# Patient Record
Sex: MALE | Race: BLACK OR AFRICAN AMERICAN | NOT HISPANIC OR LATINO | Employment: OTHER | URBAN - METROPOLITAN AREA
[De-identification: names, ages, dates, MRNs, and addresses within clinical notes are randomized per-mention and may not be internally consistent; named-entity substitution may affect disease eponyms.]

---

## 2017-05-22 ENCOUNTER — OFFICE VISIT (OUTPATIENT)
Dept: CARDIOLOGY | Facility: CLINIC | Age: 72
End: 2017-05-22
Payer: MEDICARE

## 2017-05-22 VITALS
SYSTOLIC BLOOD PRESSURE: 93 MMHG | DIASTOLIC BLOOD PRESSURE: 51 MMHG | OXYGEN SATURATION: 97 % | HEIGHT: 71 IN | BODY MASS INDEX: 28.93 KG/M2 | HEART RATE: 88 BPM | WEIGHT: 206.63 LBS

## 2017-05-22 DIAGNOSIS — I45.10 RBBB: ICD-10-CM

## 2017-05-22 DIAGNOSIS — R06.09 DOE (DYSPNEA ON EXERTION): Primary | ICD-10-CM

## 2017-05-22 DIAGNOSIS — J44.9 CHRONIC OBSTRUCTIVE PULMONARY DISEASE, UNSPECIFIED COPD TYPE: ICD-10-CM

## 2017-05-22 PROCEDURE — 1160F RVW MEDS BY RX/DR IN RCRD: CPT | Mod: S$GLB,,, | Performed by: INTERNAL MEDICINE

## 2017-05-22 PROCEDURE — 1159F MED LIST DOCD IN RCRD: CPT | Mod: S$GLB,,, | Performed by: INTERNAL MEDICINE

## 2017-05-22 PROCEDURE — 99214 OFFICE O/P EST MOD 30 MIN: CPT | Mod: S$GLB,,, | Performed by: INTERNAL MEDICINE

## 2017-05-22 PROCEDURE — 1126F AMNT PAIN NOTED NONE PRSNT: CPT | Mod: S$GLB,,, | Performed by: INTERNAL MEDICINE

## 2017-05-22 NOTE — PROGRESS NOTES
Subjective:   Patient ID:  Rajan Deluna is a 71 y.o. male who presents for follow-up of Follow-up and Shortness of Breath      Problem List Items Addressed This Visit        Pulmonary    JACOME (dyspnea on exertion) - Primary    COPD (chronic obstructive pulmonary disease)       Cardiac    RBBB      Other Visit Diagnoses    None.         HPI: Patient is here for f/u secondary to continued dyspnea that is worsening. Dyspnea is associated with coughing productive of gray sputum but no fever. He said he was seen by PCP and was told his lung are ok and his inhalers were changed and he was started on a diuretics but he forgot the name. He has been on fluid pill for a month with no improvement. No orthopnea or PND. He quit smoking last year after many years of smoking.     Review of Systems   Constitution: Negative.   HENT: Negative.    Eyes: Negative.    Cardiovascular: Positive for chest pain and dyspnea on exertion.   Respiratory: Negative.    Endocrine: Negative.    Hematologic/Lymphatic: Negative.    Skin: Negative.    Musculoskeletal: Negative.    Gastrointestinal: Negative.    Neurological: Negative.        Patient's Medications   New Prescriptions    No medications on file   Previous Medications    BUDESONIDE-FORMOTEROL 160-4.5 MCG (SYMBICORT) 160-4.5 MCG/ACTUATION HFAA    Inhale 2 puffs into the lungs every 12 (twelve) hours.    ESCITALOPRAM OXALATE (LEXAPRO) 10 MG TABLET        ETODOLAC (LODINE) 500 MG TABLET    Take 500 mg by mouth 2 (two) times daily.    GABAPENTIN (NEURONTIN) 300 MG CAPSULE    Take 300 mg by mouth.    LISINOPRIL-HYDROCHLOROTHIAZIDE (PRINZIDE,ZESTORETIC) 20-12.5 MG PER TABLET    Take 1 tablet by mouth.    LOSARTAN (COZAAR) 100 MG TABLET        OMEPRAZOLE (PRILOSEC) 20 MG CAPSULE        PREGABALIN (LYRICA) 75 MG CAPSULE    Take 75 mg by mouth 2 (two) times daily.    PROAIR HFA 90 MCG/ACTUATION INHALER        TRAMADOL (ULTRAM) 50 MG TABLET    Take 50 mg by mouth every 6 (six) hours as needed for  Pain.   Modified Medications    No medications on file   Discontinued Medications    No medications on file       Objective:   Physical Exam   Constitutional: He is oriented to person, place, and time. He appears well-developed and well-nourished. No distress.   Examination of the digits showed no clubbing or cyanosis   HENT:   Head: Normocephalic and atraumatic.   Eyes: Conjunctivae are normal. Pupils are equal, round, and reactive to light. Right eye exhibits no discharge.   Neck: Normal range of motion. Neck supple. No JVD present. No thyromegaly present.   No carotid bruits   Cardiovascular: Normal rate, regular rhythm, S1 normal, S2 normal, normal heart sounds, intact distal pulses and normal pulses.  PMI is not displaced.  Exam reveals no gallop, no friction rub and no opening snap.    No murmur heard.  Pulmonary/Chest: Effort normal. No respiratory distress. He has no wheezes. He has no rales. He exhibits no tenderness.   Decrease breath sounds bilaterally.   Abdominal: Soft. Bowel sounds are normal. He exhibits no distension and no mass. There is no tenderness. There is no guarding.   No hepatosplenomegaly   Musculoskeletal: Normal range of motion. He exhibits no edema or tenderness.   Lymphadenopathy:     He has no cervical adenopathy.   Neurological: He is alert and oriented to person, place, and time.   Skin: Skin is warm. No rash noted. He is not diaphoretic. No erythema.   Psychiatric: He has a normal mood and affect.   Nursing note and vitals reviewed.      ECGs reviewed-NSR with RBBB  LABS reviewed      Assessment:     1. JACOME (dyspnea on exertion)    2. RBBB    3. Chronic obstructive pulmonary disease, unspecified COPD type        Plan:     No signs or symptoms for CHF  2d echo  Continue current medications  Activity as tolerated  F/u with Pulmonologist    F/u in 4 months.

## 2017-09-22 ENCOUNTER — OFFICE VISIT (OUTPATIENT)
Dept: CARDIOLOGY | Facility: CLINIC | Age: 72
End: 2017-09-22
Payer: MEDICARE

## 2017-09-22 VITALS
HEART RATE: 85 BPM | BODY MASS INDEX: 28.35 KG/M2 | DIASTOLIC BLOOD PRESSURE: 76 MMHG | SYSTOLIC BLOOD PRESSURE: 141 MMHG | WEIGHT: 202.5 LBS | HEIGHT: 71 IN | OXYGEN SATURATION: 95 %

## 2017-09-22 DIAGNOSIS — I51.89 DIASTOLIC DYSFUNCTION WITHOUT HEART FAILURE: ICD-10-CM

## 2017-09-22 DIAGNOSIS — R07.9 CHEST PAIN, UNSPECIFIED TYPE: ICD-10-CM

## 2017-09-22 DIAGNOSIS — I45.10 RBBB: ICD-10-CM

## 2017-09-22 DIAGNOSIS — R06.09 DOE (DYSPNEA ON EXERTION): Primary | ICD-10-CM

## 2017-09-22 DIAGNOSIS — J43.8 OTHER EMPHYSEMA: ICD-10-CM

## 2017-09-22 PROCEDURE — 99214 OFFICE O/P EST MOD 30 MIN: CPT | Mod: S$GLB,,, | Performed by: INTERNAL MEDICINE

## 2017-09-22 PROCEDURE — 3008F BODY MASS INDEX DOCD: CPT | Mod: S$GLB,,, | Performed by: INTERNAL MEDICINE

## 2017-09-22 PROCEDURE — 1159F MED LIST DOCD IN RCRD: CPT | Mod: S$GLB,,, | Performed by: INTERNAL MEDICINE

## 2017-09-22 PROCEDURE — 1126F AMNT PAIN NOTED NONE PRSNT: CPT | Mod: S$GLB,,, | Performed by: INTERNAL MEDICINE

## 2017-09-22 NOTE — PROGRESS NOTES
Subjective:   Patient ID:  Rajan Deluna is a 71 y.o. male who presents for follow-up of Follow-up      Problem List Items Addressed This Visit        Pulmonary    COPD (chronic obstructive pulmonary disease)       Cardiac/Vascular    RBBB    Diastolic dysfunction without heart failure       Other    Chest pain    Relevant Orders    NM Myocardial Perfusion Spect Multi Pharmacologic    NM Multi Study RX Stress Card Component    JACOME (dyspnea on exertion) - Primary    Relevant Orders    NM Myocardial Perfusion Spect Multi Pharmacologic    NM Multi Study RX Stress Card Component      Other Visit Diagnoses    None.         HPI: Patient is here for f/u diastolic dysfunction and JACOME now on oxygen. He is now having pain located mid sternally that is tight in quality and none radiating. Discomfort is mild in intensity . No orthopnea or PND. BP is controlled. He is able to be more active with oxygen.     No syncope or dizziness.          Review of Systems   Constitution: Negative.   HENT: Negative.    Eyes: Negative.    Cardiovascular: Positive for dyspnea on exertion. Negative for chest pain.   Respiratory: Negative.    Endocrine: Negative.    Hematologic/Lymphatic: Negative.    Skin: Negative.    Musculoskeletal: Negative.    Gastrointestinal: Negative.    Neurological: Negative.        Patient's Medications   New Prescriptions    No medications on file   Previous Medications    BUDESONIDE-FORMOTEROL 160-4.5 MCG (SYMBICORT) 160-4.5 MCG/ACTUATION HFAA    Inhale 2 puffs into the lungs every 12 (twelve) hours.    ESCITALOPRAM OXALATE (LEXAPRO) 10 MG TABLET        ETODOLAC (LODINE) 500 MG TABLET    Take 500 mg by mouth 2 (two) times daily.    GABAPENTIN (NEURONTIN) 300 MG CAPSULE    Take 300 mg by mouth.    LISINOPRIL-HYDROCHLOROTHIAZIDE (PRINZIDE,ZESTORETIC) 20-12.5 MG PER TABLET    Take 1 tablet by mouth.    LOSARTAN (COZAAR) 100 MG TABLET        OMEPRAZOLE (PRILOSEC) 20 MG CAPSULE        PREGABALIN (LYRICA) 75 MG CAPSULE     Take 75 mg by mouth 2 (two) times daily.    PROAIR HFA 90 MCG/ACTUATION INHALER        TRAMADOL (ULTRAM) 50 MG TABLET    Take 50 mg by mouth every 6 (six) hours as needed for Pain.   Modified Medications    No medications on file   Discontinued Medications    No medications on file       Objective:   Physical Exam   Constitutional: He is oriented to person, place, and time. He appears well-developed and well-nourished. No distress.   Examination of the digits showed no clubbing or cyanosis   HENT:   Head: Normocephalic and atraumatic.   Eyes: Conjunctivae are normal. Pupils are equal, round, and reactive to light. Right eye exhibits no discharge.   Neck: Normal range of motion. Neck supple. No JVD present. No thyromegaly present.   No carotid bruits   Cardiovascular: Normal rate, regular rhythm, S1 normal, S2 normal, normal heart sounds, intact distal pulses and normal pulses.  PMI is not displaced.  Exam reveals no gallop, no friction rub and no opening snap.    No murmur heard.  Pulmonary/Chest: Effort normal. No respiratory distress. He has no wheezes. He has no rales. He exhibits no tenderness.   Decrease breath sounds bilaterally.   Abdominal: Soft. Bowel sounds are normal. He exhibits no distension and no mass. There is no tenderness. There is no guarding.   No hepatosplenomegaly   Musculoskeletal: Normal range of motion. He exhibits no edema or tenderness.   Lymphadenopathy:     He has no cervical adenopathy.   Neurological: He is alert and oriented to person, place, and time.   Skin: Skin is warm. No rash noted. He is not diaphoretic. No erythema.   Psychiatric: He has a normal mood and affect.   Nursing note and vitals reviewed.      ECGs reviewed-NSR with RBBB  LABS reviewed  Echo- normal ef with DD    Assessment:     1. JACOME (dyspnea on exertion)    2. RBBB    3. Other emphysema    4. Diastolic dysfunction without heart failure    5. Chest pain, unspecified type        Plan:   Nuclear stress  No signs or  symptoms for CHF  Continue current medications  Activity as tolerated  F/u with Pulmonologist    F/u in 4 months.

## 2018-04-20 ENCOUNTER — TREATMENT PLANNING (OUTPATIENT)
Dept: HEPATOLOGY | Facility: HOSPITAL | Age: 73
End: 2018-04-20

## 2018-04-20 NOTE — PROGRESS NOTES
Outside Hospital Acceptance Note      Patient Name:  LAUREN Polk [id number]    Accepting Physician: Janet Villa MD    Acceptance Date: 4/20/18    Transferring Physician/Midlevel Provider and Institution: [facility]    Reason for Transfer: GI evaluation for abdominal pain    HPI:   Mr. Rajan Deluna is a 72 y.o. male with tobacco abuse, COPD and HTN who presents to [facility] ED because of epigastric and RUQ abdominal pain that has been present for a week.  He has had 3 presentations for abdominal pain this week by the ED.  He denies any diarrhea or constipation.  He denies any history of ulcer disease or weight loss.  CT scan was only remarkable for diverticulosis.  He has never had an EGD and never been evaluated by GI.    Vitals:   /58, HR 68, RR 22, 91-92% on NC (home oxygen)    Labs:   Bilirubin 1.6  Amylase and Lipase Normal  Alk Phos 159    Imaging:   · CT Abdomen:  Diverticulosis  · Gallbladder US:  Negative cholecystitis    Medical Decision Making:  Requested that an ultrasound be ordered to evaluate for mesenteric ischemia.  Requested the patient be started on a PPI possible ulcer disease, although he doesn't take any NSAIDS.   Patient can be worked up outpatient for further causes of abdominal pain with an EGD and GI follow up outpatient, as labs are not concerning and no intervention would be performed until Monday given the weekend.    The case was discussed with Dr. Huynh - who agreed with the plan of care.  She was informed to call back the [facility] if any other problems arise.    Janet Villa MD  Hospital Medicine  Cell:  [phone]  Spectra:  [phone]  Pager:  [phone]    Please call extension [phone] (if nobody answers, this will flip to a beeper, so put in your call back number) upon patient arrival to floor for Hospital Medicine admit team assignment and for additional admit orders for the patient.

## 2020-07-21 ENCOUNTER — HOSPITAL ENCOUNTER (INPATIENT)
Facility: HOSPITAL | Age: 75
LOS: 37 days | Discharge: NURSING, REHAB OR LONG-TERM CARE | DRG: 871 | End: 2020-08-27
Attending: EMERGENCY MEDICINE | Admitting: FAMILY MEDICINE
Payer: MEDICARE

## 2020-07-21 DIAGNOSIS — D64.9 ANEMIA, UNSPECIFIED TYPE: ICD-10-CM

## 2020-07-21 DIAGNOSIS — A41.9 SEPSIS, DUE TO UNSPECIFIED ORGANISM, UNSPECIFIED WHETHER ACUTE ORGAN DYSFUNCTION PRESENT: ICD-10-CM

## 2020-07-21 DIAGNOSIS — R06.09 DOE (DYSPNEA ON EXERTION): ICD-10-CM

## 2020-07-21 DIAGNOSIS — R53.1 WEAKNESS GENERALIZED: ICD-10-CM

## 2020-07-21 DIAGNOSIS — U07.1 PNEUMONIA DUE TO COVID-19 VIRUS: ICD-10-CM

## 2020-07-21 DIAGNOSIS — R53.81 POST VIRAL DEBILITY: ICD-10-CM

## 2020-07-21 DIAGNOSIS — R07.9 CHEST PAIN: ICD-10-CM

## 2020-07-21 DIAGNOSIS — R07.9 CHEST PAIN, UNSPECIFIED TYPE: ICD-10-CM

## 2020-07-21 DIAGNOSIS — J12.9 VIRAL PNEUMONIA: ICD-10-CM

## 2020-07-21 DIAGNOSIS — U07.1 ACUTE RESPIRATORY DISEASE DUE TO COVID-19 VIRUS: Primary | ICD-10-CM

## 2020-07-21 DIAGNOSIS — A49.8 CLOSTRIDIUM DIFFICILE INFECTION: ICD-10-CM

## 2020-07-21 DIAGNOSIS — B44.9 POSITIVE SPUTUM CULTURE FOR ASPERGILLUS: ICD-10-CM

## 2020-07-21 DIAGNOSIS — B94.8 POST VIRAL DEBILITY: ICD-10-CM

## 2020-07-21 DIAGNOSIS — J44.9 CHRONIC OBSTRUCTIVE PULMONARY DISEASE, UNSPECIFIED COPD TYPE: ICD-10-CM

## 2020-07-21 DIAGNOSIS — Z71.89 COUNSELING REGARDING ADVANCE CARE PLANNING AND GOALS OF CARE: ICD-10-CM

## 2020-07-21 DIAGNOSIS — J18.9 COMMUNITY ACQUIRED PNEUMONIA, UNSPECIFIED LATERALITY: ICD-10-CM

## 2020-07-21 DIAGNOSIS — I95.9 HYPOTENSION, UNSPECIFIED HYPOTENSION TYPE: ICD-10-CM

## 2020-07-21 DIAGNOSIS — J43.8 OTHER EMPHYSEMA: ICD-10-CM

## 2020-07-21 DIAGNOSIS — J06.9 ACUTE RESPIRATORY DISEASE DUE TO COVID-19 VIRUS: Primary | ICD-10-CM

## 2020-07-21 DIAGNOSIS — Z71.89 GOALS OF CARE, COUNSELING/DISCUSSION: ICD-10-CM

## 2020-07-21 DIAGNOSIS — R50.9 FEVER: ICD-10-CM

## 2020-07-21 DIAGNOSIS — J12.82 PNEUMONIA DUE TO COVID-19 VIRUS: ICD-10-CM

## 2020-07-21 DIAGNOSIS — K92.2 GASTROINTESTINAL HEMORRHAGE, UNSPECIFIED GASTROINTESTINAL HEMORRHAGE TYPE: ICD-10-CM

## 2020-07-21 DIAGNOSIS — N17.9 AKI (ACUTE KIDNEY INJURY): ICD-10-CM

## 2020-07-21 DIAGNOSIS — K92.1 MELENA: ICD-10-CM

## 2020-07-21 DIAGNOSIS — J96.01 ACUTE RESPIRATORY FAILURE WITH HYPOXIA: ICD-10-CM

## 2020-07-21 DIAGNOSIS — Z86.19 HISTORY OF MAC INFECTION: ICD-10-CM

## 2020-07-21 DIAGNOSIS — Z51.5 PALLIATIVE CARE ENCOUNTER: ICD-10-CM

## 2020-07-21 DIAGNOSIS — U07.1 COVID-19: ICD-10-CM

## 2020-07-21 DIAGNOSIS — Z86.16 HISTORY OF 2019 NOVEL CORONAVIRUS DISEASE (COVID-19): ICD-10-CM

## 2020-07-21 PROBLEM — R65.21 SEPSIS WITH ACUTE RENAL FAILURE AND SEPTIC SHOCK: Status: ACTIVE | Noted: 2020-07-21

## 2020-07-21 LAB
ABO GROUP BLD: NORMAL
ALBUMIN SERPL BCP-MCNC: 1 G/DL (ref 3.5–5.2)
ALBUMIN SERPL BCP-MCNC: 2.7 G/DL (ref 3.5–5.2)
ALP SERPL-CCNC: 35 U/L (ref 55–135)
ALP SERPL-CCNC: 89 U/L (ref 55–135)
ALT SERPL W/O P-5'-P-CCNC: 232 U/L (ref 10–44)
ALT SERPL W/O P-5'-P-CCNC: 84 U/L (ref 10–44)
ANION GAP SERPL CALC-SCNC: 12 MMOL/L (ref 8–16)
ANION GAP SERPL CALC-SCNC: ABNORMAL MMOL/L (ref 8–16)
ANISOCYTOSIS BLD QL SMEAR: SLIGHT
AST SERPL-CCNC: 165 U/L (ref 10–40)
AST SERPL-CCNC: 450 U/L (ref 10–40)
BACTERIA #/AREA URNS HPF: ABNORMAL /HPF
BASOPHILS # BLD AUTO: 0 K/UL (ref 0–0.2)
BASOPHILS # BLD AUTO: 0.01 K/UL (ref 0–0.2)
BASOPHILS NFR BLD: 0 % (ref 0–1.9)
BASOPHILS NFR BLD: 0.2 % (ref 0–1.9)
BILIRUB SERPL-MCNC: 0.3 MG/DL (ref 0.1–1)
BILIRUB SERPL-MCNC: 0.8 MG/DL (ref 0.1–1)
BILIRUB UR QL STRIP: NEGATIVE
BLD GP AB SCN CELLS X3 SERPL QL: NORMAL
BLD PROD TYP BPU: NORMAL
BLD PROD TYP BPU: NORMAL
BLOOD UNIT EXPIRATION DATE: NORMAL
BLOOD UNIT EXPIRATION DATE: NORMAL
BLOOD UNIT TYPE CODE: 7300
BLOOD UNIT TYPE CODE: 7300
BLOOD UNIT TYPE: NORMAL
BLOOD UNIT TYPE: NORMAL
BNP SERPL-MCNC: 13 PG/ML (ref 0–99)
BUN SERPL-MCNC: 24 MG/DL (ref 8–23)
BUN SERPL-MCNC: 45 MG/DL (ref 8–23)
BURR CELLS BLD QL SMEAR: ABNORMAL
CALCIUM SERPL-MCNC: 3.3 MG/DL (ref 8.7–10.5)
CALCIUM SERPL-MCNC: 7.7 MG/DL (ref 8.7–10.5)
CHLORIDE SERPL-SCNC: 109 MMOL/L (ref 95–110)
CHLORIDE SERPL-SCNC: >130 MMOL/L (ref 95–110)
CLARITY UR: CLEAR
CO2 SERPL-SCNC: 11 MMOL/L (ref 23–29)
CO2 SERPL-SCNC: 22 MMOL/L (ref 23–29)
CODING SYSTEM: NORMAL
CODING SYSTEM: NORMAL
COLOR UR: YELLOW
CREAT SERPL-MCNC: 1.1 MG/DL (ref 0.5–1.4)
CREAT SERPL-MCNC: 2.9 MG/DL (ref 0.5–1.4)
D DIMER PPP IA.FEU-MCNC: 7.22 MG/L FEU
DIFFERENTIAL METHOD: ABNORMAL
DIFFERENTIAL METHOD: ABNORMAL
DISPENSE STATUS: NORMAL
DISPENSE STATUS: NORMAL
EOSINOPHIL # BLD AUTO: 0 K/UL (ref 0–0.5)
EOSINOPHIL # BLD AUTO: 0 K/UL (ref 0–0.5)
EOSINOPHIL NFR BLD: 0 % (ref 0–8)
EOSINOPHIL NFR BLD: 0 % (ref 0–8)
ERYTHROCYTE [DISTWIDTH] IN BLOOD BY AUTOMATED COUNT: 17.3 % (ref 11.5–14.5)
ERYTHROCYTE [DISTWIDTH] IN BLOOD BY AUTOMATED COUNT: 17.5 % (ref 11.5–14.5)
EST. GFR  (AFRICAN AMERICAN): 23 ML/MIN/1.73 M^2
EST. GFR  (AFRICAN AMERICAN): >60 ML/MIN/1.73 M^2
EST. GFR  (NON AFRICAN AMERICAN): 20 ML/MIN/1.73 M^2
EST. GFR  (NON AFRICAN AMERICAN): >60 ML/MIN/1.73 M^2
FERRITIN SERPL-MCNC: 2041 NG/ML (ref 20–300)
GLUCOSE SERPL-MCNC: 123 MG/DL (ref 70–110)
GLUCOSE SERPL-MCNC: 55 MG/DL (ref 70–110)
GLUCOSE UR QL STRIP: NEGATIVE
HCT VFR BLD AUTO: 16.1 % (ref 40–54)
HCT VFR BLD AUTO: 32.8 % (ref 40–54)
HGB BLD-MCNC: 10.1 G/DL (ref 14–18)
HGB BLD-MCNC: 4.9 G/DL (ref 14–18)
HGB UR QL STRIP: ABNORMAL
HYALINE CASTS #/AREA URNS LPF: 1 /LPF
HYPOCHROMIA BLD QL SMEAR: ABNORMAL
IMM GRANULOCYTES # BLD AUTO: 0.01 K/UL (ref 0–0.04)
IMM GRANULOCYTES # BLD AUTO: 0.02 K/UL (ref 0–0.04)
IMM GRANULOCYTES NFR BLD AUTO: 0.4 % (ref 0–0.5)
IMM GRANULOCYTES NFR BLD AUTO: 0.4 % (ref 0–0.5)
KETONES UR QL STRIP: NEGATIVE
LACTATE SERPL-SCNC: 0.6 MMOL/L (ref 0.5–2.2)
LACTATE SERPL-SCNC: 1.2 MMOL/L (ref 0.5–2.2)
LDH SERPL L TO P-CCNC: 794 U/L (ref 110–260)
LEUKOCYTE ESTERASE UR QL STRIP: NEGATIVE
LIPASE SERPL-CCNC: 81 U/L (ref 4–60)
LYMPHOCYTES # BLD AUTO: 0.5 K/UL (ref 1–4.8)
LYMPHOCYTES # BLD AUTO: 1 K/UL (ref 1–4.8)
LYMPHOCYTES NFR BLD: 18.7 % (ref 18–48)
LYMPHOCYTES NFR BLD: 19.3 % (ref 18–48)
MAGNESIUM SERPL-MCNC: 1.1 MG/DL (ref 1.6–2.6)
MAGNESIUM SERPL-MCNC: 2.4 MG/DL (ref 1.6–2.6)
MCH RBC QN AUTO: 28.7 PG (ref 27–31)
MCH RBC QN AUTO: 29.2 PG (ref 27–31)
MCHC RBC AUTO-ENTMCNC: 30.4 G/DL (ref 32–36)
MCHC RBC AUTO-ENTMCNC: 30.8 G/DL (ref 32–36)
MCV RBC AUTO: 93 FL (ref 82–98)
MCV RBC AUTO: 96 FL (ref 82–98)
MICROSCOPIC COMMENT: ABNORMAL
MONOCYTES # BLD AUTO: 0.2 K/UL (ref 0.3–1)
MONOCYTES # BLD AUTO: 0.4 K/UL (ref 0.3–1)
MONOCYTES NFR BLD: 7.5 % (ref 4–15)
MONOCYTES NFR BLD: 7.8 % (ref 4–15)
NEUTROPHILS # BLD AUTO: 1.8 K/UL (ref 1.8–7.7)
NEUTROPHILS # BLD AUTO: 3.6 K/UL (ref 1.8–7.7)
NEUTROPHILS NFR BLD: 72.3 % (ref 38–73)
NEUTROPHILS NFR BLD: 73.4 % (ref 38–73)
NITRITE UR QL STRIP: NEGATIVE
NRBC BLD-RTO: 0 /100 WBC
NRBC BLD-RTO: 0 /100 WBC
PH UR STRIP: 6 [PH] (ref 5–8)
PHOSPHATE SERPL-MCNC: 1.3 MG/DL (ref 2.7–4.5)
PHOSPHATE SERPL-MCNC: 3 MG/DL (ref 2.7–4.5)
PLATELET # BLD AUTO: 236 K/UL (ref 150–350)
PLATELET # BLD AUTO: 98 K/UL (ref 150–350)
PLATELET BLD QL SMEAR: ABNORMAL
PMV BLD AUTO: 10.5 FL (ref 9.2–12.9)
PMV BLD AUTO: 9.8 FL (ref 9.2–12.9)
POIKILOCYTOSIS BLD QL SMEAR: SLIGHT
POLYCHROMASIA BLD QL SMEAR: ABNORMAL
POTASSIUM SERPL-SCNC: 4.1 MMOL/L (ref 3.5–5.1)
POTASSIUM SERPL-SCNC: <2 MMOL/L (ref 3.5–5.1)
PROCALCITONIN SERPL IA-MCNC: 1.13 NG/ML
PROT SERPL-MCNC: 2.9 G/DL (ref 6–8.4)
PROT SERPL-MCNC: 7.5 G/DL (ref 6–8.4)
PROT UR QL STRIP: ABNORMAL
RBC # BLD AUTO: 1.68 M/UL (ref 4.6–6.2)
RBC # BLD AUTO: 3.52 M/UL (ref 4.6–6.2)
RBC #/AREA URNS HPF: 1 /HPF (ref 0–4)
RBC CASTS #/AREA URNS LPF: 4 /LPF
RH BLD: NORMAL
SARS-COV-2 RDRP RESP QL NAA+PROBE: POSITIVE
SODIUM SERPL-SCNC: 143 MMOL/L (ref 136–145)
SODIUM SERPL-SCNC: 146 MMOL/L (ref 136–145)
SP GR UR STRIP: 1.02 (ref 1–1.03)
TRANS ERYTHROCYTES VOL PATIENT: NORMAL ML
TRANS ERYTHROCYTES VOL PATIENT: NORMAL ML
TROPONIN I SERPL DL<=0.01 NG/ML-MCNC: 0.04 NG/ML (ref 0–0.03)
URN SPEC COLLECT METH UR: ABNORMAL
UROBILINOGEN UR STRIP-ACNC: NEGATIVE EU/DL
WBC # BLD AUTO: 2.41 K/UL (ref 3.9–12.7)
WBC # BLD AUTO: 5.02 K/UL (ref 3.9–12.7)
WBC #/AREA URNS HPF: 2 /HPF (ref 0–5)

## 2020-07-21 PROCEDURE — 27100098 HC SPACER

## 2020-07-21 PROCEDURE — 93005 ELECTROCARDIOGRAM TRACING: CPT

## 2020-07-21 PROCEDURE — 83615 LACTATE (LD) (LDH) ENZYME: CPT

## 2020-07-21 PROCEDURE — 83735 ASSAY OF MAGNESIUM: CPT

## 2020-07-21 PROCEDURE — 82306 VITAMIN D 25 HYDROXY: CPT

## 2020-07-21 PROCEDURE — 84484 ASSAY OF TROPONIN QUANT: CPT

## 2020-07-21 PROCEDURE — 85025 COMPLETE CBC W/AUTO DIFF WBC: CPT

## 2020-07-21 PROCEDURE — 94761 N-INVAS EAR/PLS OXIMETRY MLT: CPT

## 2020-07-21 PROCEDURE — 85730 THROMBOPLASTIN TIME PARTIAL: CPT

## 2020-07-21 PROCEDURE — 83605 ASSAY OF LACTIC ACID: CPT

## 2020-07-21 PROCEDURE — 25000242 PHARM REV CODE 250 ALT 637 W/ HCPCS: Performed by: STUDENT IN AN ORGANIZED HEALTH CARE EDUCATION/TRAINING PROGRAM

## 2020-07-21 PROCEDURE — 84100 ASSAY OF PHOSPHORUS: CPT

## 2020-07-21 PROCEDURE — 36430 TRANSFUSION BLD/BLD COMPNT: CPT

## 2020-07-21 PROCEDURE — 12000002 HC ACUTE/MED SURGE SEMI-PRIVATE ROOM

## 2020-07-21 PROCEDURE — U0002 COVID-19 LAB TEST NON-CDC: HCPCS

## 2020-07-21 PROCEDURE — 86920 COMPATIBILITY TEST SPIN: CPT

## 2020-07-21 PROCEDURE — G0378 HOSPITAL OBSERVATION PER HR: HCPCS

## 2020-07-21 PROCEDURE — 96361 HYDRATE IV INFUSION ADD-ON: CPT

## 2020-07-21 PROCEDURE — 25000003 PHARM REV CODE 250: Performed by: EMERGENCY MEDICINE

## 2020-07-21 PROCEDURE — 83690 ASSAY OF LIPASE: CPT

## 2020-07-21 PROCEDURE — 27000221 HC OXYGEN, UP TO 24 HOURS

## 2020-07-21 PROCEDURE — 85610 PROTHROMBIN TIME: CPT

## 2020-07-21 PROCEDURE — 86850 RBC ANTIBODY SCREEN: CPT

## 2020-07-21 PROCEDURE — 83880 ASSAY OF NATRIURETIC PEPTIDE: CPT

## 2020-07-21 PROCEDURE — 63600175 PHARM REV CODE 636 W HCPCS: Performed by: EMERGENCY MEDICINE

## 2020-07-21 PROCEDURE — 80053 COMPREHEN METABOLIC PANEL: CPT

## 2020-07-21 PROCEDURE — P9021 RED BLOOD CELLS UNIT: HCPCS

## 2020-07-21 PROCEDURE — 87186 SC STD MICRODIL/AGAR DIL: CPT

## 2020-07-21 PROCEDURE — 83605 ASSAY OF LACTIC ACID: CPT | Mod: 91

## 2020-07-21 PROCEDURE — 96376 TX/PRO/DX INJ SAME DRUG ADON: CPT | Performed by: EMERGENCY MEDICINE

## 2020-07-21 PROCEDURE — 86901 BLOOD TYPING SEROLOGIC RH(D): CPT

## 2020-07-21 PROCEDURE — 85379 FIBRIN DEGRADATION QUANT: CPT

## 2020-07-21 PROCEDURE — 94640 AIRWAY INHALATION TREATMENT: CPT

## 2020-07-21 PROCEDURE — 63600175 PHARM REV CODE 636 W HCPCS: Performed by: STUDENT IN AN ORGANIZED HEALTH CARE EDUCATION/TRAINING PROGRAM

## 2020-07-21 PROCEDURE — 99291 CRITICAL CARE FIRST HOUR: CPT

## 2020-07-21 PROCEDURE — 81000 URINALYSIS NONAUTO W/SCOPE: CPT

## 2020-07-21 PROCEDURE — 96365 THER/PROPH/DIAG IV INF INIT: CPT

## 2020-07-21 PROCEDURE — 82728 ASSAY OF FERRITIN: CPT

## 2020-07-21 PROCEDURE — 84145 PROCALCITONIN (PCT): CPT

## 2020-07-21 PROCEDURE — 87040 BLOOD CULTURE FOR BACTERIA: CPT

## 2020-07-21 PROCEDURE — 99900035 HC TECH TIME PER 15 MIN (STAT)

## 2020-07-21 PROCEDURE — 25000003 PHARM REV CODE 250: Performed by: STUDENT IN AN ORGANIZED HEALTH CARE EDUCATION/TRAINING PROGRAM

## 2020-07-21 RX ORDER — ONDANSETRON 2 MG/ML
4 INJECTION INTRAMUSCULAR; INTRAVENOUS EVERY 8 HOURS PRN
Status: DISCONTINUED | OUTPATIENT
Start: 2020-07-21 | End: 2020-08-27 | Stop reason: HOSPADM

## 2020-07-21 RX ORDER — NOREPINEPHRINE BITARTRATE/D5W 8 MG/250ML
0.02 PLASTIC BAG, INJECTION (ML) INTRAVENOUS CONTINUOUS
Status: DISCONTINUED | OUTPATIENT
Start: 2020-07-21 | End: 2020-07-22

## 2020-07-21 RX ORDER — HYDROCODONE BITARTRATE AND ACETAMINOPHEN 500; 5 MG/1; MG/1
TABLET ORAL
Status: DISCONTINUED | OUTPATIENT
Start: 2020-07-21 | End: 2020-07-21

## 2020-07-21 RX ORDER — ACETAMINOPHEN 325 MG/1
650 TABLET ORAL EVERY 8 HOURS PRN
Status: DISCONTINUED | OUTPATIENT
Start: 2020-07-21 | End: 2020-07-24

## 2020-07-21 RX ORDER — LEVOFLOXACIN 750 MG/1
750 TABLET ORAL DAILY
Status: DISCONTINUED | OUTPATIENT
Start: 2020-07-22 | End: 2020-07-22

## 2020-07-21 RX ORDER — ACETAMINOPHEN 500 MG
1000 TABLET ORAL
Status: COMPLETED | OUTPATIENT
Start: 2020-07-21 | End: 2020-07-21

## 2020-07-21 RX ORDER — ALBUTEROL SULFATE 90 UG/1
2 AEROSOL, METERED RESPIRATORY (INHALATION) EVERY 4 HOURS PRN
Status: DISCONTINUED | OUTPATIENT
Start: 2020-07-21 | End: 2020-07-22

## 2020-07-21 RX ORDER — FUROSEMIDE 10 MG/ML
40 INJECTION INTRAMUSCULAR; INTRAVENOUS ONCE
Status: COMPLETED | OUTPATIENT
Start: 2020-07-21 | End: 2020-07-22

## 2020-07-21 RX ORDER — HEPARIN SODIUM 5000 [USP'U]/ML
5000 INJECTION, SOLUTION INTRAVENOUS; SUBCUTANEOUS EVERY 8 HOURS
Status: DISCONTINUED | OUTPATIENT
Start: 2020-07-21 | End: 2020-07-21

## 2020-07-21 RX ORDER — HEPARIN SODIUM,PORCINE/D5W 25000/250
18 INTRAVENOUS SOLUTION INTRAVENOUS CONTINUOUS
Status: DISCONTINUED | OUTPATIENT
Start: 2020-07-22 | End: 2020-07-22

## 2020-07-21 RX ORDER — SODIUM CHLORIDE 0.9 % (FLUSH) 0.9 %
10 SYRINGE (ML) INJECTION
Status: DISCONTINUED | OUTPATIENT
Start: 2020-07-21 | End: 2020-08-27 | Stop reason: HOSPADM

## 2020-07-21 RX ORDER — ATORVASTATIN CALCIUM 40 MG/1
40 TABLET, FILM COATED ORAL NIGHTLY
Status: DISCONTINUED | OUTPATIENT
Start: 2020-07-21 | End: 2020-07-22

## 2020-07-21 RX ADMIN — ACETAMINOPHEN 1000 MG: 500 TABLET ORAL at 06:07

## 2020-07-21 RX ADMIN — ALBUTEROL SULFATE 2 PUFF: 90 AEROSOL, METERED RESPIRATORY (INHALATION) at 11:07

## 2020-07-21 RX ADMIN — HEPARIN SODIUM 5000 UNITS: 5000 INJECTION, SOLUTION INTRAVENOUS; SUBCUTANEOUS at 10:07

## 2020-07-21 RX ADMIN — VANCOMYCIN HYDROCHLORIDE 2250 MG: 100 INJECTION, POWDER, LYOPHILIZED, FOR SOLUTION INTRAVENOUS at 06:07

## 2020-07-21 RX ADMIN — PIPERACILLIN AND TAZOBACTAM 4.5 G: 4; .5 INJECTION, POWDER, LYOPHILIZED, FOR SOLUTION INTRAVENOUS; PARENTERAL at 02:07

## 2020-07-21 RX ADMIN — SODIUM CHLORIDE 2700 ML: 0.9 INJECTION, SOLUTION INTRAVENOUS at 01:07

## 2020-07-21 RX ADMIN — Medication 0.02 MCG/KG/MIN: at 10:07

## 2020-07-21 NOTE — ED PROVIDER NOTES
"Encounter Date: 7/21/2020    SCRIBE #1 NOTE: I, Rao Wayne, am scribing for, and in the presence of, Joey Ward MD.       History     Chief Complaint   Patient presents with    Fever     with AMS- pt from home, recent UTI, fever, fatigue and " not acting like normal self"      Rajan Deluna is a 74 y.o. male who  has a past medical history of Arthritis, COPD (chronic obstructive pulmonary disease), Hypertension, Smoker, and Weakness.    The patient presents to the ED via EMS from due to fever and AMS.  Per EMS, family was concerned he was not acting like his himself and appeared weak.  On arrival to ED, he endorses cough but denies any CP, SOB, abdominal pain, N/V/D, recent illness, or known sick contacts. He denies any contact with anyone who tested positive for COVID.  He is unsure of his medical history but does use 2L O2 at home at baseline.    The history is provided by the patient.     Review of patient's allergies indicates:  No Known Allergies  Past Medical History:   Diagnosis Date    Arthritis     COPD (chronic obstructive pulmonary disease)     Hypertension     Smoker     Weakness      Past Surgical History:   Procedure Laterality Date    HERNIA REPAIR      right knee surgery       No family history on file.  Social History     Tobacco Use    Smoking status: Former Smoker    Smokeless tobacco: Never Used   Substance Use Topics    Alcohol use: Not on file    Drug use: Not on file     Review of Systems   Constitutional: Negative for chills and fever.   HENT: Negative for sore throat.    Respiratory: Positive for cough. Negative for shortness of breath.    Cardiovascular: Negative for chest pain.   Gastrointestinal: Negative for constipation, diarrhea, nausea and vomiting.   Genitourinary: Negative for dysuria, frequency and urgency.   Musculoskeletal: Negative for back pain.   Skin: Negative for rash and wound.   Neurological: Negative for weakness.   Hematological: Does not bruise/bleed " easily.   Psychiatric/Behavioral: Negative for agitation, behavioral problems and confusion.       Physical Exam     Initial Vitals [07/21/20 1301]   BP Pulse Resp Temp SpO2   (!) 88/45 93 18 (!) 101 °F (38.3 °C) (!) 84 %      MAP       --         Physical Exam    Nursing note and vitals reviewed.  Constitutional: He appears well-developed and well-nourished.   Appears stated age, fatigued but in no distress.   HENT:   Head: Normocephalic and atraumatic.   Eyes: EOM are normal. Pupils are equal, round, and reactive to light.   Neck: Normal range of motion. Neck supple.   Cardiovascular: Normal rate, regular rhythm, normal heart sounds and intact distal pulses.   Pulmonary/Chest: No respiratory distress. He has rales.   Scattered rales bilaterally   Abdominal: Soft. He exhibits no distension and no mass. There is no abdominal tenderness.   Musculoskeletal: Normal range of motion. No edema.   Neurological: He is alert and oriented to person, place, and time. He has normal strength. No cranial nerve deficit or sensory deficit. He exhibits normal muscle tone. GCS eye subscore is 4. GCS verbal subscore is 5. GCS motor subscore is 6.   Skin: Skin is warm and dry.         ED Course   Procedures  Labs Reviewed   CBC W/ AUTO DIFFERENTIAL - Abnormal; Notable for the following components:       Result Value    WBC 2.41 (*)     RBC 1.68 (*)     Hemoglobin 4.9 (*)     Hematocrit 16.1 (*)     Mean Corpuscular Hemoglobin Conc 30.4 (*)     RDW 17.3 (*)     Platelets 98 (*)     Lymph # 0.5 (*)     Mono # 0.2 (*)     Gran% 73.4 (*)     Platelet Estimate Decreased (*)     All other components within normal limits    Narrative:      Hgb & Hct  critical result(s) called and verbal readback obtained   from   Rosio Mahmood RN.  by [facility] 07/21/2020 14:39   COMPREHENSIVE METABOLIC PANEL - Abnormal; Notable for the following components:    Sodium 146 (*)     Potassium <2.0 (*)     Chloride >130 (*)     CO2 11 (*)     Glucose 55 (*)     BUN,  Bld 24 (*)     Calcium 3.3 (*)     Total Protein 2.9 (*)     Albumin 1.0 (*)     Alkaline Phosphatase 35 (*)      (*)     ALT 84 (*)     All other components within normal limits    Narrative:     Cl, K, Ca -   critical result(s) called and verbal readback obtained   from YAZMIN Mahmood RN  by [facility] 07/21/2020 15:22   URINALYSIS, REFLEX TO URINE CULTURE - Abnormal; Notable for the following components:    Protein, UA 2+ (*)     Occult Blood UA 3+ (*)     All other components within normal limits    Narrative:     Specimen Source->Urine   MAGNESIUM - Abnormal; Notable for the following components:    Magnesium 1.1 (*)     All other components within normal limits   PHOSPHORUS - Abnormal; Notable for the following components:    Phosphorus 1.3 (*)     All other components within normal limits   TROPONIN I - Abnormal; Notable for the following components:    Troponin I 0.036 (*)     All other components within normal limits   PROCALCITONIN - Abnormal; Notable for the following components:    Procalcitonin 1.13 (*)     All other components within normal limits   SARS-COV-2 RNA AMPLIFICATION, QUAL - Abnormal; Notable for the following components:    SARS-CoV-2 RNA, Amplification, Qual Positive (*)     All other components within normal limits    Narrative:       COVID- 19 result(s) called and verbal readback obtained from Rosio Mahmood RN by [facility] 07/21/2020 14:38   URINALYSIS MICROSCOPIC - Abnormal; Notable for the following components:    RBC Casts, UA 4 (*)     All other components within normal limits    Narrative:     Specimen Source->Urine   COMPREHENSIVE METABOLIC PANEL - Abnormal; Notable for the following components:    CO2 22 (*)     Glucose 123 (*)     BUN, Bld 45 (*)     Creatinine 2.9 (*)     Calcium 7.7 (*)     Albumin 2.7 (*)      (*)      (*)     eGFR if  23 (*)     eGFR if non  20 (*)     All other components within normal limits   FERRITIN -  Abnormal; Notable for the following components:    Ferritin 2,041 (*)     All other components within normal limits   D DIMER, QUANTITATIVE - Abnormal; Notable for the following components:    D-Dimer 7.22 (*)     All other components within normal limits   LACTATE DEHYDROGENASE - Abnormal; Notable for the following components:     (*)     All other components within normal limits   CBC W/ AUTO DIFFERENTIAL - Abnormal; Notable for the following components:    RBC 3.52 (*)     Hemoglobin 10.1 (*)     Hematocrit 32.8 (*)     Mean Corpuscular Hemoglobin Conc 30.8 (*)     RDW 17.5 (*)     All other components within normal limits   LIPASE - Abnormal; Notable for the following components:    Lipase 81 (*)     All other components within normal limits   APTT - Abnormal; Notable for the following components:    aPTT 38.1 (*)     All other components within normal limits    Narrative:     (if patient is on warfarin prior to heparin therapy)   CBC W/ AUTO DIFFERENTIAL - Abnormal; Notable for the following components:    RBC 3.40 (*)     Hemoglobin 9.9 (*)     Hematocrit 31.4 (*)     Mean Corpuscular Hemoglobin Conc 31.5 (*)     RDW 17.0 (*)     All other components within normal limits    Narrative:     (if patient is on warfarin prior to heparin therapy)   APTT - Abnormal; Notable for the following components:    aPTT 38.1 (*)     All other components within normal limits    Narrative:     (if patient is on warfarin prior to heparin therapy)   COMPREHENSIVE METABOLIC PANEL - Abnormal; Notable for the following components:    Glucose 148 (*)     BUN, Bld 40 (*)     Creatinine 2.4 (*)     Calcium 7.3 (*)     Albumin 2.4 (*)      (*)      (*)     eGFR if  29 (*)     eGFR if non  25 (*)     All other components within normal limits   CBC W/ AUTO DIFFERENTIAL - Abnormal; Notable for the following components:    RBC 3.70 (*)     Hemoglobin 10.8 (*)     Hematocrit 33.8 (*)      RDW 16.9 (*)     Lymph # 0.7 (*)     Gran% 74.1 (*)     All other components within normal limits   APTT - Abnormal; Notable for the following components:    aPTT >150.0 (*)     All other components within normal limits    Narrative:      PTT  critical result(s) called and verbal readback obtained from   Jaye Montano RN by [facility] 07/22/2020 07:32   TROPONIN I - Abnormal; Notable for the following components:    Troponin I 0.037 (*)     All other components within normal limits   CULTURE, BLOOD    Narrative:     Aerobic and anaerobic   CULTURE, BLOOD    Narrative:     Aerobic and anaerobic   LACTIC ACID, PLASMA   B-TYPE NATRIURETIC PEPTIDE   LACTIC ACID, PLASMA    Narrative:     Collection has been rescheduled by [facility] at 07/21/2020 19:12 Reason: pt   receiving blood. collect labs around 22:30.check with nurse before   drawing blood   COMPREHENSIVE METABOLIC PANEL   CBC WITHOUT DIFFERENTIAL   D DIMER, QUANTITATIVE   FERRITIN   LACTATE DEHYDROGENASE   MAGNESIUM   PHOSPHORUS   VITAMIN D   VITAMIN D   PHOSPHORUS   MAGNESIUM   LIPASE   PROTIME-INR    Narrative:     (if patient is on warfarin prior to heparin therapy)   MAGNESIUM   PHOSPHORUS   TROPONIN I   TYPE & SCREEN   GROUP & RH   PREPARE RBC SOFT     EKG Readings: (Independently Interpreted)   Normal sinus rhythm, rate 92, right bundle branch block, no ST elevations or other signs of ischemia, otherwise normal intervals.  Grossly stable from prior April 2018.     ECG Results          EKG 12-lead (In process)  Result time 07/21/20 15:15:07    In process by Interface, Lab In [facility] (07/21/20 15:15:07)                 Narrative:    Test Reason : R50.9,    Vent. Rate : 092 BPM     Atrial Rate : 092 BPM     P-R Int : 160 ms          QRS Dur : 132 ms      QT Int : 372 ms       P-R-T Axes : 053 -85 039 degrees     QTc Int : 460 ms    Normal sinus rhythm  Left axis deviation  Right bundle branch block  Abnormal ECG  When compared with ECG of 24-APR-2018 11:35,  Premature  atrial complexes are no longer Present  Vent. rate has increased BY  32 BPM  QRS duration has decreased    Referred By: [email or web address]   SELF           Confirmed By:                             Imaging Results          X-Ray Chest AP Portable (Final result)  Result time 07/21/20 13:54:05    Final result by Luda Powell MD (07/21/20 13:54:05)                 Impression:      Abnormal increased interstitial lung markings predominantly at the lung basis left more than right, nonspecific could be acute or chronic.      Electronically signed by: Luda Powell MD  Date:    07/21/2020  Time:    13:54             Narrative:    EXAMINATION:  XR CHEST AP PORTABLE    CLINICAL HISTORY:  Sepsis;    TECHNIQUE:  Single frontal view of the chest was performed.    COMPARISON:  None    FINDINGS:  EKG wires overlie the chest.  The cardiac silhouette is normal in size, midline.  Mild increased interstitial lung markings noted both lung fields, nonspecific, slightly accentuated  at the lung bases left more than right.  No large pleural effusion.  No pneumothorax.  The osseous structures appear normal.                                 Medical Decision Making:   History:   Old Medical Records: I decided to obtain old medical records.  Old Records Summarized: records from another hospital.  Differential Diagnosis:   Differential Diagnosis includes, but is not limited to:  Sepsis, bacteremia, UTI, pneumonia, cellulitis, abscess, indwelling line/catheter infection, cholecystitis, viral URI, gastroenteritis, viral syndrome, sinusitis, otitis media/externa, neoplasm, drug reaction, serotonin syndrome, intoxication/withdrawal syndrome.    Clinical Tests:   Lab Tests: Reviewed and Ordered  Radiological Study: Reviewed and Ordered  Medical Tests: Reviewed and Ordered  ED Management:  Labs repeated, significant for MARQUIS. H/H appears stable, CMP without significant abnormalities.  [facility] to admit for further management of COVID infection,  respiratory failure.    On re-evaluation, the patient's status has improved.  At this time, I believe the patient should be admitted to the hospital for further evaluation and management of COVID infection.  [facility] FM service was contacted and the case was discussed.   The consulting physician/team agrees with plan and will admit under their service.   The patient and family were updated with test results, overall impression, and further plan of care. All questions were answered. The patient expressed understanding and agrees with the current plan.                Attending Attestation:         Attending Critical Care:   Critical Care Times:   Direct Patient Care (initial evaluation, reassessments, and time considering the case)................................................................15 minutes.   Additional History from reviewing old medical records or taking additional history from the family, EMS, PCP, etc.......................5 minutes.   Ordering, Reviewing, and Interpreting Diagnostic Studies...............................................................................................................10 minutes.   Documentation..................................................................................................................................................................................12 minutes.   Consultation with other Physicians. .................................................................................................................................................10 minutes.   Consultation with the patient's family directly relating to the patient's condition, care, and DNR status (when patient unable)......5 minutes.   ==============================================================  · Total Critical Care Time - exclusive of procedural time: 57 minutes.  ==============================================================  Critical care was necessary to treat or prevent imminent or  life-threatening deterioration of the following conditions: sepsis and respiratory failure.   Critical Care Condition: critical               ED Course as of Jul 22 0828   Tue Jul 21, 2020   1513 74-year-old male presents to ER due to fever and altered mental status.  On arrival, febrile 101 Fahrenheit, hypotensive 80s/40s.  Septic evaluation initiated, IV fluids and broad-spectrum antibiotics given.  Will obtain infectious evaluation including labs, blood cultures, CXR, UA, and closely monitor.    [SS]   1538 After IV fluids, blood pressure improved to 120/60.  Patient remains hypoxic, O2 increased to 4 liters nasal cannula satting in the mid 90s.  CXR with diffuse patchy infiltrates, concerning for viral versus bacterial pneumonia.  Labs with several severe abnormalities, including hemoglobin of 4 with pancytopenia, hypokalemia, hypoglycemia.  Labs repeated due to concern for lab error/dilution.  Given continue hypoxia in setting of positive COVID, will request admission to [facility] for further management.    [SS]      ED Course User Index  [SS] Joey Ward MD                Clinical Impression:       ICD-10-CM ICD-9-CM   1. Sepsis, due to unspecified organism, unspecified whether acute organ dysfunction present  A41.9 038.9     995.91   2. Fever  R50.9 780.60   3. Hypotension, unspecified hypotension type  I95.9 458.9   4. Acute respiratory failure with hypoxia  J96.01 518.81   5. MARQUIS (acute kidney injury)  N17.9 584.9   6. Weakness generalized  R53.1 780.79   7. Community acquired pneumonia, unspecified laterality  J18.9 486   8. Viral pneumonia  J12.9 480.9               ED Disposition Condition    Observation              I, Dr. Joey Ward, personally performed the services described in this documentation. All medical record entries made by the scribe were at my direction and in my presence.  I have reviewed the chart and agree that the record reflects my personal performance and is accurate  and complete.     Joey Ward MD.       Joey Ward MD  07/22/20 0890

## 2020-07-21 NOTE — HPI
Patient is a 74-year-old male with a past medical history of hypertension, COPD, MAC infection who presented to the ED with altered mental status and fever.  As per the family, patient has had altered mental status for the past 3-4 days. Family endorses increased somnolence as well as urinating on himself. EMS was called two days prior to presentation but reportedly deemed that patient did not need to be brought in. Over the previous day, reportedly the patient was found stuck, staring in his sink, not responding. On the day of presentation, the patient was found down on the floor, family wasn't able to get patient up from the floor and was reportedly not responding as much as baseline. Family believed that patient may not have been wearing baseline 2L O2. Patient also endorses a cough and generalized weakness. Patient reportedly had a recent UTI.     In the ED, chest x-ray showed diffuse interstitial patchy infiltrates concerning for possible pneumonia and a COVID-19 screening test was positive. Fever was 101° F and patient was hypotensive 80s/40s. Patient given 30cc/kg bolus. BP very soft on presentation, adequately responded to volume resuscitation and then decreased again. Labs showed a BUN 45, creatinine 2.9, , , troponin 0.036, procalcitonin 1.13.

## 2020-07-21 NOTE — SUBJECTIVE & OBJECTIVE
Interval History: ***    Review of Systems  Objective:     Vital Signs (Most Recent):  Temp: (!) 101 °F (38.3 °C) (07/21/20 1301)  Pulse: 93 (07/21/20 1301)  Resp: 18 (07/21/20 1301)  BP: (!) 122/59 (07/21/20 1702)  SpO2: (!) 93 % (07/21/20 1702) Vital Signs (24h Range):  Temp:  [101 °F (38.3 °C)] 101 °F (38.3 °C)  Pulse:  [93] 93  Resp:  [18] 18  SpO2:  [84 %-97 %] 93 %  BP: ()/(45-59) 122/59     Weight: 93 kg (205 lb)  Body mass index is 28.59 kg/m².  No intake or output data in the 24 hours ending 07/21/20 1821   Physical Exam    Significant Labs: {Results:09463}    Significant Imaging: {Imaging Review:31479}

## 2020-07-21 NOTE — ASSESSMENT & PLAN NOTE
Patient presents with fever, increased oxygen demand, cough, shortness of breath, and altered mental status worsening over the past 3-4 days. Patient on 2L O2 at home, requiring 4L to maintain sat above 90% in the ED. Inflammatory markers elevated. COVID positive.    - COVID Isolation per protocol   - COVID labs   - O2 to maintain sat >90%   - Dexamethasone 6mg daily for 10 days   - Remdesivir consult   - Anticoagulation with 5000u TID   - Levofloxacin 750mg daily   - Atorvastatin 40mg daily   - Multivitamin

## 2020-07-22 LAB
25(OH)D3+25(OH)D2 SERPL-MCNC: 34 NG/ML (ref 30–96)
ALBUMIN SERPL BCP-MCNC: 2.4 G/DL (ref 3.5–5.2)
ALLENS TEST: ABNORMAL
ALP SERPL-CCNC: 81 U/L (ref 55–135)
ALT SERPL W/O P-5'-P-CCNC: 199 U/L (ref 10–44)
ANION GAP SERPL CALC-SCNC: 10 MMOL/L (ref 8–16)
APTT BLDCRRT: 108.7 SEC (ref 21–32)
APTT BLDCRRT: 38.1 SEC (ref 21–32)
APTT BLDCRRT: 38.1 SEC (ref 21–32)
APTT BLDCRRT: 63.9 SEC (ref 21–32)
APTT BLDCRRT: >150 SEC (ref 21–32)
AST SERPL-CCNC: 310 U/L (ref 10–40)
BASOPHILS # BLD AUTO: 0 K/UL (ref 0–0.2)
BASOPHILS # BLD AUTO: 0.01 K/UL (ref 0–0.2)
BASOPHILS NFR BLD: 0 % (ref 0–1.9)
BASOPHILS NFR BLD: 0.2 % (ref 0–1.9)
BILIRUB SERPL-MCNC: 0.7 MG/DL (ref 0.1–1)
BUN SERPL-MCNC: 40 MG/DL (ref 8–23)
CALCIUM SERPL-MCNC: 7.3 MG/DL (ref 8.7–10.5)
CHLORIDE SERPL-SCNC: 106 MMOL/L (ref 95–110)
CO2 SERPL-SCNC: 24 MMOL/L (ref 23–29)
CREAT SERPL-MCNC: 2.4 MG/DL (ref 0.5–1.4)
DELSYS: ABNORMAL
DIFFERENTIAL METHOD: ABNORMAL
DIFFERENTIAL METHOD: ABNORMAL
EOSINOPHIL # BLD AUTO: 0 K/UL (ref 0–0.5)
EOSINOPHIL # BLD AUTO: 0 K/UL (ref 0–0.5)
EOSINOPHIL NFR BLD: 0 % (ref 0–8)
EOSINOPHIL NFR BLD: 0.2 % (ref 0–8)
ERYTHROCYTE [DISTWIDTH] IN BLOOD BY AUTOMATED COUNT: 16.9 % (ref 11.5–14.5)
ERYTHROCYTE [DISTWIDTH] IN BLOOD BY AUTOMATED COUNT: 17 % (ref 11.5–14.5)
EST. GFR  (AFRICAN AMERICAN): 29 ML/MIN/1.73 M^2
EST. GFR  (NON AFRICAN AMERICAN): 25 ML/MIN/1.73 M^2
FLOW: 12
GLUCOSE SERPL-MCNC: 148 MG/DL (ref 70–110)
HCO3 UR-SCNC: 23.2 MMOL/L (ref 24–28)
HCT VFR BLD AUTO: 31.4 % (ref 40–54)
HCT VFR BLD AUTO: 33.8 % (ref 40–54)
HGB BLD-MCNC: 10.8 G/DL (ref 14–18)
HGB BLD-MCNC: 9.9 G/DL (ref 14–18)
IMM GRANULOCYTES # BLD AUTO: 0.01 K/UL (ref 0–0.04)
IMM GRANULOCYTES # BLD AUTO: 0.02 K/UL (ref 0–0.04)
IMM GRANULOCYTES NFR BLD AUTO: 0.2 % (ref 0–0.5)
IMM GRANULOCYTES NFR BLD AUTO: 0.5 % (ref 0–0.5)
INR PPP: 1 (ref 0.8–1.2)
LYMPHOCYTES # BLD AUTO: 0.7 K/UL (ref 1–4.8)
LYMPHOCYTES # BLD AUTO: 1.1 K/UL (ref 1–4.8)
LYMPHOCYTES NFR BLD: 18.5 % (ref 18–48)
LYMPHOCYTES NFR BLD: 26.7 % (ref 18–48)
MAGNESIUM SERPL-MCNC: 2.1 MG/DL (ref 1.6–2.6)
MCH RBC QN AUTO: 29.1 PG (ref 27–31)
MCH RBC QN AUTO: 29.2 PG (ref 27–31)
MCHC RBC AUTO-ENTMCNC: 31.5 G/DL (ref 32–36)
MCHC RBC AUTO-ENTMCNC: 32 G/DL (ref 32–36)
MCV RBC AUTO: 91 FL (ref 82–98)
MCV RBC AUTO: 92 FL (ref 82–98)
MODE: ABNORMAL
MONOCYTES # BLD AUTO: 0.3 K/UL (ref 0.3–1)
MONOCYTES # BLD AUTO: 0.5 K/UL (ref 0.3–1)
MONOCYTES NFR BLD: 11 % (ref 4–15)
MONOCYTES NFR BLD: 7.2 % (ref 4–15)
NEUTROPHILS # BLD AUTO: 2.6 K/UL (ref 1.8–7.7)
NEUTROPHILS # BLD AUTO: 3 K/UL (ref 1.8–7.7)
NEUTROPHILS NFR BLD: 61.4 % (ref 38–73)
NEUTROPHILS NFR BLD: 74.1 % (ref 38–73)
NRBC BLD-RTO: 0 /100 WBC
NRBC BLD-RTO: 0 /100 WBC
PCO2 BLDA: 36.6 MMHG (ref 35–45)
PH SMN: 7.41 [PH] (ref 7.35–7.45)
PHOSPHATE SERPL-MCNC: 3.1 MG/DL (ref 2.7–4.5)
PLATELET # BLD AUTO: 188 K/UL (ref 150–350)
PLATELET # BLD AUTO: 214 K/UL (ref 150–350)
PMV BLD AUTO: 9.8 FL (ref 9.2–12.9)
PMV BLD AUTO: 9.8 FL (ref 9.2–12.9)
PO2 BLDA: 54 MMHG (ref 80–100)
POC BE: -1 MMOL/L
POC SATURATED O2: 88 % (ref 95–100)
POC TCO2: 24 MMOL/L (ref 23–27)
POTASSIUM SERPL-SCNC: 4 MMOL/L (ref 3.5–5.1)
PROT SERPL-MCNC: 6.8 G/DL (ref 6–8.4)
PROTHROMBIN TIME: 10.4 SEC (ref 9–12.5)
RBC # BLD AUTO: 3.4 M/UL (ref 4.6–6.2)
RBC # BLD AUTO: 3.7 M/UL (ref 4.6–6.2)
SAMPLE: ABNORMAL
SITE: ABNORMAL
SODIUM SERPL-SCNC: 140 MMOL/L (ref 136–145)
SP02: 90
TROPONIN I SERPL DL<=0.01 NG/ML-MCNC: 0.02 NG/ML (ref 0–0.03)
TROPONIN I SERPL DL<=0.01 NG/ML-MCNC: 0.04 NG/ML (ref 0–0.03)
WBC # BLD AUTO: 4.01 K/UL (ref 3.9–12.7)
WBC # BLD AUTO: 4.19 K/UL (ref 3.9–12.7)

## 2020-07-22 PROCEDURE — 94640 AIRWAY INHALATION TREATMENT: CPT

## 2020-07-22 PROCEDURE — 63600175 PHARM REV CODE 636 W HCPCS: Performed by: FAMILY MEDICINE

## 2020-07-22 PROCEDURE — 63600175 PHARM REV CODE 636 W HCPCS: Performed by: STUDENT IN AN ORGANIZED HEALTH CARE EDUCATION/TRAINING PROGRAM

## 2020-07-22 PROCEDURE — 96375 TX/PRO/DX INJ NEW DRUG ADDON: CPT | Performed by: EMERGENCY MEDICINE

## 2020-07-22 PROCEDURE — 27000221 HC OXYGEN, UP TO 24 HOURS

## 2020-07-22 PROCEDURE — 96374 THER/PROPH/DIAG INJ IV PUSH: CPT | Mod: 59 | Performed by: EMERGENCY MEDICINE

## 2020-07-22 PROCEDURE — 25000242 PHARM REV CODE 250 ALT 637 W/ HCPCS: Performed by: STUDENT IN AN ORGANIZED HEALTH CARE EDUCATION/TRAINING PROGRAM

## 2020-07-22 PROCEDURE — 11000001 HC ACUTE MED/SURG PRIVATE ROOM

## 2020-07-22 PROCEDURE — 96361 HYDRATE IV INFUSION ADD-ON: CPT | Mod: 59 | Performed by: EMERGENCY MEDICINE

## 2020-07-22 PROCEDURE — 96376 TX/PRO/DX INJ SAME DRUG ADON: CPT | Performed by: EMERGENCY MEDICINE

## 2020-07-22 PROCEDURE — 84484 ASSAY OF TROPONIN QUANT: CPT | Mod: 91

## 2020-07-22 PROCEDURE — 93005 ELECTROCARDIOGRAM TRACING: CPT

## 2020-07-22 PROCEDURE — 84100 ASSAY OF PHOSPHORUS: CPT

## 2020-07-22 PROCEDURE — 36600 WITHDRAWAL OF ARTERIAL BLOOD: CPT

## 2020-07-22 PROCEDURE — 25000003 PHARM REV CODE 250: Performed by: STUDENT IN AN ORGANIZED HEALTH CARE EDUCATION/TRAINING PROGRAM

## 2020-07-22 PROCEDURE — 99900035 HC TECH TIME PER 15 MIN (STAT)

## 2020-07-22 PROCEDURE — 85730 THROMBOPLASTIN TIME PARTIAL: CPT

## 2020-07-22 PROCEDURE — 96366 THER/PROPH/DIAG IV INF ADDON: CPT | Performed by: EMERGENCY MEDICINE

## 2020-07-22 PROCEDURE — 27100171 HC OXYGEN HIGH FLOW UP TO 24 HOURS

## 2020-07-22 PROCEDURE — 96360 HYDRATION IV INFUSION INIT: CPT | Mod: 59 | Performed by: EMERGENCY MEDICINE

## 2020-07-22 PROCEDURE — 84484 ASSAY OF TROPONIN QUANT: CPT

## 2020-07-22 PROCEDURE — 94761 N-INVAS EAR/PLS OXIMETRY MLT: CPT

## 2020-07-22 PROCEDURE — 83735 ASSAY OF MAGNESIUM: CPT

## 2020-07-22 PROCEDURE — 96367 TX/PROPH/DG ADDL SEQ IV INF: CPT | Performed by: EMERGENCY MEDICINE

## 2020-07-22 PROCEDURE — 85025 COMPLETE CBC W/AUTO DIFF WBC: CPT

## 2020-07-22 PROCEDURE — 82803 BLOOD GASES ANY COMBINATION: CPT

## 2020-07-22 PROCEDURE — 80053 COMPREHEN METABOLIC PANEL: CPT

## 2020-07-22 PROCEDURE — 36415 COLL VENOUS BLD VENIPUNCTURE: CPT

## 2020-07-22 RX ORDER — SODIUM CHLORIDE, SODIUM LACTATE, POTASSIUM CHLORIDE, CALCIUM CHLORIDE 600; 310; 30; 20 MG/100ML; MG/100ML; MG/100ML; MG/100ML
INJECTION, SOLUTION INTRAVENOUS CONTINUOUS
Status: DISCONTINUED | OUTPATIENT
Start: 2020-07-22 | End: 2020-07-22

## 2020-07-22 RX ORDER — LEVOFLOXACIN 750 MG/1
750 TABLET ORAL EVERY OTHER DAY
Status: DISCONTINUED | OUTPATIENT
Start: 2020-07-22 | End: 2020-07-24

## 2020-07-22 RX ORDER — HEPARIN SODIUM,PORCINE/D5W 25000/250
18 INTRAVENOUS SOLUTION INTRAVENOUS CONTINUOUS
Status: DISCONTINUED | OUTPATIENT
Start: 2020-07-22 | End: 2020-07-26

## 2020-07-22 RX ORDER — DEXAMETHASONE SODIUM PHOSPHATE 4 MG/ML
6 INJECTION, SOLUTION INTRA-ARTICULAR; INTRALESIONAL; INTRAMUSCULAR; INTRAVENOUS; SOFT TISSUE EVERY 24 HOURS
Status: COMPLETED | OUTPATIENT
Start: 2020-07-22 | End: 2020-07-31

## 2020-07-22 RX ORDER — ALBUTEROL SULFATE 90 UG/1
2 AEROSOL, METERED RESPIRATORY (INHALATION) EVERY 4 HOURS
Status: DISCONTINUED | OUTPATIENT
Start: 2020-07-22 | End: 2020-07-28

## 2020-07-22 RX ORDER — FUROSEMIDE 10 MG/ML
40 INJECTION INTRAMUSCULAR; INTRAVENOUS ONCE
Status: COMPLETED | OUTPATIENT
Start: 2020-07-22 | End: 2020-07-22

## 2020-07-22 RX ORDER — LIDOCAINE 50 MG/G
1 PATCH TOPICAL
Status: DISCONTINUED | OUTPATIENT
Start: 2020-07-22 | End: 2020-08-13

## 2020-07-22 RX ADMIN — HEPARIN SODIUM AND DEXTROSE 18 UNITS/KG/HR: 10000; 5 INJECTION INTRAVENOUS at 12:07

## 2020-07-22 RX ADMIN — HEPARIN SODIUM AND DEXTROSE 14 UNITS/KG/HR: 10000; 5 INJECTION INTRAVENOUS at 03:07

## 2020-07-22 RX ADMIN — FUROSEMIDE 40 MG: 10 INJECTION, SOLUTION INTRAVENOUS at 07:07

## 2020-07-22 RX ADMIN — ALBUTEROL SULFATE 2 PUFF: 90 AEROSOL, METERED RESPIRATORY (INHALATION) at 01:07

## 2020-07-22 RX ADMIN — LEVOFLOXACIN 750 MG: 750 TABLET, FILM COATED ORAL at 09:07

## 2020-07-22 RX ADMIN — SODIUM CHLORIDE, SODIUM LACTATE, POTASSIUM CHLORIDE, AND CALCIUM CHLORIDE: .6; .31; .03; .02 INJECTION, SOLUTION INTRAVENOUS at 02:07

## 2020-07-22 RX ADMIN — SODIUM CHLORIDE, SODIUM LACTATE, POTASSIUM CHLORIDE, AND CALCIUM CHLORIDE: .6; .31; .03; .02 INJECTION, SOLUTION INTRAVENOUS at 11:07

## 2020-07-22 RX ADMIN — ACETAMINOPHEN 650 MG: 325 TABLET ORAL at 09:07

## 2020-07-22 RX ADMIN — FUROSEMIDE 40 MG: 10 INJECTION, SOLUTION INTRAVENOUS at 12:07

## 2020-07-22 RX ADMIN — DEXAMETHASONE SODIUM PHOSPHATE 6 MG: 4 INJECTION, SOLUTION INTRAMUSCULAR; INTRAVENOUS at 09:07

## 2020-07-22 RX ADMIN — THERA TABS 1 TABLET: TAB at 09:07

## 2020-07-22 RX ADMIN — LIDOCAINE 1 PATCH: 50 PATCH TOPICAL at 04:07

## 2020-07-22 RX ADMIN — HEPARIN SODIUM AND DEXTROSE 10 UNITS/KG/HR: 10000; 5 INJECTION INTRAVENOUS at 09:07

## 2020-07-22 RX ADMIN — ALBUTEROL SULFATE 2 PUFF: 90 AEROSOL, METERED RESPIRATORY (INHALATION) at 07:07

## 2020-07-22 RX ADMIN — ALBUTEROL SULFATE 2 PUFF: 90 AEROSOL, METERED RESPIRATORY (INHALATION) at 04:07

## 2020-07-22 NOTE — ED NOTES
Admit team informed via phone of new H&H that resulted at 1919. Janet with lab called to say that the specimen collected at 1550 resulted with an H&H 10.1 & 32.8. Blood transfusion in progress at time of notification.

## 2020-07-22 NOTE — ED NOTES
Spoke to Dr. Grey to clarify if nuclear med study needs to be done tonight, The study can be done in the am.

## 2020-07-22 NOTE — ED TRIAGE NOTES
Pt presents to ED via EMS from home with complaints of AMS. EMS reports that family states pt has not been acting himself, that pt has been urinating in the bad. Reported that pt was recently diagnosed with an UTI. Pt denies pain at this time. Pt denies CP, SOB, N/V/D.

## 2020-07-22 NOTE — ASSESSMENT & PLAN NOTE
CMP in the ED showed a BUN/Creatinine ratio of 45/2.9.  Elevation from baseline renal functioning. Likely prerenal etiology. Already received about 3L NS in the ED.   - Continue to monitor renal function   - Avoid nephrotoxic medications

## 2020-07-22 NOTE — ASSESSMENT & PLAN NOTE
History of COPD currently uses 2 L of oxygen at home.  Currently on 4 LPM via nasal cannula satting in the low 90's.    - Continue to monitor breathing    - titrate O2 to maintain sat's >90   - Albuterol MDI

## 2020-07-22 NOTE — H&P
"[facility] Medicine  History & Physical    Patient Name: Rajan Deluna  MRN: [id number]  Admission Date: 7/21/2020  Attending Physician: Rajan Rodríguez MD   Primary Care Provider: Jason Mccord MD         Patient information was obtained from patient, relative(s) and ER records.     Subjective:     Principal Problem:COVID-19    Chief Complaint:   Chief Complaint   Patient presents with    Fever     with AMS- pt from home, recent UTI, fever, fatigue and " not acting like normal self"         HPI: Patient is a 74-year-old male with a past medical history of hypertension, COPD, MAC infection who presented to the ED with altered mental status and fever.  As per the family, patient has had altered mental status for the past 3-4 days. Family endorses increased somnolence as well as urinating on himself. EMS was called two days prior to presentation but reportedly deemed that patient did not need to be brought in. Over the previous day, reportedly the patient was found stuck, staring in his sink, not responding. On the day of presentation, the patient was found down on the floor, family wasn't able to get patient up from the floor and was reportedly not responding as much as baseline. Family believed that patient may not have been wearing baseline 2L O2. Patient also endorses a cough and generalized weakness. Patient reportedly had a recent UTI.     In the ED, chest x-ray showed diffuse interstitial patchy infiltrates concerning for possible pneumonia and a COVID-19 screening test was positive. Fever was 101° F and patient was hypotensive 80s/40s. Patient given 30cc/kg bolus. BP very soft on presentation, adequately responded to volume resuscitation and then decreased again. Labs showed a BUN 45, creatinine 2.9, , , troponin 0.036, procalcitonin 1.13.     Past Medical History:   Diagnosis Date    Arthritis     COPD (chronic obstructive pulmonary disease)     Hypertension  "    Smoker     Weakness        Past Surgical History:   Procedure Laterality Date    HERNIA REPAIR      right knee surgery         Review of patient's allergies indicates:  No Known Allergies    No current facility-administered medications on file prior to encounter.      Current Outpatient Medications on File Prior to Encounter   Medication Sig    budesonide-formoterol 160-4.5 mcg (SYMBICORT) 160-4.5 mcg/actuation HFAA Inhale 2 puffs into the lungs every 12 (twelve) hours.    escitalopram oxalate (LEXAPRO) 10 MG tablet     etodolac (LODINE) 500 MG tablet Take 500 mg by mouth 2 (two) times daily.    gabapentin (NEURONTIN) 300 MG capsule Take 300 mg by mouth.    lisinopril-hydrochlorothiazide (PRINZIDE,ZESTORETIC) 20-12.5 mg per tablet Take 1 tablet by mouth.    losartan (COZAAR) 100 MG tablet     omeprazole (PRILOSEC) 20 MG capsule     pregabalin (LYRICA) 75 MG capsule Take 75 mg by mouth 2 (two) times daily.    PROAIR HFA 90 mcg/actuation inhaler     tramadol (ULTRAM) 50 mg tablet Take 50 mg by mouth every 6 (six) hours as needed for Pain.     Family History     None        Tobacco Use    Smoking status: Former Smoker    Smokeless tobacco: Never Used   Substance and Sexual Activity    Alcohol use: Not on file    Drug use: Not on file    Sexual activity: Not on file     Review of Systems  Objective:     Vital Signs (Most Recent):  Temp: 98 °F (36.7 °C) (07/21/20 1902)  Pulse: 74 (07/21/20 2120)  Resp: 20 (07/21/20 1902)  BP: (!) 82/49 (07/21/20 2120)  SpO2: (!) 94 % (07/21/20 2120) Vital Signs (24h Range):  Temp:  [98 °F (36.7 °C)-101 °F (38.3 °C)] 98 °F (36.7 °C)  Pulse:  [74-93] 74  Resp:  [18-26] 20  SpO2:  [84 %-97 %] 94 %  BP: ()/(45-59) 82/49     Weight: 93 kg (205 lb)  Body mass index is 28.59 kg/m².    Physical Exam (per ED note as patient is COVID positive)  Physical Exam     Nursing note and vitals reviewed.  Constitutional: He appears well-developed and well-nourished.   HENT:    Head: Normocephalic and atraumatic.   Eyes: EOM are normal. Pupils are equal, round, and reactive to light.   Neck: Normal range of motion. Neck supple.   Cardiovascular: Normal rate, regular rhythm, normal heart sounds and intact distal pulses.   Pulmonary/Chest: No respiratory distress. He has rales.   Scattered rales bilaterally   Abdominal: Soft. He exhibits no distension and no mass. There is no abdominal tenderness.   Musculoskeletal: Normal range of motion. No edema.   Neurological: He is alert.   Skin: Skin is warm and dry.         Significant Labs:   CBC:   Recent Labs   Lab 07/21/20  1331 07/21/20  1550   WBC 2.41* 5.02   HGB 4.9* 10.1*   HCT 16.1* 32.8*   PLT 98* 236     CMP:   Recent Labs   Lab 07/21/20  1331 07/21/20  1550   * 143   K <2.0* 4.1   CL >130* 109   CO2 11* 22*   GLU 55* 123*   BUN 24* 45*   CREATININE 1.1 2.9*   CALCIUM 3.3* 7.7*   PROT 2.9* 7.5   ALBUMIN 1.0* 2.7*   BILITOT 0.3 0.8   ALKPHOS 35* 89   * 450*   ALT 84* 232*   ANIONGAP Unable to calculate 12   EGFRNONAA >60 20*     Cardiac Markers:   Recent Labs   Lab 07/21/20  1331   BNP 13     Lactic Acid:   Recent Labs   Lab 07/21/20  1331   LACTATE 0.6       Significant Imaging:   Imaging Results          X-Ray Chest AP Portable (Final result)  Result time 07/21/20 13:54:05    Final result by Luda Powell MD (07/21/20 13:54:05)                 Impression:      Abnormal increased interstitial lung markings predominantly at the lung basis left more than right, nonspecific could be acute or chronic.      Electronically signed by: Luda Powell MD  Date:    07/21/2020  Time:    13:54             Narrative:    EXAMINATION:  XR CHEST AP PORTABLE    CLINICAL HISTORY:  Sepsis;    TECHNIQUE:  Single frontal view of the chest was performed.    COMPARISON:  None    FINDINGS:  EKG wires overlie the chest.  The cardiac silhouette is normal in size, midline.  Mild increased interstitial lung markings noted both lung  fields, nonspecific, slightly accentuated  at the lung bases left more than right.  No large pleural effusion.  No pneumothorax.  The osseous structures appear normal.                                  Assessment/Plan:     * COVID-19  Patient presents with fever, increased oxygen demand, cough, shortness of breath, and altered mental status worsening over the past 3-4 days. Patient on 2L O2 at home, requiring 4L to maintain sat above 90% in the ED. Inflammatory markers elevated. COVID positive.    - COVID Isolation per protocol   - COVID labs   - O2 to maintain sat >90%   - Dexamethasone 6mg daily for 10 days   - Remdesivir consult   - Heparin gtt given significantly elevated d-dimer   - Levofloxacin 750mg daily   - Atorvastatin 40mg daily   - Multivitamin    Sepsis with acute renal failure and septic shock  Patient with soft blood pressures while in the ED, not wholly responding to fluid resuscitation. MAP remains in low 60s. Likely 2/2 COVID-19 infection   - consult anesthesia for central line placement   - peripheral levophed until central line placed to maintain MAP>60   - Supportive treatment for COVID   - Levaquin to cover for possible pneumonia   - will be conservative with fluids given fluid status    MARQUIS (acute kidney injury)  CMP in the ED showed a BUN/Creatinine ratio of 45/2.9.  Elevation from baseline renal functioning. Likely prerenal etiology. Already received about 3L NS in the ED.   - Continue to monitor renal function   - Avoid nephrotoxic medications    Elevated D-Dimer       Patient with D-dimer elevated to 7 on presentation. Relatively low        suspicion for PE in the setting of no tachycardia and other likely           sources for elevation in d-dimer (I.e. COVID) but cannot rule out.         Patient likely unstable for CTA and has a significant MARQUIS.    - VQ Scan in AM   - Heparin gtt given MARQUIS and can't rule out PE currently   - lasix 40mg IV to help diurese fluid given with heparin  gtt    COPD (chronic obstructive pulmonary disease)  History of COPD currently uses 2 L of oxygen at home.  Currently on 4 LPM via nasal cannula satting in the low 90's.    - Continue to monitor breathing    - titrate O2 to maintain sat's >90   - Albuterol MDI        VTE Risk Mitigation (From admission, onward)         Ordered     heparin (porcine) injection 5,000 Units  Every 8 hours      07/21/20 2142     Place sequential compression device  Until discontinued      07/21/20 2142     IP VTE HIGH RISK PATIENT  Once      07/21/20 2142                   Emile Grey MD PGY-II  Department of Hospital Medicine   [facility]

## 2020-07-22 NOTE — CONSULTS
Pulmonary & Critical Care Medicine   Consultation Note    Consulting Team: Family Medicine  Consulting Attending: Rajan Rodríguez MD    Reason for Consultation: COVID, hypotensive requiring pressors    HPI:     Rajan Deluna is a 74 y.o. male with PMH COPD, HTN, arthritis presenting from home via EMS for 3-4 days of altered mental status per family.  Per family, he has been confused over recent days.  Yesterday morning was found down in home and EMS was called.  On arrival to ED, he was hypoxic, febrile and hypotensive.  He tested positive for COVID and was also given IVF with some response of BP.  He initially required levophed for BP support, but that has since been downtitrated.  This morning, patient reports significant chills, but states he feels better than yesterday.       Past Medical History:   Diagnosis Date    Arthritis     COPD (chronic obstructive pulmonary disease)     Hypertension     Smoker     Weakness      Past Surgical History:   Procedure Laterality Date    HERNIA REPAIR      right knee surgery       Social History:   Social History     Socioeconomic History    Marital status: Unknown     Spouse name: Not on file    Number of children: Not on file    Years of education: Not on file    Highest education level: Not on file   Occupational History    Not on file   Social Needs    Financial resource strain: Not on file    Food insecurity     Worry: Not on file     Inability: Not on file    Transportation needs     Medical: Not on file     Non-medical: Not on file   Tobacco Use    Smoking status: Former Smoker    Smokeless tobacco: Never Used   Substance and Sexual Activity    Alcohol use: Not on file    Drug use: Not on file    Sexual activity: Not on file   Lifestyle    Physical activity     Days per week: Not on file     Minutes per session: Not on file    Stress: Not on file   Relationships    Social connections     Talks on phone: Not on file     Gets together: Not on  "file     Attends Taoist service: Not on file     Active member of club or organization: Not on file     Attends meetings of clubs or organizations: Not on file     Relationship status: Not on file   Other Topics Concern    Not on file   Social History Narrative    Not on file     No family history on file.  Drug Allergies:   Review of patient's allergies indicates:  No Known Allergies      Review of Systems:   A comprehensive 12-point review of systems was performed, and is negative except for those items mentioned above in the HPI section of this note.       Physical Exam:   Vitals:  BP  Min: 77/52  Max: 153/70  Temp  Av.7 °F (37.1 °C)  Min: 98 °F (36.7 °C)  Max: 101 °F (38.3 °C)  Pulse  Av.9  Min: 68  Max: 107  Resp  Av  Min: 18  Max: 35  SpO2  Av %  Min: 84 %  Max: 100 %  Height  Av' 2" (188 cm)  Min: 6' 2" (188 cm)  Max: 6' 2" (188 cm)  Weight  Av kg (205 lb)  Min: 93 kg (205 lb)  Max: 93 kg (205 lb)    Fluid Balance:     Intake/Output Summary (Last 24 hours) at 2020 1136  Last data filed at 2020 0943  Gross per 24 hour   Intake 2975.57 ml   Output 3700 ml   Net -724.43 ml       Const: Ill-appearing, +rigors  HEENT: NCAT, PERRL, EOM nml  Cardio: Tachycardic, 2+ peripheral pulses  Pulm: On O2 via NC, mildly tachypnic, no increased WOB, speaking in full sentences  Abdomen: Soft, non-tender, non-distended  Extremities: No peripheral edema  Neuro: Alert and oriented x 3.  No focal deficits.      Laboratory Studies:     Hematology:  Recent Labs   Lab 20  1331 20  1550 20  2358 20  0518   WBC 2.41* 5.02 4.19 4.01   HGB 4.9* 10.1* 9.9* 10.8*   HCT 16.1* 32.8* 31.4* 33.8*   PLT 98* 236 188 214   MCV 96 93 92 91   RDW 17.3* 17.5* 17.0* 16.9*       Chemistry:  Recent Labs   Lab 20  1331 20  1550 20  0518   * 143 140   K <2.0* 4.1 4.0   CL >130* 109 106   CO2 11* 22* 24   BUN 24* 45* 40*   CREATININE 1.1 2.9* 2.4*   GLU 55* 123* " 148*   CALCIUM 3.3* 7.7* 7.3*   PROT 2.9* 7.5 6.8   ALBUMIN 1.0* 2.7* 2.4*   ALT 84* 232* 199*   * 450* 310*   ALKPHOS 35* 89 81   MG 1.1* 2.4 2.1   PHOS 1.3* 3.0 3.1   BILITOT 0.3 0.8 0.7       Cardiac:  Recent Labs   Lab 07/21/20  1331 07/22/20  0518   TROPONINI 0.036* 0.037*   BNP 13  --        DM:  Recent Labs   Lab 07/21/20  1331 07/21/20  1550 07/22/20  0518   GLU 55* 123* 148*       ABG:  Recent Labs   Lab 07/21/20  1331 07/21/20  1550 07/22/20  0518   GLU 55* 123* 148*       Microbiology Data:   Microbiology Results (last 7 days)     Procedure Component Value Units Date/Time    Blood culture x two cultures. Draw prior to antibiotics. [[id number]] Collected: 07/21/20 1330    Order Status: Completed Specimen: Blood from Peripheral, Wrist, Left Updated: 07/22/20 0545     Blood Culture, Routine No Growth to date    Narrative:      Aerobic and anaerobic    Blood culture x two cultures. Draw prior to antibiotics. [[id number]] Collected: 07/21/20 1419    Order Status: Completed Specimen: Blood from Peripheral, Antecubital, Right Updated: 07/22/20 0545     Blood Culture, Routine No Growth to date    Narrative:      Aerobic and anaerobic          Imaging:   Imaging Results          X-Ray Chest AP Portable (Final result)  Result time 07/21/20 13:54:05    Final result by Luda Powell MD (07/21/20 13:54:05)                 Impression:      Abnormal increased interstitial lung markings predominantly at the lung basis left more than right, nonspecific could be acute or chronic.      Electronically signed by: Luda Powell MD  Date:    07/21/2020  Time:    13:54             Narrative:    EXAMINATION:  XR CHEST AP PORTABLE    CLINICAL HISTORY:  Sepsis;    TECHNIQUE:  Single frontal view of the chest was performed.    COMPARISON:  None    FINDINGS:  EKG wires overlie the chest.  The cardiac silhouette is normal in size, midline.  Mild increased interstitial lung markings noted both lung fields,  nonspecific, slightly accentuated  at the lung bases left more than right.  No large pleural effusion.  No pneumothorax.  The osseous structures appear normal.                                  Medications:     Current Infusions:   heparin (porcine) in D5W 14 Units/kg/hr (07/22/20 1021)    lactated ringers      norepinephrine bitartrate-D5W Stopped (07/22/20 0625)     Scheduled Medications:     atorvastatin  40 mg Oral QHS    dexamethasone  6 mg Intravenous Q24H    levoFLOXacin  750 mg Oral Every other day    multivitamin  1 tablet Oral Daily     PRN Medications:   acetaminophen, albuterol, heparin (PORCINE), heparin (PORCINE), ondansetron, sodium chloride 0.9%    Antibiotics:  Antimicrobials (From admission, onward)     Ordered     Dose Route Frequency Start Stop    07/22/20 0001  levoFLOXacin tablet 750 mg     INDICATION: Lower Respiratory Infections        750 mg Oral Every other day 07/22/20 0900 07/28/20 0859                  Other Studies:     ECG: NSR, RBBB, LAD    Assessment:     Rajan Deluna is a 74 y.o. male with PMH COPD (Gold Stage III), HTN presenting from home via EMS for altered mental status, cough, fevers, chills found to be in septic shock, also positive for COVID-19.  He was initially requiring O2 via NRB and pressor support, but responded well to fluids initially.  Pressors have been off since this morning and he is currently on O2 via nasal cannula.      Plan:     Neuro:  1. Acute encephalopathy  - Likely 2/2 sepsis vs COVID  - Improved this AM, patient alert, oriented, answering questions appropriately    Pulm:  1. Acute hypoxic respiratory failure 2/2 COVID PNA  2. COPD  - CXR with bilateral interstitial opacities, consistent with COVID  - Patient presented in septic shock, and markedly elevated procal, agree with continuing abx  - Inflammatory markers significantly elevated  - Dexamethasone 6 mg IV x 10d  - Agree with pharmacy consult for remdesivir  - Continue full-dose  anticoagulation  - Caution with IV fluids in COVID patients, try to maintain net even fluid balance  - Albuterol MDI  - Titrate O2 to sats of 85-90%    Cardio:  1. Septic shock  2. NSTEMI  - Requiring pressor support,   - Troponin mildly elevated on arrival, likely 2/2 demand vs COVID cardiomyopathy  - ECG without ST-T changes  - D-dimer significantly elevated  - Recommend formal echo  - Continue full-dose anticoagulation with heparin gtt per protocol    GI:  1. Transaminitis  - likely 2/2 Shock, downtrending this morning  - Continue to trend CMP    Renal/:  1. MARQUIS  2. Hematuria  - Cr baseline unclear, some prior labs ranging 1-1.3  - Cr 2.9 on admit, decreasing to 2.4  - Likely pre-renal, continue to monitor  - Can check urine studies to help determine etiology  - UA also with 3+ occult blood, +RBCs, +RBC casts, continue to monitor    ID:  1. Septic shock of unknown etiology  2. COVID-19 PNA  - Procal 1.13  - BCx with NGTD, continue to follow  - Agree with treating with abx right now  - F/u cultures    Heme/Onc:  1. Normocytic anemia  - No obvious source of bleeding; unclear baseline Hgb  - Check iron studies, B12, folate  - Continue to trend CBC    Endocrine:  1. Hypocalcemia  - Replete as needed  - Check PTH, Vit D     DVT ppx: Heparin gtt  GI ppx:  Protonix    Code status: Full    Thank you for involving us in the care of this patient. We will continue to follow along. Please call with any questions.    Aaron Vega MD  [facility] Internal Medicine PGY-2  [facility] Pulmonology/Critical Care

## 2020-07-22 NOTE — SUBJECTIVE & OBJECTIVE
Interval History:  Peripheral Levophed was discontinued at 6:30 a.m..  Blood pressure was stable.Patient currently satting at 92% on 6 L via nasal cannula. CMP showing a BUN of 45 and creatinine 2.9. Indicated patient was not a candidate for CTA of the chest and patient shows a high D-dimer currently at 7.22. Patient was started on heparin drip.     Review of Systems   Unable to perform ROS: Severe respiratory distress     Objective:     Vital Signs (Most Recent):  Temp: 98.2 °F (36.8 °C) (07/22/20 1647)  Pulse: 86 (07/22/20 1743)  Resp: (!) 28 (07/22/20 1743)  BP: (!) 105/50 (07/22/20 1743)  SpO2: (!) 89 % (07/22/20 1743) Vital Signs (24h Range):  Temp:  [98 °F (36.7 °C)-98.7 °F (37.1 °C)] 98.2 °F (36.8 °C)  Pulse:  [] 86  Resp:  [18-35] 28  SpO2:  [84 %-100 %] 89 %  BP: ()/(49-70) 105/50     Weight: 93 kg (205 lb)  Body mass index is 26.32 kg/m².    Intake/Output Summary (Last 24 hours) at 7/22/2020 1759  Last data filed at 7/22/2020 1454  Gross per 24 hour   Intake 2975.57 ml   Output 4175 ml   Net -1199.43 ml      Physical Exam   Deferred to conserve PPE.    Significant Labs:   Bilirubin:   Recent Labs   Lab 07/21/20  1331 07/21/20  1550 07/22/20 0518   BILITOT 0.3 0.8 0.7     Blood Culture:   Recent Labs   Lab 07/21/20  1330 07/21/20  1419   LABBLOO No Growth to date No Growth to date     BMP:   Recent Labs   Lab 07/22/20 0518   *      K 4.0      CO2 24   BUN 40*   CREATININE 2.4*   CALCIUM 7.3*   MG 2.1     CBC:   Recent Labs   Lab 07/21/20  1550 07/21/20  2358 07/22/20 0518   WBC 5.02 4.19 4.01   HGB 10.1* 9.9* 10.8*   HCT 32.8* 31.4* 33.8*    188 214     CMP:   Recent Labs   Lab 07/21/20  1331 07/21/20  1550 07/22/20  0518   * 143 140   K <2.0* 4.1 4.0   CL >130* 109 106   CO2 11* 22* 24   GLU 55* 123* 148*   BUN 24* 45* 40*   CREATININE 1.1 2.9* 2.4*   CALCIUM 3.3* 7.7* 7.3*   PROT 2.9* 7.5 6.8   ALBUMIN 1.0* 2.7* 2.4*   BILITOT 0.3 0.8 0.7   ALKPHOS 35* 89 81    * 450* 310*   ALT 84* 232* 199*   ANIONGAP Unable to calculate 12 10   EGFRNONAA >60 20* 25*     Cardiac Markers:   Recent Labs   Lab 07/21/20  1331   BNP 13     Coagulation:   Recent Labs   Lab 07/21/20  2358  07/22/20  1634   INR 1.0  --   --    APTT 38.1*  38.1*   < > 108.7*    < > = values in this interval not displayed.     Lactic Acid:   Recent Labs   Lab 07/21/20  1331 07/21/20  2223   LACTATE 0.6 1.2     Lipase:   Recent Labs   Lab 07/21/20  1550   LIPASE 81*     Magnesium:   Recent Labs   Lab 07/21/20  1331 07/21/20  1550 07/22/20  0518   MG 1.1* 2.4 2.1     Troponin:   Recent Labs   Lab 07/21/20  1331 07/22/20  0518 07/22/20  1805   TROPONINI 0.036* 0.037* 0.021     Urine Studies:   Recent Labs   Lab 07/21/20  1450   COLORU Yellow   APPEARANCEUA Clear   PHUR 6.0   SPECGRAV 1.020   PROTEINUA 2+*   GLUCUA Negative   KETONESU Negative   BILIRUBINUA Negative   OCCULTUA 3+*   NITRITE Negative   UROBILINOGEN Negative   LEUKOCYTESUR Negative   RBCUA 1   WBCUA 2   BACTERIA Occasional   HYALINECASTS 1       Significant Imaging: I have reviewed all pertinent imaging results/findings within the past 24 hours.

## 2020-07-22 NOTE — CARE UPDATE
I called Salina Deluna (daughter) at [phone] who reports that she has POA for her father. Daughter reports that patient lives with his wife who was concern that patient was altered since Friday. Daughter has not been able to see the patient as she lives in [facility] and he lives in [facility] along with isolating due to coronavirus. Patient typically receives his care at [facility] and was recently tested for COVID but had not received his results.    Patient has been sleeping extensively since Friday and not eating or drinking. He also started to urinate on himself. On Sunday, patient was not responding and was found leaning over the bathroom sink. EMS was called but patient was not brought to the ED. Yesterday patient was found on the floor and not responsive. Multiple family members had to help patient up. Daughter is concern that patient was not wearing his oxygen as needed. Daughter does not think patient took more pain meds or anti-hypertensive during his confusion.    Per chart review, last CMP was in 4/2018 which showed BUN/Cr: 12/1.01.     Per [place]  Aware, patient last received 30 pills of Hydrocodone-Acetaminophen 10-325mg on 7/8/20    Daughter states that patient is FULL Code    Suzy Mejias, PGY-3  [facility] Family Medicine  07/21/2020 10:17 PM

## 2020-07-22 NOTE — ED NOTES
Pt states he is feeling very SOB again. O2 sat. 87-88% on 6L via nasal cannula. Pt remains tachpneic. Expiratory wheezes noted throughout chest, worse on right side.  Albuterol inhaler administered, and pt reports immediate improvement

## 2020-07-22 NOTE — ASSESSMENT & PLAN NOTE
Patient with soft blood pressures while in the ED, not wholly responding to fluid resuscitation. MAP remains in low 60s. Likely 2/2 COVID-19 infection   - consult anesthesia for central line placement   - peripheral levophed until central line placed to maintain MAP>60   - Supportive treatment for COVID   - Levaquin to cover for possible pneumonia   - will be conservative with fluids given fluid status

## 2020-07-22 NOTE — PLAN OF CARE
Patient on oxygen with documented flow.  Will attempt to wean per O2 order protocol. The proper method of use, as well as anticipated side effects, of this metered-dose inhaler are discussed and demonstrated to the patient. Will continue to monitor.

## 2020-07-22 NOTE — ED NOTES
Pt has been constantly c/o feeling cold and shivering since this morning, despite having blankets placed multiple times. Warm blankets applied again at this time. Covered with thick blanket. Pt repositioned in bed on his right side for comfort.

## 2020-07-22 NOTE — ED NOTES
Pt is AA&O; pt is laying in bed watching TV. Pt provided blanket; denies other needs. Call light within reach, will continue to monitor.

## 2020-07-22 NOTE — ED NOTES
Texted the team about decreasing SPO2, orders received to increase oxygen to include NRB mask as needed to maintain SPO2 greater than 88%.

## 2020-07-22 NOTE — ED NOTES
Report received from [facility].    Adult Physical Assessment  LOC: Rajan Deluna, 74 y.o. male verified via two identifiers.  The patient is awake, alert, oriented and speaking appropriately at this time.  APPEARANCE: Patient resting fairly comfortably in a semi trendelenburg position to maintain a MAP greater than 60mm HG and appears to be in no acute distress at this time. Patient is clean and well groomed, patient's clothing is properly fastened.  SKIN:The skin is warm and dry, color consistent with ethnicity, patient has normal skin turgor and moist mucus membranes, skin intact, no breakdown or brusing noted.  MUSCULOSKELETAL: Patient moving all extremities well, no obvious swelling or deformities noted.  RESPIRATORY: Airway is open and patent, respirations are spontaneous, patient has a normal effort and rate, no accessory muscle use noted.  CARDIAC: Patient has a normal rate and rhythm, no periphreal edema noted in any extremity, capillary refill < 3 seconds in all extremities  ABDOMEN: Soft and non tender to palpation, no abdominal distention noted. Bowel sounds present in all four quadrants.  NEUROLOGIC: Eyes open spontaneously, behavior appropriate to situation, follows commands, facial expression symmetrical, bilateral hand grasp equal and even, purposeful motor response noted, normal sensation in all extremities when touched with a finger.

## 2020-07-22 NOTE — SUBJECTIVE & OBJECTIVE
Past Medical History:   Diagnosis Date    Arthritis     COPD (chronic obstructive pulmonary disease)     Hypertension     Smoker     Weakness        Past Surgical History:   Procedure Laterality Date    HERNIA REPAIR      right knee surgery         Review of patient's allergies indicates:  No Known Allergies    No current facility-administered medications on file prior to encounter.      Current Outpatient Medications on File Prior to Encounter   Medication Sig    budesonide-formoterol 160-4.5 mcg (SYMBICORT) 160-4.5 mcg/actuation HFAA Inhale 2 puffs into the lungs every 12 (twelve) hours.    escitalopram oxalate (LEXAPRO) 10 MG tablet     etodolac (LODINE) 500 MG tablet Take 500 mg by mouth 2 (two) times daily.    gabapentin (NEURONTIN) 300 MG capsule Take 300 mg by mouth.    lisinopril-hydrochlorothiazide (PRINZIDE,ZESTORETIC) 20-12.5 mg per tablet Take 1 tablet by mouth.    losartan (COZAAR) 100 MG tablet     omeprazole (PRILOSEC) 20 MG capsule     pregabalin (LYRICA) 75 MG capsule Take 75 mg by mouth 2 (two) times daily.    PROAIR HFA 90 mcg/actuation inhaler     tramadol (ULTRAM) 50 mg tablet Take 50 mg by mouth every 6 (six) hours as needed for Pain.     Family History     None        Tobacco Use    Smoking status: Former Smoker    Smokeless tobacco: Never Used   Substance and Sexual Activity    Alcohol use: Not on file    Drug use: Not on file    Sexual activity: Not on file     Review of Systems  Objective:     Vital Signs (Most Recent):  Temp: 98 °F (36.7 °C) (07/21/20 1902)  Pulse: 74 (07/21/20 2120)  Resp: 20 (07/21/20 1902)  BP: (!) 82/49 (07/21/20 2120)  SpO2: (!) 94 % (07/21/20 2120) Vital Signs (24h Range):  Temp:  [98 °F (36.7 °C)-101 °F (38.3 °C)] 98 °F (36.7 °C)  Pulse:  [74-93] 74  Resp:  [18-26] 20  SpO2:  [84 %-97 %] 94 %  BP: ()/(45-59) 82/49     Weight: 93 kg (205 lb)  Body mass index is 28.59 kg/m².    Physical Exam (per ED note as patient is COVID  positive)  Physical Exam     Nursing note and vitals reviewed.  Constitutional: He appears well-developed and well-nourished.   HENT:   Head: Normocephalic and atraumatic.   Eyes: EOM are normal. Pupils are equal, round, and reactive to light.   Neck: Normal range of motion. Neck supple.   Cardiovascular: Normal rate, regular rhythm, normal heart sounds and intact distal pulses.   Pulmonary/Chest: No respiratory distress. He has rales.   Scattered rales bilaterally   Abdominal: Soft. He exhibits no distension and no mass. There is no abdominal tenderness.   Musculoskeletal: Normal range of motion. No edema.   Neurological: He is alert.   Skin: Skin is warm and dry.         Significant Labs:   CBC:   Recent Labs   Lab 07/21/20  1331 07/21/20  1550   WBC 2.41* 5.02   HGB 4.9* 10.1*   HCT 16.1* 32.8*   PLT 98* 236     CMP:   Recent Labs   Lab 07/21/20  1331 07/21/20  1550   * 143   K <2.0* 4.1   CL >130* 109   CO2 11* 22*   GLU 55* 123*   BUN 24* 45*   CREATININE 1.1 2.9*   CALCIUM 3.3* 7.7*   PROT 2.9* 7.5   ALBUMIN 1.0* 2.7*   BILITOT 0.3 0.8   ALKPHOS 35* 89   * 450*   ALT 84* 232*   ANIONGAP Unable to calculate 12   EGFRNONAA >60 20*     Cardiac Markers:   Recent Labs   Lab 07/21/20  1331   BNP 13     Lactic Acid:   Recent Labs   Lab 07/21/20  1331   LACTATE 0.6       Significant Imaging:   Imaging Results          X-Ray Chest AP Portable (Final result)  Result time 07/21/20 13:54:05    Final result by Luda Powell MD (07/21/20 13:54:05)                 Impression:      Abnormal increased interstitial lung markings predominantly at the lung basis left more than right, nonspecific could be acute or chronic.      Electronically signed by: Luda Powell MD  Date:    07/21/2020  Time:    13:54             Narrative:    EXAMINATION:  XR CHEST AP PORTABLE    CLINICAL HISTORY:  Sepsis;    TECHNIQUE:  Single frontal view of the chest was performed.    COMPARISON:  None    FINDINGS:  EKG wires  overlie the chest.  The cardiac silhouette is normal in size, midline.  Mild increased interstitial lung markings noted both lung fields, nonspecific, slightly accentuated  at the lung bases left more than right.  No large pleural effusion.  No pneumothorax.  The osseous structures appear normal.

## 2020-07-22 NOTE — ED NOTES
Pt called out c/o SOB due to a dry throat. SOB improved after taking a small sip of water and coughing up a small amount of clear sputum. Breath sounds remain clear and diminished throughout chest. Pt pulled up in bed and HOB elevated.

## 2020-07-23 PROBLEM — U07.1 COVID-19 VIRUS INFECTION: Status: ACTIVE | Noted: 2020-07-23

## 2020-07-23 LAB
ABO GROUP BLD: NORMAL
ALBUMIN SERPL BCP-MCNC: 2.4 G/DL (ref 3.5–5.2)
ALLENS TEST: ABNORMAL
ALP SERPL-CCNC: 83 U/L (ref 55–135)
ALT SERPL W/O P-5'-P-CCNC: 148 U/L (ref 10–44)
ANION GAP SERPL CALC-SCNC: 9 MMOL/L (ref 8–16)
APTT BLDCRRT: 58.8 SEC (ref 21–32)
APTT BLDCRRT: 64.4 SEC (ref 21–32)
APTT BLDCRRT: 83.7 SEC (ref 21–32)
AST SERPL-CCNC: 158 U/L (ref 10–40)
BASOPHILS # BLD AUTO: 0.01 K/UL (ref 0–0.2)
BASOPHILS NFR BLD: 0.1 % (ref 0–1.9)
BASOPHILS NFR BLD: 0.1 % (ref 0–1.9)
BASOPHILS NFR BLD: 0.2 % (ref 0–1.9)
BILIRUB SERPL-MCNC: 0.6 MG/DL (ref 0.1–1)
BLD GP AB SCN CELLS X3 SERPL QL: NORMAL
BUN SERPL-MCNC: 38 MG/DL (ref 8–23)
C DIFF GDH STL QL: POSITIVE
C DIFF TOX A+B STL QL IA: NEGATIVE
CALCIUM SERPL-MCNC: 7.8 MG/DL (ref 8.7–10.5)
CHLORIDE SERPL-SCNC: 105 MMOL/L (ref 95–110)
CK SERPL-CCNC: 415 U/L (ref 20–200)
CO2 SERPL-SCNC: 25 MMOL/L (ref 23–29)
CREAT SERPL-MCNC: 1.8 MG/DL (ref 0.5–1.4)
CRP SERPL-MCNC: 175.5 MG/L (ref 0–8.2)
DELSYS: ABNORMAL
DIFFERENTIAL METHOD: ABNORMAL
EOSINOPHIL # BLD AUTO: 0 K/UL (ref 0–0.5)
EOSINOPHIL NFR BLD: 0 % (ref 0–8)
ERYTHROCYTE [DISTWIDTH] IN BLOOD BY AUTOMATED COUNT: 16.5 % (ref 11.5–14.5)
ERYTHROCYTE [DISTWIDTH] IN BLOOD BY AUTOMATED COUNT: 16.7 % (ref 11.5–14.5)
ERYTHROCYTE [DISTWIDTH] IN BLOOD BY AUTOMATED COUNT: 16.7 % (ref 11.5–14.5)
ERYTHROCYTE [SEDIMENTATION RATE] IN BLOOD BY WESTERGREN METHOD: >120 MM/HR (ref 0–10)
EST. GFR  (AFRICAN AMERICAN): 41 ML/MIN/1.73 M^2
EST. GFR  (NON AFRICAN AMERICAN): 36 ML/MIN/1.73 M^2
ESTIMATED AVG GLUCOSE: 134 MG/DL (ref 68–131)
FERRITIN SERPL-MCNC: 1700 NG/ML (ref 20–300)
GLUCOSE SERPL-MCNC: 139 MG/DL (ref 70–110)
HBA1C MFR BLD HPLC: 6.3 % (ref 4–5.6)
HCO3 UR-SCNC: 25.1 MMOL/L (ref 24–28)
HCT VFR BLD AUTO: 32.5 % (ref 40–54)
HCT VFR BLD AUTO: 32.5 % (ref 40–54)
HCT VFR BLD AUTO: 34.2 % (ref 40–54)
HGB BLD-MCNC: 10.6 G/DL (ref 14–18)
HGB BLD-MCNC: 10.6 G/DL (ref 14–18)
HGB BLD-MCNC: 11.3 G/DL (ref 14–18)
IMM GRANULOCYTES # BLD AUTO: 0.02 K/UL (ref 0–0.04)
IMM GRANULOCYTES # BLD AUTO: 0.08 K/UL (ref 0–0.04)
IMM GRANULOCYTES # BLD AUTO: 0.08 K/UL (ref 0–0.04)
IMM GRANULOCYTES NFR BLD AUTO: 0.3 % (ref 0–0.5)
IMM GRANULOCYTES NFR BLD AUTO: 0.6 % (ref 0–0.5)
IMM GRANULOCYTES NFR BLD AUTO: 0.6 % (ref 0–0.5)
LYMPHOCYTES # BLD AUTO: 0.6 K/UL (ref 1–4.8)
LYMPHOCYTES NFR BLD: 4 % (ref 18–48)
LYMPHOCYTES NFR BLD: 4 % (ref 18–48)
LYMPHOCYTES NFR BLD: 9.4 % (ref 18–48)
MAGNESIUM SERPL-MCNC: 1.9 MG/DL (ref 1.6–2.6)
MCH RBC QN AUTO: 29.1 PG (ref 27–31)
MCH RBC QN AUTO: 29.2 PG (ref 27–31)
MCH RBC QN AUTO: 29.2 PG (ref 27–31)
MCHC RBC AUTO-ENTMCNC: 32.6 G/DL (ref 32–36)
MCHC RBC AUTO-ENTMCNC: 32.6 G/DL (ref 32–36)
MCHC RBC AUTO-ENTMCNC: 33 G/DL (ref 32–36)
MCV RBC AUTO: 88 FL (ref 82–98)
MCV RBC AUTO: 90 FL (ref 82–98)
MCV RBC AUTO: 90 FL (ref 82–98)
MONOCYTES # BLD AUTO: 0.3 K/UL (ref 0.3–1)
MONOCYTES # BLD AUTO: 0.9 K/UL (ref 0.3–1)
MONOCYTES # BLD AUTO: 0.9 K/UL (ref 0.3–1)
MONOCYTES NFR BLD: 5.4 % (ref 4–15)
MONOCYTES NFR BLD: 6.2 % (ref 4–15)
MONOCYTES NFR BLD: 6.2 % (ref 4–15)
NEUTROPHILS # BLD AUTO: 12.2 K/UL (ref 1.8–7.7)
NEUTROPHILS # BLD AUTO: 12.2 K/UL (ref 1.8–7.7)
NEUTROPHILS # BLD AUTO: 5.1 K/UL (ref 1.8–7.7)
NEUTROPHILS NFR BLD: 84.7 % (ref 38–73)
NEUTROPHILS NFR BLD: 89.1 % (ref 38–73)
NEUTROPHILS NFR BLD: 89.1 % (ref 38–73)
NRBC BLD-RTO: 0 /100 WBC
OB PNL STL: POSITIVE
PCO2 BLDA: 36.5 MMHG (ref 35–45)
PH SMN: 7.44 [PH] (ref 7.35–7.45)
PHOSPHATE SERPL-MCNC: 1.7 MG/DL (ref 2.7–4.5)
PLATELET # BLD AUTO: 217 K/UL (ref 150–350)
PLATELET # BLD AUTO: 243 K/UL (ref 150–350)
PLATELET # BLD AUTO: 243 K/UL (ref 150–350)
PMV BLD AUTO: 9.8 FL (ref 9.2–12.9)
PMV BLD AUTO: 9.9 FL (ref 9.2–12.9)
PMV BLD AUTO: 9.9 FL (ref 9.2–12.9)
PO2 BLDA: 58 MMHG (ref 80–100)
POC BE: 1 MMOL/L
POC SATURATED O2: 91 % (ref 95–100)
POC TCO2: 26 MMOL/L (ref 23–27)
POTASSIUM SERPL-SCNC: 4.3 MMOL/L (ref 3.5–5.1)
PROT SERPL-MCNC: 7.1 G/DL (ref 6–8.4)
RBC # BLD AUTO: 3.63 M/UL (ref 4.6–6.2)
RBC # BLD AUTO: 3.63 M/UL (ref 4.6–6.2)
RBC # BLD AUTO: 3.88 M/UL (ref 4.6–6.2)
RH BLD: NORMAL
SAMPLE: ABNORMAL
SITE: ABNORMAL
SODIUM SERPL-SCNC: 139 MMOL/L (ref 136–145)
TROPONIN I SERPL DL<=0.01 NG/ML-MCNC: 0.03 NG/ML (ref 0–0.03)
TSH SERPL DL<=0.005 MIU/L-ACNC: 0.6 UIU/ML (ref 0.4–4)
VANCOMYCIN SERPL-MCNC: 13.9 UG/ML
WBC # BLD AUTO: 13.69 K/UL (ref 3.9–12.7)
WBC # BLD AUTO: 13.69 K/UL (ref 3.9–12.7)
WBC # BLD AUTO: 6.06 K/UL (ref 3.9–12.7)

## 2020-07-23 PROCEDURE — 51702 INSERT TEMP BLADDER CATH: CPT

## 2020-07-23 PROCEDURE — 84484 ASSAY OF TROPONIN QUANT: CPT

## 2020-07-23 PROCEDURE — 93005 ELECTROCARDIOGRAM TRACING: CPT

## 2020-07-23 PROCEDURE — 83036 HEMOGLOBIN GLYCOSYLATED A1C: CPT

## 2020-07-23 PROCEDURE — 94660 CPAP INITIATION&MGMT: CPT

## 2020-07-23 PROCEDURE — 87493 C DIFF AMPLIFIED PROBE: CPT

## 2020-07-23 PROCEDURE — C9113 INJ PANTOPRAZOLE SODIUM, VIA: HCPCS | Performed by: INTERNAL MEDICINE

## 2020-07-23 PROCEDURE — 94761 N-INVAS EAR/PLS OXIMETRY MLT: CPT

## 2020-07-23 PROCEDURE — 82550 ASSAY OF CK (CPK): CPT

## 2020-07-23 PROCEDURE — 80202 ASSAY OF VANCOMYCIN: CPT

## 2020-07-23 PROCEDURE — 86850 RBC ANTIBODY SCREEN: CPT

## 2020-07-23 PROCEDURE — 99900035 HC TECH TIME PER 15 MIN (STAT)

## 2020-07-23 PROCEDURE — C9113 INJ PANTOPRAZOLE SODIUM, VIA: HCPCS | Performed by: STUDENT IN AN ORGANIZED HEALTH CARE EDUCATION/TRAINING PROGRAM

## 2020-07-23 PROCEDURE — 87449 NOS EACH ORGANISM AG IA: CPT

## 2020-07-23 PROCEDURE — 87324 CLOSTRIDIUM AG IA: CPT

## 2020-07-23 PROCEDURE — 63600175 PHARM REV CODE 636 W HCPCS: Performed by: INTERNAL MEDICINE

## 2020-07-23 PROCEDURE — 85730 THROMBOPLASTIN TIME PARTIAL: CPT

## 2020-07-23 PROCEDURE — 84100 ASSAY OF PHOSPHORUS: CPT

## 2020-07-23 PROCEDURE — 85652 RBC SED RATE AUTOMATED: CPT

## 2020-07-23 PROCEDURE — 20000000 HC ICU ROOM

## 2020-07-23 PROCEDURE — 80053 COMPREHEN METABOLIC PANEL: CPT

## 2020-07-23 PROCEDURE — 36415 COLL VENOUS BLD VENIPUNCTURE: CPT

## 2020-07-23 PROCEDURE — 83735 ASSAY OF MAGNESIUM: CPT

## 2020-07-23 PROCEDURE — 85025 COMPLETE CBC W/AUTO DIFF WBC: CPT

## 2020-07-23 PROCEDURE — 25000003 PHARM REV CODE 250: Performed by: STUDENT IN AN ORGANIZED HEALTH CARE EDUCATION/TRAINING PROGRAM

## 2020-07-23 PROCEDURE — 84443 ASSAY THYROID STIM HORMONE: CPT

## 2020-07-23 PROCEDURE — 63600175 PHARM REV CODE 636 W HCPCS: Performed by: STUDENT IN AN ORGANIZED HEALTH CARE EDUCATION/TRAINING PROGRAM

## 2020-07-23 PROCEDURE — 82803 BLOOD GASES ANY COMBINATION: CPT

## 2020-07-23 PROCEDURE — 27000190 HC CPAP FULL FACE MASK W/VALVE

## 2020-07-23 PROCEDURE — 94640 AIRWAY INHALATION TREATMENT: CPT

## 2020-07-23 PROCEDURE — 82728 ASSAY OF FERRITIN: CPT

## 2020-07-23 PROCEDURE — 27100171 HC OXYGEN HIGH FLOW UP TO 24 HOURS

## 2020-07-23 PROCEDURE — 86140 C-REACTIVE PROTEIN: CPT

## 2020-07-23 PROCEDURE — 25000242 PHARM REV CODE 250 ALT 637 W/ HCPCS: Performed by: STUDENT IN AN ORGANIZED HEALTH CARE EDUCATION/TRAINING PROGRAM

## 2020-07-23 PROCEDURE — 86901 BLOOD TYPING SEROLOGIC RH(D): CPT

## 2020-07-23 PROCEDURE — 82272 OCCULT BLD FECES 1-3 TESTS: CPT

## 2020-07-23 RX ORDER — HALOPERIDOL 5 MG/ML
2 INJECTION INTRAMUSCULAR EVERY 4 HOURS PRN
Status: DISCONTINUED | OUTPATIENT
Start: 2020-07-24 | End: 2020-07-29

## 2020-07-23 RX ORDER — PANTOPRAZOLE SODIUM 40 MG/10ML
40 INJECTION, POWDER, LYOPHILIZED, FOR SOLUTION INTRAVENOUS 2 TIMES DAILY
Status: DISCONTINUED | OUTPATIENT
Start: 2020-07-23 | End: 2020-08-04

## 2020-07-23 RX ORDER — HALOPERIDOL 5 MG/ML
2 INJECTION INTRAMUSCULAR EVERY 6 HOURS PRN
Status: DISCONTINUED | OUTPATIENT
Start: 2020-07-23 | End: 2020-07-23

## 2020-07-23 RX ORDER — PANTOPRAZOLE SODIUM 40 MG/10ML
40 INJECTION, POWDER, LYOPHILIZED, FOR SOLUTION INTRAVENOUS DAILY
Status: DISCONTINUED | OUTPATIENT
Start: 2020-07-23 | End: 2020-07-23

## 2020-07-23 RX ADMIN — PANTOPRAZOLE SODIUM 40 MG: 40 INJECTION, POWDER, FOR SOLUTION INTRAVENOUS at 08:07

## 2020-07-23 RX ADMIN — ALBUTEROL SULFATE 2 PUFF: 90 AEROSOL, METERED RESPIRATORY (INHALATION) at 12:07

## 2020-07-23 RX ADMIN — THERA TABS 1 TABLET: TAB at 10:07

## 2020-07-23 RX ADMIN — HEPARIN SODIUM AND DEXTROSE 18 UNITS/KG/HR: 10000; 5 INJECTION INTRAVENOUS at 11:07

## 2020-07-23 RX ADMIN — LORAZEPAM 0.5 MG: 2 INJECTION INTRAMUSCULAR; INTRAVENOUS at 10:07

## 2020-07-23 RX ADMIN — DEXAMETHASONE SODIUM PHOSPHATE 6 MG: 4 INJECTION, SOLUTION INTRAMUSCULAR; INTRAVENOUS at 10:07

## 2020-07-23 RX ADMIN — HALOPERIDOL LACTATE 2 MG: 5 INJECTION, SOLUTION INTRAMUSCULAR at 08:07

## 2020-07-23 RX ADMIN — LIDOCAINE 1 PATCH: 50 PATCH TOPICAL at 06:07

## 2020-07-23 RX ADMIN — ALBUTEROL SULFATE 2 PUFF: 90 AEROSOL, METERED RESPIRATORY (INHALATION) at 08:07

## 2020-07-23 RX ADMIN — PANTOPRAZOLE SODIUM 40 MG: 40 INJECTION, POWDER, FOR SOLUTION INTRAVENOUS at 10:07

## 2020-07-23 RX ADMIN — ALBUTEROL SULFATE 2 PUFF: 90 AEROSOL, METERED RESPIRATORY (INHALATION) at 04:07

## 2020-07-23 RX ADMIN — HALOPERIDOL LACTATE 2 MG: 5 INJECTION, SOLUTION INTRAMUSCULAR at 12:07

## 2020-07-23 NOTE — PROGRESS NOTES
Entered pts room to administer scheduled inhaler. Pts high flow cannula was malfunctioning. Placed pt on NRB mask. Pt is anxious.  Pts SpO2 lorrie to an 89%. Replaced high flow cannula and pt was unable to maintain adequate SpO2. Nurse notified. I discussed the situation with MD. Performed ABG and notified MD of results. Place pt on Bipap. Pt remains anxious, but maintaining adequate SpO2 on documented setting. Will continue to monitor.

## 2020-07-23 NOTE — PT/OT/SLP PROGRESS
Speech Language Pathology  MISSED VISIT    Rajan Deluna  MRN: [id number]    SLP evaluate and tx orders received this date to assess swallow function. Full chart review completed. Patient not seen today secondary to Nursing hold at 10:48AM (Per RN, Skyla, pt inappropriate as he is unable to come off BiPap at this time). Will follow-up as able.    Maranda Blake, ELOISE-SLP

## 2020-07-23 NOTE — ASSESSMENT & PLAN NOTE
Patient was showing increased oxygen demand and was stepped up to the ICU and placed on continuous BiPAP.

## 2020-07-23 NOTE — CARE UPDATE
Received call from nursing that patient was de-sating despite 15L on HFNC. Went to evaluate patient and patient had new wheezing despite recently having received a breathing treatment. Got a repeat ABG (see below). Given the patient's high oxygen requirement and seeming deterioration (he was sating in the mid-80's on max HFNC whereas was previously in low-to-mid 90's) decided to start BiPAP. Discussed current status of patient with pulm fellow.    Recent Labs     07/23/20  0432   PH 7.444   PCO2 36.5   PO2 58*   HCO3 25.1   POCSATURATED 91*   BE 1       Emile Grey MD PGY-2  [facility]   07/23/2020 5:23 AM

## 2020-07-23 NOTE — PROGRESS NOTES
[facility] ICU 5th Floor  Hospital Medicine  Progress Note    Patient Name: Rajan Deluna  MRN: [id number]  Patient Class: IP- Inpatient   Admission Date: 7/21/2020  Length of Stay: 2 days  Attending Physician: Rajan Rodríguez MD  Primary Care Provider: Jason Mccord MD        Subjective:     Principal Problem:COVID-19        HPI:  Patient is a 74-year-old male with a past medical history of hypertension, COPD, MAC infection who presented to the ED with altered mental status and fever.  As per the family, patient has had altered mental status for the past 3-4 days. Family endorses increased somnolence as well as urinating on himself. EMS was called two days prior to presentation but reportedly deemed that patient did not need to be brought in. Over the previous day, reportedly the patient was found stuck, staring in his sink, not responding. On the day of presentation, the patient was found down on the floor, family wasn't able to get patient up from the floor and was reportedly not responding as much as baseline. Family believed that patient may not have been wearing baseline 2L O2. Patient also endorses a cough and generalized weakness. Patient reportedly had a recent UTI.     In the ED, chest x-ray showed diffuse interstitial patchy infiltrates concerning for possible pneumonia and a COVID-19 screening test was positive. Fever was 101° F and patient was hypotensive 80s/40s. Patient given 30cc/kg bolus. BP very soft on presentation, adequately responded to volume resuscitation and then decreased again. Labs showed a BUN 45, creatinine 2.9, , , troponin 0.036, procalcitonin 1.13.     Overview/Hospital Course:  No notes on file    Interval History:  Patient was showing increased oxygen requirements while on the floor. Determined that patient needed continuous BiPAP and was stepped up to the ICU. Current O2 saturation 97%. Patient still on heparin drip. Patient is complaining of  melanic diarrhea. Rectal tube was placed. Patient is anxious and disoriented.    Review of Systems   Unable to perform ROS: Severe respiratory distress   Respiratory: Positive for shortness of breath.    Cardiovascular: Negative for chest pain.   Gastrointestinal: Positive for abdominal pain and diarrhea (melanic). Negative for nausea.   Psychiatric/Behavioral: The patient is nervous/anxious.      Objective:     Vital Signs (Most Recent):  Temp: 98.4 °F (36.9 °C) (07/23/20 1515)  Pulse: 99 (07/23/20 1515)  Resp: (!) 26 (07/23/20 1515)  BP: (!) 92/52 (07/23/20 1515)  SpO2: (!) 94 % (07/23/20 1532) Vital Signs (24h Range):  Temp:  [96.6 °F (35.9 °C)-99 °F (37.2 °C)] 98.4 °F (36.9 °C)  Pulse:  [] 99  Resp:  [21-36] 26  SpO2:  [80 %-97 %] 94 %  BP: ()/(50-78) 92/52     Weight: 84.5 kg (186 lb 4.6 oz)  Body mass index is 25.98 kg/m².    Intake/Output Summary (Last 24 hours) at 7/23/2020 1659  Last data filed at 7/23/2020 0501  Gross per 24 hour   Intake 69.9 ml   Output 800 ml   Net -730.1 ml      Physical Exam  Constitutional:       General: He is in acute distress.      Appearance: He is toxic-appearing.      Comments: Patient in moderate distress on a continuous BiPAP.    HENT:      Head: Normocephalic and atraumatic.   Cardiovascular:      Rate and Rhythm: Normal rate.   Pulmonary:      Effort: Respiratory distress present.      Breath sounds: No wheezing.      Comments: Diminished breath sounds in all lung fields. BiPAP present.    Abdominal:      General: There is no distension.      Tenderness: There is abdominal tenderness.      Comments: Positive bowel sounds. Melanic diarrhea within rectal tube.    Skin:     General: Skin is warm.   Neurological:      Mental Status: He is disoriented.         Significant Labs:   A1C:   Recent Labs   Lab 07/23/20  0403   HGBA1C 6.3*     ABGs:   Recent Labs   Lab 07/23/20  0432   PH 7.444   PCO2 36.5   HCO3 25.1   POCSATURATED 91*   BE 1     Bilirubin:   Recent Labs    Lab 07/21/20  1331 07/21/20  1550 07/22/20  0518 07/23/20  0403   BILITOT 0.3 0.8 0.7 0.6     BMP:   Recent Labs   Lab 07/23/20  0403   *      K 4.3      CO2 25   BUN 38*   CREATININE 1.8*   CALCIUM 7.8*   MG 1.9     CBC:   Recent Labs   Lab 07/22/20  0518 07/23/20  0403 07/23/20  1507   WBC 4.01 6.06 13.69*  13.69*   HGB 10.8* 11.3* 10.6*  10.6*   HCT 33.8* 34.2* 32.5*  32.5*    217 243  243     CMP:   Recent Labs   Lab 07/22/20  0518 07/23/20  0403    139   K 4.0 4.3    105   CO2 24 25   * 139*   BUN 40* 38*   CREATININE 2.4* 1.8*   CALCIUM 7.3* 7.8*   PROT 6.8 7.1   ALBUMIN 2.4* 2.4*   BILITOT 0.7 0.6   ALKPHOS 81 83   * 158*   * 148*   ANIONGAP 10 9   EGFRNONAA 25* 36*     Coagulation:   Recent Labs   Lab 07/21/20  2358  07/23/20  1027   INR 1.0  --   --    APTT 38.1*  38.1*   < > 58.8*    < > = values in this interval not displayed.     Lactic Acid:   Recent Labs   Lab 07/21/20  2223   LACTATE 1.2     Magnesium:   Recent Labs   Lab 07/22/20  0518 07/23/20  0403   MG 2.1 1.9     Troponin:   Recent Labs   Lab 07/22/20  0518 07/22/20  1805 07/23/20  0056   TROPONINI 0.037* 0.021 0.030*     TSH:   Recent Labs   Lab 07/23/20  0403   TSH 0.604       All pertinent labs within the past 24 hours have been reviewed.    Significant Imaging: I have reviewed all pertinent imaging results/findings within the past 24 hours.      Assessment/Plan:      * COVID-19  Patient currently in acute hypoxic respiratory failure. Showing increased oxygen requirements and was stepped up to the ICU and placed on continues BiPAP at 70% oxygen saturation early this morning. Patient currently satting at 94%.   - COVID Isolation per protocol   - Dexamethasone 6mg daily for 10 days   - start Remdesivir following clearance   - Anticoagulation with 5000u TID   - Levofloxacin 750mg daily   - Atorvastatin 40mg daily   - Multivitamin    MARQUIS (acute kidney injury)  CMP in the ED showed a  BUN/Creatinine ratio of 45/2.9.  Elevation from baseline renal functioning. Likely prerenal etiology. Currently 38/1.8.    - Continue to monitor renal function   - Avoid nephrotoxic medications      Sepsis with acute renal failure and septic shock  24-hour MAP ranges 67-81. Most recent blood pressure 92/52. Patient satting at 95% on 70% O2 concentration via BiPAP.    - Continue to monitor MAP (greater than 65).   - Supportive treatment for COVID   - Levaquin to cover for possible pneumonia   - will be conservative with fluids given fluid status    COPD (chronic obstructive pulmonary disease)   Patient was showing increased oxygen demand and was stepped up to the ICU and placed on continuous BiPAP.      VTE Risk Mitigation (From admission, onward)         Ordered     heparin 25,000 units in dextrose 5% 250 mL (100 units/mL) infusion HIGH INTENSITY nomogram - [facility]  Continuous     Question:  Heparin Infusion Adjustment (DO NOT MODIFY ANSWER)  Answer:  \\[email or web address]\epic\Images\Pharmacy\HeparinInfusions\heparin HIGH INTENSITY nomogram for [facility] [id number].pdf    07/22/20 0215     heparin 25,000 units in dextrose 5% (100 units/ml) IV bolus from bag - ADDITIONAL PRN BOLUS - 60 units/kg  As needed (PRN)     Question:  Heparin Infusion Adjustment (DO NOT MODIFY ANSWER)  Answer:  \\[email or web address]\epic\Images\Pharmacy\HeparinInfusions\heparin HIGH INTENSITY nomogram for [facility] [id number].pdf    07/22/20 0215     heparin 25,000 units in dextrose 5% (100 units/ml) IV bolus from bag - ADDITIONAL PRN BOLUS - 30 units/kg  As needed (PRN)     Question:  Heparin Infusion Adjustment (DO NOT MODIFY ANSWER)  Answer:  \\[email or web address]\epic\Images\Pharmacy\HeparinInfusions\heparin HIGH INTENSITY nomogram for [facility] [id number].pdf    07/22/20 0215     Place sequential compression device  Until discontinued      07/21/20 2142     IP VTE HIGH RISK PATIENT  Once      07/21/20 2142                      Olivier Rowe DO PGY-1  [facility] ICU 5th Floor

## 2020-07-23 NOTE — ED NOTES
Review of patient's allergies indicates:  No Known Allergies     Patient has verified the spelling of their name and  on armband.   APPEARANCE: Patient is alert, calm, oriented x 4, and does not appear distressed.  SKIN: Skin is normal for race, warm, and dry. Normal skin turgor and mucous membranes moist.  CARDIAC: Normal rate and rhythm, no murmur heard.   RESPIRATORY:Normal rate and effort. Breath sounds diminished bilaterally throughout chest. Respirations are equal but mildly labored.  On 12 liters high flow oxygen with POX remaining in the low 90s.   GASTRO: Bowel sounds normal, abdomen is soft, no tenderness, and no abdominal distention.  MUSCLE: Full ROM. No bony tenderness or soft tissue tenderness. No obvious deformity.  PERIPHERAL VASCULAR: peripheral pulses present. Normal cap refill. No edema. Warm to touch.  NEURO: 5/5 strength major flexors/extensors bilaterally. Sensory intact to light touch bilaterally. Grady coma scale: eyes open spontaneously-4, oriented & converses-5, obeys commands-6. No neurological abnormalities.   MENTAL STATUS: awake, alert and aware of environment.  EYE: No overt deficits noted. No drainage. Sclera WNL  ENT: EARS: no obvious drainage. NOSE: no active bleeding. THROAT: no redness or swelling.  BREAST: symmetrical. No masses. No tenderness.  GENITALIA: Normal external genitalia.  : Voids without complication

## 2020-07-23 NOTE — ASSESSMENT & PLAN NOTE
CMP in the ED showed a BUN/Creatinine ratio of 45/2.9.  Elevation from baseline renal functioning. Likely prerenal etiology. Currently 38/1.8.    - Continue to monitor renal function   - Avoid nephrotoxic medications

## 2020-07-23 NOTE — PLAN OF CARE
Remdesivir FDA EUA Verbal Consent  The patient or parent/caregiver has been provided with the remdesivir Fact Sheet for Patients and Parents/Caregivers and has been counseled that the FDA has authorized the emergency use of remdesivir, which is not an FDA approved drug. The significant known and potential risks and benefits are unknown. The patient or parent/caregiver has been given the option to accept or refuse and has verbally agreed to receive remdesivir. Daily labs will be ordered and monitoring for Serious Adverse Events will be performed.    Olivier Rowe DO  [facility], PGY-1

## 2020-07-23 NOTE — ED NOTES
Pt is now suddenly c/o left chest pain and SOB. Pt is very restless. EKG performed. Admit team notified. New orders received. Will monitor and repeat troponin before transfer to floor.

## 2020-07-23 NOTE — PLAN OF CARE
Patient sent to ICU for respiratory distress, placed on BIPAP andf O2 sats 96%, R 22 and VSS, rodriguez catheter placed and rectal tube for watery black stool, occult blood came back positive and C-Diff pending, patient continues to be restless and anxious, pulled out IV and a new IV started. Continues on heparin drip pending results of PTT. Type and screen done and also waiting for results from CBC.  Will continue to monitor and tx per MD orders.

## 2020-07-23 NOTE — ASSESSMENT & PLAN NOTE
24-hour MAP ranges 67-81. Most recent blood pressure 92/52. Patient satting at 95% on 70% O2 concentration via BiPAP.    - Continue to monitor MAP (greater than 65).   - Supportive treatment for COVID   - Levaquin to cover for possible pneumonia   - will be conservative with fluids given fluid status

## 2020-07-23 NOTE — PROGRESS NOTES
[facility] Medicine  Progress Note    Patient Name: Rajan Deluna  MRN: [id number]  Patient Class: IP- Inpatient   Admission Date: 7/21/2020  Length of Stay: 1 days  Attending Physician: Rajan Rodríguez MD  Primary Care Provider: Jason Mccord MD        Subjective:     Principal Problem:COVID-19        HPI:  Patient is a 74-year-old male with a past medical history of hypertension, COPD, MAC infection who presented to the ED with altered mental status and fever.  As per the family, patient has had altered mental status for the past 3-4 days. Family endorses increased somnolence as well as urinating on himself. EMS was called two days prior to presentation but reportedly deemed that patient did not need to be brought in. Over the previous day, reportedly the patient was found stuck, staring in his sink, not responding. On the day of presentation, the patient was found down on the floor, family wasn't able to get patient up from the floor and was reportedly not responding as much as baseline. Family believed that patient may not have been wearing baseline 2L O2. Patient also endorses a cough and generalized weakness. Patient reportedly had a recent UTI.     In the ED, chest x-ray showed diffuse interstitial patchy infiltrates concerning for possible pneumonia and a COVID-19 screening test was positive. Fever was 101° F and patient was hypotensive 80s/40s. Patient given 30cc/kg bolus. BP very soft on presentation, adequately responded to volume resuscitation and then decreased again. Labs showed a BUN 45, creatinine 2.9, , , troponin 0.036, procalcitonin 1.13.     Overview/Hospital Course:  No notes on file    Interval History:  Peripheral Levophed was discontinued at 6:30 a.m..  Blood pressure was stable.Patient currently satting at 92% on 6 L via nasal cannula. CMP showing a BUN of 45 and creatinine 2.9. Indicated patient was not a candidate for CTA of the chest and  patient shows a high D-dimer currently at 7.22. Patient was started on heparin drip.     Review of Systems   Unable to perform ROS: Severe respiratory distress     Objective:     Vital Signs (Most Recent):  Temp: 98.2 °F (36.8 °C) (07/22/20 1647)  Pulse: 86 (07/22/20 1743)  Resp: (!) 28 (07/22/20 1743)  BP: (!) 105/50 (07/22/20 1743)  SpO2: (!) 89 % (07/22/20 1743) Vital Signs (24h Range):  Temp:  [98 °F (36.7 °C)-98.7 °F (37.1 °C)] 98.2 °F (36.8 °C)  Pulse:  [] 86  Resp:  [18-35] 28  SpO2:  [84 %-100 %] 89 %  BP: ()/(49-70) 105/50     Weight: 93 kg (205 lb)  Body mass index is 26.32 kg/m².    Intake/Output Summary (Last 24 hours) at 7/22/2020 1759  Last data filed at 7/22/2020 1454  Gross per 24 hour   Intake 2975.57 ml   Output 4175 ml   Net -1199.43 ml      Physical Exam   Deferred to conserve PPE.    Significant Labs:   Bilirubin:   Recent Labs   Lab 07/21/20  1331 07/21/20  1550 07/22/20  0518   BILITOT 0.3 0.8 0.7     Blood Culture:   Recent Labs   Lab 07/21/20  1330 07/21/20  1419   LABBLOO No Growth to date No Growth to date     BMP:   Recent Labs   Lab 07/22/20  0518   *      K 4.0      CO2 24   BUN 40*   CREATININE 2.4*   CALCIUM 7.3*   MG 2.1     CBC:   Recent Labs   Lab 07/21/20  1550 07/21/20  2358 07/22/20  0518   WBC 5.02 4.19 4.01   HGB 10.1* 9.9* 10.8*   HCT 32.8* 31.4* 33.8*    188 214     CMP:   Recent Labs   Lab 07/21/20  1331 07/21/20  1550 07/22/20  0518   * 143 140   K <2.0* 4.1 4.0   CL >130* 109 106   CO2 11* 22* 24   GLU 55* 123* 148*   BUN 24* 45* 40*   CREATININE 1.1 2.9* 2.4*   CALCIUM 3.3* 7.7* 7.3*   PROT 2.9* 7.5 6.8   ALBUMIN 1.0* 2.7* 2.4*   BILITOT 0.3 0.8 0.7   ALKPHOS 35* 89 81   * 450* 310*   ALT 84* 232* 199*   ANIONGAP Unable to calculate 12 10   EGFRNONAA >60 20* 25*     Cardiac Markers:   Recent Labs   Lab 07/21/20  1331   BNP 13     Coagulation:   Recent Labs   Lab 07/21/20  6808  07/22/20  1634   INR 1.0  --   --    APTT  38.1*  38.1*   < > 108.7*    < > = values in this interval not displayed.     Lactic Acid:   Recent Labs   Lab 07/21/20  1331 07/21/20  2223   LACTATE 0.6 1.2     Lipase:   Recent Labs   Lab 07/21/20  1550   LIPASE 81*     Magnesium:   Recent Labs   Lab 07/21/20  1331 07/21/20  1550 07/22/20  0518   MG 1.1* 2.4 2.1     Troponin:   Recent Labs   Lab 07/21/20  1331 07/22/20  0518 07/22/20  1805   TROPONINI 0.036* 0.037* 0.021     Urine Studies:   Recent Labs   Lab 07/21/20  1450   COLORU Yellow   APPEARANCEUA Clear   PHUR 6.0   SPECGRAV 1.020   PROTEINUA 2+*   GLUCUA Negative   KETONESU Negative   BILIRUBINUA Negative   OCCULTUA 3+*   NITRITE Negative   UROBILINOGEN Negative   LEUKOCYTESUR Negative   RBCUA 1   WBCUA 2   BACTERIA Occasional   HYALINECASTS 1       Significant Imaging: I have reviewed all pertinent imaging results/findings within the past 24 hours.      Assessment/Plan:      * COVID-19  Patient presents with fever, increased oxygen demand, cough, shortness of breath, and altered mental status worsening over the past 3-4 days. Patient on 2L O2 at home, currently requiring 12L to maintain sat at upper 80s% in the ED. Inflammatory markers elevated. COVID positive.    - COVID Isolation per protocol   - COVID labs   - O2 to maintain sat >90%   - ABG   - Dexamethasone 6mg daily for 10 days   - Remdesivir consult   - Anticoagulation with 5000u TID   - Levofloxacin 750mg daily   - Atorvastatin 40mg daily   - Multivitamin    MARQUIS (acute kidney injury)  CMP in the ED showed a BUN/Creatinine ratio of 45/2.9.  Elevation from baseline renal functioning. Likely prerenal etiology. Already received about 3L NS in the ED.   - Continue to monitor renal function   - Avoid nephrotoxic medications      Sepsis with acute renal failure and septic shock  Patient with soft blood pressures while in the ED, not wholly responding to fluid resuscitation. MAP remains in low 60s. Likely 2/2 COVID-19 infection   - consider central line  placement    - discontinue levophed. Continue to monitor MAP (greater than 65).   - Supportive treatment for COVID   - Levaquin to cover for possible pneumonia   - will be conservative with fluids given fluid status    COPD (chronic obstructive pulmonary disease)  History of COPD currently uses 2 L of oxygen at home. Currently on 12 LPM via nasal cannula satting in the high 80s.    - Continue to monitor breathing    - titrate O2 to maintain sat's >90   - Albuterol MDI        VTE Risk Mitigation (From admission, onward)         Ordered     heparin 25,000 units in dextrose 5% 250 mL (100 units/mL) infusion HIGH INTENSITY nomogram - [facility]  Continuous     Question:  Heparin Infusion Adjustment (DO NOT MODIFY ANSWER)  Answer:  \\[email or web address]\epic\Images\Pharmacy\HeparinInfusions\heparin HIGH INTENSITY nomogram for [facility] [id number].pdf    07/22/20 0215     heparin 25,000 units in dextrose 5% (100 units/ml) IV bolus from bag - ADDITIONAL PRN BOLUS - 60 units/kg  As needed (PRN)     Question:  Heparin Infusion Adjustment (DO NOT MODIFY ANSWER)  Answer:  \\[facility] [email or web address]\epic\Images\Pharmacy\HeparinInfusions\heparin HIGH INTENSITY nomogram for [facility] [id number].pdf    07/22/20 0215     heparin 25,000 units in dextrose 5% (100 units/ml) IV bolus from bag - ADDITIONAL PRN BOLUS - 30 units/kg  As needed (PRN)     Question:  Heparin Infusion Adjustment (DO NOT MODIFY ANSWER)  Answer:  \\[employer] [email or web address]\epic\Images\Pharmacy\HeparinInfusions\heparin HIGH INTENSITY nomogram for [facility] [id number].pdf    07/22/20 0215     Place sequential compression device  Until discontinued      07/21/20 2142     IP VTE HIGH RISK PATIENT  Once      07/21/20 2142                      Olivier Rowe DO HO-1  [facility]

## 2020-07-23 NOTE — PROGRESS NOTES
[facility]/Critical Care Resident Progress Note    Primary Team: Family Medicine  Attending Physician: Rajan Rodríguez MD    Subjective:      Patient was admitted to the floor yesterday afternoon and was satting well on 8-10L HFNC.  Overnight, patient continued to desat and was placed on bipap approx 4 am.  This morning, patient was attempted to placed back on HFNC, but desatted to 80% on 15L NC O2.  Placed on continuous bipap and orders placed to be stepped up to ICU.  Patient also having frequent loose, watery diarrhea.      Objective:     Last 24 Hour Vital Signs:  BP  Min: 88/52  Max: 122/78  Temp  Av °F (36.7 °C)  Min: 96.6 °F (35.9 °C)  Max: 99 °F (37.2 °C)  Pulse  Av.8  Min: 75  Max: 103  Resp  Av.6  Min: 19  Max: 36  SpO2  Av %  Min: 80 %  Max: 97 %    Intake/Output Summary (Last 24 hours) at 2020 1111  Last data filed at 2020 0501  Gross per 24 hour   Intake 69.9 ml   Output 1275 ml   Net -1205.1 ml       Vent Settings:  Oxygen Concentration (%):  [70] 70    Physical Examination:  Const: Ill-appearing  HEENT: NCAT, PERRL, EOM nml  Cardio: Tachycardic, 2+ peripheral pulses  Pulm: Tachypneic, increased WOB, on bipap  Abdomen: Soft, mild tenderness RLQ, suprapubic region  Extremities: no peripheral edema  Skin: Warm and dry, no rash  Neuro: Alert and oriented x 3, no focal deficits.     Laboratory:  Trended Lab Data:  Hematology:  Recent Labs   Lab 20  1331 20  1550 20  2358 20  0518 20  0403   WBC 2.41* 5.02 4.19 4.01 6.06   HGB 4.9* 10.1* 9.9* 10.8* 11.3*   HCT 16.1* 32.8* 31.4* 33.8* 34.2*   PLT 98* 236 188 214 217   MCV 96 93 92 91 88   RDW 17.3* 17.5* 17.0* 16.9* 16.5*       Chemistry:  Recent Labs   Lab 20  1331 20  1550 20  0518 20  0403   * 143 140 139   K <2.0* 4.1 4.0 4.3   CL >130* 109 106 105   CO2 11* 22* 24 25   BUN 24* 45* 40* 38*   CREATININE 1.1 2.9* 2.4* 1.8*   GLU 55* 123* 148* 139*   CALCIUM 3.3*  7.7* 7.3* 7.8*   PROT 2.9* 7.5 6.8 7.1   ALBUMIN 1.0* 2.7* 2.4* 2.4*   ALT 84* 232* 199* 148*   * 450* 310* 158*   ALKPHOS 35* 89 81 83   MG 1.1* 2.4 2.1 1.9   PHOS 1.3* 3.0 3.1 1.7*   BILITOT 0.3 0.8 0.7 0.6       Cardiac:  Recent Labs   Lab 07/21/20  1331 07/22/20  0518 07/22/20  1805 07/23/20  0056   TROPONINI 0.036* 0.037* 0.021 0.030*   BNP 13  --   --   --        DM:  Recent Labs   Lab 07/21/20  1550 07/22/20  0518 07/23/20  0403   HGBA1C  --   --  6.3*   * 148* 139*       ABG:  ABG:     Recent Labs     07/22/20  1945 07/23/20  0432   PH 7.410 7.444   PCO2 36.6 36.5   PO2 54* 58*   HCO3 23.2* 25.1   POCSATURATED 88* 91*   BE -1 1        Microbiology:  Microbiology Results (last 7 days)     Procedure Component Value Units Date/Time    Blood culture x two cultures. Draw prior to antibiotics. [[id number]] Collected: 07/21/20 1419    Order Status: Completed Specimen: Blood from Peripheral, Antecubital, Right Updated: 07/22/20 2322     Blood Culture, Routine No Growth to date      No Growth to date    Narrative:      Aerobic and anaerobic    Blood culture x two cultures. Draw prior to antibiotics. [[id number]] Collected: 07/21/20 1330    Order Status: Completed Specimen: Blood from Peripheral, Wrist, Left Updated: 07/22/20 2322     Blood Culture, Routine No Growth to date      No Growth to date    Narrative:      Aerobic and anaerobic          EKG:  Sinus, LAD, RBBB, [id number]    Radiology:  X-ray Chest 1 View    Result Date: 7/22/2020  EXAMINATION: CHEST ONE VIEW CLINICAL HISTORY: COPD Exacerbation; Sepsis, unspecified organism TECHNIQUE: One view of the chest. COMPARISON: 07/21/2020 FINDINGS: The cardiac silhouette is within normal limits.   There is no focal consolidation, pneumothorax, or pleural effusion. There is a diffuse interstitial lung abnormalities.  There is chronic appearing bilateral pleural thickening.  There is no large appearing pleural fluid.     No significant change. Electronically  signed by: Kari Becerra Date:    07/22/2020 Time:    18:36    X-ray Chest Ap Portable    Result Date: 7/21/2020  EXAMINATION: XR CHEST AP PORTABLE CLINICAL HISTORY: Sepsis; TECHNIQUE: Single frontal view of the chest was performed. COMPARISON: None FINDINGS: EKG wires overlie the chest.  The cardiac silhouette is normal in size, midline.  Mild increased interstitial lung markings noted both lung fields, nonspecific, slightly accentuated  at the lung bases left more than right.  No large pleural effusion.  No pneumothorax.  The osseous structures appear normal.     Abnormal increased interstitial lung markings predominantly at the lung basis left more than right, nonspecific could be acute or chronic. Electronically signed by: Luda Powell MD Date:    07/21/2020 Time:    13:54      Current Medications:     Infusions:   heparin (porcine) in D5W 10.058 Units/kg/hr (07/23/20 0459)        Scheduled:   albuterol  2 puff Inhalation Q4H    dexamethasone  6 mg Intravenous Q24H    levoFLOXacin  750 mg Oral Every other day    lidocaine  1 patch Transdermal Q24H    multivitamin  1 tablet Oral Daily    pantoprazole  40 mg Intravenous Daily        PRN:  acetaminophen, heparin (PORCINE), heparin (PORCINE), ondansetron, pneumoc 13-loy conj-dip cr(PF), sodium chloride 0.9%    Antibiotics:  Antimicrobials (From admission, onward)     Ordered     Dose Route Frequency Start Stop    07/22/20 0001  levoFLOXacin tablet 750 mg     INDICATION: Lower Respiratory Infections        750 mg Oral Every other day 07/22/20 0900 07/28/20 0859                Assessment:     Rajan Deluna is a 74 y.o.male with  Patient Active Problem List    Diagnosis Date Noted    COVID-19 virus infection 07/23/2020    Sepsis with acute renal failure and septic shock 07/21/2020    COVID-19 07/21/2020    MARQUIS (acute kidney injury) 07/21/2020    Diastolic dysfunction without heart failure 09/22/2017    COPD (chronic obstructive pulmonary  disease) 05/22/2017    Chest pain 12/09/2016    Anemia 12/09/2016    RBBB 12/09/2016    JACOME (dyspnea on exertion) 12/09/2016        Plan:     Neuro:  1. Acute encephalopathy  - Likely 2/2 sepsis vs COVID  - Improved this AM, patient alert, oriented, answering questions appropriately     Pulm:  1. Acute hypoxic respiratory failure 2/2 COVID PNA  2. COPD  - CXR with bilateral interstitial opacities, consistent with COVID  - Patient presented in septic shock, and markedly elevated procal, agree with continuing abx  - Inflammatory markers significantly elevated  - Dexamethasone 6 mg IV x 10d  - Agree with pharmacy consult for remdesivir  - Continue full-dose anticoagulation  - Albuterol MDI prn  - Continuous bipap at present, will try to wean to vapotherm  - Titrate O2 to sats of 85-90%     Cardio:  1. Septic shock  2. NSTEMI  - No longer requiring pressor support  - Troponin mildly elevated on arrival, likely 2/2 demand vs COVID cardiomyopathy  - ECG without ST-T changes  - D-dimer significantly elevated  - Recommend formal echo  - Continue full-dose anticoagulation with heparin gtt per protocol    GI:  1. Transaminitis  - likely 2/2 Shock, downtrending  - Continue to trend CMP    Renal/:  1. MARQUIS  2. Hematuria  - Cr baseline unclear, some prior labs ranging 1-1.3  - Cr 2.9 on admit, decreasing to 1.8 this AM  - Likely pre-renal, continue to monitor  - UA also with 3+ occult blood, +RBCs, +RBC casts, continue to monitor     ID:  1. Septic shock of unknown etiology  2. COVID-19 PNA  - Procal 1.13  - BCx with NGTD, continue to follow  - Agree with treating with abx right now  - F/u cultures     Heme/Onc:  1. Normocytic anemia  - No obvious source of bleeding; unclear baseline Hgb  - Check iron studies, B12, folate  - Continue to trend CBC     Endocrine:  1. Hypocalcemia  - Replete as needed  - Check PTH, Vit D      DVT ppx: Heparin gtt  GI ppx:  Protonix     Code status: Full    Aaron Vega MD  [facility] -[facility]  [facility]

## 2020-07-23 NOTE — ASSESSMENT & PLAN NOTE
History of COPD currently uses 2 L of oxygen at home. Currently on 12 LPM via nasal cannula satting in the high 80s.    - Continue to monitor breathing    - titrate O2 to maintain sat's >90   - Albuterol MDI

## 2020-07-23 NOTE — ASSESSMENT & PLAN NOTE
Patient presents with fever, increased oxygen demand, cough, shortness of breath, and altered mental status worsening over the past 3-4 days. Patient on 2L O2 at home, currently requiring 12L to maintain sat at upper 80s% in the ED. Inflammatory markers elevated. COVID positive.    - COVID Isolation per protocol   - COVID labs   - O2 to maintain sat >90%   - ABG   - Dexamethasone 6mg daily for 10 days   - Remdesivir consult   - Anticoagulation with 5000u TID   - Levofloxacin 750mg daily   - Atorvastatin 40mg daily   - Multivitamin

## 2020-07-23 NOTE — ASSESSMENT & PLAN NOTE
Patient currently in acute hypoxic respiratory failure. Showing increased oxygen requirements and was stepped up to the ICU and placed on continues BiPAP at 70% oxygen saturation early this morning. Patient currently satting at 94%.   - COVID Isolation per protocol   - Dexamethasone 6mg daily for 10 days   - start Remdesivir following clearance   - Anticoagulation with 5000u TID   - Levofloxacin 750mg daily   - Atorvastatin 40mg daily   - Multivitamin

## 2020-07-23 NOTE — PLAN OF CARE
"Principal Problem:COVID-19     Chief Complaint:        Chief Complaint   Patient presents with    Fever       with AMS- pt from home, recent UTI, fever, fatigue and " not acting like normal self"          HPI: Patient is a 74-year-old male with a past medical history of hypertension, COPD, MAC infection who presented to the ED with altered mental status and fever.  As per the family, patient has had altered mental status for the past 3-4 days. Family endorses increased somnolence as well as urinating on himself. EMS was called two days prior to presentation but reportedly deemed that patient did not need to be brought in. Over the previous day, reportedly the patient was found stuck, staring in his sink, not responding. On the day of presentation, the patient was found down on the floor, family wasn't able to get patient up from the floor and was reportedly not responding as much as baseline. Family believed that patient may not have been wearing baseline 2L O2. Patient also endorses a cough and generalized weakness. Patient reportedly had a recent UTI.      The pt's currently in ICU. The pt's on continuous bipap and couldn't talk so the Sw spoke to the pt's daughter Susanna Deluna [phone] via phone. Susanna states she's the POA and she will bring the documentation to the hospital. The pt lives with his wife Tania Deluna [phone] in [place] at [address] [place] [id number]. The pt was independent with his adl's and he only uses o2 at home. The pt doesn't drive so he utilizes [employer] Transportation to get to his doctor's appointments. The Sw left her name and contact info with the pt's dtr Susanna. The Sw will continue to follow the pt throughout his transitions of care and will assist with any d/c needs. The pt's dtr expressed an interest in snf for the pt at d/c but wants to explore it further once the pt's medically stable. The Sw spoke to her in reference to her preferences due to the pt " having [facility] Medicare and they will have to be contracted. The Sw offered her a list but she wants to wait. She did notify the Sw she asked her brother and step mother to go get tested but she's not aware if they have or have not.        07/23/20 1308   Discharge Assessment   Assessment Type Discharge Planning Assessment   Confirmed/corrected address and phone number on facesheet? Yes   Assessment information obtained from? Other   Prior to hospitilization cognitive status: Alert/Oriented   Prior to hospitalization functional status: Independent   Current cognitive status: Alert/Oriented   Current Functional Status: Needs Assistance   Lives With child(danita), adult;spouse   Able to Return to Prior Arrangements yes   Is patient able to care for self after discharge? Unable to determine at this time (comments)   Who are your caregiver(s) and their phone number(s)? Tania Deluna(wife)[phone]  Susanna Mikie(dtr)[phone]   Patient's perception of discharge disposition home health   Readmission Within the Last 30 Days no previous admission in last 30 days   Patient currently being followed by outpatient case management? No   Equipment Currently Used at Home oxygen   Do you have any problems affording any of your prescribed medications? No  (the pt receives his meds affordably at [facility] in [place])   Is the patient taking medications as prescribed? yes   Does the patient have transportation home? Yes   Transportation Anticipated family or friend will provide;health plan transportation   Does the patient receive services at the [facility]? No   Discharge Plan A Home Health   Discharge Plan B Skilled Nursing Facility   DME Needed Upon Discharge  other (see comments)  (TBD)   Patient/Family in Agreement with Plan yes

## 2020-07-23 NOTE — ED NOTES
Pt's family updated on room assignment. Admit team states pt is still clear to go to the 4th floor after ABG.

## 2020-07-23 NOTE — ASSESSMENT & PLAN NOTE
Patient with soft blood pressures while in the ED, not wholly responding to fluid resuscitation. MAP remains in low 60s. Likely 2/2 COVID-19 infection   - consider central line placement    - discontinue levophed. Continue to monitor MAP (greater than 65).   - Supportive treatment for COVID   - Levaquin to cover for possible pneumonia   - will be conservative with fluids given fluid status

## 2020-07-23 NOTE — PLAN OF CARE
VN cued into room for introduction. Informed patient that VN would be working alongside bedside nurse and PCT throughout shift.  Education provided and documented under education tab.  Patient educated on safety measures. Call bell in reach and instructed on how to call for assistance.  Patient verbalized all understanding.  Allowed time for questions.  Patient requesting to use bathroom at this time, PCT Pablo currently unavailable.  VN unit secretary Tr notified of patient's request and  stated that she would notify bedside nurse.

## 2020-07-23 NOTE — SUBJECTIVE & OBJECTIVE
Interval History:  Patient was showing increased oxygen requirements while on the floor. Determined that patient needed continuous BiPAP and was stepped up to the ICU. Current O2 saturation 97%. Patient still on heparin drip. Patient is complaining of melanic diarrhea. Rectal tube was placed. Patient is anxious and disoriented.    Review of Systems   Unable to perform ROS: Severe respiratory distress   Respiratory: Positive for shortness of breath.    Cardiovascular: Negative for chest pain.   Gastrointestinal: Positive for abdominal pain and diarrhea (melanic). Negative for nausea.   Psychiatric/Behavioral: The patient is nervous/anxious.      Objective:     Vital Signs (Most Recent):  Temp: 98.4 °F (36.9 °C) (07/23/20 1515)  Pulse: 99 (07/23/20 1515)  Resp: (!) 26 (07/23/20 1515)  BP: (!) 92/52 (07/23/20 1515)  SpO2: (!) 94 % (07/23/20 1532) Vital Signs (24h Range):  Temp:  [96.6 °F (35.9 °C)-99 °F (37.2 °C)] 98.4 °F (36.9 °C)  Pulse:  [] 99  Resp:  [21-36] 26  SpO2:  [80 %-97 %] 94 %  BP: ()/(50-78) 92/52     Weight: 84.5 kg (186 lb 4.6 oz)  Body mass index is 25.98 kg/m².    Intake/Output Summary (Last 24 hours) at 7/23/2020 1659  Last data filed at 7/23/2020 0501  Gross per 24 hour   Intake 69.9 ml   Output 800 ml   Net -730.1 ml      Physical Exam  Constitutional:       General: He is in acute distress.      Appearance: He is toxic-appearing.      Comments: Patient in moderate distress on a continuous BiPAP.    HENT:      Head: Normocephalic and atraumatic.   Cardiovascular:      Rate and Rhythm: Normal rate.   Pulmonary:      Effort: Respiratory distress present.      Breath sounds: No wheezing.      Comments: Diminished breath sounds in all lung fields. BiPAP present.    Abdominal:      General: There is no distension.      Tenderness: There is abdominal tenderness.      Comments: Positive bowel sounds. Melanic diarrhea within rectal tube.    Skin:     General: Skin is warm.   Neurological:       Mental Status: He is disoriented.         Significant Labs:   A1C:   Recent Labs   Lab 07/23/20  0403   HGBA1C 6.3*     ABGs:   Recent Labs   Lab 07/23/20  0432   PH 7.444   PCO2 36.5   HCO3 25.1   POCSATURATED 91*   BE 1     Bilirubin:   Recent Labs   Lab 07/21/20  1331 07/21/20  1550 07/22/20  0518 07/23/20  0403   BILITOT 0.3 0.8 0.7 0.6     BMP:   Recent Labs   Lab 07/23/20  0403   *      K 4.3      CO2 25   BUN 38*   CREATININE 1.8*   CALCIUM 7.8*   MG 1.9     CBC:   Recent Labs   Lab 07/22/20  0518 07/23/20  0403 07/23/20  1507   WBC 4.01 6.06 13.69*  13.69*   HGB 10.8* 11.3* 10.6*  10.6*   HCT 33.8* 34.2* 32.5*  32.5*    217 243  243     CMP:   Recent Labs   Lab 07/22/20  0518 07/23/20  0403    139   K 4.0 4.3    105   CO2 24 25   * 139*   BUN 40* 38*   CREATININE 2.4* 1.8*   CALCIUM 7.3* 7.8*   PROT 6.8 7.1   ALBUMIN 2.4* 2.4*   BILITOT 0.7 0.6   ALKPHOS 81 83   * 158*   * 148*   ANIONGAP 10 9   EGFRNONAA 25* 36*     Coagulation:   Recent Labs   Lab 07/21/20  2358  07/23/20  1027   INR 1.0  --   --    APTT 38.1*  38.1*   < > 58.8*    < > = values in this interval not displayed.     Lactic Acid:   Recent Labs   Lab 07/21/20  2223   LACTATE 1.2     Magnesium:   Recent Labs   Lab 07/22/20 0518 07/23/20  0403   MG 2.1 1.9     Troponin:   Recent Labs   Lab 07/22/20  0518 07/22/20  1805 07/23/20  0056   TROPONINI 0.037* 0.021 0.030*     TSH:   Recent Labs   Lab 07/23/20  0403   TSH 0.604       All pertinent labs within the past 24 hours have been reviewed.    Significant Imaging: I have reviewed all pertinent imaging results/findings within the past 24 hours.

## 2020-07-24 LAB
ALBUMIN SERPL BCP-MCNC: 2.6 G/DL (ref 3.5–5.2)
ALP SERPL-CCNC: 82 U/L (ref 55–135)
ALT SERPL W/O P-5'-P-CCNC: 117 U/L (ref 10–44)
ANION GAP SERPL CALC-SCNC: 13 MMOL/L (ref 8–16)
ANION GAP SERPL CALC-SCNC: 9 MMOL/L (ref 8–16)
APTT BLDCRRT: 38.2 SEC (ref 21–32)
APTT BLDCRRT: 71.4 SEC (ref 21–32)
APTT BLDCRRT: 72.5 SEC (ref 21–32)
AST SERPL-CCNC: 92 U/L (ref 10–40)
BACTERIA #/AREA URNS HPF: ABNORMAL /HPF
BASOPHILS # BLD AUTO: 0.01 K/UL (ref 0–0.2)
BASOPHILS NFR BLD: 0.1 % (ref 0–1.9)
BILIRUB SERPL-MCNC: 0.5 MG/DL (ref 0.1–1)
BILIRUB UR QL STRIP: NEGATIVE
BUN SERPL-MCNC: 68 MG/DL (ref 8–23)
BUN SERPL-MCNC: 76 MG/DL (ref 8–23)
C DIFF TOX GENS STL QL NAA+PROBE: POSITIVE
CALCIUM SERPL-MCNC: 8 MG/DL (ref 8.7–10.5)
CALCIUM SERPL-MCNC: 8.3 MG/DL (ref 8.7–10.5)
CHLORIDE SERPL-SCNC: 110 MMOL/L (ref 95–110)
CHLORIDE SERPL-SCNC: 110 MMOL/L (ref 95–110)
CLARITY UR: CLEAR
CO2 SERPL-SCNC: 24 MMOL/L (ref 23–29)
CO2 SERPL-SCNC: 26 MMOL/L (ref 23–29)
COLOR UR: YELLOW
CREAT SERPL-MCNC: 2.2 MG/DL (ref 0.5–1.4)
CREAT SERPL-MCNC: 2.3 MG/DL (ref 0.5–1.4)
DIFFERENTIAL METHOD: ABNORMAL
EOSINOPHIL # BLD AUTO: 0 K/UL (ref 0–0.5)
EOSINOPHIL NFR BLD: 0 % (ref 0–8)
ERYTHROCYTE [DISTWIDTH] IN BLOOD BY AUTOMATED COUNT: 16.8 % (ref 11.5–14.5)
EST. GFR  (AFRICAN AMERICAN): 31 ML/MIN/1.73 M^2
EST. GFR  (AFRICAN AMERICAN): 32 ML/MIN/1.73 M^2
EST. GFR  (NON AFRICAN AMERICAN): 27 ML/MIN/1.73 M^2
EST. GFR  (NON AFRICAN AMERICAN): 28 ML/MIN/1.73 M^2
GLUCOSE SERPL-MCNC: 135 MG/DL (ref 70–110)
GLUCOSE SERPL-MCNC: 146 MG/DL (ref 70–110)
GLUCOSE UR QL STRIP: NEGATIVE
HCT VFR BLD AUTO: 33.7 % (ref 40–54)
HGB BLD-MCNC: 10.8 G/DL (ref 14–18)
HGB UR QL STRIP: ABNORMAL
IMM GRANULOCYTES # BLD AUTO: 0.05 K/UL (ref 0–0.04)
IMM GRANULOCYTES NFR BLD AUTO: 0.4 % (ref 0–0.5)
KETONES UR QL STRIP: NEGATIVE
LEUKOCYTE ESTERASE UR QL STRIP: ABNORMAL
LYMPHOCYTES # BLD AUTO: 0.6 K/UL (ref 1–4.8)
LYMPHOCYTES NFR BLD: 5.5 % (ref 18–48)
MAGNESIUM SERPL-MCNC: 2.4 MG/DL (ref 1.6–2.6)
MCH RBC QN AUTO: 28.8 PG (ref 27–31)
MCHC RBC AUTO-ENTMCNC: 32 G/DL (ref 32–36)
MCV RBC AUTO: 90 FL (ref 82–98)
MICROSCOPIC COMMENT: ABNORMAL
MONOCYTES # BLD AUTO: 0.5 K/UL (ref 0.3–1)
MONOCYTES NFR BLD: 4.8 % (ref 4–15)
NEUTROPHILS # BLD AUTO: 10.1 K/UL (ref 1.8–7.7)
NEUTROPHILS NFR BLD: 89.2 % (ref 38–73)
NITRITE UR QL STRIP: NEGATIVE
NRBC BLD-RTO: 0 /100 WBC
PH UR STRIP: 6 [PH] (ref 5–8)
PHOSPHATE SERPL-MCNC: 2.9 MG/DL (ref 2.7–4.5)
PLATELET # BLD AUTO: 275 K/UL (ref 150–350)
PMV BLD AUTO: 10.8 FL (ref 9.2–12.9)
POTASSIUM SERPL-SCNC: 4.5 MMOL/L (ref 3.5–5.1)
POTASSIUM SERPL-SCNC: 5.2 MMOL/L (ref 3.5–5.1)
PROCALCITONIN SERPL IA-MCNC: 0.9 NG/ML
PROT SERPL-MCNC: 7.6 G/DL (ref 6–8.4)
PROT UR QL STRIP: NEGATIVE
RBC # BLD AUTO: 3.75 M/UL (ref 4.6–6.2)
RBC #/AREA URNS HPF: 10 /HPF (ref 0–4)
SODIUM SERPL-SCNC: 145 MMOL/L (ref 136–145)
SODIUM SERPL-SCNC: 147 MMOL/L (ref 136–145)
SP GR UR STRIP: 1.01 (ref 1–1.03)
SQUAMOUS #/AREA URNS HPF: 2 /HPF
URN SPEC COLLECT METH UR: ABNORMAL
UROBILINOGEN UR STRIP-ACNC: NEGATIVE EU/DL
WBC # BLD AUTO: 11.35 K/UL (ref 3.9–12.7)
WBC #/AREA URNS HPF: 1 /HPF (ref 0–5)

## 2020-07-24 PROCEDURE — 63600175 PHARM REV CODE 636 W HCPCS: Performed by: STUDENT IN AN ORGANIZED HEALTH CARE EDUCATION/TRAINING PROGRAM

## 2020-07-24 PROCEDURE — 27000221 HC OXYGEN, UP TO 24 HOURS

## 2020-07-24 PROCEDURE — 84145 PROCALCITONIN (PCT): CPT

## 2020-07-24 PROCEDURE — 25000003 PHARM REV CODE 250

## 2020-07-24 PROCEDURE — 25000003 PHARM REV CODE 250: Performed by: STUDENT IN AN ORGANIZED HEALTH CARE EDUCATION/TRAINING PROGRAM

## 2020-07-24 PROCEDURE — 87040 BLOOD CULTURE FOR BACTERIA: CPT | Mod: 59

## 2020-07-24 PROCEDURE — 20000000 HC ICU ROOM

## 2020-07-24 PROCEDURE — 84100 ASSAY OF PHOSPHORUS: CPT

## 2020-07-24 PROCEDURE — 80048 BASIC METABOLIC PNL TOTAL CA: CPT

## 2020-07-24 PROCEDURE — 85730 THROMBOPLASTIN TIME PARTIAL: CPT | Mod: 91

## 2020-07-24 PROCEDURE — 99900035 HC TECH TIME PER 15 MIN (STAT)

## 2020-07-24 PROCEDURE — 80053 COMPREHEN METABOLIC PANEL: CPT

## 2020-07-24 PROCEDURE — S0030 INJECTION, METRONIDAZOLE: HCPCS

## 2020-07-24 PROCEDURE — 36415 COLL VENOUS BLD VENIPUNCTURE: CPT

## 2020-07-24 PROCEDURE — 83735 ASSAY OF MAGNESIUM: CPT

## 2020-07-24 PROCEDURE — 81000 URINALYSIS NONAUTO W/SCOPE: CPT

## 2020-07-24 PROCEDURE — 85025 COMPLETE CBC W/AUTO DIFF WBC: CPT

## 2020-07-24 PROCEDURE — C9113 INJ PANTOPRAZOLE SODIUM, VIA: HCPCS | Performed by: INTERNAL MEDICINE

## 2020-07-24 PROCEDURE — 94660 CPAP INITIATION&MGMT: CPT

## 2020-07-24 PROCEDURE — 94640 AIRWAY INHALATION TREATMENT: CPT

## 2020-07-24 PROCEDURE — 63600175 PHARM REV CODE 636 W HCPCS: Performed by: INTERNAL MEDICINE

## 2020-07-24 PROCEDURE — 94761 N-INVAS EAR/PLS OXIMETRY MLT: CPT

## 2020-07-24 RX ORDER — ACETAMINOPHEN 325 MG/1
650 TABLET ORAL EVERY 8 HOURS PRN
Status: DISCONTINUED | OUTPATIENT
Start: 2020-07-24 | End: 2020-08-14

## 2020-07-24 RX ORDER — METRONIDAZOLE 500 MG/100ML
500 INJECTION, SOLUTION INTRAVENOUS
Status: DISCONTINUED | OUTPATIENT
Start: 2020-07-24 | End: 2020-08-04

## 2020-07-24 RX ORDER — METRONIDAZOLE 500 MG/100ML
500 INJECTION, SOLUTION INTRAVENOUS
Status: DISCONTINUED | OUTPATIENT
Start: 2020-07-24 | End: 2020-07-24

## 2020-07-24 RX ORDER — SODIUM CHLORIDE, SODIUM LACTATE, POTASSIUM CHLORIDE, CALCIUM CHLORIDE 600; 310; 30; 20 MG/100ML; MG/100ML; MG/100ML; MG/100ML
INJECTION, SOLUTION INTRAVENOUS CONTINUOUS
Status: ACTIVE | OUTPATIENT
Start: 2020-07-24 | End: 2020-07-24

## 2020-07-24 RX ADMIN — HALOPERIDOL LACTATE 2 MG: 5 INJECTION, SOLUTION INTRAMUSCULAR at 03:07

## 2020-07-24 RX ADMIN — HALOPERIDOL LACTATE 2 MG: 5 INJECTION, SOLUTION INTRAMUSCULAR at 01:07

## 2020-07-24 RX ADMIN — METRONIDAZOLE 500 MG: 500 INJECTION, SOLUTION INTRAVENOUS at 04:07

## 2020-07-24 RX ADMIN — HALOPERIDOL LACTATE 2 MG: 5 INJECTION, SOLUTION INTRAMUSCULAR at 10:07

## 2020-07-24 RX ADMIN — SODIUM CHLORIDE, SODIUM LACTATE, POTASSIUM CHLORIDE, AND CALCIUM CHLORIDE: .6; .31; .03; .02 INJECTION, SOLUTION INTRAVENOUS at 09:07

## 2020-07-24 RX ADMIN — THERA TABS 1 TABLET: TAB at 08:07

## 2020-07-24 RX ADMIN — LIDOCAINE 1 PATCH: 50 PATCH TOPICAL at 04:07

## 2020-07-24 RX ADMIN — HEPARIN SODIUM AND DEXTROSE 13 UNITS/KG/HR: 10000; 5 INJECTION INTRAVENOUS at 04:07

## 2020-07-24 RX ADMIN — HALOPERIDOL LACTATE 2 MG: 5 INJECTION, SOLUTION INTRAMUSCULAR at 04:07

## 2020-07-24 RX ADMIN — ALBUTEROL SULFATE 2 PUFF: 90 AEROSOL, METERED RESPIRATORY (INHALATION) at 07:07

## 2020-07-24 RX ADMIN — ALBUTEROL SULFATE 2 PUFF: 90 AEROSOL, METERED RESPIRATORY (INHALATION) at 05:07

## 2020-07-24 RX ADMIN — SODIUM CHLORIDE 200 MG: 9 INJECTION, SOLUTION INTRAVENOUS at 02:07

## 2020-07-24 RX ADMIN — ACETAMINOPHEN 650 MG: 325 TABLET ORAL at 10:07

## 2020-07-24 RX ADMIN — Medication 125 MG: at 08:07

## 2020-07-24 RX ADMIN — ALBUTEROL SULFATE 2 PUFF: 90 AEROSOL, METERED RESPIRATORY (INHALATION) at 02:07

## 2020-07-24 RX ADMIN — LEVOFLOXACIN 750 MG: 750 TABLET, FILM COATED ORAL at 08:07

## 2020-07-24 RX ADMIN — PANTOPRAZOLE SODIUM 40 MG: 40 INJECTION, POWDER, FOR SOLUTION INTRAVENOUS at 08:07

## 2020-07-24 RX ADMIN — ALBUTEROL SULFATE 2 PUFF: 90 AEROSOL, METERED RESPIRATORY (INHALATION) at 08:07

## 2020-07-24 RX ADMIN — ALBUTEROL SULFATE 2 PUFF: 90 AEROSOL, METERED RESPIRATORY (INHALATION) at 01:07

## 2020-07-24 RX ADMIN — ALBUTEROL SULFATE 2 PUFF: 90 AEROSOL, METERED RESPIRATORY (INHALATION) at 11:07

## 2020-07-24 RX ADMIN — Medication 125 MG: at 11:07

## 2020-07-24 RX ADMIN — METRONIDAZOLE 500 MG: 500 INJECTION, SOLUTION INTRAVENOUS at 11:07

## 2020-07-24 RX ADMIN — DEXAMETHASONE SODIUM PHOSPHATE 6 MG: 4 INJECTION, SOLUTION INTRAMUSCULAR; INTRAVENOUS at 08:07

## 2020-07-24 RX ADMIN — PANTOPRAZOLE SODIUM 40 MG: 40 INJECTION, POWDER, FOR SOLUTION INTRAVENOUS at 10:07

## 2020-07-24 NOTE — ASSESSMENT & PLAN NOTE
CMP in the ED showed a BUN/Creatinine ratio of 45/2.9.  Elevation from baseline renal functioning. Likely prerenal etiology. Currently 68/2.3.     Plan:  - start mLR 100 cc/hr   - Continue to monitor renal function  - Avoid nephrotoxic medications

## 2020-07-24 NOTE — PROGRESS NOTES
[facility] ICU 5th Floor  Hospital Medicine  Progress Note    Patient Name: Rajan Deluna  MRN: [id number]  Patient Class: IP- Inpatient   Admission Date: 7/21/2020  Length of Stay: 3 days  Attending Physician: Rajan Rodríguez MD  Primary Care Provider: Jason Mccord MD        Subjective:     Principal Problem:<principal problem not specified>        HPI:  Patient is a 74-year-old male with a past medical history of hypertension, COPD, MAC infection who presented to the ED with altered mental status and fever.  As per the family, patient has had altered mental status for the past 3-4 days. Family endorses increased somnolence as well as urinating on himself. EMS was called two days prior to presentation but reportedly deemed that patient did not need to be brought in. Over the previous day, reportedly the patient was found stuck, staring in his sink, not responding. On the day of presentation, the patient was found down on the floor, family wasn't able to get patient up from the floor and was reportedly not responding as much as baseline. Family believed that patient may not have been wearing baseline 2L O2. Patient also endorses a cough and generalized weakness. Patient reportedly had a recent UTI.     In the ED, chest x-ray showed diffuse interstitial patchy infiltrates concerning for possible pneumonia and a COVID-19 screening test was positive. Fever was 101° F and patient was hypotensive 80s/40s. Patient given 30cc/kg bolus. BP very soft on presentation, adequately responded to volume resuscitation and then decreased again. Labs showed a BUN 45, creatinine 2.9, , , troponin 0.036, procalcitonin 1.13.     Overview/Hospital Course:  No notes on file    Interval History:  Most recent MAP 78. Stable overnight on BiPAP, sating at 95-99%. Patient still does have some agitation, increased Haldol to q.4 hours p.r.n.. Patient's kidney function is worsening, most current BUN to  creatinine ratio 68/2.3 up from 38/1.8. Patient still does have melenic stools as per rectal tube.  H&H stable 10.8 and 33.7.     Unable to perform ROS: Altered Mental Status    Objective:     Vital Signs (Most Recent):  Temp: 98.7 °F (37.1 °C) (07/24/20 0745)  Pulse: 89 (07/24/20 0830)  Resp: 19 (07/24/20 0830)  BP: (!) 111/57 (07/24/20 0830)  SpO2: 100 % (07/24/20 0830) Vital Signs (24h Range):  Temp:  [97.9 °F (36.6 °C)-98.7 °F (37.1 °C)] 98.7 °F (37.1 °C)  Pulse:  [] 89  Resp:  [16-37] 19  SpO2:  [84 %-100 %] 100 %  BP: ()/(52-97) 111/57     Weight: 83 kg (182 lb 15.7 oz)  Body mass index is 25.52 kg/m².    Intake/Output Summary (Last 24 hours) at 7/24/2020 0924  Last data filed at 7/24/2020 0749  Gross per 24 hour   Intake 359.94 ml   Output 1410 ml   Net -1050.06 ml      Physical Exam  Constitutional:       General: He is in acute distress.      Appearance: He is toxic-appearing.      Comments: Patient in moderate distress on a continuous BiPAP.    HENT:      Head: Normocephalic and atraumatic.   Cardiovascular:      Rate and Rhythm: Normal rate.   Pulmonary:      Effort: Respiratory distress present.      Breath sounds: No wheezing.      Comments: Diminished breath sounds in all lung fields. BiPAP present.    Abdominal:      General: There is no distension.      Tenderness: There is abdominal tenderness.      Comments: Abdomen firm. Positive bowel sounds. Melanic diarrhea within rectal tube.    Skin:     General: Skin is warm.   Neurological:      Mental Status: He is disoriented.         Significant Labs:   A1C:   Recent Labs   Lab 07/23/20  0403   HGBA1C 6.3*     ABGs:   Recent Labs   Lab 07/23/20  0432   PH 7.444   PCO2 36.5   HCO3 25.1   POCSATURATED 91*   BE 1     Bilirubin:   Recent Labs   Lab 07/21/20  1331 07/21/20  1550 07/22/20  0518 07/23/20  0403 07/24/20  0322   BILITOT 0.3 0.8 0.7 0.6 0.5     BMP:   Recent Labs   Lab 07/24/20  0322   *   *   K 5.2*      CO2 24    BUN 68*   CREATININE 2.3*   CALCIUM 8.3*   MG 2.4     CBC:   Recent Labs   Lab 07/23/20  0403 07/23/20  1507 07/24/20  0322   WBC 6.06 13.69*  13.69* 11.35   HGB 11.3* 10.6*  10.6* 10.8*   HCT 34.2* 32.5*  32.5* 33.7*    243  243 275     CMP:   Recent Labs   Lab 07/23/20  0403 07/24/20  0322    147*   K 4.3 5.2*    110   CO2 25 24   * 135*   BUN 38* 68*   CREATININE 1.8* 2.3*   CALCIUM 7.8* 8.3*   PROT 7.1 7.6   ALBUMIN 2.4* 2.6*   BILITOT 0.6 0.5   ALKPHOS 83 82   * 92*   * 117*   ANIONGAP 9 13   EGFRNONAA 36* 27*     Coagulation:   Recent Labs   Lab 07/24/20  0322   APTT 71.4*     Magnesium:   Recent Labs   Lab 07/23/20  0403 07/24/20  0322   MG 1.9 2.4     Troponin:   Recent Labs   Lab 07/22/20  1805 07/23/20  0056   TROPONINI 0.021 0.030*     TSH:   Recent Labs   Lab 07/23/20  0403   TSH 0.604     All pertinent labs within the past 24 hours have been reviewed.    Significant Imaging: I have reviewed all pertinent imaging results/findings within the past 24 hours.      Assessment/Plan:        Patient is currently on continues BiPAP satting at 95-99%. Patient not dependent on pressors and BP is stable, current /57. Patient's O2 demand is stable and oxygen saturation at 95-99%. Will attempt to wean the patient off of continuous BiPAP and transition to a high-flow nasal cannula. The patient's kidney function was slightly worsening and will hold Remdesivir pending repeat CMP will need to touch base with inpatient pharmacist. We will continue to monitor his fluid status and watch his stool output via rectal tube. Will need to follow-up with infectious disease.       Pneumonia due to COVID-19 virus  Patient was stepped up to the ICU yesterday due to increasing O2 demands and decreasing O2 saturation from baseline. Patient was started on continues BiPAP, currently satting at 95-95% at 50% oxygen concentration. Patient was approved for Remdesivir yesterday but morning  labs indicated BUN 68 creatinine 2.3, will hold pending repeat CMP.    Plan:   - Wean off continuous BiPAP and transition to high flow nasal cannula, monitor response.    - COVID Isolation per protocol   - Dexamethasone 6mg daily for 10 days   - continue Heparin gtt   - Levofloxacin 750mg daily   - Atorvastatin 40mg daily   - Multivitamin      Acute respiratory failure with hypoxia  Currently on continuous BiPAP at 50% oxygen concentration satting at 95-99%. Continue to monitor with continuous pulse oximetry.  Will attempt to wean off BiPAP.  STABLE      Sepsis with acute renal failure and septic shock  24-hour MAP ranges .  Most current map 78.  Blood pressure 111/57.  Most recent blood pressure 92/52. Patient satting at 95% on 70% O2 concentration via BiPAP.    - Continue to monitor MAP (greater than 65).   - Supportive treatment for COVID   - Levaquin to cover for possible pneumonia   - patient started on mLR 100 cc/hour.    MARQUIS (acute kidney injury)  CMP in the ED showed a BUN/Creatinine ratio of 45/2.9.  Elevation from baseline renal functioning. Likely prerenal etiology. Currently 68/2.3.     Plan:  - start mLR 100 cc/hr   - Continue to monitor renal function  - Avoid nephrotoxic medications      COPD (chronic obstructive pulmonary disease)   Patient was showing increased oxygen demand and was stepped up to the ICU and placed on continuous BiPAP.  - albuterol inhaler 2 puff Q4H        VTE Risk Mitigation (From admission, onward)         Ordered     heparin 25,000 units in dextrose 5% 250 mL (100 units/mL) infusion HIGH INTENSITY nomogram - [facility]  Continuous     Question:  Heparin Infusion Adjustment (DO NOT MODIFY ANSWER)  Answer:  [email or web address]\epic\Images\Pharmacy\HeparinInfusions\heparin HIGH INTENSITY nomogram for [facility] [id number]    07/22/20 0215     heparin 25,000 units in dextrose 5% (100 units/ml) IV bolus from bag - ADDITIONAL PRN BOLUS - 60 units/kg  As needed (PRN)     Question:  Heparin Infusion  Adjustment (DO NOT MODIFY ANSWER)  Answer:  \\[email or web address]\epic\Images\Pharmacy\HeparinInfusions\heparin HIGH INTENSITY nomogram for [facility] [id number]    07/22/20 0215     heparin 25,000 units in dextrose 5% (100 units/ml) IV bolus from bag - ADDITIONAL PRN BOLUS - 30 units/kg  As needed (PRN)     Question:  Heparin Infusion Adjustment (DO NOT MODIFY ANSWER)  Answer:  \\[email or web address]\epic\Images\Pharmacy\HeparinInfusions\heparin HIGH INTENSITY nomogram for [facility] [id number]    07/22/20 0215     Place sequential compression device  Until discontinued      07/21/20 2142     IP VTE HIGH RISK PATIENT  Once      07/21/20 2142                      Olivier Rowe DO PGY-1  U Family Medicine  [facility] ICU 5th Floor

## 2020-07-24 NOTE — SUBJECTIVE & OBJECTIVE
Interval History:  Most recent MAP 78. Stable overnight on BiPAP, sating at 95-99%. Patient still does have some agitation, increased Haldol to q.4 hours p.r.n.. Patient's kidney function is worsening, most current BUN to creatinine ratio 68/2.3 up from 38/1.8. Patient still does have melenic stools as per rectal tube.  H&H stable 10.8 and 33.7.     Unable to perform ROS: Altered Mental Status    Objective:     Vital Signs (Most Recent):  Temp: 98.7 °F (37.1 °C) (07/24/20 0745)  Pulse: 89 (07/24/20 0830)  Resp: 19 (07/24/20 0830)  BP: (!) 111/57 (07/24/20 0830)  SpO2: 100 % (07/24/20 0830) Vital Signs (24h Range):  Temp:  [97.9 °F (36.6 °C)-98.7 °F (37.1 °C)] 98.7 °F (37.1 °C)  Pulse:  [] 89  Resp:  [16-37] 19  SpO2:  [84 %-100 %] 100 %  BP: ()/(52-97) 111/57     Weight: 83 kg (182 lb 15.7 oz)  Body mass index is 25.52 kg/m².    Intake/Output Summary (Last 24 hours) at 7/24/2020 0924  Last data filed at 7/24/2020 0749  Gross per 24 hour   Intake 359.94 ml   Output 1410 ml   Net -1050.06 ml      Physical Exam  Constitutional:       General: He is in acute distress.      Appearance: He is toxic-appearing.      Comments: Patient in moderate distress on a continuous BiPAP.    HENT:      Head: Normocephalic and atraumatic.   Cardiovascular:      Rate and Rhythm: Normal rate.   Pulmonary:      Effort: Respiratory distress present.      Breath sounds: No wheezing.      Comments: Diminished breath sounds in all lung fields. BiPAP present.    Abdominal:      General: There is no distension.      Tenderness: There is abdominal tenderness.      Comments: Abdomen firm. Positive bowel sounds. Melanic diarrhea within rectal tube.    Skin:     General: Skin is warm.   Neurological:      Mental Status: He is disoriented.         Significant Labs:   A1C:   Recent Labs   Lab 07/23/20  0403   HGBA1C 6.3*     ABGs:   Recent Labs   Lab 07/23/20  0432   PH 7.444   PCO2 36.5   HCO3 25.1   POCSATURATED 91*   BE 1     Bilirubin:    Recent Labs   Lab 07/21/20  1331 07/21/20  1550 07/22/20  0518 07/23/20  0403 07/24/20  0322   BILITOT 0.3 0.8 0.7 0.6 0.5     BMP:   Recent Labs   Lab 07/24/20  0322   *   *   K 5.2*      CO2 24   BUN 68*   CREATININE 2.3*   CALCIUM 8.3*   MG 2.4     CBC:   Recent Labs   Lab 07/23/20  0403 07/23/20  1507 07/24/20  0322   WBC 6.06 13.69*  13.69* 11.35   HGB 11.3* 10.6*  10.6* 10.8*   HCT 34.2* 32.5*  32.5* 33.7*    243  243 275     CMP:   Recent Labs   Lab 07/23/20  0403 07/24/20  0322    147*   K 4.3 5.2*    110   CO2 25 24   * 135*   BUN 38* 68*   CREATININE 1.8* 2.3*   CALCIUM 7.8* 8.3*   PROT 7.1 7.6   ALBUMIN 2.4* 2.6*   BILITOT 0.6 0.5   ALKPHOS 83 82   * 92*   * 117*   ANIONGAP 9 13   EGFRNONAA 36* 27*     Coagulation:   Recent Labs   Lab 07/24/20  0322   APTT 71.4*     Magnesium:   Recent Labs   Lab 07/23/20  0403 07/24/20  0322   MG 1.9 2.4     Troponin:   Recent Labs   Lab 07/22/20  1805 07/23/20  0056   TROPONINI 0.021 0.030*     TSH:   Recent Labs   Lab 07/23/20  0403   TSH 0.604     All pertinent labs within the past 24 hours have been reviewed.    Significant Imaging: I have reviewed all pertinent imaging results/findings within the past 24 hours.

## 2020-07-24 NOTE — ASSESSMENT & PLAN NOTE
Currently on continuous BiPAP at 50% oxygen concentration satting at 95-99%. Continue to monitor with continuous pulse oximetry.  Will attempt to wean off BiPAP.  STABLE

## 2020-07-24 NOTE — PLAN OF CARE
Pt on continuous Bipap.  Took pt off for MDI treatment.  Placed pt back with documented settings  Will continue to monitor.

## 2020-07-24 NOTE — PT/OT/SLP PROGRESS
Speech Language Pathology  MISSED VISIT    Rajan Deluna  MRN: [id number]    1353: SLP attempted to see pt for clinical swallow assessment. Per RNReina, pt recently on HFNC though transitioned back to BiPap as he was unable to maintain acceptable O2 sats. Patient not seen today secondary to Other (Pt unable to come off BiPap). Will follow-up next date of service.    Maranda Blake, ELOISE-SLP

## 2020-07-24 NOTE — ASSESSMENT & PLAN NOTE
Patient was stepped up to the ICU yesterday due to increasing O2 demands and decreasing O2 saturation from baseline. Patient was started on continues BiPAP, currently satting at 95-95% at 50% oxygen concentration. Patient was approved for Remdesivir yesterday but morning labs indicated BUN 68 creatinine 2.3, will hold pending repeat CMP.    Plan:   - COVID Isolation per protocol   - Dexamethasone 6mg daily for 10 days   - continue Heparin gtt   - Levofloxacin 750mg daily   - Atorvastatin 40mg daily   - Multivitamin

## 2020-07-24 NOTE — ASSESSMENT & PLAN NOTE
Patient was stepped up to the ICU yesterday due to increasing O2 demands and decreasing O2 saturation from baseline. Patient was started on continues BiPAP, currently satting at 95-95% at 50% oxygen concentration. Patient was approved for Remdesivir yesterday but morning labs indicated BUN 68 creatinine 2.3, will hold pending repeat CMP.    Plan:   - Wean off continuous BiPAP and transition to high flow nasal cannula, monitor response.    - COVID Isolation per protocol   - Dexamethasone 6mg daily for 10 days   - continue Heparin gtt   - Levofloxacin 750mg daily   - Atorvastatin 40mg daily   - Multivitamin

## 2020-07-24 NOTE — ASSESSMENT & PLAN NOTE
Patient was showing increased oxygen demand and was stepped up to the ICU and placed on continuous BiPAP.  - albuterol inhaler 2 puff Q4H

## 2020-07-24 NOTE — PROGRESS NOTES
"[facility]/Critical Care Resident Progress Note    Primary Team: Family Medicine  Attending Physician: Rajan Rodríguez MD    Subjective:      Patient states he is doing ok this morning.  Wants to be able to eat/drink.  Complaining of some mild abdominal pain.  Stool output has slowed overnight.        Objective:     Last 24 Hour Vital Signs:  BP  Min: 84/60  Max: 149/97  Temp  Av.3 °F (36.8 °C)  Min: 97.9 °F (36.6 °C)  Max: 98.7 °F (37.1 °C)  Pulse  Av.2  Min: 82  Max: 116  Resp  Av.9  Min: 16  Max: 37  SpO2  Av.8 %  Min: 84 %  Max: 100 %  Height  Av' 11" (180.3 cm)  Min: 5' 11" (180.3 cm)  Max: 5' 11" (180.3 cm)  Weight  Av.8 kg (184 lb 10.2 oz)  Min: 83 kg (182 lb 15.7 oz)  Max: 84.5 kg (186 lb 4.6 oz)    Intake/Output Summary (Last 24 hours) at 2020 1001  Last data filed at 2020 0749  Gross per 24 hour   Intake 359.94 ml   Output 1410 ml   Net -1050.06 ml     Vent Settings:  Oxygen Concentration (%):  [60-70] 60    Physical Examination:  Const: Ill-appearing  HEENT: NCAT, PERRL, EOM nml  Cardio: Tachycardic, 2+ peripheral pulses  Pulm: Tachypneic, increased WOB, on bipap  Abdomen: Soft, mild distention, mild tenderness RLQ, suprapubic region, rectal tube in place draining dark liquid stool.   Extremities: no peripheral edema  Skin: Warm and dry, no rash  Neuro: Alert and oriented x 3, no focal deficits.     Laboratory:  Trended Lab Data:  Hematology:  Recent Labs   Lab 20  2358 20  0518 20  0403 20  1507 20  0322   WBC 4.19 4.01 6.06 13.69*  13.69* 11.35   HGB 9.9* 10.8* 11.3* 10.6*  10.6* 10.8*   HCT 31.4* 33.8* 34.2* 32.5*  32.5* 33.7*    214 217 243  243 275   MCV 92 91 88 90  90 90   RDW 17.0* 16.9* 16.5* 16.7*  16.7* 16.8*     Chemistry:  Recent Labs   Lab 20  1331 20  1550 20  0518 20  0403 20  0322   * 143 140 139 147*   K <2.0* 4.1 4.0 4.3 5.2*   CL >130* 109 106 105 110   CO2 11* " 22* 24 25 24   BUN 24* 45* 40* 38* 68*   CREATININE 1.1 2.9* 2.4* 1.8* 2.3*   GLU 55* 123* 148* 139* 135*   CALCIUM 3.3* 7.7* 7.3* 7.8* 8.3*   PROT 2.9* 7.5 6.8 7.1 7.6   ALBUMIN 1.0* 2.7* 2.4* 2.4* 2.6*   ALT 84* 232* 199* 148* 117*   * 450* 310* 158* 92*   ALKPHOS 35* 89 81 83 82   MG 1.1* 2.4 2.1 1.9 2.4   PHOS 1.3* 3.0 3.1 1.7* 2.9   BILITOT 0.3 0.8 0.7 0.6 0.5       Cardiac:  Recent Labs   Lab 07/21/20  1331 07/22/20  0518 07/22/20  1805 07/23/20  0056   TROPONINI 0.036* 0.037* 0.021 0.030*   BNP 13  --   --   --        DM:  Recent Labs   Lab 07/22/20  0518 07/23/20  0403 07/24/20  0322   HGBA1C  --  6.3*  --    * 139* 135*       ABG:  ABG:     Recent Labs     07/22/20  1945 07/23/20  0432   PH 7.410 7.444   PCO2 36.6 36.5   PO2 54* 58*   HCO3 23.2* 25.1   POCSATURATED 88* 91*   BE -1 1        Microbiology:  Microbiology Results (last 7 days)     Procedure Component Value Units Date/Time    C Diff Toxin by PCR [[id number]] Collected: 07/23/20 1205    Order Status: No result Updated: 07/24/20 0324    Blood culture x two cultures. Draw prior to antibiotics. [[id number]] Collected: 07/21/20 1419    Order Status: Completed Specimen: Blood from Peripheral, Antecubital, Right Updated: 07/23/20 2322     Blood Culture, Routine No Growth to date      No Growth to date      No Growth to date    Narrative:      Aerobic and anaerobic    Blood culture x two cultures. Draw prior to antibiotics. [[id number]] Collected: 07/21/20 1330    Order Status: Completed Specimen: Blood from Peripheral, Wrist, Left Updated: 07/23/20 2322     Blood Culture, Routine No Growth to date      No Growth to date      No Growth to date    Narrative:      Aerobic and anaerobic    Clostridium difficile EIA [[id number]]  (Abnormal) Collected: 07/23/20 1205    Order Status: Completed Specimen: Stool Updated: 07/23/20 2032     C. diff Antigen Positive     C difficile Toxins A+B, EIA Negative     Comment: Testing not recommended for children  <24 months old.             EKG:  Sinus, LAD, RBBB, [id number]    Radiology:  X-ray Chest 1 View    Result Date: 7/22/2020  EXAMINATION: CHEST ONE VIEW CLINICAL HISTORY: COPD Exacerbation; Sepsis, unspecified organism TECHNIQUE: One view of the chest. COMPARISON: 07/21/2020 FINDINGS: The cardiac silhouette is within normal limits.   There is no focal consolidation, pneumothorax, or pleural effusion. There is a diffuse interstitial lung abnormalities.  There is chronic appearing bilateral pleural thickening.  There is no large appearing pleural fluid.     No significant change. Electronically signed by: Kari Becerra Date:    07/22/2020 Time:    18:36    X-ray Chest Ap Portable    Result Date: 7/21/2020  EXAMINATION: XR CHEST AP PORTABLE CLINICAL HISTORY: Sepsis; TECHNIQUE: Single frontal view of the chest was performed. COMPARISON: None FINDINGS: EKG wires overlie the chest.  The cardiac silhouette is normal in size, midline.  Mild increased interstitial lung markings noted both lung fields, nonspecific, slightly accentuated  at the lung bases left more than right.  No large pleural effusion.  No pneumothorax.  The osseous structures appear normal.     Abnormal increased interstitial lung markings predominantly at the lung basis left more than right, nonspecific could be acute or chronic. Electronically signed by: Luda Powell MD Date:    07/21/2020 Time:    13:54      Current Medications:     Infusions:   heparin (porcine) in D5W 13 Units/kg/hr (07/24/20 0452)    lactated ringers 100 mL/hr at 07/24/20 0901        Scheduled:   albuterol  2 puff Inhalation Q4H    dexamethasone  6 mg Intravenous Q24H    levoFLOXacin  750 mg Oral Every other day    lidocaine  1 patch Transdermal Q24H    multivitamin  1 tablet Oral Daily    pantoprazole  40 mg Intravenous BID    [START ON 7/25/2020] EUA remdesivir infusion (NO CHARGE)  100 mg Intravenous Q24H    EUA remdesivir infusion (NO CHARGE)  200 mg Intravenous  Once        PRN:  acetaminophen, haloperidol lactate, heparin (PORCINE), heparin (PORCINE), ondansetron, pneumoc 13-loy conj-dip cr(PF), sodium chloride 0.9%    Antibiotics:  Antimicrobials (From admission, onward)     Ordered     Dose Route Frequency Start Stop    07/22/20 0001  levoFLOXacin tablet 750 mg     INDICATION: Lower Respiratory Infections        750 mg Oral Every other day 07/22/20 0900 07/28/20 0859                Assessment:     Rajan Deluna is a 74 y.o.male with  Patient Active Problem List    Diagnosis Date Noted    COVID-19 virus infection 07/23/2020    Acute respiratory failure with hypoxia     Pneumonia due to COVID-19 virus     Sepsis with acute renal failure and septic shock 07/21/2020    COVID-19 07/21/2020    MARQUIS (acute kidney injury) 07/21/2020    Diastolic dysfunction without heart failure 09/22/2017    COPD (chronic obstructive pulmonary disease) 05/22/2017    Chest pain 12/09/2016    Anemia 12/09/2016    RBBB 12/09/2016    JACOME (dyspnea on exertion) 12/09/2016        Plan:     Neuro:  1. Acute encephalopathy  - Likely 2/2 sepsis vs COVID  - Mental status waxing and waning.  ICU delirium vs metabolic encephalopathy vs sepsis vs COVID-related  - Delirium precautions  - Check VBG     Pulm:  1. Acute hypoxic respiratory failure 2/2 COVID PNA  2. COPD  - CXR with bilateral interstitial opacities, consistent with COVID  - Patient presented in septic shock, and markedly elevated procal, agree with continuing abx  - Inflammatory markers significantly elevated  - Dexamethasone 6 mg IV x 10d  - Remdesivir approved, check with pharmacy regarding renal function  - Continue full-dose anticoagulation  - Albuterol MDI prn  - Continuous bipap at present, will try to wean to vapotherm  - Titrate O2 to sats of 85-90%     Cardio:  1. Septic shock  2. NSTEMI  - No longer requiring pressor support  - Troponin mildly elevated on arrival, stable, likely 2/2 demand vs COVID cardiomyopathy  - ECG  without ST-T changes  - D-dimer significantly elevated  - Recommend formal echo  - Continue full-dose anticoagulation with heparin gtt per protocol    GI:  1. Transaminitis  2. Melanotic stool  - likely 2/2 Shock, downtrending  - Continue to trend CMP  - Patient with dark liquid stool, occult blood positive  - C. Diff sent and positive antigen/negative toxin; would not treat at this time  - Suspect UGIB, started on protonix BID  - Stool output has been slowing overnight.  Continue to monitor  - Consider discussing with GI once patient more stable from respiratory standpoint  - Recommend continuing heparin gtt at present    Renal/:  1. MARQUIS  2. Hematuria  - Cr baseline unclear, some prior labs ranging 1-1.3  - Cr 2.9 on admit, decreasing to 1.8 this AM  - Likely pre-renal, continue to monitor  - UA also with 3+ occult blood, +RBCs, +RBC casts, continue to monitor  - Cr increased to 2.3 this AM with concomitant increase in Na, K, BUN  - Will provide IVF and reassess     ID:  1. Septic shock of unknown etiology  2. COVID-19 PNA  - Procal 1.13  - BCx with NGTD, continue to follow  - Agree with treating with abx right now  - F/u cultures     Heme/Onc:  1. Normocytic anemia  - Possible UGIB; Hgb remains stable  - Continue to trend CBC     Endocrine:  1. Hypocalcemia  - Replete as needed  - Check PTH, Vit D      DVT ppx: Heparin gtt  GI ppx:  Protonix     Code status: Full    Aaron Vega MD  [facility]  [facility]

## 2020-07-24 NOTE — CONSULTS
[facility] Consult Note    Primary Attending Physician: Rajan Rodríguez MD  Consultant Attending: Alesha Casanova MD  Consultant Fellow: Stewart Moura MD    Reason for Consult:     1. Severe C diff  2. Pneumonia due to COVID-19  3. History of MAC infection  4. History of Aspergillosis      Assessment:     Rajan Deluna is a 74 y.o. male with:  Patient Active Problem List    Diagnosis Date Noted    COVID-19 virus infection 07/23/2020    Acute respiratory failure with hypoxia     Pneumonia due to COVID-19 virus     Sepsis with acute renal failure and septic shock 07/21/2020    COVID-19 07/21/2020    MARQUIS (acute kidney injury) 07/21/2020    Diastolic dysfunction without heart failure 09/22/2017    COPD (chronic obstructive pulmonary disease) 05/22/2017    Chest pain 12/09/2016    Anemia 12/09/2016    RBBB 12/09/2016    JACOME (dyspnea on exertion) 12/09/2016        Plan:     1. Severe C diff  Patient with profuse diarrhea prompting C diff testing. Antigen positive, toxin negative; however, toxin PCR positive.  - Recommend treating severe C diff with PO vancomycin and IV metronidazole (ordered)  - Recommend trending procalcitonin (ordered) and if less than 1, please discontinue levofloxacin  - Recommend KUB to evaluate for toxic megacolon given abdominal tenderness. Would require emergent Surgical consult if present.    2. Pneumonia due to COVID-19  - Continue dexamethasone 6 mg qD for a 10 day course  - Continue remdesivir, monitor kidney function closely  - Again, repeat procalcitonin ordered. If less than 1 recommend discontinuing levofloxacin.     3. History of MAC infection  4. History of Aspergillosis  Patient with history of structural lung disease c/b MAC infection, and was recently treated with itraconazole for Aspergillosis.  - Recommend fungal and AFB blood cultures, fungal and AFB sputum cultures, and serum Aspergillus antigen (ordered)    Thank you for allowing us to  "participate in the care of this patient. Please contact me if you have any questions regarding this consult.    Stewart Moura MD  [facility], PGY-4  Cell: [phone]    Subjective:      History of Present Illness:  Rajan Deluna is a 74 y.o. male who  has a past medical history of Arthritis, COPD (chronic obstructive pulmonary disease), Hypertension, Smoker, and Weakness.. The patient presented to the [facility] on 7/21/2020 with a primary complaint of Fever (with AMS- pt from home, recent UTI, fever, fatigue and " not acting like normal self" )    Patient admitted 7/21 after being found down at home. CXR with bilateral infiltrates, COVID positive. Care escalated to ICU on 7/21 for worsening hypoxia. Over the last 24 hours he has had profuse diarrhea with C diff ag positive, toxin negative. ID consulted for COVID PNA and C diff.     Past Medical History:  Past Medical History:   Diagnosis Date    Arthritis     COPD (chronic obstructive pulmonary disease)     Hypertension     Smoker     Weakness        Past Surgical History:  Past Surgical History:   Procedure Laterality Date    HERNIA REPAIR      right knee surgery         Allergies:  Review of patient's allergies indicates:  No Known Allergies    Medications:   In-Hospital Scheduled Medications:   albuterol  2 puff Inhalation Q4H    dexamethasone  6 mg Intravenous Q24H    levoFLOXacin  750 mg Oral Every other day    lidocaine  1 patch Transdermal Q24H    metronidazole  500 mg Intravenous Q8H    multivitamin  1 tablet Oral Daily    pantoprazole  40 mg Intravenous BID    [START ON 7/25/2020] EUA remdesivir infusion (NO CHARGE)  100 mg Intravenous Q24H    vancomycin  125 mg Oral Q6H      In-Hospital PRN Medications:  acetaminophen, haloperidol lactate, heparin (PORCINE), heparin (PORCINE), ondansetron, pneumoc 13-loy conj-dip cr(PF), sodium chloride 0.9%   In-Hospital IV Infusion Medications:   heparin " (porcine) in D5W 11 Units/kg/hr (20 1345)    lactated ringers 100 mL/hr at 20 0901      Home Medications:  Prior to Admission medications    Medication Sig Start Date End Date Taking? Authorizing Provider   budesonide-formoterol 160-4.5 mcg (SYMBICORT) 160-4.5 mcg/actuation HFAA Inhale 2 puffs into the lungs every 12 (twelve) hours.    Historical Provider, MD   escitalopram oxalate (LEXAPRO) 10 MG tablet  16   Historical Provider, MD   etodolac (LODINE) 500 MG tablet Take 500 mg by mouth 2 (two) times daily.    Historical Provider, MD   gabapentin (NEURONTIN) 300 MG capsule Take 300 mg by mouth.    Historical Provider, MD   lisinopril-hydrochlorothiazide (PRINZIDE,ZESTORETIC) 20-12.5 mg per tablet Take 1 tablet by mouth.    Historical Provider, MD   losartan (COZAAR) 100 MG tablet  16   Historical Provider, MD   omeprazole (PRILOSEC) 20 MG capsule  16   Historical Provider, MD   pregabalin (LYRICA) 75 MG capsule Take 75 mg by mouth 2 (two) times daily.    Historical Provider, MD   PROAIR HFA 90 mcg/actuation inhaler  16   Historical Provider, MD   tramadol (ULTRAM) 50 mg tablet Take 50 mg by mouth every 6 (six) hours as needed for Pain.    Historical Provider, MD       Family History:  History reviewed. No pertinent family history.    Social History:  Social History     Tobacco Use    Smoking status: Former Smoker    Smokeless tobacco: Never Used   Substance Use Topics    Alcohol use: Not Currently    Drug use: Never       Review of Systems   Constitutional: Positive for malaise/fatigue. Negative for fever.   Respiratory: Positive for cough.    Gastrointestinal: Positive for diarrhea.          Objective:   Last 24 Hour Vital Signs:  BP  Min: 84/60  Max: 149/97  Temp  Av.1 °F (36.7 °C)  Min: 97.9 °F (36.6 °C)  Max: 98.7 °F (37.1 °C)  Pulse  Av.6  Min: 82  Max: 105  Resp  Av.9  Min: 16  Max: 46  SpO2  Av.7 %  Min: 67 %  Max: 100 %  Weight  Av kg (182 lb  15.7 oz)  Min: 83 kg (182 lb 15.7 oz)  Max: 83 kg (182 lb 15.7 oz)  I/O last 3 completed shifts:  In: 429.8 [P.O.:60; I.V.:369.8]  Out: 2150 [Urine:1650; Stool:500]    Physical Exam   Limited by Telemedicine  Gen: Ill-appearing elderly gentleman on BiPAP  Pulm: On Bipap  CV: RRR    Laboratory Results:  Most Recent Data:  CBC:   Lab Results   Component Value Date    WBC 11.35 07/24/2020    HGB 10.8 (L) 07/24/2020    HCT 33.7 (L) 07/24/2020     07/24/2020    MCV 90 07/24/2020    RDW 16.8 (H) 07/24/2020     BMP:   Lab Results   Component Value Date     07/24/2020    K 4.5 07/24/2020     07/24/2020    CO2 26 07/24/2020    BUN 76 (H) 07/24/2020     (H) 07/24/2020    CALCIUM 8.0 (L) 07/24/2020    MG 2.4 07/24/2020    PHOS 2.9 07/24/2020     LFTs:   Lab Results   Component Value Date    PROT 7.6 07/24/2020    ALBUMIN 2.6 (L) 07/24/2020    BILITOT 0.5 07/24/2020    AST 92 (H) 07/24/2020    ALKPHOS 82 07/24/2020     (H) 07/24/2020     Coags:   Lab Results   Component Value Date    INR 1.0 07/21/2020     FLP: No results found for: CHOL, HDL, LDLCALC, TRIG, CHOLHDL  DM:   Lab Results   Component Value Date    HGBA1C 6.3 (H) 07/23/2020    CREATININE 2.2 (H) 07/24/2020     Thyroid:   Lab Results   Component Value Date    TSH 0.604 07/23/2020     Anemia:   Lab Results   Component Value Date    FERRITIN 1,700 (H) 07/23/2020     Cardiac:   Lab Results   Component Value Date    TROPONINI 0.030 (H) 07/23/2020    BNP 13 07/21/2020     Urinalysis:   Lab Results   Component Value Date    COLORU Yellow 07/21/2020    SPECGRAV 1.020 07/21/2020    NITRITE Negative 07/21/2020    KETONESU Negative 07/21/2020    UROBILINOGEN Negative 07/21/2020       Trended Lab Data:  Recent Labs   Lab 07/22/20  0518 07/23/20  0403 07/23/20  1507 07/24/20  0322 07/24/20  1240   WBC 4.01 6.06 13.69*  13.69* 11.35  --    HGB 10.8* 11.3* 10.6*  10.6* 10.8*  --    HCT 33.8* 34.2* 32.5*  32.5* 33.7*  --     217 243  243  275  --    MCV 91 88 90  90 90  --    RDW 16.9* 16.5* 16.7*  16.7* 16.8*  --     139  --  147* 145   K 4.0 4.3  --  5.2* 4.5    105  --  110 110   CO2 24 25  --  24 26   BUN 40* 38*  --  68* 76*   * 139*  --  135* 146*   PROT 6.8 7.1  --  7.6  --    ALBUMIN 2.4* 2.4*  --  2.6*  --    BILITOT 0.7 0.6  --  0.5  --    * 158*  --  92*  --    ALKPHOS 81 83  --  82  --    * 148*  --  117*  --        Trended Cardiac Data:  Recent Labs   Lab 07/21/20  1331 07/22/20  0518 07/22/20  1805 07/23/20  0056   TROPONINI 0.036* 0.037* 0.021 0.030*   BNP 13  --   --   --        Microbiology Data:  Blood cultures 7/21 NGTD  C diff antigen +, toxin - (7/23) toxin PCR + (7/24)    Other Results:      Radiology:  X-ray Chest 1 View    Result Date: 7/22/2020  EXAMINATION: CHEST ONE VIEW CLINICAL HISTORY: COPD Exacerbation; Sepsis, unspecified organism TECHNIQUE: One view of the chest. COMPARISON: 07/21/2020 FINDINGS: The cardiac silhouette is within normal limits.   There is no focal consolidation, pneumothorax, or pleural effusion. There is a diffuse interstitial lung abnormalities.  There is chronic appearing bilateral pleural thickening.  There is no large appearing pleural fluid.     No significant change. Electronically signed by: Kari Becerra Date:    07/22/2020 Time:    18:36    X-ray Chest Ap Portable    Result Date: 7/21/2020  EXAMINATION: XR CHEST AP PORTABLE CLINICAL HISTORY: Sepsis; TECHNIQUE: Single frontal view of the chest was performed. COMPARISON: None FINDINGS: EKG wires overlie the chest.  The cardiac silhouette is normal in size, midline.  Mild increased interstitial lung markings noted both lung fields, nonspecific, slightly accentuated  at the lung bases left more than right.  No large pleural effusion.  No pneumothorax.  The osseous structures appear normal.     Abnormal increased interstitial lung markings predominantly at the lung basis left more than right, nonspecific could  be acute or chronic. Electronically signed by: Luda Powell MD Date:    07/21/2020 Time:    13:54

## 2020-07-24 NOTE — ASSESSMENT & PLAN NOTE
24-hour MAP ranges .  Most current map 78.  Blood pressure 111/57.  Most recent blood pressure 92/52. Patient satting at 95% on 70% O2 concentration via BiPAP.    - Continue to monitor MAP (greater than 65).   - Supportive treatment for COVID   - Levaquin to cover for possible pneumonia   - patient started on mLR 100 cc/hour.

## 2020-07-25 LAB
ALBUMIN SERPL BCP-MCNC: 2.5 G/DL (ref 3.5–5.2)
ALP SERPL-CCNC: 76 U/L (ref 55–135)
ALT SERPL W/O P-5'-P-CCNC: 126 U/L (ref 10–44)
ANION GAP SERPL CALC-SCNC: 9 MMOL/L (ref 8–16)
APTT BLDCRRT: 52.7 SEC (ref 21–32)
APTT BLDCRRT: 66.2 SEC (ref 21–32)
AST SERPL-CCNC: 110 U/L (ref 10–40)
BASOPHILS # BLD AUTO: 0 K/UL (ref 0–0.2)
BASOPHILS NFR BLD: 0 % (ref 0–1.9)
BILIRUB SERPL-MCNC: 0.6 MG/DL (ref 0.1–1)
BUN SERPL-MCNC: 68 MG/DL (ref 8–23)
CALCIUM SERPL-MCNC: 8.2 MG/DL (ref 8.7–10.5)
CHLORIDE SERPL-SCNC: 111 MMOL/L (ref 95–110)
CO2 SERPL-SCNC: 26 MMOL/L (ref 23–29)
CREAT SERPL-MCNC: 1.9 MG/DL (ref 0.5–1.4)
DIFFERENTIAL METHOD: ABNORMAL
EOSINOPHIL # BLD AUTO: 0 K/UL (ref 0–0.5)
EOSINOPHIL NFR BLD: 0 % (ref 0–8)
ERYTHROCYTE [DISTWIDTH] IN BLOOD BY AUTOMATED COUNT: 17 % (ref 11.5–14.5)
EST. GFR  (AFRICAN AMERICAN): 39 ML/MIN/1.73 M^2
EST. GFR  (NON AFRICAN AMERICAN): 33 ML/MIN/1.73 M^2
GLUCOSE SERPL-MCNC: 118 MG/DL (ref 70–110)
HCT VFR BLD AUTO: 28.3 % (ref 40–54)
HGB BLD-MCNC: 8.9 G/DL (ref 14–18)
IMM GRANULOCYTES # BLD AUTO: 0.24 K/UL (ref 0–0.04)
IMM GRANULOCYTES NFR BLD AUTO: 1.7 % (ref 0–0.5)
LYMPHOCYTES # BLD AUTO: 1 K/UL (ref 1–4.8)
LYMPHOCYTES NFR BLD: 6.6 % (ref 18–48)
MAGNESIUM SERPL-MCNC: 2.4 MG/DL (ref 1.6–2.6)
MCH RBC QN AUTO: 28.6 PG (ref 27–31)
MCHC RBC AUTO-ENTMCNC: 31.4 G/DL (ref 32–36)
MCV RBC AUTO: 91 FL (ref 82–98)
MONOCYTES # BLD AUTO: 1.2 K/UL (ref 0.3–1)
MONOCYTES NFR BLD: 8.5 % (ref 4–15)
NEUTROPHILS # BLD AUTO: 12.1 K/UL (ref 1.8–7.7)
NEUTROPHILS NFR BLD: 83.2 % (ref 38–73)
NRBC BLD-RTO: 0 /100 WBC
PHOSPHATE SERPL-MCNC: 3.5 MG/DL (ref 2.7–4.5)
PLATELET # BLD AUTO: 304 K/UL (ref 150–350)
PMV BLD AUTO: 10.2 FL (ref 9.2–12.9)
POTASSIUM SERPL-SCNC: 4.4 MMOL/L (ref 3.5–5.1)
PROT SERPL-MCNC: 6.6 G/DL (ref 6–8.4)
RBC # BLD AUTO: 3.11 M/UL (ref 4.6–6.2)
SODIUM SERPL-SCNC: 146 MMOL/L (ref 136–145)
WBC # BLD AUTO: 14.52 K/UL (ref 3.9–12.7)

## 2020-07-25 PROCEDURE — 97535 SELF CARE MNGMENT TRAINING: CPT

## 2020-07-25 PROCEDURE — 94660 CPAP INITIATION&MGMT: CPT

## 2020-07-25 PROCEDURE — 80053 COMPREHEN METABOLIC PANEL: CPT

## 2020-07-25 PROCEDURE — 87106 FUNGI IDENTIFICATION YEAST: CPT

## 2020-07-25 PROCEDURE — 87116 MYCOBACTERIA CULTURE: CPT

## 2020-07-25 PROCEDURE — 25000003 PHARM REV CODE 250: Performed by: STUDENT IN AN ORGANIZED HEALTH CARE EDUCATION/TRAINING PROGRAM

## 2020-07-25 PROCEDURE — 36415 COLL VENOUS BLD VENIPUNCTURE: CPT

## 2020-07-25 PROCEDURE — 63600175 PHARM REV CODE 636 W HCPCS: Performed by: STUDENT IN AN ORGANIZED HEALTH CARE EDUCATION/TRAINING PROGRAM

## 2020-07-25 PROCEDURE — 87305 ASPERGILLUS AG IA: CPT

## 2020-07-25 PROCEDURE — 85025 COMPLETE CBC W/AUTO DIFF WBC: CPT

## 2020-07-25 PROCEDURE — 84100 ASSAY OF PHOSPHORUS: CPT

## 2020-07-25 PROCEDURE — 94761 N-INVAS EAR/PLS OXIMETRY MLT: CPT

## 2020-07-25 PROCEDURE — 87102 FUNGUS ISOLATION CULTURE: CPT

## 2020-07-25 PROCEDURE — C9113 INJ PANTOPRAZOLE SODIUM, VIA: HCPCS | Performed by: INTERNAL MEDICINE

## 2020-07-25 PROCEDURE — 92610 EVALUATE SWALLOWING FUNCTION: CPT

## 2020-07-25 PROCEDURE — 94640 AIRWAY INHALATION TREATMENT: CPT

## 2020-07-25 PROCEDURE — 87206 SMEAR FLUORESCENT/ACID STAI: CPT

## 2020-07-25 PROCEDURE — 25000003 PHARM REV CODE 250

## 2020-07-25 PROCEDURE — S0030 INJECTION, METRONIDAZOLE: HCPCS

## 2020-07-25 PROCEDURE — 83735 ASSAY OF MAGNESIUM: CPT

## 2020-07-25 PROCEDURE — 87015 SPECIMEN INFECT AGNT CONCNTJ: CPT

## 2020-07-25 PROCEDURE — 27000221 HC OXYGEN, UP TO 24 HOURS

## 2020-07-25 PROCEDURE — 63600175 PHARM REV CODE 636 W HCPCS: Performed by: INTERNAL MEDICINE

## 2020-07-25 PROCEDURE — 85730 THROMBOPLASTIN TIME PARTIAL: CPT

## 2020-07-25 PROCEDURE — 99900035 HC TECH TIME PER 15 MIN (STAT)

## 2020-07-25 PROCEDURE — 20000000 HC ICU ROOM

## 2020-07-25 RX ADMIN — HALOPERIDOL LACTATE 2 MG: 5 INJECTION, SOLUTION INTRAMUSCULAR at 09:07

## 2020-07-25 RX ADMIN — DEXAMETHASONE SODIUM PHOSPHATE 6 MG: 4 INJECTION, SOLUTION INTRAMUSCULAR; INTRAVENOUS at 08:07

## 2020-07-25 RX ADMIN — Medication 125 MG: at 12:07

## 2020-07-25 RX ADMIN — THERA TABS 1 TABLET: TAB at 08:07

## 2020-07-25 RX ADMIN — PANTOPRAZOLE SODIUM 40 MG: 40 INJECTION, POWDER, FOR SOLUTION INTRAVENOUS at 09:07

## 2020-07-25 RX ADMIN — ALBUTEROL SULFATE 2 PUFF: 90 AEROSOL, METERED RESPIRATORY (INHALATION) at 12:07

## 2020-07-25 RX ADMIN — HEPARIN SODIUM AND DEXTROSE 13 UNITS/KG/HR: 10000; 5 INJECTION INTRAVENOUS at 04:07

## 2020-07-25 RX ADMIN — ALBUTEROL SULFATE 2 PUFF: 90 AEROSOL, METERED RESPIRATORY (INHALATION) at 08:07

## 2020-07-25 RX ADMIN — SODIUM CHLORIDE 100 MG: 9 INJECTION, SOLUTION INTRAVENOUS at 05:07

## 2020-07-25 RX ADMIN — Medication 125 MG: at 05:07

## 2020-07-25 RX ADMIN — METRONIDAZOLE 500 MG: 500 INJECTION, SOLUTION INTRAVENOUS at 08:07

## 2020-07-25 RX ADMIN — ALBUTEROL SULFATE 2 PUFF: 90 AEROSOL, METERED RESPIRATORY (INHALATION) at 03:07

## 2020-07-25 RX ADMIN — PANTOPRAZOLE SODIUM 40 MG: 40 INJECTION, POWDER, FOR SOLUTION INTRAVENOUS at 08:07

## 2020-07-25 RX ADMIN — METRONIDAZOLE 500 MG: 500 INJECTION, SOLUTION INTRAVENOUS at 05:07

## 2020-07-25 RX ADMIN — LIDOCAINE 1 PATCH: 50 PATCH TOPICAL at 05:07

## 2020-07-25 NOTE — ASSESSMENT & PLAN NOTE
Patient was having consistent watery melenic stools.  Infectious disease is consulted and will follow recommendation    Plan:  - p.o. vancomycin and IV metronidazole  - continue IV Protonix.

## 2020-07-25 NOTE — PROGRESS NOTES
[facility] Progress Note    Assessment/Plan:     1. Severe C diff  Patient with profuse diarrhea prompting C diff testing. Antigen positive, toxin negative; however, toxin PCR positive.  - Continue treatment of severe C diff with PO vancomycin and IV metronidazole.  - Recommend KUB to evaluate for toxic megacolon given abdominal tenderness. Would require emergent Surgical consult if present.     2. Pneumonia due to COVID-19  - Continue dexamethasone 6 mg qD for a 10 day course  - Continue remdesivir, monitor kidney/liver function closely  - Agree with discontinuing levofloxacin as repeat procalcitonin is < 1.      3. History of MAC infection  4. History of Aspergillosis  Patient with history of structural lung disease c/b MAC infection, and was recently treated with itraconazole for Aspergillosis.  - Serum aspergillus antigen pending.   - Will follow up fungal/AFB blood and respiratory cultures. No recommendations for treatment of MAC or aspergillosis at this time.    Stewart Moura MD  [facility], PGY-4    Thank you for allowing us to participate in the care of this patient. We will continue to follow along. Case has been discussed with consult staff, who is in agreement with assessment and plan.     Subjective:      Rajan Deluna is a 74 y.o. male who is being followed by the [facility] at [facility] for Pneumonia 2/2 COVID-19 infection, severe C diff, h/o MAC and aspergillosis.     Afebrile overnight. Remains on BiPAP, FiO2 70%, maintaining SpO2 in high 90s. Hg dropped 2 g overnight.        Objective:   Last 24 Hour Vital Signs:  BP  Min: 90/55  Max: 135/72  Temp  Av.2 °F (36.8 °C)  Min: 97.9 °F (36.6 °C)  Max: 98.7 °F (37.1 °C)  Pulse  Av.6  Min: 83  Max: 116  Resp  Av.1  Min: 17  Max: 48  SpO2  Av.9 %  Min: 67 %  Max: 100 %  I/O last 3 completed shifts:  In: .1 [P.O.:1140; I.V.:398.2; IV Piggyback:476]  Out: 3250  [Urine:2600; Stool:650]    Physical Exam  Limited by Telemedicine  Gen: Ill-appearing elderly gentleman on BiPAP  Pulm: On Bipap  CV: RRR    Laboratory:  Laboratory Data Reviewed: yes  Pertinent Findings:  Recent Labs   Lab 07/23/20  0403 07/23/20  1507 07/24/20  0322 07/24/20  1240 07/25/20  0311   WBC 6.06 13.69*  13.69* 11.35  --  14.52*   HGB 11.3* 10.6*  10.6* 10.8*  --  8.9*   HCT 34.2* 32.5*  32.5* 33.7*  --  28.3*    243  243 275  --  304   MCV 88 90  90 90  --  91   RDW 16.5* 16.7*  16.7* 16.8*  --  17.0*     --  147* 145 146*   K 4.3  --  5.2* 4.5 4.4     --  110 110 111*   CO2 25  --  24 26 26   BUN 38*  --  68* 76* 68*   CREATININE 1.8*  --  2.3* 2.2* 1.9*   *  --  135* 146* 118*   PROT 7.1  --  7.6  --  6.6   ALBUMIN 2.4*  --  2.6*  --  2.5*   BILITOT 0.6  --  0.5  --  0.6   *  --  92*  --  110*   ALKPHOS 83  --  82  --  76   *  --  117*  --  126*         Microbiology Data Reviewed: yes  Pertinent Findings:  Blood cultures 7/21 NGTD  C diff antigen +, toxin - (7/23) toxin PCR + (7/24)  AFB and fungal blood and respiratory cultures pending    Other Results:  Radiology Data Reviewed: yes  Pertinent Findings:  No new imaging over the last 24 hours.     Current Medications:     Infusions:   heparin (porcine) in D5W 13 Units/kg/hr (07/25/20 0452)        Scheduled:   albuterol  2 puff Inhalation Q4H    dexamethasone  6 mg Intravenous Q24H    lidocaine  1 patch Transdermal Q24H    metronidazole  500 mg Intravenous Q8H    multivitamin  1 tablet Oral Daily    pantoprazole  40 mg Intravenous BID    EUA remdesivir infusion (NO CHARGE)  100 mg Intravenous Q24H    vancomycin  125 mg Oral Q6H        PRN:  acetaminophen, haloperidol lactate, heparin (PORCINE), heparin (PORCINE), ondansetron, pneumoc 13-loy conj-dip cr(PF), sodium chloride 0.9%    Antibiotics and Day Number of Therapy:  PO levofloxacin 7/22-7/24  PO vancomycin 7/24- current  IV metronidazole 7/24 -  current    Lines and Day Number of Therapy:  Trejo 7/23- current

## 2020-07-25 NOTE — ASSESSMENT & PLAN NOTE
Patient has been on continuous BiPAP, 60% oxygen concentration. Currently satting at 90-95%.  Patient was started on Remdesivir and infectious disease was consulted.    Plan:   - continue to try to wean off continuous BiPAP and transition to high flow nasal cannula, monitor response.    - follow ID recommendation   - COVID Isolation per protocol   - Dexamethasone 6mg daily for 10 days   - continue Heparin gtt   - continue Remdesivir   - Levofloxacin 750mg daily   - Atorvastatin 40mg daily   - Multivitamin

## 2020-07-25 NOTE — ASSESSMENT & PLAN NOTE
Patient was showing increased oxygen demand and was stepped up to the ICU and placed on continuous BiPAP.  - albuterol inhaler 2 puff Q4H  - MDI

## 2020-07-25 NOTE — PROGRESS NOTES
[facility] ICU 5th Floor  Hospital Medicine  Progress Note    Patient Name: Rajan Deluna  MRN: [id number]  Patient Class: IP- Inpatient   Admission Date: 7/21/2020  Length of Stay: 4 days  Attending Physician: Rajan Rodríguez MD  Primary Care Provider: Jason Mccord MD        Subjective:     Principal Problem:<principal problem not specified>        HPI:  Patient is a 74-year-old male with a past medical history of hypertension, COPD, MAC infection who presented to the ED with altered mental status and fever.  As per the family, patient has had altered mental status for the past 3-4 days. Family endorses increased somnolence as well as urinating on himself. EMS was called two days prior to presentation but reportedly deemed that patient did not need to be brought in. Over the previous day, reportedly the patient was found stuck, staring in his sink, not responding. On the day of presentation, the patient was found down on the floor, family wasn't able to get patient up from the floor and was reportedly not responding as much as baseline. Family believed that patient may not have been wearing baseline 2L O2. Patient also endorses a cough and generalized weakness. Patient reportedly had a recent UTI.     In the ED, chest x-ray showed diffuse interstitial patchy infiltrates concerning for possible pneumonia and a COVID-19 screening test was positive. Fever was 101° F and patient was hypotensive 80s/40s. Patient given 30cc/kg bolus. BP very soft on presentation, adequately responded to volume resuscitation and then decreased again. Labs showed a BUN 45, creatinine 2.9, , , troponin 0.036, procalcitonin 1.13.     Overview/Hospital Course:  No notes on file    Interval History: NAEON.  Patient currently on continuous BiPAP at 60% oxygen concentration, satting at 90-95%.  Most recent MAP 85 and blood pressure 112/67.  Patient is drinking water without difficulty and overall mental  status has improved.  Patient produced 1 L of urine overnight.  Patient still does have watery myoclonic diarrhea, infectious disease was consulted yesterday and labs showed C. Diff positive antigen positive and C. Diff PCR toxin positive.  Patient was started on IV metronidazole and p.o. vancomycin.    Review of Systems   Unable to perform ROS: Severe respiratory distress   Respiratory: Positive for shortness of breath.    Cardiovascular: Negative for chest pain.   Gastrointestinal: Positive for abdominal pain and diarrhea (melanic). Negative for nausea.   Psychiatric/Behavioral: The patient is nervous/anxious.      Objective:     Vital Signs (Most Recent):  Temp: 98.2 °F (36.8 °C) (07/25/20 0400)  Pulse: 98 (07/25/20 0615)  Resp: (!) 31 (07/25/20 0615)  BP: 112/67 (07/25/20 0600)  SpO2: (!) 94 % (07/25/20 0615) Vital Signs (24h Range):  Temp:  [97.9 °F (36.6 °C)-98.2 °F (36.8 °C)] 98.2 °F (36.8 °C)  Pulse:  [] 98  Resp:  [17-48] 31  SpO2:  [67 %-100 %] 94 %  BP: ()/(51-80) 112/67     Weight: 83 kg (182 lb 15.7 oz)  Body mass index is 25.52 kg/m².    Intake/Output Summary (Last 24 hours) at 7/25/2020 0745  Last data filed at 7/25/2020 0600  Gross per 24 hour   Intake 1812.29 ml   Output 2750 ml   Net -937.71 ml      Physical Exam  Constitutional:       General: He is in acute distress.      Appearance: He is toxic-appearing.      Comments: Patient in moderate distress on a continuous BiPAP.    HENT:      Head: Normocephalic and atraumatic.   Cardiovascular:      Rate and Rhythm: Normal rate.   Pulmonary:      Effort: Respiratory distress present.      Breath sounds: No wheezing.      Comments: Diminished breath sounds in all lung fields. BiPAP present.    Abdominal:      General: There is no distension.      Tenderness: There is abdominal tenderness.      Comments: Abdomen firm. Positive bowel sounds. Melanic diarrhea within rectal tube.    Skin:     General: Skin is warm.   Neurological:      Mental  Status: He is disoriented.         Significant Labs:   A1C:   Recent Labs   Lab 07/23/20  0403   HGBA1C 6.3*     Bilirubin:   Recent Labs   Lab 07/21/20  1550 07/22/20  0518 07/23/20  0403 07/24/20  0322 07/25/20  0311   BILITOT 0.8 0.7 0.6 0.5 0.6     Blood Culture:   Recent Labs   Lab 07/24/20  1645   LABBLOO No Growth to date  No Growth to date     BMP:   Recent Labs   Lab 07/25/20  0311   *   *   K 4.4   *   CO2 26   BUN 68*   CREATININE 1.9*   CALCIUM 8.2*   MG 2.4     CBC:   Recent Labs   Lab 07/23/20  1507 07/24/20 0322 07/25/20  0311   WBC 13.69*  13.69* 11.35 14.52*   HGB 10.6*  10.6* 10.8* 8.9*   HCT 32.5*  32.5* 33.7* 28.3*     243 275 304     CMP:   Recent Labs   Lab 07/24/20  0322 07/24/20  1240 07/25/20  0311   * 145 146*   K 5.2* 4.5 4.4    110 111*   CO2 24 26 26   * 146* 118*   BUN 68* 76* 68*   CREATININE 2.3* 2.2* 1.9*   CALCIUM 8.3* 8.0* 8.2*   PROT 7.6  --  6.6   ALBUMIN 2.6*  --  2.5*   BILITOT 0.5  --  0.6   ALKPHOS 82  --  76   AST 92*  --  110*   *  --  126*   ANIONGAP 13 9 9   EGFRNONAA 27* 28* 33*     Coagulation:   Recent Labs   Lab 07/25/20 0311   APTT 66.2*     Magnesium:   Recent Labs   Lab 07/24/20 0322 07/25/20  0311   MG 2.4 2.4     TSH:   Recent Labs   Lab 07/23/20  0403   TSH 0.604     Urine Studies:   Recent Labs   Lab 07/24/20  1735   COLORU Yellow   APPEARANCEUA Clear   PHUR 6.0   SPECGRAV 1.010   PROTEINUA Negative   GLUCUA Negative   KETONESU Negative   BILIRUBINUA Negative   OCCULTUA 3+*   NITRITE Negative   UROBILINOGEN Negative   LEUKOCYTESUR Trace*   RBCUA 10*   WBCUA 1   BACTERIA None   SQUAMEPITHEL 2     All pertinent labs within the past 24 hours have been reviewed.    Significant Imaging: I have reviewed all pertinent imaging results/findings within the past 24 hours.      Assessment/Plan:      Acute respiratory disease due to COVID-19 virus  Patient has been on continuous BiPAP, 60% oxygen concentration.  Currently satting at 90-95%.  Patient was started on Remdesivir and infectious disease was consulted.    Plan:   - continue to try to wean off continuous BiPAP and transition to high flow nasal cannula, monitor response.    - follow ID recommendation   - COVID Isolation per protocol   - Dexamethasone 6mg daily (currently day 4) for 6 days total   - continue Heparin gtt   - continue Remdesivir   - discontinue Levofloxacin 750mg   - Atorvastatin 40mg daily   - Multivitamin      Acute respiratory failure with hypoxia  Currently on continuous BiPAP at  60% oxygen concentration satting at 95-99%. Continue to monitor with continuous pulse oximetry.  Will attempt to wean off BiPAP.  STABLE      Sepsis with acute renal failure and septic shock  24-hour MAP ranges 66-93.  Most current MAP 85.  Blood pressure 112/67.  Patient satting at 90-95% on 60% O2 concentration via BiPAP.    - Continue to monitor MAP (greater than 65).   - Supportive treatment for COVID   - Levaquin to cover for possible pneumonia   - patient started on mLR 100 cc/hour yesterday    MARQUIS (acute kidney injury)  CMP in the ED showed a BUN/Creatinine ratio of 45/2.9.  Elevation from baseline renal functioning. Likely prerenal etiology. Currently 68/1.9, 76/2.2 yesterday.  Patient did produce 1 L of urine yesterday.  And as per the nurse, drink significant amounts of water 5-6 cups overnight.    Plan:  - encourage oral fluid intake.  - Continue to monitor renal function  - Avoid nephrotoxic medications      COPD (chronic obstructive pulmonary disease)   Patient was showing increased oxygen demand and was stepped up to the ICU and placed on continuous BiPAP.  - albuterol inhaler 2 puff Q4H  - MDI    Clostridium difficile infection  Patient was having consistent watery melenic stools.  Infectious disease is consulted and will follow recommendation    Plan:  - p.o. vancomycin and IV metronidazole  - continue IV Protonix.        VTE Risk Mitigation (From  admission, onward)         Ordered     heparin 25,000 units in dextrose 5% 250 mL (100 units/mL) infusion HIGH INTENSITY nomogram - [facility]  Continuous     Question:  Heparin Infusion Adjustment (DO NOT MODIFY ANSWER)  Answer:  \\[email or web address]\epic\Images\Pharmacy\HeparinInfusions\heparin HIGH INTENSITY nomogram for [facility] [id number]    07/22/20 0215     heparin 25,000 units in dextrose 5% (100 units/ml) IV bolus from bag - ADDITIONAL PRN BOLUS - 60 units/kg  As needed (PRN)     Question:  Heparin Infusion Adjustment (DO NOT MODIFY ANSWER)  Answer:  \\[email or web address]\epic\Images\Pharmacy\HeparinInfusions\heparin HIGH INTENSITY nomogram for [facility] [id number].pdf    07/22/20 0215     heparin 25,000 units in dextrose 5% (100 units/ml) IV bolus from bag - ADDITIONAL PRN BOLUS - 30 units/kg  As needed (PRN)     Question:  Heparin Infusion Adjustment (DO NOT MODIFY ANSWER)  Answer:  \\[email or web address]\epic\Images\Pharmacy\HeparinInfusions\heparin HIGH INTENSITY nomogram for [facility] [id number]    07/22/20 0215     Place sequential compression device  Until discontinued      07/21/20 2142     IP VTE HIGH RISK PATIENT  Once      07/21/20 2142                      Olivier Rowe DO PGY-1  U Family Medicine  [facility] ICU 5th Floor

## 2020-07-25 NOTE — PLAN OF CARE
Pt participated in BSE. Regular diet with thin liquids was recommended. Meds given po, one at a time. Please see evaluation note for further explanation.

## 2020-07-25 NOTE — ASSESSMENT & PLAN NOTE
Patient admitted 7/21 with AMS & fever  - 7/21 COVID(+)  - initially required NC in the ED but escalated to BIPAP so admitted to ICU  - CXR: diffuse patchy bilateral infiltrates  - hx of aspergillus and MAC infection     Recommend  - day 5 dexamethasone -  consideration that if patient has untreated or not properly treated MAC/aspergillus infection in the past that reactivation of infection is possible  - full dose AC   - day 2 remdesivir  - proning if he can tolerate at all  - incentive spirometry

## 2020-07-25 NOTE — ASSESSMENT & PLAN NOTE
CMP in the ED showed a BUN/Creatinine ratio of 45/2.9.  Elevation from baseline renal functioning. Likely prerenal etiology. Currently 68/1.9, 76/2.2 yesterday.  Patient did produce 1 L of urine yesterday.  And as per the nurse, drink significant amounts of water 5-6 cups overnight.    Plan:  - encourage oral fluid intake.  - Continue to monitor renal function  - Avoid nephrotoxic medications

## 2020-07-25 NOTE — ASSESSMENT & PLAN NOTE
24-hour MAP ranges 66-93.  Most current MAP 85.  Blood pressure 112/67.  Patient satting at 90-95% on 60% O2 concentration via BiPAP.    - Continue to monitor MAP (greater than 65).   - Supportive treatment for COVID   - Levaquin to cover for possible pneumonia   - patient started on mLR 100 cc/hour yesterday

## 2020-07-25 NOTE — SUBJECTIVE & OBJECTIVE
Interval History: No acute events overnight. Patient on BiPAP at 60-70% FiO2 with adequate saturation.    Objective:     Vital Signs (Most Recent):  Temp: 98.7 °F (37.1 °C) (07/25/20 0815)  Pulse: 88 (07/25/20 0900)  Resp: 20 (07/25/20 0900)  BP: 117/61 (07/25/20 0900)  SpO2: 97 % (07/25/20 0900) Vital Signs (24h Range):  Temp:  [97.9 °F (36.6 °C)-98.7 °F (37.1 °C)] 98.7 °F (37.1 °C)  Pulse:  [] 88  Resp:  [17-48] 20  SpO2:  [89 %-100 %] 97 %  BP: ()/(51-80) 117/61     Weight: 83 kg (182 lb 15.7 oz)  Body mass index is 25.52 kg/m².      Intake/Output Summary (Last 24 hours) at 7/25/2020 1142  Last data filed at 7/25/2020 0800  Gross per 24 hour   Intake 1812.29 ml   Output 2915 ml   Net -1102.71 ml     PATIENT NOT EXAMINED IN PERSON  Physical Exam  Vitals signs and nursing note reviewed.   Constitutional:       General: He is not in acute distress.  HENT:      Head: Normocephalic and atraumatic.   Eyes:      Conjunctiva/sclera: Conjunctivae normal.   Cardiovascular:      Rate and Rhythm: Normal rate and regular rhythm.      Comments: As seen on monitor, patient appears to have sinus rhythm  Pulmonary:      Effort: Pulmonary effort is normal.   Abdominal:      General: There is no distension.   Skin:     Coloration: Skin is not jaundiced.   Neurological:      Mental Status: He is alert.         Vents:  Oxygen Concentration (%): 70 (07/25/20 0800)    Lines/Drains/Airways     Drain                 Rectal Tube 07/23/20 1215 1 day         Urethral Catheter 07/23/20 1212 Straight-tip 16 Fr. 1 day          Peripheral Intravenous Line                 Peripheral IV - Single Lumen 07/21/20 1254 20 G Right Hand 3 days         Peripheral IV - Single Lumen 07/23/20 1506 20 G Anterior;Right Forearm 1 day                Significant Labs:    CBC/Anemia Profile:  Recent Labs   Lab 07/23/20  1240 07/23/20  1507 07/24/20  0322 07/25/20  0311   WBC  --  13.69*  13.69* 11.35 14.52*   HGB  --  10.6*  10.6* 10.8* 8.9*   HCT   --  32.5*  32.5* 33.7* 28.3*   PLT  --  243  243 275 304   MCV  --  90  90 90 91   RDW  --  16.7*  16.7* 16.8* 17.0*   OCCULTBLOOD Positive*  --   --   --         Chemistries:  Recent Labs   Lab 07/24/20  0322 07/24/20  1240 07/25/20  0311   * 145 146*   K 5.2* 4.5 4.4    110 111*   CO2 24 26 26   BUN 68* 76* 68*   CREATININE 2.3* 2.2* 1.9*   CALCIUM 8.3* 8.0* 8.2*   ALBUMIN 2.6*  --  2.5*   PROT 7.6  --  6.6   BILITOT 0.5  --  0.6   ALKPHOS 82  --  76   *  --  126*   AST 92*  --  110*   MG 2.4  --  2.4   PHOS 2.9  --  3.5       All pertinent labs within the past 24 hours have been reviewed.    Significant Imaging:  no new imaging

## 2020-07-25 NOTE — ASSESSMENT & PLAN NOTE
Patient positive on PCR, initiated on oral vancomycin & flagyl on 7/24  - has severe infection given rise in Cr, hemodynamic instability on presentation    Recommend  - 10-14 days PO vanc/flagyl  Post completion of any other antibiotics

## 2020-07-25 NOTE — PT/OT/SLP EVAL
"Speech Language Pathology Evaluation  Bedside Swallow    Patient Name:  Rajan Deluna   MRN:  [id number]  Admitting Diagnosis: <principal problem not specified>    Recommendations:                 General Recommendations:  Follow up to assess safety and efficiency with diet upgrade  Diet recommendations:  Regular, Thin   Aspiration Precautions: monitor pulse oxygen saturation level during meals and d/c meal if satting drops below 85 per RN recommendation, 1 bite/sip at a time, HOB to 90 degrees, Remain upright 30 minutes post meal, Small bites/sips, Standard aspiration precautions and Wear oxygen during intake   General Precautions: Standard,    Communication strategies:  speak loudly; pt [place]    History:   "Patient is a 74-year-old male with a past medical history of hypertension, COPD, MAC infection who presented to the ED with altered mental status and fever.  As per the family, patient has had altered mental status for the past 3-4 days. Family endorses increased somnolence as well as urinating on himself. EMS was called two days prior to presentation but reportedly deemed that patient did not need to be brought in. Over the previous day, reportedly the patient was found stuck, staring in his sink, not responding. On the day of presentation, the patient was found down on the floor, family wasn't able to get patient up from the floor and was reportedly not responding as much as baseline. Family believed that patient may not have been wearing baseline 2L O2. Patient also endorses a cough and generalized weakness. Patient reportedly had a recent UTI.      In the ED, chest x-ray showed diffuse interstitial patchy infiltrates concerning for possible pneumonia and a COVID-19 screening test was positive. Fever was 101° F and patient was hypotensive 80s/40s. Patient given 30cc/kg bolus. BP very soft on presentation, adequately responded to volume resuscitation and then decreased again. Labs showed a BUN 45, creatinine 2.9, " ", , troponin 0.036, procalcitonin 1.13. " Per Dr. Rowe 7/25/20        Past Medical History:   Diagnosis Date    Arthritis     COPD (chronic obstructive pulmonary disease)     Hypertension     Smoker     Weakness        Past Surgical History:   Procedure Laterality Date    HERNIA REPAIR      right knee surgery         Social History: Patient lives at home with his family members. Pt stated he is not currently working, but enjoys cooking for his family and friends.     Prior Intubation HX:  Pt was not intubated during this stay. Pt on continuous biPAP.     Modified Barium Swallow: No MBSS on EMR.     Chest X-Rays: 7/21/20 "The cardiac silhouette is within normal limits.   There is no focal consolidation, pneumothorax, or pleural effusion. There is a diffuse interstitial lung abnormalities.  There is chronic appearing bilateral pleural thickening.  There is no large appearing pleural fluid."    Prior diet: NPO until SLP evaluation.        Subjective     Pt's RNReina stated that he passed bedside swallow screen with water and apple sauce. SLP and pt's RN changed pt's sheets and cleaned pt prior to BSE. SLP and pt's RN re-positioned pt and sat pt upright to 90 degrees prior to BSE.     Patient goals: Pt wanted water and food.      Pain/Comfort:  · Pain Rating 1: 0/10    Objective:     Oral Musculature Evaluation  · Oral Musculature: WFL, general weakness  · Dentition: scattered dentition  · Secretion Management: adequate  · Mucosal Quality: cracked, dry, sticky(mucosal quality affected by continuous biPAP)  · Oral Labial Strength and Mobility: WFL  · Lingual Strength and Mobility: WFL, other (see comments)(involuntary tremors)  · Velar Elevation: WFL  · Buccal Strength and Mobility: WFL  · Volitional Cough: clear  · Volitional Swallow: adequate hyolaryngeal elevation and excursion  · Voice Prior to PO Intake: reduced volume; clear    Bedside Swallow Eval:   Consistencies Assessed:  · Thin liquids " straw sips of water, approx 8 oz  · Puree 1 cup of pudding via tsp bites  · Solids 1 sheet of bill cracker, served in 1/4 sheets      Oral Phase:   · WNL    Pharyngeal Phase:   · no overt clinical signs/symptoms of aspiration  · no overt clinical signs/symptoms of pharyngeal dysphagia    Compensatory Strategies  · None    Pt safely consumed all consistencies without any overt s/s of aspiration or oral or pharyngeal dysphagia. Pt's pulse oxygen saturation level was approximately 95% pulse oxygen saturation when removed from the biPAP and transferred to high flow nasal cannula. Pt began satting at 85% after approximately 10 minutes off the biPAP and through the meal. He did not exhibit decreased pulse oxygen saturation level with any one consistency. De-satting appeared to be related to amount of time off the biPAP not d/t specific consistency.     Assessment:      Rajan Deluna is a 74 y.o. male with a medical dx of severe c diff; pneumonia d/t COVID-19. Pt did not exhibit any s/s of oral or pharyngeal dysphagia. Pt is safe to advance to regular diet with thin liquids, given strict adherence to swallow precautions. Pt's pulse oxygen saturation levels should be continuously monitored during all meals, and meals should be d/c if pulse oxygen saturation level drops below 85%. Pt's RN stated he had COPD at baseline and anything below 85% will be of a concern during meals. SLP will follow up to ensure safety and efficiency with current diet.     Goals:   Multidisciplinary Problems     SLP Goals        Problem: SLP Goal    Goal Priority Disciplines Outcome   SLP Goal     SLP    Description: 1. Pt will participate in ongoing BSE to determine safest and least restrictive diet.   2. Pt will safely consume approximately 50% of his diet without any s/s of aspiration or upper airway threat.   3. Pt will participate in cognitive-linguistic evaluation to determine if pt is at baseline.                    Plan:     · Patient to  be seen:  2 x/week   · Plan of Care expires:     · Plan of Care reviewed with:  patient, other (see comments)(pt's RN)   · SLP Follow-Up:  Yes       Discharge recommendations:      Barriers to Discharge:  Level of Skilled Assistance Needed      Time Tracking:     SLP Treatment Date:   07/25/20  Speech Start Time:  1030  Speech Stop Time:  1110     Speech Total Time (min):  40 min    Billable Minutes: Eval 28  and [facility]/Home Management Training 12    Leandra Burrows CCC-SLP  07/25/2020

## 2020-07-25 NOTE — ASSESSMENT & PLAN NOTE
Likely pre-renal with component of COVID infection and possible ATN given patient presented hemodynamically unstable and in shock, requiring fluid resuscitation  - Cr improving overnight    Plan  - encourage PO intake, if Cr worsening would consider repeating urine studies  - pending eval for remdesivir given low GFR

## 2020-07-25 NOTE — ASSESSMENT & PLAN NOTE
Patient admitted 7/21 with AMS & fever  - 7/21 COVID(+)  - initially required NC in the ED but escalated to BIPAP so admitted to ICU  - CXR: diffuse patchy bilateral infiltrates    Recommend  - day 4 dexamethasone - plan for 6 day treatment unless otherwise indicated  - full dose AC   - would recommend discussion with ID regarding remdesivir given low GFR

## 2020-07-25 NOTE — ASSESSMENT & PLAN NOTE
Patient positive on PCR, initiated on oral vancomycin & flagyl on 7/24  - has severe infection given rise in Cr, hemodynamic instability on presentation    Recommend  - 10-14 days PO vanc/flagyl

## 2020-07-25 NOTE — SUBJECTIVE & OBJECTIVE
Interval History: NAEON.  Patient currently on continuous BiPAP at 60% oxygen concentration, satting at 90-95%.  Most recent MAP 85 and blood pressure 112/67.  Patient is drinking water without difficulty and overall mental status has improved.  Patient produced 1 L of urine overnight.  Patient still does have watery myoclonic diarrhea, infectious disease was consulted yesterday and labs showed C. Diff positive antigen positive and C. Diff PCR toxin positive.  Patient was started on IV metronidazole and p.o. vancomycin.    Review of Systems   Unable to perform ROS: Severe respiratory distress   Respiratory: Positive for shortness of breath.    Cardiovascular: Negative for chest pain.   Gastrointestinal: Positive for abdominal pain and diarrhea (melanic). Negative for nausea.   Psychiatric/Behavioral: The patient is nervous/anxious.      Objective:     Vital Signs (Most Recent):  Temp: 98.2 °F (36.8 °C) (07/25/20 0400)  Pulse: 98 (07/25/20 0615)  Resp: (!) 31 (07/25/20 0615)  BP: 112/67 (07/25/20 0600)  SpO2: (!) 94 % (07/25/20 0615) Vital Signs (24h Range):  Temp:  [97.9 °F (36.6 °C)-98.2 °F (36.8 °C)] 98.2 °F (36.8 °C)  Pulse:  [] 98  Resp:  [17-48] 31  SpO2:  [67 %-100 %] 94 %  BP: ()/(51-80) 112/67     Weight: 83 kg (182 lb 15.7 oz)  Body mass index is 25.52 kg/m².    Intake/Output Summary (Last 24 hours) at 7/25/2020 0745  Last data filed at 7/25/2020 0600  Gross per 24 hour   Intake 1812.29 ml   Output 2750 ml   Net -937.71 ml      Physical Exam  Constitutional:       General: He is in acute distress.      Appearance: He is toxic-appearing.      Comments: Patient in moderate distress on a continuous BiPAP.    HENT:      Head: Normocephalic and atraumatic.   Cardiovascular:      Rate and Rhythm: Normal rate.   Pulmonary:      Effort: Respiratory distress present.      Breath sounds: No wheezing.      Comments: Diminished breath sounds in all lung fields. BiPAP present.    Abdominal:      General:  There is no distension.      Tenderness: There is abdominal tenderness.      Comments: Abdomen firm. Positive bowel sounds. Melanic diarrhea within rectal tube.    Skin:     General: Skin is warm.   Neurological:      Mental Status: He is disoriented.         Significant Labs:   A1C:   Recent Labs   Lab 07/23/20  0403   HGBA1C 6.3*     Bilirubin:   Recent Labs   Lab 07/21/20  1550 07/22/20  0518 07/23/20  0403 07/24/20  0322 07/25/20  0311   BILITOT 0.8 0.7 0.6 0.5 0.6     Blood Culture:   Recent Labs   Lab 07/24/20  1645   LABBLOO No Growth to date  No Growth to date     BMP:   Recent Labs   Lab 07/25/20  0311   *   *   K 4.4   *   CO2 26   BUN 68*   CREATININE 1.9*   CALCIUM 8.2*   MG 2.4     CBC:   Recent Labs   Lab 07/23/20  1507 07/24/20  0322 07/25/20  0311   WBC 13.69*  13.69* 11.35 14.52*   HGB 10.6*  10.6* 10.8* 8.9*   HCT 32.5*  32.5* 33.7* 28.3*     243 275 304     CMP:   Recent Labs   Lab 07/24/20  0322 07/24/20  1240 07/25/20  0311   * 145 146*   K 5.2* 4.5 4.4    110 111*   CO2 24 26 26   * 146* 118*   BUN 68* 76* 68*   CREATININE 2.3* 2.2* 1.9*   CALCIUM 8.3* 8.0* 8.2*   PROT 7.6  --  6.6   ALBUMIN 2.6*  --  2.5*   BILITOT 0.5  --  0.6   ALKPHOS 82  --  76   AST 92*  --  110*   *  --  126*   ANIONGAP 13 9 9   EGFRNONAA 27* 28* 33*     Coagulation:   Recent Labs   Lab 07/25/20  0311   APTT 66.2*     Magnesium:   Recent Labs   Lab 07/24/20  0322 07/25/20  0311   MG 2.4 2.4     TSH:   Recent Labs   Lab 07/23/20  0403   TSH 0.604     Urine Studies:   Recent Labs   Lab 07/24/20  1735   COLORU Yellow   APPEARANCEUA Clear   PHUR 6.0   SPECGRAV 1.010   PROTEINUA Negative   GLUCUA Negative   KETONESU Negative   BILIRUBINUA Negative   OCCULTUA 3+*   NITRITE Negative   UROBILINOGEN Negative   LEUKOCYTESUR Trace*   RBCUA 10*   WBCUA 1   BACTERIA None   SQUAMEPITHEL 2     All pertinent labs within the past 24 hours have been reviewed.    Significant Imaging:  I have reviewed all pertinent imaging results/findings within the past 24 hours.

## 2020-07-25 NOTE — PROGRESS NOTES
[facility] ICU 5th Floor  Pulmonology  Progress Note    Patient Name: Rajan Deluna  MRN: [id number]  Admission Date: 7/21/2020  Hospital Length of Stay: 4 days  Code Status: Full Code  Attending Provider: Rajan Rodríguez MD  Primary Care Provider: Jason Mccord MD   Principal Problem: <principal problem not specified>    Subjective:     Interval History: No acute events overnight. Patient on BiPAP at 60-70% FiO2 with adequate saturation.    Objective:     Vital Signs (Most Recent):  Temp: 98.7 °F (37.1 °C) (07/25/20 0815)  Pulse: 88 (07/25/20 0900)  Resp: 20 (07/25/20 0900)  BP: 117/61 (07/25/20 0900)  SpO2: 97 % (07/25/20 0900) Vital Signs (24h Range):  Temp:  [97.9 °F (36.6 °C)-98.7 °F (37.1 °C)] 98.7 °F (37.1 °C)  Pulse:  [] 88  Resp:  [17-48] 20  SpO2:  [89 %-100 %] 97 %  BP: ()/(51-80) 117/61     Weight: 83 kg (182 lb 15.7 oz)  Body mass index is 25.52 kg/m².      Intake/Output Summary (Last 24 hours) at 7/25/2020 1142  Last data filed at 7/25/2020 0800  Gross per 24 hour   Intake 1812.29 ml   Output 2915 ml   Net -1102.71 ml     PATIENT NOT EXAMINED IN PERSON  Physical Exam  Vitals signs and nursing note reviewed.   Constitutional:       General: He is not in acute distress.  HENT:      Head: Normocephalic and atraumatic.   Eyes:      Conjunctiva/sclera: Conjunctivae normal.   Cardiovascular:      Rate and Rhythm: Normal rate and regular rhythm.      Comments: As seen on monitor, patient appears to have sinus rhythm  Pulmonary:      Effort: Pulmonary effort is normal.   Abdominal:      General: There is no distension.   Skin:     Coloration: Skin is not jaundiced.   Neurological:      Mental Status: He is alert.         Vents:  Oxygen Concentration (%): 70 (07/25/20 0800)    Lines/Drains/Airways     Drain                 Rectal Tube 07/23/20 1215 1 day         Urethral Catheter 07/23/20 1212 Straight-tip 16 Fr. 1 day          Peripheral Intravenous Line                  Peripheral IV - Single Lumen 07/21/20 1254 20 G Right Hand 3 days         Peripheral IV - Single Lumen 07/23/20 1506 20 G Anterior;Right Forearm 1 day                Significant Labs:    CBC/Anemia Profile:  Recent Labs   Lab 07/23/20  1240 07/23/20  1507 07/24/20  0322 07/25/20  0311   WBC  --  13.69*  13.69* 11.35 14.52*   HGB  --  10.6*  10.6* 10.8* 8.9*   HCT  --  32.5*  32.5* 33.7* 28.3*   PLT  --  243  243 275 304   MCV  --  90  90 90 91   RDW  --  16.7*  16.7* 16.8* 17.0*   OCCULTBLOOD Positive*  --   --   --         Chemistries:  Recent Labs   Lab 07/24/20  0322 07/24/20  1240 07/25/20  0311   * 145 146*   K 5.2* 4.5 4.4    110 111*   CO2 24 26 26   BUN 68* 76* 68*   CREATININE 2.3* 2.2* 1.9*   CALCIUM 8.3* 8.0* 8.2*   ALBUMIN 2.6*  --  2.5*   PROT 7.6  --  6.6   BILITOT 0.5  --  0.6   ALKPHOS 82  --  76   *  --  126*   AST 92*  --  110*   MG 2.4  --  2.4   PHOS 2.9  --  3.5       All pertinent labs within the past 24 hours have been reviewed.    Significant Imaging:  no new imaging    Assessment/Plan:     Clostridium difficile infection  Patient positive on PCR, initiated on oral vancomycin & flagyl on 7/24  - has severe infection given rise in Cr, hemodynamic instability on presentation    Recommend  - 10-14 days PO vanc/flagyl     Acute respiratory disease due to COVID-19 virus  Patient admitted 7/21 with AMS & fever  - 7/21 COVID(+)  - initially required NC in the ED but escalated to BIPAP so admitted to ICU  - CXR: diffuse patchy bilateral infiltrates  - hx of aspergillus and MAC infection     Recommend  - day 4 dexamethasone - plan for 6 day treatment unless otherwise indicated - consideration that if patient has untreated or not properly treated MAC/aspergillus infection in the past that reactivation of infection is possible  - full dose AC   - would recommend discussion with ID regarding remdesivir given low GFR    Acute respiratory failure with hypoxia  See COVID problem      MARQUIS (acute kidney injury)  Likely pre-renal with component of COVID infection and possible ATN given patient presented hemodynamically unstable and in shock, requiring fluid resuscitation  - Cr improving overnight    Recommend  - encourage PO intake, if Cr worsening would consider repeating urine studies  - pending eval for remdesivir given low GFR    COPD (chronic obstructive pulmonary disease)  Use of 2L NC at home    Recommend  - metered dose inhaler q4h           Felicity Mendieta MD  Pulmonology  [facility] ICU 5th Floor

## 2020-07-25 NOTE — ASSESSMENT & PLAN NOTE
Currently on continuous BiPAP at  60% oxygen concentration satting at 95-99%. Continue to monitor with continuous pulse oximetry.  Will attempt to wean off BiPAP.  STABLE

## 2020-07-25 NOTE — ASSESSMENT & PLAN NOTE
Likely pre-renal with component of COVID infection & c diff and possible ATN given patient presented hemodynamically unstable and in shock, requiring fluid resuscitation  - Cr improving overnight    Recommend  - encourage PO intake, if Cr worsening would consider repeating urine studies

## 2020-07-26 LAB
ALBUMIN SERPL BCP-MCNC: 2.2 G/DL (ref 3.5–5.2)
ALP SERPL-CCNC: 63 U/L (ref 55–135)
ALT SERPL W/O P-5'-P-CCNC: 133 U/L (ref 10–44)
ANION GAP SERPL CALC-SCNC: 8 MMOL/L (ref 8–16)
ANISOCYTOSIS BLD QL SMEAR: SLIGHT
APTT BLDCRRT: 68.2 SEC (ref 21–32)
AST SERPL-CCNC: 85 U/L (ref 10–40)
BACTERIA BLD CULT: NORMAL
BACTERIA BLD CULT: NORMAL
BASOPHILS # BLD AUTO: 0.02 K/UL (ref 0–0.2)
BASOPHILS NFR BLD: 0.1 % (ref 0–1.9)
BILIRUB SERPL-MCNC: 0.5 MG/DL (ref 0.1–1)
BUN SERPL-MCNC: 45 MG/DL (ref 8–23)
BURR CELLS BLD QL SMEAR: ABNORMAL
CALCIUM SERPL-MCNC: 7.8 MG/DL (ref 8.7–10.5)
CHLORIDE SERPL-SCNC: 109 MMOL/L (ref 95–110)
CO2 SERPL-SCNC: 22 MMOL/L (ref 23–29)
CREAT SERPL-MCNC: 1.5 MG/DL (ref 0.5–1.4)
DIFFERENTIAL METHOD: ABNORMAL
EOSINOPHIL # BLD AUTO: 0.1 K/UL (ref 0–0.5)
EOSINOPHIL NFR BLD: 0.4 % (ref 0–8)
ERYTHROCYTE [DISTWIDTH] IN BLOOD BY AUTOMATED COUNT: 16.7 % (ref 11.5–14.5)
EST. GFR  (AFRICAN AMERICAN): 52 ML/MIN/1.73 M^2
EST. GFR  (NON AFRICAN AMERICAN): 45 ML/MIN/1.73 M^2
GLUCOSE SERPL-MCNC: 126 MG/DL (ref 70–110)
HCT VFR BLD AUTO: 22.9 % (ref 40–54)
HGB BLD-MCNC: 7.3 G/DL (ref 14–18)
HYPOCHROMIA BLD QL SMEAR: ABNORMAL
IMM GRANULOCYTES # BLD AUTO: 0.66 K/UL (ref 0–0.04)
IMM GRANULOCYTES NFR BLD AUTO: 4.8 % (ref 0–0.5)
LYMPHOCYTES # BLD AUTO: 1.5 K/UL (ref 1–4.8)
LYMPHOCYTES NFR BLD: 11 % (ref 18–48)
MAGNESIUM SERPL-MCNC: 2.2 MG/DL (ref 1.6–2.6)
MCH RBC QN AUTO: 29 PG (ref 27–31)
MCHC RBC AUTO-ENTMCNC: 31.9 G/DL (ref 32–36)
MCV RBC AUTO: 91 FL (ref 82–98)
MONOCYTES # BLD AUTO: 1.1 K/UL (ref 0.3–1)
MONOCYTES NFR BLD: 7.8 % (ref 4–15)
NEUTROPHILS # BLD AUTO: 10.4 K/UL (ref 1.8–7.7)
NEUTROPHILS NFR BLD: 75.9 % (ref 38–73)
NRBC BLD-RTO: 1 /100 WBC
OVALOCYTES BLD QL SMEAR: ABNORMAL
PHOSPHATE SERPL-MCNC: 3.3 MG/DL (ref 2.7–4.5)
PLATELET # BLD AUTO: 301 K/UL (ref 150–350)
PLATELET BLD QL SMEAR: ABNORMAL
PMV BLD AUTO: 10.3 FL (ref 9.2–12.9)
POIKILOCYTOSIS BLD QL SMEAR: SLIGHT
POLYCHROMASIA BLD QL SMEAR: ABNORMAL
POTASSIUM SERPL-SCNC: 4 MMOL/L (ref 3.5–5.1)
PROT SERPL-MCNC: 5.5 G/DL (ref 6–8.4)
RBC # BLD AUTO: 2.52 M/UL (ref 4.6–6.2)
SODIUM SERPL-SCNC: 139 MMOL/L (ref 136–145)
WBC # BLD AUTO: 13.72 K/UL (ref 3.9–12.7)

## 2020-07-26 PROCEDURE — S0030 INJECTION, METRONIDAZOLE: HCPCS

## 2020-07-26 PROCEDURE — 83735 ASSAY OF MAGNESIUM: CPT

## 2020-07-26 PROCEDURE — 84100 ASSAY OF PHOSPHORUS: CPT

## 2020-07-26 PROCEDURE — 87116 MYCOBACTERIA CULTURE: CPT

## 2020-07-26 PROCEDURE — 94660 CPAP INITIATION&MGMT: CPT

## 2020-07-26 PROCEDURE — 94761 N-INVAS EAR/PLS OXIMETRY MLT: CPT

## 2020-07-26 PROCEDURE — 94640 AIRWAY INHALATION TREATMENT: CPT

## 2020-07-26 PROCEDURE — 80053 COMPREHEN METABOLIC PANEL: CPT

## 2020-07-26 PROCEDURE — 87103 BLOOD FUNGUS CULTURE: CPT

## 2020-07-26 PROCEDURE — C9113 INJ PANTOPRAZOLE SODIUM, VIA: HCPCS | Performed by: INTERNAL MEDICINE

## 2020-07-26 PROCEDURE — 87206 SMEAR FLUORESCENT/ACID STAI: CPT

## 2020-07-26 PROCEDURE — 25000003 PHARM REV CODE 250: Performed by: STUDENT IN AN ORGANIZED HEALTH CARE EDUCATION/TRAINING PROGRAM

## 2020-07-26 PROCEDURE — 87015 SPECIMEN INFECT AGNT CONCNTJ: CPT

## 2020-07-26 PROCEDURE — 20000000 HC ICU ROOM

## 2020-07-26 PROCEDURE — 87118 MYCOBACTERIC IDENTIFICATION: CPT | Mod: 59

## 2020-07-26 PROCEDURE — 87149 DNA/RNA DIRECT PROBE: CPT

## 2020-07-26 PROCEDURE — 27000221 HC OXYGEN, UP TO 24 HOURS

## 2020-07-26 PROCEDURE — 85730 THROMBOPLASTIN TIME PARTIAL: CPT

## 2020-07-26 PROCEDURE — 85025 COMPLETE CBC W/AUTO DIFF WBC: CPT

## 2020-07-26 PROCEDURE — 36415 COLL VENOUS BLD VENIPUNCTURE: CPT

## 2020-07-26 PROCEDURE — 99900035 HC TECH TIME PER 15 MIN (STAT)

## 2020-07-26 PROCEDURE — 63600175 PHARM REV CODE 636 W HCPCS: Performed by: INTERNAL MEDICINE

## 2020-07-26 PROCEDURE — 63600175 PHARM REV CODE 636 W HCPCS: Performed by: STUDENT IN AN ORGANIZED HEALTH CARE EDUCATION/TRAINING PROGRAM

## 2020-07-26 PROCEDURE — 25000003 PHARM REV CODE 250

## 2020-07-26 RX ORDER — SIMETHICONE 125 MG
125 TABLET,CHEWABLE ORAL EVERY 6 HOURS PRN
Status: DISCONTINUED | OUTPATIENT
Start: 2020-07-26 | End: 2020-08-27 | Stop reason: HOSPADM

## 2020-07-26 RX ADMIN — METRONIDAZOLE 500 MG: 500 INJECTION, SOLUTION INTRAVENOUS at 05:07

## 2020-07-26 RX ADMIN — Medication 125 MG: at 12:07

## 2020-07-26 RX ADMIN — ALBUTEROL SULFATE 2 PUFF: 90 AEROSOL, METERED RESPIRATORY (INHALATION) at 04:07

## 2020-07-26 RX ADMIN — SIMETHICONE 125 MG: 125 TABLET, CHEWABLE ORAL at 10:07

## 2020-07-26 RX ADMIN — THERA TABS 1 TABLET: TAB at 08:07

## 2020-07-26 RX ADMIN — HEPARIN SODIUM AND DEXTROSE 13 UNITS/KG/HR: 10000; 5 INJECTION INTRAVENOUS at 02:07

## 2020-07-26 RX ADMIN — ALBUTEROL SULFATE 2 PUFF: 90 AEROSOL, METERED RESPIRATORY (INHALATION) at 11:07

## 2020-07-26 RX ADMIN — METRONIDAZOLE 500 MG: 500 INJECTION, SOLUTION INTRAVENOUS at 08:07

## 2020-07-26 RX ADMIN — ALBUTEROL SULFATE 2 PUFF: 90 AEROSOL, METERED RESPIRATORY (INHALATION) at 08:07

## 2020-07-26 RX ADMIN — PANTOPRAZOLE SODIUM 40 MG: 40 INJECTION, POWDER, FOR SOLUTION INTRAVENOUS at 09:07

## 2020-07-26 RX ADMIN — ALBUTEROL SULFATE 2 PUFF: 90 AEROSOL, METERED RESPIRATORY (INHALATION) at 03:07

## 2020-07-26 RX ADMIN — SODIUM CHLORIDE 100 MG: 9 INJECTION, SOLUTION INTRAVENOUS at 04:07

## 2020-07-26 RX ADMIN — ACETAMINOPHEN 650 MG: 325 TABLET ORAL at 06:07

## 2020-07-26 RX ADMIN — METRONIDAZOLE 500 MG: 500 INJECTION, SOLUTION INTRAVENOUS at 12:07

## 2020-07-26 RX ADMIN — Medication 125 MG: at 11:07

## 2020-07-26 RX ADMIN — DEXAMETHASONE SODIUM PHOSPHATE 6 MG: 4 INJECTION, SOLUTION INTRAMUSCULAR; INTRAVENOUS at 08:07

## 2020-07-26 RX ADMIN — Medication 125 MG: at 06:07

## 2020-07-26 RX ADMIN — LIDOCAINE 1 PATCH: 50 PATCH TOPICAL at 04:07

## 2020-07-26 RX ADMIN — ALBUTEROL SULFATE 2 PUFF: 90 AEROSOL, METERED RESPIRATORY (INHALATION) at 12:07

## 2020-07-26 RX ADMIN — PANTOPRAZOLE SODIUM 40 MG: 40 INJECTION, POWDER, FOR SOLUTION INTRAVENOUS at 08:07

## 2020-07-26 RX ADMIN — Medication 125 MG: at 05:07

## 2020-07-26 NOTE — ASSESSMENT & PLAN NOTE
P/w consistent melanotic diarrhea, C diff toxin positive    Plan:  - ID following  - p.o. vancomycin and IV metronidazole  - continue IV Protonix.

## 2020-07-26 NOTE — PROGRESS NOTES
[facility] ICU 5th Floor  Pulmonology  Progress Note    Patient Name: Rajan Deluna  MRN: [id number]  Admission Date: 7/21/2020  Hospital Length of Stay: 5 days  Code Status: Full Code  Attending Provider: Rajan Rodríguez MD  Primary Care Provider: Jason Mccord MD   Principal Problem: Acute respiratory disease due to COVID-19 virus    Subjective:     Interval History: No acute events overnight. Patient on vapotherm, is able to follow basic commands. 700 cc stool output overnight.    Objective:     Vital Signs (Most Recent):  Temp: 98.8 °F (37.1 °C) (07/26/20 0800)  Pulse: 93 (07/26/20 1214)  Resp: (!) 23 (07/26/20 1214)  BP: 125/88 (07/26/20 1000)  SpO2: 100 % (07/26/20 1214) Vital Signs (24h Range):  Temp:  [98.5 °F (36.9 °C)-99 °F (37.2 °C)] 98.8 °F (37.1 °C)  Pulse:  [] 93  Resp:  [14-34] 23  SpO2:  [76 %-100 %] 100 %  BP: (102-133)/(53-88) 125/88     Weight: 83 kg (182 lb 15.7 oz)  Body mass index is 25.52 kg/m².      Intake/Output Summary (Last 24 hours) at 7/26/2020 1218  Last data filed at 7/26/2020 0900  Gross per 24 hour   Intake 2258.8 ml   Output 3095 ml   Net -836.2 ml     PATIENT NOT EXAMINED IN PERSON  Physical Exam  Vitals signs and nursing note reviewed.   Constitutional:       General: He is not in acute distress.  HENT:      Head: Normocephalic and atraumatic.   Eyes:      Conjunctiva/sclera: Conjunctivae normal.   Cardiovascular:      Rate and Rhythm: Normal rate and regular rhythm.      Comments: As seen on monitor, patient appears to have sinus rhythm  Pulmonary:      Effort: Pulmonary effort is normal.   Abdominal:      General: There is no distension.   Skin:     Coloration: Skin is not jaundiced.   Neurological:      Mental Status: He is alert.         Vents:  Oxygen Concentration (%): 50 (07/26/20 0315)    Lines/Drains/Airways     Drain                 Rectal Tube 07/23/20 1215 3 days         Urethral Catheter 07/23/20 1212 Straight-tip 16 Fr. 3 days           Peripheral Intravenous Line                 Peripheral IV - Single Lumen 07/21/20 1254 20 G Right Hand 4 days         Peripheral IV - Single Lumen 07/23/20 1506 20 G Anterior;Right Forearm 2 days                Significant Labs:    CBC/Anemia Profile:  Recent Labs   Lab 07/25/20  0311 07/26/20  0441   WBC 14.52* 13.72*   HGB 8.9* 7.3*   HCT 28.3* 22.9*    301   MCV 91 91   RDW 17.0* 16.7*        Chemistries:  Recent Labs   Lab 07/24/20  1240 07/25/20  0311 07/26/20  0441    146* 139   K 4.5 4.4 4.0    111* 109   CO2 26 26 22*   BUN 76* 68* 45*   CREATININE 2.2* 1.9* 1.5*   CALCIUM 8.0* 8.2* 7.8*   ALBUMIN  --  2.5* 2.2*   PROT  --  6.6 5.5*   BILITOT  --  0.6 0.5   ALKPHOS  --  76 63   ALT  --  126* 133*   AST  --  110* 85*   MG  --  2.4 2.2   PHOS  --  3.5 3.3       All pertinent labs within the past 24 hours have been reviewed.    Significant Imaging:  no new imaging    Assessment/Plan:     * Acute respiratory disease due to COVID-19 virus  Patient admitted 7/21 with AMS & fever  - 7/21 COVID(+)  - initially required NC in the ED but escalated to BIPAP so admitted to ICU  - CXR: diffuse patchy bilateral infiltrates  - hx of aspergillus and MAC infection     Recommend  - day 5 dexamethasone -  consideration that if patient has untreated or not properly treated MAC/aspergillus infection in the past that reactivation of infection is possible  - full dose AC   - day 2 remdesivir  - proning if he can tolerate at all  - incentive spirometry    Clostridium difficile infection  Patient positive on PCR, initiated on oral vancomycin & flagyl on 7/24  - has severe infection given rise in Cr, hemodynamic instability on presentation    Recommend  - 10-14 days PO vanc/flagyl      MARQUIS (acute kidney injury)  Likely pre-renal with component of COVID infection & c diff and possible ATN given patient presented hemodynamically unstable and in shock, requiring fluid resuscitation  - Cr improving  overnight    Recommend  - encourage PO intake, if Cr worsening would consider repeating urine studies    COPD (chronic obstructive pulmonary disease)  Use of 2L NC at home    Recommend  - metered dose inhaler q4h       Anemia  Patient has been having GIB  - decline in Hgb from baseline of 10-11g/dL to 7g/dL  - monitor stool for blood    Recommend  - do not transfuse unless Hgb is below 7g/dL    Acute respiratory failure with hypoxia-resolved as of 7/26/2020  See COVID problem            Felicity Mendieta MD  Pulmonology  [facility] ICU 5th Floor

## 2020-07-26 NOTE — PLAN OF CARE
Pt afebrile. Heparin infusing. APTT therapeutic this morning. Weaned off Bipap to HFNC 5L; O2 sats maintained  > 88%. NSR. Urinary output adequate. Safety maintained.

## 2020-07-26 NOTE — ASSESSMENT & PLAN NOTE
Presented with Acute Kidney Injury 2/2 to hypovolemic shock, pre renal   Status improving  Cr 1.5 today BUN 45    Plan:  - cont to encourage oral fluid intake.  - Continue to monitor renal function  - Avoid nephrotoxic medications

## 2020-07-26 NOTE — PLAN OF CARE
Still having periods when pulse goes down to the 60's sinus, noted when pt asleep, not sustained, will go back up to the 70-80's

## 2020-07-26 NOTE — SUBJECTIVE & OBJECTIVE
Interval History: NAEO. Transitioned from continuous bipap to HFNC this am saturations well in 90s. Renal fxn cont to improve. Tolerating thin liquids.     Review of Systems   Unable to perform ROS: Severe respiratory distress   Respiratory: Positive for shortness of breath.    Cardiovascular: Negative for chest pain.   Gastrointestinal: Positive for abdominal pain and diarrhea (melanic). Negative for nausea.   Psychiatric/Behavioral: The patient is nervous/anxious.      Objective:     Vital Signs (Most Recent):  Temp: 98.6 °F (37 °C) (07/26/20 0315)  Pulse: 78 (07/26/20 0645)  Resp: 19 (07/26/20 0645)  BP: (!) 102/53 (07/26/20 0600)  SpO2: 95 % (07/26/20 0645) Vital Signs (24h Range):  Temp:  [98.5 °F (36.9 °C)-99 °F (37.2 °C)] 98.6 °F (37 °C)  Pulse:  [] 78  Resp:  [16-34] 19  SpO2:  [76 %-100 %] 95 %  BP: (102-133)/(53-74) 102/53     Weight: 83 kg (182 lb 15.7 oz)  Body mass index is 25.52 kg/m².    Intake/Output Summary (Last 24 hours) at 7/26/2020 0824  Last data filed at 7/26/2020 0600  Gross per 24 hour   Intake 2258.8 ml   Output 2845 ml   Net -586.2 ml      Physical Exam  Vitals signs and nursing note reviewed.   Constitutional:       General: He is not in acute distress.  HENT:      Head: Normocephalic and atraumatic.   Eyes:      Conjunctiva/sclera: Conjunctivae normal.   Cardiovascular:      Rate and Rhythm: Normal rate and regular rhythm.      Comments: As seen on monitor, patient appears to have sinus rhythm  Pulmonary:      Effort: Pulmonary effort is normal.   Abdominal:      General: There is no distension.   Skin:     Coloration: Skin is not jaundiced.   Neurological:      Mental Status: He is alert.         Significant Labs:   CBC:   Recent Labs   Lab 07/25/20  0311 07/26/20  0441   WBC 14.52* 13.72*   HGB 8.9* 7.3*   HCT 28.3* 22.9*    301     CMP:   Recent Labs   Lab 07/24/20  1240 07/25/20  0311 07/26/20  0441    146* 139   K 4.5 4.4 4.0    111* 109   CO2 26 26 22*    * 118* 126*   BUN 76* 68* 45*   CREATININE 2.2* 1.9* 1.5*   CALCIUM 8.0* 8.2* 7.8*   PROT  --  6.6 5.5*   ALBUMIN  --  2.5* 2.2*   BILITOT  --  0.6 0.5   ALKPHOS  --  76 63   AST  --  110* 85*   ALT  --  126* 133*   ANIONGAP 9 9 8   EGFRNONAA 28* 33* 45*       Significant Imaging: I have reviewed all pertinent imaging results/findings within the past 24 hours.

## 2020-07-26 NOTE — ASSESSMENT & PLAN NOTE
Admitted w/ AMS and hypovolemic shock  Status improving  Plan:   -Transitioned to HFNC, sating well in 90s on 5L, bipap ready to put back if needed    - COVID Isolation per protocol   - Dexamethasone 6mg for 6 days, last day Monday due to hx of MAC and aspergillus   - continue Heparin gtt full dose   - continue Remdesivir, monitoring renal fxn which is improving, ID following    - Atorvastatin 40mg daily   - Multivitamin

## 2020-07-26 NOTE — ASSESSMENT & PLAN NOTE
Patient has been having GIB  - decline in Hgb from baseline of 10-11g/dL to 7g/dL  - monitor stool for blood    Recommend  - do not transfuse unless Hgb is below 7g/dL

## 2020-07-26 NOTE — SUBJECTIVE & OBJECTIVE
Interval History: No acute events overnight. Patient on vapotherm, is able to follow basic commands. 700 cc stool output overnight.    Objective:     Vital Signs (Most Recent):  Temp: 98.8 °F (37.1 °C) (07/26/20 0800)  Pulse: 93 (07/26/20 1214)  Resp: (!) 23 (07/26/20 1214)  BP: 125/88 (07/26/20 1000)  SpO2: 100 % (07/26/20 1214) Vital Signs (24h Range):  Temp:  [98.5 °F (36.9 °C)-99 °F (37.2 °C)] 98.8 °F (37.1 °C)  Pulse:  [] 93  Resp:  [14-34] 23  SpO2:  [76 %-100 %] 100 %  BP: (102-133)/(53-88) 125/88     Weight: 83 kg (182 lb 15.7 oz)  Body mass index is 25.52 kg/m².      Intake/Output Summary (Last 24 hours) at 7/26/2020 1218  Last data filed at 7/26/2020 0900  Gross per 24 hour   Intake 2258.8 ml   Output 3095 ml   Net -836.2 ml     PATIENT NOT EXAMINED IN PERSON  Physical Exam  Vitals signs and nursing note reviewed.   Constitutional:       General: He is not in acute distress.  HENT:      Head: Normocephalic and atraumatic.   Eyes:      Conjunctiva/sclera: Conjunctivae normal.   Cardiovascular:      Rate and Rhythm: Normal rate and regular rhythm.      Comments: As seen on monitor, patient appears to have sinus rhythm  Pulmonary:      Effort: Pulmonary effort is normal.   Abdominal:      General: There is no distension.   Skin:     Coloration: Skin is not jaundiced.   Neurological:      Mental Status: He is alert.         Vents:  Oxygen Concentration (%): 50 (07/26/20 0315)    Lines/Drains/Airways     Drain                 Rectal Tube 07/23/20 1215 3 days         Urethral Catheter 07/23/20 1212 Straight-tip 16 Fr. 3 days          Peripheral Intravenous Line                 Peripheral IV - Single Lumen 07/21/20 1254 20 G Right Hand 4 days         Peripheral IV - Single Lumen 07/23/20 1506 20 G Anterior;Right Forearm 2 days                Significant Labs:    CBC/Anemia Profile:  Recent Labs   Lab 07/25/20  0311 07/26/20  0441   WBC 14.52* 13.72*   HGB 8.9* 7.3*   HCT 28.3* 22.9*    301   MCV 91  91   RDW 17.0* 16.7*        Chemistries:  Recent Labs   Lab 07/24/20  1240 07/25/20  0311 07/26/20  0441    146* 139   K 4.5 4.4 4.0    111* 109   CO2 26 26 22*   BUN 76* 68* 45*   CREATININE 2.2* 1.9* 1.5*   CALCIUM 8.0* 8.2* 7.8*   ALBUMIN  --  2.5* 2.2*   PROT  --  6.6 5.5*   BILITOT  --  0.6 0.5   ALKPHOS  --  76 63   ALT  --  126* 133*   AST  --  110* 85*   MG  --  2.4 2.2   PHOS  --  3.5 3.3       All pertinent labs within the past 24 hours have been reviewed.    Significant Imaging:  no new imaging

## 2020-07-26 NOTE — PROGRESS NOTES
[facility] Progress Note    Assessment/Plan:     1. Severe C diff  Patient with profuse diarrhea prompting C diff testing. Antigen positive, toxin negative; however, toxin PCR positive. KUB with no evidence of toxic megacolon.  - Continue treatment of severe C diff with PO vancomycin and IV metronidazole, recommend 10 day course.      2. Pneumonia due to COVID-19  - Continue dexamethasone 6 mg qD for a 10 day course if patient tolerates.   - Continue remdesivir, monitor kidney/liver function closely.      3. History of MAC infection  4. History of Aspergillosis  Patient with history of structural lung disease c/b MAC infection, and was recently treated with itraconazole for Aspergillosis.  - Serum aspergillus antigen pending.   - Will follow up fungal/AFB blood and respiratory cultures. No recommendations for treatment of MAC or aspergillosis at this time. However, we do recognized that patient is at high risk for flare of aspergillosis and/or MAC in this setting.   - Appreciated Pulmonology's recommendations as well.    Stewart Moura MD  [facility], PGY-4    Thank you for allowing us to participate in the care of this patient. We will continue to follow along. Case has been discussed with consult staff, who is in agreement with assessment and plan.     Subjective:      Rajan Deluna is a 74 y.o. male who is being followed by the [facility] service at [facility] for Pneumonia 2/2 COVID-19 infection, severe C diff, h/o MAC and aspergillosis.     Afebrile overnight. Now on HFNC at 5L, maintaining SpO2 in 90s.        Objective:   Last 24 Hour Vital Signs:  BP  Min: 102/53  Max: 133/63  Temp  Av.8 °F (37.1 °C)  Min: 98.5 °F (36.9 °C)  Max: 99 °F (37.2 °C)  Pulse  Av.2  Min: 63  Max: 116  Resp  Av.1  Min: 16  Max: 34  SpO2  Av.1 %  Min: 76 %  Max: 100 %  Weight  Av kg (182 lb 15.7 oz)  Min: 83 kg (182 lb 15.7 oz)  Max: 83 kg (182 lb  15.7 oz)  I/O last 3 completed shifts:  In: 3721.1 [P.O.:2520; I.V.:525.1; IV Piggyback:676]  Out: 4520 [Urine:3345; Stool:1175]    Physical Exam  Limited by Telemedicine  Gen: Ill-appearing elderly gentleman.  Pulm: On 5L per NC, SpO2 91%  CV: RRR    Laboratory:  Laboratory Data Reviewed: yes  Pertinent Findings:  Recent Labs   Lab 07/24/20  0322 07/24/20  1240 07/25/20  0311 07/26/20  0441   WBC 11.35  --  14.52* 13.72*   HGB 10.8*  --  8.9* 7.3*   HCT 33.7*  --  28.3* 22.9*     --  304 301   MCV 90  --  91 91   RDW 16.8*  --  17.0* 16.7*   * 145 146* 139   K 5.2* 4.5 4.4 4.0    110 111* 109   CO2 24 26 26 22*   BUN 68* 76* 68* 45*   CREATININE 2.3* 2.2* 1.9* 1.5*   * 146* 118* 126*   PROT 7.6  --  6.6 5.5*   ALBUMIN 2.6*  --  2.5* 2.2*   BILITOT 0.5  --  0.6 0.5   AST 92*  --  110* 85*   ALKPHOS 82  --  76 63   *  --  126* 133*         Microbiology Data Reviewed: yes  Pertinent Findings:  Blood cultures 7/21 NGTD  C diff antigen +, toxin - (7/23) toxin PCR + (7/24)  AFB and fungal blood and respiratory cultures pending    Other Results:  Radiology Data Reviewed: yes  Pertinent Findings:  KUB with no evidence of toxic megacolon.    Current Medications:     Infusions:   heparin (porcine) in D5W 13 Units/kg/hr (07/26/20 0232)        Scheduled:   albuterol  2 puff Inhalation Q4H    dexamethasone  6 mg Intravenous Q24H    lidocaine  1 patch Transdermal Q24H    metronidazole  500 mg Intravenous Q8H    multivitamin  1 tablet Oral Daily    pantoprazole  40 mg Intravenous BID    EUA remdesivir infusion (NO CHARGE)  100 mg Intravenous Q24H    vancomycin  125 mg Oral Q6H        PRN:  acetaminophen, haloperidol lactate, heparin (PORCINE), heparin (PORCINE), ondansetron, pneumoc 13-loy conj-dip cr(PF), sodium chloride 0.9%    Antibiotics and Day Number of Therapy:  PO levofloxacin 7/22-7/24  PO vancomycin 7/24- current  IV metronidazole 7/24 - current    Lines and Day Number of  Therapy:  Trejo 7/23- current

## 2020-07-26 NOTE — PLAN OF CARE
Pt arouses easily, denies pain sob, or chest pain, resp unlabored , full , even, rate 28-32, sat's 93% on 5l high flow cannula, breath sounds very diminished post,

## 2020-07-26 NOTE — PROGRESS NOTES
[facility] ICU 5th Floor  Hospital Medicine  Progress Note    Patient Name: Rajan Deluna  MRN: [id number]  Patient Class: IP- Inpatient   Admission Date: 7/21/2020  Length of Stay: 5 days  Attending Physician: Rajan Rodríguez MD  Primary Care Provider: Jason Mccord MD        Subjective:     Principal Problem:Acute respiratory disease due to COVID-19 virus        HPI:  Patient is a 74-year-old male with a past medical history of hypertension, COPD, MAC infection who presented to the ED with altered mental status and fever.  As per the family, patient has had altered mental status for the past 3-4 days. Family endorses increased somnolence as well as urinating on himself. EMS was called two days prior to presentation but reportedly deemed that patient did not need to be brought in. Over the previous day, reportedly the patient was found stuck, staring in his sink, not responding. On the day of presentation, the patient was found down on the floor, family wasn't able to get patient up from the floor and was reportedly not responding as much as baseline. Family believed that patient may not have been wearing baseline 2L O2. Patient also endorses a cough and generalized weakness. Patient reportedly had a recent UTI.     In the ED, chest x-ray showed diffuse interstitial patchy infiltrates concerning for possible pneumonia and a COVID-19 screening test was positive. Fever was 101° F and patient was hypotensive 80s/40s. Patient given 30cc/kg bolus. BP very soft on presentation, adequately responded to volume resuscitation and then decreased again. Labs showed a BUN 45, creatinine 2.9, , , troponin 0.036, procalcitonin 1.13.     Interval History: NAEO. Transitioned from continuous bipap to HFNC this am saturations well in 90s. Renal fxn cont to improve. Tolerating thin liquids.     Review of Systems   Unable to perform ROS: Severe respiratory distress   Respiratory: Positive for  shortness of breath.    Cardiovascular: Negative for chest pain.   Gastrointestinal: Positive for abdominal pain and diarrhea (melanic). Negative for nausea.   Psychiatric/Behavioral: The patient is nervous/anxious.      Objective:     Vital Signs (Most Recent):  Temp: 98.6 °F (37 °C) (07/26/20 0315)  Pulse: 78 (07/26/20 0645)  Resp: 19 (07/26/20 0645)  BP: (!) 102/53 (07/26/20 0600)  SpO2: 95 % (07/26/20 0645) Vital Signs (24h Range):  Temp:  [98.5 °F (36.9 °C)-99 °F (37.2 °C)] 98.6 °F (37 °C)  Pulse:  [] 78  Resp:  [16-34] 19  SpO2:  [76 %-100 %] 95 %  BP: (102-133)/(53-74) 102/53     Weight: 83 kg (182 lb 15.7 oz)  Body mass index is 25.52 kg/m².    Intake/Output Summary (Last 24 hours) at 7/26/2020 0824  Last data filed at 7/26/2020 0600  Gross per 24 hour   Intake 2258.8 ml   Output 2845 ml   Net -586.2 ml      Physical Exam  Vitals signs and nursing note reviewed.   Constitutional:       General: He is not in acute distress.  HENT:      Head: Normocephalic and atraumatic.   Eyes:      Conjunctiva/sclera: Conjunctivae normal.   Cardiovascular:      Rate and Rhythm: Normal rate and regular rhythm.      Comments: As seen on monitor, patient appears to have sinus rhythm  Pulmonary:      Effort: Pulmonary effort is normal.   Abdominal:      General: There is no distension.   Skin:     Coloration: Skin is not jaundiced.   Neurological:      Mental Status: He is alert.         Significant Labs:   CBC:   Recent Labs   Lab 07/25/20  0311 07/26/20  0441   WBC 14.52* 13.72*   HGB 8.9* 7.3*   HCT 28.3* 22.9*    301     CMP:   Recent Labs   Lab 07/24/20  1240 07/25/20  0311 07/26/20  0441    146* 139   K 4.5 4.4 4.0    111* 109   CO2 26 26 22*   * 118* 126*   BUN 76* 68* 45*   CREATININE 2.2* 1.9* 1.5*   CALCIUM 8.0* 8.2* 7.8*   PROT  --  6.6 5.5*   ALBUMIN  --  2.5* 2.2*   BILITOT  --  0.6 0.5   ALKPHOS  --  76 63   AST  --  110* 85*   ALT  --  126* 133*   ANIONGAP 9 9 8   EGFRNONAA 28* 33*  45*       Significant Imaging: I have reviewed all pertinent imaging results/findings within the past 24 hours.      Assessment/Plan:      * Acute respiratory disease due to COVID-19 virus  Admitted w/ AMS and hypovolemic shock  Status improving  Plan:   -Transitioned to HFNC, sating well in 90s on 5L, bipap ready to put back if needed    - COVID Isolation per protocol   - Dexamethasone 6mg for 6 days, last day Monday due to hx of MAC and aspergillus   - continue Heparin gtt full dose   - continue Remdesivir, monitoring renal fxn which is improving, ID following    - Atorvastatin 40mg daily   - Multivitamin      Clostridium difficile infection  P/w consistent melanotic diarrhea, C diff toxin positive    Plan:  - ID following  - p.o. vancomycin and IV metronidazole  - continue IV Protonix.    MARQUIS (acute kidney injury)  Presented with Acute Kidney Injury 2/2 to hypovolemic shock, pre renal   Status improving  Cr 1.5 today BUN 45    Plan:  - cont to encourage oral fluid intake.  - Continue to monitor renal function  - Avoid nephrotoxic medications      COVID-19  Patient was stepped up to the ICU yesterday due to increasing O2 demands and decreasing O2 saturation from baseline. Patient was started on continues BiPAP, currently satting at 95-95% at 50% oxygen concentration. Patient was approved for Remdesivir yesterday but morning labs indicated BUN 68 creatinine 2.3, will hold pending repeat CMP.    Plan:   - COVID Isolation per protocol   - Dexamethasone 6mg daily for 10 days   - continue Heparin gtt   - Levofloxacin 750mg daily   - Atorvastatin 40mg daily   - Multivitamin    Sepsis with acute renal failure and septic shock  Septic shock resolving  See plan for COVID 19    COPD (chronic obstructive pulmonary disease)  - albuterol inhaler 2 puff Q4H  - MDI      VTE Risk Mitigation (From admission, onward)         Ordered     heparin 25,000 units in dextrose 5% 250 mL (100 units/mL) infusion HIGH INTENSITY nomogram - [facility]   Continuous     Question:  Heparin Infusion Adjustment (DO NOT MODIFY ANSWER)  Answer:  [email or web address]\Images\Pharmacy\HeparinInfusions\heparin HIGH INTENSITY nomogram for [facility] [id number]    07/22/20 0215     heparin 25,000 units in dextrose 5% (100 units/ml) IV bolus from bag - ADDITIONAL PRN BOLUS - 60 units/kg  As needed (PRN)     Question:  Heparin Infusion Adjustment (DO NOT MODIFY ANSWER)  Answer:  [email or web address]\Images\Pharmacy\HeparinInfusions\heparin HIGH INTENSITY nomogram for [facility] [id number].pdf    07/22/20 0215     heparin 25,000 units in dextrose 5% (100 units/ml) IV bolus from bag - ADDITIONAL PRN BOLUS - 30 units/kg  As needed (PRN)     Question:  Heparin Infusion Adjustment (DO NOT MODIFY ANSWER)  Answer:  [email or web address]\Images\Pharmacy\HeparinInfusions\heparin HIGH INTENSITY nomogram for [facility] [id number]    07/22/20 0215     Place sequential compression device  Until discontinued      07/21/20 2142     IP VTE HIGH RISK PATIENT  Once      07/21/20 2142              Dispo: Pending respiratory improvement         Arnie Gaston MD  Department of Hospital Medicine   [facility] ICU 5th Floor

## 2020-07-26 NOTE — PLAN OF CARE
Notice drop in pulse to the 60's , sinus, comes in and out of it, pt asleep, sat's 90's, will monitor

## 2020-07-27 LAB
ABO + RH BLD: NORMAL
ALBUMIN SERPL BCP-MCNC: 2.2 G/DL (ref 3.5–5.2)
ALP SERPL-CCNC: 61 U/L (ref 55–135)
ALT SERPL W/O P-5'-P-CCNC: 103 U/L (ref 10–44)
ANION GAP SERPL CALC-SCNC: 7 MMOL/L (ref 8–16)
ANISOCYTOSIS BLD QL SMEAR: SLIGHT
APTT BLDCRRT: 25.7 SEC (ref 21–32)
AST SERPL-CCNC: 49 U/L (ref 10–40)
BASOPHILS NFR BLD: 0 % (ref 0–1.9)
BILIRUB SERPL-MCNC: 0.6 MG/DL (ref 0.1–1)
BLD GP AB SCN CELLS X3 SERPL QL: NORMAL
BLD PROD TYP BPU: NORMAL
BLOOD UNIT EXPIRATION DATE: NORMAL
BLOOD UNIT TYPE CODE: 7300
BLOOD UNIT TYPE: NORMAL
BUN SERPL-MCNC: 31 MG/DL (ref 8–23)
CALCIUM SERPL-MCNC: 7.8 MG/DL (ref 8.7–10.5)
CHLORIDE SERPL-SCNC: 110 MMOL/L (ref 95–110)
CO2 SERPL-SCNC: 22 MMOL/L (ref 23–29)
CODING SYSTEM: NORMAL
CREAT SERPL-MCNC: 1.4 MG/DL (ref 0.5–1.4)
DIFFERENTIAL METHOD: ABNORMAL
DISPENSE STATUS: NORMAL
EOSINOPHIL NFR BLD: 0 % (ref 0–8)
ERYTHROCYTE [DISTWIDTH] IN BLOOD BY AUTOMATED COUNT: 16.8 % (ref 11.5–14.5)
EST. GFR  (AFRICAN AMERICAN): 56 ML/MIN/1.73 M^2
EST. GFR  (NON AFRICAN AMERICAN): 48 ML/MIN/1.73 M^2
GLUCOSE SERPL-MCNC: 109 MG/DL (ref 70–110)
HCT VFR BLD AUTO: 21.5 % (ref 40–54)
HGB BLD-MCNC: 6.9 G/DL (ref 14–18)
HYPOCHROMIA BLD QL SMEAR: ABNORMAL
IMM GRANULOCYTES # BLD AUTO: ABNORMAL K/UL (ref 0–0.04)
IMM GRANULOCYTES NFR BLD AUTO: ABNORMAL % (ref 0–0.5)
LYMPHOCYTES NFR BLD: 8 % (ref 18–48)
MAGNESIUM SERPL-MCNC: 2.1 MG/DL (ref 1.6–2.6)
MCH RBC QN AUTO: 29 PG (ref 27–31)
MCHC RBC AUTO-ENTMCNC: 32.1 G/DL (ref 32–36)
MCV RBC AUTO: 90 FL (ref 82–98)
MONOCYTES NFR BLD: 3 % (ref 4–15)
NEUTROPHILS NFR BLD: 87 % (ref 38–73)
NEUTS BAND NFR BLD MANUAL: 2 %
NRBC BLD-RTO: 2 /100 WBC
OVALOCYTES BLD QL SMEAR: ABNORMAL
PHOSPHATE SERPL-MCNC: 3.2 MG/DL (ref 2.7–4.5)
PLATELET # BLD AUTO: 340 K/UL (ref 150–350)
PLATELET BLD QL SMEAR: ABNORMAL
PMV BLD AUTO: 10.1 FL (ref 9.2–12.9)
POIKILOCYTOSIS BLD QL SMEAR: SLIGHT
POLYCHROMASIA BLD QL SMEAR: ABNORMAL
POTASSIUM SERPL-SCNC: 3.9 MMOL/L (ref 3.5–5.1)
PROT SERPL-MCNC: 5.2 G/DL (ref 6–8.4)
RBC # BLD AUTO: 2.38 M/UL (ref 4.6–6.2)
SODIUM SERPL-SCNC: 139 MMOL/L (ref 136–145)
TRANS ERYTHROCYTES VOL PATIENT: NORMAL ML
WBC # BLD AUTO: 16.4 K/UL (ref 3.9–12.7)

## 2020-07-27 PROCEDURE — 84100 ASSAY OF PHOSPHORUS: CPT

## 2020-07-27 PROCEDURE — 63600175 PHARM REV CODE 636 W HCPCS: Performed by: INTERNAL MEDICINE

## 2020-07-27 PROCEDURE — 97161 PT EVAL LOW COMPLEX 20 MIN: CPT

## 2020-07-27 PROCEDURE — 86920 COMPATIBILITY TEST SPIN: CPT

## 2020-07-27 PROCEDURE — 80053 COMPREHEN METABOLIC PANEL: CPT

## 2020-07-27 PROCEDURE — 94640 AIRWAY INHALATION TREATMENT: CPT

## 2020-07-27 PROCEDURE — 36415 COLL VENOUS BLD VENIPUNCTURE: CPT

## 2020-07-27 PROCEDURE — 25000003 PHARM REV CODE 250

## 2020-07-27 PROCEDURE — 85027 COMPLETE CBC AUTOMATED: CPT

## 2020-07-27 PROCEDURE — 97165 OT EVAL LOW COMPLEX 30 MIN: CPT

## 2020-07-27 PROCEDURE — S0030 INJECTION, METRONIDAZOLE: HCPCS

## 2020-07-27 PROCEDURE — 63600175 PHARM REV CODE 636 W HCPCS: Performed by: STUDENT IN AN ORGANIZED HEALTH CARE EDUCATION/TRAINING PROGRAM

## 2020-07-27 PROCEDURE — 97535 SELF CARE MNGMENT TRAINING: CPT

## 2020-07-27 PROCEDURE — 86850 RBC ANTIBODY SCREEN: CPT

## 2020-07-27 PROCEDURE — C9113 INJ PANTOPRAZOLE SODIUM, VIA: HCPCS | Performed by: INTERNAL MEDICINE

## 2020-07-27 PROCEDURE — 92526 ORAL FUNCTION THERAPY: CPT

## 2020-07-27 PROCEDURE — 99900035 HC TECH TIME PER 15 MIN (STAT)

## 2020-07-27 PROCEDURE — 85007 BL SMEAR W/DIFF WBC COUNT: CPT

## 2020-07-27 PROCEDURE — 27000221 HC OXYGEN, UP TO 24 HOURS

## 2020-07-27 PROCEDURE — 83735 ASSAY OF MAGNESIUM: CPT

## 2020-07-27 PROCEDURE — 97530 THERAPEUTIC ACTIVITIES: CPT

## 2020-07-27 PROCEDURE — 94660 CPAP INITIATION&MGMT: CPT

## 2020-07-27 PROCEDURE — P9021 RED BLOOD CELLS UNIT: HCPCS

## 2020-07-27 PROCEDURE — 11000001 HC ACUTE MED/SURG PRIVATE ROOM

## 2020-07-27 PROCEDURE — 25000003 PHARM REV CODE 250: Performed by: STUDENT IN AN ORGANIZED HEALTH CARE EDUCATION/TRAINING PROGRAM

## 2020-07-27 PROCEDURE — 85730 THROMBOPLASTIN TIME PARTIAL: CPT

## 2020-07-27 PROCEDURE — 94761 N-INVAS EAR/PLS OXIMETRY MLT: CPT

## 2020-07-27 RX ORDER — HYDROCODONE BITARTRATE AND ACETAMINOPHEN 500; 5 MG/1; MG/1
TABLET ORAL
Status: DISCONTINUED | OUTPATIENT
Start: 2020-07-27 | End: 2020-08-27 | Stop reason: HOSPADM

## 2020-07-27 RX ORDER — HEPARIN SODIUM 5000 [USP'U]/ML
5000 INJECTION, SOLUTION INTRAVENOUS; SUBCUTANEOUS EVERY 8 HOURS
Status: DISCONTINUED | OUTPATIENT
Start: 2020-07-27 | End: 2020-07-27

## 2020-07-27 RX ADMIN — METRONIDAZOLE 500 MG: 500 INJECTION, SOLUTION INTRAVENOUS at 06:07

## 2020-07-27 RX ADMIN — METRONIDAZOLE 500 MG: 500 INJECTION, SOLUTION INTRAVENOUS at 01:07

## 2020-07-27 RX ADMIN — SODIUM CHLORIDE 100 MG: 9 INJECTION, SOLUTION INTRAVENOUS at 06:07

## 2020-07-27 RX ADMIN — DEXAMETHASONE SODIUM PHOSPHATE 6 MG: 4 INJECTION, SOLUTION INTRAMUSCULAR; INTRAVENOUS at 08:07

## 2020-07-27 RX ADMIN — PANTOPRAZOLE SODIUM 40 MG: 40 INJECTION, POWDER, FOR SOLUTION INTRAVENOUS at 08:07

## 2020-07-27 RX ADMIN — METRONIDAZOLE 500 MG: 500 INJECTION, SOLUTION INTRAVENOUS at 08:07

## 2020-07-27 RX ADMIN — ALBUTEROL SULFATE 2 PUFF: 90 AEROSOL, METERED RESPIRATORY (INHALATION) at 11:07

## 2020-07-27 RX ADMIN — ALBUTEROL SULFATE 2 PUFF: 90 AEROSOL, METERED RESPIRATORY (INHALATION) at 08:07

## 2020-07-27 RX ADMIN — HALOPERIDOL LACTATE 2 MG: 5 INJECTION, SOLUTION INTRAMUSCULAR at 11:07

## 2020-07-27 RX ADMIN — LIDOCAINE 1 PATCH: 50 PATCH TOPICAL at 06:07

## 2020-07-27 RX ADMIN — THERA TABS 1 TABLET: TAB at 08:07

## 2020-07-27 RX ADMIN — Medication 125 MG: at 05:07

## 2020-07-27 RX ADMIN — ALBUTEROL SULFATE 2 PUFF: 90 AEROSOL, METERED RESPIRATORY (INHALATION) at 04:07

## 2020-07-27 RX ADMIN — Medication 125 MG: at 11:07

## 2020-07-27 RX ADMIN — Medication 125 MG: at 06:07

## 2020-07-27 NOTE — ASSESSMENT & PLAN NOTE
Admitted w/ AMS and hypovolemic shock.  Patient currently satting at 100% at 2 L of supplemental oxygen via nasal cannula.   STABLE    Plan:   - COVID Isolation per protocol   - continue dexamethasone 6 mg, currently on day 6   - discontinue heparin gtt yesterday (7/27) and discontinue prophylactic heparin. Will continue to monitor and once H&H stable will continue prophylactic dose of heparin   - continue Remdesivir, monitoring renal fxn which is improving, ID following    - Atorvastatin 40mg daily   - Multivitamin    - continue to monitor respiratory status.

## 2020-07-27 NOTE — PROGRESS NOTES
[facility] Progress Note  ICU    Admit Date: 7/21/2020   LOS: 6 days     SUBJECTIVE:     No acute events over night. VSS, he is stable on home O2 requirements. Heparin gtt stopped after drop in H/H, patient is type and screened and waiting on transfusion of 1 unit pRBC.     Continuous Infusions:  Scheduled Meds:   albuterol  2 puff Inhalation Q4H    dexamethasone  6 mg Intravenous Q24H    lidocaine  1 patch Transdermal Q24H    metronidazole  500 mg Intravenous Q8H    multivitamin  1 tablet Oral Daily    pantoprazole  40 mg Intravenous BID    EUA remdesivir infusion (NO CHARGE)  100 mg Intravenous Q24H    vancomycin  125 mg Oral Q6H     PRN Meds:sodium chloride, acetaminophen, haloperidol lactate, ondansetron, pneumoc 13-loy conj-dip cr(PF), simethicone, sodium chloride 0.9%    Review of patient's allergies indicates:  No Known Allergies  ROS  Negative other than HPI     OBJECTIVE:     Vital Signs (Most Recent)  Temp: 99.2 °F (37.3 °C) (07/27/20 0815)  Pulse: 92 (07/27/20 0915)  Resp: (!) 25 (07/27/20 0915)  BP: 106/77 (07/27/20 0900)  SpO2: 95 % (07/27/20 0915)    Vital Signs Range (Last 24H):  Temp:  [97.8 °F (36.6 °C)-99.2 °F (37.3 °C)]   Pulse:  [68-99]   Resp:  [14-48]   BP: (100-129)/(51-88)   SpO2:  [88 %-100 %]     Current Diet Order   Procedures    Diet Adult Regular (IDDSI Level 7) Thin     Order Specific Question:   Fluid consistency:     Answer:   Thin      I & O (Last 24H):    Intake/Output Summary (Last 24 hours) at 7/27/2020 0955  Last data filed at 7/27/2020 0600  Gross per 24 hour   Intake 1917.87 ml   Output 1800 ml   Net 117.87 ml     Wt Readings from Last 3 Encounters:   07/26/20 83 kg (182 lb 15.7 oz)   09/22/17 91.9 kg (202 lb 8 oz)   05/22/17 93.7 kg (206 lb 9.6 oz)     Ventilator Data (Last 24H):          Hemodynamic Parameters (Last 24H):       Physical Exam  Due to COVID, PE was limited to reduce use of PPE   Resting comfortably in bed on NC   Moves all extremities  "     Lines/Drains:       Peripheral IV - Single Lumen 07/21/20 1254 20 G Right Hand (Active)   Site Assessment Clean;Dry;Intact;No redness;No swelling 07/27/20 0830   Line Status Flushed;Saline locked 07/27/20 0830   Dressing Status Clean;Dry;Intact 07/27/20 0830   Dressing Intervention Integrity maintained 07/26/20 1905   Dressing Change Due 07/25/20 07/26/20 1905   Site Change Due 07/25/20 07/27/20 0830   Reason Not Rotated Poor venous access 07/27/20 0830   Number of days: 5            Peripheral IV - Single Lumen 07/23/20 1506 20 G Anterior;Right Forearm (Active)   Site Assessment Clean;Dry;Intact;No redness;No swelling 07/27/20 0830   Line Status Infusing 07/27/20 0830   Dressing Status Clean;Dry;Intact 07/27/20 0830   Dressing Intervention Integrity maintained 07/26/20 1905   Dressing Change Due 07/27/20 07/26/20 1905   Site Change Due 07/27/20 07/27/20 0830   Reason Not Rotated Poor venous access 07/27/20 0830   Number of days: 3            Rectal Tube 07/23/20 1215 (Active)   Reposition drainage bags for BMS & Trejo on opposite sides of bed 07/26/20 1905   Outcome stool evacuated 07/27/20 0830   Stool Color Black 07/27/20 0830   Insertion Site Appearance no redness, warmth, tenderness, skin breakdown, drainage 07/26/20 1905   Flush/Irrigation flushed w/;water;no resistance met 07/26/20 1630   Intake (mL) 40 mL 07/26/20 1630   Rectal Tube Output 75 mL 07/27/20 0600   Number of days: 3            Urethral Catheter 07/23/20 1212 Straight-tip 16 Fr. (Active)   Site Assessment Clean;Intact 07/27/20 0830   Collection Container Urimeter 07/27/20 0830   Securement Method secured to top of thigh w/ adhesive device 07/27/20 0830   Catheter Care Performed yes 07/27/20 0830   Reason for Continuing Urinary Catheterization Critically ill in ICU requiring intensive monitoring 07/27/20 0830   CAUTI Prevention Bundle StatLock in place w 1" slack;Intact seal between catheter & drainage tubing;Drainage bag/urimeter off the " floor;Green sheeting clip in use;No dependent loops or kinks;Drainage bag/urimeter not overfilled (<2/3 full);Drainage bag/urimeter below bladder 07/27/20 0830   Output (mL) 170 mL 07/27/20 0505   Number of days: 3         LABS  CBC  Recent Labs   Lab 07/23/20  1507 07/24/20  0322 07/25/20  0311 07/26/20  0441 07/27/20  0520   WBC 13.69*  13.69* 11.35 14.52* 13.72* 16.40*   RBC 3.63*  3.63* 3.75* 3.11* 2.52* 2.38*   HGB 10.6*  10.6* 10.8* 8.9* 7.3* 6.9*   HCT 32.5*  32.5* 33.7* 28.3* 22.9* 21.5*     243 275 304 301 340   MCV 90  90 90 91 91 90   MCH 29.2  29.2 28.8 28.6 29.0 29.0   MCHC 32.6  32.6 32.0 31.4* 31.9* 32.1       BMP  Recent Labs   Lab 07/24/20  0322 07/24/20  1240 07/25/20  0311 07/26/20  0441 07/27/20  0520   * 145 146* 139 139   K 5.2* 4.5 4.4 4.0 3.9   CO2 24 26 26 22* 22*    110 111* 109 110   BUN 68* 76* 68* 45* 31*   CREATININE 2.3* 2.2* 1.9* 1.5* 1.4       CMP  Sodium   Date Value Ref Range Status   07/27/2020 139 136 - 145 mmol/L Final     Potassium   Date Value Ref Range Status   07/27/2020 3.9 3.5 - 5.1 mmol/L Final     Chloride   Date Value Ref Range Status   07/27/2020 110 95 - 110 mmol/L Final     CO2   Date Value Ref Range Status   07/27/2020 22 (L) 23 - 29 mmol/L Final     Glucose   Date Value Ref Range Status   07/27/2020 109 70 - 110 mg/dL Final     BUN, Bld   Date Value Ref Range Status   07/27/2020 31 (H) 8 - 23 mg/dL Final     Creatinine   Date Value Ref Range Status   07/27/2020 1.4 0.5 - 1.4 mg/dL Final     Calcium   Date Value Ref Range Status   07/27/2020 7.8 (L) 8.7 - 10.5 mg/dL Final     Total Protein   Date Value Ref Range Status   07/27/2020 5.2 (L) 6.0 - 8.4 g/dL Final     Albumin   Date Value Ref Range Status   07/27/2020 2.2 (L) 3.5 - 5.2 g/dL Final     Total Bilirubin   Date Value Ref Range Status   07/27/2020 0.6 0.1 - 1.0 mg/dL Final     Comment:     For infants and newborns, interpretation of results should be based  on gestational age,  weight and in agreement with clinical  observations.  Premature Infant recommended reference ranges:  Up to 24 hours.............<8.0 mg/dL  Up to 48 hours............<12.0 mg/dL  3-5 days..................<15.0 mg/dL  6-29 days.................<15.0 mg/dL       Alkaline Phosphatase   Date Value Ref Range Status   07/27/2020 61 55 - 135 U/L Final     AST   Date Value Ref Range Status   07/27/2020 49 (H) 10 - 40 U/L Final     ALT   Date Value Ref Range Status   07/27/2020 103 (H) 10 - 44 U/L Final     Anion Gap   Date Value Ref Range Status   07/27/2020 7 (L) 8 - 16 mmol/L Final     eGFR if    Date Value Ref Range Status   07/27/2020 56 (A) >60 mL/min/1.73 m^2 Final     eGFR if non    Date Value Ref Range Status   07/27/2020 48 (A) >60 mL/min/1.73 m^2 Final     Comment:     Calculation used to obtain the estimated glomerular filtration  rate (eGFR) is the CKD-EPI equation.            POCT-Glucose  No results found for: POCTGLUCOSE  Recent Labs   Lab 07/23/20  0403 07/24/20  0322 07/24/20  1240 07/25/20  0311 07/26/20  0441 07/27/20  0520   CALCIUM 7.8* 8.3* 8.0* 8.2* 7.8* 7.8*   MG 1.9 2.4  --  2.4 2.2 2.1   PHOS 1.7* 2.9  --  3.5 3.3 3.2         LFT  Recent Labs   Lab 07/23/20  0403 07/24/20  0322 07/25/20  0311 07/26/20  0441 07/27/20  0520   PROT 7.1 7.6 6.6 5.5* 5.2*   ALBUMIN 2.4* 2.6* 2.5* 2.2* 2.2*   BILITOT 0.6 0.5 0.6 0.5 0.6   * 92* 110* 85* 49*   ALKPHOS 83 82 76 63 61   * 117* 126* 133* 103*         COAGS  Recent Labs   Lab 07/21/20  2358  07/24/20 2008 07/25/20  0311 07/25/20  1322 07/26/20  0441 07/27/20  0519   INR 1.0  --   --   --   --   --   --    APTT 38.1*  38.1*   < > 38.2* 66.2* 52.7* 68.2* 25.7    < > = values in this interval not displayed.       CE  Recent Labs   Lab 07/21/20  1331 07/22/20  0518 07/22/20  1805 07/23/20  0056   TROPONINI 0.036* 0.037* 0.021 0.030*     BNP  Recent Labs   Lab 07/21/20  1331   BNP 13       LAST HbA1c  Lab Results    Component Value Date    HGBA1C 6.3 (H) 07/23/2020         Imaging Results          X-Ray Chest 1 View (Final result)  Result time 07/22/20 18:36:38    Final result by Kari Becerra MD (07/22/20 18:36:38)                 Impression:      No significant change.      Electronically signed by: Kari Becerra  Date:    07/22/2020  Time:    18:36             Narrative:    EXAMINATION:  CHEST ONE VIEW    CLINICAL HISTORY:  COPD Exacerbation; Sepsis, unspecified organism    TECHNIQUE:  One view of the chest.    COMPARISON:  07/21/2020    FINDINGS:  The cardiac silhouette is within normal limits.   There is no focal consolidation, pneumothorax, or pleural effusion. There is a diffuse interstitial lung abnormalities.  There is chronic appearing bilateral pleural thickening.  There is no large appearing pleural fluid.                               X-Ray Chest AP Portable (Final result)  Result time 07/21/20 13:54:05    Final result by Luda Powell MD (07/21/20 13:54:05)                 Impression:      Abnormal increased interstitial lung markings predominantly at the lung basis left more than right, nonspecific could be acute or chronic.      Electronically signed by: Luda Powell MD  Date:    07/21/2020  Time:    13:54             Narrative:    EXAMINATION:  XR CHEST AP PORTABLE    CLINICAL HISTORY:  Sepsis;    TECHNIQUE:  Single frontal view of the chest was performed.    COMPARISON:  None    FINDINGS:  EKG wires overlie the chest.  The cardiac silhouette is normal in size, midline.  Mild increased interstitial lung markings noted both lung fields, nonspecific, slightly accentuated  at the lung bases left more than right.  No large pleural effusion.  No pneumothorax.  The osseous structures appear normal.                                  Microbiology Results (last 7 days)     Procedure Component Value Units Date/Time    AFB Culture & Smear [[id number]] Collected: 07/25/20 2231    Order Status:  Completed Specimen: Respiratory from Mouth Updated: 07/27/20 0927     AFB Culture & Smear Culture in progress    Blood culture x two cultures. Draw prior to antibiotics. [[id number]] Collected: 07/21/20 1419    Order Status: Completed Specimen: Blood from Peripheral, Antecubital, Right Updated: 07/26/20 2322     Blood Culture, Routine No growth after 5 days.    Narrative:      Aerobic and anaerobic    Blood culture x two cultures. Draw prior to antibiotics. [[id number]] Collected: 07/21/20 1330    Order Status: Completed Specimen: Blood from Peripheral, Wrist, Left Updated: 07/26/20 2322     Blood Culture, Routine No growth after 5 days.    Narrative:      Aerobic and anaerobic    Blood culture [[id number]] Collected: 07/24/20 1645    Order Status: Completed Specimen: Blood from Antecubital, Right Updated: 07/26/20 2312     Blood Culture, Routine No Growth to date      No Growth to date      No Growth to date    Blood culture [[id number]] Collected: 07/24/20 1645    Order Status: Completed Specimen: Blood from Peripheral, Left Hand Updated: 07/26/20 2312     Blood Culture, Routine No Growth to date      No Growth to date      No Growth to date    AFB Culture & Smear [[id number]] Collected: 07/26/20 0923    Order Status: Sent Specimen: Respiratory from Sputum, Expectorated Updated: 07/26/20 1339    Fungus Culture, Blood or Bone Marrow [[id number]] Collected: 07/26/20 1041    Order Status: Sent Specimen: Blood from Antecubital, Left Updated: 07/26/20 1339    Fungus culture [[id number]] Collected: 07/25/20 2231    Order Status: Sent Specimen: Respiratory from Mouth Updated: 07/26/20 0243    AFB Culture & Smear [[id number]]     Order Status: No result Specimen: Respiratory     Fungus culture [[id number]]     Order Status: Canceled Specimen: Blood     AFB Culture & Smear [[id number]]     Order Status: Sent Specimen: Blood     C Diff Toxin by PCR [[id number]]  (Abnormal) Collected: 07/23/20 1205    Order Status: Completed Updated:  07/24/20 1500     C. diff PCR Positive    Clostridium difficile EIA [[id number]]  (Abnormal) Collected: 07/23/20 1205    Order Status: Completed Specimen: Stool Updated: 07/23/20 2032     C. diff Antigen Positive     C difficile Toxins A+B, EIA Negative     Comment: Testing not recommended for children <24 months old.                 IP CONSULT TO PULMONOLOGY  PHARMACY REMDESIVIR CONSULT  IP CONSULT TO INFECTIOUS DISEASES    No new subjective & objective note has been filed under this hospital service since the last note was generated.        ASSESSMENT/PLAN:       Rajan Deluna is a 74 y.o. male with extensive pmh who presented with Acute respiratory disease due to COVID-19 virus.     The primary encounter diagnosis was Acute respiratory disease due to COVID-19 virus. Diagnoses of Fever, Sepsis, due to unspecified organism, unspecified whether acute organ dysfunction present, Hypotension, unspecified hypotension type, Acute respiratory failure with hypoxia, MARQUIS (acute kidney injury), Weakness generalized, Community acquired pneumonia, unspecified laterality, Viral pneumonia, COVID-19, Chest pain, Melena, Pneumonia due to COVID-19 virus, Chronic obstructive pulmonary disease, unspecified COPD type, and Clostridium difficile infection were also pertinent to this visit.    Acute respiratory disease due to COVID-19 virus  Admitted w/ AMS and hypovolemic shock.  Patient currently satting at 100% at 2 L of supplemental oxygen via nasal cannula.   STABLE     Plan:   - continue COVID isolation    - continue dexamethasone 6 mg, currently on day 6  -due to Aspergillis and MAC will stop dexamethasone on day 8    - stop heparin gtt 2/2 down trending Hgb              COPD (chronic obstructive pulmonary disease)  - albuterol inhaler 2 puff Q4H  - MDI     Clostridium difficile infection  P/w consistent melanotic diarrhea, C diff toxin positive.  Still producing watery diarrhea but diminished overnight.     Plan:  - ID  following  - p.o. vancomycin and IV metronidazole  - continue IV Protonix.  -Recommend continuing steroids for 8 day course      Anemia  Patient's most recent H&H 6 9 and 21.5, baseline hemoglobin 10-11.  Patient has had consistent melanic stools since starting heparin drip. Patient's blood was typed and crossed and blood transfusion consent was obtained.      Plan:  -Stop heparin/lovenox   - transfuse 1 unit of PRBC      Thank you for the consult, we will continue to follow the patient     Case discussed with MD Krystyna Chambers MD   [facility]  07/27/2020 10:07 AM

## 2020-07-27 NOTE — PLAN OF CARE
Pt afebrile, VS stable, NSR. Heparin stopped. HFNC 2L. Aax4. Complained of abdominal pain/cramping PRN meds given. Complains of arm pain; extremity elevated. 75cc output from Flexiseal. Urinary output adequate. Safety maintained.

## 2020-07-27 NOTE — PLAN OF CARE
Problem: Physical Therapy Goal  Goal: Physical Therapy Goal  Description: Goals to be met by: 20     Patient will increase functional independence with mobility by performin. Supine <> sit with MInimal Assistance  2. Sit to stand transfer with Minimal Assistance  3. Bed to chair transfer with Minimal Assistance   4. Assess gait when able  5. Lower extremity exercise program x 10 reps per handout, with supervision    Outcome: Ongoing, Progressing     PT evaluation completed, note to follow. Pt only cleared for bed level eval per nurse this date 2/2 pt having to be placed on non re-breather recently and SaO2 decreasing as low as 86% while speaking with therapists and moving in bed. Recommendations ongoing pending pt's progress.

## 2020-07-27 NOTE — PLAN OF CARE
VN cued into pt's room for introduction with pt's permission.  VN role explained and informed pt that VN would be working with bedside nurse and the rest of the care team.  Fall risk and bed alarm protocol education provided.  Instructed pt to call for assistance and agreeable.  Allowed time for questions.call light in reach. Pt denies pain and sob. NRB noted , 94% on cont. Pulse ox. Pt instructed on Q 2 hour rounds. Verbalized understanding.  NAD noted.  Will cont to be available as needed.

## 2020-07-27 NOTE — ASSESSMENT & PLAN NOTE
Patient's most recent H&H 6 9 and 21.5, baseline hemoglobin 10-11.  Patient has had consistent melanic stools since starting heparin drip. Patient's blood was typed and crossed and blood transfusion consent was obtained.     Plan:  - discontinued heparin drip (7/26) and discontinue prophylactic heparin. Will continue to monitor and once H&H stable will continue prophylactic dose of heparin.  - transfuse 1 unit of PRBC  - continue monitor H&H with daily CBC.  - trend stool output and consistency.

## 2020-07-27 NOTE — NURSING TRANSFER
Nursing Transfer Note      7/27/2020     Transfer To: [id number]    Transfer via bed    Transfer with cardiac monitoring, O2    Transported by Transport and ICU RN    Chart send with patient: Yes    Notified: giselle Peacock RN in room upon my arrival    Upon arrival to floor: cardiac monitor applied, patient oriented to room, call bell in reach and bed in lowest position

## 2020-07-27 NOTE — PLAN OF CARE
VN Q2 hour round:VN cued into patients room.Patient denies any needs at this time. Pt on NRB, cont pulse on 95%. No c/o sob. Call light in reach. Will continue to monitor.

## 2020-07-27 NOTE — PLAN OF CARE
"Pt arrived to unit on 2L NC. Bipap at bedside. Pt reading 69% on 2L NC. Double checked sats with different Dinamap. Pt denies SOB and shows no sign of distress. Reading stayed the same. Pt upped to 5L NC satting high 70s-low 80s. RT at bedside, brought in Venti mask 15L @50% FiO2; pt only went as high as 88%. Pt placed on nonrebreather, RR 24-28. Satting at 91% at rest. Notified Lexa [facility]. Stated "If his sats start to go down even on the Nonrebreather, call me and get an ABG. If his stats start going back up, wean him off." Will continue to monitor pt.   "

## 2020-07-27 NOTE — ASSESSMENT & PLAN NOTE
P/w consistent melanotic diarrhea, C diff toxin positive.  Still producing watery diarrhea but diminished overnight.    Plan:  - ID following  - p.o. vancomycin and IV metronidazole  - continue IV Protonix.

## 2020-07-27 NOTE — PROGRESS NOTES
[facility] ICU 5th Floor  Hospital Medicine  Progress Note    Patient Name: Rajan Deluna  MRN: [id number]  Patient Class: IP- Inpatient   Admission Date: 7/21/2020  Length of Stay: 6 days  Attending Physician: Rajan Rodríguez MD  Primary Care Provider: Jason Mccord MD        Subjective:     Principal Problem:Acute respiratory disease due to COVID-19 virus        HPI:  Patient is a 74-year-old male with a past medical history of hypertension, COPD, MAC infection who presented to the ED with altered mental status and fever.  As per the family, patient has had altered mental status for the past 3-4 days. Family endorses increased somnolence as well as urinating on himself. EMS was called two days prior to presentation but reportedly deemed that patient did not need to be brought in. Over the previous day, reportedly the patient was found stuck, staring in his sink, not responding. On the day of presentation, the patient was found down on the floor, family wasn't able to get patient up from the floor and was reportedly not responding as much as baseline. Family believed that patient may not have been wearing baseline 2L O2. Patient also endorses a cough and generalized weakness. Patient reportedly had a recent UTI.     In the ED, chest x-ray showed diffuse interstitial patchy infiltrates concerning for possible pneumonia and a COVID-19 screening test was positive. Fever was 101° F and patient was hypotensive 80s/40s. Patient given 30cc/kg bolus. BP very soft on presentation, adequately responded to volume resuscitation and then decreased again. Labs showed a BUN 45, creatinine 2.9, , , troponin 0.036, procalcitonin 1.13.     Overview/Hospital Course:  No notes on file    Interval History: NOEON. Vitals signs stable.  Patient currently satting 100% on 2 L via nasal cannula.  Patient was transitioned off of BiPAP yesterday.  Heparin drip was also discontinued due to drops in H&H,  current levels 6.9 and 21.5.  Patient was typed and cross and blood consent was obtained. Patient denies shortness of breath and is tolerating his thin diet.    Review of Systems   Respiratory: Negative for shortness of breath.    Cardiovascular: Negative for chest pain.   Gastrointestinal: Positive for diarrhea (melanic). Negative for abdominal pain and nausea.   Psychiatric/Behavioral: The patient is not nervous/anxious.      Objective:     Vital Signs (Most Recent):  Temp: 99.2 °F (37.3 °C) (07/27/20 0815)  Pulse: 90 (07/27/20 0830)  Resp: (!) 28 (07/27/20 0830)  BP: (!) 110/55 (07/27/20 0800)  SpO2: 100 % (07/27/20 0830) Vital Signs (24h Range):  Temp:  [97.8 °F (36.6 °C)-99.2 °F (37.3 °C)] 99.2 °F (37.3 °C)  Pulse:  [] 90  Resp:  [14-48] 28  SpO2:  [88 %-100 %] 100 %  BP: (100-129)/(51-88) 110/55     Weight: 83 kg (182 lb 15.7 oz)  Body mass index is 25.52 kg/m².    Intake/Output Summary (Last 24 hours) at 7/27/2020 0840  Last data filed at 7/27/2020 0600  Gross per 24 hour   Intake 2017.87 ml   Output 2050 ml   Net -32.13 ml      Physical Exam  Vitals signs and nursing note reviewed.   Constitutional:       General: He is not in acute distress.     Comments: Patient is resting comfortably in bed in no acute distress.  Answers questions appropriately.   HENT:      Head: Normocephalic and atraumatic.   Eyes:      Conjunctiva/sclera: Conjunctivae normal.   Cardiovascular:      Rate and Rhythm: Normal rate and regular rhythm.      Comments: As seen on monitor, patient appears to have sinus rhythm  Pulmonary:      Effort: Pulmonary effort is normal.      Comments: Patient satting at 100% on 2 L via nasal cannula as seen by the monitor.   Abdominal:      General: There is no distension.   Skin:     Coloration: Skin is not jaundiced.   Neurological:      Mental Status: He is alert.         Significant Labs:   A1C:   Recent Labs   Lab 07/23/20  0403   HGBA1C 6.3*     Bilirubin:   Recent Labs   Lab 07/23/20  3341  07/24/20  0322 07/25/20  0311 07/26/20  0441 07/27/20  0520   BILITOT 0.6 0.5 0.6 0.5 0.6     BMP:   Recent Labs   Lab 07/27/20  0520         K 3.9      CO2 22*   BUN 31*   CREATININE 1.4   CALCIUM 7.8*   MG 2.1     CBC:   Recent Labs   Lab 07/26/20  0441 07/27/20  0520   WBC 13.72* 16.40*   HGB 7.3* 6.9*   HCT 22.9* 21.5*    340     CMP:   Recent Labs   Lab 07/26/20  0441 07/27/20  0520    139   K 4.0 3.9    110   CO2 22* 22*   * 109   BUN 45* 31*   CREATININE 1.5* 1.4   CALCIUM 7.8* 7.8*   PROT 5.5* 5.2*   ALBUMIN 2.2* 2.2*   BILITOT 0.5 0.6   ALKPHOS 63 61   AST 85* 49*   * 103*   ANIONGAP 8 7*   EGFRNONAA 45* 48*     Coagulation:   Recent Labs   Lab 07/27/20  0519   APTT 25.7     Magnesium:   Recent Labs   Lab 07/26/20  0441 07/27/20  0520   MG 2.2 2.1     TSH:   Recent Labs   Lab 07/23/20  0403   TSH 0.604       All pertinent labs within the past 24 hours have been reviewed.    Significant Imaging: I have reviewed all pertinent imaging results/findings within the past 24 hours.      Assessment/Plan:      * Acute respiratory disease due to COVID-19 virus  Admitted w/ AMS and hypovolemic shock.  Patient currently satting at 100% at 2 L of supplemental oxygen via nasal cannula.   STABLE    Plan:   - COVID Isolation per protocol   - continue dexamethasone 6 mg, currently on day 6   - discontinue heparin gtt yesterday (7/27) and discontinue prophylactic heparin. Will continue to monitor and once H&H stable will continue prophylactic dose of heparin   - continue Remdesivir, monitoring renal fxn which is improving, ID following    - Atorvastatin 40mg daily   - Multivitamin    - continue to monitor respiratory status.        MARQUIS (acute kidney injury)  Presented with Acute Kidney Injury 2/2 to hypovolemic shock, pre renal   Status improving  Cr 1.4 today BUN 31    Plan:  - cont to encourage oral fluid intake.  - Continue to monitor renal function  - Avoid nephrotoxic  medications      COPD (chronic obstructive pulmonary disease)  - albuterol inhaler 2 puff Q4H  - MDI    Clostridium difficile infection  P/w consistent melanotic diarrhea, C diff toxin positive.  Still producing watery diarrhea but diminished overnight.    Plan:  - ID following  - p.o. vancomycin and IV metronidazole  - continue IV Protonix.    Anemia  Patient's most recent H&H 6 9 and 21.5, baseline hemoglobin 10-11.  Patient has had consistent melanic stools since starting heparin drip. Patient's blood was typed and crossed and blood transfusion consent was obtained.     Plan:  - discontinued heparin drip (7/26) and discontinue prophylactic heparin. Will continue to monitor and once H&H stable will continue prophylactic dose of heparin.  - transfuse 1 unit of PRBC  - continue monitor H&H with daily CBC.  - trend stool output and consistency.      VTE Risk Mitigation (From admission, onward)         Ordered     Place sequential compression device  Until discontinued      07/21/20 2142     IP VTE HIGH RISK PATIENT  Once      07/21/20 2142                      Olivier Rowe DO PGY-1  [facility]  [facility] ICU 5th Floor

## 2020-07-27 NOTE — PLAN OF CARE
Plan of care reviewed with patient. Patient voiced understanding. NSR on monitor. No acute distress noted. Sats remain 91% at rest. Will hold off on weaning. Side rails x 2, bed in lowest position, call bell within reach, pt advised to call for assistance. Maintain bed alarm for patient safety.

## 2020-07-27 NOTE — PT/OT/SLP EVAL
"Physical Therapy Evaluation and Treatment    Patient Name:  Rajan Deluna   MRN:  [id number]    Recommendations:     Discharge Recommendations:  (TBD)   Discharge Equipment Recommendations: (TBD)   Barriers to discharge: limited activity tolerance    Assessment:     Rajan Deluna is a 74 y.o. male admitted with a medical diagnosis of Acute respiratory disease due to COVID-19 virus.  He presents with the following impairments/functional limitations:  weakness, impaired endurance, impaired self care skills, impaired functional mobilty, gait instability, impaired balance, impaired cognition, decreased coordination, decreased safety awareness, pain, impaired cardiopulmonary response to activity. Pt only cleared for bed level eval per nurse this date 2/2 pt having to be placed on non re-breather recently and SaO2 decreasing as low as 86% while speaking with therapists and moving in bed. Recommendations ongoing pending pt's progress.    Rehab Prognosis: Good; patient would benefit from acute skilled PT services to address these deficits and reach maximum level of function.    Recent Surgery: * No surgery found *      Plan:     During this hospitalization, patient to be seen 6 x/week to address the identified rehab impairments via gait training, therapeutic activities, therapeutic exercises and progress toward the following goals:    · Plan of Care Expires:  08/27/20    Subjective     Chief Complaint: c/o pain with perianal hygiene  Patient/Family Comments/goals: "I am fine without this mask for a minute" -- pt moving non re-breather mask several times during session, provided pt with max education to maintain mask   Pain/Comfort:  · Pain Rating 1: 0/10(no pain at rest but grimacing adn c/o pain with perianal hygiene)  · Pain Rating Post-Intervention 1: 0/10    Patients cultural, spiritual, Caodaism conflicts given the current situation: no    Living Environment:  Pt reports living with his wife, son, and grandson in a 2 " story house with (unsure) ENIO with bedroom/full bath with tub/shower downstairs.   Prior to admission, patients level of function was independent without AD, drives.  Equipment used at home: (unsure as pt is questionable historian).  DME owned (not currently used): unsure.  Upon discharge, patient will have assistance from unknown.    Objective:     Communicated with nurse Peacock prior to session.  Patient found HOB elevated with bed alarm, pulse ox (continuous), oxygen(non re-breather), bowel management system, condom catheter  upon PT entry to room.    General Precautions: Standard, fall, airborne, contact, droplet   Orthopedic Precautions:N/A   Braces: N/A     Exams:  · Cognitive Exam:  Patient is oriented to Person, Place and Time  · Pt requires increased time and repetition of questions prior to answering  · Postural Exam:  Patient presented with the following abnormalities:    · -       unsure as pt only cleared for bed level  · Sensation:    · -       Intact  · Skin Integrity/Edema:      · -       Skin integrity: Visible skin intact  · BLE ROM: WFL  · BLE Strength: WFLs as assessed in supine    Functional Mobility:  · Bed Mobility:     · Rolling Left:  minimum assistance  · Rolling Right: minimum assistance  · Scooting: minimum assistance and of 2 persons    Therapeutic Activities and Exercises:  Pt only cleared for bed level activities this date by nurse Peacock.  SaO2 was 90-92% initially and decreased as low as 86% with speaking and bed mobility.  Pt with bowel management system but was soiled therefore completed bed mobility, rolling R/L to change linen and complete hygiene.  Pt with pain with perianal hygiene.  Scooted up in bed and HOB elevated.  Educated on pursed lip breathing and to keep non re-breather on.    AM-PAC 6 CLICK MOBILITY  Total Score:8     Patient left HOB elevated with all lines intact, call button in reach, bed alarm on and nurse notified.    GOALS:   Multidisciplinary Problems      Physical Therapy Goals        Problem: Physical Therapy Goal    Goal Priority Disciplines Outcome Goal Variances Interventions   Physical Therapy Goal     PT, PT/OT Ongoing, Progressing     Description: Goals to be met by: 20     Patient will increase functional independence with mobility by performin. Supine <> sit with MInimal Assistance  2. Sit to stand transfer with Minimal Assistance  3. Bed to chair transfer with Minimal Assistance   4. Assess gait when able  5. Lower extremity exercise program x 10 reps per handout, with supervision                     History:     Past Medical History:   Diagnosis Date    Arthritis     COPD (chronic obstructive pulmonary disease)     Hypertension     Smoker     Weakness        Past Surgical History:   Procedure Laterality Date    HERNIA REPAIR      right knee surgery         Time Tracking:     PT Received On: 20  PT Start Time: 1300     PT Stop Time: 1340  PT Total Time (min): 40 min with OT    Billable Minutes: Evaluation 10 and Therapeutic Activity 15      Lorie Johnson, PT  2020

## 2020-07-27 NOTE — PROGRESS NOTES
[facility] Progress Note    Assessment/Plan:     1. Severe C diff  Patient with profuse diarrhea prompting C diff testing. Antigen positive, toxin negative; however, toxin PCR positive. KUB with no evidence of toxic megacolon.  - Continue treatment of severe C diff with PO vancomycin and IV metronidazole, recommend 10 day course.      2. Pneumonia due to COVID-19  - Continue dexamethasone 6 mg qD for a 10 day course if patient tolerates.   - Continue remdesivir, monitor kidney/liver function closely.      3. History of MAC infection  4. History of Aspergillosis  Patient with history of structural lung disease c/b MAC infection, and was recently treated with itraconazole for Aspergillosis.  - Serum aspergillus antigen pending.   - Will follow up fungal/AFB blood and respiratory cultures. No recommendations for treatment of MAC or aspergillosis at this time. However, we do recognized that patient is at high risk for flare of aspergillosis and/or MAC in this setting.   - Appreciated Pulmonology's recommendations as well.    Stewart Moura MD  [facility], PGY-4    Thank you for allowing us to participate in the care of this patient. We will continue to follow along. Case has been discussed with consult staff, who is in agreement with assessment and plan.     Subjective:      Rajan Deluna is a 74 y.o. male who is being followed by the [facility] service at [facility] for Pneumonia 2/2 COVID-19 infection, severe C diff, h/o MAC and aspergillosis.     Afebrile overnight. Now on home O2, 2L. Stepped out of the ICU.        Objective:   Last 24 Hour Vital Signs:  BP  Min: 100/55  Max: 129/60  Temp  Av.8 °F (37.1 °C)  Min: 97.8 °F (36.6 °C)  Max: 99.2 °F (37.3 °C)  Pulse  Av.2  Min: 68  Max: 97  Resp  Av.3  Min: 14  Max: 48  SpO2  Av.4 %  Min: 88 %  Max: 100 %  I/O last 3 completed shifts:  In: 4516.7 [P.O.:2980; I.V.:396.7; NG/GT:40; IV  Piggyback:1100]  Out: 4045 [Urine:3120; Stool:925]    Physical Exam  Limited by Telemedicine  Gen: Ill-appearing elderly gentleman.  Pulm: On 5L per NC, SpO2 91%  CV: RRR    Laboratory:  Laboratory Data Reviewed: yes  Pertinent Findings:  Recent Labs   Lab 07/25/20  0311 07/26/20  0441 07/27/20  0520   WBC 14.52* 13.72* 16.40*   HGB 8.9* 7.3* 6.9*   HCT 28.3* 22.9* 21.5*    301 340   MCV 91 91 90   RDW 17.0* 16.7* 16.8*   * 139 139   K 4.4 4.0 3.9   * 109 110   CO2 26 22* 22*   BUN 68* 45* 31*   CREATININE 1.9* 1.5* 1.4   * 126* 109   PROT 6.6 5.5* 5.2*   ALBUMIN 2.5* 2.2* 2.2*   BILITOT 0.6 0.5 0.6   * 85* 49*   ALKPHOS 76 63 61   * 133* 103*         Microbiology Data Reviewed: yes  Pertinent Findings:  Blood cultures 7/21 NGTD  C diff antigen +, toxin - (7/23) toxin PCR + (7/24)  AFB and fungal blood and respiratory cultures pending    Other Results:  Radiology Data Reviewed: yes  Pertinent Findings:  KUB with no evidence of toxic megacolon.    Current Medications:     Infusions:       Scheduled:   albuterol  2 puff Inhalation Q4H    dexamethasone  6 mg Intravenous Q24H    lidocaine  1 patch Transdermal Q24H    metronidazole  500 mg Intravenous Q8H    multivitamin  1 tablet Oral Daily    pantoprazole  40 mg Intravenous BID    EUA remdesivir infusion (NO CHARGE)  100 mg Intravenous Q24H    vancomycin  125 mg Oral Q6H        PRN:  sodium chloride, acetaminophen, haloperidol lactate, ondansetron, pneumoc 13-loy conj-dip cr(PF), simethicone, sodium chloride 0.9%    Antibiotics and Day Number of Therapy:  PO levofloxacin 7/22-7/24  PO vancomycin 7/24- current  IV metronidazole 7/24 - current    Lines and Day Number of Therapy:  Trejo 7/23- current

## 2020-07-27 NOTE — PLAN OF CARE
Problem: Occupational Therapy Goal  Goal: Occupational Therapy Goal  Description: Goals to be met by: 8/27/20     Patient will increase functional independence with ADLs by performing:    LE Dressing with Stand-by Assistance.  Grooming while standing with Stand-by Assistance.  Toileting from toilet with Stand-by Assistance for hygiene and clothing management.   Supine to sit with Stand-by Assistance.  Step transfer with Stand-by Assistance  Toilet transfer to toilet with Stand-by Assistance.  Upper extremity exercise program x10 reps per handout, with independence.    Outcome: Ongoing, Progressing       Pt would benefit from cont OT services in order to maximize functional independence. Recommendations ongoing at this time pending further progress in therapy. Pt currently on NRB and desats to 86% with bed level axs. Will progress pt as able.

## 2020-07-27 NOTE — PLAN OF CARE
Pt turned to left side to change sheet, started to co of sudden pain in left deltoid area, not shoulder, upper arm, can not describe it, aches, tight, denies chest pain, no sob, pulse 72 regular, sinus, bp 110 sys, states it is in arm, not heart, lidocaine patch switched from shoulder to arm, gave tylenol and warm compress, stated the compress felt good, and it is helping

## 2020-07-27 NOTE — PT/OT/SLP PROGRESS
"Speech Language Pathology Treatment    Patient Name:  Rajan Deluna   MRN:  [id number]  Admitting Diagnosis: Acute respiratory disease due to COVID-19 virus    Recommendations:                 General Recommendations:  Dysphagia therapy and F/u with meal  Diet recommendations:  Regular, Liquid Diet Level: Thin   Aspiration Precautions: monitor pulse oxygen saturation level during meals and d/c meal if satting drops below 85 per RN recommendation, 1 bite/sip at a time, HOB to 90 degrees, Remain upright 30 minutes post meal, Small bites/sips, Standard aspiration precautions and Wear oxygen during intake   General Precautions: Standard, airborne, contact, fall, droplet, respiratory  Communication strategies:  repeat information as needed    Subjective     Pt seen at the bedside for diet f/u. SLP did check w/ RN, Madonna, prior to visit. Pt awake/alert and agreeable to ST visit.   Patient goals: "Can you lift this up some?" re: bed.      Pain/Comfort:  · Pain Rating 1: 0/10    Objective:     Has the patient been evaluated by SLP for swallowing?   Yes  Keep patient NPO? No   Current Respiratory Status: non-rebreather mask      SLP attempted to see pt earlier this date though pt's O2 sats had dropped to 70s on NC. Pt then placed on NRB mask. Continuous pulse ox in place. Pt's HOB elevated to 90*. RN reported pt only consumed ice cream from lunch tray. PPE donned prior to room entry. Pt agreeable to PO trials. Assistance needed with feeding 2/2 mask in place. Pt tolerated tsp bites diced peaches x4, 1 inch bite bill cracker x3, and straw sips lemonade x4. Pt demonstrated good mastication, timely oral transit of bolus, no oral residue s/p swallow, timely swallow initiation, good hyolaryngeal lift/excursion, and no overt s/s of aspiration. Mild throat clearing noted x2. Extensive education provided to pt re: need for NRB to remain in place and only be removed while placing eating utensil in mouth, affect of current respiratory " "illness on swallow function, and aspiration risks. Pt verbalized very minimal understanding of taught material as he stated, "Oh no my lungs are very strong!" SLP reinforced swallow precautions/coordinating breath:swallow ratio. Time allowed for questions which were all answered within SLP scope. Pt left upright with call light in reach. TV turned onto requested channel. Following session, SLP reviewed results of ongoing swallow assessment with RN.     Assessment:     Rajan Deluna is a 74 y.o. male with an SLP diagnosis of Dysphagia.  He presents with good tolerance of current diet though reduced safety awareness and impaired coordination of breath:swallow ratio. Pt to continue on a Regular/Thin liquid diet; 1:1 assistance needed with meals to ensure swallow safety. SLP to f/u with meal.     Goals:   Multidisciplinary Problems     SLP Goals        Problem: SLP Goal    Goal Priority Disciplines Outcome   SLP Goal     SLP Ongoing, Progressing   Description: 1. Pt will participate in ongoing BSE to determine safest and least restrictive diet.   2. Pt will safely consume approximately 50% of his diet without any s/s of aspiration or upper airway threat.   3. Pt will participate in cognitive-linguistic evaluation to determine if pt is at baseline.                    Plan:     · Patient to be seen:  3 x/week   · Plan of Care expires:   8/23/2020  · Plan of Care reviewed with:  patient(RN)   · SLP Follow-Up:  Yes       Discharge recommendations:   TBD pending progress   Barriers to Discharge:  None    Time Tracking:     SLP Treatment Date:   07/27/20  Speech Start Time:  1430  Speech Stop Time:  1459     Speech Total Time (min):  29 min    Billable Minutes: Treatment Swallowing Dysfunction 14 and Seld Care/Home Management Training 15    Maranda Blake CCC-SLP  07/27/2020  "

## 2020-07-27 NOTE — PLAN OF CARE
Problem: SLP Goal  Goal: SLP Goal  Description: 1. Pt will participate in ongoing BSE to determine safest and least restrictive diet.   2. Pt will safely consume approximately 50% of his diet without any s/s of aspiration or upper airway threat.   3. Pt will participate in cognitive-linguistic evaluation to determine if pt is at baseline.   Outcome: Ongoing, Progressing   Pt seen for ongoing swallow assessment. He tolerated soft solids, hard solids, and thin liquids without any overt s/s of aspiration. Mild throat clearing noted. Further education needed re: safe swallow precautions. SLP to f/u.

## 2020-07-27 NOTE — PT/OT/SLP EVAL
Occupational Therapy   Evaluation    Name: Rajan Deluna  MRN: [id number]  Admitting Diagnosis:  Acute respiratory disease due to COVID-19 virus      Recommendations:     Discharge Recommendations: (TBD)  Discharge Equipment Recommendations:  (TBD)  Barriers to discharge:  Inaccessible home environment, Decreased caregiver support    Assessment:     Rajan Deluna is a 74 y.o. male with a medical diagnosis of Acute respiratory disease due to COVID-19 virus.  He presents with SOB, labored breathing, impaired respiratory status . Performance deficits affecting function: weakness, impaired endurance, impaired cognition, decreased coordination, decreased ROM, impaired self care skills, impaired functional mobilty, pain, decreased safety awareness, impaired cardiopulmonary response to activity.      Pt would benefit from cont OT services in order to maximize functional independence. Recommendations ongoing at this time pending further progress in therapy. Pt currently on NRB and desats to 86% with bed level axs. Will progress pt as able.     Rehab Prognosis: Good; patient would benefit from acute skilled OT services to address these deficits and reach maximum level of function.       Plan:     Patient to be seen 5 x/week to address the above listed problems via self-care/home management, therapeutic exercises, therapeutic activities  · Plan of Care Expires: 08/27/20  · Plan of Care Reviewed with: patient    Subjective     Chief Complaint: pt stating he would like to be readjusted in bed; states face mask is uncomfortable   Patient/Family Comments/goals: none stated     Occupational Profile:  Living Environment: reports with wife, son and grandson in 2 story home, steps to enter, bed and t/s combo downstairs   Previous level of function: pt questionable historian regarding use of equipment; states he was independent and that he has RW, but his wife uses DME  Equipment Used at Home:  (pt questionable historian at this  time.)  Assistance upon Discharge: from family     Pain/Comfort:  · Pain Rating 1: 0/10(stated at rest; pt grimacing with perianal hygiene)    Patients cultural, spiritual, Methodist conflicts given the current situation:      Objective:     Communicated with: willow prior to session.   General Precautions: Standard, fall   Orthopedic Precautions:N/A   Braces: N/A     Occupational Performance:    Bed Mobility:    · Patient completed Rolling/Turning to Left with  minimum assistance  · Patient completed Rolling/Turning to Right with minimum assistance  · Patient completed Scooting/Bridging with minimum assistance    Functional Mobility/Transfers:  · N/A     Activities of Daily Living:  · Toileting: total assistance      Cognitive/Visual Perceptual:  Cognitive/Psychosocial Skills:     -       Oriented to: Person and Place   -       Follows Commands/attention:Follows multistep  commands  -       Communication: difficult to understand 2/2 non rebreather   -       Memory: Impaired LTM  -       Safety awareness/insight to disability: impaired   -       Mood/Affect/Coping skills/emotional control: Appropriate to situation    Physical Exam:  Balance:    -       supine eval   Upper Extremity Range of Motion:   BUE WFL for pt's needs   Upper Extremity Strength:  Grossly 4-/5 BUE    Strength:  Good     AMPAC 6 Click ADL:  AMPAC Total Score: 15    Treatment & Education:  Pt found supine; O2 sats in low 90's on NRB; dropping to 86% with speaking to therapists; pt continuously trying to take mask off throughout session; educated on impt of keeping mask donned   Pt soiled in bowel; bed mobility rolling and scooting x multiple trials for hygiene and changing of linens; rest breaks required for breathing techniques and to raise saturations   Rolling Min A as well as scooting to HOB  Pt repositioned with pillow wedge under L side for comfort   Education:    Patient left supine with all lines intact, call button in reach, bed alarm  on and nsg notified    GOALS:   Multidisciplinary Problems     Occupational Therapy Goals        Problem: Occupational Therapy Goal    Goal Priority Disciplines Outcome Interventions   Occupational Therapy Goal     OT, PT/OT Ongoing, Progressing    Description: Goals to be met by: 8/27/20     Patient will increase functional independence with ADLs by performing:    LE Dressing with Stand-by Assistance.  Grooming while standing with Stand-by Assistance.  Toileting from toilet with Stand-by Assistance for hygiene and clothing management.   Supine to sit with Stand-by Assistance.  Step transfer with Stand-by Assistance  Toilet transfer to toilet with Stand-by Assistance.  Upper extremity exercise program x10 reps per handout, with independence.                     History:     Past Medical History:   Diagnosis Date    Arthritis     COPD (chronic obstructive pulmonary disease)     Hypertension     Smoker     Weakness        Past Surgical History:   Procedure Laterality Date    HERNIA REPAIR      right knee surgery         Time Tracking:     OT Date of Treatment: 07/27/20  OT Start Time: 1301  OT Stop Time: 1340  OT Total Time (min): 39 min    Billable Minutes:Evaluation 10  Self Care/Home Management 13 co treatment with PT       Madonna Flores OT  7/27/2020

## 2020-07-27 NOTE — SUBJECTIVE & OBJECTIVE
Interval History: NOEON. Vitals signs stable.  Patient currently satting 100% on 2 L via nasal cannula.  Patient was transitioned off of BiPAP yesterday.  Heparin drip was also discontinued due to drops in H&H, current levels 6.9 and 21.5.  Patient was typed and cross and blood consent was obtained. Patient denies shortness of breath and is tolerating his thin diet.    Review of Systems   Respiratory: Negative for shortness of breath.    Cardiovascular: Negative for chest pain.   Gastrointestinal: Positive for diarrhea (melanic). Negative for abdominal pain and nausea.   Psychiatric/Behavioral: The patient is not nervous/anxious.      Objective:     Vital Signs (Most Recent):  Temp: 99.2 °F (37.3 °C) (07/27/20 0815)  Pulse: 90 (07/27/20 0830)  Resp: (!) 28 (07/27/20 0830)  BP: (!) 110/55 (07/27/20 0800)  SpO2: 100 % (07/27/20 0830) Vital Signs (24h Range):  Temp:  [97.8 °F (36.6 °C)-99.2 °F (37.3 °C)] 99.2 °F (37.3 °C)  Pulse:  [] 90  Resp:  [14-48] 28  SpO2:  [88 %-100 %] 100 %  BP: (100-129)/(51-88) 110/55     Weight: 83 kg (182 lb 15.7 oz)  Body mass index is 25.52 kg/m².    Intake/Output Summary (Last 24 hours) at 7/27/2020 0840  Last data filed at 7/27/2020 0600  Gross per 24 hour   Intake 2017.87 ml   Output 2050 ml   Net -32.13 ml      Physical Exam  Vitals signs and nursing note reviewed.   Constitutional:       General: He is not in acute distress.     Comments: Patient is resting comfortably in bed in no acute distress.  Answers questions appropriately.   HENT:      Head: Normocephalic and atraumatic.   Eyes:      Conjunctiva/sclera: Conjunctivae normal.   Cardiovascular:      Rate and Rhythm: Normal rate and regular rhythm.      Comments: As seen on monitor, patient appears to have sinus rhythm  Pulmonary:      Effort: Pulmonary effort is normal.      Comments: Patient satting at 100% on 2 L via nasal cannula as seen by the monitor.   Abdominal:      General: There is no distension.   Skin:      Coloration: Skin is not jaundiced.   Neurological:      Mental Status: He is alert.         Significant Labs:   A1C:   Recent Labs   Lab 07/23/20  0403   HGBA1C 6.3*     Bilirubin:   Recent Labs   Lab 07/23/20  0403 07/24/20  0322 07/25/20  0311 07/26/20 0441 07/27/20  0520   BILITOT 0.6 0.5 0.6 0.5 0.6     BMP:   Recent Labs   Lab 07/27/20  0520         K 3.9      CO2 22*   BUN 31*   CREATININE 1.4   CALCIUM 7.8*   MG 2.1     CBC:   Recent Labs   Lab 07/26/20 0441 07/27/20  0520   WBC 13.72* 16.40*   HGB 7.3* 6.9*   HCT 22.9* 21.5*    340     CMP:   Recent Labs   Lab 07/26/20 0441 07/27/20  0520    139   K 4.0 3.9    110   CO2 22* 22*   * 109   BUN 45* 31*   CREATININE 1.5* 1.4   CALCIUM 7.8* 7.8*   PROT 5.5* 5.2*   ALBUMIN 2.2* 2.2*   BILITOT 0.5 0.6   ALKPHOS 63 61   AST 85* 49*   * 103*   ANIONGAP 8 7*   EGFRNONAA 45* 48*     Coagulation:   Recent Labs   Lab 07/27/20  0519   APTT 25.7     Magnesium:   Recent Labs   Lab 07/26/20 0441 07/27/20  0520   MG 2.2 2.1     TSH:   Recent Labs   Lab 07/23/20  0403   TSH 0.604       All pertinent labs within the past 24 hours have been reviewed.    Significant Imaging: I have reviewed all pertinent imaging results/findings within the past 24 hours.

## 2020-07-27 NOTE — PROGRESS NOTES
Helped transport patient from icu to [facility] on 4th floor. Patient was stable, no signs of distress. O2 sat was 99 on 2L nasal cannula. Once I left patient desatted. He is currently on 15L non rebreather sat 92.

## 2020-07-27 NOTE — ASSESSMENT & PLAN NOTE
Presented with Acute Kidney Injury 2/2 to hypovolemic shock, pre renal   Status improving  Cr 1.4 today BUN 31    Plan:  - cont to encourage oral fluid intake.  - Continue to monitor renal function  - Avoid nephrotoxic medications

## 2020-07-27 NOTE — PLAN OF CARE
Pt weaned to 3l high flow cannula, less sob, with exertion he said, to continue to wean as maricarmen, to inc activity as maricarmen

## 2020-07-28 LAB
ALBUMIN SERPL BCP-MCNC: 2.6 G/DL (ref 3.5–5.2)
ALP SERPL-CCNC: 77 U/L (ref 55–135)
ALT SERPL W/O P-5'-P-CCNC: 101 U/L (ref 10–44)
ANION GAP SERPL CALC-SCNC: 7 MMOL/L (ref 8–16)
APTT BLDCRRT: 26.5 SEC (ref 21–32)
AST SERPL-CCNC: 52 U/L (ref 10–40)
BASOPHILS # BLD AUTO: 0.04 K/UL (ref 0–0.2)
BASOPHILS NFR BLD: 0.2 % (ref 0–1.9)
BILIRUB SERPL-MCNC: 0.8 MG/DL (ref 0.1–1)
BUN SERPL-MCNC: 26 MG/DL (ref 8–23)
CALCIUM SERPL-MCNC: 8.2 MG/DL (ref 8.7–10.5)
CHLORIDE SERPL-SCNC: 108 MMOL/L (ref 95–110)
CO2 SERPL-SCNC: 21 MMOL/L (ref 23–29)
CREAT SERPL-MCNC: 1.4 MG/DL (ref 0.5–1.4)
DIFFERENTIAL METHOD: ABNORMAL
EOSINOPHIL # BLD AUTO: 0 K/UL (ref 0–0.5)
EOSINOPHIL NFR BLD: 0.1 % (ref 0–8)
ERYTHROCYTE [DISTWIDTH] IN BLOOD BY AUTOMATED COUNT: 17.4 % (ref 11.5–14.5)
EST. GFR  (AFRICAN AMERICAN): 56 ML/MIN/1.73 M^2
EST. GFR  (NON AFRICAN AMERICAN): 48 ML/MIN/1.73 M^2
GALACTOMANNAN AG SERPL IA-ACNC: <0.5 INDEX
GLUCOSE SERPL-MCNC: 203 MG/DL (ref 70–110)
HCT VFR BLD AUTO: 27.6 % (ref 40–54)
HCT VFR BLD AUTO: 30.3 % (ref 40–54)
HGB BLD-MCNC: 8.9 G/DL (ref 14–18)
HGB BLD-MCNC: 9.7 G/DL (ref 14–18)
IMM GRANULOCYTES # BLD AUTO: 0.92 K/UL (ref 0–0.04)
IMM GRANULOCYTES NFR BLD AUTO: 4.6 % (ref 0–0.5)
LYMPHOCYTES # BLD AUTO: 1 K/UL (ref 1–4.8)
LYMPHOCYTES NFR BLD: 4.8 % (ref 18–48)
MAGNESIUM SERPL-MCNC: 1.9 MG/DL (ref 1.6–2.6)
MCH RBC QN AUTO: 29.9 PG (ref 27–31)
MCHC RBC AUTO-ENTMCNC: 32 G/DL (ref 32–36)
MCV RBC AUTO: 94 FL (ref 82–98)
MONOCYTES # BLD AUTO: 1 K/UL (ref 0.3–1)
MONOCYTES NFR BLD: 5.2 % (ref 4–15)
NEUTROPHILS # BLD AUTO: 16.9 K/UL (ref 1.8–7.7)
NEUTROPHILS NFR BLD: 85.1 % (ref 38–73)
NRBC BLD-RTO: 3 /100 WBC
PHOSPHATE SERPL-MCNC: 2.5 MG/DL (ref 2.7–4.5)
PLATELET # BLD AUTO: 320 K/UL (ref 150–350)
PMV BLD AUTO: 10 FL (ref 9.2–12.9)
POTASSIUM SERPL-SCNC: 4.4 MMOL/L (ref 3.5–5.1)
PROT SERPL-MCNC: 6 G/DL (ref 6–8.4)
RBC # BLD AUTO: 3.24 M/UL (ref 4.6–6.2)
SODIUM SERPL-SCNC: 136 MMOL/L (ref 136–145)
WBC # BLD AUTO: 19.85 K/UL (ref 3.9–12.7)

## 2020-07-28 PROCEDURE — 99900035 HC TECH TIME PER 15 MIN (STAT)

## 2020-07-28 PROCEDURE — 25000003 PHARM REV CODE 250

## 2020-07-28 PROCEDURE — 11000001 HC ACUTE MED/SURG PRIVATE ROOM

## 2020-07-28 PROCEDURE — 85025 COMPLETE CBC W/AUTO DIFF WBC: CPT

## 2020-07-28 PROCEDURE — 85018 HEMOGLOBIN: CPT

## 2020-07-28 PROCEDURE — 25000003 PHARM REV CODE 250: Performed by: STUDENT IN AN ORGANIZED HEALTH CARE EDUCATION/TRAINING PROGRAM

## 2020-07-28 PROCEDURE — 94640 AIRWAY INHALATION TREATMENT: CPT

## 2020-07-28 PROCEDURE — 85014 HEMATOCRIT: CPT

## 2020-07-28 PROCEDURE — 63600175 PHARM REV CODE 636 W HCPCS: Performed by: INTERNAL MEDICINE

## 2020-07-28 PROCEDURE — 83735 ASSAY OF MAGNESIUM: CPT

## 2020-07-28 PROCEDURE — 63600175 PHARM REV CODE 636 W HCPCS: Performed by: STUDENT IN AN ORGANIZED HEALTH CARE EDUCATION/TRAINING PROGRAM

## 2020-07-28 PROCEDURE — 80053 COMPREHEN METABOLIC PANEL: CPT

## 2020-07-28 PROCEDURE — C9113 INJ PANTOPRAZOLE SODIUM, VIA: HCPCS | Performed by: INTERNAL MEDICINE

## 2020-07-28 PROCEDURE — 97535 SELF CARE MNGMENT TRAINING: CPT

## 2020-07-28 PROCEDURE — 85730 THROMBOPLASTIN TIME PARTIAL: CPT

## 2020-07-28 PROCEDURE — 94761 N-INVAS EAR/PLS OXIMETRY MLT: CPT

## 2020-07-28 PROCEDURE — 94660 CPAP INITIATION&MGMT: CPT

## 2020-07-28 PROCEDURE — 84100 ASSAY OF PHOSPHORUS: CPT

## 2020-07-28 PROCEDURE — 36415 COLL VENOUS BLD VENIPUNCTURE: CPT

## 2020-07-28 PROCEDURE — S0030 INJECTION, METRONIDAZOLE: HCPCS

## 2020-07-28 PROCEDURE — 97530 THERAPEUTIC ACTIVITIES: CPT

## 2020-07-28 RX ORDER — TRAZODONE HYDROCHLORIDE 50 MG/1
50 TABLET ORAL NIGHTLY
Status: DISCONTINUED | OUTPATIENT
Start: 2020-07-28 | End: 2020-07-30

## 2020-07-28 RX ORDER — ALBUTEROL SULFATE 90 UG/1
2 AEROSOL, METERED RESPIRATORY (INHALATION)
Status: DISCONTINUED | OUTPATIENT
Start: 2020-07-28 | End: 2020-07-30

## 2020-07-28 RX ADMIN — LIDOCAINE 1 PATCH: 50 PATCH TOPICAL at 04:07

## 2020-07-28 RX ADMIN — Medication 125 MG: at 11:07

## 2020-07-28 RX ADMIN — DEXAMETHASONE SODIUM PHOSPHATE 6 MG: 4 INJECTION, SOLUTION INTRAMUSCULAR; INTRAVENOUS at 08:07

## 2020-07-28 RX ADMIN — PANTOPRAZOLE SODIUM 40 MG: 40 INJECTION, POWDER, FOR SOLUTION INTRAVENOUS at 08:07

## 2020-07-28 RX ADMIN — TRAZODONE HYDROCHLORIDE 50 MG: 50 TABLET ORAL at 09:07

## 2020-07-28 RX ADMIN — ALBUTEROL SULFATE 2 PUFF: 90 AEROSOL, METERED RESPIRATORY (INHALATION) at 07:07

## 2020-07-28 RX ADMIN — METRONIDAZOLE 500 MG: 500 INJECTION, SOLUTION INTRAVENOUS at 04:07

## 2020-07-28 RX ADMIN — Medication 125 MG: at 05:07

## 2020-07-28 RX ADMIN — ALBUTEROL SULFATE 2 PUFF: 90 AEROSOL, METERED RESPIRATORY (INHALATION) at 11:07

## 2020-07-28 RX ADMIN — ALBUTEROL SULFATE 2 PUFF: 90 AEROSOL, METERED RESPIRATORY (INHALATION) at 08:07

## 2020-07-28 RX ADMIN — METRONIDAZOLE 500 MG: 500 INJECTION, SOLUTION INTRAVENOUS at 01:07

## 2020-07-28 RX ADMIN — SODIUM CHLORIDE 100 MG: 9 INJECTION, SOLUTION INTRAVENOUS at 04:07

## 2020-07-28 RX ADMIN — METRONIDAZOLE 500 MG: 500 INJECTION, SOLUTION INTRAVENOUS at 08:07

## 2020-07-28 RX ADMIN — THERA TABS 1 TABLET: TAB at 08:07

## 2020-07-28 NOTE — ASSESSMENT & PLAN NOTE
Presented with Acute Kidney Injury 2/2 to hypovolemic shock, pre renal   Status improving      Plan:  - cont to encourage oral fluid intake.  - Continue to monitor renal function  - Avoid nephrotoxic medications

## 2020-07-28 NOTE — SUBJECTIVE & OBJECTIVE
Interval History: NAEON. Vital signs have been stable. Patient was on 2 L of supplemental oxygen yesterday all day but required a non-rebreather in the afternoon at 15 LPM. Patient was on BiPAP overnight at a FiO2 of 70%.  Patient currently on 15 L via non-rebreather mask satting at 92%.  Patient also received 1 unit PRBC due to hemoglobin of 6.9. Patient still has watery melanic stools, minimal of 200 cc as per patients nurse. Patient was given one dose of haloperidol due to agitation. Currently has a condom catheter in place.     Review of Systems   Respiratory: Negative for shortness of breath.    Cardiovascular: Negative for chest pain.   Gastrointestinal: Positive for diarrhea (melanic). Negative for abdominal pain and nausea.   Psychiatric/Behavioral: The patient is not nervous/anxious.      Objective:     Vital Signs (Most Recent):  Temp: 97.9 °F (36.6 °C) (07/28/20 0807)  Pulse: 87 (07/28/20 0807)  Resp: 18 (07/28/20 0807)  BP: 116/75 (07/28/20 0807)  SpO2: 96 % (07/28/20 0528) Vital Signs (24h Range):  Temp:  [96.9 °F (36.1 °C)-98.9 °F (37.2 °C)] 97.9 °F (36.6 °C)  Pulse:  [67-92] 87  Resp:  [18-30] 18  SpO2:  [91 %-100 %] 96 %  BP: (106-137)/(55-77) 116/75     Weight: 83 kg (182 lb 15.7 oz)  Body mass index is 25.52 kg/m².    Intake/Output Summary (Last 24 hours) at 7/28/2020 0825  Last data filed at 7/28/2020 0500  Gross per 24 hour   Intake 1606.67 ml   Output 486 ml   Net 1120.67 ml      Physical Exam  Vitals signs and nursing note reviewed.   Constitutional:       General: He is not in acute distress.  HENT:      Head: Normocephalic and atraumatic.   Eyes:      Conjunctiva/sclera: Conjunctivae normal.   Cardiovascular:      Rate and Rhythm: Normal rate and regular rhythm.      Comments: As seen on monitor, patient appears to have sinus rhythm  Pulmonary:      Effort: Pulmonary effort is normal.   Abdominal:      General: There is no distension.   Skin:     Coloration: Skin is not jaundiced.    Neurological:      Mental Status: He is alert.         Significant Labs:   A1C:   Recent Labs   Lab 07/23/20  0403   HGBA1C 6.3*     Bilirubin:   Recent Labs   Lab 07/23/20  0403 07/24/20  0322 07/25/20  0311 07/26/20  0441 07/27/20  0520   BILITOT 0.6 0.5 0.6 0.5 0.6     BMP:   Recent Labs   Lab 07/27/20  0520         K 3.9      CO2 22*   BUN 31*   CREATININE 1.4   CALCIUM 7.8*   MG 2.1     CBC:   Recent Labs   Lab 07/27/20  0520   WBC 16.40*   HGB 6.9*   HCT 21.5*        CMP:   Recent Labs   Lab 07/27/20  0520      K 3.9      CO2 22*      BUN 31*   CREATININE 1.4   CALCIUM 7.8*   PROT 5.2*   ALBUMIN 2.2*   BILITOT 0.6   ALKPHOS 61   AST 49*   *   ANIONGAP 7*   EGFRNONAA 48*     Coagulation:   Recent Labs   Lab 07/27/20  0519   APTT 25.7     Magnesium:   Recent Labs   Lab 07/27/20  0520   MG 2.1     TSH:   Recent Labs   Lab 07/23/20  0403   TSH 0.604     All pertinent labs within the past 24 hours have been reviewed.    Significant Imaging: I have reviewed all pertinent imaging results/findings within the past 24 hours.

## 2020-07-28 NOTE — PLAN OF CARE
Problem: Physical Therapy Goal  Goal: Physical Therapy Goal  Description: Goals to be met by: 20     Patient will increase functional independence with mobility by performin. Supine <> sit with MInimal Assistance  2. Sit to stand transfer with Minimal Assistance  3. Bed to chair transfer with Minimal Assistance   4. Assess gait when able  5. Lower extremity exercise program x 10 reps per handout, with supervision    Outcome: Ongoing, Progressing     Pt with improved activity tolerance this date and was able to tolerate sitting EOB, standing, and side stepping. Pt on 13 L/min through high flow and SaO2 was 88-93% at rest, decreased as low as 82% sitting EOB but improved to 88% with cues for pursed lip breathing, and decreased to 72% following stand and ambulation and increased to 85-88% within 2 minutes. D/C disposition TBD pending pt's progress.

## 2020-07-28 NOTE — PLAN OF CARE
DEEPA Genao gave me a list of SNF's in network with [facility] managed Medicare.  I used Evon to discuss SNF and preferences with pt.  He is [facility] and requested that I call his daughter Susanna.  I called Susanna Deluna [phone] and Tania Mikie [phone] to discuss SNF and preferences.  No answer on either number.  I left a  requesting a call back.  [phone] someone answered and said wrong number.         07/28/20 1446   Post-Acute Status   Post-Acute Authorization Placement     Phong Suh RN,   [phone]

## 2020-07-28 NOTE — ASSESSMENT & PLAN NOTE
Admitted w/ AMS and hypovolemic shock.  Patient was step-down ICU yesterday on 2 L of supplemental oxygen via nasal cannula at, satting at %.  Throughout the day patient require more oxygen and was put on a non-rebreather at 15 L. and patient was put on BiPAP overnight.      Plan:     - COVID Isolation per protocol   - scheduled BiPAP at night   - continue dexamethasone 6 mg, currently on day 7   - discontinue heparin gtt yesterday (7/27) and discontinue prophylactic heparin. Will continue to monitor and once H&H stable will continue prophylactic dose of heparin   - continue Remdesivir, monitoring renal fxn which is improving, ID following    - Atorvastatin 40mg daily   - Multivitamin    - continue to monitor respiratory status.

## 2020-07-28 NOTE — PLAN OF CARE
POC reviewed with patient. Patient AAOx4 and denies any pain. COVID precautions maintained. Remdesivir and IV antibiotics administered. Tele monitor in place reading NSR with 1st degree AVB. No red alarms or ectopy noted. Patient on 13L high flow oxygen via NC. Gastro consult completed. No interventions recommended at this time. Bed alarm on, bed locked and low, side rails up x2, and call bell in reach. Plan is for patient to be discharged to SNF pending clinical improvement.  Patient verbalizes clear understanding of POC.

## 2020-07-28 NOTE — PROGRESS NOTES
[facility] Progress Note  Pulmonology    Admit Date: 7/21/2020   LOS: 7 days     SUBJECTIVE:     No acute events over night. VSS, he is stable on home O2 requirements.     Continuous Infusions:  Scheduled Meds:   albuterol  2 puff Inhalation Q4H While awake    dexamethasone  6 mg Intravenous Q24H    lidocaine  1 patch Transdermal Q24H    metronidazole  500 mg Intravenous Q8H    multivitamin  1 tablet Oral Daily    pantoprazole  40 mg Intravenous BID    EUA remdesivir infusion (NO CHARGE)  100 mg Intravenous Q24H    vancomycin  125 mg Oral Q6H     PRN Meds:sodium chloride, acetaminophen, haloperidol lactate, ondansetron, pneumoc 13-loy conj-dip cr(PF), simethicone, sodium chloride 0.9%    Review of patient's allergies indicates:  No Known Allergies  ROS  Negative other than HPI     OBJECTIVE:     Vital Signs (Most Recent)  Temp: 97.9 °F (36.6 °C) (07/28/20 0807)  Pulse: 87 (07/28/20 0807)  Resp: 18 (07/28/20 0807)  BP: 116/75 (07/28/20 0807)  SpO2: 96 % (07/28/20 0528)    Vital Signs Range (Last 24H):  Temp:  [96.9 °F (36.1 °C)-98.9 °F (37.2 °C)]   Pulse:  [67-92]   Resp:  [18-30]   BP: (106-137)/(55-77)   SpO2:  [91 %-100 %]     Current Diet Order   Procedures    Diet Adult Regular (IDDSI Level 7) Thin     Order Specific Question:   Fluid consistency:     Answer:   Thin      I & O (Last 24H):    Intake/Output Summary (Last 24 hours) at 7/28/2020 0845  Last data filed at 7/28/2020 0500  Gross per 24 hour   Intake 1506.67 ml   Output 486 ml   Net 1020.67 ml     Wt Readings from Last 3 Encounters:   07/26/20 83 kg (182 lb 15.7 oz)   09/22/17 91.9 kg (202 lb 8 oz)   05/22/17 93.7 kg (206 lb 9.6 oz)     Ventilator Data (Last 24H):     Oxygen Concentration (%):  [70] 70    Hemodynamic Parameters (Last 24H):       Physical Exam  Due to COVID, PE was limited to reduce use of PPE   Resting comfortably in bed on bipap   Moves all extremities      Lines/Drains:       Peripheral IV - Single Lumen 07/21/20 1254 20 G  "Right Hand (Active)   Site Assessment Clean;Dry;Intact;No redness;No swelling 07/27/20 0830   Line Status Flushed;Saline locked 07/27/20 0830   Dressing Status Clean;Dry;Intact 07/27/20 0830   Dressing Intervention Integrity maintained 07/26/20 1905   Dressing Change Due 07/25/20 07/26/20 1905   Site Change Due 07/25/20 07/27/20 0830   Reason Not Rotated Poor venous access 07/27/20 0830   Number of days: 5            Peripheral IV - Single Lumen 07/23/20 1506 20 G Anterior;Right Forearm (Active)   Site Assessment Clean;Dry;Intact;No redness;No swelling 07/27/20 0830   Line Status Infusing 07/27/20 0830   Dressing Status Clean;Dry;Intact 07/27/20 0830   Dressing Intervention Integrity maintained 07/26/20 1905   Dressing Change Due 07/27/20 07/26/20 1905   Site Change Due 07/27/20 07/27/20 0830   Reason Not Rotated Poor venous access 07/27/20 0830   Number of days: 3            Rectal Tube 07/23/20 1215 (Active)   Reposition drainage bags for BMS & Trejo on opposite sides of bed 07/26/20 1905   Outcome stool evacuated 07/27/20 0830   Stool Color Black 07/27/20 0830   Insertion Site Appearance no redness, warmth, tenderness, skin breakdown, drainage 07/26/20 1905   Flush/Irrigation flushed w/;water;no resistance met 07/26/20 1630   Intake (mL) 40 mL 07/26/20 1630   Rectal Tube Output 75 mL 07/27/20 0600   Number of days: 3            Urethral Catheter 07/23/20 1212 Straight-tip 16 Fr. (Active)   Site Assessment Clean;Intact 07/27/20 0830   Collection Container Urimeter 07/27/20 0830   Securement Method secured to top of thigh w/ adhesive device 07/27/20 0830   Catheter Care Performed yes 07/27/20 0830   Reason for Continuing Urinary Catheterization Critically ill in ICU requiring intensive monitoring 07/27/20 0830   CAUTI Prevention Bundle StatLock in place w 1" slack;Intact seal between catheter & drainage tubing;Drainage bag/urimeter off the floor;Green sheeting clip in use;No dependent loops or kinks;Drainage " bag/urimeter not overfilled (<2/3 full);Drainage bag/urimeter below bladder 07/27/20 0830   Output (mL) 170 mL 07/27/20 0505   Number of days: 3         LABS  CBC  Recent Labs   Lab 07/23/20  1507 07/24/20  0322 07/25/20  0311 07/26/20  0441 07/27/20  0520   WBC 13.69*  13.69* 11.35 14.52* 13.72* 16.40*   RBC 3.63*  3.63* 3.75* 3.11* 2.52* 2.38*   HGB 10.6*  10.6* 10.8* 8.9* 7.3* 6.9*   HCT 32.5*  32.5* 33.7* 28.3* 22.9* 21.5*     243 275 304 301 340   MCV 90  90 90 91 91 90   MCH 29.2  29.2 28.8 28.6 29.0 29.0   MCHC 32.6  32.6 32.0 31.4* 31.9* 32.1       BMP  Recent Labs   Lab 07/24/20  0322 07/24/20  1240 07/25/20  0311 07/26/20  0441 07/27/20  0520   * 145 146* 139 139   K 5.2* 4.5 4.4 4.0 3.9   CO2 24 26 26 22* 22*    110 111* 109 110   BUN 68* 76* 68* 45* 31*   CREATININE 2.3* 2.2* 1.9* 1.5* 1.4       CMP  Sodium   Date Value Ref Range Status   07/27/2020 139 136 - 145 mmol/L Final     Potassium   Date Value Ref Range Status   07/27/2020 3.9 3.5 - 5.1 mmol/L Final     Chloride   Date Value Ref Range Status   07/27/2020 110 95 - 110 mmol/L Final     CO2   Date Value Ref Range Status   07/27/2020 22 (L) 23 - 29 mmol/L Final     Glucose   Date Value Ref Range Status   07/27/2020 109 70 - 110 mg/dL Final     BUN, Bld   Date Value Ref Range Status   07/27/2020 31 (H) 8 - 23 mg/dL Final     Creatinine   Date Value Ref Range Status   07/27/2020 1.4 0.5 - 1.4 mg/dL Final     Calcium   Date Value Ref Range Status   07/27/2020 7.8 (L) 8.7 - 10.5 mg/dL Final     Total Protein   Date Value Ref Range Status   07/27/2020 5.2 (L) 6.0 - 8.4 g/dL Final     Albumin   Date Value Ref Range Status   07/27/2020 2.2 (L) 3.5 - 5.2 g/dL Final     Total Bilirubin   Date Value Ref Range Status   07/27/2020 0.6 0.1 - 1.0 mg/dL Final     Comment:     For infants and newborns, interpretation of results should be based  on gestational age, weight and in agreement with clinical  observations.  Premature Infant  recommended reference ranges:  Up to 24 hours.............<8.0 mg/dL  Up to 48 hours............<12.0 mg/dL  3-5 days..................<15.0 mg/dL  6-29 days.................<15.0 mg/dL       Alkaline Phosphatase   Date Value Ref Range Status   07/27/2020 61 55 - 135 U/L Final     AST   Date Value Ref Range Status   07/27/2020 49 (H) 10 - 40 U/L Final     ALT   Date Value Ref Range Status   07/27/2020 103 (H) 10 - 44 U/L Final     Anion Gap   Date Value Ref Range Status   07/27/2020 7 (L) 8 - 16 mmol/L Final     eGFR if    Date Value Ref Range Status   07/27/2020 56 (A) >60 mL/min/1.73 m^2 Final     eGFR if non    Date Value Ref Range Status   07/27/2020 48 (A) >60 mL/min/1.73 m^2 Final     Comment:     Calculation used to obtain the estimated glomerular filtration  rate (eGFR) is the CKD-EPI equation.            POCT-Glucose  No results found for: POCTGLUCOSE  Recent Labs   Lab 07/23/20  0403 07/24/20  0322 07/24/20  1240 07/25/20  0311 07/26/20  0441 07/27/20  0520   CALCIUM 7.8* 8.3* 8.0* 8.2* 7.8* 7.8*   MG 1.9 2.4  --  2.4 2.2 2.1   PHOS 1.7* 2.9  --  3.5 3.3 3.2         LFT  Recent Labs   Lab 07/23/20  0403 07/24/20  0322 07/25/20  0311 07/26/20  0441 07/27/20  0520   PROT 7.1 7.6 6.6 5.5* 5.2*   ALBUMIN 2.4* 2.6* 2.5* 2.2* 2.2*   BILITOT 0.6 0.5 0.6 0.5 0.6   * 92* 110* 85* 49*   ALKPHOS 83 82 76 63 61   * 117* 126* 133* 103*         COAGS  Recent Labs   Lab 07/21/20  2358  07/24/20 2008 07/25/20  0311 07/25/20  1322 07/26/20  0441 07/27/20  0519   INR 1.0  --   --   --   --   --   --    APTT 38.1*  38.1*   < > 38.2* 66.2* 52.7* 68.2* 25.7    < > = values in this interval not displayed.       CE  Recent Labs   Lab 07/21/20  1331 07/22/20  0518 07/22/20  1805 07/23/20  0056   TROPONINI 0.036* 0.037* 0.021 0.030*     BNP  Recent Labs   Lab 07/21/20  1331   BNP 13       LAST HbA1c  Lab Results   Component Value Date    HGBA1C 6.3 (H) 07/23/2020         Imaging  Results          X-Ray Chest 1 View (Final result)  Result time 07/22/20 18:36:38    Final result by Kari Becerra MD (07/22/20 18:36:38)                 Impression:      No significant change.      Electronically signed by: Kari Becerra  Date:    07/22/2020  Time:    18:36             Narrative:    EXAMINATION:  CHEST ONE VIEW    CLINICAL HISTORY:  COPD Exacerbation; Sepsis, unspecified organism    TECHNIQUE:  One view of the chest.    COMPARISON:  07/21/2020    FINDINGS:  The cardiac silhouette is within normal limits.   There is no focal consolidation, pneumothorax, or pleural effusion. There is a diffuse interstitial lung abnormalities.  There is chronic appearing bilateral pleural thickening.  There is no large appearing pleural fluid.                               X-Ray Chest AP Portable (Final result)  Result time 07/21/20 13:54:05    Final result by Luda Powell MD (07/21/20 13:54:05)                 Impression:      Abnormal increased interstitial lung markings predominantly at the lung basis left more than right, nonspecific could be acute or chronic.      Electronically signed by: Luda Powell MD  Date:    07/21/2020  Time:    13:54             Narrative:    EXAMINATION:  XR CHEST AP PORTABLE    CLINICAL HISTORY:  Sepsis;    TECHNIQUE:  Single frontal view of the chest was performed.    COMPARISON:  None    FINDINGS:  EKG wires overlie the chest.  The cardiac silhouette is normal in size, midline.  Mild increased interstitial lung markings noted both lung fields, nonspecific, slightly accentuated  at the lung bases left more than right.  No large pleural effusion.  No pneumothorax.  The osseous structures appear normal.                                  Microbiology Results (last 7 days)     Procedure Component Value Units Date/Time    Blood culture [[id number]] Collected: 07/24/20 1645    Order Status: Completed Specimen: Blood from Antecubital, Right Updated: 07/27/20 3692      Blood Culture, Routine No Growth to date      No Growth to date      No Growth to date      No Growth to date    Blood culture [[id number]] Collected: 07/24/20 1645    Order Status: Completed Specimen: Blood from Peripheral, Left Hand Updated: 07/27/20 2312     Blood Culture, Routine No Growth to date      No Growth to date      No Growth to date      No Growth to date    AFB Culture & Smear [[id number]] Collected: 07/26/20 0923    Order Status: Completed Specimen: Respiratory from Sputum, Expectorated Updated: 07/27/20 2127     AFB Culture & Smear Culture in progress     AFB CULTURE STAIN No acid fast bacilli seen.    AFB Culture & Smear [[id number]] Collected: 07/25/20 2231    Order Status: Completed Specimen: Respiratory from Mouth Updated: 07/27/20 1516     AFB Culture & Smear Culture in progress     AFB CULTURE STAIN No acid fast bacilli seen.    Fungus culture [[id number]]  (Abnormal) Collected: 07/25/20 2231    Order Status: Completed Specimen: Respiratory from Mouth Updated: 07/27/20 1420     Fungus (Mycology) Culture YEAST   Few  Identification pending      Fungus Culture, Blood or Bone Marrow [[id number]] Collected: 07/26/20 1041    Order Status: Completed Specimen: Blood from Antecubital, Left Updated: 07/27/20 1418     Fungus Cult, blood or BM Culture in progress    Blood culture x two cultures. Draw prior to antibiotics. [[id number]] Collected: 07/21/20 1419    Order Status: Completed Specimen: Blood from Peripheral, Antecubital, Right Updated: 07/26/20 2322     Blood Culture, Routine No growth after 5 days.    Narrative:      Aerobic and anaerobic    Blood culture x two cultures. Draw prior to antibiotics. [[id number]] Collected: 07/21/20 1330    Order Status: Completed Specimen: Blood from Peripheral, Wrist, Left Updated: 07/26/20 2322     Blood Culture, Routine No growth after 5 days.    Narrative:      Aerobic and anaerobic    AFB Culture & Smear [[id number]]     Order Status: No result Specimen:  Respiratory     Fungus culture [[id number]]     Order Status: Canceled Specimen: Blood     AFB Culture & Smear [[id number]]     Order Status: Sent Specimen: Blood     C Diff Toxin by PCR [[id number]]  (Abnormal) Collected: 07/23/20 1205    Order Status: Completed Updated: 07/24/20 1500     C. diff PCR Positive    Clostridium difficile EIA [[id number]]  (Abnormal) Collected: 07/23/20 1205    Order Status: Completed Specimen: Stool Updated: 07/23/20 2032     C. diff Antigen Positive     C difficile Toxins A+B, EIA Negative     Comment: Testing not recommended for children <24 months old.                 IP CONSULT TO PULMONOLOGY  PHARMACY REMDESIVIR CONSULT  IP CONSULT TO INFECTIOUS DISEASES    No new subjective & objective note has been filed under this hospital service since the last note was generated.        ASSESSMENT/PLAN:       Rajan Deluna is a 74 y.o. male with extensive pmh who presented with Acute respiratory disease due to COVID-19 virus.     The primary encounter diagnosis was Acute respiratory disease due to COVID-19 virus. Diagnoses of Fever, Sepsis, due to unspecified organism, unspecified whether acute organ dysfunction present, Hypotension, unspecified hypotension type, Acute respiratory failure with hypoxia, MARQUIS (acute kidney injury), Weakness generalized, Community acquired pneumonia, unspecified laterality, Viral pneumonia, COVID-19, Chest pain, Melena, Pneumonia due to COVID-19 virus, Chronic obstructive pulmonary disease, unspecified COPD type, Clostridium difficile infection, Anemia, unspecified type, and Chest pain, unspecified type were also pertinent to this visit.    Acute respiratory disease due to COVID-19 virus  Admitted w/ AMS and hypovolemic shock.  Patient currently satting at 100% at 2 L of supplemental oxygen via nasal cannula.   STABLE     Plan:   - continue COVID isolation    - continue dexamethasone 6 mg, currently on day 7  -due to Aspergillis and MAC will stop dexamethasone on  day 8, end date 7/29/2020   - stop heparin gtt 2/2 down trending Hgb    - FU post transfusion H/H           COPD (chronic obstructive pulmonary disease)  - albuterol inhaler 2 puff Q4H  - MDI     Clostridium difficile infection  P/w consistent melanotic diarrhea, C diff toxin positive.  Still producing watery diarrhea but diminished overnight.     Plan:  - ID following  - p.o. vancomycin and IV metronidazole  - continue IV Protonix.  -Recommend continuing steroids for 8 day course      Anemia  Patient's most recent H&H 6 9 and 21.5, baseline hemoglobin 10-11.  Patient has had consistent melanic stools since starting heparin drip. Patient's blood was typed and crossed and blood transfusion consent was obtained.      Plan:  -Stop heparin/lovenox   - transfuse 1 unit of PRBC      Thank you for the consult, we will continue to follow the patient     Case discussed with MD Krystyna Chambers MD   [facility]  07/28/2020 10:07 AM

## 2020-07-28 NOTE — PLAN OF CARE
Problem: Occupational Therapy Goal  Goal: Occupational Therapy Goal  Description: Goals to be met by: 8/27/20     Patient will increase functional independence with ADLs by performing:    LE Dressing with Stand-by Assistance.  Grooming while standing with Stand-by Assistance.  Toileting from toilet with Stand-by Assistance for hygiene and clothing management.   Supine to sit with Stand-by Assistance.  Step transfer with Stand-by Assistance  Toilet transfer to toilet with Stand-by Assistance.  Upper extremity exercise program x10 reps per handout, with independence.    Outcome: Ongoing, Progressing     Pt with increased lethargy today's date- per chart, pt did not sleep well overnight. Pt's O2 at rest on high flow 13L ranging 88-91%. Able to sit EOB with assist and stand to take steps alongside EOB. O2 fluctuates and hovers around 84% while EOB, however, did drop to 72% during recovery following standing. Will cont to progress pt as able.

## 2020-07-28 NOTE — PT/OT/SLP PROGRESS
Occupational Therapy   Treatment    Name: Rajan Deluna  MRN: [id number]  Admitting Diagnosis:  Acute respiratory disease due to COVID-19 virus       Recommendations:     Discharge Recommendations: (TBD)  Discharge Equipment Recommendations:  (TBD)  Barriers to discharge:  Decreased caregiver support, Inaccessible home environment    Assessment:     Rajan Deluna is a 74 y.o. male with a medical diagnosis of Acute respiratory disease due to COVID-19 virus.  He presents with impaired respiratory function . Performance deficits affecting function are weakness, gait instability, impaired balance, impaired endurance, impaired self care skills, impaired functional mobilty, impaired cognition, decreased coordination, impaired cardiopulmonary response to activity.   Pt with increased lethargy today's date- per chart, pt did not sleep well overnight. Pt's O2 at rest on high flow 13L ranging 88-91%. Able to sit EOB with assist and stand to take steps alongside EOB. O2 fluctuates and hovers around 84% while EOB, however, did drop to 72% during recovery following standing. Will cont to progress pt as able.       Rehab Prognosis:  Good; patient would benefit from acute skilled OT services to address these deficits and reach maximum level of function.       Plan:     Patient to be seen 5 x/week to address the above listed problems via self-care/home management, therapeutic activities, therapeutic exercises  · Plan of Care Expires: 08/27/20  · Plan of Care Reviewed with: patient    Subjective     Pain/Comfort:  · Pain Rating 1: 0/10    Objective:     Communicated with: nsfredy prior to session.   General Precautions: Standard, airborne, contact, fall, droplet, respiratory   Orthopedic Precautions:N/A   Braces: N/A     Occupational Performance:     Bed Mobility:    · Patient completed Scooting/Bridging with minimum assistance  · Patient completed Supine to Sit with moderate assistance  · Patient completed Sit to Supine with minimum  assistance     Functional Mobility/Transfers:  · Patient completed Sit <> Stand Transfer with moderate assistance  with  no assistive device   · Functional Mobility: Min A with HHA alongside EOB     Activities of Daily Living:  · Feeding:  contact guard assistance    · Lower Body Dressing: total assistance    · Toileting: total assistance        [facility] 6 Click ADL: 15    Treatment & Education:  Pt found supine; asleep; 88% on 13L high flow; soiled in urine as condom cath found off of pt   moved to EOB with Mod A; O2 remaining at 84% majority of time EOB; able to reach 88% with encouraging pt to sit upright and look forward; O2 appears to drop with forward flexion and pt appearing to drift off to sleep while seated EOB  Changed gown max A while seated; total A magda socks   Stood x 1 trial with Mod A from EOB with HHA; lateral steps to HOB Min A with HHA; O2 dropping to 72% during recovery period   Returned to supine min/mod A   Min A/CGA take sip of water from cup; O2 at rest 88-91%     Patient left supine with all lines intact, call button in reach, bed alarm on and nsg notifiedEducation:      GOALS:   Multidisciplinary Problems     Occupational Therapy Goals        Problem: Occupational Therapy Goal    Goal Priority Disciplines Outcome Interventions   Occupational Therapy Goal     OT, PT/OT Ongoing, Progressing    Description: Goals to be met by: 8/27/20     Patient will increase functional independence with ADLs by performing:    LE Dressing with Stand-by Assistance.  Grooming while standing with Stand-by Assistance.  Toileting from toilet with Stand-by Assistance for hygiene and clothing management.   Supine to sit with Stand-by Assistance.  Step transfer with Stand-by Assistance  Toilet transfer to toilet with Stand-by Assistance.  Upper extremity exercise program x10 reps per handout, with independence.                     Time Tracking:     OT Date of Treatment: 07/28/20  OT Start Time: 1105  OT Stop Time:  1136  OT Total Time (min): 31 min    Billable Minutes:Self Care/Home Management 15 co treatment with PT     Madonna Flores OT  7/28/2020

## 2020-07-28 NOTE — PROGRESS NOTES
[facility] Medicine  Progress Note    Patient Name: Rajan Deluna  MRN: [id number]  Patient Class: IP- Inpatient   Admission Date: 7/21/2020  Length of Stay: 7 days  Attending Physician: Rell Tolentino III, MD  Primary Care Provider: Jason Mccord MD        Subjective:     Principal Problem:Acute respiratory disease due to COVID-19 virus        HPI:  Patient is a 74-year-old male with a past medical history of hypertension, COPD, MAC infection who presented to the ED with altered mental status and fever.  As per the family, patient has had altered mental status for the past 3-4 days. Family endorses increased somnolence as well as urinating on himself. EMS was called two days prior to presentation but reportedly deemed that patient did not need to be brought in. Over the previous day, reportedly the patient was found stuck, staring in his sink, not responding. On the day of presentation, the patient was found down on the floor, family wasn't able to get patient up from the floor and was reportedly not responding as much as baseline. Family believed that patient may not have been wearing baseline 2L O2. Patient also endorses a cough and generalized weakness. Patient reportedly had a recent UTI.     In the ED, chest x-ray showed diffuse interstitial patchy infiltrates concerning for possible pneumonia and a COVID-19 screening test was positive. Fever was 101° F and patient was hypotensive 80s/40s. Patient given 30cc/kg bolus. BP very soft on presentation, adequately responded to volume resuscitation and then decreased again. Labs showed a BUN 45, creatinine 2.9, , , troponin 0.036, procalcitonin 1.13.     Overview/Hospital Course:  No notes on file    Interval History: NAEON. Vital signs have been stable. Patient was on 2 L of supplemental oxygen yesterday all day but required a non-rebreather in the afternoon at 15 LPM. Patient was on BiPAP overnight at a FiO2 of 70%.   Patient currently on 15 L via non-rebreather mask satting at 92%.  Patient also received 1 unit PRBC due to hemoglobin of 6.9. Patient still has watery melanic stools, minimal of 200 cc as per patients nurse. Patient was given one dose of haloperidol due to agitation. Currently has a condom catheter in place.     Review of Systems   Respiratory: Negative for shortness of breath.    Cardiovascular: Negative for chest pain.   Gastrointestinal: Positive for diarrhea (melanic). Negative for abdominal pain and nausea.   Psychiatric/Behavioral: The patient is not nervous/anxious.      Objective:     Vital Signs (Most Recent):  Temp: 97.9 °F (36.6 °C) (07/28/20 0807)  Pulse: 87 (07/28/20 0807)  Resp: 18 (07/28/20 0807)  BP: 116/75 (07/28/20 0807)  SpO2: 96 % (07/28/20 0528) Vital Signs (24h Range):  Temp:  [96.9 °F (36.1 °C)-98.9 °F (37.2 °C)] 97.9 °F (36.6 °C)  Pulse:  [67-92] 87  Resp:  [18-30] 18  SpO2:  [91 %-100 %] 96 %  BP: (106-137)/(55-77) 116/75     Weight: 83 kg (182 lb 15.7 oz)  Body mass index is 25.52 kg/m².    Intake/Output Summary (Last 24 hours) at 7/28/2020 0825  Last data filed at 7/28/2020 0500  Gross per 24 hour   Intake 1606.67 ml   Output 486 ml   Net 1120.67 ml      Physical Exam  Vitals signs and nursing note reviewed.   Constitutional:       General: He is not in acute distress.  HENT:      Head: Normocephalic and atraumatic.   Eyes:      Conjunctiva/sclera: Conjunctivae normal.   Cardiovascular:      Rate and Rhythm: Normal rate and regular rhythm.      Comments: As seen on monitor, patient appears to have sinus rhythm  Pulmonary:      Effort: Pulmonary effort is normal.   Abdominal:      General: There is no distension.   Skin:     Coloration: Skin is not jaundiced.   Neurological:      Mental Status: He is alert.         Significant Labs:   A1C:   Recent Labs   Lab 07/23/20  0403   HGBA1C 6.3*     Bilirubin:   Recent Labs   Lab 07/23/20  0403 07/24/20  0322 07/25/20  0311 07/26/20  0441  07/27/20  0520   BILITOT 0.6 0.5 0.6 0.5 0.6     BMP:   Recent Labs   Lab 07/27/20  0520         K 3.9      CO2 22*   BUN 31*   CREATININE 1.4   CALCIUM 7.8*   MG 2.1     CBC:   Recent Labs   Lab 07/27/20  0520   WBC 16.40*   HGB 6.9*   HCT 21.5*        CMP:   Recent Labs   Lab 07/27/20  0520      K 3.9      CO2 22*      BUN 31*   CREATININE 1.4   CALCIUM 7.8*   PROT 5.2*   ALBUMIN 2.2*   BILITOT 0.6   ALKPHOS 61   AST 49*   *   ANIONGAP 7*   EGFRNONAA 48*     Coagulation:   Recent Labs   Lab 07/27/20  0519   APTT 25.7     Magnesium:   Recent Labs   Lab 07/27/20  0520   MG 2.1     TSH:   Recent Labs   Lab 07/23/20  0403   TSH 0.604     All pertinent labs within the past 24 hours have been reviewed.    Significant Imaging: I have reviewed all pertinent imaging results/findings within the past 24 hours.      Assessment/Plan:      * Acute respiratory disease due to COVID-19 virus  Admitted w/ AMS and hypovolemic shock.  Patient was step-down ICU yesterday on 2 L of supplemental oxygen via nasal cannula at, satting at %.  Throughout the day patient require more oxygen and was put on a non-rebreather at 15 L. and patient was put on BiPAP overnight.      Plan:     - COVID Isolation per protocol   - scheduled BiPAP at night   - continue dexamethasone 6 mg, currently on day 7   - discontinue heparin gtt yesterday (7/27) and discontinue prophylactic heparin. Will continue to monitor and once H&H stable will continue prophylactic dose of heparin   - continue Remdesivir, monitoring renal fxn which is improving, ID following    - Atorvastatin 40mg daily   - Multivitamin    - continue to monitor respiratory status.      MARQUIS (acute kidney injury)  Presented with Acute Kidney Injury 2/2 to hypovolemic shock, pre renal   Status improving      Plan:  - cont to encourage oral fluid intake.  - Continue to monitor renal function  - Avoid nephrotoxic medications      COPD  (chronic obstructive pulmonary disease)  - albuterol inhaler 2 puff Q4H  - MDI      Clostridium difficile infection  P/w consistent melanotic diarrhea, C diff toxin positive.  Still producing watery diarrhea but diminished overnight.    Plan:  - ID following  - p.o. vancomycin and IV metronidazole  - continue IV Protonix.    Anemia  Patient's most recent H&H 6.9 and 21.5, baseline hemoglobin 10-11.  Patient has had consistent melanic stools since starting heparin drip. Patient's blood was typed and crossed and blood transfusion consent was obtained.     Plan:  - discontinued heparin drip (7/26) and discontinue prophylactic heparin. Will continue to monitor and once H&H stable will continue prophylactic dose of heparin.  - transfuse 1 unit of PRBC yesterday due to hemoglobin of 6.9  - continue monitor H&H with daily CBC.  - trend stool output and consistency. Had 200 cc of melenic stools overnight.      VTE Risk Mitigation (From admission, onward)         Ordered     Place sequential compression device  Until discontinued      07/21/20 2142     IP VTE HIGH RISK PATIENT  Once      07/21/20 2142                      Olivier Rowe,   PGY-1  [facility]  [facility]

## 2020-07-28 NOTE — ASSESSMENT & PLAN NOTE
Patient's most recent H&H 6.9 and 21.5, baseline hemoglobin 10-11.  Patient has had consistent melanic stools since starting heparin drip. Patient's blood was typed and crossed and blood transfusion consent was obtained.     Plan:  - discontinued heparin drip (7/26) and discontinue prophylactic heparin. Will continue to monitor and once H&H stable will continue prophylactic dose of heparin.  - transfuse 1 unit of PRBC yesterday due to hemoglobin of 6.9  - continue monitor H&H with daily CBC.  - trend stool output and consistency. Had 200 cc of melenic stools overnight.

## 2020-07-28 NOTE — PLAN OF CARE
sent SNF referrals to following skilled facilities: 1) [facility], 2) [facility] , 3) [facility]  , 4) [facility]  , and 5) [facility] . SW will continue to follow up pending approval.    ** 3:38 PM- SW sent IPR referrals to 1) [facility], 2) [facility] and 3) [facility]. SW will continue to follow up pending approval.       07/28/20 1531   Post-Acute Status   Post-Acute Authorization Placement   Post-Acute Placement Status Referrals Sent

## 2020-07-28 NOTE — PT/OT/SLP PROGRESS
Physical Therapy Treatment    Patient Name:  Rajan Deluna   MRN:  [id number]    Recommendations:     Discharge Recommendations:  (TBD)   Discharge Equipment Recommendations: (TBD)   Barriers to discharge: limited activity tolerance    Assessment:     Rajan Deluna is a 74 y.o. male admitted with a medical diagnosis of Acute respiratory disease due to COVID-19 virus.  He presents with the following impairments/functional limitations:  weakness, impaired endurance, impaired self care skills, impaired functional mobilty, gait instability, impaired balance, impaired cardiopulmonary response to activity.  Pt with improved activity tolerance this date and was able to tolerate sitting EOB, standing, and side stepping. Pt on 13 L/min through high flow and SaO2 was 88-93% at rest, decreased as low as 82% sitting EOB but improved to 88% with cues for pursed lip breathing, and decreased to 72% following stand and ambulation and increased to 85-88% within 2 minutes. D/C disposition TBD pending pt's progress.    Rehab Prognosis: Good; patient would benefit from acute skilled PT services to address these deficits and reach maximum level of function.    Recent Surgery: * No surgery found *      Plan:     During this hospitalization, patient to be seen 6 x/week to address the identified rehab impairments via gait training, therapeutic activities, therapeutic exercises and progress toward the following goals:    · Plan of Care Expires:  08/27/20    Subjective     Chief Complaint: being tired  Patient/Family Comments/goals: pt agreeable to participate in session - pt sleeping when PT/OT entered but awakened and agreeable to participate in therapy   Pain/Comfort:  · Pain Rating 1: 0/10  · Pain Rating Post-Intervention 1: 0/10      Objective:     Communicated with nurse Crum prior to session.  Patient found HOB elevated with bed alarm, oxygen, pulse ox (continuous)(13 L/min high flow), bowel management system upon PT entry to room.      General Precautions: Standard, airborne, contact, droplet, fall   Orthopedic Precautions:N/A   Braces: N/A     Functional Mobility:  · Bed Mobility:     · Scooting: minimum assistance  · Supine to Sit: moderate assistance  · Sit to Supine: minimum assistance and moderate assistance  · Transfers:     · Sit to Stand:  moderate assistance with no AD  · Gait: 4-5 side steps right with HHA and min A 2/2 posterior leaning - pt with flexed posture and downward gaze      AM-PAC 6 CLICK MOBILITY  Turning over in bed (including adjusting bedclothes, sheets and blankets)?: 3  Sitting down on and standing up from a chair with arms (e.g., wheelchair, bedside commode, etc.): 2  Moving from lying on back to sitting on the side of the bed?: 2  Moving to and from a bed to a chair (including a wheelchair)?: 2  Need to walk in hospital room?: 1  Climbing 3-5 steps with a railing?: 1  Basic Mobility Total Score: 11       Therapeutic Activities and Exercises:  Pt on 13 L/min through high flow  88-93% at rest - decreased as low as 82% sitting EOB - pt with flexed posture and groggy, requiring max cues for improved upright posture and pursed lip breathing. SaO2 increased to 88%.  Completed stand and side stepping as reported above. SaO2 84% upon sitting but decreased as low as 72% and within 2 minutes increased to 85-88%.  Linens changed during standing 2/2 pt's condom catheter being off and linens being soiled.  Max cues for pursed lip breathing and posture.  Returned to supine and HOB elevated. saO2 was 89% at end of session.    Patient left HOB elevated with all lines intact, call button in reach, bed alarm on and nurse notified..    GOALS:   Multidisciplinary Problems     Physical Therapy Goals        Problem: Physical Therapy Goal    Goal Priority Disciplines Outcome Goal Variances Interventions   Physical Therapy Goal     PT, PT/OT Ongoing, Progressing     Description: Goals to be met by: 8/27/20     Patient will increase  functional independence with mobility by performin. Supine <> sit with MInimal Assistance  2. Sit to stand transfer with Minimal Assistance  3. Bed to chair transfer with Minimal Assistance   4. Assess gait when able  5. Lower extremity exercise program x 10 reps per handout, with supervision                     Time Tracking:     PT Received On: 20  PT Start Time: 1105     PT Stop Time: 1136  PT Total Time (min): 31 min co-tx with OT    Billable Minutes: Therapeutic Activity 15                   Lorie Johnson, PT  2020

## 2020-07-28 NOTE — PLAN OF CARE
called [facility] at [phone], spoke with Jaye, completed LOCET. SW faxed PAS, awaiting form 142 from State.       07/28/20 2410   Post-Acute Status   Post-Acute Authorization Placement   Post-Acute Placement Status Pending State Certification

## 2020-07-28 NOTE — PLAN OF CARE
The proper method of use, as well as anticipated side effects, of this metered-dose inhaler are discussed and demonstrated to the patient. Per MD, patient placed on Bipap with documented settings and patient parameters. Will continue to monitor.

## 2020-07-28 NOTE — CONSULTS
[facility]    CC: Melena    HPI 74 y.o. male with PMHx of HTN, COPD (on 2L baseline), MAC infection, aspergillosis infection who presents with acute onset black, tarry stools that are liquid in consistency associated with newly diagnosed C. difficile colitis and COVID-19 pneumonia infections. The patient initially presented with altered mental status with somnolence, fever, and liquid stools for 3-4 days prior to admission and was found down on the floor. In the ED, CXR revealed diffuse interstitial patchy infiltrates and COVID-19 screen was positive. He was in the ICU for hypotension and started on aggressive anti-coaulation with heparin gtt given severe covid infection with thrombogenic risk. Heparin gtt was started 7/22 and stopped 7/24 after melenic stools that were watery in consistency were noted, then he was switched to subQ heparin 5000 c9awion which was discontinued 7/27. He has a rectal tube in place and overnight noted to put out ~200 mL black liquid output. [facility] was consulted for concern for GI bleeding. He is currently on IV PPI BID and was started on Remdesivir for severe COVID in addition to IV metronidazole and oral vancomycin solution for presumed first C difficile colitis infection. Also received 1 unit pRBCs transfusion. Current O2 requirements are 13 L high flow nasal cannula.    Upon interview, the patient denies abdominal pain. Reports conversational dyspnea.    Chart reviewed and summarized here.    Past Medical History  Past Medical History:   Diagnosis Date    Arthritis     COPD (chronic obstructive pulmonary disease)     Hypertension     Smoker     Weakness        Past Surgical History  Past Surgical History:   Procedure Laterality Date    HERNIA REPAIR      right knee surgery         Social History  Social History     Tobacco Use    Smoking status: Former Smoker    Smokeless tobacco: Never Used   Substance Use Topics    Alcohol use: Not Currently    Drug use: Never  "      Family History  No FH of colon cancer    Review of Systems  General ROS: negative for chills, fever or weight loss. +Generalized fatigue  Psychological ROS: negative for hallucination, depression or suicidal ideation  Ophthalmic ROS: negative for blurry vision, photophobia or eye pain  ENT ROS: negative for epistaxis, sore throat or rhinorrhea  Respiratory ROS: +Shortness of breath at rest and with conversation  Cardiovascular ROS: +dyspnea on exertion, no chest pain  Gastrointestinal ROS: +Black stools, +diarrhea  Genito-Urinary ROS: no dysuria, trouble voiding, or hematuria  Musculoskeletal ROS: +Weakness  Neurological ROS: +Confusion  Dermatological ROS: negative for pruritis, rash and jaundice    Physical Examination  /75 (BP Location: Left arm, Patient Position: Lying)   Pulse 86   Temp 97.9 °F (36.6 °C) (Oral)   Resp 20   Ht 5' 11" (1.803 m)   Wt 83 kg (182 lb 15.7 oz)   SpO2 (!) 92%   BMI 25.52 kg/m²   General appearance: alert, cooperative, no distress, conversational dyspnea present, sitting upright on 13L HHFNC  HENT: Normocephalic, atraumatic, neck symmetrical, no nasal discharge   Eyes: conjunctivae/corneas clear, PERRL  Lungs: Poor effort, crackles diffusely, conversational dyspnea present  Heart: regular rate and rhythm without rub; no displacement of the PMI   Abdomen: soft, non-tender; rectal tube in place with moderate volume of black stool output  Extremities: extremities symmetric; no clubbing, cyanosis, or edema  Integument: Skin color, texture, turgor normal; no rashes; hair distrubution normal  Neurologic: Alert and oriented to person and place, normal strength  Psychiatric: no pressured speech; normal affect    Labs:  WBC 19.85, Hgb 9.7, Hct 30.3, MCV 94  Cr 1.4  COVID-19 positive  C. Difficile Antigen positive  FOBT positive    Imaging:  Abdominal XR:  No obstructive pattern or free air seen on review.    I have personally reviewed and interpreted these " images.    Assessment:   Mr. Deluna is a 74 year-old -American male with PMHx of HTN, COPD (on 2L baseline), MAC infection, aspergillosis infection who presents with acute onset black, tarry stools that are liquid in consistency associated with newly diagnosed C. difficile colitis and COVID-19 pneumonia infections. Initially presented with somnolence, fever, and liquid stools for 3-4 days prior to admission and was found down. In the ED, CXR revealed diffuse interstitial patchy infiltrates, COVID-19 screen was positive, and C diff antigen positive. Was in the ICU for hypotension and started on aggressive anti-coaulation with heparin gtt given severe covid infection with thrombogenic risk.  On IV PPI BID and was started on Remdesivir for severe COVID in addition to IV metronidazole and oral vancomycin. S/p 1 unit pRBCs transfusion. On HHFNC and Bipap nightly. Ddx for black stools/melena include upper GI bleed from PUD vs gastritis vs Dieulafoy lesion vs angioectasia.     // Melena, concern for upper GIB  // COVID-19 pneumonia  // C. Difficile colitis  // Normocytic anemia    Plan:   - 2 large IVs in place for fluid resuscitation  - Follow CBC closely (every 12 hours)  - Continue IV PPI BID  - Continue PO vancomycin 125 mg QID for 10 days  - Given poor respiratory status, will defer EGD/colonoscopy at this time   - Will discuss consideration for VCE while inpatient      Tara Price MD  [facility] PGY IV

## 2020-07-28 NOTE — PLAN OF CARE
Pre-Visit Chart Review  For Appointment Scheduled on 1-6-17    Health Maintenance Due   Topic Date Due    TETANUS VACCINE  10/06/1968    Pneumococcal (65+) (2 of 2 - PCV13) 03/14/2015    Influenza Vaccine  08/01/2016    Colonoscopy  04/03/2017                      VN note: VN cued into patient's room for introduction. Bedside nurse Skyla in room. Confirmed with nurse rectal tube still in place. VN updated last BM on flowsheet. Allowed time for questions. VN will continue to be available to patient and intervene prn.

## 2020-07-28 NOTE — PLAN OF CARE
1915H- Received patient upon rounds last night, patient seen in bed, conscious , not coherent to time, with saline lock on the right  hand and right forearm gauge 20  flushed patent, with clean and dry dressing.Telemetry Normal Sinus Rhythm (60's) to sinus tachycardic, non-sustaining (130's) upon removal of oxygen mask. On continuous pulse oxy meter, oxygen saturation % with 15 lpm via Non-re-breather mask, but oxygen saturation goes down to 66% without the mask on. Patient tried to remove his mask on and off at times, but easily re-directed. Condom catheter out upon rounds, diaper applied instead. Flexeril in place draining blackish loose stool. Advised patient to call for any assistance. Safety fall precaution measures noted. Bed alarm on. Call bell with in reach. Airborne and contact precautions maintained.Bipap on at HS. Will continue to monitor patient.      0616H- Patient stable VS over the night, afebrile. One dose of Haldol given last night, patient consistently removes his BIPAP around midnight. Did not sleep over the night. Otherwise % oxygen saturation with Bipap on. Telemetry no ectopy. Free from fall. IV line patent.Condom catheter applied by PCT Pablo this morning, flexeril in place, minimal 200 cc loose watery black stool. Will endorse patient to day shift Nurse.

## 2020-07-28 NOTE — PROGRESS NOTES
\[facility]\"" Infectious Diseases Progress Note    Assessment/Plan:     1. Severe C diff  Patient with profuse diarrhea prompting C diff testing. Antigen positive, toxin negative; however, toxin PCR positive. KUB with no evidence of toxic megacolon.  - Continue treatment of severe C diff with PO vancomycin and IV metronidazole, recommend 10 day course.      2. Pneumonia due to COVID-19  - Continue dexamethasone 6 mg qD for a 10 day course if patient tolerates (day 7/10).   - Continue remdesivir, monitor kidney/liver function closely (day 5/5).      3. History of MAC infection  4. History of Aspergillosis  Patient with history of structural lung disease c/b MAC infection, and was recently treated with itraconazole for Aspergillosis.  - Serum aspergillus antigen pending.   - Will follow up fungal/AFB blood and respiratory cultures. No recommendations for treatment of MAC or aspergillosis at this time. However, we do recognized that patient is at high risk for flare of aspergillosis and/or MAC in this setting.   - Appreciated Pulmonology's recommendations as well.    Lina Giles MD  \[facility]\"" Internal Medicine [facility] Infectious Diseases     Thank you for allowing us to participate in the care of this patient. We will continue to follow along. Case has been discussed with consult staff, who is in agreement with assessment and plan.     Subjective:      Rajan Deluna is a 74 y.o. male who is being followed by the \[facility]\"" Infectious Diseases service at [facility] for Pneumonia 2/2 COVID-19 infection, severe C diff, h/o MAC and aspergillosis.     Afebrile overnight. Stable on floor. On 15L HFNC throughout day yesterday, BIPAP overnight. De-satted when mask removed but quickly improved with replacement. 200cc loose watery black stool out via flexiseal.      Objective:   Last 24 Hour Vital Signs:  BP  Min: 116/75  Max: 137/72  Temp  Av.5 °F (36.4 °C)  Min: 96.9 °F (36.1 °C)  Max: 98 °F (36.7 °C)  Pulse  Avg:  "86.9  Min: 67  Max: 100  Resp  Av.2  Min: 18  Max: 28  SpO2  Av.9 %  Min: 91 %  Max: 99 %  I/O last 3 completed shifts:  In: 2406.7 [P.O.:1390; Blood:266.7; IV Piggyback:750]  Out: 1411 [Urine:1335; Stool:76]    Physical Exam  Limited by Telemedicine. Elderly appearing. Comfortable on NC. Says he feels "much better" today.     Laboratory:  Laboratory Data Reviewed: yes  Pertinent Findings:  Recent Labs   Lab 20  0311 20  0441 20  0520   WBC 14.52* 13.72* 16.40*   HGB 8.9* 7.3* 6.9*   HCT 28.3* 22.9* 21.5*    301 340   MCV 91 91 90   RDW 17.0* 16.7* 16.8*   * 139 139   K 4.4 4.0 3.9   * 109 110   CO2 26 22* 22*   BUN 68* 45* 31*   CREATININE 1.9* 1.5* 1.4   * 126* 109   PROT 6.6 5.5* 5.2*   ALBUMIN 2.5* 2.2* 2.2*   BILITOT 0.6 0.5 0.6   * 85* 49*   ALKPHOS 76 63 61   * 133* 103*         Microbiology Data Reviewed: yes  Pertinent Findings:  Blood cultures  NGTD  C diff antigen +, toxin - () toxin PCR + ()  AFB and fungal blood and respiratory cultures pending    Other Results:  Radiology Data Reviewed: yes  Pertinent Findings:  KUB with no evidence of toxic megacolon.    Current Medications:     Infusions:       Scheduled:   albuterol  2 puff Inhalation Q4H While awake    dexamethasone  6 mg Intravenous Q24H    lidocaine  1 patch Transdermal Q24H    metronidazole  500 mg Intravenous Q8H    multivitamin  1 tablet Oral Daily    pantoprazole  40 mg Intravenous BID    EUA remdesivir infusion (NO CHARGE)  100 mg Intravenous Q24H    vancomycin  125 mg Oral Q6H        PRN:  sodium chloride, acetaminophen, haloperidol lactate, ondansetron, pneumoc 13-loy conj-dip cr(PF), simethicone, sodium chloride 0.9%    Antibiotics and Day Number of Therapy:    PO vancomycin - current  IV metronidazole  - current    Lines and Day Number of Therapy:  Trejo - current  "

## 2020-07-29 PROBLEM — K92.2 GI BLEED: Status: ACTIVE | Noted: 2020-07-29

## 2020-07-29 LAB
ALBUMIN SERPL BCP-MCNC: 2.4 G/DL (ref 3.5–5.2)
ALBUMIN SERPL BCP-MCNC: 2.4 G/DL (ref 3.5–5.2)
ALP SERPL-CCNC: 69 U/L (ref 55–135)
ALP SERPL-CCNC: 69 U/L (ref 55–135)
ALT SERPL W/O P-5'-P-CCNC: 74 U/L (ref 10–44)
ALT SERPL W/O P-5'-P-CCNC: 74 U/L (ref 10–44)
ANION GAP SERPL CALC-SCNC: 6 MMOL/L (ref 8–16)
ANION GAP SERPL CALC-SCNC: 6 MMOL/L (ref 8–16)
APTT BLDCRRT: 28.2 SEC (ref 21–32)
AST SERPL-CCNC: 31 U/L (ref 10–40)
AST SERPL-CCNC: 31 U/L (ref 10–40)
BACTERIA BLD CULT: NORMAL
BACTERIA BLD CULT: NORMAL
BASOPHILS # BLD AUTO: 0.04 K/UL (ref 0–0.2)
BASOPHILS NFR BLD: 0.2 % (ref 0–1.9)
BILIRUB SERPL-MCNC: 0.7 MG/DL (ref 0.1–1)
BILIRUB SERPL-MCNC: 0.7 MG/DL (ref 0.1–1)
BUN SERPL-MCNC: 21 MG/DL (ref 8–23)
BUN SERPL-MCNC: 21 MG/DL (ref 8–23)
CALCIUM SERPL-MCNC: 8 MG/DL (ref 8.7–10.5)
CALCIUM SERPL-MCNC: 8 MG/DL (ref 8.7–10.5)
CHLORIDE SERPL-SCNC: 110 MMOL/L (ref 95–110)
CHLORIDE SERPL-SCNC: 110 MMOL/L (ref 95–110)
CO2 SERPL-SCNC: 22 MMOL/L (ref 23–29)
CO2 SERPL-SCNC: 22 MMOL/L (ref 23–29)
CREAT SERPL-MCNC: 1.2 MG/DL (ref 0.5–1.4)
CREAT SERPL-MCNC: 1.2 MG/DL (ref 0.5–1.4)
DIFFERENTIAL METHOD: ABNORMAL
EOSINOPHIL # BLD AUTO: 0 K/UL (ref 0–0.5)
EOSINOPHIL NFR BLD: 0 % (ref 0–8)
ERYTHROCYTE [DISTWIDTH] IN BLOOD BY AUTOMATED COUNT: 17.7 % (ref 11.5–14.5)
EST. GFR  (AFRICAN AMERICAN): >60 ML/MIN/1.73 M^2
EST. GFR  (AFRICAN AMERICAN): >60 ML/MIN/1.73 M^2
EST. GFR  (NON AFRICAN AMERICAN): 58 ML/MIN/1.73 M^2
EST. GFR  (NON AFRICAN AMERICAN): 58 ML/MIN/1.73 M^2
GLUCOSE SERPL-MCNC: 109 MG/DL (ref 70–110)
GLUCOSE SERPL-MCNC: 109 MG/DL (ref 70–110)
HCT VFR BLD AUTO: 28.6 % (ref 40–54)
HCT VFR BLD AUTO: 29.6 % (ref 40–54)
HGB BLD-MCNC: 9.2 G/DL (ref 14–18)
HGB BLD-MCNC: 9.7 G/DL (ref 14–18)
IMM GRANULOCYTES # BLD AUTO: 0.82 K/UL (ref 0–0.04)
IMM GRANULOCYTES NFR BLD AUTO: 4.1 % (ref 0–0.5)
LYMPHOCYTES # BLD AUTO: 1.4 K/UL (ref 1–4.8)
LYMPHOCYTES NFR BLD: 6.9 % (ref 18–48)
MAGNESIUM SERPL-MCNC: 1.8 MG/DL (ref 1.6–2.6)
MCH RBC QN AUTO: 30.1 PG (ref 27–31)
MCHC RBC AUTO-ENTMCNC: 32.2 G/DL (ref 32–36)
MCV RBC AUTO: 94 FL (ref 82–98)
MONOCYTES # BLD AUTO: 1.4 K/UL (ref 0.3–1)
MONOCYTES NFR BLD: 6.8 % (ref 4–15)
NEUTROPHILS # BLD AUTO: 16.6 K/UL (ref 1.8–7.7)
NEUTROPHILS NFR BLD: 82 % (ref 38–73)
NRBC BLD-RTO: 1 /100 WBC
PHOSPHATE SERPL-MCNC: 3.2 MG/DL (ref 2.7–4.5)
PLATELET # BLD AUTO: 299 K/UL (ref 150–350)
PMV BLD AUTO: 9.9 FL (ref 9.2–12.9)
POTASSIUM SERPL-SCNC: 4.6 MMOL/L (ref 3.5–5.1)
POTASSIUM SERPL-SCNC: 4.6 MMOL/L (ref 3.5–5.1)
PROT SERPL-MCNC: 5.6 G/DL (ref 6–8.4)
PROT SERPL-MCNC: 5.6 G/DL (ref 6–8.4)
RBC # BLD AUTO: 3.06 M/UL (ref 4.6–6.2)
SODIUM SERPL-SCNC: 138 MMOL/L (ref 136–145)
SODIUM SERPL-SCNC: 138 MMOL/L (ref 136–145)
WBC # BLD AUTO: 20.19 K/UL (ref 3.9–12.7)

## 2020-07-29 PROCEDURE — 85014 HEMATOCRIT: CPT

## 2020-07-29 PROCEDURE — 25000003 PHARM REV CODE 250: Performed by: STUDENT IN AN ORGANIZED HEALTH CARE EDUCATION/TRAINING PROGRAM

## 2020-07-29 PROCEDURE — 63600175 PHARM REV CODE 636 W HCPCS: Performed by: INTERNAL MEDICINE

## 2020-07-29 PROCEDURE — 25000003 PHARM REV CODE 250

## 2020-07-29 PROCEDURE — 85730 THROMBOPLASTIN TIME PARTIAL: CPT

## 2020-07-29 PROCEDURE — 83735 ASSAY OF MAGNESIUM: CPT

## 2020-07-29 PROCEDURE — 36415 COLL VENOUS BLD VENIPUNCTURE: CPT

## 2020-07-29 PROCEDURE — 11000001 HC ACUTE MED/SURG PRIVATE ROOM

## 2020-07-29 PROCEDURE — 94660 CPAP INITIATION&MGMT: CPT

## 2020-07-29 PROCEDURE — 97530 THERAPEUTIC ACTIVITIES: CPT

## 2020-07-29 PROCEDURE — 27100098 HC SPACER

## 2020-07-29 PROCEDURE — 84100 ASSAY OF PHOSPHORUS: CPT

## 2020-07-29 PROCEDURE — 97110 THERAPEUTIC EXERCISES: CPT | Mod: CQ

## 2020-07-29 PROCEDURE — 99900035 HC TECH TIME PER 15 MIN (STAT)

## 2020-07-29 PROCEDURE — 94640 AIRWAY INHALATION TREATMENT: CPT

## 2020-07-29 PROCEDURE — 85018 HEMOGLOBIN: CPT

## 2020-07-29 PROCEDURE — 25000003 PHARM REV CODE 250: Performed by: INTERNAL MEDICINE

## 2020-07-29 PROCEDURE — C9113 INJ PANTOPRAZOLE SODIUM, VIA: HCPCS | Performed by: INTERNAL MEDICINE

## 2020-07-29 PROCEDURE — 27000221 HC OXYGEN, UP TO 24 HOURS

## 2020-07-29 PROCEDURE — 80053 COMPREHEN METABOLIC PANEL: CPT

## 2020-07-29 PROCEDURE — 63600175 PHARM REV CODE 636 W HCPCS: Performed by: STUDENT IN AN ORGANIZED HEALTH CARE EDUCATION/TRAINING PROGRAM

## 2020-07-29 PROCEDURE — 85025 COMPLETE CBC W/AUTO DIFF WBC: CPT

## 2020-07-29 PROCEDURE — 94761 N-INVAS EAR/PLS OXIMETRY MLT: CPT

## 2020-07-29 PROCEDURE — S0030 INJECTION, METRONIDAZOLE: HCPCS

## 2020-07-29 PROCEDURE — 97530 THERAPEUTIC ACTIVITIES: CPT | Mod: CQ

## 2020-07-29 RX ORDER — VORICONAZOLE 200 MG/1
200 TABLET, FILM COATED ORAL
Status: DISCONTINUED | OUTPATIENT
Start: 2020-07-30 | End: 2020-08-27 | Stop reason: HOSPADM

## 2020-07-29 RX ORDER — SODIUM CHLORIDE, SODIUM LACTATE, POTASSIUM CHLORIDE, CALCIUM CHLORIDE 600; 310; 30; 20 MG/100ML; MG/100ML; MG/100ML; MG/100ML
INJECTION, SOLUTION INTRAVENOUS CONTINUOUS
Status: DISCONTINUED | OUTPATIENT
Start: 2020-07-29 | End: 2020-07-30

## 2020-07-29 RX ORDER — VORICONAZOLE 200 MG/1
400 TABLET, FILM COATED ORAL
Status: DISCONTINUED | OUTPATIENT
Start: 2020-07-29 | End: 2020-07-30

## 2020-07-29 RX ADMIN — Medication 4 TABLET: at 09:07

## 2020-07-29 RX ADMIN — THERA TABS 1 TABLET: TAB at 09:07

## 2020-07-29 RX ADMIN — PANTOPRAZOLE SODIUM 40 MG: 40 INJECTION, POWDER, FOR SOLUTION INTRAVENOUS at 09:07

## 2020-07-29 RX ADMIN — SODIUM CHLORIDE, SODIUM LACTATE, POTASSIUM CHLORIDE, AND CALCIUM CHLORIDE: .6; .31; .03; .02 INJECTION, SOLUTION INTRAVENOUS at 11:07

## 2020-07-29 RX ADMIN — ALBUTEROL SULFATE 2 PUFF: 90 AEROSOL, METERED RESPIRATORY (INHALATION) at 02:07

## 2020-07-29 RX ADMIN — METRONIDAZOLE 500 MG: 500 INJECTION, SOLUTION INTRAVENOUS at 09:07

## 2020-07-29 RX ADMIN — Medication 4 TABLET: at 05:07

## 2020-07-29 RX ADMIN — Medication 125 MG: at 05:07

## 2020-07-29 RX ADMIN — ALBUTEROL SULFATE 2 PUFF: 90 AEROSOL, METERED RESPIRATORY (INHALATION) at 06:07

## 2020-07-29 RX ADMIN — Medication 4 TABLET: at 12:07

## 2020-07-29 RX ADMIN — Medication 125 MG: at 12:07

## 2020-07-29 RX ADMIN — METRONIDAZOLE 500 MG: 500 INJECTION, SOLUTION INTRAVENOUS at 01:07

## 2020-07-29 RX ADMIN — DEXAMETHASONE SODIUM PHOSPHATE 6 MG: 4 INJECTION, SOLUTION INTRAMUSCULAR; INTRAVENOUS at 09:07

## 2020-07-29 RX ADMIN — Medication 125 MG: at 11:07

## 2020-07-29 RX ADMIN — METRONIDAZOLE 500 MG: 500 INJECTION, SOLUTION INTRAVENOUS at 05:07

## 2020-07-29 RX ADMIN — ALBUTEROL SULFATE 2 PUFF: 90 AEROSOL, METERED RESPIRATORY (INHALATION) at 07:07

## 2020-07-29 RX ADMIN — LIDOCAINE 1 PATCH: 50 PATCH TOPICAL at 05:07

## 2020-07-29 RX ADMIN — TRAZODONE HYDROCHLORIDE 50 MG: 50 TABLET ORAL at 09:07

## 2020-07-29 RX ADMIN — ALBUTEROL SULFATE 2 PUFF: 90 AEROSOL, METERED RESPIRATORY (INHALATION) at 10:07

## 2020-07-29 NOTE — PLAN OF CARE
Patient has been declined by [facility] , unable to meet patient's needs and cannot accept COVID + patient's. SW will continue to follow up with other facilities pending approval.    [facility] declined -unable to accept payor.    Ying TORRES, [facility] and [facility] currently under review. SW will continue to follow up.       07/29/20 1123   Post-Acute Status   Post-Acute Authorization Placement   Post-Acute Placement Status Patient Evaluation by Facility

## 2020-07-29 NOTE — ASSESSMENT & PLAN NOTE
Patient has had consistent watery melanic stools since admission. Patient found to have positive C. Diff infection and required PO vancomycin and IV metronidazole. Patient was started on a heparin drip for an elevated D-dimer of 7 and patient not being a candidate to a CTA due to MARQUIS during admission. Heparin drip was discontinued on 7/26.     Plan:   - GI consulted and will follow recommendations   - patient currently NPO   - will trend H&H Q12H   - if Hgb < 7 will transfuse with PRBCs.   - continue IV Protonix    - will scheduled outpatient upper endoscopy at discharge

## 2020-07-29 NOTE — ASSESSMENT & PLAN NOTE
P/w consistent melanotic diarrhea, C diff toxin positive. Still producing watery melanic diarrhea.    Plan:  - ID following and GI following  - p.o. vancomycin and IV metronidazole  - continue IV Protonix.

## 2020-07-29 NOTE — PT/OT/SLP PROGRESS
Physical Therapy Treatment    Patient Name:  Rajan Deluna   MRN:  [id number]    Recommendations:     Discharge Recommendations:  (TBD)   Discharge Equipment Recommendations: (TBD)   Barriers to discharge: Limited activity tolerance secondary to decreasing SpO2 levels    Assessment:     Rajan Deluna is a 74 y.o. male admitted with a medical diagnosis of Acute respiratory disease due to COVID-19 virus.  He presents with the following impairments/functional limitations:  weakness, impaired endurance, impaired self care skills, impaired functional mobilty, gait instability, impaired balance, decreased coordination, decreased upper extremity function, decreased lower extremity function, decreased safety awareness, decreased ROM, impaired coordination, impaired cardiopulmonary response to activity. Pt found with O2 nasal canula out of nose and SpO2 rate fluctuating between 79-81%. PTA placed O2 canula in nose and instructed pt in pursed lipped breathing and able to increase SpO2 level to 87%. Able to perform 3 sit to stand trials with RW and CGA/SBA. SpO2 level fluctuated between 81-87% with sit to stand trials. Post session SpO2 level increased to 90% with pt in supine and on 15 L/Min. Would benefit from continued PT services to increase pt's cardiorespiratory strength and endurance.    Rehab Prognosis: Good; patient would benefit from acute skilled PT services to address these deficits and reach maximum level of function.    Recent Surgery: * No surgery found *      Plan:     During this hospitalization, patient to be seen 6 x/week to address the identified rehab impairments via gait training, therapeutic activities, therapeutic exercises and progress toward the following goals:    · Plan of Care Expires:  08/27/20    Subjective     Chief Complaint: None Expressed  Patient/Family Comments/goals: None Expressed  Pain/Comfort:  · Pain Rating 1: 0/10  · Pain Rating Post-Intervention 1: 0/10      Objective:      Communicated with nurse prior to session.  Patient found supine with bed alarm, bowel management system, rodriguez catheter, peripheral IV, pulse ox (continuous), telemetry, oxygen upon PT entry to room.     General Precautions: Standard, airborne, contact, droplet, fall   Orthopedic Precautions:N/A   Braces: N/A     Functional Mobility:  · Bed Mobility:     · Rolling Right: stand by assistance and contact guard assistance  · Scooting: stand by assistance  · Supine to Sit: minimum assistance and moderate assistance  · Sit to Supine: minimum assistance and moderate assistance  · Transfers:     · Sit to Stand:  stand by assistance and contact guard assistance with rolling walker  · Gait: ~3-4 side steps to reach HOB with RW and CGA      AM-PAC 6 CLICK MOBILITY          Therapeutic Activities and Exercises:   Pt's nasal canula not in nose upon entering room and SpO2 level registering at 79%. Nasal canula donned and SpO2 level increased/fluctuated between 79-81%. Pt performed supine therapeutic exercises 1 x 10 reps consisting of hip add/abd and hip/knee flexion (heel slides). Able to perform 3 sit to stand trial with RW and CGA/SBA. SpO2 levels fluctuated between 81-87% with sit to stand trials. Pt recovers with performing pursed lipped breathing. Able to take ~3-4 small side steps to reach HOB with RW and CGA (pt initially misunderstood directions given by OT but once instructions clarified pt able to perform stepping correctly). Able to assist therapists to transfer pt closer to HOB in supine by placing feet onto bed and performing somewhat of a bridge to push to HOB.  Post session pt able to increase SpO2 level to 90% in supine.    Patient left supine with all lines intact, call button in reach, bed alarm on and  nurse notified..    GOALS:   Multidisciplinary Problems     Physical Therapy Goals        Problem: Physical Therapy Goal    Goal Priority Disciplines Outcome Goal Variances Interventions   Physical Therapy  Goal     PT, PT/OT Ongoing, Progressing     Description: Goals to be met by: 20     Patient will increase functional independence with mobility by performin. Supine <> sit with MInimal Assistance  2. Sit to stand transfer with Minimal Assistance  3. Bed to chair transfer with Minimal Assistance   4. Gait x 50 ft with min A with appropriate AD  5. Lower extremity exercise program x 10 reps per handout, with supervision                     Time Tracking:     PT Received On: 20  PT Start Time: 1132(Overlap with OT)     PT Stop Time: 1225(Overlap with OT) OT in room from 11:56-12:25  PT Total Time (min): 53 min Total Billable PT time= 40 min    Billable Minutes: Therapeutic Activity  23 and Therapeutic Exercise  17    Treatment Type: Treatment  PT/PTA: PTA     PTA Visit Number: 1     Afshan Marin, PTA  2020

## 2020-07-29 NOTE — ASSESSMENT & PLAN NOTE
Patient has had consistent melanic stools since starting heparin drip. Patient's blood was typed and crossed and blood transfusion consent was obtained. Patient was transfused 1 unit of PRBC.     Plan:  - discontinued heparin drip (7/26) and discontinue prophylactic heparin. Will continue to monitor and once H&H stable will continue prophylactic dose of heparin.  - GI consulted for gi bleed  - continue monitor H&H Q12H.  - trend stool output and consistency.

## 2020-07-29 NOTE — ASSESSMENT & PLAN NOTE
Patient used 2L supplemental oxygen at home.    Plan:  - continue respiratory therapy treatments  - albuterol inhaler 2 puff Q4H  - MDI

## 2020-07-29 NOTE — PROGRESS NOTES
[facility]    CC: Melena     HPI: 74 y.o. male with PMHx of HTN, COPD (on 2L baseline), MAC infection, aspergillosis infection who presents with acute onset black, tarry stools that are liquid in consistency associated with newly diagnosed C. difficile colitis and COVID-19 pneumonia infections. The patient initially presented with altered mental status with somnolence, fever, and liquid stools for 3-4 days prior to admission and was found down on the floor. In the ED, CXR revealed diffuse interstitial patchy infiltrates and COVID-19 screen was positive. He was in the ICU for hypotension and started on aggressive anti-coaulation with heparin gtt given severe covid infection with thrombogenic risk. Heparin gtt was started 7/22 and stopped 7/24 after melenic stools that were watery in consistency were noted, then he was switched to subQ heparin 5000 [id number] which was discontinued 7/27. He has a rectal tube in place and overnight noted to put out ~200 mL black liquid output. [facility] was consulted for concern for GI bleeding. He is currently on IV PPI BID and was started on Remdesivir for severe COVID in addition to IV metronidazole and oral vancomycin solution for presumed first C difficile colitis infection. Also received 1 unit pRBCs transfusion.     Interval History: Patient interviewed with video telemedicine today. He remains on 15L HFNC with black loose stool reported from rectal tube. He denies nausea, vomiting, abdominal pain, and hematochezia. SOB remains unchanged.     Past Medical History  Past Medical History:   Diagnosis Date    Arthritis     COPD (chronic obstructive pulmonary disease)     Hypertension     Smoker     Weakness        Review of Systems  General ROS: negative for chills, fever or weight loss  Cardiovascular ROS: no chest pain or palpiations,  + dyspnea on exertion  Gastrointestinal ROS: + black stool, no abdominal pain and change in bowel habits,     Physical Examination  BP  "111/61 (BP Location: Left arm, Patient Position: Lying)   Pulse 96   Temp 98 °F (36.7 °C) (Oral)   Resp 20   Ht 5' 11" (1.803 m)   Wt 83 kg (182 lb 15.7 oz)   SpO2 (!) 93%   BMI 25.52 kg/m²    General appearance: alert, cooperative, no distress, NC in place   HENT: Normocephalic, atraumatic  Lungs: no respiratory distress or use of accessory muscles     Labs:  WBC 20.19, Hgb 9.2, Hct 28.6, Plt 299     Imaging:   Abdominal XR:  No obstructive pattern or free air seen on review.  I have personally reviewed and interpreted these images.    Assessment: patrick Deluna is a 74 year-old -American male with PMHx of HTN, COPD (on 2L baseline), MAC infection, aspergillosis infection who presents with acute onset black, tarry stools that are liquid in consistency associated with newly diagnosed C. difficile colitis and COVID-19 pneumonia infections. Initially presented with somnolence, fever, and liquid stools for 3-4 days prior to admission and was found down. In the ED, CXR revealed diffuse interstitial patchy infiltrates, COVID-19 screen was positive, and C diff antigen positive. Was in the ICU for hypotension and started on aggressive anti-coaulation with heparin gtt given severe covid infection with thrombogenic risk.  On IV PPI BID and was started on Remdesivir for severe COVID in addition to IV metronidazole and oral vancomycin. S/p 1 unit pRBCs transfusion. On HHFNC and Bipap nightly. Ddx for black stools/melena include upper GI bleed from PUD vs gastritis vs Dieulafoy lesion vs angioectasia.     Plan:  - 2 large IVs in place for fluid resuscitation  - continue to trend Hgb and transfuse for Hgb >7 unless higher threshold indicated by comorbidites   - Continue IV PPI BID  - Continue PO vancomycin 125 mg QID for 10 days  - Given poor respiratory status, will defer EGD/colonoscopy at this time   - planning for VCE while inpatient if there is continued concern for GIB    Parker Cordon MD  Case discussed with [facility] " GI Fellow

## 2020-07-29 NOTE — PROGRESS NOTES
[facility] Infectious Diseases Progress Note    Assessment/Plan:     Leukocytosis  Possibly related to steroids - currently day 8/10 of dexamethasone.  - If WBC count continues to rise, consider pan-culture and CXR.     Severe C diff  Patient with profuse diarrhea prompting C diff testing. Antigen positive, toxin negative; however, toxin PCR positive. KUB with no evidence of toxic megacolon.  - Continue treatment of severe C diff with PO vancomycin and IV metronidazole, recommend 10 day course.      Pneumonia due to COVID-19  - Continue dexamethasone 6 mg qD for a 10 day course if patient tolerates (day 8/10).   - S/P 5 day course of remdesivir  - Monitor on tele  - Monitor LFTs daily  - Wean oxygen as able      History of MAC infection  History of Aspergillosis  Patient with history of structural lung disease c/b MAC infection, and was recently treated with itraconazole for Aspergillosis.  - Serum aspergillus antigen negative. However, recommend Voriconazole PO 400mg q12h x2 doses --> 200mg q12h thereafter. Must take one hour before or after meals - ordered by ID team.  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 Do not need to treat.   - Will hold off on MAC treatment at this time.   - Appreciated Pulmonology's recommendations as well.    Lina Giles MD  [facility]  [facility] Infectious Diseases     Thank you for allowing us to participate in the care of this patient. We will continue to follow along. Case has been discussed with consult staff, who is in agreement with assessment and plan.     Subjective:      Rajan Deluna is a 74 y.o. male who is being followed by the [facility] Infect[facility] Diseases service at [facility] for Pneumonia 2/2 COVID-19 infection, severe C diff, h/o MAC and aspergillosis.     Afebrile overnight. Stable on floor. On 15L HFNC throughout day yesterday, BIPAP overnight. De-sats periodically when patient removes mask, though improves when replaced. Still  having liquid melanotic stools, though frequency has improved.      Objective:   Last 24 Hour Vital Signs:  BP  Min: 104/56  Max: 120/56  Temp  Av.5 °F (36.4 °C)  Min: 96.6 °F (35.9 °C)  Max: 98 °F (36.7 °C)  Pulse  Av.7  Min: 83  Max: 112  Resp  Av.2  Min: 18  Max: 22  SpO2  Av.1 %  Min: 86 %  Max: 94 %  I/O last 3 completed shifts:  In: 1540 [P.O.:760; NG/GT:30; IV Piggyback:750]  Out: 1000 [Urine:1000]    Physical Exam  Limited by Telemedicine. Elderly appearing. Comfortable on NC.     Laboratory:  Laboratory Data Reviewed: yes  Pertinent Findings:  Recent Labs   Lab 20  0520 20  1131 20  1946 20  0602   WBC 16.40* 19.85*  --  20.19*   HGB 6.9* 9.7* 8.9* 9.2*   HCT 21.5* 30.3* 27.6* 28.6*    320  --  299   MCV 90 94  --  94   RDW 16.8* 17.4*  --  17.7*    136  --  138  138   K 3.9 4.4  --  4.6  4.6    108  --  110  110   CO2 22* 21*  --  22*  22*   BUN 31* 26*  --  21  21   CREATININE 1.4 1.4  --  1.2  1.2    203*  --  109  109   PROT 5.2* 6.0  --  5.6*  5.6*   ALBUMIN 2.2* 2.6*  --  2.4*  2.4*   BILITOT 0.6 0.8  --  0.7  0.7   AST 49* 52*  --  31  31   ALKPHOS 61 77  --  69  69   * 101*  --  74*  74*         Microbiology Data Reviewed: yes  Pertinent Findings:  Blood cultures  NGTD  C diff antigen +, toxin - () toxin PCR + ()  AFB and fungal blood and respiratory cultures pending    Other Results:  Radiology Data Reviewed: yes  Pertinent Findings:  KUB with no evidence of toxic megacolon.    Current Medications:     Infusions:       Scheduled:   albuterol  2 puff Inhalation Q4H While awake    dexamethasone  6 mg Intravenous Q24H    Lactobacillus acidoph-L.bulgar  4 tablet Oral TID WM    lidocaine  1 patch Transdermal Q24H    metronidazole  500 mg Intravenous Q8H    multivitamin  1 tablet Oral Daily    pantoprazole  40 mg Intravenous BID    traZODone  50 mg Oral QHS    vancomycin  125 mg Oral Q6H         PRN:  sodium chloride, acetaminophen, ondansetron, pneumoc 13-loy conj-dip cr(PF), simethicone, sodium chloride 0.9%    Antibiotics and Day Number of Therapy:    PO vancomycin 7/24- current  IV metronidazole 7/24 - current    Lines and Day Number of Therapy:  Trejo 7/23- current

## 2020-07-29 NOTE — PLAN OF CARE
sent LTAC  referrals to following facilities: 1) [facility], 2) [facility], 3) [facility], 4) [facility] [place] [place], and 5) [facility]. SW will continue to follow up pending approval.        07/29/20 1019   Post-Acute Status   Post-Acute Authorization Placement   Post-Acute Placement Status Referrals Sent

## 2020-07-29 NOTE — PROGRESS NOTES
[facility] Progress Note  Pulmonology    Admit Date: 7/21/2020   LOS: 8 days     SUBJECTIVE:     No acute events over night. VSS, he is stable on 15 L/min hiflo, tolerated Bipap overnight continues to have melanoic stools that are liquid. GI will have outpatient fu for scope. S/p  1 unit prbc with appropriate rise in H/H, and is stable today.      Continuous Infusions:  Scheduled Meds:   albuterol  2 puff Inhalation Q4H While awake    dexamethasone  6 mg Intravenous Q24H    Lactobacillus acidoph-L.bulgar  4 tablet Oral TID WM    lidocaine  1 patch Transdermal Q24H    metronidazole  500 mg Intravenous Q8H    multivitamin  1 tablet Oral Daily    pantoprazole  40 mg Intravenous BID    traZODone  50 mg Oral QHS    vancomycin  125 mg Oral Q6H     PRN Meds:sodium chloride, acetaminophen, ondansetron, pneumoc 13-loy conj-dip cr(PF), simethicone, sodium chloride 0.9%    Review of patient's allergies indicates:  No Known Allergies  ROS  Negative other than HPI     OBJECTIVE:     Vital Signs (Most Recent)  Temp: 98 °F (36.7 °C) (07/29/20 0801)  Pulse: 102 (07/29/20 1159)  Resp: 20 (07/29/20 1000)  BP: 111/61 (07/29/20 0801)  SpO2: (!) 93 % (07/29/20 1000)    Vital Signs Range (Last 24H):  Temp:  [96.6 °F (35.9 °C)-98 °F (36.7 °C)]   Pulse:  []   Resp:  [20-22]   BP: (104-120)/(56-61)   SpO2:  [86 %-94 %]     Current Diet Order   Procedures    Diet Cardiac      I & O (Last 24H):    Intake/Output Summary (Last 24 hours) at 7/29/2020 1206  Last data filed at 7/29/2020 0600  Gross per 24 hour   Intake 965 ml   Output 1000 ml   Net -35 ml     Wt Readings from Last 3 Encounters:   07/26/20 83 kg (182 lb 15.7 oz)   09/22/17 91.9 kg (202 lb 8 oz)   05/22/17 93.7 kg (206 lb 9.6 oz)     Ventilator Data (Last 24H):     Oxygen Concentration (%):  [70] 70    Hemodynamic Parameters (Last 24H):       Physical Exam  Due to COVID, PE was limited to reduce use of PPE   Resting comfortably in bed on bipap   Moves all  "extremities      Lines/Drains:       Peripheral IV - Single Lumen 07/21/20 1254 20 G Right Hand (Active)   Site Assessment Clean;Dry;Intact;No redness;No swelling 07/27/20 0830   Line Status Flushed;Saline locked 07/27/20 0830   Dressing Status Clean;Dry;Intact 07/27/20 0830   Dressing Intervention Integrity maintained 07/26/20 1905   Dressing Change Due 07/25/20 07/26/20 1905   Site Change Due 07/25/20 07/27/20 0830   Reason Not Rotated Poor venous access 07/27/20 0830   Number of days: 5            Peripheral IV - Single Lumen 07/23/20 1506 20 G Anterior;Right Forearm (Active)   Site Assessment Clean;Dry;Intact;No redness;No swelling 07/27/20 0830   Line Status Infusing 07/27/20 0830   Dressing Status Clean;Dry;Intact 07/27/20 0830   Dressing Intervention Integrity maintained 07/26/20 1905   Dressing Change Due 07/27/20 07/26/20 1905   Site Change Due 07/27/20 07/27/20 0830   Reason Not Rotated Poor venous access 07/27/20 0830   Number of days: 3            Rectal Tube 07/23/20 1215 (Active)   Reposition drainage bags for BMS & Trejo on opposite sides of bed 07/26/20 1905   Outcome stool evacuated 07/27/20 0830   Stool Color Black 07/27/20 0830   Insertion Site Appearance no redness, warmth, tenderness, skin breakdown, drainage 07/26/20 1905   Flush/Irrigation flushed w/;water;no resistance met 07/26/20 1630   Intake (mL) 40 mL 07/26/20 1630   Rectal Tube Output 75 mL 07/27/20 0600   Number of days: 3            Urethral Catheter 07/23/20 1212 Straight-tip 16 Fr. (Active)   Site Assessment Clean;Intact 07/27/20 0830   Collection Container Urimeter 07/27/20 0830   Securement Method secured to top of thigh w/ adhesive device 07/27/20 0830   Catheter Care Performed yes 07/27/20 0830   Reason for Continuing Urinary Catheterization Critically ill in ICU requiring intensive monitoring 07/27/20 0830   CAUTI Prevention Bundle StatLock in place w 1" slack;Intact seal between catheter & drainage tubing;Drainage bag/urimeter " off the floor;Green sheeting clip in use;No dependent loops or kinks;Drainage bag/urimeter not overfilled (<2/3 full);Drainage bag/urimeter below bladder 07/27/20 0830   Output (mL) 170 mL 07/27/20 0505   Number of days: 3         LABS  CBC  Recent Labs   Lab 07/25/20  0311 07/26/20  0441 07/27/20  0520 07/28/20  1131 07/28/20  1946 07/29/20  0602   WBC 14.52* 13.72* 16.40* 19.85*  --  20.19*   RBC 3.11* 2.52* 2.38* 3.24*  --  3.06*   HGB 8.9* 7.3* 6.9* 9.7* 8.9* 9.2*   HCT 28.3* 22.9* 21.5* 30.3* 27.6* 28.6*    301 340 320  --  299   MCV 91 91 90 94  --  94   MCH 28.6 29.0 29.0 29.9  --  30.1   MCHC 31.4* 31.9* 32.1 32.0  --  32.2       BMP  Recent Labs   Lab 07/25/20  0311 07/26/20  0441 07/27/20  0520 07/28/20  1131 07/29/20  0602   * 139 139 136 138  138   K 4.4 4.0 3.9 4.4 4.6  4.6   CO2 26 22* 22* 21* 22*  22*   * 109 110 108 110  110   BUN 68* 45* 31* 26* 21  21   CREATININE 1.9* 1.5* 1.4 1.4 1.2  1.2       CMP  Sodium   Date Value Ref Range Status   07/29/2020 138 136 - 145 mmol/L Final   07/29/2020 138 136 - 145 mmol/L Final     Potassium   Date Value Ref Range Status   07/29/2020 4.6 3.5 - 5.1 mmol/L Final   07/29/2020 4.6 3.5 - 5.1 mmol/L Final     Chloride   Date Value Ref Range Status   07/29/2020 110 95 - 110 mmol/L Final   07/29/2020 110 95 - 110 mmol/L Final     CO2   Date Value Ref Range Status   07/29/2020 22 (L) 23 - 29 mmol/L Final   07/29/2020 22 (L) 23 - 29 mmol/L Final     Glucose   Date Value Ref Range Status   07/29/2020 109 70 - 110 mg/dL Final   07/29/2020 109 70 - 110 mg/dL Final     BUN, Bld   Date Value Ref Range Status   07/29/2020 21 8 - 23 mg/dL Final   07/29/2020 21 8 - 23 mg/dL Final     Creatinine   Date Value Ref Range Status   07/29/2020 1.2 0.5 - 1.4 mg/dL Final   07/29/2020 1.2 0.5 - 1.4 mg/dL Final     Calcium   Date Value Ref Range Status   07/29/2020 8.0 (L) 8.7 - 10.5 mg/dL Final   07/29/2020 8.0 (L) 8.7 - 10.5 mg/dL Final     Total Protein   Date  Value Ref Range Status   07/29/2020 5.6 (L) 6.0 - 8.4 g/dL Final   07/29/2020 5.6 (L) 6.0 - 8.4 g/dL Final     Albumin   Date Value Ref Range Status   07/29/2020 2.4 (L) 3.5 - 5.2 g/dL Final   07/29/2020 2.4 (L) 3.5 - 5.2 g/dL Final     Total Bilirubin   Date Value Ref Range Status   07/29/2020 0.7 0.1 - 1.0 mg/dL Final     Comment:     For infants and newborns, interpretation of results should be based  on gestational age, weight and in agreement with clinical  observations.  Premature Infant recommended reference ranges:  Up to 24 hours.............<8.0 mg/dL  Up to 48 hours............<12.0 mg/dL  3-5 days..................<15.0 mg/dL  6-29 days.................<15.0 mg/dL     07/29/2020 0.7 0.1 - 1.0 mg/dL Final     Comment:     For infants and newborns, interpretation of results should be based  on gestational age, weight and in agreement with clinical  observations.  Premature Infant recommended reference ranges:  Up to 24 hours.............<8.0 mg/dL  Up to 48 hours............<12.0 mg/dL  3-5 days..................<15.0 mg/dL  6-29 days.................<15.0 mg/dL       Alkaline Phosphatase   Date Value Ref Range Status   07/29/2020 69 55 - 135 U/L Final   07/29/2020 69 55 - 135 U/L Final     AST   Date Value Ref Range Status   07/29/2020 31 10 - 40 U/L Final   07/29/2020 31 10 - 40 U/L Final     ALT   Date Value Ref Range Status   07/29/2020 74 (H) 10 - 44 U/L Final   07/29/2020 74 (H) 10 - 44 U/L Final     Anion Gap   Date Value Ref Range Status   07/29/2020 6 (L) 8 - 16 mmol/L Final   07/29/2020 6 (L) 8 - 16 mmol/L Final     eGFR if    Date Value Ref Range Status   07/29/2020 >60 >60 mL/min/1.73 m^2 Final   07/29/2020 >60 >60 mL/min/1.73 m^2 Final     eGFR if non    Date Value Ref Range Status   07/29/2020 58 (A) >60 mL/min/1.73 m^2 Final     Comment:     Calculation used to obtain the estimated glomerular filtration  rate (eGFR) is the CKD-EPI equation.      07/29/2020 58  (A) >60 mL/min/1.73 m^2 Final     Comment:     Calculation used to obtain the estimated glomerular filtration  rate (eGFR) is the CKD-EPI equation.            POCT-Glucose  No results found for: POCTGLUCOSE  Recent Labs   Lab 07/25/20  0311 07/26/20  0441 07/27/20  0520 07/28/20  1131 07/29/20  0602   CALCIUM 8.2* 7.8* 7.8* 8.2* 8.0*  8.0*   MG 2.4 2.2 2.1 1.9 1.8   PHOS 3.5 3.3 3.2 2.5* 3.2         LFT  Recent Labs   Lab 07/25/20  0311 07/26/20  0441 07/27/20  0520 07/28/20  1131 07/29/20  0602   PROT 6.6 5.5* 5.2* 6.0 5.6*  5.6*   ALBUMIN 2.5* 2.2* 2.2* 2.6* 2.4*  2.4*   BILITOT 0.6 0.5 0.6 0.8 0.7  0.7   * 85* 49* 52* 31  31   ALKPHOS 76 63 61 77 69  69   * 133* 103* 101* 74*  74*         COAGS  Recent Labs   Lab 07/25/20  1322 07/26/20  0441 07/27/20  0519 07/28/20  1125 07/29/20  0602   APTT 52.7* 68.2* 25.7 26.5 28.2       CE  Recent Labs   Lab 07/22/20  1805 07/23/20  0056   TROPONINI 0.021 0.030*     BNP  No results for input(s): BNP in the last 168 hours.    LAST HbA1c  Lab Results   Component Value Date    HGBA1C 6.3 (H) 07/23/2020         Imaging Results          X-Ray Chest 1 View (Final result)  Result time 07/22/20 18:36:38    Final result by Kari Becerra MD (07/22/20 18:36:38)                 Impression:      No significant change.      Electronically signed by: Kari Becerra  Date:    07/22/2020  Time:    18:36             Narrative:    EXAMINATION:  CHEST ONE VIEW    CLINICAL HISTORY:  COPD Exacerbation; Sepsis, unspecified organism    TECHNIQUE:  One view of the chest.    COMPARISON:  07/21/2020    FINDINGS:  The cardiac silhouette is within normal limits.   There is no focal consolidation, pneumothorax, or pleural effusion. There is a diffuse interstitial lung abnormalities.  There is chronic appearing bilateral pleural thickening.  There is no large appearing pleural fluid.                               X-Ray Chest AP Portable (Final result)  Result time 07/21/20  13:54:05    Final result by Luda Powell MD (07/21/20 13:54:05)                 Impression:      Abnormal increased interstitial lung markings predominantly at the lung basis left more than right, nonspecific could be acute or chronic.      Electronically signed by: Luda Powell MD  Date:    07/21/2020  Time:    13:54             Narrative:    EXAMINATION:  XR CHEST AP PORTABLE    CLINICAL HISTORY:  Sepsis;    TECHNIQUE:  Single frontal view of the chest was performed.    COMPARISON:  None    FINDINGS:  EKG wires overlie the chest.  The cardiac silhouette is normal in size, midline.  Mild increased interstitial lung markings noted both lung fields, nonspecific, slightly accentuated  at the lung bases left more than right.  No large pleural effusion.  No pneumothorax.  The osseous structures appear normal.                                  Microbiology Results (last 7 days)     Procedure Component Value Units Date/Time    Blood culture [[id number]] Collected: 07/24/20 1645    Order Status: Completed Specimen: Blood from Antecubital, Right Updated: 07/28/20 2312     Blood Culture, Routine No Growth to date      No Growth to date      No Growth to date      No Growth to date      No Growth to date    Blood culture [[id number]] Collected: 07/24/20 1645    Order Status: Completed Specimen: Blood from Peripheral, Left Hand Updated: 07/28/20 2312     Blood Culture, Routine No Growth to date      No Growth to date      No Growth to date      No Growth to date      No Growth to date    Fungus culture [[id number]]  (Abnormal) Collected: 07/25/20 2231    Order Status: Completed Specimen: Respiratory from Mouth Updated: 07/28/20 1436     Fungus (Mycology) Culture CANDIDA PARAPSILOSIS  Few      AFB Culture & Smear [[id number]] Collected: 07/26/20 0923    Order Status: Completed Specimen: Respiratory from Sputum, Expectorated Updated: 07/27/20 2127     AFB Culture & Smear Culture in progress     AFB CULTURE STAIN No  acid fast bacilli seen.    AFB Culture & Smear [[id number]] Collected: 07/25/20 2231    Order Status: Completed Specimen: Respiratory from Mouth Updated: 07/27/20 1516     AFB Culture & Smear Culture in progress     AFB CULTURE STAIN No acid fast bacilli seen.    Fungus Culture, Blood or Bone Marrow [[id number]] Collected: 07/26/20 1041    Order Status: Completed Specimen: Blood from Antecubital, Left Updated: 07/27/20 1418     Fungus Cult, blood or BM Culture in progress    Blood culture x two cultures. Draw prior to antibiotics. [[id number]] Collected: 07/21/20 1419    Order Status: Completed Specimen: Blood from Peripheral, Antecubital, Right Updated: 07/26/20 2322     Blood Culture, Routine No growth after 5 days.    Narrative:      Aerobic and anaerobic    Blood culture x two cultures. Draw prior to antibiotics. [[id number]] Collected: 07/21/20 1330    Order Status: Completed Specimen: Blood from Peripheral, Wrist, Left Updated: 07/26/20 2322     Blood Culture, Routine No growth after 5 days.    Narrative:      Aerobic and anaerobic    AFB Culture & Smear [[id number]]     Order Status: No result Specimen: Respiratory     Fungus culture [[id number]]     Order Status: Canceled Specimen: Blood     AFB Culture & Smear [[id number]]     Order Status: Sent Specimen: Blood     C Diff Toxin by PCR [[id number]]  (Abnormal) Collected: 07/23/20 1205    Order Status: Completed Updated: 07/24/20 1500     C. diff PCR Positive    Clostridium difficile EIA [[id number]]  (Abnormal) Collected: 07/23/20 1205    Order Status: Completed Specimen: Stool Updated: 07/23/20 2032     C. diff Antigen Positive     C difficile Toxins A+B, EIA Negative     Comment: Testing not recommended for children <24 months old.                 IP CONSULT TO PULMONOLOGY  PHARMACY REMDESIVIR CONSULT  IP CONSULT TO INFECTIOUS DISEASES  IP CONSULT TO GASTROENTEROLOGY-[facility]    No new subjective & objective note has been filed under this hospital service since the last  note was generated.        ASSESSMENT/PLAN:       Rajan Deluna is a 74 y.o. male with extensive pmh who presented with Acute respiratory disease due to COVID-19 virus.     The primary encounter diagnosis was Acute respiratory disease due to COVID-19 virus. Diagnoses of Fever, Sepsis, due to unspecified organism, unspecified whether acute organ dysfunction present, Hypotension, unspecified hypotension type, Acute respiratory failure with hypoxia, MARQUIS (acute kidney injury), Weakness generalized, Community acquired pneumonia, unspecified laterality, Viral pneumonia, COVID-19, Chest pain, Melena, Pneumonia due to COVID-19 virus, Chronic obstructive pulmonary disease, unspecified COPD type, Clostridium difficile infection, Anemia, unspecified type, and Chest pain, unspecified type were also pertinent to this visit.    Acute respiratory disease due to COVID-19 virus  Admitted w/ AMS and hypovolemic shock.  Patient currently satting at 100% at 2 L of supplemental oxygen via nasal cannula.   STABLE     Plan:   - continue COVID isolation    - continue dexamethasone 6 mg, currently on last day 2/2 aspergillis and MAC will do shortened course of 8 days   -due to Aspergillis and MAC will stop dexamethasone on day 8, end date 7/29/2020   - stop heparin gtt 2/2 down trending Hgb    - Appropriate rise in H/H will continue to trend Q12           COPD (chronic obstructive pulmonary disease)  - albuterol inhaler 2 puff Q4H  - MDI     Clostridium difficile infection  P/w consistent melanotic diarrhea, C diff toxin positive.  Still producing watery diarrhea but diminished overnight.     Plan:  - ID following  - p.o. vancomycin and IV metronidazole  - continue IV Protonix.  -Recommend continuing steroids for 8 day course      Anemia  Patient's most recent H&H 6 9 and 21.5, baseline hemoglobin 10-11.  Patient has had consistent melanic stools since starting heparin drip. Patient's blood was typed and crossed and blood transfusion  consent was obtained.      Plan:  -Stop heparin/lovenox   - transfuse 1 unit of PRBC      Thank you for the consult, we will continue to follow the patient     Case discussed with MD Krystyna Chambers MD   [facility]  07/29/2020 10:07 AM

## 2020-07-29 NOTE — PT/OT/SLP PROGRESS
Occupational Therapy   Treatment    Name: Rajan Deluna  MRN: [id number]  Admitting Diagnosis:  Acute respiratory disease due to COVID-19 virus       Recommendations:     Discharge Recommendations: (TBD)  Discharge Equipment Recommendations:  (TBD)  Barriers to discharge:  Inaccessible home environment, Decreased caregiver support    Assessment:     Rajan Deluna is a 74 y.o. male with a medical diagnosis of Acute respiratory disease due to COVID-19 virus.  He presents with deconditioning, labile spO2 seated EOB and in stance with increased time required to recover sats; soO2 between 82-89% spO2 while OT in room but PTA reported as low as 79% during bed mobility to come to seated EOB. Performance deficits affecting function are weakness, impaired endurance, impaired self care skills, impaired functional mobilty, gait instability, impaired balance, decreased lower extremity function, decreased upper extremity function, impaired cardiopulmonary response to activity.     Rehab Prognosis:  Good; patient would benefit from acute skilled OT services to address these deficits and reach maximum level of function.       Plan:     Patient to be seen 5 x/week to address the above listed problems via self-care/home management, therapeutic activities, therapeutic exercises  · Plan of Care Expires: 08/27/20  · Plan of Care Reviewed with: patient    Subjective     Pain/Comfort:  · Pain Rating 1: 0/10    Objective:     Communicated with: nsg prior to session.  Patient found seated EOB with PT in room with bed alarm, pulse ox (continuous), oxygen, telemetry, peripheral IV, rodriguez catheter, bowel management system(HFNC at 15L) upon OT entry to room.    General Precautions: Standard, airborne, contact, droplet, fall, NPO   Orthopedic Precautions:N/A   Braces: N/A     Occupational Performance:     Bed Mobility:    · Patient completed Scooting/Bridging with moderate assistance x2 while supine to scoot to HOB; SBA to scoot to EOB at times  while seated but increased v/c to perform scooting at this time  · Patient completed Sit to Supine with minimum assistance and moderate assistance     Functional Mobility/Transfers:  · Patient completed Sit <> Stand Transfer with stand by assistance and contact guard assistance  with  rolling walker   Functional Mobility: Pt with fair dynamic seated and standing balance.  ·  Pt able to take 3-4 side steps to HOB with min A for RW management likely 2/2 misunderstanding directives at pt attempting to ambulate ~2 steps from bed and demonstrated ability to manage RW forward/backward with no assistance        [facility] 6 Click ADL: 14    Treatment & Education:  Pt educated on role of OT and POC.   Pt performing skills as listed above.   Pt educated on use of diaphragmatic breathing to increase spO2 sats while seated EOB with some improvement; improved oxygenation with return to supine and increased time to rest/recover. Pt spO2 90% upon OT/PT departure.    Patient left HOB elevated with all lines intact, call button in reach, bed alarm on and nsg notifiedEducation:      GOALS:   Multidisciplinary Problems     Occupational Therapy Goals        Problem: Occupational Therapy Goal    Goal Priority Disciplines Outcome Interventions   Occupational Therapy Goal     OT, PT/OT Ongoing, Progressing    Description: Goals to be met by: 8/27/20     Patient will increase functional independence with ADLs by performing:    LE Dressing with Stand-by Assistance.  Grooming while standing with Stand-by Assistance.  Toileting from toilet with Stand-by Assistance for hygiene and clothing management.   Supine to sit with Stand-by Assistance.  Step transfer with Stand-by Assistance  Toilet transfer to toilet with Stand-by Assistance.  Upper extremity exercise program x10 reps per handout, with independence.                     Time Tracking:     OT Date of Treatment: 07/29/20  OT Start Time: 1155  OT Stop Time: 1223  OT Total Time (min): 28  min    Billable Minutes:Therapeutic Activity 13 Co Tx PT    Asia Griffiths OT  7/29/2020

## 2020-07-29 NOTE — PROGRESS NOTES
[facility] Hospital Medicine  Progress Note    Patient Name: Rajan Deluna  MRN: [id number]  Patient Class: IP- Inpatient   Admission Date: 7/21/2020  Length of Stay: 8 days  Attending Physician: Rell Tolentino III, MD  Primary Care Provider: Jason Mccord MD        Subjective:     Principal Problem:Acute respiratory disease due to COVID-19 virus        HPI:  Patient is a 74-year-old male with a past medical history of hypertension, COPD, MAC infection who presented to the ED with altered mental status and fever.  As per the family, patient has had altered mental status for the past 3-4 days. Family endorses increased somnolence as well as urinating on himself. EMS was called two days prior to presentation but reportedly deemed that patient did not need to be brought in. Over the previous day, reportedly the patient was found stuck, staring in his sink, not responding. On the day of presentation, the patient was found down on the floor, family wasn't able to get patient up from the floor and was reportedly not responding as much as baseline. Family believed that patient may not have been wearing baseline 2L O2. Patient also endorses a cough and generalized weakness. Patient reportedly had a recent UTI.     In the ED, chest x-ray showed diffuse interstitial patchy infiltrates concerning for possible pneumonia and a COVID-19 screening test was positive. Fever was 101° F and patient was hypotensive 80s/40s. Patient given 30cc/kg bolus. BP very soft on presentation, adequately responded to volume resuscitation and then decreased again. Labs showed a BUN 45, creatinine 2.9, , , troponin 0.036, procalcitonin 1.13.     Overview/Hospital Course:  No notes on file    Interval History: NAEON. Vital signs stable. Patient currently on 15 liters/minute of supplemental oxygen via high-flow nasal cannula. Patient used BiPAP overnight at 70% oxygen concentration. Melenic stools still present via  rectal tube.  GI was consulted due to upper gi bleed and  recommended patient be placed NPO, H&H trend Q12H, and schedule outpatient upper endoscopy. Patient was started on probiotics.     Review of Systems   Respiratory: Negative for shortness of breath.    Cardiovascular: Negative for chest pain.   Gastrointestinal: Positive for diarrhea (melanic). Negative for abdominal pain and nausea.   Psychiatric/Behavioral: The patient is not nervous/anxious.      Objective:     Vital Signs (Most Recent):  Temp: 96.6 °F (35.9 °C) (07/29/20 0601)  Pulse: 91 (07/29/20 0601)  Resp: 20 (07/29/20 0601)  BP: 111/61 (07/29/20 0601)  SpO2: (!) 94 % (07/29/20 0323) Vital Signs (24h Range):  Temp:  [96.6 °F (35.9 °C)-97.9 °F (36.6 °C)] 96.6 °F (35.9 °C)  Pulse:  [] 91  Resp:  [18-22] 20  SpO2:  [86 %-94 %] 94 %  BP: (104-120)/(56-75) 111/61     Weight: 83 kg (182 lb 15.7 oz)  Body mass index is 25.52 kg/m².    Intake/Output Summary (Last 24 hours) at 7/29/2020 0755  Last data filed at 7/29/2020 0600  Gross per 24 hour   Intake 1065 ml   Output 1000 ml   Net 65 ml      Physical Exam  Vitals signs and nursing note reviewed.   Constitutional:       General: He is not in acute distress.  HENT:      Head: Normocephalic and atraumatic.   Eyes:      Extraocular Movements: Extraocular movements intact.   Cardiovascular:      Rate and Rhythm: Normal rate and regular rhythm.      Comments: As seen on monitor, patient appears to have sinus rhythm  Pulmonary:      Effort: Pulmonary effort is normal.      Comments: As seen on the monitor, patient peripheral oxygen saturation in low 90s.   Abdominal:      General: There is no distension.   Skin:     Coloration: Skin is not jaundiced.   Neurological:      Mental Status: He is alert.   Psychiatric:      Comments: Answers questions appropriately.          Significant Labs:   A1C:   Recent Labs   Lab 07/23/20  0403   HGBA1C 6.3*     Bilirubin:   Recent Labs   Lab 07/25/20  0311 07/26/20  0441  07/27/20  0520 07/28/20  1131 07/29/20  0602   BILITOT 0.6 0.5 0.6 0.8 0.7  0.7       BMP:   Recent Labs   Lab 07/29/20  0602     109     138   K 4.6  4.6     110   CO2 22*  22*   BUN 21  21   CREATININE 1.2  1.2   CALCIUM 8.0*  8.0*   MG 1.8     CBC:   Recent Labs   Lab 07/28/20  1131 07/28/20  1946 07/29/20  0602   WBC 19.85*  --  20.19*   HGB 9.7* 8.9* 9.2*   HCT 30.3* 27.6* 28.6*     --  299     CMP:   Recent Labs   Lab 07/28/20  1131 07/29/20  0602    138  138   K 4.4 4.6  4.6    110  110   CO2 21* 22*  22*   * 109  109   BUN 26* 21  21   CREATININE 1.4 1.2  1.2   CALCIUM 8.2* 8.0*  8.0*   PROT 6.0 5.6*  5.6*   ALBUMIN 2.6* 2.4*  2.4*   BILITOT 0.8 0.7  0.7   ALKPHOS 77 69  69   AST 52* 31  31   * 74*  74*   ANIONGAP 7* 6*  6*   EGFRNONAA 48* 58*  58*     Coagulation:   Recent Labs   Lab 07/29/20  0602   APTT 28.2     Magnesium:   Recent Labs   Lab 07/28/20  1131 07/29/20  0602   MG 1.9 1.8     TSH:   Recent Labs   Lab 07/23/20  0403   TSH 0.604       Significant Imaging: I have reviewed all pertinent imaging results/findings within the past 24 hours.      Assessment/Plan:      * Acute respiratory disease due to COVID-19 virus  Patient has been on 13-15 liter/per min via high flow nasal cannula. Patient used BiPAP at 70% oxygen concentration overnight. Patient is currently stable but oxygen saturation will decrease quickly if off of supplement oxygen for short periods of time.     Plan:     - COVID Isolation per protocol   - scheduled BiPAP at night   - continue dexamethasone 6 mg, currently on day 8   - discontinue heparin gtt (7/26) and discontinue prophylactic heparin. Will continue to monitor and once H&H stable will continue prophylactic dose of heparin   - patient finished 5 day course of Remdesivir.   - Atorvastatin 40mg daily   - Multivitamin    - continue to monitor respiratory status.        MARQUIS (acute kidney  injury)  Presented with Acute Kidney Injury 2/2 to hypovolemic shock, pre renal   Status improving    Plan:  - cont to encourage oral fluid intake.  - Continue to monitor renal function  - Avoid nephrotoxic medications      COPD (chronic obstructive pulmonary disease)  Patient used 2L supplemental oxygen at home.    Plan:  - continue respiratory therapy treatments  - albuterol inhaler 2 puff Q4H  - MDI    GI bleed  Patient has had consistent watery melanic stools since admission. Patient found to have positive C. Diff infection and required PO vancomycin and IV metronidazole. Patient was started on a heparin drip for an elevated D-dimer of 7 and patient not being a candidate to a CTA due to MARQUIS during admission. Heparin drip was discontinued on 7/26.     Plan:   - GI consulted and will follow recommendations   - patient currently NPO   - will trend H&H Q12H   - if Hgb < 7 will transfuse with PRBCs.   - continue IV Protonix    - will scheduled outpatient upper endoscopy at discharge      Clostridium difficile infection  P/w consistent melanotic diarrhea, C diff toxin positive. Still producing watery melanic diarrhea.    Plan:  - ID following and GI following  - p.o. vancomycin and IV metronidazole  - continue IV Protonix.      Anemia  Patient has had consistent melanic stools since starting heparin drip. Patient's blood was typed and crossed and blood transfusion consent was obtained. Patient was transfused 1 unit of PRBC.     Plan:  - discontinued heparin drip (7/26) and discontinue prophylactic heparin. Will continue to monitor and once H&H stable will continue prophylactic dose of heparin.  - GI consulted for gi bleed  - continue monitor H&H Q12H.  - trend stool output and consistency.      VTE Risk Mitigation (From admission, onward)         Ordered     Place sequential compression device  Until discontinued      07/21/20 2142     IP VTE HIGH RISK PATIENT  Once      07/21/20 2142                    Olivier Pacheco  DO [place] PGY-1  [facility]  Department of Hospital Medicine   [facility]

## 2020-07-29 NOTE — PLAN OF CARE
I had a long discussion with Thea Mikie (Daughter): [phone] and Tania Mikie ( Wife ) : [phone] seperately over the phone.  I also went in pt's room to discuss discharge plans with him.  I discussed with all that pt will need placement for rehab following hospital stay.  We discussed IPR vs SNF.  They are all agreeable to either.  They live in [place] but understand that facilities that accept Covid positive pts are not allowing visitors at this time.  I discussed with all of them that pt has been accepted to [facility] in [place] once medically ready for discharge.  They are all agreeable to pt discharging to [facility] once medically ready for discharge.  Tania/Thea/Pt encouraged to call with any questions or concerns.  Cm will continue to follow pt through transitions of care and assist with any discharge needs.       07/29/20 1306   Post-Acute Status   Post-Acute Authorization Placement     Phong Suh RN,   [phone]

## 2020-07-29 NOTE — PLAN OF CARE
Problem: Physical Therapy Goal  Goal: Physical Therapy Goal  Description: Goals to be met by: 20     Patient will increase functional independence with mobility by performin. Supine <> sit with MInimal Assistance  2. Sit to stand transfer with Minimal Assistance  3. Bed to chair transfer with Minimal Assistance   4. Gait x 50 ft with min A with appropriate AD  5. Lower extremity exercise program x 10 reps per handout, with supervision    Outcome: Ongoing, Progressing    Pt continues to work and progress toward all goals. Able to perform 3 sit to stand trials with RW requiring CGA/SBA. SpO2 levels decreased and fluctuated between 81-87% during standing trails. Post session SpO2 level 90%. All with 15 L/Min on high flow O2.

## 2020-07-29 NOTE — PLAN OF CARE
Patient on oxygen with documented flow.  Will attempt to wean per O2 order protocol. BIPAP on standby at patient bedside. The proper method of use, as well as anticipated side effects, of this metered-dose inhaler are discussed and demonstrated to the patient. Will continue to monitor.

## 2020-07-29 NOTE — ASSESSMENT & PLAN NOTE
Patient has been on 13-15 liter/per min via high flow nasal cannula. Patient used BiPAP at 70% oxygen concentration overnight. Patient is currently stable but oxygen saturation will decrease quickly if off of supplement oxygen for short periods of time.     Plan:     - COVID Isolation per protocol   - scheduled BiPAP at night   - continue dexamethasone 6 mg, currently on day 8   - discontinue heparin gtt (7/26) and discontinue prophylactic heparin. Will continue to monitor and once H&H stable will continue prophylactic dose of heparin   - patient finished 5 day course of Remdesivir.   - Atorvastatin 40mg daily   - Multivitamin    - continue to monitor respiratory status.

## 2020-07-29 NOTE — PLAN OF CARE
"VN note: VN cued into patient's room for introduction. Patient still on high-flow 02 with continuous pulse ox in place. Patient requesting to "eat food." I explained to him NPO status. Patient needs reinforcement. Allowed time for questions. VN will continue to be available to patient and intervene prn.    "

## 2020-07-29 NOTE — PLAN OF CARE
spoke with Wendie with [facility], confirmed that she had already submitted auth and is pending approval. Gilma informed that [facility] has been getting back pretty quickly with [employer] and is anticipating answer by tomorrow. Gilma also informed SW that patient will need to be COVID negative before admission. SW will continue to follow up.       07/29/20 4514   Post-Acute Status   Post-Acute Authorization Placement   Post-Acute Placement Status Pending Payor Review

## 2020-07-29 NOTE — PLAN OF CARE
received a call from Meghana with [facility], [phone], informed SW that patient will be  reviewed again once medically stable. Meghana did inform SW that patient can be accepted with positive COVID results. SW will continue to follow up.       07/29/20 1608   Post-Acute Status   Post-Acute Authorization Placement   Post-Acute Placement Status Patient Evaluation by Facility

## 2020-07-29 NOTE — PLAN OF CARE
Problem: Occupational Therapy Goal  Goal: Occupational Therapy Goal  Description: Goals to be met by: 8/27/20     Patient will increase functional independence with ADLs by performing:    LE Dressing with Stand-by Assistance.  Grooming while standing with Stand-by Assistance.  Toileting from toilet with Stand-by Assistance for hygiene and clothing management.   Supine to sit with Stand-by Assistance.  Step transfer with Stand-by Assistance  Toilet transfer to toilet with Stand-by Assistance.  Upper extremity exercise program x10 reps per handout, with independence.    Outcome: Ongoing, Progressing   Pt progressing well towards goals. Cont OT POC

## 2020-07-29 NOTE — PLAN OF CARE
1915H- Received patient upon rounds last night, patient seen in bed, conscious , not coherent to time, with saline lock on the right  AC,  flushed patent, with clean and dry dressing.Telemetry Normal Sinus Rhythm (80's).On continuous pulse oxy meter, oxygen saturation 91-93% with 15 lpm high flow, oxygen saturation goes down to 85% whenever patient removes his nasal cannula. Patient tried to remove his nasal canula, but easily re-directed. Condom catheter applied. Flexeril rectal tube, flushed in place draining blackish loose stool. Advised patient to call for any assistance. Safety fall precaution measures noted. Bed alarm on. Call bell with in reach. Airborne and contact precautions maintained.Bipap on at HS. Dr. Mehrdad MARTINS Updated patient not being able to sleep well, trazodone PO given. Will continue to monitor patient.       0631H- Patient stable VS over the night, afebrile. Able to sleep for a while over the night,  Bipap tolerated. More cooperative last night, not removing his BIPAP. Telemetry no ectopy. Free from fall. IV line patent.Condom catheter intact,  flexeril in place, minimal 100 cc loose watery black stool. Will endorse patient to day shift Nurse.

## 2020-07-29 NOTE — PLAN OF CARE
POC reviewed with patient. Patient AAOx4 and denies any pain. COVID precautions maintained. IV and oral antibiotics administered. Tele monitor in place reading NSR with 1st degree AVB. No red alarms or ectopy noted. Patient on 13L high flow oxygen via NC. Continuous pulse ox in place. Bed alarm on, bed locked and low, side rails up x2, and call bell in reach. Plan is for patient to be discharged to SNF pending clinical improvement.  Patient verbalizes clear understanding of POC.

## 2020-07-30 LAB
ALBUMIN SERPL BCP-MCNC: 2.5 G/DL (ref 3.5–5.2)
ALBUMIN SERPL BCP-MCNC: 2.5 G/DL (ref 3.5–5.2)
ALP SERPL-CCNC: 73 U/L (ref 55–135)
ALP SERPL-CCNC: 73 U/L (ref 55–135)
ALT SERPL W/O P-5'-P-CCNC: 54 U/L (ref 10–44)
ALT SERPL W/O P-5'-P-CCNC: 54 U/L (ref 10–44)
ANION GAP SERPL CALC-SCNC: 5 MMOL/L (ref 8–16)
ANION GAP SERPL CALC-SCNC: 5 MMOL/L (ref 8–16)
APTT BLDCRRT: 25.9 SEC (ref 21–32)
AST SERPL-CCNC: 22 U/L (ref 10–40)
AST SERPL-CCNC: 22 U/L (ref 10–40)
BASOPHILS # BLD AUTO: 0.03 K/UL (ref 0–0.2)
BASOPHILS NFR BLD: 0.2 % (ref 0–1.9)
BILIRUB SERPL-MCNC: 0.8 MG/DL (ref 0.1–1)
BILIRUB SERPL-MCNC: 0.8 MG/DL (ref 0.1–1)
BUN SERPL-MCNC: 21 MG/DL (ref 8–23)
BUN SERPL-MCNC: 21 MG/DL (ref 8–23)
CALCIUM SERPL-MCNC: 8.1 MG/DL (ref 8.7–10.5)
CALCIUM SERPL-MCNC: 8.1 MG/DL (ref 8.7–10.5)
CHLORIDE SERPL-SCNC: 106 MMOL/L (ref 95–110)
CHLORIDE SERPL-SCNC: 106 MMOL/L (ref 95–110)
CO2 SERPL-SCNC: 24 MMOL/L (ref 23–29)
CO2 SERPL-SCNC: 24 MMOL/L (ref 23–29)
CREAT SERPL-MCNC: 1.2 MG/DL (ref 0.5–1.4)
CREAT SERPL-MCNC: 1.2 MG/DL (ref 0.5–1.4)
DIFFERENTIAL METHOD: ABNORMAL
EOSINOPHIL # BLD AUTO: 0 K/UL (ref 0–0.5)
EOSINOPHIL NFR BLD: 0.1 % (ref 0–8)
ERYTHROCYTE [DISTWIDTH] IN BLOOD BY AUTOMATED COUNT: 18.2 % (ref 11.5–14.5)
EST. GFR  (AFRICAN AMERICAN): >60 ML/MIN/1.73 M^2
EST. GFR  (AFRICAN AMERICAN): >60 ML/MIN/1.73 M^2
EST. GFR  (NON AFRICAN AMERICAN): 58 ML/MIN/1.73 M^2
EST. GFR  (NON AFRICAN AMERICAN): 58 ML/MIN/1.73 M^2
GLUCOSE SERPL-MCNC: 142 MG/DL (ref 70–110)
GLUCOSE SERPL-MCNC: 142 MG/DL (ref 70–110)
HCT VFR BLD AUTO: 29.7 % (ref 40–54)
HCT VFR BLD AUTO: 36.3 % (ref 40–54)
HGB BLD-MCNC: 11.5 G/DL (ref 14–18)
HGB BLD-MCNC: 9.5 G/DL (ref 14–18)
IMM GRANULOCYTES # BLD AUTO: 0.56 K/UL (ref 0–0.04)
IMM GRANULOCYTES NFR BLD AUTO: 3 % (ref 0–0.5)
LYMPHOCYTES # BLD AUTO: 0.8 K/UL (ref 1–4.8)
LYMPHOCYTES NFR BLD: 4 % (ref 18–48)
MAGNESIUM SERPL-MCNC: 1.9 MG/DL (ref 1.6–2.6)
MCH RBC QN AUTO: 30.5 PG (ref 27–31)
MCHC RBC AUTO-ENTMCNC: 32 G/DL (ref 32–36)
MCV RBC AUTO: 96 FL (ref 82–98)
MONOCYTES # BLD AUTO: 0.7 K/UL (ref 0.3–1)
MONOCYTES NFR BLD: 3.4 % (ref 4–15)
NEUTROPHILS # BLD AUTO: 16.9 K/UL (ref 1.8–7.7)
NEUTROPHILS NFR BLD: 89.3 % (ref 38–73)
NRBC BLD-RTO: 0 /100 WBC
PHOSPHATE SERPL-MCNC: 3.2 MG/DL (ref 2.7–4.5)
PLATELET # BLD AUTO: 288 K/UL (ref 150–350)
PMV BLD AUTO: 9.5 FL (ref 9.2–12.9)
POTASSIUM SERPL-SCNC: 4.9 MMOL/L (ref 3.5–5.1)
POTASSIUM SERPL-SCNC: 4.9 MMOL/L (ref 3.5–5.1)
PROT SERPL-MCNC: 6.1 G/DL (ref 6–8.4)
PROT SERPL-MCNC: 6.1 G/DL (ref 6–8.4)
RBC # BLD AUTO: 3.11 M/UL (ref 4.6–6.2)
SODIUM SERPL-SCNC: 135 MMOL/L (ref 136–145)
SODIUM SERPL-SCNC: 135 MMOL/L (ref 136–145)
WBC # BLD AUTO: 18.9 K/UL (ref 3.9–12.7)

## 2020-07-30 PROCEDURE — 85018 HEMOGLOBIN: CPT

## 2020-07-30 PROCEDURE — 94660 CPAP INITIATION&MGMT: CPT

## 2020-07-30 PROCEDURE — 25000003 PHARM REV CODE 250: Performed by: INTERNAL MEDICINE

## 2020-07-30 PROCEDURE — 94761 N-INVAS EAR/PLS OXIMETRY MLT: CPT

## 2020-07-30 PROCEDURE — 36415 COLL VENOUS BLD VENIPUNCTURE: CPT

## 2020-07-30 PROCEDURE — 80053 COMPREHEN METABOLIC PANEL: CPT

## 2020-07-30 PROCEDURE — 94640 AIRWAY INHALATION TREATMENT: CPT

## 2020-07-30 PROCEDURE — 27000221 HC OXYGEN, UP TO 24 HOURS

## 2020-07-30 PROCEDURE — 63600175 PHARM REV CODE 636 W HCPCS: Performed by: STUDENT IN AN ORGANIZED HEALTH CARE EDUCATION/TRAINING PROGRAM

## 2020-07-30 PROCEDURE — 85730 THROMBOPLASTIN TIME PARTIAL: CPT

## 2020-07-30 PROCEDURE — 84100 ASSAY OF PHOSPHORUS: CPT

## 2020-07-30 PROCEDURE — 97535 SELF CARE MNGMENT TRAINING: CPT

## 2020-07-30 PROCEDURE — 99900035 HC TECH TIME PER 15 MIN (STAT)

## 2020-07-30 PROCEDURE — 27100098 HC SPACER

## 2020-07-30 PROCEDURE — 63600175 PHARM REV CODE 636 W HCPCS: Performed by: INTERNAL MEDICINE

## 2020-07-30 PROCEDURE — 11000001 HC ACUTE MED/SURG PRIVATE ROOM

## 2020-07-30 PROCEDURE — C9113 INJ PANTOPRAZOLE SODIUM, VIA: HCPCS | Performed by: INTERNAL MEDICINE

## 2020-07-30 PROCEDURE — 25000003 PHARM REV CODE 250: Performed by: STUDENT IN AN ORGANIZED HEALTH CARE EDUCATION/TRAINING PROGRAM

## 2020-07-30 PROCEDURE — 83735 ASSAY OF MAGNESIUM: CPT

## 2020-07-30 PROCEDURE — 85014 HEMATOCRIT: CPT

## 2020-07-30 PROCEDURE — 97530 THERAPEUTIC ACTIVITIES: CPT

## 2020-07-30 PROCEDURE — 85025 COMPLETE CBC W/AUTO DIFF WBC: CPT

## 2020-07-30 PROCEDURE — 25000003 PHARM REV CODE 250

## 2020-07-30 PROCEDURE — S0030 INJECTION, METRONIDAZOLE: HCPCS

## 2020-07-30 RX ORDER — ALBUTEROL SULFATE 90 UG/1
2 AEROSOL, METERED RESPIRATORY (INHALATION)
Status: DISCONTINUED | OUTPATIENT
Start: 2020-07-30 | End: 2020-08-12

## 2020-07-30 RX ORDER — HALOPERIDOL 5 MG/ML
2 INJECTION INTRAMUSCULAR EVERY 6 HOURS PRN
Status: DISCONTINUED | OUTPATIENT
Start: 2020-07-30 | End: 2020-07-30

## 2020-07-30 RX ORDER — HALOPERIDOL 5 MG/ML
2 INJECTION INTRAMUSCULAR ONCE
Status: COMPLETED | OUTPATIENT
Start: 2020-07-30 | End: 2020-07-30

## 2020-07-30 RX ORDER — METOCLOPRAMIDE HYDROCHLORIDE 5 MG/ML
10 INJECTION INTRAMUSCULAR; INTRAVENOUS ONCE
Status: COMPLETED | OUTPATIENT
Start: 2020-07-30 | End: 2020-07-30

## 2020-07-30 RX ADMIN — LIDOCAINE 1 PATCH: 50 PATCH TOPICAL at 04:07

## 2020-07-30 RX ADMIN — VORICONAZOLE 400 MG: 200 TABLET, FILM COATED ORAL at 03:07

## 2020-07-30 RX ADMIN — METOCLOPRAMIDE 10 MG: 5 INJECTION, SOLUTION INTRAMUSCULAR; INTRAVENOUS at 06:07

## 2020-07-30 RX ADMIN — Medication 4 TABLET: at 04:07

## 2020-07-30 RX ADMIN — HALOPERIDOL LACTATE 2 MG: 5 INJECTION, SOLUTION INTRAMUSCULAR at 01:07

## 2020-07-30 RX ADMIN — VORICONAZOLE 200 MG: 200 TABLET, FILM COATED ORAL at 04:07

## 2020-07-30 RX ADMIN — METRONIDAZOLE 500 MG: 500 INJECTION, SOLUTION INTRAVENOUS at 12:07

## 2020-07-30 RX ADMIN — METRONIDAZOLE 500 MG: 500 INJECTION, SOLUTION INTRAVENOUS at 09:07

## 2020-07-30 RX ADMIN — Medication 125 MG: at 05:07

## 2020-07-30 RX ADMIN — ALBUTEROL SULFATE 2 PUFF: 90 AEROSOL, METERED RESPIRATORY (INHALATION) at 08:07

## 2020-07-30 RX ADMIN — LORAZEPAM 1 MG: 2 INJECTION INTRAMUSCULAR; INTRAVENOUS at 04:07

## 2020-07-30 RX ADMIN — PANTOPRAZOLE SODIUM 40 MG: 40 INJECTION, POWDER, FOR SOLUTION INTRAVENOUS at 08:07

## 2020-07-30 RX ADMIN — ALBUTEROL SULFATE 2 PUFF: 90 AEROSOL, METERED RESPIRATORY (INHALATION) at 12:07

## 2020-07-30 RX ADMIN — PANTOPRAZOLE SODIUM 40 MG: 40 INJECTION, POWDER, FOR SOLUTION INTRAVENOUS at 09:07

## 2020-07-30 RX ADMIN — Medication 125 MG: at 06:07

## 2020-07-30 RX ADMIN — DEXAMETHASONE SODIUM PHOSPHATE 6 MG: 4 INJECTION, SOLUTION INTRAMUSCULAR; INTRAVENOUS at 08:07

## 2020-07-30 RX ADMIN — ALBUTEROL SULFATE 2 PUFF: 90 AEROSOL, METERED RESPIRATORY (INHALATION) at 04:07

## 2020-07-30 RX ADMIN — METRONIDAZOLE 500 MG: 500 INJECTION, SOLUTION INTRAVENOUS at 04:07

## 2020-07-30 NOTE — PLAN OF CARE
Problem: Occupational Therapy Goal  Goal: Occupational Therapy Goal  Description: Goals to be met by: 8/27/20     Patient will increase functional independence with ADLs by performing:    LE Dressing with Stand-by Assistance.  Grooming while standing with Stand-by Assistance.  Toileting from toilet with Stand-by Assistance for hygiene and clothing management.   Supine to sit with Stand-by Assistance.  Step transfer with Stand-by Assistance  Toilet transfer to toilet with Stand-by Assistance.  Upper extremity exercise program x10 reps per handout, with independence.    Outcome: Ongoing, Progressing   Pt initially on nsg hold 2/2 lethargy from meds administered for agitation Phlebotomy asked for assistance to maintain pt's UE still for blood draw and stated that pt asked for assistance for urination; OT assisted pt for urinal and assisted to keep pt calm for blood draw. Pt satting ~82%-88% spO2 on 15L O2 via HFNC while seated upright in bed during blood draw with no bed mobility performed. Pt diapered and nsg notified of low spO2. OT asked nsg to attempt proning and nsg ok'd at this time. Pt proned but with decreasing sats as low as 67% spO2 and returned to supine seated upright with HOB elevated; sats recovered ~84-86% spO2 after increased time and mouth wiped 2/2 thick secretions after coughing. Nsg notified and attending to pt for further assistance, possible transition to non-rebreather.

## 2020-07-30 NOTE — PT/OT/SLP PROGRESS
Occupational Therapy   Treatment    Name: Rajan Deluna  MRN: [id number]  Admitting Diagnosis:  Acute respiratory disease due to COVID-19 virus       Recommendations:     Discharge Recommendations: (TBD)  Discharge Equipment Recommendations:  (TBD)  Barriers to discharge:  Inaccessible home environment, Decreased caregiver support    Assessment:     Rajan Deluna is a 74 y.o. male with a medical diagnosis of Acute respiratory disease due to COVID-19 virus.  He presents with labile spO2 sats while seated upright with HOB elevated. Pt initially on nsg hold 2/2 lethargy from meds administered for agitation. Phlebotomy asked for assistance to maintain pt's UE still for blood draw and stated that pt asked for assistance for urination; OT assisted pt for urinal and assisted to keep pt calm for blood draw. Pt satting ~82%-88% spO2 on 15L O2 via HFNC while seated upright in bed during blood draw with no bed mobility performed. Pt diapered and nsg notified of low spO2. OT asked nsg to attempt proning and nsg ok'd at this time. Pt proned but with decreasing sats as low as 67% spO2 and returned to supine seated upright with HOB elevated; sats recovered ~84-86% spO2 after increased time and mouth wiped 2/2 thick secretions after coughing. Nsg notified and attending to pt for further assistance, possible transition to non-rebreather.   Performance deficits affecting function are weakness, impaired endurance, impaired self care skills, impaired functional mobilty, gait instability, impaired balance, decreased coordination, decreased lower extremity function, decreased upper extremity function, decreased ROM, impaired cardiopulmonary response to activity, impaired coordination.     Rehab Prognosis:  Fair; patient would benefit from acute skilled OT services to address these deficits and reach maximum level of function.       Plan:     Patient to be seen 5 x/week to address the above listed problems via self-care/home management,  therapeutic activities, therapeutic exercises  · Plan of Care Expires: 08/27/20  · Plan of Care Reviewed with: patient    Subjective     Pain/Comfort:  Pain Rating 1: (did not c/o pain)    Objective:     Communicated with: nsg prior to session.  Patient found HOB elevated with bed alarm, pulse ox (continuous), telemetry, peripheral IV, oxygen(HFNC) upon OT entry to room.    General Precautions: Standard, airborne, contact, droplet, fall   Orthopedic Precautions:N/A   Braces: N/A     Occupational Performance:     Bed Mobility:    · Patient completed Rolling/Turning to Left with  moderate assistance  · Patient completed Rolling/Turning to Right with moderate assistance  · Patient completed Proning through roll to Right with maximum assistance  · Patient completed Scooting/Bridging with total assistance       Activities of Daily Living:  · Toileting: total assistance for placing urinal and pericare/diapering after use of urinal as pt wet prior to use of urinal      AMPAC 6 Click ADL: 15    Treatment & Education:  Pt educated on role of OT and POC.   Pt performing skills as listed above.   Pt calm and each UE remained still during blood draw at this time, no agitation or recoil noted with x3 needle sticks. Conversant and pleasant throughout session though noted lability in spO2 saturations with little to no movement in bed. Pt assisted to prone initially requiring mod A to roll through R sidelying but max A to adjust RUE; pt with dropping spO2 sats in prone and max/total A to roll out of prone through R sidelying with good improvement of spO2 sats to ~84-85% spO2. Pt provided with increased time and rest but sats did not recover above 85% spO2 at this time. Nsg aware of spO2 and nsg present in room to assist pt to don non-rebreather at end of OT session.     Patient left HOB elevated with all lines intact, call button in reach, bed alarm on, nsg notified and nsg presentEducation:      GOALS:   Multidisciplinary Problems      Occupational Therapy Goals        Problem: Occupational Therapy Goal    Goal Priority Disciplines Outcome Interventions   Occupational Therapy Goal     OT, PT/OT Ongoing, Progressing    Description: Goals to be met by: 8/27/20     Patient will increase functional independence with ADLs by performing:    LE Dressing with Stand-by Assistance.  Grooming while standing with Stand-by Assistance.  Toileting from toilet with Stand-by Assistance for hygiene and clothing management.   Supine to sit with Stand-by Assistance.  Step transfer with Stand-by Assistance  Toilet transfer to toilet with Stand-by Assistance.  Upper extremity exercise program x10 reps per handout, with independence.                     Time Tracking:     OT Date of Treatment: 07/30/20  OT Start Time: 1245  OT Stop Time: 1310  OT Total Time (min): 25 min    Billable Minutes:Self Care/Home Management 12  Therapeutic Activity 13    Asia Griffiths OT  7/30/2020

## 2020-07-30 NOTE — ASSESSMENT & PLAN NOTE
Patient has had consistent melanic stools since starting heparin drip. Patient's blood was typed and crossed and blood transfusion consent was obtained. Patient was transfused 1 unit of PRBC.     Plan:  - discontinued heparin drip (7/26) and discontinue prophylactic heparin. Will continue to monitor and once H&H stable will continue prophylactic dose of heparin.  - GI consulted for gi bleed and recommended continue IV Protonix BID, continue trending H&H, and schedule patient for EGD/colonoscopy outpatient due to patient's respiratory status. Possible video capsule endoscopy if inpatient GI bleeding persists.  - continue monitor H&H Q12H.  - trend stool output and consistency.

## 2020-07-30 NOTE — PROGRESS NOTES
[facility] Hospital Medicine  Progress Note    Patient Name: Rajan Deluna  MRN: [id number]  Patient Class: IP- Inpatient   Admission Date: 7/21/2020  Length of Stay: 9 days  Attending Physician: Rell Tolentino III, MD  Primary Care Provider: Jason Mccord MD        Subjective:     Principal Problem:Acute respiratory disease due to COVID-19 virus        HPI:  Patient is a 74-year-old male with a past medical history of hypertension, COPD, MAC infection who presented to the ED with altered mental status and fever.  As per the family, patient has had altered mental status for the past 3-4 days. Family endorses increased somnolence as well as urinating on himself. EMS was called two days prior to presentation but reportedly deemed that patient did not need to be brought in. Over the previous day, reportedly the patient was found stuck, staring in his sink, not responding. On the day of presentation, the patient was found down on the floor, family wasn't able to get patient up from the floor and was reportedly not responding as much as baseline. Family believed that patient may not have been wearing baseline 2L O2. Patient also endorses a cough and generalized weakness. Patient reportedly had a recent UTI.     In the ED, chest x-ray showed diffuse interstitial patchy infiltrates concerning for possible pneumonia and a COVID-19 screening test was positive. Fever was 101° F and patient was hypotensive 80s/40s. Patient given 30cc/kg bolus. BP very soft on presentation, adequately responded to volume resuscitation and then decreased again. Labs showed a BUN 45, creatinine 2.9, , , troponin 0.036, procalcitonin 1.13.     Overview/Hospital Course:  No notes on file    Interval History:  Patient was agitated and irritated last night and attempted to remove BiPAP mask. Patient was given 1 mg of Ativan and agitation improved. Patient currently on BiPAP at night. With FiO2 of 70% patient satting  in the low to mid 90s overnight.  Patient has been on supplemental oxygen via high-flow nasal cannula at 13-15 liters/minute. Satting in the low to mid 90s during the day. Patient's blood pressure stable, 135/76 with a MAP of 100. GI was consulted and recommending continuing IV Protonix b.i.d. as well as scheduling outpatient EGD/colonoscopy due to patient's respiratory status. Continue monitor H&H for possible VCE while inpatient.  ID further recommended adding voriconazole for past history of Aspergillus infections uptrending white blood cell count.    Review of Systems   Respiratory: Negative for shortness of breath.    Cardiovascular: Negative for chest pain.   Gastrointestinal: Positive for diarrhea (melanic). Negative for abdominal pain and nausea.   Psychiatric/Behavioral: The patient is not nervous/anxious.      Objective:     Vital Signs (Most Recent):  Temp: 96.8 °F (36 °C) (07/30/20 0419)  Pulse: 93 (07/30/20 0422)  Resp: 20 (07/30/20 0419)  BP: 135/76 (07/30/20 0419)  SpO2: 95 % (07/30/20 0606) Vital Signs (24h Range):  Temp:  [96.8 °F (36 °C)-98.8 °F (37.1 °C)] 96.8 °F (36 °C)  Pulse:  [] 93  Resp:  [16-20] 20  SpO2:  [89 %-96 %] 95 %  BP: ()/(57-76) 135/76     Weight: 83 kg (182 lb 15.7 oz)  Body mass index is 25.52 kg/m².    Intake/Output Summary (Last 24 hours) at 7/30/2020 0615  Last data filed at 7/30/2020 0200  Gross per 24 hour   Intake 520 ml   Output 1170 ml   Net -650 ml      Physical Exam  Vitals signs and nursing note reviewed.   Constitutional:       General: He is not in acute distress.  HENT:      Head: Normocephalic and atraumatic.   Eyes:      Extraocular Movements: Extraocular movements intact.   Cardiovascular:      Rate and Rhythm: Normal rate and regular rhythm.      Comments: As seen on monitor, patient appears to have sinus rhythm  Pulmonary:      Effort: Pulmonary effort is normal.      Comments: As seen on the monitor, patient peripheral oxygen saturation in low 90s.    Abdominal:      General: There is no distension.   Skin:     Coloration: Skin is not jaundiced.   Neurological:      Mental Status: He is alert.   Psychiatric:      Comments: Answers questions appropriately.          Significant Labs:   A1C:   Recent Labs   Lab 07/23/20  0403   HGBA1C 6.3*     Bilirubin:   Recent Labs   Lab 07/25/20  0311 07/26/20  0441 07/27/20  0520 07/28/20  1131 07/29/20  0602   BILITOT 0.6 0.5 0.6 0.8 0.7  0.7     BMP:   Recent Labs   Lab 07/29/20  0602     109     138   K 4.6  4.6     110   CO2 22*  22*   BUN 21  21   CREATININE 1.2  1.2   CALCIUM 8.0*  8.0*   MG 1.8     CBC:   Recent Labs   Lab 07/28/20  1131 07/28/20  1946 07/29/20  0602 07/29/20  1819   WBC 19.85*  --  20.19*  --    HGB 9.7* 8.9* 9.2* 9.7*   HCT 30.3* 27.6* 28.6* 29.6*     --  299  --      CMP:   Recent Labs   Lab 07/28/20  1131 07/29/20  0602    138  138   K 4.4 4.6  4.6    110  110   CO2 21* 22*  22*   * 109  109   BUN 26* 21  21   CREATININE 1.4 1.2  1.2   CALCIUM 8.2* 8.0*  8.0*   PROT 6.0 5.6*  5.6*   ALBUMIN 2.6* 2.4*  2.4*   BILITOT 0.8 0.7  0.7   ALKPHOS 77 69  69   AST 52* 31  31   * 74*  74*   ANIONGAP 7* 6*  6*   EGFRNONAA 48* 58*  58*     Coagulation:   Recent Labs   Lab 07/29/20  0602   APTT 28.2     Magnesium:   Recent Labs   Lab 07/28/20  1131 07/29/20  0602   MG 1.9 1.8     TSH:   Recent Labs   Lab 07/23/20  0403   TSH 0.604       Significant Imaging: I have reviewed all pertinent imaging results/findings within the past 24 hours.      Assessment/Plan:      * Acute respiratory disease due to COVID-19 virus  Patient has been on 13-15 liter/per min via high flow nasal cannula. Patient used BiPAP at 70% oxygen concentration overnight. Patient is currently stable but oxygen saturation will decrease quickly if off of supplement oxygen for short periods of time. Patient working closely with PT/OT in patient able to sit up at  bedside.    Plan:     - COVID Isolation per protocol   - scheduled BiPAP at night   - continue dexamethasone 6 mg, currently on day 9   - discontinue heparin gtt (7/26) and discontinue prophylactic heparin. Will continue to monitor and once H&H stable will continue prophylactic dose of heparin   - patient finished 5 day course of Remdesivir.   - Atorvastatin 40mg daily   - Multivitamin    - continue to monitor respiratory status.    - infectious disease made further recommendations of adding an antifungal voriconazole for past history of Aspergillus infections with uptrending WBC count.  Will follow recommendations.      MARQUIS (acute kidney injury)  Presented with Acute Kidney Injury 2/2 to hypovolemic shock, pre renal   Status improving    Plan:  - cont to encourage oral fluid intake.  - Continue to monitor renal function  - Avoid nephrotoxic medications      COPD (chronic obstructive pulmonary disease)  Patient used 2L supplemental oxygen at home.    Plan:  - continue respiratory therapy treatments  - albuterol inhaler 2 puff Q4H  - MDI    Positive sputum culture for Aspergillus  Patient has a known history of MAC infections as well as Aspergillus.  ID consulted and will follow recommendations.    Plan:  - continue voriconazole.     GI bleed  Patient has had consistent watery melanic stools since admission. Patient found to have positive C. Diff infection and required PO vancomycin and IV metronidazole. Patient was started on a heparin drip for an elevated D-dimer of 7 and patient not being a candidate to a CTA due to MARQUIS during admission. Heparin drip was discontinued on 7/26.     Plan:   - GI consulted for gi bleed and recommended continue IV Protonix BID, continue trending H&H, and schedule patient for EGD/colonoscopy outpatient due to patient's respiratory status. Possible video capsule endoscopy if inpatient GI bleeding persists.  - patient's diet was advanced yesterday.   - will trend H&H Q12H   - if Hgb < 7  will transfuse with PRBCs.   - continue IV Protonix BID      Clostridium difficile infection  P/w consistent melanotic diarrhea, C diff toxin positive. Still producing watery melanic diarrhea.    Plan:  - ID following and GI following  - p.o. vancomycin and IV metronidazole  - continue IV Protonix.   - continue probiotics    Anemia  Patient has had consistent melanic stools since starting heparin drip. Patient's blood was typed and crossed and blood transfusion consent was obtained. Patient was transfused 1 unit of PRBC.     Plan:  - discontinued heparin drip (7/26) and discontinue prophylactic heparin. Will continue to monitor and once H&H stable will continue prophylactic dose of heparin.  - GI consulted for gi bleed and recommended continue IV Protonix BID, continue trending H&H, and schedule patient for EGD/colonoscopy outpatient due to patient's respiratory status. Possible video capsule endoscopy if inpatient GI bleeding persists.  - continue monitor H&H Q12H.  - trend stool output and consistency.      VTE Risk Mitigation (From admission, onward)         Ordered     Place sequential compression device  Until discontinued      07/21/20 2142     IP VTE HIGH RISK PATIENT  Once      07/21/20 2142                      Olivier Rowe,  PGY-1  [facility]  Department of Hospital Medicine   [facility]

## 2020-07-30 NOTE — PROGRESS NOTES
"[facility]       Interval History: Patient interviewed with video telemedicine today. Minimally interactive with interview this morning. Nursing staff reported patient became agitated overnight requiring Ativan. He continues to have dark, black stool. No nausea, vomiting, abdominal pain or hematochezia.     Past Medical History  Past Medical History:   Diagnosis Date    Arthritis     COPD (chronic obstructive pulmonary disease)     Hypertension     Smoker     Weakness        Review of Systems  Cardiovascular ROS: no chest pain or palpiations,  + dyspnea on exertion  Gastrointestinal ROS: + black stool and diarrhea, no abdominal pain and change in bowel habits,     Physical Examination  /76 (BP Location: Left arm, Patient Position: Lying)   Pulse 93   Temp 96.8 °F (36 °C) (Axillary)   Resp 20   Ht 5' 11" (1.803 m)   Wt 83 kg (182 lb 15.7 oz)   SpO2 95%   BMI 25.52 kg/m²    General appearance: alert, cooperative, no distress, NC in place   HENT: Normocephalic, atraumatic  Lungs: no respiratory distress or use of accessory muscles     Labs:  (7/30) WBC 20.19, Hgb 9.2, Hct 28.6, Plt 299  CBC pending from today        Assessment: patrick Deluna is a 74 year-old -American male with PMHx of HTN, COPD (on 2L baseline), MAC infection, aspergillosis infection who presents with acute onset black, tarry stools that are liquid in consistency associated with newly diagnosed C. difficile colitis and COVID-19 pneumonia infections. Initially presented with somnolence, fever, and liquid stools for 3-4 days prior to admission and was found down. In the ED, CXR revealed diffuse interstitial patchy infiltrates, COVID-19 screen was positive, and C diff antigen positive. Was in the ICU for hypotension and started on aggressive anti-coaulation with heparin gtt given severe covid infection with thrombogenic risk.  On IV PPI BID and was started on Remdesivir for severe COVID in addition to IV metronidazole and " oral vancomycin. S/p 1 unit pRBCs transfusion. On HHFNC and Bipap nightly. Ddx for black stools/melena include upper GI bleed from PUD vs gastritis vs Dieulafoy lesion vs angioectasia vs lower GI bleed from C diff colitis or right sided diverticulosis     Plan:  - maintain2 large bore  IVs  - continue to trend Hgb and transfuse for Hgb >7 unless higher threshold indicated by comorbidites   - continue IV PPI BID  - continue PO vancomycin 125 mg QID for 10 days  - Given poor respiratory status, will defer EGD/colonoscopy at this time   - planning for VCE this evening if feasible

## 2020-07-30 NOTE — PLAN OF CARE
[facility]:    Video capsule placed today at bedside 7/30 at 4:30 pm.    Plan:  - Can have clear liquids in 2 hours (6:30 PM).  - Can have a light snack in 4 hours (8:30 PM).  - Study ends after 8 hours (end time approximately 7/31 12:30 AM).  - Please remove equipment and okay to leave at bedside after completed.  - Instruction sheet left in chart and communicated to nursing staff.  - Will follow up VCE findings to determine further plan of care and need for intervention.  - Continue IV PPI BID and trend H/H closely (every 12 hrs).  - Please call GI on call physician with any additional questions.    Tara Price MD  [facility] PGY IV

## 2020-07-30 NOTE — PLAN OF CARE
BELTRAN q2h COVID rounds: Staff currently at the bedside. Patient has no needs at the time. WCTM.

## 2020-07-30 NOTE — PHYSICIAN QUERY
PT Name: Rajan Deluna  MR #: [id number]    HEMATOLOGY CLARIFICATION      CDS/: ULISSES Artis, RN, CDS               Contact information:[email or web address]     This form is a permanent document in the medical record.      Query Date: July 30, 2020    By submitting this query, we are merely seeking further clarification of documentation. Please utilize your independent clinical judgment when addressing the question(s) below.    The Medical Record contains the following:   Indicators  Supporting Clinical Findings Location in Medical Record   X Anemia documented  Anemia-Patient has had consistent melanic stools since starting heparin drip    Dr. Soledad STEWART, 7/30   X H&H    7/21 7/21 7/22 7/23 7/23 7/24 7/25 7/26 7/27 7/28   Hgb 4.9 (LL) 9.9 (L) 10.8 (L) 11.3 (L) 10.6 (L) 10.8 (L) 8.9 (L) 7.3 (L) 6.9 (L) 9.7 (L)   Hct 16.1 (LL) 31.4 (L) 33.8 (L) 34.2 (L) 32.5 (L) 33.7 (L) 28.3 (L) 22.9 (L) 21.5 (L) 30.3 (L)         Lab 7/21-7/28   X BP                    HR Vital Signs (24h Range):  Pulse:  [] 93  Resp:  [16-20] 20  SpO2:  [89 %-96 %] 95 %  BP: ()/(57-76) 135/76    Dr. Soledad STEWART, 7/30   X GI bleeding documented  GI bleed     Patient has had consistent watery melanic stools since admission     Clostridium difficile infection  P/w consistent melanotic diarrhea, C diff toxin positive. Still producing watery melanic diarrhea.    Dr. Soledad STEWART, 7/30    Acute bleeding (Non GI site)     X Transfusion(s)  Transfused 1 unit PRBC    Lab 7/27   X Acute/Chronic illness  hypertension, COPD, MAC infection, Acute respiratory disease due to COVID 19, MARQUIS, COPD, Clostridium difficile infection    Dr. Soledad STEWART, 7/30   X Treatments  GI consulted for GI bleed and recommend to continue IV Protonix BID   Continue trending H&H     Discontinued heparin drip (7/26) and discontinue prophylactic heparin. Will continue to monitor and once H&H stable will continue prophylactic dose of heparin.     p.o. vancomycin and IV  metronidazole     Possible video capsule endoscopy if inpatient GI bleeding persists    Dr. Soledad STEWART, 7/30    Other       Provider, please specify the type of Anemia associated with above clinical findings.     [ X ] Acute blood loss anemia    [   ] Anemia, unspecified    [   ] Other Hematological Diagnosis (please specify): _________________   [   ] Clinically Undetermined     Present on admission (POA) status:   [   ] Yes (Y)                          [ x ] Clinically Undetermined (W)  [   ] No (N)                            [   ] Documentation insufficient to determine if condition is POA (U)          Please document in your progress notes daily for the duration of treatment, until resolved, and include in your discharge summary.

## 2020-07-30 NOTE — ASSESSMENT & PLAN NOTE
Patient has had consistent watery melanic stools since admission. Patient found to have positive C. Diff infection and required PO vancomycin and IV metronidazole. Patient was started on a heparin drip for an elevated D-dimer of 7 and patient not being a candidate to a CTA due to MARQUIS during admission. Heparin drip was discontinued on 7/26.     Plan:   - GI consulted for gi bleed and recommended continue IV Protonix BID, continue trending H&H, and schedule patient for EGD/colonoscopy outpatient due to patient's respiratory status. Possible video capsule endoscopy if inpatient GI bleeding persists.  - patient's diet was advanced yesterday.   - will trend H&H Q12H   - if Hgb < 7 will transfuse with PRBCs.   - continue IV Protonix BID

## 2020-07-30 NOTE — PROGRESS NOTES
[facility] Progress Note  Pulmonology    Admit Date: 7/21/2020   LOS: 9 days     SUBJECTIVE:     Overnight patient became agitated and attempted to pull BiPAP off.  VSS, he is stable on 15 L/min hiflo, tolerated Bipap overnight he was given ativan and haldol for agitation and is very sleepy on exam today.      Continuous Infusions:  Scheduled Meds:   albuterol  2 puff Inhalation QID WAKE    dexamethasone  6 mg Intravenous Q24H    Lactobacillus acidoph-L.bulgar  4 tablet Oral TID WM    lidocaine  1 patch Transdermal Q24H    metronidazole  500 mg Intravenous Q8H    multivitamin  1 tablet Oral Daily    pantoprazole  40 mg Intravenous BID    vancomycin  125 mg Oral Q6H    voriconazole  200 mg Oral Q12H    voriconazole  400 mg Oral Q12H     PRN Meds:sodium chloride, acetaminophen, ondansetron, pneumoc 13-loy conj-dip cr(PF), simethicone, sodium chloride 0.9%    Review of patient's allergies indicates:  No Known Allergies  ROS  Negative other than HPI     OBJECTIVE:     Vital Signs (Most Recent)  Temp: 99.4 °F (37.4 °C) (07/30/20 0829)  Pulse: 96 (07/30/20 0829)  Resp: 16 (07/30/20 0829)  BP: (!) 106/54 (07/30/20 0829)  SpO2: (!) 88 % (07/30/20 0829)    Vital Signs Range (Last 24H):  Temp:  [96.8 °F (36 °C)-99.4 °F (37.4 °C)]   Pulse:  []   Resp:  [16-20]   BP: ()/(54-76)   SpO2:  [88 %-96 %]     Current Diet Order   Procedures    Diet NPO      I & O (Last 24H):    Intake/Output Summary (Last 24 hours) at 7/30/2020 1131  Last data filed at 7/30/2020 0200  Gross per 24 hour   Intake 420 ml   Output 1170 ml   Net -750 ml     Wt Readings from Last 3 Encounters:   07/26/20 83 kg (182 lb 15.7 oz)   09/22/17 91.9 kg (202 lb 8 oz)   05/22/17 93.7 kg (206 lb 9.6 oz)     Ventilator Data (Last 24H):     Oxygen Concentration (%):  [70] 70    Hemodynamic Parameters (Last 24H):       Physical Exam  Due to COVID, PE was limited to reduce use of PPE   Resting comfortably in bed on vapotherm visibly asleep,  "protecting airway   Moves all extremities      Lines/Drains:       Peripheral IV - Single Lumen 07/21/20 1254 20 G Right Hand (Active)   Site Assessment Clean;Dry;Intact;No redness;No swelling 07/27/20 0830   Line Status Flushed;Saline locked 07/27/20 0830   Dressing Status Clean;Dry;Intact 07/27/20 0830   Dressing Intervention Integrity maintained 07/26/20 1905   Dressing Change Due 07/25/20 07/26/20 1905   Site Change Due 07/25/20 07/27/20 0830   Reason Not Rotated Poor venous access 07/27/20 0830   Number of days: 5            Peripheral IV - Single Lumen 07/23/20 1506 20 G Anterior;Right Forearm (Active)   Site Assessment Clean;Dry;Intact;No redness;No swelling 07/27/20 0830   Line Status Infusing 07/27/20 0830   Dressing Status Clean;Dry;Intact 07/27/20 0830   Dressing Intervention Integrity maintained 07/26/20 1905   Dressing Change Due 07/27/20 07/26/20 1905   Site Change Due 07/27/20 07/27/20 0830   Reason Not Rotated Poor venous access 07/27/20 0830   Number of days: 3            Rectal Tube 07/23/20 1215 (Active)   Reposition drainage bags for BMS & Trejo on opposite sides of bed 07/26/20 1905   Outcome stool evacuated 07/27/20 0830   Stool Color Black 07/27/20 0830   Insertion Site Appearance no redness, warmth, tenderness, skin breakdown, drainage 07/26/20 1905   Flush/Irrigation flushed w/;water;no resistance met 07/26/20 1630   Intake (mL) 40 mL 07/26/20 1630   Rectal Tube Output 75 mL 07/27/20 0600   Number of days: 3            Urethral Catheter 07/23/20 1212 Straight-tip 16 Fr. (Active)   Site Assessment Clean;Intact 07/27/20 0830   Collection Container Urimeter 07/27/20 0830   Securement Method secured to top of thigh w/ adhesive device 07/27/20 0830   Catheter Care Performed yes 07/27/20 0830   Reason for Continuing Urinary Catheterization Critically ill in ICU requiring intensive monitoring 07/27/20 0830   CAUTI Prevention Bundle StatLock in place w 1" slack;Intact seal between catheter & " drainage tubing;Drainage bag/urimeter off the floor;Green sheeting clip in use;No dependent loops or kinks;Drainage bag/urimeter not overfilled (<2/3 full);Drainage bag/urimeter below bladder 07/27/20 0830   Output (mL) 170 mL 07/27/20 0505   Number of days: 3         LABS  CBC  Recent Labs   Lab 07/25/20  0311 07/26/20  0441 07/27/20  0520 07/28/20  1131 07/28/20  1946 07/29/20  0602 07/29/20  1819   WBC 14.52* 13.72* 16.40* 19.85*  --  20.19*  --    RBC 3.11* 2.52* 2.38* 3.24*  --  3.06*  --    HGB 8.9* 7.3* 6.9* 9.7* 8.9* 9.2* 9.7*   HCT 28.3* 22.9* 21.5* 30.3* 27.6* 28.6* 29.6*    301 340 320  --  299  --    MCV 91 91 90 94  --  94  --    MCH 28.6 29.0 29.0 29.9  --  30.1  --    MCHC 31.4* 31.9* 32.1 32.0  --  32.2  --        BMP  Recent Labs   Lab 07/25/20 0311 07/26/20  0441 07/27/20  0520 07/28/20  1131 07/29/20  0602   * 139 139 136 138  138   K 4.4 4.0 3.9 4.4 4.6  4.6   CO2 26 22* 22* 21* 22*  22*   * 109 110 108 110  110   BUN 68* 45* 31* 26* 21  21   CREATININE 1.9* 1.5* 1.4 1.4 1.2  1.2       CMP  Sodium   Date Value Ref Range Status   07/29/2020 138 136 - 145 mmol/L Final   07/29/2020 138 136 - 145 mmol/L Final     Potassium   Date Value Ref Range Status   07/29/2020 4.6 3.5 - 5.1 mmol/L Final   07/29/2020 4.6 3.5 - 5.1 mmol/L Final     Chloride   Date Value Ref Range Status   07/29/2020 110 95 - 110 mmol/L Final   07/29/2020 110 95 - 110 mmol/L Final     CO2   Date Value Ref Range Status   07/29/2020 22 (L) 23 - 29 mmol/L Final   07/29/2020 22 (L) 23 - 29 mmol/L Final     Glucose   Date Value Ref Range Status   07/29/2020 109 70 - 110 mg/dL Final   07/29/2020 109 70 - 110 mg/dL Final     BUN, Bld   Date Value Ref Range Status   07/29/2020 21 8 - 23 mg/dL Final   07/29/2020 21 8 - 23 mg/dL Final     Creatinine   Date Value Ref Range Status   07/29/2020 1.2 0.5 - 1.4 mg/dL Final   07/29/2020 1.2 0.5 - 1.4 mg/dL Final     Calcium   Date Value Ref Range Status   07/29/2020 8.0  (L) 8.7 - 10.5 mg/dL Final   07/29/2020 8.0 (L) 8.7 - 10.5 mg/dL Final     Total Protein   Date Value Ref Range Status   07/29/2020 5.6 (L) 6.0 - 8.4 g/dL Final   07/29/2020 5.6 (L) 6.0 - 8.4 g/dL Final     Albumin   Date Value Ref Range Status   07/29/2020 2.4 (L) 3.5 - 5.2 g/dL Final   07/29/2020 2.4 (L) 3.5 - 5.2 g/dL Final     Total Bilirubin   Date Value Ref Range Status   07/29/2020 0.7 0.1 - 1.0 mg/dL Final     Comment:     For infants and newborns, interpretation of results should be based  on gestational age, weight and in agreement with clinical  observations.  Premature Infant recommended reference ranges:  Up to 24 hours.............<8.0 mg/dL  Up to 48 hours............<12.0 mg/dL  3-5 days..................<15.0 mg/dL  6-29 days.................<15.0 mg/dL     07/29/2020 0.7 0.1 - 1.0 mg/dL Final     Comment:     For infants and newborns, interpretation of results should be based  on gestational age, weight and in agreement with clinical  observations.  Premature Infant recommended reference ranges:  Up to 24 hours.............<8.0 mg/dL  Up to 48 hours............<12.0 mg/dL  3-5 days..................<15.0 mg/dL  6-29 days.................<15.0 mg/dL       Alkaline Phosphatase   Date Value Ref Range Status   07/29/2020 69 55 - 135 U/L Final   07/29/2020 69 55 - 135 U/L Final     AST   Date Value Ref Range Status   07/29/2020 31 10 - 40 U/L Final   07/29/2020 31 10 - 40 U/L Final     ALT   Date Value Ref Range Status   07/29/2020 74 (H) 10 - 44 U/L Final   07/29/2020 74 (H) 10 - 44 U/L Final     Anion Gap   Date Value Ref Range Status   07/29/2020 6 (L) 8 - 16 mmol/L Final   07/29/2020 6 (L) 8 - 16 mmol/L Final     eGFR if    Date Value Ref Range Status   07/29/2020 >60 >60 mL/min/1.73 m^2 Final   07/29/2020 >60 >60 mL/min/1.73 m^2 Final     eGFR if non    Date Value Ref Range Status   07/29/2020 58 (A) >60 mL/min/1.73 m^2 Final     Comment:     Calculation used to  obtain the estimated glomerular filtration  rate (eGFR) is the CKD-EPI equation.      07/29/2020 58 (A) >60 mL/min/1.73 m^2 Final     Comment:     Calculation used to obtain the estimated glomerular filtration  rate (eGFR) is the CKD-EPI equation.            POCT-Glucose  No results found for: POCTGLUCOSE  Recent Labs   Lab 07/25/20  0311 07/26/20  0441 07/27/20  0520 07/28/20  1131 07/29/20  0602   CALCIUM 8.2* 7.8* 7.8* 8.2* 8.0*  8.0*   MG 2.4 2.2 2.1 1.9 1.8   PHOS 3.5 3.3 3.2 2.5* 3.2         LFT  Recent Labs   Lab 07/25/20  0311 07/26/20  0441 07/27/20  0520 07/28/20  1131 07/29/20  0602   PROT 6.6 5.5* 5.2* 6.0 5.6*  5.6*   ALBUMIN 2.5* 2.2* 2.2* 2.6* 2.4*  2.4*   BILITOT 0.6 0.5 0.6 0.8 0.7  0.7   * 85* 49* 52* 31  31   ALKPHOS 76 63 61 77 69  69   * 133* 103* 101* 74*  74*         COAGS  Recent Labs   Lab 07/25/20  1322 07/26/20  0441 07/27/20  0519 07/28/20  1125 07/29/20  0602   APTT 52.7* 68.2* 25.7 26.5 28.2       CE  No results for input(s): TROPONINI, CKTOTAL, CKMB in the last 168 hours.  BNP  No results for input(s): BNP in the last 168 hours.    LAST HbA1c  Lab Results   Component Value Date    HGBA1C 6.3 (H) 07/23/2020         Imaging Results          X-Ray Chest 1 View (Final result)  Result time 07/22/20 18:36:38    Final result by Kari Becerra MD (07/22/20 18:36:38)                 Impression:      No significant change.      Electronically signed by: Kari Becerra  Date:    07/22/2020  Time:    18:36             Narrative:    EXAMINATION:  CHEST ONE VIEW    CLINICAL HISTORY:  COPD Exacerbation; Sepsis, unspecified organism    TECHNIQUE:  One view of the chest.    COMPARISON:  07/21/2020    FINDINGS:  The cardiac silhouette is within normal limits.   There is no focal consolidation, pneumothorax, or pleural effusion. There is a diffuse interstitial lung abnormalities.  There is chronic appearing bilateral pleural thickening.  There is no large appearing pleural  fluid.                               X-Ray Chest AP Portable (Final result)  Result time 07/21/20 13:54:05    Final result by Luda Powell MD (07/21/20 13:54:05)                 Impression:      Abnormal increased interstitial lung markings predominantly at the lung basis left more than right, nonspecific could be acute or chronic.      Electronically signed by: Luda Powell MD  Date:    07/21/2020  Time:    13:54             Narrative:    EXAMINATION:  XR CHEST AP PORTABLE    CLINICAL HISTORY:  Sepsis;    TECHNIQUE:  Single frontal view of the chest was performed.    COMPARISON:  None    FINDINGS:  EKG wires overlie the chest.  The cardiac silhouette is normal in size, midline.  Mild increased interstitial lung markings noted both lung fields, nonspecific, slightly accentuated  at the lung bases left more than right.  No large pleural effusion.  No pneumothorax.  The osseous structures appear normal.                                  Microbiology Results (last 7 days)     Procedure Component Value Units Date/Time    Blood culture [[id number]] Collected: 07/24/20 1645    Order Status: Completed Specimen: Blood from Antecubital, Right Updated: 07/29/20 2312     Blood Culture, Routine No growth after 5 days.    Blood culture [[id number]] Collected: 07/24/20 1645    Order Status: Completed Specimen: Blood from Peripheral, Left Hand Updated: 07/29/20 2312     Blood Culture, Routine No growth after 5 days.    Fungus culture [[id number]]  (Abnormal) Collected: 07/25/20 2231    Order Status: Completed Specimen: Respiratory from Mouth Updated: 07/28/20 1436     Fungus (Mycology) Culture CANDIDA PARAPSILOSIS  Few      AFB Culture & Smear [[id number]] Collected: 07/26/20 0923    Order Status: Completed Specimen: Respiratory from Sputum, Expectorated Updated: 07/27/20 2127     AFB Culture & Smear Culture in progress     AFB CULTURE STAIN No acid fast bacilli seen.    AFB Culture & Smear [[id number]] Collected:  07/25/20 2231    Order Status: Completed Specimen: Respiratory from Mouth Updated: 07/27/20 1516     AFB Culture & Smear Culture in progress     AFB CULTURE STAIN No acid fast bacilli seen.    Fungus Culture, Blood or Bone Marrow [[id number]] Collected: 07/26/20 1041    Order Status: Completed Specimen: Blood from Antecubital, Left Updated: 07/27/20 1418     Fungus Cult, blood or BM Culture in progress    Blood culture x two cultures. Draw prior to antibiotics. [[id number]] Collected: 07/21/20 1419    Order Status: Completed Specimen: Blood from Peripheral, Antecubital, Right Updated: 07/26/20 2322     Blood Culture, Routine No growth after 5 days.    Narrative:      Aerobic and anaerobic    Blood culture x two cultures. Draw prior to antibiotics. [[id number]] Collected: 07/21/20 1330    Order Status: Completed Specimen: Blood from Peripheral, Wrist, Left Updated: 07/26/20 2322     Blood Culture, Routine No growth after 5 days.    Narrative:      Aerobic and anaerobic    AFB Culture & Smear [[id number]]     Order Status: No result Specimen: Respiratory     Fungus culture [[id number]]     Order Status: Canceled Specimen: Blood     AFB Culture & Smear [[id number]]     Order Status: Sent Specimen: Blood     C Diff Toxin by PCR [[id number]]  (Abnormal) Collected: 07/23/20 1205    Order Status: Completed Updated: 07/24/20 1500     C. diff PCR Positive    Clostridium difficile EIA [[id number]]  (Abnormal) Collected: 07/23/20 1205    Order Status: Completed Specimen: Stool Updated: 07/23/20 2032     C. diff Antigen Positive     C difficile Toxins A+B, EIA Negative     Comment: Testing not recommended for children <24 months old.                 IP CONSULT TO PULMONOLOGY  PHARMACY REMDESIVIR CONSULT  IP CONSULT TO INFECTIOUS DISEASES  IP CONSULT TO GASTROENTEROLOGY-[facility]    No new subjective & objective note has been filed under this hospital service since the last note was generated.        ASSESSMENT/PLAN:       Rajan Deluna is a  74 y.o. male with extensive pmh who presented with Acute respiratory disease due to COVID-19 virus.     The primary encounter diagnosis was Acute respiratory disease due to COVID-19 virus. Diagnoses of Fever, Sepsis, due to unspecified organism, unspecified whether acute organ dysfunction present, Hypotension, unspecified hypotension type, Acute respiratory failure with hypoxia, MARQUIS (acute kidney injury), Weakness generalized, Community acquired pneumonia, unspecified laterality, Viral pneumonia, COVID-19, Chest pain, Melena, Pneumonia due to COVID-19 virus, Chronic obstructive pulmonary disease, unspecified COPD type, Clostridium difficile infection, Anemia, unspecified type, Chest pain, unspecified type, and Positive sputum culture for Aspergillus were also pertinent to this visit.    Acute respiratory disease due to COVID-19 virus  Admitted w/ AMS and hypovolemic shock.  Patient currently satting at 100% at 2 L of supplemental oxygen via nasal cannula.   STABLE     Plan:   - continue COVID isolation    - completed dexamethasone 7/29   - stop heparin gtt 2/2 down trending Hgb    - Appropriate rise in H/H will continue to trend Q12           COPD (chronic obstructive pulmonary disease)  - albuterol inhaler 2 puff Q4H  - MDI     Clostridium difficile infection  P/w consistent melanotic diarrhea, C diff toxin positive.  Still producing watery diarrhea but diminished overnight.     Plan:  - ID following  - p.o. vancomycin and IV metronidazole  - continue IV Protonix.  -Completed steroids     Anemia   Labs pending   baseline hemoglobin 10-11.  Patient has had consistent melanic stools since starting heparin drip. Patient's blood was typed and crossed and blood transfusion consent was obtained.      Plan:  -Stop heparin/lovenox   - transfuse 1 unit of PRBC      Thank you for the consult, we will continue to follow the patient     Case discussed with MD Krystyna Chambers MD   [facility]  07/30/2020 10:07 AM

## 2020-07-30 NOTE — PHYSICIAN QUERY
PT Name: Rajan Deluna  MR #: [id number]     RESPIRATORY CONDITION CLARIFICATION     CDS/: ULISSES Artis, RN, CDS               Contact information:[email or web address]   This form is a permanent document in the medical record.     Query Date: July 30, 2020    By submitting this query, we are merely seeking further clarification of documentation.  Please utilize your independent clinical judgment when addressing the question(s) below.  The Medical Record contains the following   Indicators   Supporting Clinical Findings Location in Medical Record   X SOB, JACOME, Wheezing, Productive Cough, Use of Accessory Muscles, etc.  Pt states he is feeling very SOB again.  Pt remains tachpneic. Expiratory wheezes noted throughout chest, worse on right side     Tachypneic, increased WOB, on bipap    Nurse note, 7/22at 1:40 PM         Dr. Gary RUBALCAVA, 7/24   X RR         ABGs         O2 sat  O2 sat. 87-88% on 6L via nasal cannula        7/22/2020 19:45 7/23/2020 04:32   POC PH 7.410 7.444   POC PCO2 36.6 36.5   POC PO2 54 (LL) 58 (LL)   POC BE -1 1   POC HCO3 23.2 (L) 25.1   POC SATURATED O2 88 (L) 91 (L)   POC TCO2 24 26   Flow 12    Sample ARTERIAL ARTERIAL   DelSys Nasal Can NRB       Nurse note, 7/22at 1:40 PM       ABG on 7/22-7/23   X Hypoxia/Hypercapnia  Was hypoxic at presentation and initially doing well on HFNC.     Dr. Gary RUBALCAVA/Dr. Kirby, 7/22   X BiPAP/Intubation/Mechanical Ventilation  Continuous bipap at present, will try to wean to vapotherm    Dr. Gary RUBALCAVA, 7/24   X Supplemental O2  Pt placed on high flow nasal cannula at 10L     Requiring O2 via NRB     Nurse note, 7/22 at 1:52 PM     Dr. Gary RUBALCAVA/Dr. Kirby, 7/22   X Home O2, Oxygen Dependence  History of COPD currently uses 2 L of oxygen at home    H&P, Dr. Grey,/Dr. Rodríguez, 7/21   X Respiratory Distress or Failure  Acute hypoxic respiratory failure 2/2 COVID PNA      Dr. Gary RUBALCAVA, 7/24   X Radiology Findings    CXR with  bilateral interstitial opacities, consistent with COVID  KHADRA, Dr. Vega, 7/24   X Acute/Chronic Illness  PMH COPD, HTN, arthritis       Acute encephalopathy 2/2 sepsis and COVID, COVID PNA, Septic shock, NSTEMI, transaminitis 2/2 shock, MARQUIS, anemia    Dr. Gary RUBALCAVA/Dr. Kirby, 7/22   X Treatment  Dexamethasone 6 mg IV    Remdesivir   Albuterol MDI prn     Dr. Gary RUBALCAVA, 7/24    Other       Respiratory failure can be acute, chronic or both. It is generally further specified as hypoxic, hypercapnic or both. Lastly, it is important to identify an etiology, if known or suspected.   References:: https://www.acphospitalist.org/archives/2013/10/coding.htm    http://pickrset/acute-respiratory-failure-know    The noted clinical guidelines are only system guidelines and do not replace the providers clinical judgment.    Provider, please clarify the Acuity of the Respiratory Failure diagnosis associated with above clinical findings.     [ X ] Acute and (on) Chronic Respiratory Failure with Hypoxia - pO2 >10 mmHg below baseline OR SpO2 < 91% on usual home O2 or O2 ? 2L/min over baseline home O2      [    ] Acute Respiratory Failure with Hypoxia - ABG pO2 < 60 mmHg or O2 sat of <91% on room air and respiratory symptoms documented     [    ] Other Respiratory Diagnosis (please specify): _________________     [   ] Clinically Undetermined     Present on admission (POA) status:   [ Y ] Yes (Y)                          [ ] Clinically Undetermined (W)  [   ] No (N)                            [   ] Documentation insufficient to determine if condition is POA (U)          Please document in your progress notes daily for the duration of treatment until resolved and include in your discharge summary.

## 2020-07-30 NOTE — PHYSICIAN QUERY
PT Name: Rajan Deluna  MR #: [id number]    CAUSE AND EFFECT RELATIONSHIP CLARIFICATION     CDS/: ULISSES Artis, RN, CDS               Contact information:[email or web address]     This form is a permanent document in the medical record.     Query Date: July 30, 2020    By submitting this query, we are merely seeking further clarification of documentation. Please utilize your independent clinical judgment when addressing the question(s) below.    Supporting Clinical Findings   Location in Medical Record    Patient has had consistent melanic stools since starting heparin drip   Stop heparin/lovenox    Transfuse 1 unit of PRBC       Concern for upper GIB   C.Difficile colitis    Melena could be related to ulceration from C diff colitis but upper endoscopy or VCE is needed when/if stable     He was in the ICU for hypotension and started on aggressive anti-coaulation with heparin gtt given severe covid infection with thrombogenic risk.     Heparin gtt was started 7/22 and stopped 7/24 after melenic stools that were watery in consistency were noted, then he was switched to subQ heparin 5000 [id number] which was discontinued 7/27.     He has a rectal tube in place and overnight noted to put out ~200 mL black liquid output. [facility] was consulted for concern for GI bleeding        GI bleed  Patient has had consistent watery melanic stools since admission      Anemia  Patient has had consistent melanic stools since starting heparin drip          Patient was transfused 1 unit of PRBC        Discontinued heparin drip (7/26) and discontinue prophylactic heparin. Will continue to monitor and once H&H stable will continue prophylactic dose of heparin.             7/21 7/22 7/22 7/22 7/23 7/24 7/24 7/24/ 7/25 7/27 7/28 7/29   aPTT 38.1 (H) >150.0 (AA) 108.7 (H) 63.9 (H) 64.4 (H) 71.4 (H) 72.5 (H) 38.2 (H) 52.7 (H) 25.7 26.5 28.2                                                                                                                                                                             KHADAR, Dr. Vallejo/Dr. Kirby, 7/28            Gastro, Dr. Price/Dr. Olivares, 7/28                                     HM, Dr. Rowe, 7/30                           Lab 7/21- 7/29         Provider, please clarify if there is any clinical correlation between Heparin and GI Bleed.           Are the conditions:      [  ] Due to or associated with each other   [  ] Unrelated to each other   [  ] Other explanation (Please Specify): ______________   [ x ] Clinically Undetermined                                                                               Please document in your progress notes daily for the duration of treatment until resolved and include in your discharge summary.

## 2020-07-30 NOTE — ASSESSMENT & PLAN NOTE
P/w consistent melanotic diarrhea, C diff toxin positive. Still producing watery melanic diarrhea.    Plan:  - ID following and GI following  - p.o. vancomycin and IV metronidazole  - continue IV Protonix.   - continue probiotics

## 2020-07-30 NOTE — PT/OT/SLP PROGRESS
Physical Therapy      Patient Name:  Rajan Deluna   MRN:  [id number]    1215 - RN reports pt arousable, but unable to stay awake secondary to medications given earlier. Will follow-up next tx date.    Dee Urena, PTA

## 2020-07-30 NOTE — PROGRESS NOTES
[facility] Infectious Diseases Progress Note    Assessment/Plan:     Leukocytosis  Possibly related to steroids - currently day 8/10 of dexamethasone.  - If WBC count continues to rise, consider pan-culture and CXR.     Severe C diff  Patient with profuse diarrhea prompting C diff testing. Antigen positive, toxin negative; however, toxin PCR positive. KUB with no evidence of toxic megacolon.  - Continue treatment of severe C diff with PO vancomycin and IV metronidazole, recommend 10 day course.      Pneumonia due to COVID-19  - Continue dexamethasone 6 mg qD for a 10 day course if patient tolerates (day 9/10).   - S/P 5 day course of remdesivir  - Monitor on tele  - Monitor LFTs daily  - Wean oxygen as able      History of MAC infection  History of Aspergillosis  Patient with history of structural lung disease c/b MAC infection, and was recently treated with itraconazole for Aspergillosis.  - Serum aspergillus antigen negative. Continue Voriconazole, s/p load on 7/29. Must take one hour before or after meals - ordered by ID team.  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 Do not need to treat.   - Will hold off on MAC treatment at this time due to COVID and active c-diff infection.  - Appreciated Pulmonology's recommendations as well.    Lina Giles MD  [facility] HO-III  [facility] Infectious Diseases     Thank you for allowing us to participate in the care of this patient. We will continue to follow along. Case has been discussed with consult staff, who is in agreement with assessment and plan.     Subjective:      Rajan Deluna is a 74 y.o. male who is being followed by the [facility] service at [facility] for Pneumonia 2/2 COVID-19 infection, severe C diff, h/o MAC and aspergillosis.     Afebrile overnight. Stable on floor. On 15L HFNC throughout day yesterday, BIPAP overnight. De-sats periodically when patient removes mask, though improves when replaced. Still  having liquid melanotic stools, though frequency has improved. Required ativan and haldol overnight for agitation.      Objective:   Last 24 Hour Vital Signs:  BP  Min: 96/58  Max: 135/76  Temp  Av.3 °F (36.8 °C)  Min: 96.8 °F (36 °C)  Max: 99.4 °F (37.4 °C)  Pulse  Av.3  Min: 80  Max: 104  Resp  Av.3  Min: 16  Max: 20  SpO2  Av.4 %  Min: 88 %  Max: 96 %  I/O last 3 completed shifts:  In: 950 [P.O.:520; NG/GT:30; IV Piggyback:400]  Out: 1670 [Urine:1600; Stool:70]    Physical Exam  Limited by Telemedicine. Elderly appearing. Comfortable on NC. Very sleepy.    Laboratory:  Laboratory Data Reviewed: yes  Pertinent Findings:  Recent Labs   Lab 20  0520 20  1131 20  1946 20  0602 20  1819   WBC 16.40* 19.85*  --  20.19*  --    HGB 6.9* 9.7* 8.9* 9.2* 9.7*   HCT 21.5* 30.3* 27.6* 28.6* 29.6*    320  --  299  --    MCV 90 94  --  94  --    RDW 16.8* 17.4*  --  17.7*  --     136  --  138  138  --    K 3.9 4.4  --  4.6  4.6  --     108  --  110  110  --    CO2 22* 21*  --  22*  22*  --    BUN 31* 26*  --  21  21  --    CREATININE 1.4 1.4  --  1.2  1.2  --     203*  --  109  109  --    PROT 5.2* 6.0  --  5.6*  5.6*  --    ALBUMIN 2.2* 2.6*  --  2.4*  2.4*  --    BILITOT 0.6 0.8  --  0.7  0.7  --    AST 49* 52*  --  31  31  --    ALKPHOS 61 77  --  69  69  --    * 101*  --  74*  74*  --          Microbiology Data Reviewed: yes  Pertinent Findings:  Blood cultures  NGTD  C diff antigen +, toxin - () toxin PCR + ()  AFB and fungal blood and respiratory cultures pending  Aspergillus antigen - <0.5 ()    Other Results:  Radiology Data Reviewed: yes  Pertinent Findings:  KUB with no evidence of toxic megacolon.    Current Medications:     Infusions:       Scheduled:   albuterol  2 puff Inhalation QID WAKE    dexamethasone  6 mg Intravenous Q24H    Lactobacillus acidoph-L.bulgar  4 tablet Oral TID WM    lidocaine  1  patch Transdermal Q24H    metronidazole  500 mg Intravenous Q8H    multivitamin  1 tablet Oral Daily    pantoprazole  40 mg Intravenous BID    vancomycin  125 mg Oral Q6H    voriconazole  200 mg Oral Q12H    voriconazole  400 mg Oral Q12H        PRN:  sodium chloride, acetaminophen, ondansetron, pneumoc 13-loy conj-dip cr(PF), simethicone, sodium chloride 0.9%    Antibiotics and Day Number of Therapy:    PO vancomycin 7/24- current  IV metronidazole 7/24 - current  IV voriconazole 7/29 - current    Lines and Day Number of Therapy:  Trejo 7/23- current

## 2020-07-30 NOTE — PLAN OF CARE
Problem: Adult Inpatient Plan of Care  Goal: Plan of Care Review  Patient is awake and orientedx4. Care plan explained to patient; he verbalized understanding. On 15L high flow nasal cannula, O2 saturation at 90-92%. Placed on BiPAP by respiratory. Hooked to heart monitor running normal sinus rhythm at 85-100bpm. Condom catheter in place, draining yellow urine. Rectal tube in place, green liquid stools noted. No pain/n/v/d during shift. Due medications given. LR infusing at 120mL/hr. Airborne, Contact, Droplet precautions maintained. Encouraged/assisted to turn every 2 hours as tolerated. Maintained on fall risk precaution. Bed in lowest position, bed alarm on, call light/personal items within reach and instructed to call for help when needed. Will continue to monitor.    Outcome: Ongoing, Progressing

## 2020-07-30 NOTE — SUBJECTIVE & OBJECTIVE
Interval History:  Patient was agitated and irritated last night and attempted to remove BiPAP mask. Patient was given 1 mg of Ativan and agitation improved. Patient currently on BiPAP at night. With FiO2 of 70% patient satting in the low to mid 90s overnight.  Patient has been on supplemental oxygen via high-flow nasal cannula at 13-15 liters/minute. Satting in the low to mid 90s during the day. Patient's blood pressure stable, 135/76 with a MAP of 100. GI was consulted and recommending continuing IV Protonix b.i.d. as well as scheduling outpatient EGD/colonoscopy due to patient's respiratory status. Continue monitor H&H for possible VCE while inpatient.  ID further recommended adding voriconazole for past history of Aspergillus infections uptrending white blood cell count.    Review of Systems   Respiratory: Negative for shortness of breath.    Cardiovascular: Negative for chest pain.   Gastrointestinal: Positive for diarrhea (melanic). Negative for abdominal pain and nausea.   Psychiatric/Behavioral: The patient is not nervous/anxious.      Objective:     Vital Signs (Most Recent):  Temp: 96.8 °F (36 °C) (07/30/20 0419)  Pulse: 93 (07/30/20 0422)  Resp: 20 (07/30/20 0419)  BP: 135/76 (07/30/20 0419)  SpO2: 95 % (07/30/20 0606) Vital Signs (24h Range):  Temp:  [96.8 °F (36 °C)-98.8 °F (37.1 °C)] 96.8 °F (36 °C)  Pulse:  [] 93  Resp:  [16-20] 20  SpO2:  [89 %-96 %] 95 %  BP: ()/(57-76) 135/76     Weight: 83 kg (182 lb 15.7 oz)  Body mass index is 25.52 kg/m².    Intake/Output Summary (Last 24 hours) at 7/30/2020 0615  Last data filed at 7/30/2020 0200  Gross per 24 hour   Intake 520 ml   Output 1170 ml   Net -650 ml      Physical Exam  Vitals signs and nursing note reviewed.   Constitutional:       General: He is not in acute distress.  HENT:      Head: Normocephalic and atraumatic.   Eyes:      Extraocular Movements: Extraocular movements intact.   Cardiovascular:      Rate and Rhythm: Normal rate and  regular rhythm.      Comments: As seen on monitor, patient appears to have sinus rhythm  Pulmonary:      Effort: Pulmonary effort is normal.      Comments: As seen on the monitor, patient peripheral oxygen saturation in low 90s.   Abdominal:      General: There is no distension.   Skin:     Coloration: Skin is not jaundiced.   Neurological:      Mental Status: He is alert.   Psychiatric:      Comments: Answers questions appropriately.          Significant Labs:   A1C:   Recent Labs   Lab 07/23/20  0403   HGBA1C 6.3*     Bilirubin:   Recent Labs   Lab 07/25/20  0311 07/26/20  0441 07/27/20  0520 07/28/20  1131 07/29/20  0602   BILITOT 0.6 0.5 0.6 0.8 0.7  0.7     BMP:   Recent Labs   Lab 07/29/20  0602     109     138   K 4.6  4.6     110   CO2 22*  22*   BUN 21  21   CREATININE 1.2  1.2   CALCIUM 8.0*  8.0*   MG 1.8     CBC:   Recent Labs   Lab 07/28/20  1131 07/28/20  1946 07/29/20  0602 07/29/20  1819   WBC 19.85*  --  20.19*  --    HGB 9.7* 8.9* 9.2* 9.7*   HCT 30.3* 27.6* 28.6* 29.6*     --  299  --      CMP:   Recent Labs   Lab 07/28/20  1131 07/29/20  0602    138  138   K 4.4 4.6  4.6    110  110   CO2 21* 22*  22*   * 109  109   BUN 26* 21  21   CREATININE 1.4 1.2  1.2   CALCIUM 8.2* 8.0*  8.0*   PROT 6.0 5.6*  5.6*   ALBUMIN 2.6* 2.4*  2.4*   BILITOT 0.8 0.7  0.7   ALKPHOS 77 69  69   AST 52* 31  31   * 74*  74*   ANIONGAP 7* 6*  6*   EGFRNONAA 48* 58*  58*     Coagulation:   Recent Labs   Lab 07/29/20  0602   APTT 28.2     Magnesium:   Recent Labs   Lab 07/28/20  1131 07/29/20  0602   MG 1.9 1.8     TSH:   Recent Labs   Lab 07/23/20  0403   TSH 0.604       Significant Imaging: I have reviewed all pertinent imaging results/findings within the past 24 hours.

## 2020-07-30 NOTE — PLAN OF CARE
VN q2h rounds: Patient is asleep in bed. Currently on 15 L HF, HR 85, last temp 99.4. NAD noted. Call light within reach and safety measures are in place. Will continue to monitor and remain available as needed.

## 2020-07-30 NOTE — ASSESSMENT & PLAN NOTE
Patient has been on 13-15 liter/per min via high flow nasal cannula. Patient used BiPAP at 70% oxygen concentration overnight. Patient is currently stable but oxygen saturation will decrease quickly if off of supplement oxygen for short periods of time. Patient working closely with PT/OT in patient able to sit up at bedside.    Plan:     - COVID Isolation per protocol   - scheduled BiPAP at night   - continue dexamethasone 6 mg, currently on day 9   - discontinue heparin gtt (7/26) and discontinue prophylactic heparin. Will continue to monitor and once H&H stable will continue prophylactic dose of heparin   - patient finished 5 day course of Remdesivir.   - Atorvastatin 40mg daily   - Multivitamin    - continue to monitor respiratory status.    - infectious disease made further recommendations of adding an antifungal voriconazole for past history of Aspergillus infections with uptrending WBC count.  Will follow recommendations.

## 2020-07-30 NOTE — ASSESSMENT & PLAN NOTE
Patient has a known history of MAC infections as well as Aspergillus.  ID consulted and will follow recommendations.    Plan:  - continue voriconazole.

## 2020-07-30 NOTE — PLAN OF CARE
VN rounds completed.  The patient has no complaints at this time and states that he didn't have a bowel movement today.  Will continue to monitor.

## 2020-07-31 LAB
ALBUMIN SERPL BCP-MCNC: 2.2 G/DL (ref 3.5–5.2)
ALBUMIN SERPL BCP-MCNC: 2.2 G/DL (ref 3.5–5.2)
ALP SERPL-CCNC: 60 U/L (ref 55–135)
ALP SERPL-CCNC: 60 U/L (ref 55–135)
ALT SERPL W/O P-5'-P-CCNC: 40 U/L (ref 10–44)
ALT SERPL W/O P-5'-P-CCNC: 40 U/L (ref 10–44)
ANION GAP SERPL CALC-SCNC: 6 MMOL/L (ref 8–16)
ANION GAP SERPL CALC-SCNC: 6 MMOL/L (ref 8–16)
APTT BLDCRRT: 27.3 SEC (ref 21–32)
AST SERPL-CCNC: 21 U/L (ref 10–40)
AST SERPL-CCNC: 21 U/L (ref 10–40)
BASOPHILS # BLD AUTO: 0.02 K/UL (ref 0–0.2)
BASOPHILS NFR BLD: 0.1 % (ref 0–1.9)
BILIRUB SERPL-MCNC: 0.7 MG/DL (ref 0.1–1)
BILIRUB SERPL-MCNC: 0.7 MG/DL (ref 0.1–1)
BUN SERPL-MCNC: 24 MG/DL (ref 8–23)
BUN SERPL-MCNC: 24 MG/DL (ref 8–23)
CALCIUM SERPL-MCNC: 7.9 MG/DL (ref 8.7–10.5)
CALCIUM SERPL-MCNC: 7.9 MG/DL (ref 8.7–10.5)
CHLORIDE SERPL-SCNC: 106 MMOL/L (ref 95–110)
CHLORIDE SERPL-SCNC: 106 MMOL/L (ref 95–110)
CO2 SERPL-SCNC: 23 MMOL/L (ref 23–29)
CO2 SERPL-SCNC: 23 MMOL/L (ref 23–29)
CREAT SERPL-MCNC: 1.2 MG/DL (ref 0.5–1.4)
CREAT SERPL-MCNC: 1.2 MG/DL (ref 0.5–1.4)
DIFFERENTIAL METHOD: ABNORMAL
EOSINOPHIL # BLD AUTO: 0 K/UL (ref 0–0.5)
EOSINOPHIL NFR BLD: 0 % (ref 0–8)
ERYTHROCYTE [DISTWIDTH] IN BLOOD BY AUTOMATED COUNT: 17.7 % (ref 11.5–14.5)
EST. GFR  (AFRICAN AMERICAN): >60 ML/MIN/1.73 M^2
EST. GFR  (AFRICAN AMERICAN): >60 ML/MIN/1.73 M^2
EST. GFR  (NON AFRICAN AMERICAN): 58 ML/MIN/1.73 M^2
EST. GFR  (NON AFRICAN AMERICAN): 58 ML/MIN/1.73 M^2
GLUCOSE SERPL-MCNC: 136 MG/DL (ref 70–110)
GLUCOSE SERPL-MCNC: 136 MG/DL (ref 70–110)
HCT VFR BLD AUTO: 26.4 % (ref 40–54)
HCT VFR BLD AUTO: 33.1 % (ref 40–54)
HGB BLD-MCNC: 10.4 G/DL (ref 14–18)
HGB BLD-MCNC: 8.5 G/DL (ref 14–18)
IMM GRANULOCYTES # BLD AUTO: 0.35 K/UL (ref 0–0.04)
IMM GRANULOCYTES NFR BLD AUTO: 2 % (ref 0–0.5)
LYMPHOCYTES # BLD AUTO: 0.9 K/UL (ref 1–4.8)
LYMPHOCYTES NFR BLD: 4.9 % (ref 18–48)
MAGNESIUM SERPL-MCNC: 2 MG/DL (ref 1.6–2.6)
MCH RBC QN AUTO: 30.1 PG (ref 27–31)
MCHC RBC AUTO-ENTMCNC: 32.2 G/DL (ref 32–36)
MCV RBC AUTO: 94 FL (ref 82–98)
MONOCYTES # BLD AUTO: 0.9 K/UL (ref 0.3–1)
MONOCYTES NFR BLD: 5.3 % (ref 4–15)
NEUTROPHILS # BLD AUTO: 15.6 K/UL (ref 1.8–7.7)
NEUTROPHILS NFR BLD: 87.7 % (ref 38–73)
NRBC BLD-RTO: 0 /100 WBC
PHOSPHATE SERPL-MCNC: 3.6 MG/DL (ref 2.7–4.5)
PLATELET # BLD AUTO: 283 K/UL (ref 150–350)
PMV BLD AUTO: 9.6 FL (ref 9.2–12.9)
POCT GLUCOSE: 179 MG/DL (ref 70–110)
POCT GLUCOSE: 196 MG/DL (ref 70–110)
POTASSIUM SERPL-SCNC: 4.9 MMOL/L (ref 3.5–5.1)
POTASSIUM SERPL-SCNC: 4.9 MMOL/L (ref 3.5–5.1)
PROT SERPL-MCNC: 5.4 G/DL (ref 6–8.4)
PROT SERPL-MCNC: 5.4 G/DL (ref 6–8.4)
RBC # BLD AUTO: 2.82 M/UL (ref 4.6–6.2)
SODIUM SERPL-SCNC: 135 MMOL/L (ref 136–145)
SODIUM SERPL-SCNC: 135 MMOL/L (ref 136–145)
WBC # BLD AUTO: 17.82 K/UL (ref 3.9–12.7)

## 2020-07-31 PROCEDURE — 94640 AIRWAY INHALATION TREATMENT: CPT

## 2020-07-31 PROCEDURE — 25000003 PHARM REV CODE 250: Performed by: STUDENT IN AN ORGANIZED HEALTH CARE EDUCATION/TRAINING PROGRAM

## 2020-07-31 PROCEDURE — 99900035 HC TECH TIME PER 15 MIN (STAT)

## 2020-07-31 PROCEDURE — 36415 COLL VENOUS BLD VENIPUNCTURE: CPT

## 2020-07-31 PROCEDURE — 27000646 HC AEROBIKA DEVICE

## 2020-07-31 PROCEDURE — 83735 ASSAY OF MAGNESIUM: CPT

## 2020-07-31 PROCEDURE — 80053 COMPREHEN METABOLIC PANEL: CPT

## 2020-07-31 PROCEDURE — 63600175 PHARM REV CODE 636 W HCPCS: Performed by: STUDENT IN AN ORGANIZED HEALTH CARE EDUCATION/TRAINING PROGRAM

## 2020-07-31 PROCEDURE — 85730 THROMBOPLASTIN TIME PARTIAL: CPT

## 2020-07-31 PROCEDURE — 84100 ASSAY OF PHOSPHORUS: CPT

## 2020-07-31 PROCEDURE — S0030 INJECTION, METRONIDAZOLE: HCPCS

## 2020-07-31 PROCEDURE — 97530 THERAPEUTIC ACTIVITIES: CPT | Mod: CQ

## 2020-07-31 PROCEDURE — 85018 HEMOGLOBIN: CPT

## 2020-07-31 PROCEDURE — 27100171 HC OXYGEN HIGH FLOW UP TO 24 HOURS

## 2020-07-31 PROCEDURE — 25000003 PHARM REV CODE 250

## 2020-07-31 PROCEDURE — 97530 THERAPEUTIC ACTIVITIES: CPT

## 2020-07-31 PROCEDURE — 85025 COMPLETE CBC W/AUTO DIFF WBC: CPT

## 2020-07-31 PROCEDURE — 25000003 PHARM REV CODE 250: Performed by: INTERNAL MEDICINE

## 2020-07-31 PROCEDURE — 63600175 PHARM REV CODE 636 W HCPCS: Performed by: INTERNAL MEDICINE

## 2020-07-31 PROCEDURE — 94664 DEMO&/EVAL PT USE INHALER: CPT

## 2020-07-31 PROCEDURE — C9113 INJ PANTOPRAZOLE SODIUM, VIA: HCPCS | Performed by: INTERNAL MEDICINE

## 2020-07-31 PROCEDURE — 11000001 HC ACUTE MED/SURG PRIVATE ROOM

## 2020-07-31 PROCEDURE — 27000221 HC OXYGEN, UP TO 24 HOURS

## 2020-07-31 PROCEDURE — 94660 CPAP INITIATION&MGMT: CPT

## 2020-07-31 PROCEDURE — 97802 MEDICAL NUTRITION INDIV IN: CPT

## 2020-07-31 PROCEDURE — 94761 N-INVAS EAR/PLS OXIMETRY MLT: CPT

## 2020-07-31 PROCEDURE — 85014 HEMATOCRIT: CPT

## 2020-07-31 RX ADMIN — ALBUTEROL SULFATE 2 PUFF: 90 AEROSOL, METERED RESPIRATORY (INHALATION) at 07:07

## 2020-07-31 RX ADMIN — Medication 4 TABLET: at 05:07

## 2020-07-31 RX ADMIN — Medication 125 MG: at 05:07

## 2020-07-31 RX ADMIN — PANTOPRAZOLE SODIUM 40 MG: 40 INJECTION, POWDER, FOR SOLUTION INTRAVENOUS at 09:07

## 2020-07-31 RX ADMIN — ACETAMINOPHEN 650 MG: 325 TABLET ORAL at 10:07

## 2020-07-31 RX ADMIN — LIDOCAINE 1 PATCH: 50 PATCH TOPICAL at 05:07

## 2020-07-31 RX ADMIN — ALBUTEROL SULFATE 2 PUFF: 90 AEROSOL, METERED RESPIRATORY (INHALATION) at 03:07

## 2020-07-31 RX ADMIN — VORICONAZOLE 200 MG: 200 TABLET, FILM COATED ORAL at 03:07

## 2020-07-31 RX ADMIN — METRONIDAZOLE 500 MG: 500 INJECTION, SOLUTION INTRAVENOUS at 05:07

## 2020-07-31 RX ADMIN — Medication 4 TABLET: at 11:07

## 2020-07-31 RX ADMIN — PANTOPRAZOLE SODIUM 40 MG: 40 INJECTION, POWDER, FOR SOLUTION INTRAVENOUS at 08:07

## 2020-07-31 RX ADMIN — DEXAMETHASONE SODIUM PHOSPHATE 6 MG: 4 INJECTION, SOLUTION INTRAMUSCULAR; INTRAVENOUS at 09:07

## 2020-07-31 RX ADMIN — ALBUTEROL SULFATE 2 PUFF: 90 AEROSOL, METERED RESPIRATORY (INHALATION) at 11:07

## 2020-07-31 RX ADMIN — THERA TABS 1 TABLET: TAB at 09:07

## 2020-07-31 RX ADMIN — Medication 4 TABLET: at 09:07

## 2020-07-31 RX ADMIN — VORICONAZOLE 200 MG: 200 TABLET, FILM COATED ORAL at 04:07

## 2020-07-31 RX ADMIN — METRONIDAZOLE 500 MG: 500 INJECTION, SOLUTION INTRAVENOUS at 09:07

## 2020-07-31 RX ADMIN — Medication 125 MG: at 12:07

## 2020-07-31 RX ADMIN — Medication 125 MG: at 11:07

## 2020-07-31 RX ADMIN — METRONIDAZOLE 500 MG: 500 INJECTION, SOLUTION INTRAVENOUS at 12:07

## 2020-07-31 NOTE — SUBJECTIVE & OBJECTIVE
Interval History:  No acute events overnight. Vital signs stable.  Patient was on high-flow nasal cannula at 15 L yesterday, satting in low-mid 90s. Patient uses BiPAP overnight at 100% FiO2.  o agitation overnight.  Underwent video capsule endoscopy yesterday. Patient currently on a regular diet. Most recent recent H&H 11.5/36.3., stable  Patient receiving PT/OT daily.    Review of Systems   Respiratory: Negative for shortness of breath.    Cardiovascular: Negative for chest pain.   Gastrointestinal: Positive for diarrhea (melanic). Negative for abdominal pain, blood in stool and nausea.   Psychiatric/Behavioral: The patient is not nervous/anxious.      Objective:     Vital Signs (Most Recent):  Temp: 96.2 °F (35.7 °C) (07/31/20 0357)  Pulse: 73 (07/31/20 0809)  Resp: (!) 22 (07/31/20 0742)  BP: 133/70 (07/31/20 0357)  SpO2: (!) 94 % (07/31/20 0742) Vital Signs (24h Range):  Temp:  [96.2 °F (35.7 °C)-98.3 °F (36.8 °C)] 96.2 °F (35.7 °C)  Pulse:  [64-95] 73  Resp:  [17-22] 22  SpO2:  [90 %-100 %] 94 %  BP: (111-133)/(66-70) 133/70     Weight: 83 kg (182 lb 15.7 oz)  Body mass index is 25.52 kg/m².    Intake/Output Summary (Last 24 hours) at 7/31/2020 0840  Last data filed at 7/31/2020 0055  Gross per 24 hour   Intake 200 ml   Output --   Net 200 ml      Physical Exam  Vitals signs and nursing note reviewed.   Constitutional:       General: He is not in acute distress.  HENT:      Head: Normocephalic and atraumatic.   Eyes:      Extraocular Movements: Extraocular movements intact.   Cardiovascular:      Rate and Rhythm: Normal rate and regular rhythm.      Comments: As seen on monitor, patient appears to have sinus rhythm  Pulmonary:      Effort: Pulmonary effort is normal.      Comments: As seen on the monitor, patient peripheral oxygen saturation in low 90s.   Abdominal:      General: There is no distension.   Skin:     Coloration: Skin is not jaundiced.   Neurological:      Mental Status: He is alert.    Psychiatric:      Comments: Answers questions appropriately.          Significant Labs:   A1C:   Recent Labs   Lab 07/23/20  0403   HGBA1C 6.3*     Bilirubin:   Recent Labs   Lab 07/27/20  0520 07/28/20  1131 07/29/20  0602 07/30/20  1344 07/31/20  0755   BILITOT 0.6 0.8 0.7  0.7 0.8  0.8 0.7  0.7     BMP:   Recent Labs   Lab 07/31/20  0755   *  136*   *  135*   K 4.9  4.9     106   CO2 23  23   BUN 24*  24*   CREATININE 1.2  1.2   CALCIUM 7.9*  7.9*   MG 2.0     CBC:   Recent Labs   Lab 07/29/20  1819 07/30/20  1344 07/30/20  1759   WBC  --  18.90*  --    HGB 9.7* 9.5* 11.5*   HCT 29.6* 29.7* 36.3*   PLT  --  288  --      CMP:   Recent Labs   Lab 07/30/20  1344 07/31/20  0755   *  135* 135*  135*   K 4.9  4.9 4.9  4.9     106 106  106   CO2 24  24 23  23   *  142* 136*  136*   BUN 21  21 24*  24*   CREATININE 1.2  1.2 1.2  1.2   CALCIUM 8.1*  8.1* 7.9*  7.9*   PROT 6.1  6.1 5.4*  5.4*   ALBUMIN 2.5*  2.5* 2.2*  2.2*   BILITOT 0.8  0.8 0.7  0.7   ALKPHOS 73  73 60  60   AST 22  22 21  21   ALT 54*  54* 40  40   ANIONGAP 5*  5* 6*  6*   EGFRNONAA 58*  58* 58*  58*     Coagulation:   Recent Labs   Lab 07/30/20  1344   APTT 25.9     Magnesium:   Recent Labs   Lab 07/30/20  1344 07/31/20  0755   MG 1.9 2.0   TSH:   Recent Labs   Lab 07/23/20  0403   TSH 0.604     All pertinent labs within the past 24 hours have been reviewed.    Significant Imaging: I have reviewed all pertinent imaging results/findings within the past 24 hours.

## 2020-07-31 NOTE — PLAN OF CARE
I called Tania Deluna ( Wife ) : [phone] and left a VM.  I called Thea Deluna (Daughter): [phone] and discussed with her that pt will likely need LTAC placement following hospital stay.  She verbalized understanding.  Pt still requiring high flow oxygen and not medically ready.         07/31/20 1526   Post-Acute Status   Post-Acute Authorization Placement  (LTAC)   Discharge Plan   Discharge Plan A Long-term acute care facility (LTAC)   Discharge Plan B Skilled Nursing Facility     Phong Suh RN,   [phone]

## 2020-07-31 NOTE — PT/OT/SLP PROGRESS
Occupational Therapy   Treatment    Name: Rajan Deluna  MRN: [id number]  Admitting Diagnosis:  Acute respiratory disease due to COVID-19 virus       Recommendations:     Discharge Recommendations: (TBD)  Discharge Equipment Recommendations:  (TBD)  Barriers to discharge:  Inaccessible home environment, Decreased caregiver support    Assessment:     Rajan Deluna is a 74 y.o. male with a medical diagnosis of Acute respiratory disease due to COVID-19 virus.  He presents with deconditioning, labile spO2 sats on Vapotherm 40L O2 at 100%. Performance deficits affecting function are impaired endurance, weakness, gait instability, impaired balance, impaired self care skills, impaired functional mobilty, decreased upper extremity function, decreased lower extremity function, decreased ROM, impaired cardiopulmonary response to activity, impaired coordination, impaired cognition.     Rehab Prognosis:  Fair; patient would benefit from acute skilled OT services to address these deficits and reach maximum level of function.       Plan:     Patient to be seen 5 x/week to address the above listed problems via self-care/home management, therapeutic activities, therapeutic exercises  · Plan of Care Expires: 08/27/20  · Plan of Care Reviewed with: patient    Subjective     Pain/Comfort:  · Pain Rating 1: 0/10    Objective:     Communicated with: nsg prior to session.  Patient found HOB elevated with bed alarm, pulse ox (continuous), telemetry, peripheral IV, oxygen(Vapotherm) upon OT entry to room.    General Precautions: Standard, airborne, contact, droplet, fall   Orthopedic Precautions:N/A   Braces: N/A     Occupational Performance:     Bed Mobility:    · Patient completed Rolling/Turning to Left with  stand by assistance  · Patient completed Scooting/Bridging with stand by assistance and contact guard assistance  · Patient completed Supine to Sit with minimum assistance  · Patient completed Sit to Supine with moderate assistance      Functional Mobility/Transfers:  · Patient completed Sit <> Stand Transfer with stand by assistance and contact guard assistance  x2 persons for safety with  rolling walker   Functional Mobility: Pt with fair to fair- dynamic seated and standing balance. Pt attempted sit<>stand x2 this date and able to take 3-4 small steps to HOB with use of RW with min A for RW management; x1 LOB laterally to L side requiring min/mod a to recover balance    Activities of Daily Living:  · Feeding:  minimum assistance to drink from styrofoam cup with assist to maintain upright at this time  · Upper Body Dressing: moderate assistance to don gown as robe seated EOB 2/2 multiple PIV/lines  · Lower Body Dressing: maximal assistance to don/doff B socks seated EOB      AMPAC 6 Click ADL: 15    Treatment & Education:  Pt educated on role of OT and POC.   Pt performing skills as listed above.   Pt sat EOB ~18 mins with SBA/S without various degrees of SOB on exertion.  Pt was on Vapotherm with 40L @ 100% upon entering the room were 92 - 93%, and 87 - 88% with activity, and was able to recover to 88 - 91% during tx. Pt was able to perform PLB with min-mod v/c but at times able to demonstrate PLB independently      Patient left HOB elevated with all lines intact, call button in reach, bed alarm on and nsg notifiedEducation:      GOALS:   Multidisciplinary Problems     Occupational Therapy Goals        Problem: Occupational Therapy Goal    Goal Priority Disciplines Outcome Interventions   Occupational Therapy Goal     OT, PT/OT Ongoing, Progressing    Description: Goals to be met by: 8/27/20     Patient will increase functional independence with ADLs by performing:    LE Dressing with Stand-by Assistance.  Grooming while standing with Stand-by Assistance.  Toileting from toilet with Stand-by Assistance for hygiene and clothing management.   Supine to sit with Stand-by Assistance.  Step transfer with Stand-by Assistance  Toilet transfer to  toilet with Stand-by Assistance.  Upper extremity exercise program x10 reps per handout, with independence.                     Time Tracking:     OT Date of Treatment: 07/31/20  OT Start Time: 1315  OT Stop Time: 1345  OT Total Time (min): 30 min    Billable Minutes:Therapeutic Activity 15 Co Tx PT    Asia Griffiths OT  7/31/2020

## 2020-07-31 NOTE — ASSESSMENT & PLAN NOTE
Patient has received 1 unit of PRBC.  GI was consulted for upper GI bleed and patient underwent video capsule endoscopy yesterday.  H&H has been stable at 11.5 and 36.33    Plan:   - discontinued heparin drip (7/26) and discontinue prophylactic heparin. Will continue to monitor and once H&H stable will continue prophylactic dose of heparin.  - patient underwent video capsule endoscopy yesterday.  Will need to follow-up with GI.    - continue monitor H&H Q12H.  - trend stool output and consistency.

## 2020-07-31 NOTE — ASSESSMENT & PLAN NOTE
Patient on overnight BiPAP at 100% FiO2. Patient uses high-flow nasal cannula at 15 L during the day. Patient being followed by PT/OT and is able to sit up at bedside..     Plan:     - COVID Isolation per protocol   - scheduled BiPAP at night   - high flow nassal cannula at 15 L, continue to wean.    - continue dexamethasone 6 mg, currently on day 10.  Will discontinue after last dose today.   - discontinue heparin gtt (7/26) and discontinue prophylactic heparin. Will continue to monitor and once H&H stable will continue prophylactic dose of heparin   - patient finished 5 day course of Remdesivir.   - Multivitamin    - continue to monitor respiratory status.    - infectious disease made further recommendations of adding an antifungal voriconazole for past history of Aspergillus infections with uptrending WBC count.  Will follow recommendations.

## 2020-07-31 NOTE — PROGRESS NOTES
[facility] Pulmonology Brief COVID Video Conference    Subjuctive      Patient found in his room with his oxygen removed and Spo2 monitor reading 80's. Patient states he was attempting to eat his lunch. Patient was able to place the oxygen back on his face and did have increased in his O2 level. We discussed the importance of keeping the oxygen on. The patient states he understands and will continue to keep it on. States he is coughing some and states he feels as though he is breathing well.     Vitals:  Vitals:    07/31/20 0344 07/31/20 0357 07/31/20 0427 07/31/20 0742   BP:  133/70     BP Location:  Left arm     Patient Position:  Sitting     Pulse:  80 64 85   Resp:  18  (!) 22   Temp:  96.2 °F (35.7 °C)     TempSrc:  Axillary     SpO2: 100% 99%  (!) 94%   Weight:       Height:           Lab Results:  Lab Results   Component Value Date    [id number] Positive (A) 07/21/2020       COVID-19 Inflammatory Markers  Recent Labs   Lab 07/24/20  1645  07/30/20  1344   PROCAL 0.90*  --   --    WBC  --    < > 18.90*   LYMPH  --    < > 4.0*  0.8*    < > = values in this interval not displayed.       ABG:   No results for input(s): PH, PCO2, PO2, HCO3, POCSATURATED, BE in the last 72 hours.     CXR:   Imaging Results          X-Ray Chest 1 View (Final result)  Result time 07/22/20 18:36:38    Final result by Kari Becerra MD (07/22/20 18:36:38)                 Impression:      No significant change.      Electronically signed by: Kari Becerra  Date:    07/22/2020  Time:    18:36             Narrative:    EXAMINATION:  CHEST ONE VIEW    CLINICAL HISTORY:  COPD Exacerbation; Sepsis, unspecified organism    TECHNIQUE:  One view of the chest.    COMPARISON:  07/21/2020    FINDINGS:  The cardiac silhouette is within normal limits.   There is no focal consolidation, pneumothorax, or pleural effusion. There is a diffuse interstitial lung abnormalities.  There is chronic appearing bilateral pleural thickening.  There is no large  appearing pleural fluid.                               X-Ray Chest AP Portable (Final result)  Result time 07/21/20 13:54:05    Final result by Luda Powell MD (07/21/20 13:54:05)                 Impression:      Abnormal increased interstitial lung markings predominantly at the lung basis left more than right, nonspecific could be acute or chronic.      Electronically signed by: Luda Powell MD  Date:    07/21/2020  Time:    13:54             Narrative:    EXAMINATION:  XR CHEST AP PORTABLE    CLINICAL HISTORY:  Sepsis;    TECHNIQUE:  Single frontal view of the chest was performed.    COMPARISON:  None    FINDINGS:  EKG wires overlie the chest.  The cardiac silhouette is normal in size, midline.  Mild increased interstitial lung markings noted both lung fields, nonspecific, slightly accentuated  at the lung bases left more than right.  No large pleural effusion.  No pneumothorax.  The osseous structures appear normal.                                Recommendations:  Rajan Deluna is a 74 y.o. male with PMH     - Continue to reiterate the importance of wearing the Vapotherm   - BiPAP at night as needed  - Continue to titrate down the Fi02 as tolerated   - Dexamethasone Day 10/10  - Remdisivir 7/28     Thank you for the consult.  We will continue to follow the patient peripherally.  Please let us know if you need any further assistance.    Jeremy Dubon MD  [facility] [facility] PGY2  [facility]

## 2020-07-31 NOTE — PROGRESS NOTES
"[facility]    Interval History: Patient seen and examined this morning, no acute events overnight. No nausea, vomiting, abdominal pain, or hematochezia. Dark stools slightly improved discontinued. Retrieved VCE equipment this morning.     Past Medical History  Past Medical History:   Diagnosis Date    Arthritis     COPD (chronic obstructive pulmonary disease)     Hypertension     Smoker     Weakness        Review of Systems  General ROS: negative for chills and fever, + fatigue   Cardiovascular ROS: no chest pain, +dyspnea on exertion  Gastrointestinal ROS: + black stool, no abdominal pain, dysphagia or hematochezia      Physical Examination  /70 (BP Location: Left arm, Patient Position: Sitting)   Pulse 85   Temp 96.2 °F (35.7 °C) (Axillary)   Resp (!) 22   Ht 5' 11" (1.803 m)   Wt 83 kg (182 lb 15.7 oz)   SpO2 (!) 94%   BMI 25.52 kg/m²    Exam limited due to Covid Isolation precautions   General appearance: alert, cooperative, no distress,    HENT: Normocephalic, atraumatic  Lungs: no respiratory distress or use of accessory muscles   Abdomen: soft, no TTP, rebound tenderness or guarding, non-distended,     Labs:  (7/30) WBC 17.8, Hgb 8.5, Hct 26.4    Assessment: patrick Deluna is a 74 year-old -American male with PMHx of HTN, COPD (on 2L baseline), MAC infection, aspergillosis infection who presents with acute onset black, tarry stools that are liquid in consistency associated with newly diagnosed C. difficile colitis and COVID-19 pneumonia infections. Initially presented with somnolence, fever, and liquid stools for 3-4 days prior to admission and was found down. In the ED, CXR revealed diffuse interstitial patchy infiltrates, COVID-19 screen was positive, and C diff antigen positive. Was in the ICU for hypotension and started on aggressive anti-coaulation with heparin gtt given severe covid infection with thrombogenic risk.  On IV PPI BID and was started on Remdesivir for severe " COVID in addition to IV metronidazole and oral vancomycin. S/p 1 unit pRBCs transfusion. On HHFNC and Bipap nightly. Ddx for black stools/melena include upper GI bleed from PUD vs gastritis vs Dieulafoy lesion vs angioectasia vs lower GI bleed from C diff colitis or right sided diverticulosis     Plan:  - continue to trend Hgb and  transfuse as needed for goal >7 unless higher threshold indicated by comorbidities   - continue PPI therapy   - continue PO vancomycin 125 mg QID for 10 days (started 7/24)  - given poor respiratory status, will defer EGD/Colconscopy at this time   - VCE retrieved this morning, upload in process, recommendations to follow    Parker Cordon MD  Case discussed with [facility]

## 2020-07-31 NOTE — PLAN OF CARE
Problem: Physical Therapy Goal  Goal: Physical Therapy Goal  Description: Goals to be met by: 20     Patient will increase functional independence with mobility by performin. Supine <> sit with MInimal Assistance  2. Sit to stand transfer with Minimal Assistance  3. Bed to chair transfer with Minimal Assistance   4. Gait x 50 ft with min A with appropriate AD  5. Lower extremity exercise program x 10 reps per handout, with supervision    Outcome: Ongoing, Progressing   Continue working toward goals.

## 2020-07-31 NOTE — ASSESSMENT & PLAN NOTE
Patient has had consistent watery melanic stools since admission. Patient found to have positive C. Diff infection and required PO vancomycin and IV metronidazole. Patient was started on a heparin drip for an elevated D-dimer of 7 and patient not being a candidate to a CTA due to MARQUIS during admission. Heparin drip was discontinued on 7/26.  Patient underwent video capsule endoscopy yesterday (7/30).  Will need to follow-up with GI    Plan:   - will need to follow-up with GI for results of video capsule endoscopy.   - continue to trend H&H Q12H   - patient's diet was advanced yesterday.   - if Hgb < 7 will transfuse with PRBCs.   - continue IV Protonix BID

## 2020-07-31 NOTE — ASSESSMENT & PLAN NOTE
Contributing Nutrition Diagnosis  Inadequate oral intake    Related to (etiology):   physiological state    Signs and Symptoms (as evidenced by):   Pt COVID+ needing continuous respiratory support    Interventions:  Collaboration with other providers  Commercial Beverage: Boost Glucose Control TID to supplement protein and calorie intake      Nutrition Diagnosis Status:   Improving

## 2020-07-31 NOTE — PROGRESS NOTES
[facility] Hospital Medicine  Progress Note    Patient Name: Rajan Deluna  MRN: [id number]  Patient Class: IP- Inpatient   Admission Date: 7/21/2020  Length of Stay: 10 days  Attending Physician: Rell Tolentino III, MD  Primary Care Provider: Jason Mccord MD        Subjective:     Principal Problem:Acute respiratory disease due to COVID-19 virus        HPI:  Patient is a 74-year-old male with a past medical history of hypertension, COPD, MAC infection who presented to the ED with altered mental status and fever.  As per the family, patient has had altered mental status for the past 3-4 days. Family endorses increased somnolence as well as urinating on himself. EMS was called two days prior to presentation but reportedly deemed that patient did not need to be brought in. Over the previous day, reportedly the patient was found stuck, staring in his sink, not responding. On the day of presentation, the patient was found down on the floor, family wasn't able to get patient up from the floor and was reportedly not responding as much as baseline. Family believed that patient may not have been wearing baseline 2L O2. Patient also endorses a cough and generalized weakness. Patient reportedly had a recent UTI.     In the ED, chest x-ray showed diffuse interstitial patchy infiltrates concerning for possible pneumonia and a COVID-19 screening test was positive. Fever was 101° F and patient was hypotensive 80s/40s. Patient given 30cc/kg bolus. BP very soft on presentation, adequately responded to volume resuscitation and then decreased again. Labs showed a BUN 45, creatinine 2.9, , , troponin 0.036, procalcitonin 1.13.     Overview/Hospital Course:  No notes on file    Interval History:  No acute events overnight. Vital signs stable.  Patient was on high-flow nasal cannula at 15 L yesterday, satting in low-mid 90s. Patient uses BiPAP overnight at 100% FiO2.  o agitation overnight.  Underwent  video capsule endoscopy yesterday. Patient currently on a regular diet. Most recent recent H&H 11.5/36.3., stable  Patient receiving PT/OT daily.    Review of Systems   Respiratory: Negative for shortness of breath.    Cardiovascular: Negative for chest pain.   Gastrointestinal: Positive for diarrhea (melanic). Negative for abdominal pain, blood in stool and nausea.   Psychiatric/Behavioral: The patient is not nervous/anxious.      Objective:     Vital Signs (Most Recent):  Temp: 96.2 °F (35.7 °C) (07/31/20 0357)  Pulse: 73 (07/31/20 0809)  Resp: (!) 22 (07/31/20 0742)  BP: 133/70 (07/31/20 0357)  SpO2: (!) 94 % (07/31/20 0742) Vital Signs (24h Range):  Temp:  [96.2 °F (35.7 °C)-98.3 °F (36.8 °C)] 96.2 °F (35.7 °C)  Pulse:  [64-95] 73  Resp:  [17-22] 22  SpO2:  [90 %-100 %] 94 %  BP: (111-133)/(66-70) 133/70     Weight: 83 kg (182 lb 15.7 oz)  Body mass index is 25.52 kg/m².    Intake/Output Summary (Last 24 hours) at 7/31/2020 0840  Last data filed at 7/31/2020 0055  Gross per 24 hour   Intake 200 ml   Output --   Net 200 ml      Physical Exam  Vitals signs and nursing note reviewed.   Constitutional:       General: He is not in acute distress.  HENT:      Head: Normocephalic and atraumatic.   Eyes:      Extraocular Movements: Extraocular movements intact.   Cardiovascular:      Rate and Rhythm: Normal rate and regular rhythm.      Comments: As seen on monitor, patient appears to have sinus rhythm  Pulmonary:      Effort: Pulmonary effort is normal.      Comments: As seen on the monitor, patient peripheral oxygen saturation in low 90s.   Abdominal:      General: There is no distension.   Skin:     Coloration: Skin is not jaundiced.   Neurological:      Mental Status: He is alert.   Psychiatric:      Comments: Answers questions appropriately.          Significant Labs:   A1C:   Recent Labs   Lab 07/23/20  0403   HGBA1C 6.3*     Bilirubin:   Recent Labs   Lab 07/27/20  0520 07/28/20  1131 07/29/20  0602  07/30/20  1344 07/31/20  0755   BILITOT 0.6 0.8 0.7  0.7 0.8  0.8 0.7  0.7     BMP:   Recent Labs   Lab 07/31/20  0755   *  136*   *  135*   K 4.9  4.9     106   CO2 23  23   BUN 24*  24*   CREATININE 1.2  1.2   CALCIUM 7.9*  7.9*   MG 2.0     CBC:   Recent Labs   Lab 07/29/20  1819 07/30/20 1344 07/30/20  1759   WBC  --  18.90*  --    HGB 9.7* 9.5* 11.5*   HCT 29.6* 29.7* 36.3*   PLT  --  288  --      CMP:   Recent Labs   Lab 07/30/20  1344 07/31/20  0755   *  135* 135*  135*   K 4.9  4.9 4.9  4.9     106 106  106   CO2 24  24 23  23   *  142* 136*  136*   BUN 21  21 24*  24*   CREATININE 1.2  1.2 1.2  1.2   CALCIUM 8.1*  8.1* 7.9*  7.9*   PROT 6.1  6.1 5.4*  5.4*   ALBUMIN 2.5*  2.5* 2.2*  2.2*   BILITOT 0.8  0.8 0.7  0.7   ALKPHOS 73  73 60  60   AST 22  22 21  21   ALT 54*  54* 40  40   ANIONGAP 5*  5* 6*  6*   EGFRNONAA 58*  58* 58*  58*     Coagulation:   Recent Labs   Lab 07/30/20  1344   APTT 25.9     Magnesium:   Recent Labs   Lab 07/30/20  1344 07/31/20  0755   MG 1.9 2.0   TSH:   Recent Labs   Lab 07/23/20  0403   TSH 0.604     All pertinent labs within the past 24 hours have been reviewed.    Significant Imaging: I have reviewed all pertinent imaging results/findings within the past 24 hours.      Assessment/Plan:      * Acute respiratory disease due to COVID-19 virus  Patient on overnight BiPAP at 100% FiO2. Patient uses high-flow nasal cannula at 15 L during the day. Patient being followed by PT/OT and is able to sit up at bedside..     Plan:     - COVID Isolation per protocol   - scheduled BiPAP at night   - high flow nassal cannula at 15 L, continue to wean.    - continue dexamethasone 6 mg, currently on day 10.  Will discontinue after last dose today.   - discontinue heparin gtt (7/26) and discontinue prophylactic heparin. Will continue to monitor and once H&H stable will continue prophylactic dose of heparin   -  patient finished 5 day course of Remdesivir.   - Multivitamin    - continue to monitor respiratory status.    - infectious disease made further recommendations of adding an antifungal voriconazole for past history of Aspergillus infections with uptrending WBC count.  Will follow recommendations.      Acute respiratory failure with hypoxia  Currently on continuous BiPAP at  60% oxygen concentration satting at 95-99%. Continue to monitor with continuous pulse oximetry.  Will attempt to wean off BiPAP.  STABLE      Sepsis  RESOLVED  See plan for COVID 19    MARQUIS (acute kidney injury)  Presented with Acute Kidney Injury 2/2 to hypovolemic shock, pre renal.  Patient's most recent BUN/creatinine ratio 21/1.2.  GFR greater than 60.  RESOLVED    Plan:  - cont to encourage oral fluid intake.  - Continue to monitor renal function  - Avoid nephrotoxic medications      COPD (chronic obstructive pulmonary disease)  Patient used 2L supplemental oxygen at home.    Plan:  - continue respiratory therapy treatments  - albuterol inhaler 2 puff Q4H    Positive sputum culture for Aspergillus  Patient has a known history of MAC infections as well as Aspergillus.  ID consulted and will follow recommendations.    Plan:  - continue voriconazole.       GI bleed  Patient has had consistent watery melanic stools since admission. Patient found to have positive C. Diff infection and required PO vancomycin and IV metronidazole. Patient was started on a heparin drip for an elevated D-dimer of 7 and patient not being a candidate to a CTA due to MARQUIS during admission. Heparin drip was discontinued on 7/26.  Patient underwent video capsule endoscopy yesterday (7/30).  Will need to follow-up with GI    Plan:   - will need to follow-up with GI for results of video capsule endoscopy.   - continue to trend H&H Q12H   - patient's diet was advanced yesterday.   - if Hgb < 7 will transfuse with PRBCs.   - continue IV Protonix BID      Hypotension  Patient's  most recent blood pressure 116/75  RESOLVED      Clostridium difficile infection  P/w consistent melanotic diarrhea, C diff toxin positive.     Plan:  - ID following and GI following  - p.o. vancomycin and IV metronidazole  - continue IV Protonix.   - continue probiotics  - continue to monitor stool output.  Follow-up with ID.    Anemia  Patient has received 1 unit of PRBC.  GI was consulted for upper GI bleed and patient underwent video capsule endoscopy yesterday.  H&H has been stable at 11.5 and 36.33    Plan:   - discontinued heparin drip (7/26) and discontinue prophylactic heparin. Will continue to monitor and once H&H stable will continue prophylactic dose of heparin.  - patient underwent video capsule endoscopy yesterday.  Will need to follow-up with GI.    - continue monitor H&H Q12H.  - trend stool output and consistency.      VTE Risk Mitigation (From admission, onward)         Ordered     Place sequential compression device  Until discontinued      07/21/20 2142     IP VTE HIGH RISK PATIENT  Once      07/21/20 2142                      Olivier Rowe,  PGY-1  [facility]   Department of Hospital Medicine   [facility]

## 2020-07-31 NOTE — PLAN OF CARE
Care plan reviewed with pt. Cardiac and glucose monitoring in place. Safety maintained, bed at lowest position wheels locked, call light within reach. Medications and IV antibiotics administered as ordered.

## 2020-07-31 NOTE — PLAN OF CARE
Plan of care reviewed with patient, understanding verbalized.  SR on tele.  Bipap overnight. Bed alarm on, call light in reach, fall precautions in place.

## 2020-07-31 NOTE — PT/OT/SLP PROGRESS
Physical Therapy Treatment    Patient Name:  Rajan Deluna   MRN:  [id number]    Recommendations:     Discharge Recommendations:  (TBD)   Discharge Equipment Recommendations: (TBD)   Barriers to discharge: limited in activity secondary to SOB and decreasing O2 sats with activity.    Assessment:     Rajan Deluna is a 74 y.o. male admitted with a medical diagnosis of Acute respiratory disease due to COVID-19 virus.  He presents with the following impairments/functional limitations:  weakness, impaired endurance, impaired self care skills, impaired functional mobilty, gait instability, impaired balance, decreased upper extremity function, decreased lower extremity function, decreased ROM, impaired cardiopulmonary response to activity.  Pt would continue to benefit from P.T. To address impairments listed above.  .    Rehab Prognosis: Fair; patient would benefit from acute skilled PT services to address these deficits and reach maximum level of function.    Recent Surgery: Procedure(s) (LRB):  IMAGING PROCEDURE, GI TRACT, INTRALUMINAL, VIA CAPSULE (N/A)      Plan:     During this hospitalization, patient to be seen 6 x/week to address the identified rehab impairments via gait training, therapeutic activities, therapeutic exercises and progress toward the following goals:    · Plan of Care Expires:  08/27/20    Subjective     Patient/Family Comments/goals: Pt agreed to tx.  Pain/Comfort:  · Pain Rating 1: 0/10  · Pain Rating Post-Intervention 1: 0/10      Objective:     Communicated with RN (Travis) prior to session.  Patient found supine with oxygen, pulse ox (continuous), telemetry, bed alarm, peripheral IV upon PT entry to room.     General Precautions: Standard, airborne, contact, droplet, fall   Orthopedic Precautions:N/A   Braces:       Functional Mobility:  · Bed Mobility:     · Scooting: stand by assistance, contact guard assistance and to EOB  · Supine to Sit: minimum assistance  · Sit to Supine: moderate  assistance  · Transfers:     · Sit to Stand:  stand by assistance and contact guard assistance with rolling walker and x 2 reps  · Balance: sitting fair+, standing fair, gait fair-      AM-PAC 6 CLICK MOBILITY  Turning over in bed (including adjusting bedclothes, sheets and blankets)?: 3  Sitting down on and standing up from a chair with arms (e.g., wheelchair, bedside commode, etc.): 3  Moving from lying on back to sitting on the side of the bed?: 3  Moving to and from a bed to a chair (including a wheelchair)?: 2  Need to walk in hospital room?: 1  Climbing 3-5 steps with a railing?: 1  Basic Mobility Total Score: 13       Therapeutic Activities and Exercises:   pt ambulated 3 to 4 small sidesteps up to HOB with RW and Maurice for RW management and stability with one bout LOB requiring Maurice to assist pt's lowering EOB.  AP x 10 reps.  Pt sat EOB ~18 mins with SBA/S without various degrees of SOB on exertion.  Pt was on HF O2 NC and O2 sats upon entering the room were 92 - 93%, and 87 - 88% with activity, and was able to recover to 88 - 91% during tx.     Pt was able to perform PLB with and without vc's.        Patient left supine with HOB elevated with all lines intact, call button in reach, bed alarm on and RN notified..    GOALS:   Multidisciplinary Problems     Physical Therapy Goals        Problem: Physical Therapy Goal    Goal Priority Disciplines Outcome Goal Variances Interventions   Physical Therapy Goal     PT, PT/OT Ongoing, Progressing     Description: Goals to be met by: 20     Patient will increase functional independence with mobility by performin. Supine <> sit with MInimal Assistance  2. Sit to stand transfer with Minimal Assistance  3. Bed to chair transfer with Minimal Assistance   4. Gait x 50 ft with min A with appropriate AD  5. Lower extremity exercise program x 10 reps per handout, with supervision                     Time Tracking:     PT Received On: 20  PT Start Time: 6934      PT Stop Time: 1345  PT Total Time (min): 30 min     Billable Minutes: Therapeutic Activity 15  Co tx 15 mins      Treatment Type: Treatment  PT/PTA: PTA     PTA Visit Number: 2     Dee Urena PTA  07/31/2020

## 2020-07-31 NOTE — ASSESSMENT & PLAN NOTE
Patient used 2L supplemental oxygen at home.    Plan:  - continue respiratory therapy treatments  - albuterol inhaler 2 puff Q4H

## 2020-07-31 NOTE — ASSESSMENT & PLAN NOTE
P/w consistent melanotic diarrhea, C diff toxin positive.     Plan:  - ID following and GI following  - p.o. vancomycin and IV metronidazole  - continue IV Protonix.   - continue probiotics  - continue to monitor stool output.  Follow-up with ID.

## 2020-07-31 NOTE — PLAN OF CARE
Recommendation:   1. Continue current diabetic diet order.   2. Addition of Boost Glucose Control TID to supplement protein and calorie intake   3. RD to continue to follow and monitor intake and whether hyperglycemia is illness related or new onset DM.    Goals:   Pt intake >/= 75% EEN/EPN by RD follow up    Nutrition Goal Status: new  Communication of RD Recs: other (comment)(POC)      Problem: Nutrition Impaired (Sepsis/Septic Shock)  Goal: Optimal Nutrition Intake  Outcome: Ongoing, Progressing     Problem: Oral Intake Inadequate (Acute Kidney Injury/Impairment)  Goal: Optimal Nutrition Intake  Outcome: Ongoing, Progressing     Problem: Diarrhea  Goal: Fluid and Electrolyte Balance  Outcome: Ongoing, Progressing

## 2020-07-31 NOTE — ASSESSMENT & PLAN NOTE
Presented with Acute Kidney Injury 2/2 to hypovolemic shock, pre renal.  Patient's most recent BUN/creatinine ratio 21/1.2.  GFR greater than 60.  RESOLVED    Plan:  - cont to encourage oral fluid intake.  - Continue to monitor renal function  - Avoid nephrotoxic medications

## 2020-07-31 NOTE — PLAN OF CARE
Problem: Occupational Therapy Goal  Goal: Occupational Therapy Goal  Description: Goals to be met by: 8/27/20     Patient will increase functional independence with ADLs by performing:    LE Dressing with Stand-by Assistance.  Grooming while standing with Stand-by Assistance.  Toileting from toilet with Stand-by Assistance for hygiene and clothing management.   Supine to sit with Stand-by Assistance.  Step transfer with Stand-by Assistance  Toilet transfer to toilet with Stand-by Assistance.  Upper extremity exercise program x10 reps per handout, with independence.    Outcome: Ongoing, Progressing   Pt progressing towards goals. Improved tolerance for functional mobility and therapeutic activities on Vapotherm 40L O2 at 100%, sats ~85-92% spO2 throughout session. Cont OT POC

## 2020-07-31 NOTE — PROGRESS NOTES
[facility] Infectious Diseases Progress Note    Assessment/Plan:     Leukocytosis, improving  Possibly related to steroids - currently day 10/10 of dexamethasone.  - If WBC count continues to rise, consider pan-culture and CXR.     Severe C diff  Patient with profuse diarrhea prompting C diff testing. Antigen positive, toxin negative; however, toxin PCR positive. KUB with no evidence of toxic megacolon.  - Continue treatment of severe C diff with PO vancomycin and IV metronidazole, recommend 10-14 day course (started 7/24).      Pneumonia due to COVID-19  - Continue dexamethasone 6 mg qD for a 10 day course if patient tolerates (day 10/10).   - S/P 5 day course of remdesivir  - Monitor on tele  - Monitor LFTs daily  - Wean oxygen as able      History of MAC infection  History of Aspergillosis  Patient with history of structural lung disease c/b MAC infection, and was recently treated with itraconazole for Aspergillosis.  - Serum aspergillus antigen negative. Continue Voriconazole, s/p load on 7/29. Must take one hour before or after meals - ordered by ID team. Duration TBD.   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 Do not need to treat.   - Will hold off on MAC treatment at this time due to COVID and active c-diff infection.  - Appreciated Pulmonology's recommendations as well.    Lina Giles MD  [facility] \[facility] [place] [facility]\""III  [facility]     Thank you for allowing us to participate in the care of this patient. We will continue to follow along. Case has been discussed with consult staff, who is in agreement with assessment and plan.     Subjective:      Rajan Deluna is a 74 y.o. male who is being followed by the [facility] service at [facility] for Pneumonia 2/2 COVID-19 infection, severe C diff, h/o MAC and aspergillosis.     Afebrile overnight. Stable on floor. On 15L HFNC throughout day yesterday, BIPAP overnight. Still having liquid melanotic stools,  though amount has improved. VCE done last night, reading in process.     Objective:   Last 24 Hour Vital Signs:  BP  Min: 111/67  Max: 133/70  Temp  Av.2 °F (36.2 °C)  Min: 96.2 °F (35.7 °C)  Max: 98.3 °F (36.8 °C)  Pulse  Av.8  Min: 64  Max: 95  Resp  Av.9  Min: 17  Max: 22  SpO2  Av.4 %  Min: 90 %  Max: 100 %  I/O last 3 completed shifts:  In: 520 [P.O.:220; IV Piggyback:300]  Out: 500 [Urine:500]    Physical Exam  Limited by Telemedicine. Elderly appearing. Comfortable on mask. More awake and alert, no apparent distress.    Laboratory:  Laboratory Data Reviewed: yes  Pertinent Findings:  Recent Labs   Lab 20  0602  20  1344 20  1759 20  0755   WBC 20.19*  --  18.90*  --  17.82*   HGB 9.2*   < > 9.5* 11.5* 8.5*   HCT 28.6*   < > 29.7* 36.3* 26.4*     --  288  --  283   MCV 94  --  96  --  94   RDW 17.7*  --  18.2*  --  17.7*     138  --  135*  135*  --  135*  135*   K 4.6  4.6  --  4.9  4.9  --  4.9  4.9     110  --  106  106  --  106  106   CO2 22*  22*  --  24  24  --  23  23   BUN 21  21  --  21  21  --  24*  24*   CREATININE 1.2  1.2  --  1.2  1.2  --  1.2  1.2     109  --  142*  142*  --  136*  136*   PROT 5.6*  5.6*  --  6.1  6.1  --  5.4*  5.4*   ALBUMIN 2.4*  2.4*  --  2.5*  2.5*  --  2.2*  2.2*   BILITOT 0.7  0.7  --  0.8  0.8  --  0.7  0.7   AST 31  31  --  22  22  --  21  21   ALKPHOS 69  69  --  73  73  --  60  60   ALT 74*  74*  --  54*  54*  --  40  40    < > = values in this interval not displayed.         Microbiology Data Reviewed: yes  Pertinent Findings:  Blood cultures  NGTD  C diff antigen +, toxin - () toxin PCR + ()  AFB and fungal blood and respiratory cultures pending  Aspergillus antigen - <0.5 ()    Other Results:  Radiology Data Reviewed: yes  Pertinent Findings:  KUB with no evidence of toxic megacolon.    Current Medications:     Infusions:       Scheduled:    albuterol  2 puff Inhalation QID WAKE    Lactobacillus acidoph-L.bulgar  4 tablet Oral TID WM    lidocaine  1 patch Transdermal Q24H    metronidazole  500 mg Intravenous Q8H    multivitamin  1 tablet Oral Daily    pantoprazole  40 mg Intravenous BID    vancomycin  125 mg Oral Q6H    voriconazole  200 mg Oral Q12H        PRN:  sodium chloride, acetaminophen, ondansetron, pneumoc 13-loy conj-dip cr(PF), simethicone, sodium chloride 0.9%    Antibiotics and Day Number of Therapy:    PO vancomycin 7/24- current  IV metronidazole 7/24 - current  IV voriconazole 7/29 - current    Lines and Day Number of Therapy:  Trejo 7/23- current

## 2020-07-31 NOTE — PLAN OF CARE
Handoff     Primary Team: Networked reference to record [facility]  Room Number: K464/K464 A     Patient Name: Rajan Deluna MRN: [id number]     Date of Birth: [id number] Allergies: Patient has no known allergies.     Age: 74 y.o. Admit Date: 7/21/2020     Sex: male  BMI: Body mass index is 25.52 kg/m².     Code Status: Full Code        Illness Level (current clinical status): Watcher - Yes    Reason for Admission: Acute respiratory disease due to COVID-19 virus    Brief HPI (pertinent PMH and diagnosis or differential diagnosis):  74-year-old male PMHx COPD on home oxygen (2L), hx of MAC and Aspergillus infections presented to the ED with AMS and fever. Patient was found to be COVID positive and chest x-ray showed interstitial has right infiltrates concerns of possible pneumonia use.  Patient had a fever of 101° F patient was hypotensive 80s/40s. Patient was admitted to the icu for COVID 19 sepsis and MARQUIS.    Hospital Course (updated, brief assessment by system or problem, significant events):     Initial labs showed a D-dimer of 7. Patient had a MARQUIS causing an inability to obtain a CTA of the head and neck, and patient was started on a heparin drip, dexamethasone, and levofloxacin. Consent was obtained to start a course of Remdesivir.     On 7/23, patient was showing an increased oxygen demand requiring step-up to ICU for  continuous BiPAP. Patient also developed large amounts of watery melenic stools and test for Clostridium difficile ordered.  A rectal tube was placed.  Patient was found to be C diff antigen positive and C diff PCR positive. Infectious disease was consulted and recommended IV as Flagyl and PO vancomycin.     Patient was stabilized 2 days later and was stepped down from the ICU.  Patient requiring continuous BiPAP at night and 13-15 L via high-flow nasal cannula. Patient had persistent melanic stools and hemoglobin got as low as 6.9 (on 07/27) and patient received 1 unit of PRBC.  Heparin drip was  discontinued GI was consulted for possible upper GI bleed, recommended IV Protonix trending the H&H Q12H, scheduled a future video capsule endoscopy.           Tasks (specific, using if-then statements):     - continue to wean O2.  - continue aggressive pulmonary toliet and continue PT/OT daily.   - Follow-up with GI for results of video capsule endoscopy.   - Continue to wean oxygen requirements, patient's baseline oxygen requirements of 2 L via nasal cannula.   - Follow-up with infectious disease recommendations.  Patient currently on IV Flagyl and p.o. vancomycin for C. diff. Patient on voriconazole due to hx of aspergillus infections.   - discontinue dexamethasone (day 10 7/31)  - consider continuing anticoagulant therapy, possibly subcu heparin. Patient has not been on any type of anticoagulant prophylaxis since 7/26 when we discontinued the heparin drip. Possibly get further recs for what therapy to begin due to upper gi bleed.   - Patient has a hx of agitation on nights, AVOID ativan.       Contingency Plan (special circumstances anticipated and plan):  Patient approved for inpatient rehab facility [facility] once medically stabilized.    Estimated Discharge Date: pending on improvement in respiratory status    Discharge Disposition: inpatient rehab facility     Mentored By:   Attending Physician: Dr. Rell Tolentino III, MD  Chief Resident: DO Olivier Frank DO  [facility] PGY-1  07/31/2020

## 2020-07-31 NOTE — PLAN OF CARE
VN COVID rounds: Patient currently working with therapy. NAD noted. WCTM and round later if time permits.

## 2020-08-01 LAB
ALBUMIN SERPL BCP-MCNC: 2.3 G/DL (ref 3.5–5.2)
ALBUMIN SERPL BCP-MCNC: 2.3 G/DL (ref 3.5–5.2)
ALP SERPL-CCNC: 60 U/L (ref 55–135)
ALP SERPL-CCNC: 60 U/L (ref 55–135)
ALT SERPL W/O P-5'-P-CCNC: 33 U/L (ref 10–44)
ALT SERPL W/O P-5'-P-CCNC: 33 U/L (ref 10–44)
ANION GAP SERPL CALC-SCNC: 4 MMOL/L (ref 8–16)
ANION GAP SERPL CALC-SCNC: 4 MMOL/L (ref 8–16)
ANION GAP SERPL CALC-SCNC: 5 MMOL/L (ref 8–16)
APTT BLDCRRT: 25.8 SEC (ref 21–32)
AST SERPL-CCNC: 19 U/L (ref 10–40)
AST SERPL-CCNC: 19 U/L (ref 10–40)
BASOPHILS # BLD AUTO: 0.02 K/UL (ref 0–0.2)
BASOPHILS NFR BLD: 0.1 % (ref 0–1.9)
BILIRUB SERPL-MCNC: 0.7 MG/DL (ref 0.1–1)
BILIRUB SERPL-MCNC: 0.7 MG/DL (ref 0.1–1)
BUN SERPL-MCNC: 26 MG/DL (ref 8–23)
BUN SERPL-MCNC: 27 MG/DL (ref 8–23)
BUN SERPL-MCNC: 27 MG/DL (ref 8–23)
CALCIUM SERPL-MCNC: 8 MG/DL (ref 8.7–10.5)
CHLORIDE SERPL-SCNC: 104 MMOL/L (ref 95–110)
CHLORIDE SERPL-SCNC: 105 MMOL/L (ref 95–110)
CHLORIDE SERPL-SCNC: 105 MMOL/L (ref 95–110)
CO2 SERPL-SCNC: 22 MMOL/L (ref 23–29)
CO2 SERPL-SCNC: 25 MMOL/L (ref 23–29)
CO2 SERPL-SCNC: 25 MMOL/L (ref 23–29)
CREAT SERPL-MCNC: 1.1 MG/DL (ref 0.5–1.4)
CREAT SERPL-MCNC: 1.2 MG/DL (ref 0.5–1.4)
CREAT SERPL-MCNC: 1.2 MG/DL (ref 0.5–1.4)
DIFFERENTIAL METHOD: ABNORMAL
EOSINOPHIL # BLD AUTO: 0 K/UL (ref 0–0.5)
EOSINOPHIL NFR BLD: 0 % (ref 0–8)
ERYTHROCYTE [DISTWIDTH] IN BLOOD BY AUTOMATED COUNT: 17.5 % (ref 11.5–14.5)
EST. GFR  (AFRICAN AMERICAN): >60 ML/MIN/1.73 M^2
EST. GFR  (NON AFRICAN AMERICAN): 58 ML/MIN/1.73 M^2
EST. GFR  (NON AFRICAN AMERICAN): 58 ML/MIN/1.73 M^2
EST. GFR  (NON AFRICAN AMERICAN): >60 ML/MIN/1.73 M^2
GLUCOSE SERPL-MCNC: 133 MG/DL (ref 70–110)
HCT VFR BLD AUTO: 25.5 % (ref 40–54)
HCT VFR BLD AUTO: 29 % (ref 40–54)
HGB BLD-MCNC: 8.3 G/DL (ref 14–18)
HGB BLD-MCNC: 9.3 G/DL (ref 14–18)
IMM GRANULOCYTES # BLD AUTO: 0.39 K/UL (ref 0–0.04)
IMM GRANULOCYTES NFR BLD AUTO: 2.1 % (ref 0–0.5)
LYMPHOCYTES # BLD AUTO: 0.7 K/UL (ref 1–4.8)
LYMPHOCYTES NFR BLD: 3.5 % (ref 18–48)
MAGNESIUM SERPL-MCNC: 2 MG/DL (ref 1.6–2.6)
MCH RBC QN AUTO: 30.1 PG (ref 27–31)
MCHC RBC AUTO-ENTMCNC: 32.5 G/DL (ref 32–36)
MCV RBC AUTO: 92 FL (ref 82–98)
MONOCYTES # BLD AUTO: 0.8 K/UL (ref 0.3–1)
MONOCYTES NFR BLD: 4 % (ref 4–15)
NEUTROPHILS # BLD AUTO: 17 K/UL (ref 1.8–7.7)
NEUTROPHILS NFR BLD: 90.3 % (ref 38–73)
NRBC BLD-RTO: 0 /100 WBC
PHOSPHATE SERPL-MCNC: 3.8 MG/DL (ref 2.7–4.5)
PLATELET # BLD AUTO: 299 K/UL (ref 150–350)
PMV BLD AUTO: 9.7 FL (ref 9.2–12.9)
POCT GLUCOSE: 131 MG/DL (ref 70–110)
POTASSIUM SERPL-SCNC: 4.9 MMOL/L (ref 3.5–5.1)
POTASSIUM SERPL-SCNC: 5.3 MMOL/L (ref 3.5–5.1)
POTASSIUM SERPL-SCNC: 5.3 MMOL/L (ref 3.5–5.1)
PROT SERPL-MCNC: 5.2 G/DL (ref 6–8.4)
PROT SERPL-MCNC: 5.2 G/DL (ref 6–8.4)
RBC # BLD AUTO: 2.76 M/UL (ref 4.6–6.2)
SODIUM SERPL-SCNC: 131 MMOL/L (ref 136–145)
SODIUM SERPL-SCNC: 134 MMOL/L (ref 136–145)
SODIUM SERPL-SCNC: 134 MMOL/L (ref 136–145)
WBC # BLD AUTO: 18.84 K/UL (ref 3.9–12.7)

## 2020-08-01 PROCEDURE — 99900035 HC TECH TIME PER 15 MIN (STAT)

## 2020-08-01 PROCEDURE — 80053 COMPREHEN METABOLIC PANEL: CPT

## 2020-08-01 PROCEDURE — 36415 COLL VENOUS BLD VENIPUNCTURE: CPT

## 2020-08-01 PROCEDURE — 25000003 PHARM REV CODE 250

## 2020-08-01 PROCEDURE — 63600175 PHARM REV CODE 636 W HCPCS: Performed by: STUDENT IN AN ORGANIZED HEALTH CARE EDUCATION/TRAINING PROGRAM

## 2020-08-01 PROCEDURE — 85025 COMPLETE CBC W/AUTO DIFF WBC: CPT

## 2020-08-01 PROCEDURE — 84100 ASSAY OF PHOSPHORUS: CPT

## 2020-08-01 PROCEDURE — 25000003 PHARM REV CODE 250: Performed by: STUDENT IN AN ORGANIZED HEALTH CARE EDUCATION/TRAINING PROGRAM

## 2020-08-01 PROCEDURE — 85018 HEMOGLOBIN: CPT

## 2020-08-01 PROCEDURE — C9113 INJ PANTOPRAZOLE SODIUM, VIA: HCPCS | Performed by: INTERNAL MEDICINE

## 2020-08-01 PROCEDURE — 94660 CPAP INITIATION&MGMT: CPT

## 2020-08-01 PROCEDURE — 80048 BASIC METABOLIC PNL TOTAL CA: CPT

## 2020-08-01 PROCEDURE — 11000001 HC ACUTE MED/SURG PRIVATE ROOM

## 2020-08-01 PROCEDURE — 83735 ASSAY OF MAGNESIUM: CPT

## 2020-08-01 PROCEDURE — 94664 DEMO&/EVAL PT USE INHALER: CPT

## 2020-08-01 PROCEDURE — S0030 INJECTION, METRONIDAZOLE: HCPCS

## 2020-08-01 PROCEDURE — 25000003 PHARM REV CODE 250: Performed by: INTERNAL MEDICINE

## 2020-08-01 PROCEDURE — 63600175 PHARM REV CODE 636 W HCPCS: Performed by: INTERNAL MEDICINE

## 2020-08-01 PROCEDURE — 27100171 HC OXYGEN HIGH FLOW UP TO 24 HOURS

## 2020-08-01 PROCEDURE — 85014 HEMATOCRIT: CPT

## 2020-08-01 PROCEDURE — 85730 THROMBOPLASTIN TIME PARTIAL: CPT

## 2020-08-01 PROCEDURE — 94640 AIRWAY INHALATION TREATMENT: CPT

## 2020-08-01 RX ORDER — ENOXAPARIN SODIUM 150 MG/ML
1 INJECTION SUBCUTANEOUS
Status: DISCONTINUED | OUTPATIENT
Start: 2020-08-01 | End: 2020-08-03

## 2020-08-01 RX ORDER — ENOXAPARIN SODIUM 100 MG/ML
40 INJECTION SUBCUTANEOUS EVERY 24 HOURS
Status: DISCONTINUED | OUTPATIENT
Start: 2020-08-01 | End: 2020-08-01

## 2020-08-01 RX ADMIN — ALBUTEROL SULFATE 2 PUFF: 90 AEROSOL, METERED RESPIRATORY (INHALATION) at 11:08

## 2020-08-01 RX ADMIN — Medication 125 MG: at 01:08

## 2020-08-01 RX ADMIN — Medication 125 MG: at 11:08

## 2020-08-01 RX ADMIN — ALBUTEROL SULFATE 2 PUFF: 90 AEROSOL, METERED RESPIRATORY (INHALATION) at 08:08

## 2020-08-01 RX ADMIN — Medication 125 MG: at 05:08

## 2020-08-01 RX ADMIN — Medication 4 TABLET: at 04:08

## 2020-08-01 RX ADMIN — Medication 125 MG: at 02:08

## 2020-08-01 RX ADMIN — VORICONAZOLE 200 MG: 200 TABLET, FILM COATED ORAL at 04:08

## 2020-08-01 RX ADMIN — METRONIDAZOLE 500 MG: 500 INJECTION, SOLUTION INTRAVENOUS at 09:08

## 2020-08-01 RX ADMIN — Medication 4 TABLET: at 09:08

## 2020-08-01 RX ADMIN — METRONIDAZOLE 500 MG: 500 INJECTION, SOLUTION INTRAVENOUS at 04:08

## 2020-08-01 RX ADMIN — ALBUTEROL SULFATE 2 PUFF: 90 AEROSOL, METERED RESPIRATORY (INHALATION) at 04:08

## 2020-08-01 RX ADMIN — ENOXAPARIN SODIUM 90 MG: 150 INJECTION SUBCUTANEOUS at 04:08

## 2020-08-01 RX ADMIN — ALBUTEROL SULFATE 2 PUFF: 90 AEROSOL, METERED RESPIRATORY (INHALATION) at 07:08

## 2020-08-01 RX ADMIN — PANTOPRAZOLE SODIUM 40 MG: 40 INJECTION, POWDER, FOR SOLUTION INTRAVENOUS at 09:08

## 2020-08-01 RX ADMIN — METRONIDAZOLE 500 MG: 500 INJECTION, SOLUTION INTRAVENOUS at 01:08

## 2020-08-01 RX ADMIN — Medication 125 MG: at 06:08

## 2020-08-01 RX ADMIN — THERA TABS 1 TABLET: TAB at 09:08

## 2020-08-01 RX ADMIN — PANTOPRAZOLE SODIUM 40 MG: 40 INJECTION, POWDER, FOR SOLUTION INTRAVENOUS at 08:08

## 2020-08-01 RX ADMIN — LIDOCAINE 1 PATCH: 50 PATCH TOPICAL at 04:08

## 2020-08-01 RX ADMIN — Medication 4 TABLET: at 02:08

## 2020-08-01 NOTE — SUBJECTIVE & OBJECTIVE
Interval History: No acute events overnight. No acute distress. Patient is alert and sitting up in bed watching tv. Does not remember events of coming to the hospital but states he feels well and is ready to go home.     Review of Systems   Constitutional: Negative for appetite change, chills and fever.   HENT: Negative for congestion, rhinorrhea and sore throat.    Respiratory: Negative for cough and shortness of breath.    Cardiovascular: Negative for chest pain.   Gastrointestinal: Negative for abdominal pain, constipation, diarrhea, nausea and vomiting.   Musculoskeletal: Negative for myalgias.   Neurological: Negative for weakness.   All other systems reviewed and are negative.    Objective:     Vital Signs (Most Recent):  Temp: 99.1 °F (37.3 °C) (08/01/20 0912)  Pulse: 83 (08/01/20 0912)  Resp: 18 (08/01/20 0912)  BP: 113/63 (08/01/20 0912)  SpO2: (!) 92 % (08/01/20 0912) Vital Signs (24h Range):  Temp:  [97.1 °F (36.2 °C)-99.1 °F (37.3 °C)] 99.1 °F (37.3 °C)  Pulse:  [70-96] 83  Resp:  [16-20] 18  SpO2:  [91 %-98 %] 92 %  BP: (113-134)/(63-79) 113/63     Weight: 83 kg (182 lb 15.7 oz)  Body mass index is 25.52 kg/m².    Intake/Output Summary (Last 24 hours) at 8/1/2020 0947  Last data filed at 8/1/2020 0500  Gross per 24 hour   Intake 100 ml   Output 580 ml   Net -480 ml      Physical Exam  Vitals signs and nursing note reviewed.   Constitutional:       Appearance: Normal appearance. He is normal weight.   HENT:      Head: Normocephalic and atraumatic.      Mouth/Throat:      Mouth: Mucous membranes are moist.      Pharynx: Oropharynx is clear.   Eyes:      Extraocular Movements: Extraocular movements intact.      Conjunctiva/sclera: Conjunctivae normal.      Pupils: Pupils are equal, round, and reactive to light.   Neck:      Musculoskeletal: Normal range of motion and neck supple.   Cardiovascular:      Rate and Rhythm: Normal rate and regular rhythm.      Pulses: Normal pulses.      Heart sounds: Normal  heart sounds.   Pulmonary:      Effort: Pulmonary effort is normal.      Breath sounds: Normal breath sounds.   Abdominal:      General: Abdomen is flat.      Palpations: Abdomen is soft.   Musculoskeletal: Normal range of motion.   Skin:     General: Skin is warm and dry.      Capillary Refill: Capillary refill takes less than 2 seconds.   Neurological:      General: No focal deficit present.      Mental Status: He is alert and oriented to person, place, and time.   Psychiatric:         Mood and Affect: Mood normal.         Behavior: Behavior normal.         Significant Labs:   Recent Lab Results       08/01/20  0528   08/01/20  0459   07/31/20  1831   07/31/20  1734   07/31/20  1137        Albumin   2.3              2.3           Alkaline Phosphatase   60              60           ALT   33              33           Anion Gap   4              4           aPTT   25.8  Comment:  aPTT therapeutic range = 39-69 seconds           AST   19              19           Baso #   0.02           Basophil%   0.1           BILIRUBIN TOTAL   0.7  Comment:  For infants and newborns, interpretation of results should be based  on gestational age, weight and in agreement with clinical  observations.  Premature Infant recommended reference ranges:  Up to 24 hours.............<8.0 mg/dL  Up to 48 hours............<12.0 mg/dL  3-5 days..................<15.0 mg/dL  6-29 days.................<15.0 mg/dL                0.7  Comment:  For infants and newborns, interpretation of results should be based  on gestational age, weight and in agreement with clinical  observations.  Premature Infant recommended reference ranges:  Up to 24 hours.............<8.0 mg/dL  Up to 48 hours............<12.0 mg/dL  3-5 days..................<15.0 mg/dL  6-29 days.................<15.0 mg/dL             BUN, Bld   27              27           Calcium   8.0              8.0           Chloride   105              105           CO2   25              25            Creatinine   1.2              1.2           Differential Method   Automated           eGFR if    >60              >60           eGFR if non    58  Comment:  Calculation used to obtain the estimated glomerular filtration  rate (eGFR) is the CKD-EPI equation.                 58  Comment:  Calculation used to obtain the estimated glomerular filtration  rate (eGFR) is the CKD-EPI equation.              Eos #   0.0           Eosinophil%   0.0           Glucose   133              133           Gran # (ANC)   17.0           Gran%   90.3           Hematocrit   25.5 33.1         Hemoglobin   8.3 10.4         Immature Grans (Abs)   0.39  Comment:  Mild elevation in immature granulocytes is non specific and   can be seen in a variety of conditions including stress response,   acute inflammation, trauma and pregnancy. Correlation with other   laboratory and clinical findings is essential.             Immature Granulocytes   2.1           Lymph #   0.7           Lymph%   3.5           Magnesium   2.0           MCH   30.1           MCHC   32.5           MCV   92           Mono #   0.8           Mono%   4.0           MPV   9.7           nRBC   0           Phosphorus   3.8           Platelets   299           POCT Glucose 131     196 179     Potassium   5.3              5.3           PROTEIN TOTAL   5.2              5.2           RBC   2.76           RDW   17.5           Sodium   134              134           WBC   18.84                                Significant Imaging:   Imaging Results          X-Ray Chest 1 View (Final result)  Result time 07/22/20 18:36:38    Final result by Kari Becerra MD (07/22/20 18:36:38)                 Impression:      No significant change.      Electronically signed by: Kari Becerra  Date:    07/22/2020  Time:    18:36             Narrative:    EXAMINATION:  CHEST ONE VIEW    CLINICAL HISTORY:  COPD Exacerbation; Sepsis, unspecified  organism    TECHNIQUE:  One view of the chest.    COMPARISON:  07/21/2020    FINDINGS:  The cardiac silhouette is within normal limits.   There is no focal consolidation, pneumothorax, or pleural effusion. There is a diffuse interstitial lung abnormalities.  There is chronic appearing bilateral pleural thickening.  There is no large appearing pleural fluid.                               X-Ray Chest AP Portable (Final result)  Result time 07/21/20 13:54:05    Final result by Luda Powell MD (07/21/20 13:54:05)                 Impression:      Abnormal increased interstitial lung markings predominantly at the lung basis left more than right, nonspecific could be acute or chronic.      Electronically signed by: Luda Powell MD  Date:    07/21/2020  Time:    13:54             Narrative:    EXAMINATION:  XR CHEST AP PORTABLE    CLINICAL HISTORY:  Sepsis;    TECHNIQUE:  Single frontal view of the chest was performed.    COMPARISON:  None    FINDINGS:  EKG wires overlie the chest.  The cardiac silhouette is normal in size, midline.  Mild increased interstitial lung markings noted both lung fields, nonspecific, slightly accentuated  at the lung bases left more than right.  No large pleural effusion.  No pneumothorax.  The osseous structures appear normal.                                Traci Blackman MD, [facility] PGY-1  08/01/2020

## 2020-08-01 NOTE — ASSESSMENT & PLAN NOTE
Patient admitted 7/21 with AMS & fever  - 7/21 COVID(+)  - initially required NC in the ED but escalated to BIPAP so admitted to ICU  - CXR: diffuse patchy bilateral infiltrates  - hx of aspergillus and MAC infection   - s/p remdesivir x5d, dexamethasone x10d    Recommend  - consideration that if patient has untreated or not properly treated MAC/aspergillus infection in the past that reactivation of infection is possible  - full dose AC    - proning if he can tolerate at all  - incentive spirometry

## 2020-08-01 NOTE — ASSESSMENT & PLAN NOTE
Pt w/ hx of structural lung disease c/b MAC infection, recent aspergillosis infection treated with intraconazole  - ID consulted - serum aspergillus negative, loaded with voriconazole on  7/29   - RespCx with Candida parapsilosis - likely colonizer, candida is rarely pathological in sputum cultures  - no MAC treatment at this time per ID     Recommend  - continue to monitor respiratory status

## 2020-08-01 NOTE — PLAN OF CARE
POC reviewed with patient. Patient AAOx4 and denies any pain. COVID precautions maintained. IV and oral antibiotics administered. Tele monitor in place reading NSR. No red alarms or ectopy noted. Patient on 40L @ 95% via vapotherm. Continuous pulse ox in place. Condom cath in place. SQ lovenox initiated. Bed alarm on, bed locked and low, side rails up x2, and call bell in reach. Plan is for patient to be discharged to LTAC pending clinical improvement.  Patient verbalizes clear understanding of POC.

## 2020-08-01 NOTE — ASSESSMENT & PLAN NOTE
Patient has received 1 unit of PRBC.  GI was consulted for upper GI bleed and patient underwent video capsule endoscopy yesterday.  H&H has been stable at 11.5 and 36.33    Plan:   - discontinued heparin drip (7/26) and discontinue prophylactic heparin. Will continue to monitor and once H&H stable will continue prophylactic dose of heparin.  - patient underwent video capsule endoscopy yesterday.  Will need to follow-up with GI.    - continue monitor H&H Q12H.  - trend stool output and consistency.   10-Mar-2020 22:30

## 2020-08-01 NOTE — PROGRESS NOTES
[facility] Hospital Medicine  Progress Note    Patient Name: Rajan Deluna  MRN: [id number]  Patient Class: IP- Inpatient   Admission Date: 7/21/2020  Length of Stay: 11 days  Attending Physician: Rell Tolentino III, MD  Primary Care Provider: Jason Mccord MD        Subjective:     Principal Problem:Acute respiratory disease due to COVID-19 virus        HPI:  Patient is a 74-year-old male with a past medical history of hypertension, COPD, MAC infection who presented to the ED with altered mental status and fever.  As per the family, patient has had altered mental status for the past 3-4 days. Family endorses increased somnolence as well as urinating on himself. EMS was called two days prior to presentation but reportedly deemed that patient did not need to be brought in. Over the previous day, reportedly the patient was found stuck, staring in his sink, not responding. On the day of presentation, the patient was found down on the floor, family wasn't able to get patient up from the floor and was reportedly not responding as much as baseline. Family believed that patient may not have been wearing baseline 2L O2. Patient also endorses a cough and generalized weakness. Patient reportedly had a recent UTI.     In the ED, chest x-ray showed diffuse interstitial patchy infiltrates concerning for possible pneumonia and a COVID-19 screening test was positive. Fever was 101° F and patient was hypotensive 80s/40s. Patient given 30cc/kg bolus. BP very soft on presentation, adequately responded to volume resuscitation and then decreased again. Labs showed a BUN 45, creatinine 2.9, , , troponin 0.036, procalcitonin 1.13.     Overview/Hospital Course:  No notes on file    Interval History: No acute events overnight. No acute distress. Patient is alert and sitting up in bed watching tv. Does not remember events of coming to the hospital but states he feels well and is ready to go home.     Review  of Systems   Constitutional: Negative for appetite change, chills and fever.   HENT: Negative for congestion, rhinorrhea and sore throat.    Respiratory: Negative for cough and shortness of breath.    Cardiovascular: Negative for chest pain.   Gastrointestinal: Negative for abdominal pain, constipation, diarrhea, nausea and vomiting.   Musculoskeletal: Negative for myalgias.   Neurological: Negative for weakness.   All other systems reviewed and are negative.    Objective:     Vital Signs (Most Recent):  Temp: 99.1 °F (37.3 °C) (08/01/20 0912)  Pulse: 83 (08/01/20 0912)  Resp: 18 (08/01/20 0912)  BP: 113/63 (08/01/20 0912)  SpO2: (!) 92 % (08/01/20 0912) Vital Signs (24h Range):  Temp:  [97.1 °F (36.2 °C)-99.1 °F (37.3 °C)] 99.1 °F (37.3 °C)  Pulse:  [70-96] 83  Resp:  [16-20] 18  SpO2:  [91 %-98 %] 92 %  BP: (113-134)/(63-79) 113/63     Weight: 83 kg (182 lb 15.7 oz)  Body mass index is 25.52 kg/m².    Intake/Output Summary (Last 24 hours) at 8/1/2020 0947  Last data filed at 8/1/2020 0500  Gross per 24 hour   Intake 100 ml   Output 580 ml   Net -480 ml      Physical Exam  Vitals signs and nursing note reviewed.   Constitutional:       Appearance: Normal appearance. He is normal weight.   HENT:      Head: Normocephalic and atraumatic.      Mouth/Throat:      Mouth: Mucous membranes are moist.      Pharynx: Oropharynx is clear.   Eyes:      Extraocular Movements: Extraocular movements intact.      Conjunctiva/sclera: Conjunctivae normal.      Pupils: Pupils are equal, round, and reactive to light.   Neck:      Musculoskeletal: Normal range of motion and neck supple.   Cardiovascular:      Rate and Rhythm: Normal rate and regular rhythm.      Pulses: Normal pulses.      Heart sounds: Normal heart sounds.   Pulmonary:      Effort: Pulmonary effort is normal.      Breath sounds: Normal breath sounds.   Abdominal:      General: Abdomen is flat.      Palpations: Abdomen is soft.   Musculoskeletal: Normal range of motion.    Skin:     General: Skin is warm and dry.      Capillary Refill: Capillary refill takes less than 2 seconds.   Neurological:      General: No focal deficit present.      Mental Status: He is alert and oriented to person, place, and time.   Psychiatric:         Mood and Affect: Mood normal.         Behavior: Behavior normal.         Significant Labs:   Recent Lab Results       08/01/20  0528   08/01/20  0459   07/31/20  1831   07/31/20  1734   07/31/20  1137        Albumin   2.3              2.3           Alkaline Phosphatase   60              60           ALT   33              33           Anion Gap   4              4           aPTT   25.8  Comment:  aPTT therapeutic range = 39-69 seconds           AST   19              19           Baso #   0.02           Basophil%   0.1           BILIRUBIN TOTAL   0.7  Comment:  For infants and newborns, interpretation of results should be based  on gestational age, weight and in agreement with clinical  observations.  Premature Infant recommended reference ranges:  Up to 24 hours.............<8.0 mg/dL  Up to 48 hours............<12.0 mg/dL  3-5 days..................<15.0 mg/dL  6-29 days.................<15.0 mg/dL                0.7  Comment:  For infants and newborns, interpretation of results should be based  on gestational age, weight and in agreement with clinical  observations.  Premature Infant recommended reference ranges:  Up to 24 hours.............<8.0 mg/dL  Up to 48 hours............<12.0 mg/dL  3-5 days..................<15.0 mg/dL  6-29 days.................<15.0 mg/dL             BUN, Bld   27              27           Calcium   8.0              8.0           Chloride   105              105           CO2   25              25           Creatinine   1.2              1.2           Differential Method   Automated           eGFR if    >60              >60           eGFR if non    58  Comment:  Calculation used to obtain the estimated  glomerular filtration  rate (eGFR) is the CKD-EPI equation.                 58  Comment:  Calculation used to obtain the estimated glomerular filtration  rate (eGFR) is the CKD-EPI equation.              Eos #   0.0           Eosinophil%   0.0           Glucose   133              133           Gran # (ANC)   17.0           Gran%   90.3           Hematocrit   25.5 33.1         Hemoglobin   8.3 10.4         Immature Grans (Abs)   0.39  Comment:  Mild elevation in immature granulocytes is non specific and   can be seen in a variety of conditions including stress response,   acute inflammation, trauma and pregnancy. Correlation with other   laboratory and clinical findings is essential.             Immature Granulocytes   2.1           Lymph #   0.7           Lymph%   3.5           Magnesium   2.0           MCH   30.1           MCHC   32.5           MCV   92           Mono #   0.8           Mono%   4.0           MPV   9.7           nRBC   0           Phosphorus   3.8           Platelets   299           POCT Glucose 131     196 179     Potassium   5.3              5.3           PROTEIN TOTAL   5.2              5.2           RBC   2.76           RDW   17.5           Sodium   134              134           WBC   18.84                                Significant Imaging:   Imaging Results          X-Ray Chest 1 View (Final result)  Result time 07/22/20 18:36:38    Final result by Kari Becerra MD (07/22/20 18:36:38)                 Impression:      No significant change.      Electronically signed by: Kari Becerra  Date:    07/22/2020  Time:    18:36             Narrative:    EXAMINATION:  CHEST ONE VIEW    CLINICAL HISTORY:  COPD Exacerbation; Sepsis, unspecified organism    TECHNIQUE:  One view of the chest.    COMPARISON:  07/21/2020    FINDINGS:  The cardiac silhouette is within normal limits.   There is no focal consolidation, pneumothorax, or pleural effusion. There is a diffuse interstitial lung  abnormalities.  There is chronic appearing bilateral pleural thickening.  There is no large appearing pleural fluid.                               X-Ray Chest AP Portable (Final result)  Result time 07/21/20 13:54:05    Final result by Luda Powell MD (07/21/20 13:54:05)                 Impression:      Abnormal increased interstitial lung markings predominantly at the lung basis left more than right, nonspecific could be acute or chronic.      Electronically signed by: Luda Powell MD  Date:    07/21/2020  Time:    13:54             Narrative:    EXAMINATION:  XR CHEST AP PORTABLE    CLINICAL HISTORY:  Sepsis;    TECHNIQUE:  Single frontal view of the chest was performed.    COMPARISON:  None    FINDINGS:  EKG wires overlie the chest.  The cardiac silhouette is normal in size, midline.  Mild increased interstitial lung markings noted both lung fields, nonspecific, slightly accentuated  at the lung bases left more than right.  No large pleural effusion.  No pneumothorax.  The osseous structures appear normal.                                Traci Blackman MD, [facility] PGY-1  08/01/2020        Assessment/Plan:      * Acute respiratory disease due to COVID-19 virus  Patient on overnight BiPAP at 100% FiO2. Patient uses high-flow nasal cannula at 15 L during the day. Patient being followed by PT/OT and is able to sit up at bedside..     Plan:     - COVID Isolation per protocol   - scheduled BiPAP at night   - high flow nassal cannula at 15 L, continue to wean.    - continue dexamethasone 6 mg, currently on day 10.  Will discontinue after last dose today.   - discontinue heparin gtt (7/26) and discontinue prophylactic heparin. Will continue to monitor and once H&H stable will continue prophylactic dose of heparin   - patient finished 5 day course of Remdesivir.   - Multivitamin    - continue to monitor respiratory status.    - infectious disease made further recommendations of adding an  antifungal voriconazole for past history of Aspergillus infections with uptrending WBC count.  Will follow recommendations.      History of Aspergillus & MAC  Patient has a known history of MAC infections as well as Aspergillus.  ID consulted and will follow recommendations.    Plan:  - continue voriconazole.       GI bleed  Patient has had consistent watery melanic stools since admission. Patient found to have positive C. Diff infection and required PO vancomycin and IV metronidazole. Patient was started on a heparin drip for an elevated D-dimer of 7 and patient not being a candidate to a CTA due to MARQUIS during admission. Heparin drip was discontinued on 7/26.  Patient underwent video capsule endoscopy yesterday (7/30).  Will need to follow-up with GI    Plan:   - will need to follow-up with GI for results of video capsule endoscopy.   - continue to trend H&H Q12H   - patient's diet was advanced yesterday.   - if Hgb < 7 will transfuse with PRBCs.   - continue IV Protonix BID      Hypotension  Patient's most recent blood pressure 116/75  RESOLVED      Clostridium difficile infection  P/w consistent melanotic diarrhea, C diff toxin positive.     Plan:  - ID following and GI following  - p.o. vancomycin and IV metronidazole  - continue IV Protonix.   - continue probiotics  - continue to monitor stool output.  Follow-up with ID.    Acute respiratory failure with hypoxia  Currently on continuous BiPAP at  60% oxygen concentration satting at 95-99%. Continue to monitor with continuous pulse oximetry.  Will attempt to wean off BiPAP.  STABLE      MARQUIS (acute kidney injury)  Presented with Acute Kidney Injury 2/2 to hypovolemic shock, pre renal.  Patient's most recent BUN/creatinine ratio 21/1.2.  GFR greater than 60.  RESOLVED    Plan:  - cont to encourage oral fluid intake.  - Continue to monitor renal function  - Avoid nephrotoxic medications      COVID-19  Patient was stepped up to the ICU yesterday due to increasing O2  demands and decreasing O2 saturation from baseline. Patient was started on continues BiPAP, currently satting at 95-95% at 50% oxygen concentration. Patient was approved for Remdesivir yesterday but morning labs indicated BUN 68 creatinine 2.3, will hold pending repeat CMP.    Plan:   - COVID Isolation per protocol   - Dexamethasone 6mg daily for 10 days   - continue Heparin gtt   - Levofloxacin 750mg daily   - Atorvastatin 40mg daily   - Multivitamin    Sepsis  RESOLVED  See plan for COVID 19    COPD (chronic obstructive pulmonary disease)  Patient used 2L supplemental oxygen at home.    Plan:  - continue respiratory therapy treatments  - albuterol inhaler 2 puff Q4H    Anemia  Patient has received 1 unit of PRBC.  GI was consulted for upper GI bleed and patient underwent video capsule endoscopy yesterday.  H&H has been stable at 11.5 and 36.33    Plan:   - discontinued heparin drip (7/26) and discontinue prophylactic heparin. Will continue to monitor and once H&H stable will continue prophylactic dose of heparin.  - patient underwent video capsule endoscopy yesterday.  Will need to follow-up with GI.    - continue monitor H&H Q12H.  - trend stool output and consistency.      VTE Risk Mitigation (From admission, onward)         Ordered     Place sequential compression device  Until discontinued      07/21/20 2142     IP VTE HIGH RISK PATIENT  Once      07/21/20 2142                      Traci Blackman MD  Department of Hospital Medicine   [facility]

## 2020-08-01 NOTE — SUBJECTIVE & OBJECTIVE
[facility] Pulmonology Brief COVID Video Conference    Subjective:  No acute events overnight, patient appears well     Vitals:  Vitals:    08/01/20 0912 08/01/20 1153 08/01/20 1155 08/01/20 1510   BP: 113/63   118/68   BP Location: Left arm   Left arm   Patient Position: Lying   Lying   Pulse: 83 93 87 97   Resp: 18 18  16   Temp: 99.1 °F (37.3 °C)   98 °F (36.7 °C)   TempSrc: Oral   Oral   SpO2: (!) 92% 97%  95%   Weight:       Height:           Patient not examined in person, video conference used  General: Asleep, in bed, no acute distress   HENT:  NCAT  Cardio:  Regular rate and rhythm on monitor  Resp:  Respirations unlabored  Abdom: Symmetric appearing  Extrem: No cyanosis noted  Neuro:  Alert and oriented      Lab Results:  Lab Results   Component Value Date    [id number] Positive (A) 07/21/2020       COVID-19 Inflammatory Markers  Recent Labs   Lab 08/01/20  0459   WBC 18.84*   LYMPH 3.5*  0.7*       ABG:   No results for input(s): PH, PCO2, PO2, HCO3, POCSATURATED, BE in the last 72 hours.     CXR:   Imaging Results          X-Ray Chest 1 View (Final result)  Result time 07/22/20 18:36:38    Final result by Kari Becerra MD (07/22/20 18:36:38)                 Impression:      No significant change.      Electronically signed by: Kari Becerra  Date:    07/22/2020  Time:    18:36             Narrative:    EXAMINATION:  CHEST ONE VIEW    CLINICAL HISTORY:  COPD Exacerbation; Sepsis, unspecified organism    TECHNIQUE:  One view of the chest.    COMPARISON:  07/21/2020    FINDINGS:  The cardiac silhouette is within normal limits.   There is no focal consolidation, pneumothorax, or pleural effusion. There is a diffuse interstitial lung abnormalities.  There is chronic appearing bilateral pleural thickening.  There is no large appearing pleural fluid.                               X-Ray Chest AP Portable (Final result)  Result time 07/21/20 13:54:05    Final result by Luda Powell MD (07/21/20  13:54:05)                 Impression:      Abnormal increased interstitial lung markings predominantly at the lung basis left more than right, nonspecific could be acute or chronic.      Electronically signed by: Luda Powell MD  Date:    07/21/2020  Time:    13:54             Narrative:    EXAMINATION:  XR CHEST AP PORTABLE    CLINICAL HISTORY:  Sepsis;    TECHNIQUE:  Single frontal view of the chest was performed.    COMPARISON:  None    FINDINGS:  EKG wires overlie the chest.  The cardiac silhouette is normal in size, midline.  Mild increased interstitial lung markings noted both lung fields, nonspecific, slightly accentuated  at the lung bases left more than right.  No large pleural effusion.  No pneumothorax.  The osseous structures appear normal.

## 2020-08-01 NOTE — PROGRESS NOTES
[facility]  Pulmonology  Progress Note  [facility] Pulmonology Brief COVID Video Conference    Subjective:  No acute events overnight, patient appears well     Vitals:  Vitals:    08/01/20 0912 08/01/20 1153 08/01/20 1155 08/01/20 1510   BP: 113/63   118/68   BP Location: Left arm   Left arm   Patient Position: Lying   Lying   Pulse: 83 93 87 97   Resp: 18 18  16   Temp: 99.1 °F (37.3 °C)   98 °F (36.7 °C)   TempSrc: Oral   Oral   SpO2: (!) 92% 97%  95%   Weight:       Height:           Patient not examined in person, video conference used  General: Asleep, in bed, no acute distress   HENT:  NCAT  Cardio:  Regular rate and rhythm on monitor  Resp:  Respirations unlabored  Abdom: Symmetric appearing  Extrem: No cyanosis noted  Neuro:  Alert and oriented      Lab Results:  Lab Results   Component Value Date    [id number] Positive (A) 07/21/2020       COVID-19 Inflammatory Markers  Recent Labs   Lab 08/01/20  0459   WBC 18.84*   LYMPH 3.5*  0.7*       ABG:   No results for input(s): PH, PCO2, PO2, HCO3, POCSATURATED, BE in the last 72 hours.     CXR:   Imaging Results          X-Ray Chest 1 View (Final result)  Result time 07/22/20 18:36:38    Final result by Kari Becerra MD (07/22/20 18:36:38)                 Impression:      No significant change.      Electronically signed by: Kari Becerra  Date:    07/22/2020  Time:    18:36             Narrative:    EXAMINATION:  CHEST ONE VIEW    CLINICAL HISTORY:  COPD Exacerbation; Sepsis, unspecified organism    TECHNIQUE:  One view of the chest.    COMPARISON:  07/21/2020    FINDINGS:  The cardiac silhouette is within normal limits.   There is no focal consolidation, pneumothorax, or pleural effusion. There is a diffuse interstitial lung abnormalities.  There is chronic appearing bilateral pleural thickening.  There is no large appearing pleural fluid.                               X-Ray Chest AP Portable (Final result)  Result time 07/21/20 13:54:05     Final result by Luda Powell MD (07/21/20 13:54:05)                 Impression:      Abnormal increased interstitial lung markings predominantly at the lung basis left more than right, nonspecific could be acute or chronic.      Electronically signed by: Luda Powell MD  Date:    07/21/2020  Time:    13:54             Narrative:    EXAMINATION:  XR CHEST AP PORTABLE    CLINICAL HISTORY:  Sepsis;    TECHNIQUE:  Single frontal view of the chest was performed.    COMPARISON:  None    FINDINGS:  EKG wires overlie the chest.  The cardiac silhouette is normal in size, midline.  Mild increased interstitial lung markings noted both lung fields, nonspecific, slightly accentuated  at the lung bases left more than right.  No large pleural effusion.  No pneumothorax.  The osseous structures appear normal.                                  Assessment/Plan:     * Acute respiratory disease due to COVID-19 virus  Patient admitted 7/21 with AMS & fever  - 7/21 COVID(+)  - initially required NC in the ED but escalated to BIPAP so admitted to ICU  - CXR: diffuse patchy bilateral infiltrates  - hx of aspergillus and MAC infection   - s/p remdesivir x5d, dexamethasone x10d    Recommend  - consideration that if patient has untreated or not properly treated MAC/aspergillus infection in the past that reactivation of infection is possible  - full dose AC    - proning if he can tolerate at all  - incentive spirometry    History of Aspergillus & MAC  Pt w/ hx of structural lung disease c/b MAC infection, recent aspergillosis infection treated with intraconazole  - ID consulted - serum aspergillus negative, loaded with voriconazole on  7/29   - RespCx with Candida parapsilosis - likely colonizer, candida is rarely pathological in sputum cultures  - no MAC treatment at this time per ID     Recommend  - continue to monitor respiratory status       Clostridium difficile infection  Patient positive on PCR, initiated on oral  vancomycin & flagyl on 7/24  - has severe infection given rise in Cr, hemodynamic instability on presentation    Recommend  - 10-14 days PO vanc/flagyl  (start 7/24)     MARQUIS (acute kidney injury)  Likely pre-renal with component of COVID infection & c diff and possible ATN given patient presented hemodynamically unstable and in shock, requiring fluid resuscitation  - Cr improving overnight    Recommend   - encourage PO intake, if Cr worsening would consider repeating urine studies     COPD (chronic obstructive pulmonary disease)  Use of 2L NC at home    Recommend  - metered dose inhaler q4h     Anemia  Patient has been having GIB  - decline in Hgb from baseline of 10-11g/dL to 7g/dL  - monitor stool for blood  - s/p transfusion 7/27 w/ 1u PRBC     Recommend  - do not transfuse unless Hgb is below 7g/dL  - heparin gtt stopped   - daily CBC            Felicity Mendieta MD  Pulmonology  [facility]    Pt seen and examined with Pulmonary/Critical Care team and this note reviewed and validated with the following additional comments:    Pt appeared comfortable on our video visit. His oxygenation was acceptable. We will keep him on our watch list.     Deion Carmona MD  Phone [phone]

## 2020-08-01 NOTE — ASSESSMENT & PLAN NOTE
Patient has been having GIB  - decline in Hgb from baseline of 10-11g/dL to 7g/dL  - monitor stool for blood  - s/p transfusion 7/27 w/ 1u PRBC     Recommend  - do not transfuse unless Hgb is below 7g/dL  - heparin gtt stopped   - daily CBC

## 2020-08-01 NOTE — PLAN OF CARE
Pt received on vapotherm at  40  lpm and 100% FiO2.  SPO2   93%.  Pt in no apparent respiratory distress.  Will continue to monitor.

## 2020-08-01 NOTE — PLAN OF CARE
VN cued into pt's room for introduction. VN informed pt that VN would be working along side bedside nurse and PCT throughout shift. Level of present pain assessed. At present no distress noted. O2 cont to be admin at 40l vapotherm via nasal cannula. Thoroughly discussed with patient the plan of care. Discussed with patient High fall risk protocol and interventions that have been initiated and cont be in place for safety. Patient verbalized clear understanding and cooperation using teach back method. Bed alarm presently activated and in use. Will cont to be available to patient and intervene prn.

## 2020-08-02 LAB
ALBUMIN SERPL BCP-MCNC: 2.3 G/DL (ref 3.5–5.2)
ALBUMIN SERPL BCP-MCNC: 2.3 G/DL (ref 3.5–5.2)
ALP SERPL-CCNC: 60 U/L (ref 55–135)
ALP SERPL-CCNC: 60 U/L (ref 55–135)
ALT SERPL W/O P-5'-P-CCNC: 27 U/L (ref 10–44)
ALT SERPL W/O P-5'-P-CCNC: 27 U/L (ref 10–44)
ANION GAP SERPL CALC-SCNC: 3 MMOL/L (ref 8–16)
ANION GAP SERPL CALC-SCNC: 3 MMOL/L (ref 8–16)
APTT BLDCRRT: 26.8 SEC (ref 21–32)
AST SERPL-CCNC: 18 U/L (ref 10–40)
AST SERPL-CCNC: 18 U/L (ref 10–40)
BASOPHILS # BLD AUTO: 0.02 K/UL (ref 0–0.2)
BASOPHILS NFR BLD: 0.1 % (ref 0–1.9)
BILIRUB SERPL-MCNC: 0.8 MG/DL (ref 0.1–1)
BILIRUB SERPL-MCNC: 0.8 MG/DL (ref 0.1–1)
BUN SERPL-MCNC: 24 MG/DL (ref 8–23)
BUN SERPL-MCNC: 24 MG/DL (ref 8–23)
CALCIUM SERPL-MCNC: 7.9 MG/DL (ref 8.7–10.5)
CALCIUM SERPL-MCNC: 7.9 MG/DL (ref 8.7–10.5)
CHLORIDE SERPL-SCNC: 105 MMOL/L (ref 95–110)
CHLORIDE SERPL-SCNC: 105 MMOL/L (ref 95–110)
CO2 SERPL-SCNC: 25 MMOL/L (ref 23–29)
CO2 SERPL-SCNC: 25 MMOL/L (ref 23–29)
CREAT SERPL-MCNC: 1.1 MG/DL (ref 0.5–1.4)
CREAT SERPL-MCNC: 1.1 MG/DL (ref 0.5–1.4)
DIFFERENTIAL METHOD: ABNORMAL
EOSINOPHIL # BLD AUTO: 0 K/UL (ref 0–0.5)
EOSINOPHIL NFR BLD: 0 % (ref 0–8)
ERYTHROCYTE [DISTWIDTH] IN BLOOD BY AUTOMATED COUNT: 17.6 % (ref 11.5–14.5)
EST. GFR  (AFRICAN AMERICAN): >60 ML/MIN/1.73 M^2
EST. GFR  (AFRICAN AMERICAN): >60 ML/MIN/1.73 M^2
EST. GFR  (NON AFRICAN AMERICAN): >60 ML/MIN/1.73 M^2
EST. GFR  (NON AFRICAN AMERICAN): >60 ML/MIN/1.73 M^2
GLUCOSE SERPL-MCNC: 100 MG/DL (ref 70–110)
GLUCOSE SERPL-MCNC: 100 MG/DL (ref 70–110)
HCT VFR BLD AUTO: 27.4 % (ref 40–54)
HCT VFR BLD AUTO: 33.7 % (ref 40–54)
HGB BLD-MCNC: 10.8 G/DL (ref 14–18)
HGB BLD-MCNC: 8.8 G/DL (ref 14–18)
IMM GRANULOCYTES # BLD AUTO: 0.19 K/UL (ref 0–0.04)
IMM GRANULOCYTES NFR BLD AUTO: 1.1 % (ref 0–0.5)
LYMPHOCYTES # BLD AUTO: 0.7 K/UL (ref 1–4.8)
LYMPHOCYTES NFR BLD: 4.1 % (ref 18–48)
MAGNESIUM SERPL-MCNC: 1.9 MG/DL (ref 1.6–2.6)
MCH RBC QN AUTO: 30.8 PG (ref 27–31)
MCHC RBC AUTO-ENTMCNC: 32.1 G/DL (ref 32–36)
MCV RBC AUTO: 96 FL (ref 82–98)
MONOCYTES # BLD AUTO: 1.1 K/UL (ref 0.3–1)
MONOCYTES NFR BLD: 6.5 % (ref 4–15)
NEUTROPHILS # BLD AUTO: 14.9 K/UL (ref 1.8–7.7)
NEUTROPHILS NFR BLD: 88.2 % (ref 38–73)
NRBC BLD-RTO: 0 /100 WBC
PHOSPHATE SERPL-MCNC: 3 MG/DL (ref 2.7–4.5)
PLATELET # BLD AUTO: 295 K/UL (ref 150–350)
PMV BLD AUTO: 9.9 FL (ref 9.2–12.9)
POTASSIUM SERPL-SCNC: 4.7 MMOL/L (ref 3.5–5.1)
POTASSIUM SERPL-SCNC: 4.7 MMOL/L (ref 3.5–5.1)
PROT SERPL-MCNC: 5.2 G/DL (ref 6–8.4)
PROT SERPL-MCNC: 5.2 G/DL (ref 6–8.4)
RBC # BLD AUTO: 2.86 M/UL (ref 4.6–6.2)
SODIUM SERPL-SCNC: 133 MMOL/L (ref 136–145)
SODIUM SERPL-SCNC: 133 MMOL/L (ref 136–145)
TROPONIN I SERPL DL<=0.01 NG/ML-MCNC: 0.01 NG/ML (ref 0–0.03)
WBC # BLD AUTO: 16.87 K/UL (ref 3.9–12.7)

## 2020-08-02 PROCEDURE — 11000001 HC ACUTE MED/SURG PRIVATE ROOM

## 2020-08-02 PROCEDURE — 97110 THERAPEUTIC EXERCISES: CPT | Performed by: PHYSICAL THERAPIST

## 2020-08-02 PROCEDURE — 94640 AIRWAY INHALATION TREATMENT: CPT

## 2020-08-02 PROCEDURE — 25000003 PHARM REV CODE 250: Performed by: INTERNAL MEDICINE

## 2020-08-02 PROCEDURE — 97530 THERAPEUTIC ACTIVITIES: CPT | Performed by: PHYSICAL THERAPIST

## 2020-08-02 PROCEDURE — 84484 ASSAY OF TROPONIN QUANT: CPT

## 2020-08-02 PROCEDURE — 36415 COLL VENOUS BLD VENIPUNCTURE: CPT

## 2020-08-02 PROCEDURE — 85025 COMPLETE CBC W/AUTO DIFF WBC: CPT

## 2020-08-02 PROCEDURE — 93010 EKG 12-LEAD: ICD-10-PCS | Mod: ,,, | Performed by: INTERNAL MEDICINE

## 2020-08-02 PROCEDURE — S0030 INJECTION, METRONIDAZOLE: HCPCS

## 2020-08-02 PROCEDURE — 25000003 PHARM REV CODE 250: Performed by: STUDENT IN AN ORGANIZED HEALTH CARE EDUCATION/TRAINING PROGRAM

## 2020-08-02 PROCEDURE — 80053 COMPREHEN METABOLIC PANEL: CPT

## 2020-08-02 PROCEDURE — 84100 ASSAY OF PHOSPHORUS: CPT

## 2020-08-02 PROCEDURE — 94664 DEMO&/EVAL PT USE INHALER: CPT

## 2020-08-02 PROCEDURE — 27100171 HC OXYGEN HIGH FLOW UP TO 24 HOURS

## 2020-08-02 PROCEDURE — 93005 ELECTROCARDIOGRAM TRACING: CPT

## 2020-08-02 PROCEDURE — 99900035 HC TECH TIME PER 15 MIN (STAT)

## 2020-08-02 PROCEDURE — 85014 HEMATOCRIT: CPT

## 2020-08-02 PROCEDURE — 93010 ELECTROCARDIOGRAM REPORT: CPT | Mod: ,,, | Performed by: INTERNAL MEDICINE

## 2020-08-02 PROCEDURE — 63600175 PHARM REV CODE 636 W HCPCS: Performed by: INTERNAL MEDICINE

## 2020-08-02 PROCEDURE — 83735 ASSAY OF MAGNESIUM: CPT

## 2020-08-02 PROCEDURE — 85730 THROMBOPLASTIN TIME PARTIAL: CPT

## 2020-08-02 PROCEDURE — C9113 INJ PANTOPRAZOLE SODIUM, VIA: HCPCS | Performed by: INTERNAL MEDICINE

## 2020-08-02 PROCEDURE — 63600175 PHARM REV CODE 636 W HCPCS: Performed by: STUDENT IN AN ORGANIZED HEALTH CARE EDUCATION/TRAINING PROGRAM

## 2020-08-02 PROCEDURE — 94660 CPAP INITIATION&MGMT: CPT

## 2020-08-02 PROCEDURE — 85018 HEMOGLOBIN: CPT

## 2020-08-02 PROCEDURE — 25000003 PHARM REV CODE 250

## 2020-08-02 RX ORDER — BENZONATATE 100 MG/1
100 CAPSULE ORAL EVERY 8 HOURS PRN
Status: DISCONTINUED | OUTPATIENT
Start: 2020-08-02 | End: 2020-08-27 | Stop reason: HOSPADM

## 2020-08-02 RX ADMIN — ALBUTEROL SULFATE 2 PUFF: 90 AEROSOL, METERED RESPIRATORY (INHALATION) at 07:08

## 2020-08-02 RX ADMIN — PANTOPRAZOLE SODIUM 40 MG: 40 INJECTION, POWDER, FOR SOLUTION INTRAVENOUS at 09:08

## 2020-08-02 RX ADMIN — LIDOCAINE HYDROCHLORIDE: 20 SOLUTION ORAL; TOPICAL at 08:08

## 2020-08-02 RX ADMIN — METRONIDAZOLE 500 MG: 500 INJECTION, SOLUTION INTRAVENOUS at 05:08

## 2020-08-02 RX ADMIN — METRONIDAZOLE 500 MG: 500 INJECTION, SOLUTION INTRAVENOUS at 09:08

## 2020-08-02 RX ADMIN — VORICONAZOLE 200 MG: 200 TABLET, FILM COATED ORAL at 03:08

## 2020-08-02 RX ADMIN — METRONIDAZOLE 500 MG: 500 INJECTION, SOLUTION INTRAVENOUS at 12:08

## 2020-08-02 RX ADMIN — ALBUTEROL SULFATE 2 PUFF: 90 AEROSOL, METERED RESPIRATORY (INHALATION) at 03:08

## 2020-08-02 RX ADMIN — BENZONATATE 100 MG: 100 CAPSULE ORAL at 03:08

## 2020-08-02 RX ADMIN — THERA TABS 1 TABLET: TAB at 09:08

## 2020-08-02 RX ADMIN — LIDOCAINE 1 PATCH: 50 PATCH TOPICAL at 05:08

## 2020-08-02 RX ADMIN — Medication 125 MG: at 05:08

## 2020-08-02 RX ADMIN — PANTOPRAZOLE SODIUM 40 MG: 40 INJECTION, POWDER, FOR SOLUTION INTRAVENOUS at 08:08

## 2020-08-02 RX ADMIN — Medication 4 TABLET: at 09:08

## 2020-08-02 RX ADMIN — Medication 125 MG: at 11:08

## 2020-08-02 RX ADMIN — Medication 4 TABLET: at 11:08

## 2020-08-02 RX ADMIN — VORICONAZOLE 200 MG: 200 TABLET, FILM COATED ORAL at 05:08

## 2020-08-02 RX ADMIN — Medication 4 TABLET: at 05:08

## 2020-08-02 RX ADMIN — ENOXAPARIN SODIUM 90 MG: 150 INJECTION SUBCUTANEOUS at 05:08

## 2020-08-02 RX ADMIN — ALBUTEROL SULFATE 2 PUFF: 90 AEROSOL, METERED RESPIRATORY (INHALATION) at 12:08

## 2020-08-02 NOTE — PT/OT/SLP PROGRESS
Physical Therapy Treatment    Patient Name:  Rajan Deluna   MRN:  [id number]    Recommendations:     Discharge Recommendations:  (TBD)   Discharge Equipment Recommendations: none   Barriers to discharge: Decreased caregiver support and decreased functional mobility tolerance    Assessment:     Rajan Deluna is a 74 y.o. male admitted with a medical diagnosis of Acute respiratory disease due to COVID-19 virus.  He presents with the following impairments/functional limitations:  weakness, impaired endurance, impaired self care skills, impaired functional mobilty, decreased lower extremity function, gait instability, impaired balance, impaired cardiopulmonary response to activity.  Pt supine to sit with Min/mod assist of 1.  Pt sat at EOB 13 minutes. Pt HR too high to try standing.  Pt sit to supine with Min assist of 1.  Pt rolled bilaterally x 2 with Min assist of 1 for cleaning.  Pt scooted to HOB in sitting and supine with Min assist of 1.      Rehab Prognosis: Good; patient would benefit from acute skilled PT services to address these deficits and reach maximum level of function.    Recent Surgery: Procedure(s) (LRB):  IMAGING PROCEDURE, GI TRACT, INTRALUMINAL, VIA CAPSULE (N/A)      Plan:     During this hospitalization, patient to be seen 6 x/week to address the identified rehab impairments via gait training, therapeutic activities, therapeutic exercises, neuromuscular re-education and progress toward the following goals:    · Plan of Care Expires:  09/08/20    Subjective     Chief Complaint: wants to sit up  Patient/Family Comments/goals: wants to sit up  Pain/Comfort:  · Pain Rating 1: 0/10      Objective:     Communicated with nurse prior to session.  Patient found HOB elevated with telemetry, pulse ox (continuous), peripheral IV, oxygen, blood pressure cuff upon PT entry to room.     General Precautions: Standard, airborne, contact, droplet, fall   Orthopedic Precautions:N/A   Braces:       Functional  Mobility:  Pt supine to sit with Min/mod assist of 1.  Pt sat at EOB 13 minutes. Pt HR too high to try standing.  Pt sit to supine with Min assist of 1.  Pt rolled bilaterally x 2 with Min assist of 1 for cleaning.  Pt scooted to HOB in sitting and supine with Min assist of 1.        AM-PAC 6 CLICK MOBILITY  Turning over in bed (including adjusting bedclothes, sheets and blankets)?: 3  Sitting down on and standing up from a chair with arms (e.g., wheelchair, bedside commode, etc.): 3  Moving from lying on back to sitting on the side of the bed?: 3  Moving to and from a bed to a chair (including a wheelchair)?: 2  Need to walk in hospital room?: 1  Climbing 3-5 steps with a railing?: 1  Basic Mobility Total Score: 13       Therapeutic Activities and Exercises:   Pt performed supine AROM AP 20 x 2, heel slide and hip abd 10 x 2 and saq 10 x 2 with rest breaks.     Patient left HOB elevated with all lines intact, call button in reach and nursing assistant present..    GOALS:   Multidisciplinary Problems     Physical Therapy Goals        Problem: Physical Therapy Goal    Goal Priority Disciplines Outcome Goal Variances Interventions   Physical Therapy Goal     PT, PT/OT Ongoing, Progressing     Description: Goals to be met by: 20     Patient will increase functional independence with mobility by performin. Supine <> sit with MInimal Assistance  2. Sit to stand transfer with Minimal Assistance  3. Bed to chair transfer with Minimal Assistance   4. Gait x 50 ft with min A with appropriate AD  5. Lower extremity exercise program x 10 reps per handout, with supervision                     Time Tracking:     PT Received On: 20  PT Start Time: 1300     PT Stop Time: 1343  PT Total Time (min): 43 min     Billable Minutes: Therapeutic Activity 30 and Therapeutic Exercise 13    Treatment Type: Treatment  PT/PTA: PT     PTA Visit Number: 0     Miri Dickinson, PT  2020

## 2020-08-02 NOTE — ASSESSMENT & PLAN NOTE
Patient on overnight BiPAP at 80% FiO2.  They both arm during the day, currently at a flow rate of 40 an FiO2 of 100.    Plan:     - COVID Isolation per protocol   - scheduled BiPAP at night   - patient currently on Vapotherm, continue to wean.    - finish coursed of dexamethasone (07/31)   - patient currently on full-dose anticoagulant Lovenox.   - patient finished 5 day course of Remdesivir.   - Multivitamin    - continue to monitor respiratory status.    - infectious disease made further recommendations of adding an antifungal voriconazole for past history of Aspergillus infections with uptrending WBC count.  Will follow recommendations.

## 2020-08-02 NOTE — PLAN OF CARE
POC reviewed with patient. Patient AAOx4 and denies any pain. COVID precautions maintained. IV and oral antibiotics administered. Tele monitor in place reading NSR. No red alarms or ectopy noted. Patient on 40L @ 75% via vapotherm. Continuous pulse ox in place. Condom cath in place. Bed alarm on, bed locked and low, side rails up x2, and call bell in reach. Plan is for patient to be discharged to LTAC pending clinical improvement.  Patient verbalizes clear understanding of POC.

## 2020-08-02 NOTE — PROGRESS NOTES
[facility] Infectious Disease Progress Note     Primary Team:   Consultant Attending: Dr. Salinas  Date of Admit: 7/21/2020    Summary of history     Rajan Deluna is a 74 y.o. male who  has a past medical history of Arthritis, COPD (chronic obstructive pulmonary disease), emphysema, and HTN. The patient presented to the [facility] on 7/21/2020 due to fever and AMS.    CXR with bilateral infiltrates, COVID positive. Care escalated to ICU on 7/21 for worsening hypoxia. C diff ag positive, toxin negative. ID consulted for COVID PNA and C diff.     Patient with lung disease and on home oxygen - had positive culture in sputum for aspergillus snd was on itraconazole - not sure the time frame - but also with positive sputum cultures for atypical mycobacteria (MAC X 2 and others).     Interval events     In the interim he remains afebrile, WBC count is elevated but appears to be trending down. Stool frequency improving. Patient continues to require 40L via HFNC.    Subjective     Patient is resting comfortably in bed. He reports only 1 loose stool yesterday and 1 this morning. He states subjective improvement in respiratory status. Denies any fevers or chills. Denies any chest pain, abdominal pain, nausea or vomiting.    Current Medications:     Infusions:       Scheduled:   albuterol  2 puff Inhalation QID WAKE    enoxaparin  1 mg/kg (Dosing Weight) Subcutaneous Q24H    Lactobacillus acidoph-L.bulgar  4 tablet Oral TID WM    lidocaine  1 patch Transdermal Q24H    metronidazole  500 mg Intravenous Q8H    multivitamin  1 tablet Oral Daily    pantoprazole  40 mg Intravenous BID    vancomycin  125 mg Oral Q6H    voriconazole  200 mg Oral Q12H        PRN:  sodium chloride, acetaminophen, benzonatate, ondansetron, pneumoc 13-loy conj-dip cr(PF), simethicone, sodium chloride 0.9%    Antibiotics and Day Number of Therapy:  Levofloxacin - 7/22, 7/24  Zosyn - 7/21  Vancomycin IV - 7/21  Vancomycin PO  -current  Metronidazole IV -current  Voriconazole -current    Objective   Last 24 Hour Vital Signs:  BP  Min: 118/68  Max: 135/66  Temp  Av.5 °F (36.4 °C)  Min: 96.9 °F (36.1 °C)  Max: 98 °F (36.7 °C)  Pulse  Av.8  Min: 81  Max: 108  Resp  Av.6  Min: 16  Max: 22  SpO2  Av.4 %  Min: 90 %  Max: 100 %    Lines, drains, airway:  Peripheral Intravenous Line         Peripheral IV - Single Lumen 20 1822 22 G Anterior;Distal;Left Forearm 1 day   Drain    Male External Urinary Catheter 20 0300 Small 2 days     Physical Examination:  Examination  General: elderly, comfortable on HFNC  HEENT: NCAT, conjunctiva normal, pupils normal  Neck: no thyromegaly, no JVD   Cardiac: no murmurs, pulse regular    Pulmonary/Chest: no respiratory distress   GI/Rectal: no organomegaly, no masses, non tender, normal bowel sounds  Msk: normal motor screening exam  Skin/ Extremities: no rash, no pedal edema, no ulceration    Neurology/ Mental status: alert, oriented     Lab data:  CBC:   Lab Results   Component Value Date    WBC 16.87 (H) 2020    HGB 8.8 (L) 2020     2020    MCV 96 2020    RDW 17.6 (H) 2020       Estimated Creatinine Clearance: 60.8 mL/min (based on SCr of 1.1 mg/dL).    Microbiology Data  Procedure Component Value Units Date/Time   Fungus Culture, Blood or Bone Marrow [[id number]] Collected: 20 1041   Order Status: Completed Specimen: Blood from Antecubital, Left Updated: 20 1418    Fungus Cult, blood or BM Culture in progress   AFB Culture & Smear [[id number]] Collected: 20 0923   Order Status: Completed Specimen: Respiratory from Sputum, Expectorated Updated: 20    AFB Culture & Smear Culture in progress    AFB CULTURE STAIN No acid fast bacilli seen.   Fungus culture [[id number]] (Abnormal) Collected: 20 2231   Order Status: Completed Specimen: Respiratory from Mouth Updated: 20 8042    Fungus (Mycology)  Culture EILEEN PARAPSILOSIS   Few   Abnormal    AFB Culture & Smear [[id number]] Collected: 07/25/20 2231   Order Status: Completed Specimen: Respiratory from Mouth Updated: 07/27/20 1516    AFB Culture & Smear Culture in progress    AFB CULTURE STAIN No acid fast bacilli seen.   AFB Culture & Smear [[id number]]    Order Status: No result Specimen: Respiratory    Blood culture [[id number]] Collected: 07/24/20 1645   Order Status: Completed Specimen: Blood from Antecubital, Right Updated: 07/29/20 2312    Blood Culture, Routine No growth after 5 days.   Blood culture [[id number]] Collected: 07/24/20 1645   Order Status: Completed Specimen: Blood from Peripheral, Left Hand Updated: 07/29/20 2312    Blood Culture, Routine No growth after 5 days.   Fungus culture [[id number]]    Order Status: Canceled Specimen: Blood    AFB Culture & Smear [[id number]]    Order Status: Sent Specimen: Blood    Clostridium difficile EIA [[id number]] (Abnormal) Collected: 07/23/20 1205   Order Status: Completed Specimen: Stool Updated: 07/23/20 2032    C. diff Antigen PositiveAbnormal     C difficile Toxins A+B, EIA Negative    Comment: Testing not recommended for children <24 months old.      C Diff Toxin by PCR [[id number]] (Abnormal) Collected: 07/23/20 1205   Order Status: Completed Updated: 07/24/20 1500    C. diff PCR PositiveAbnormal    Blood culture x two cultures. Draw prior to antibiotics. [[id number]] Collected: 07/21/20 1419   Order Status: Completed Specimen: Blood from Peripheral, Antecubital, Right Updated: 07/26/20 2322    Blood Culture, Routine No growth after 5 days.   Narrative:     Aerobic and anaerobic   Blood culture x two cultures. Draw prior to antibiotics. [[id number]] Collected: 07/21/20 1330   Order Status: Completed Specimen: Blood from Peripheral, Wrist, Left Updated: 07/26/20 2322    Blood Culture, Routine No growth after 5 days.   Narrative:     Aerobic and anaerobic     Assessment     Rajan Deluna is a 74 y.o.male  with past medical history as above. Currently undergoing treatment for COVID and C. Diff infections. Clinically patient appears to be improving. Will need to complete 14 days of his PO Vanc and IV metronidazole. We'll attempt to contact his pulmonologist and ID outpatient providers to ascertain his aspergillus treatment course tomorrow.       Recommendations     Leukocytosis, improving  - Possibly related to steroids, currently trending down after dexamethasone was discontinued on 7/31  - If WBC count shows a significant increase, consider pan-culture and CXR.      Severe C diff  - Antigen positive, toxin negative; however, toxin PCR positive. KUB with no evidence of toxic megacolon.  - Symptoms are improving with only two instances of loose stool reported in 24 hours  - Continue treatment of severe C diff with PO vancomycin and IV metronidazole, recommend completing 14 day course with end date of 8/7 (started 7/24).      Pneumonia due to COVID-19  - s/p 5 day course of remdesivir  - s/p 10 day course of dexamethason  - Monitor on tele  - Monitor LFTs daily  - Wean oxygen as able      History of MAC infection  History of Aspergillosis  - Patient with history of structural lung disease c/b MAC infection, and was recently treated with itraconazole for Aspergillosis.  - Serum aspergillus antigen negative. Continue Voriconazole, s/p load on 7/29. Must take one hour before or after meals - History of treatment unclear. Recommend patient on discharge get started on itraconazole 100 mg BID and follow up with his Pulmonologist Dr. Yin and his Infectious Diseases specialist Dr. Dr. Lizzy Caballero-Jovanni regarding his aspergillus treatment  - RespCx with Candida parapsilosis - likely colonizer. Do not need to treat.   - Will hold off on MAC treatment at this time due to COVID and active c-diff infection.  - Appreciated Pulmonology's recommendations as well.    Thank you for this consult. We will follow.    Rehan  Zohra HERMANU ID Fellow

## 2020-08-02 NOTE — SUBJECTIVE & OBJECTIVE
Interval History:  Patient on Vapotherm at flow 40 and oxygen saturation 90, satting at 100%.  Patient on BiPAP at 80% FiO2, satting at 94%. Patient did report a chronic cough overnight and was provided Tessalon Perles.  Patient on full-dose Lovenox. Most recent H&H 9.3/29.0.  Patient still on p.o. vancomycin, IV metronidazole, and p.o. voriconazole.    Review of Systems   Respiratory: Positive for cough. Negative for shortness of breath.    Cardiovascular: Negative for chest pain.   Gastrointestinal: Negative for abdominal pain, blood in stool, diarrhea and nausea.   Psychiatric/Behavioral: The patient is not nervous/anxious.      Objective:     Vital Signs (Most Recent):  Temp: 96.9 °F (36.1 °C) (08/02/20 0408)  Pulse: 87 (08/02/20 0408)  Resp: (!) 22 (08/02/20 0408)  BP: 128/72 (08/02/20 0408)  SpO2: 97 % (08/02/20 0408) Vital Signs (24h Range):  Temp:  [96.9 °F (36.1 °C)-99.1 °F (37.3 °C)] 96.9 °F (36.1 °C)  Pulse:  [] 87  Resp:  [16-22] 22  SpO2:  [90 %-100 %] 97 %  BP: (113-128)/(61-72) 128/72     Weight: 83 kg (182 lb 15.7 oz)  Body mass index is 25.52 kg/m².    Intake/Output Summary (Last 24 hours) at 8/2/2020 0549  Last data filed at 8/1/2020 1956  Gross per 24 hour   Intake 200 ml   Output 1500 ml   Net -1300 ml      Physical Exam  Vitals signs and nursing note reviewed.   Constitutional:       General: He is not in acute distress.  HENT:      Head: Normocephalic and atraumatic.   Eyes:      Extraocular Movements: Extraocular movements intact.   Cardiovascular:      Rate and Rhythm: Normal rate and regular rhythm.      Comments: As seen on monitor, patient appears to have sinus rhythm  Pulmonary:      Effort: Pulmonary effort is normal.      Comments: As seen on the monitor, patient peripheral oxygen saturation in low 90s.   Abdominal:      General: There is no distension.   Skin:     Coloration: Skin is not jaundiced.   Neurological:      Mental Status: He is alert.   Psychiatric:      Comments:  Answers questions appropriately.          Significant Labs:   A1C:   Recent Labs   Lab 07/23/20  0403   HGBA1C 6.3*     Bilirubin:   Recent Labs   Lab 07/28/20  1131 07/29/20  0602 07/30/20  1344 07/31/20  0755 08/01/20  0459   BILITOT 0.8 0.7  0.7 0.8  0.8 0.7  0.7 0.7  0.7     BMP:   Recent Labs   Lab 08/01/20  0459 08/01/20  1014   *  133* 133*   *  134* 131*   K 5.3*  5.3* 4.9     105 104   CO2 25  25 22*   BUN 27*  27* 26*   CREATININE 1.2  1.2 1.1   CALCIUM 8.0*  8.0* 8.0*   MG 2.0  --      CBC:   Recent Labs   Lab 07/31/20  0755 07/31/20  1831 08/01/20  0459 08/01/20  1736   WBC 17.82*  --  18.84*  --    HGB 8.5* 10.4* 8.3* 9.3*   HCT 26.4* 33.1* 25.5* 29.0*     --  299  --      CMP:   Recent Labs   Lab 07/31/20  0755 08/01/20  0459 08/01/20  1014   *  135* 134*  134* 131*   K 4.9  4.9 5.3*  5.3* 4.9     106 105  105 104   CO2 23  23 25  25 22*   *  136* 133*  133* 133*   BUN 24*  24* 27*  27* 26*   CREATININE 1.2  1.2 1.2  1.2 1.1   CALCIUM 7.9*  7.9* 8.0*  8.0* 8.0*   PROT 5.4*  5.4* 5.2*  5.2*  --    ALBUMIN 2.2*  2.2* 2.3*  2.3*  --    BILITOT 0.7  0.7 0.7  0.7  --    ALKPHOS 60  60 60  60  --    AST 21  21 19  19  --    ALT 40  40 33  33  --    ANIONGAP 6*  6* 4*  4* 5*   EGFRNONAA 58*  58* 58*  58* >60     Coagulation:   Recent Labs   Lab 08/01/20  0459   APTT 25.8     Magnesium:   Recent Labs   Lab 07/31/20  0755 08/01/20  0459   MG 2.0 2.0     POCT Glucose:   Recent Labs   Lab 07/31/20  1137 07/31/20  1734 08/01/20  0528   POCTGLUCOSE 179* 196* 131*     TSH:   Recent Labs   Lab 07/23/20  0403   TSH 0.604     All pertinent labs within the past 24 hours have been reviewed.    Significant Imaging: I have reviewed all pertinent imaging results/findings within the past 24 hours.

## 2020-08-02 NOTE — ASSESSMENT & PLAN NOTE
Patient positive on PCR, initiated on oral vancomycin & flagyl on 7/24  - has severe infection given rise in Cr, hemodynamic instability on presentation    Recommend  - 10-14 days PO vanc/flagyl  (start 7/24)

## 2020-08-02 NOTE — PLAN OF CARE
Pt received on vapotherm at   40 lpm FiO2 100%.  SPO2   97%.  Pt in no apparent respiratory distress.  Will continue to monitor.

## 2020-08-02 NOTE — PROGRESS NOTES
[facility]  Pulmonology  Progress Note    Patient Name: Rajan Deluna  MRN: [id number]  Admission Date: 7/21/2020  Hospital Length of Stay: 12 days  Code Status: Full Code  Attending Provider: Rell Tolentino III, MD  Primary Care Provider: Jason Mccord MD   Principal Problem: Acute respiratory disease due to COVID-19 virus    Subjective:     [facility] Pulmonology Brief COVID Video Conference    Subjective:  No acute events overnight, patient is more responsive today than yesterday.     Vitals:  Vitals:    08/02/20 0809 08/02/20 0927 08/02/20 1156 08/02/20 1210   BP:  135/66     BP Location:  Left arm     Patient Position:  Lying     Pulse: 86 89 87 108   Resp:  19  18   Temp:  97.3 °F (36.3 °C)     TempSrc:       SpO2:  (!) 94%  100%   Weight:       Height:           Patient not examined in person, video conference used  General: Awake, in bed, no acute distress   HENT:  NCAT  Cardio:  Regular rate on monitor  Resp:  Respirations appear unlabored  Abdom: Symmetric appearing  Extrem: No cyanosis noted  Neuro:  Alert and oriented      Lab Results:  Lab Results   Component Value Date    [id number] Positive (A) 07/21/2020       COVID-19 Inflammatory Markers  Recent Labs   Lab 08/02/20  0615   WBC 16.87*   LYMPH 4.1*  0.7*         Assessment/Plan:     * Acute respiratory disease due to COVID-19 virus  Patient admitted 7/21 with AMS & fever  - 7/21 COVID(+)  - initially required NC in the ED but escalated to BIPAP so admitted to ICU  - CXR: diffuse patchy bilateral infiltrates  - hx of aspergillus and MAC infection   - s/p remdesivir x5d, dexamethasone x10d    Recommend  - consideration that if patient has untreated or not properly treated MAC/aspergillus infection in the past that reactivation of infection is possible  - full dose AC    - proning if he can tolerate at all  - incentive spirometry   - please wean O2 as tolerated for goal SpO2 88%    History of Aspergillus & MAC  Pt w/ hx of structural lung  disease c/b MAC infection, recent aspergillosis infection treated with intraconazole  - ID consulted - serum aspergillus negative, loaded with voriconazole on  7/29   - RespCx with Candida parapsilosis - likely colonizer, candida is rarely pathological in sputum cultures  - no MAC treatment at this time per ID     Recommend  - continue to monitor respiratory status        Clostridium difficile infection  Patient positive on PCR, initiated on oral vancomycin & flagyl on 7/24  - has severe infection given rise in Cr, hemodynamic instability on presentation    Recommend  - 10-14 days PO vanc/flagyl  (start 7/24)      Acute respiratory failure with hypoxia  See COVID problem      MARQUIS (acute kidney injury)  Likely pre-renal with component of COVID infection & c diff and possible ATN given patient presented hemodynamically unstable and in shock, requiring fluid resuscitation  - Cr improving overnight    Recommend   - encourage PO intake, if Cr worsening would consider repeating urine studies      COPD (chronic obstructive pulmonary disease)  Use of 2L NC at home    Recommend  - metered dose inhaler q4h      Anemia  Patient has been having GIB  - decline in Hgb from baseline of 10-11g/dL to 7g/dL  - monitor stool for blood  - s/p transfusion 7/27 w/ 1u PRBC     Recommend  - do not transfuse unless Hgb is below 7g/dL  - heparin gtt stopped    - daily CBC            Felicity Mendieta MD  Pulmonology  [facility]    Pt seen and examined with Pulmonary/Critical Care team and this note reviewed and validated with the following additional comments:    Frail but states that he is slowly feeling better. We will keep him on our watch list.    Deion Carmona MD  Phone [phone]

## 2020-08-02 NOTE — ASSESSMENT & PLAN NOTE
Patient uses 2L supplemental oxygen at home.    Plan:  - continue respiratory therapy treatments, Acapella, chest physiotherapy, incentive spirometry if able to tolerate.    - albuterol inhaler 2 puff Q4H

## 2020-08-02 NOTE — PLAN OF CARE
VN cued into pt's room for introduction. VN informed pt that VN would be working along side bedside nurse and PCT throughout shift. Level of present pain assessed. At present no distress noted. Patient states feeling better today and a little bit stronger. Discussed with patient hte plan of care. Discussed with patient High fall risk protocol and interventions that have been initiated and cont be in place for safety. Patient verbalized clear understanding and cooperation using teach back method. Bed alarm presently activated and in use. Will cont to be available to patient and intervene prn.

## 2020-08-02 NOTE — PLAN OF CARE
Problem: Physical Therapy Goal  Goal: Physical Therapy Goal  Description: Goals to be met by: 20     Patient will increase functional independence with mobility by performin. Supine <> sit with MInimal Assistance  2. Sit to stand transfer with Minimal Assistance  3. Bed to chair transfer with Minimal Assistance   4. Gait x 50 ft with min A with appropriate AD  5. Lower extremity exercise program x 10 reps per handout, with supervision    Outcome: Ongoing, Progressing

## 2020-08-02 NOTE — ASSESSMENT & PLAN NOTE
Patient admitted 7/21 with AMS & fever  - 7/21 COVID(+)  - initially required NC in the ED but escalated to BIPAP so admitted to ICU  - CXR: diffuse patchy bilateral infiltrates  - hx of aspergillus and MAC infection   - s/p remdesivir x5d, dexamethasone x10d    Recommend  - consideration that if patient has untreated or not properly treated MAC/aspergillus infection in the past that reactivation of infection is possible  - full dose AC    - proning if he can tolerate at all  - incentive spirometry   - please wean O2 as tolerated for goal SpO2 88%

## 2020-08-02 NOTE — PLAN OF CARE
Pt vitals were maintained, pt had a loose bowel movement with coffee like spots.pt C/o some chest and left lower flank when coughing MD was notified, MD ordered some tessalon pearls. Pt also had some complaints with his bipap and respiratory was called, NC was put back on. WCTM

## 2020-08-02 NOTE — SUBJECTIVE & OBJECTIVE
[facility] Pulmonology Brief COVID Video Conference    Subjective:  No acute events overnight, patient is more responsive today than yesterday.     Vitals:  Vitals:    08/02/20 0809 08/02/20 0927 08/02/20 1156 08/02/20 1210   BP:  135/66     BP Location:  Left arm     Patient Position:  Lying     Pulse: 86 89 87 108   Resp:  19  18   Temp:  97.3 °F (36.3 °C)     TempSrc:       SpO2:  (!) 94%  100%   Weight:       Height:           Patient not examined in person, video conference used  General: Awake, in bed, no acute distress   HENT:  NCAT  Cardio:  Regular rate on monitor  Resp:  Respirations appear unlabored  Abdom: Symmetric appearing  Extrem: No cyanosis noted  Neuro:  Alert and oriented      Lab Results:  Lab Results   Component Value Date    [id number] Positive (A) 07/21/2020       COVID-19 Inflammatory Markers  Recent Labs   Lab 08/02/20  0615   WBC 16.87*   LYMPH 4.1*  0.7*

## 2020-08-02 NOTE — ASSESSMENT & PLAN NOTE
Patient has received 2 unit of PRBC since admission.  Performed medial capsule endoscopy, results pending.  H&H has been stable at 9.3 and 29.0.    Plan:   - patient on full anticoagulant Lovenox.   - patient underwent video capsule endoscopy, results pending  - continue monitor H&H Q12H.  - trend stool output and consistency.

## 2020-08-02 NOTE — ASSESSMENT & PLAN NOTE
Patient has had consistent watery melanic stools since admission. Patient found to have positive C. Diff infection and required PO vancomycin and IV metronidazole. Patient was started on a heparin drip for an elevated D-dimer of 7 and patient not being a candidate to a CTA due to MARQUIS during admission. Heparin drip was discontinued on 7/26.  Patient underwent video capsule endoscopy (7/30), pending results.  Will need to follow-up with GI    Plan:   - will need to follow-up with GI for results of video capsule endoscopy.   - continue to trend H&H Q12H   - if Hgb < 7 will transfuse with PRBCs.   - continue IV Protonix BID

## 2020-08-02 NOTE — ASSESSMENT & PLAN NOTE
Presented with Acute Kidney Injury 2/2 to hypovolemic shock, pre renal.  Patient's most recent BUN/creatinine ratio 26/1.1.  GFR greater than 60.  RESOLVED    Plan:  - cont to encourage oral fluid intake.  - Continue to monitor renal function  - Avoid nephrotoxic medications

## 2020-08-02 NOTE — PROGRESS NOTES
[facility] Hospital Medicine  Progress Note    Patient Name: Rajan Deluna  MRN: [id number]  Patient Class: IP- Inpatient   Admission Date: 7/21/2020  Length of Stay: 12 days  Attending Physician: Rell Tolentino III, MD  Primary Care Provider: Jason Mccord MD        Subjective:     Principal Problem:Acute respiratory disease due to COVID-19 virus        HPI:  Patient is a 74-year-old male with a past medical history of hypertension, COPD, MAC infection who presented to the ED with altered mental status and fever.  As per the family, patient has had altered mental status for the past 3-4 days. Family endorses increased somnolence as well as urinating on himself. EMS was called two days prior to presentation but reportedly deemed that patient did not need to be brought in. Over the previous day, reportedly the patient was found stuck, staring in his sink, not responding. On the day of presentation, the patient was found down on the floor, family wasn't able to get patient up from the floor and was reportedly not responding as much as baseline. Family believed that patient may not have been wearing baseline 2L O2. Patient also endorses a cough and generalized weakness. Patient reportedly had a recent UTI.     In the ED, chest x-ray showed diffuse interstitial patchy infiltrates concerning for possible pneumonia and a COVID-19 screening test was positive. Fever was 101° F and patient was hypotensive 80s/40s. Patient given 30cc/kg bolus. BP very soft on presentation, adequately responded to volume resuscitation and then decreased again. Labs showed a BUN 45, creatinine 2.9, , , troponin 0.036, procalcitonin 1.13.     Overview/Hospital Course:  No notes on file    Interval History:  Patient on Vapotherm at flow 40 and oxygen saturation 90, satting at 100%.  Patient on BiPAP at 80% FiO2, satting at 94%. Patient did report a chronic cough overnight and was provided Tessalon Perles.  Patient  on full-dose Lovenox. Most recent H&H 9.3/29.0. As per nurse, loose bowel movement with coffee like spots.  Patient still on p.o. vancomycin, IV metronidazole, and p.o. voriconazole.    Review of Systems   Respiratory: Positive for cough. Negative for shortness of breath.    Cardiovascular: Negative for chest pain.   Gastrointestinal: Negative for abdominal pain, blood in stool, diarrhea and nausea.   Psychiatric/Behavioral: The patient is not nervous/anxious.      Objective:     Vital Signs (Most Recent):  Temp: 96.9 °F (36.1 °C) (08/02/20 0408)  Pulse: 87 (08/02/20 0408)  Resp: (!) 22 (08/02/20 0408)  BP: 128/72 (08/02/20 0408)  SpO2: 97 % (08/02/20 0408) Vital Signs (24h Range):  Temp:  [96.9 °F (36.1 °C)-99.1 °F (37.3 °C)] 96.9 °F (36.1 °C)  Pulse:  [] 87  Resp:  [16-22] 22  SpO2:  [90 %-100 %] 97 %  BP: (113-128)/(61-72) 128/72     Weight: 83 kg (182 lb 15.7 oz)  Body mass index is 25.52 kg/m².    Intake/Output Summary (Last 24 hours) at 8/2/2020 0549  Last data filed at 8/1/2020 1956  Gross per 24 hour   Intake 200 ml   Output 1500 ml   Net -1300 ml      Physical Exam  Vitals signs and nursing note reviewed.   Constitutional:       General: He is not in acute distress.  HENT:      Head: Normocephalic and atraumatic.   Eyes:      Extraocular Movements: Extraocular movements intact.   Cardiovascular:      Rate and Rhythm: Normal rate and regular rhythm.      Comments: As seen on monitor, patient appears to have sinus rhythm  Pulmonary:      Effort: Pulmonary effort is normal.      Comments: As seen on the monitor, patient peripheral oxygen saturation in low 90s.   Abdominal:      General: There is no distension.   Skin:     Coloration: Skin is not jaundiced.   Neurological:      Mental Status: He is alert.   Psychiatric:      Comments: Answers questions appropriately.          Significant Labs:   A1C:   Recent Labs   Lab 07/23/20  0403   HGBA1C 6.3*     Bilirubin:   Recent Labs   Lab 07/28/20  1130  07/29/20  0602 07/30/20  1344 07/31/20  0755 08/01/20 0459   BILITOT 0.8 0.7  0.7 0.8  0.8 0.7  0.7 0.7  0.7     BMP:   Recent Labs   Lab 08/01/20  0459 08/01/20  1014   *  133* 133*   *  134* 131*   K 5.3*  5.3* 4.9     105 104   CO2 25  25 22*   BUN 27*  27* 26*   CREATININE 1.2  1.2 1.1   CALCIUM 8.0*  8.0* 8.0*   MG 2.0  --      CBC:   Recent Labs   Lab 07/31/20  0755 07/31/20  1831 08/01/20 0459 08/01/20  1736   WBC 17.82*  --  18.84*  --    HGB 8.5* 10.4* 8.3* 9.3*   HCT 26.4* 33.1* 25.5* 29.0*     --  299  --      CMP:   Recent Labs   Lab 07/31/20  0755 08/01/20  0459 08/01/20  1014   *  135* 134*  134* 131*   K 4.9  4.9 5.3*  5.3* 4.9     106 105  105 104   CO2 23  23 25  25 22*   *  136* 133*  133* 133*   BUN 24*  24* 27*  27* 26*   CREATININE 1.2  1.2 1.2  1.2 1.1   CALCIUM 7.9*  7.9* 8.0*  8.0* 8.0*   PROT 5.4*  5.4* 5.2*  5.2*  --    ALBUMIN 2.2*  2.2* 2.3*  2.3*  --    BILITOT 0.7  0.7 0.7  0.7  --    ALKPHOS 60  60 60  60  --    AST 21  21 19  19  --    ALT 40  40 33  33  --    ANIONGAP 6*  6* 4*  4* 5*   EGFRNONAA 58*  58* 58*  58* >60     Coagulation:   Recent Labs   Lab 08/01/20 0459   APTT 25.8     Magnesium:   Recent Labs   Lab 07/31/20  0755 08/01/20  0459   MG 2.0 2.0     POCT Glucose:   Recent Labs   Lab 07/31/20  1137 07/31/20  1734 08/01/20  0528   POCTGLUCOSE 179* 196* 131*     TSH:   Recent Labs   Lab 07/23/20  0403   TSH 0.604     All pertinent labs within the past 24 hours have been reviewed.    Significant Imaging: I have reviewed all pertinent imaging results/findings within the past 24 hours.      Assessment/Plan:      * Acute respiratory disease due to COVID-19 virus  Patient on overnight BiPAP at 80% FiO2.  They both arm during the day, currently at a flow rate of 40 an FiO2 of 100.    Plan:     - COVID Isolation per protocol   - scheduled BiPAP at night   - patient currently on Vapotherm,  continue to wean.    - finish coursed of dexamethasone (07/31)   - patient currently on full-dose anticoagulant Lovenox.   - patient finished 5 day course of Remdesivir.   - Multivitamin    - continue to monitor respiratory status.    - infectious disease made further recommendations of adding an antifungal voriconazole for past history of Aspergillus infections with uptrending WBC count.  Will follow recommendations.      Acute respiratory failure with hypoxia  Patient on BiPAP overnight and and the blood turned during day. Continue to monitor with continuous pulse oximetry.  Will attempt to wean off BiPAP and vapotherm.   STABLE      History of Aspergillus & MAC  Patient has a known history of MAC infections as well as Aspergillus.  ID consulted and will follow recommendations.    Plan:  - continue voriconazole.       GI bleed  Patient has had consistent watery melanic stools since admission. Patient found to have positive C. Diff infection and required PO vancomycin and IV metronidazole. Patient was started on a heparin drip for an elevated D-dimer of 7 and patient not being a candidate to a CTA due to MARQUIS during admission. Heparin drip was discontinued on 7/26.  Patient underwent video capsule endoscopy (7/30), pending results.  Will need to follow-up with GI    Plan:   - will need to follow-up with GI for results of video capsule endoscopy.   - continue to trend H&H Q12H   - if Hgb < 7 will transfuse with PRBCs.   - continue IV Protonix BID      Clostridium difficile infection  P/w consistent melanotic diarrhea, C diff toxin positive.     Plan:  - ID following and GI following  - p.o. vancomycin and IV metronidazole  - continue IV Protonix.   - continue probiotics  - continue to monitor stool output.  Follow-up with ID.      MARQUIS (acute kidney injury)  Presented with Acute Kidney Injury 2/2 to hypovolemic shock, pre renal.  Patient's most recent BUN/creatinine ratio 26/1.1.  GFR greater than  60.  RESOLVED    Plan:  - cont to encourage oral fluid intake.  - Continue to monitor renal function  - Avoid nephrotoxic medications      COPD (chronic obstructive pulmonary disease)  Patient uses 2L supplemental oxygen at home.    Plan:  - continue respiratory therapy treatments, Acapella, chest physiotherapy, incentive spirometry if able to tolerate.    - albuterol inhaler 2 puff Q4H    Anemia  Patient has received 2 unit of PRBC since admission.  Performed medial capsule endoscopy, results pending.  H&H has been stable at 9.3 and 29.0.    Plan:   - patient on full anticoagulant Lovenox.   - patient underwent video capsule endoscopy, results pending  - continue monitor H&H Q12H.  - trend stool output and consistency.      VTE Risk Mitigation (From admission, onward)         Ordered     enoxaparin injection 90 mg  Every 24 hours (non-standard times)      08/01/20 1553     Place sequential compression device  Until discontinued      07/21/20 2142     IP VTE HIGH RISK PATIENT  Once      07/21/20 2142                      Olivier Rowe DO PGY-1  \[facility] [place] [facility]\"" Family Medicine  Department of Hospital Medicine   [facility]     clear

## 2020-08-03 LAB
ALBUMIN SERPL BCP-MCNC: 2.3 G/DL (ref 3.5–5.2)
ALBUMIN SERPL BCP-MCNC: 2.3 G/DL (ref 3.5–5.2)
ALP SERPL-CCNC: 62 U/L (ref 55–135)
ALP SERPL-CCNC: 62 U/L (ref 55–135)
ALT SERPL W/O P-5'-P-CCNC: 22 U/L (ref 10–44)
ALT SERPL W/O P-5'-P-CCNC: 22 U/L (ref 10–44)
ANION GAP SERPL CALC-SCNC: 4 MMOL/L (ref 8–16)
ANION GAP SERPL CALC-SCNC: 4 MMOL/L (ref 8–16)
APTT BLDCRRT: 28.3 SEC (ref 21–32)
AST SERPL-CCNC: 20 U/L (ref 10–40)
AST SERPL-CCNC: 20 U/L (ref 10–40)
BASOPHILS # BLD AUTO: 0.01 K/UL (ref 0–0.2)
BASOPHILS NFR BLD: 0.1 % (ref 0–1.9)
BILIRUB SERPL-MCNC: 0.9 MG/DL (ref 0.1–1)
BILIRUB SERPL-MCNC: 0.9 MG/DL (ref 0.1–1)
BUN SERPL-MCNC: 18 MG/DL (ref 8–23)
BUN SERPL-MCNC: 18 MG/DL (ref 8–23)
CALCIUM SERPL-MCNC: 8 MG/DL (ref 8.7–10.5)
CALCIUM SERPL-MCNC: 8 MG/DL (ref 8.7–10.5)
CHLORIDE SERPL-SCNC: 104 MMOL/L (ref 95–110)
CHLORIDE SERPL-SCNC: 104 MMOL/L (ref 95–110)
CO2 SERPL-SCNC: 27 MMOL/L (ref 23–29)
CO2 SERPL-SCNC: 27 MMOL/L (ref 23–29)
CREAT SERPL-MCNC: 1.1 MG/DL (ref 0.5–1.4)
CREAT SERPL-MCNC: 1.1 MG/DL (ref 0.5–1.4)
DIFFERENTIAL METHOD: ABNORMAL
EOSINOPHIL # BLD AUTO: 0.1 K/UL (ref 0–0.5)
EOSINOPHIL NFR BLD: 0.6 % (ref 0–8)
ERYTHROCYTE [DISTWIDTH] IN BLOOD BY AUTOMATED COUNT: 17.4 % (ref 11.5–14.5)
EST. GFR  (AFRICAN AMERICAN): >60 ML/MIN/1.73 M^2
EST. GFR  (AFRICAN AMERICAN): >60 ML/MIN/1.73 M^2
EST. GFR  (NON AFRICAN AMERICAN): >60 ML/MIN/1.73 M^2
EST. GFR  (NON AFRICAN AMERICAN): >60 ML/MIN/1.73 M^2
GLUCOSE SERPL-MCNC: 90 MG/DL (ref 70–110)
GLUCOSE SERPL-MCNC: 90 MG/DL (ref 70–110)
HCT VFR BLD AUTO: 28 % (ref 40–54)
HCT VFR BLD AUTO: 32.9 % (ref 40–54)
HGB BLD-MCNC: 10.5 G/DL (ref 14–18)
HGB BLD-MCNC: 9.1 G/DL (ref 14–18)
IMM GRANULOCYTES # BLD AUTO: 0.09 K/UL (ref 0–0.04)
IMM GRANULOCYTES NFR BLD AUTO: 0.7 % (ref 0–0.5)
LYMPHOCYTES # BLD AUTO: 0.9 K/UL (ref 1–4.8)
LYMPHOCYTES NFR BLD: 7 % (ref 18–48)
MAGNESIUM SERPL-MCNC: 1.8 MG/DL (ref 1.6–2.6)
MCH RBC QN AUTO: 30.6 PG (ref 27–31)
MCHC RBC AUTO-ENTMCNC: 32.5 G/DL (ref 32–36)
MCV RBC AUTO: 94 FL (ref 82–98)
MONOCYTES # BLD AUTO: 1 K/UL (ref 0.3–1)
MONOCYTES NFR BLD: 8.3 % (ref 4–15)
NEUTROPHILS # BLD AUTO: 10.5 K/UL (ref 1.8–7.7)
NEUTROPHILS NFR BLD: 83.3 % (ref 38–73)
NRBC BLD-RTO: 0 /100 WBC
PHOSPHATE SERPL-MCNC: 2.5 MG/DL (ref 2.7–4.5)
PLATELET # BLD AUTO: 289 K/UL (ref 150–350)
PMV BLD AUTO: 9.3 FL (ref 9.2–12.9)
POTASSIUM SERPL-SCNC: 4.5 MMOL/L (ref 3.5–5.1)
POTASSIUM SERPL-SCNC: 4.5 MMOL/L (ref 3.5–5.1)
PROT SERPL-MCNC: 5.4 G/DL (ref 6–8.4)
PROT SERPL-MCNC: 5.4 G/DL (ref 6–8.4)
RBC # BLD AUTO: 2.97 M/UL (ref 4.6–6.2)
SODIUM SERPL-SCNC: 135 MMOL/L (ref 136–145)
SODIUM SERPL-SCNC: 135 MMOL/L (ref 136–145)
WBC # BLD AUTO: 12.54 K/UL (ref 3.9–12.7)

## 2020-08-03 PROCEDURE — C9113 INJ PANTOPRAZOLE SODIUM, VIA: HCPCS | Performed by: INTERNAL MEDICINE

## 2020-08-03 PROCEDURE — 94660 CPAP INITIATION&MGMT: CPT

## 2020-08-03 PROCEDURE — 25000003 PHARM REV CODE 250: Performed by: STUDENT IN AN ORGANIZED HEALTH CARE EDUCATION/TRAINING PROGRAM

## 2020-08-03 PROCEDURE — 97530 THERAPEUTIC ACTIVITIES: CPT

## 2020-08-03 PROCEDURE — 94664 DEMO&/EVAL PT USE INHALER: CPT

## 2020-08-03 PROCEDURE — 85025 COMPLETE CBC W/AUTO DIFF WBC: CPT

## 2020-08-03 PROCEDURE — 91110 GI TRC IMG INTRAL ESOPH-ILE: CPT | Performed by: INTERNAL MEDICINE

## 2020-08-03 PROCEDURE — 25000003 PHARM REV CODE 250

## 2020-08-03 PROCEDURE — 99232 SBSQ HOSP IP/OBS MODERATE 35: CPT | Mod: GC,,, | Performed by: INTERNAL MEDICINE

## 2020-08-03 PROCEDURE — 85014 HEMATOCRIT: CPT

## 2020-08-03 PROCEDURE — S0030 INJECTION, METRONIDAZOLE: HCPCS

## 2020-08-03 PROCEDURE — 85018 HEMOGLOBIN: CPT

## 2020-08-03 PROCEDURE — 97535 SELF CARE MNGMENT TRAINING: CPT

## 2020-08-03 PROCEDURE — 83735 ASSAY OF MAGNESIUM: CPT

## 2020-08-03 PROCEDURE — 94668 MNPJ CHEST WALL SBSQ: CPT

## 2020-08-03 PROCEDURE — 85730 THROMBOPLASTIN TIME PARTIAL: CPT

## 2020-08-03 PROCEDURE — 63600175 PHARM REV CODE 636 W HCPCS: Performed by: STUDENT IN AN ORGANIZED HEALTH CARE EDUCATION/TRAINING PROGRAM

## 2020-08-03 PROCEDURE — 25000003 PHARM REV CODE 250: Performed by: INTERNAL MEDICINE

## 2020-08-03 PROCEDURE — 63600175 PHARM REV CODE 636 W HCPCS: Performed by: INTERNAL MEDICINE

## 2020-08-03 PROCEDURE — 11000001 HC ACUTE MED/SURG PRIVATE ROOM

## 2020-08-03 PROCEDURE — 36415 COLL VENOUS BLD VENIPUNCTURE: CPT

## 2020-08-03 PROCEDURE — 94640 AIRWAY INHALATION TREATMENT: CPT

## 2020-08-03 PROCEDURE — 99232 PR SUBSEQUENT HOSPITAL CARE,LEVL II: ICD-10-PCS | Mod: GC,,, | Performed by: INTERNAL MEDICINE

## 2020-08-03 PROCEDURE — 84100 ASSAY OF PHOSPHORUS: CPT

## 2020-08-03 PROCEDURE — 80053 COMPREHEN METABOLIC PANEL: CPT

## 2020-08-03 PROCEDURE — 99900035 HC TECH TIME PER 15 MIN (STAT)

## 2020-08-03 PROCEDURE — 94761 N-INVAS EAR/PLS OXIMETRY MLT: CPT

## 2020-08-03 PROCEDURE — 27100171 HC OXYGEN HIGH FLOW UP TO 24 HOURS

## 2020-08-03 RX ORDER — ENOXAPARIN SODIUM 100 MG/ML
1 INJECTION SUBCUTANEOUS
Status: DISCONTINUED | OUTPATIENT
Start: 2020-08-03 | End: 2020-08-03

## 2020-08-03 RX ORDER — ENOXAPARIN SODIUM 100 MG/ML
1 INJECTION SUBCUTANEOUS
Status: DISCONTINUED | OUTPATIENT
Start: 2020-08-03 | End: 2020-08-04

## 2020-08-03 RX ADMIN — METRONIDAZOLE 500 MG: 500 INJECTION, SOLUTION INTRAVENOUS at 05:08

## 2020-08-03 RX ADMIN — THERA TABS 1 TABLET: TAB at 10:08

## 2020-08-03 RX ADMIN — ALBUTEROL SULFATE 2 PUFF: 90 AEROSOL, METERED RESPIRATORY (INHALATION) at 12:08

## 2020-08-03 RX ADMIN — METRONIDAZOLE 500 MG: 500 INJECTION, SOLUTION INTRAVENOUS at 12:08

## 2020-08-03 RX ADMIN — LIDOCAINE 1 PATCH: 50 PATCH TOPICAL at 05:08

## 2020-08-03 RX ADMIN — PANTOPRAZOLE SODIUM 40 MG: 40 INJECTION, POWDER, FOR SOLUTION INTRAVENOUS at 10:08

## 2020-08-03 RX ADMIN — DEXTROSE MONOHYDRATE 15 MMOL: 50 INJECTION, SOLUTION INTRAVENOUS at 12:08

## 2020-08-03 RX ADMIN — ALBUTEROL SULFATE 2 PUFF: 90 AEROSOL, METERED RESPIRATORY (INHALATION) at 07:08

## 2020-08-03 RX ADMIN — METRONIDAZOLE 500 MG: 500 INJECTION, SOLUTION INTRAVENOUS at 10:08

## 2020-08-03 RX ADMIN — ACETAMINOPHEN 650 MG: 325 TABLET ORAL at 10:08

## 2020-08-03 RX ADMIN — Medication 4 TABLET: at 12:08

## 2020-08-03 RX ADMIN — Medication 4 TABLET: at 05:08

## 2020-08-03 RX ADMIN — PANTOPRAZOLE SODIUM 40 MG: 40 INJECTION, POWDER, FOR SOLUTION INTRAVENOUS at 09:08

## 2020-08-03 RX ADMIN — ALBUTEROL SULFATE 2 PUFF: 90 AEROSOL, METERED RESPIRATORY (INHALATION) at 03:08

## 2020-08-03 RX ADMIN — Medication 125 MG: at 12:08

## 2020-08-03 RX ADMIN — VORICONAZOLE 200 MG: 200 TABLET, FILM COATED ORAL at 05:08

## 2020-08-03 RX ADMIN — Medication 125 MG: at 05:08

## 2020-08-03 RX ADMIN — ENOXAPARIN SODIUM 80 MG: 80 INJECTION SUBCUTANEOUS at 05:08

## 2020-08-03 NOTE — PLAN OF CARE
Problem: Occupational Therapy Goal  Goal: Occupational Therapy Goal  Description: Goals to be met by: 8/27/20     Patient will increase functional independence with ADLs by performing:    LE Dressing with Stand-by Assistance.  Grooming while standing with Stand-by Assistance.  Toileting from toilet with Stand-by Assistance for hygiene and clothing management.   Supine to sit with Stand-by Assistance.  Step transfer with Stand-by Assistance  Toilet transfer to toilet with Stand-by Assistance.  Upper extremity exercise program x10 reps per handout, with independence.    Outcome: Ongoing, Progressing   Pt progressing towards OT goals. Pt maintained spO2 sats 92-97% spO2 throughout much of session with functional mobility. Sats reading as low as 64% spO2 with SPT to bedside chair but notably poor waveform and O2 probe loose. Pt in no apparent distress during t/f. Cont OT POC

## 2020-08-03 NOTE — PLAN OF CARE
Pt vitals were maintained, pt condom cath was changed, pt had a bowel movement which was brown, pt tolerated iv antibiotics. Pt maintained O2 sats. WCTM

## 2020-08-03 NOTE — PROGRESS NOTES
[facility]  Critical Care - Medicine  History & Physical    Patient Name: Rajan Deluna  MRN: [id number]  Admission Date: 7/21/2020  Hospital Length of Stay: 13 days  Code Status: Full Code  Attending Physician: Rell Tolentino III, MD   Primary Care Provider: Jason Mccord MD   Principal Problem: Acute respiratory disease due to COVID-19 virus    Subjective:     Hospital/ICU Course: Patient remains on 40L and 65% Vapotherm.  He appears mildly dyspneic when seen on video.  He denies being in acute distress.  His O2 requirements have been stable for the last 24 hours.     Past Medical History:   Diagnosis Date    Arthritis     COPD (chronic obstructive pulmonary disease)     Hypertension     Smoker     Weakness        Past Surgical History:   Procedure Laterality Date    HERNIA REPAIR      right knee surgery         Review of patient's allergies indicates:  No Known Allergies    Family History     None        Tobacco Use    Smoking status: Former Smoker    Smokeless tobacco: Never Used   Substance and Sexual Activity    Alcohol use: Not Currently    Drug use: Never    Sexual activity: Not on file     Objective:     Vital Signs (Most Recent):  Temp: 99.7 °F (37.6 °C) (08/03/20 0855)  Pulse: 94 (08/03/20 1205)  Resp: 18 (08/03/20 1205)  BP: 119/74 (08/03/20 0855)  SpO2: 100 % (08/03/20 1205) Vital Signs (24h Range):  Temp:  [98.2 °F (36.8 °C)-99.7 °F (37.6 °C)] 99.7 °F (37.6 °C)  Pulse:  [] 94  Resp:  [16-21] 18  SpO2:  [90 %-100 %] 100 %  BP: (106-122)/(66-74) 119/74     Weight: 83 kg (182 lb 15.7 oz)  Body mass index is 25.52 kg/m².      Intake/Output Summary (Last 24 hours) at 8/3/2020 1339  Last data filed at 8/3/2020 0845  Gross per 24 hour   Intake 100 ml   Output 1700 ml   Net -1600 ml       Physical Exam  Vitals reviewed: virtual visit.   Constitutional:       General: He is not in acute distress.     Appearance: Normal appearance.   HENT:      Head: Normocephalic.      Nose:  Nose normal.   Pulmonary:      Comments: Mildly increased WOB.    Neurological:      General: No focal deficit present.      Mental Status: He is alert.         Vents:  Oxygen Concentration (%): 50 (08/03/20 1205)    Lines/Drains/Airways     Drain            Male External Urinary Catheter 07/31/20 0300 Small 3 days          Peripheral Intravenous Line                 Peripheral IV - Single Lumen 07/31/20 1822 22 G Anterior;Distal;Left Forearm 2 days                Significant Labs:    CBC/Anemia Profile:  Recent Labs   Lab 08/02/20  0615 08/02/20  1802 08/03/20  0619   WBC 16.87*  --  12.54   HGB 8.8* 10.8* 9.1*   HCT 27.4* 33.7* 28.0*     --  289   MCV 96  --  94   RDW 17.6*  --  17.4*        Chemistries:  Recent Labs   Lab 08/02/20 0615 08/03/20 0619   *  133* 135*  135*   K 4.7  4.7 4.5  4.5     105 104  104   CO2 25  25 27  27   BUN 24*  24* 18  18   CREATININE 1.1  1.1 1.1  1.1   CALCIUM 7.9*  7.9* 8.0*  8.0*   ALBUMIN 2.3*  2.3* 2.3*  2.3*   PROT 5.2*  5.2* 5.4*  5.4*   BILITOT 0.8  0.8 0.9  0.9   ALKPHOS 60  60 62  62   ALT 27  27 22  22   AST 18  18 20  20   MG 1.9 1.8   PHOS 3.0 2.5*       All pertinent labs within the past 24 hours have been reviewed.    Significant Imaging: I have reviewed all pertinent imaging results/findings within the past 24 hours.    Assessment/Plan:     Active Diagnoses:    Diagnosis Date Noted POA    PRINCIPAL PROBLEM:  Acute respiratory disease due to COVID-19 virus [U07.1, J06.9]  Yes    GI bleed [K92.2] 07/29/2020 Clinically Undetermined    History of Aspergillus & MAC [B44.9]  Yes    Clostridium difficile infection [A49.8]  Clinically Undetermined    Acute respiratory failure with hypoxia [J96.01]  Yes    MARQUIS (acute kidney injury) [N17.9] 07/21/2020 Yes    COPD (chronic obstructive pulmonary disease) [J44.9] 05/22/2017 Yes    Anemia [D64.9] 12/09/2016 No      Problems Resolved During this Admission:       * Acute  respiratory disease due to COVID-19 virus  - 7/21 COVID(+)  - s/p remdesivir x5d, dexamethasone x10d  - full dose AC with lovenox  - proning if he can tolerate at all  - incentive spirometry   - please wean O2 as tolerated for goal SpO2 88%     History of Aspergillus & MAC  - serum aspergillus negative, loaded with voriconazole on  7/29   - no MAC treatment at this time per ID        Clostridium difficile infection  Patient positive on PCR, initiated on oral vancomycin & flagyl on 7/24  - has severe infection given rise in Cr, hemodynamic instability on presentation  - 14 days PO vanc/flagyl  (today day 10)    - probiotics     Acute respiratory failure with hypoxia  See COVID problem       COPD (chronic obstructive pulmonary disease)  - Use of 2L NC at home  - metered dose inhaler q4h       Anemia 2/2 GI bleed  - do not transfuse unless Hgb is below 7g/dL  - heparin gtt stopped    - daily CBC     FEN   - ensure lytes aggressively repleted.        Bryan Swain MD  Critical Care - Medicine  [facility]  [phone]

## 2020-08-03 NOTE — ASSESSMENT & PLAN NOTE
S/p Dexamethasone and remdesivir  -Full dose lovenox  -Weaning vapotherm, BiPap QHS  -Isolation ongoing

## 2020-08-03 NOTE — HOSPITAL COURSE
Patient has had an extensive hospital course 2/2 COVID-related sepsis. Patient required peripheral Levophed for 1 day (upon admission) and has had stable MAPs throughout his course off pressors. Initially, patient required continuous BiPap and was stepped up to the ICU on 7/22 for 6 days. Patient also presented with a high D-dimer at 7.22 and was started on a heparin drip. CTA was contraindicated due to MARQUIS upon initial presentation. MARQUIS has improved with current Cr of 1 down from 2.9. While in the ICU, patient required supplemental O2. Patient complained of melanic diarrhea and a rectal tube was placed. Patient anxious and disoriented in the ICU. Haldol PRN for agitation in the ICU. Mental status improved in the ICU. Good UOP.    ID workup of melanic stool showed C. diff positive antigen and C. diff PCR toxin positive. Patient's melanic stool improved and rectal tube removed. He completed treatment with IV metronidazole and P.O. vancomycin for a total of 14 days, last dose 8/7. Though patient's melanic stool and watery diarrhea improved, there were concerns for a GI bleed due to an H/H of 6.9/21.5. Heparin drip was discontinued, IV protonix started, and the patient received 1 unit of packed RBCs. 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 Started on probiotics. Most recent recent H&H 9.5/37, stable.    Patient transitioned off continuous BiPAP and stepped down to the floor 7/27 with BiPAP use only at night. Continued to require supplemental O2. Tolerated advances in diet, duoneb treatments, incentive spirometry and chest physiotherapy. Tolerates PT/OT daily. Discussed self-proning due to deconditioned respiratory status but patient unable to tolerate. Off COVID isolation on 8/10, per hospital policy of 20 days negative COVID status. Patient received Levoquin  for 5 days, stopped after procal <1. IV dexamthasone for 10 days. Redemsivir for 5 days.     Patient with history of aspergillosis and was on itraconazole as outpatient. WBC count increased to 20 during his course but resolved after patient completed 10 day course of IV dexamethasone. Per ID, Voriconazole 400 mg po every 12 h X 2 loading doses, starting 7/29, then change to voriconazole 200 mg po every 12 h thereafter. Plan is to continue voriconazole and discharge on Itraconazole 100mg BID and follow up with outpatient Pulmonary and ID for continued Aspergillosis and MAC treatment.     During the patient's hospital course, he also had complaints of chest pain which resolved with a GI cocktail. Normal EKG and troponin trend. He also complained about left shoulder pain, in which the patient has a history of chronic shoulder pain that has required orthopedic surgery in the past. A shoulder x-ray showed to acute changes and the pain was managed with lidocaine patches and naproxen.    LTAC placement denied multiple times due to patient's continued need of supplemental oxygen. Patient expressed wanting to go home with possible hospice/palliative care but now is amendable to a type of rehabilitation facility. Multiple discussions about hospice/palliative care were held with patient. Discussed hospice/palliative care with daughter, patient's power of , due to patient's new baseline for his COPD 2/2 COVID. She is not agreeable to the plan and would like a type of rehabilitation facility too. Psych consulted to assess the patient's capacity and is in agreement with the primary team that the patient has the capacity to make his own medical decisions. A family meeting with palliative care is being coordinated with patient and daughter to discuss goals of care. Due to patient's extensive hospital course, the patient is deconditioned. The patient desats <88% with minimal exertion and supplemental O2 requirement increases  during these episodes. Able to wean the patient down to 7L HFNC thus far from a peak requirement of 30L 70% on vapotherm. Patient's daughter decided to release being Power of . 8/23 Patient on 9L High flow oxygen. Spoke with pulmonology briefly about patient who suggested a CT might be beneficial in identifying a possible correctable underlying etiology. Patient initially refused CT and stated that he just wants to go home. Patient CT demonstrated new fibrosis.  8/24 Patient on 7 L High flow. 8/25 Patient on 9L high flow. 8/26 Patient on 7L High flow oxygen. 8/27Patient stable for discharge to LTAC.

## 2020-08-03 NOTE — PLAN OF CARE
[facility] and said she submitted for auth with pt's insurance [employer].  She says pt meets criteria for a gap exception.  Deb will be back tomorrow and will call me with updates on pt and insurance auth.         08/03/20 1500   Post-Acute Status   Post-Acute Authorization Placement  (LTAC)   Post-Acute Placement Status Pending Payor Review   Discharge Delays None known at this time     Phong Suh RN, CM  [phone]

## 2020-08-03 NOTE — ASSESSMENT & PLAN NOTE
Patient uses 2L supplemental oxygen at home.  Currently on 40L Vapotherm at 65% O2, satting 90%  Continue to wean  Chest physio ongoing, albuterol q4h

## 2020-08-03 NOTE — PROGRESS NOTES
[facility] Hospital Medicine  Progress Note    Patient Name: Rajan Deluna  MRN: [id number]  Patient Class: IP- Inpatient   Admission Date: 7/21/2020  Length of Stay: 13 days  Attending Physician: Rell Tolentino III, MD  Primary Care Provider: Jason Mccord MD        Subjective:     Principal Problem:Acute respiratory disease due to COVID-19 virus        HPI:  Patient is a 74-year-old male with a past medical history of hypertension, COPD, MAC infection who presented to the ED with altered mental status and fever.  As per the family, patient has had altered mental status for the past 3-4 days. Family endorses increased somnolence as well as urinating on himself. EMS was called two days prior to presentation but reportedly deemed that patient did not need to be brought in. Over the previous day, reportedly the patient was found stuck, staring in his sink, not responding. On the day of presentation, the patient was found down on the floor, family wasn't able to get patient up from the floor and was reportedly not responding as much as baseline. Family believed that patient may not have been wearing baseline 2L O2. Patient also endorses a cough and generalized weakness. Patient reportedly had a recent UTI.     In the ED, chest x-ray showed diffuse interstitial patchy infiltrates concerning for possible pneumonia and a COVID-19 screening test was positive. Fever was 101° F and patient was hypotensive 80s/40s. Patient given 30cc/kg bolus. BP very soft on presentation, adequately responded to volume resuscitation and then decreased again. Labs showed a BUN 45, creatinine 2.9, , , troponin 0.036, procalcitonin 1.13.     Overview/Hospital Course:  No notes on file    Interval History:   NAEON, had chest pain, normal EKG and troponin overnight, resolved with GI cocktail. This AM, H/H decreased from 10.8/33.7 to 9.1/28. Will follow up with GI, no reported melena overnight. Patient camera pill  results pending. ID recs include continuing voriconazole while inpatient, itraconazole outpatient, will follow up for duration. Patient currently satting 90% on 40L vapotherm at 65%, weaning as tolerated. Patient has been accepted to LTAC.    Review of Systems   Respiratory: Positive for cough. Negative for shortness of breath.    Cardiovascular: Negative for chest pain.   Gastrointestinal: Negative for abdominal pain, blood in stool, diarrhea and nausea.   Psychiatric/Behavioral: The patient is not nervous/anxious.      Objective:     Vital Signs (Most Recent):  Temp: 99.7 °F (37.6 °C) (08/03/20 0855)  Pulse: 103 (08/03/20 0855)  Resp: 20 (08/03/20 0855)  BP: 119/74 (08/03/20 0855)  SpO2: (!) 90 % (08/03/20 0855) Vital Signs (24h Range):  Temp:  [97.3 °F (36.3 °C)-99.7 °F (37.6 °C)] 99.7 °F (37.6 °C)  Pulse:  [] 103  Resp:  [16-21] 20  SpO2:  [90 %-100 %] 90 %  BP: (106-135)/(66-74) 119/74     Weight: 83 kg (182 lb 15.7 oz)  Body mass index is 25.52 kg/m².    Intake/Output Summary (Last 24 hours) at 8/3/2020 0921  Last data filed at 8/2/2020 1950  Gross per 24 hour   Intake 100 ml   Output 1700 ml   Net -1600 ml      Physical Exam  Vitals signs and nursing note reviewed.   Constitutional:       General: He is not in acute distress.  HENT:      Head: Normocephalic and atraumatic.   Eyes:      Extraocular Movements: Extraocular movements intact.   Cardiovascular:      Rate and Rhythm: Normal rate and regular rhythm.      Comments: As seen on monitor, patient appears to have sinus rhythm  Pulmonary:      Effort: Pulmonary effort is normal.      Comments: As seen on the monitor, patient peripheral oxygen saturation in low 90s.   Abdominal:      General: There is no distension.   Skin:     Coloration: Skin is not jaundiced.   Neurological:      Mental Status: He is alert.   Psychiatric:      Comments: Answers questions appropriately.          Significant Labs:   CBC:   Recent Labs   Lab 08/02/20  0615  08/02/20  1802 08/03/20  0619   WBC 16.87*  --  12.54   HGB 8.8* 10.8* 9.1*   HCT 27.4* 33.7* 28.0*     --  289     CMP:   Recent Labs   Lab 08/01/20  1014 08/02/20  0615 08/03/20  0619   * 133*  133* 135*  135*   K 4.9 4.7  4.7 4.5  4.5    105  105 104  104   CO2 22* 25  25 27  27   * 100  100 90  90   BUN 26* 24*  24* 18  18   CREATININE 1.1 1.1  1.1 1.1  1.1   CALCIUM 8.0* 7.9*  7.9* 8.0*  8.0*   PROT  --  5.2*  5.2* 5.4*  5.4*   ALBUMIN  --  2.3*  2.3* 2.3*  2.3*   BILITOT  --  0.8  0.8 0.9  0.9   ALKPHOS  --  60  60 62  62   AST  --  18  18 20  20   ALT  --  27  27 22  22   ANIONGAP 5* 3*  3* 4*  4*   EGFRNONAA >60 >60  >60 >60  >60     Magnesium:   Recent Labs   Lab 08/02/20  0615 08/03/20  0619   MG 1.9 1.8     Troponin:   Recent Labs   Lab 08/02/20  1900   TROPONINI 0.009     All pertinent labs within the past 24 hours have been reviewed.    Significant Imaging: I have reviewed and interpreted all pertinent imaging results/findings within the past 24 hours.   X-Ray Abdomen AP 1 View   Final Result      No acute process identified.         Electronically signed by: Johann Gold MD   Date:    07/25/2020   Time:    16:06      X-Ray Chest 1 View   Final Result      Stable examination findings of bilateral ground-glass airspace opacities, suggestive of pneumonitis.         Electronically signed by: Davon Santacruz MD   Date:    07/25/2020   Time:    16:03      X-Ray Chest 1 View   Final Result      No significant change.         Electronically signed by: Kari Becerra   Date:    07/22/2020   Time:    18:36      X-Ray Chest AP Portable   Final Result      Abnormal increased interstitial lung markings predominantly at the lung basis left more than right, nonspecific could be acute or chronic.         Electronically signed by: Luda Powell MD   Date:    07/21/2020   Time:    13:54              Assessment/Plan:      * Acute respiratory disease due to  COVID-19 virus  S/p Dexamethasone and remdesivir  -Full dose lovenox  -Weaning vapotherm, BiPap QHS  -Isolation ongoing    History of Aspergillus & MAC  Patient has a known history of MAC infections as well as Aspergillus.    ID recs include continuing voriconazole, switch to itroconazole at DC  - continue voriconazole.       GI bleed  -Need to follow-up with GI for results of video capsule endoscopy.   - continue to trend H&H Q12H   - if Hgb < 7 will transfuse with PRBCs.   - continue IV Protonix BID      Hypotension  Patient's most recent blood pressure 116/75  RESOLVED      Clostridium difficile infection  P/w consistent melanotic diarrhea, C diff toxin positive.     Continue treatment of severe C diff with PO vancomycin and IV metronidazole, ID recommends completing 14 day course with end date of 8/7 (started 7/24).     Continue PO tx, improved, only 2x loose stools yesterday 8/2       Acute respiratory failure with hypoxia  Vapotherm 40L at 60% O2 , satting 90s  Continue to wean  BiPap QHS        COVID-19 virus infection        MARQUIS (acute kidney injury)  RESOLVED  Avoid nephrotoxics      COVID-19  Patient was stepped up to the ICU yesterday due to increasing O2 demands and decreasing O2 saturation from baseline. Patient was started on continues BiPAP, currently satting at 95-95% at 50% oxygen concentration. Patient was approved for Remdesivir yesterday but morning labs indicated BUN 68 creatinine 2.3, will hold pending repeat CMP.    Plan:   - COVID Isolation per protocol   - Dexamethasone 6mg daily for 10 days   - continue Heparin gtt   - Levofloxacin 750mg daily   - Atorvastatin 40mg daily   - Multivitamin    Sepsis  RESOLVED  See plan for COVID 19    COPD (chronic obstructive pulmonary disease)  Patient uses 2L supplemental oxygen at home.  Currently on 40L Vapotherm at 65% O2, satting 90%  Continue to wean  Chest physio ongoing, albuterol q4h      Anemia  H/H decreased today, 9.1 from 10.8  Asymptomatic  No  reports of Melena  Follow up with GI this AM  Patient on lovenox, COVID +  Continue to monitor      VTE Risk Mitigation (From admission, onward)         Ordered     enoxaparin injection 90 mg  Every 24 hours (non-standard times)      08/01/20 1553     Place sequential compression device  Until discontinued      07/21/20 2142     IP VTE HIGH RISK PATIENT  Once      07/21/20 2142                      Emeli Adam MD   [employer] FM PGY2  Department of Hospital Medicine   [facility]

## 2020-08-03 NOTE — PLAN OF CARE
Pt lying in bed in NAD. Bed in low and locked position with bed alarm on. Pt did sit up in the chair today. Pt is on Vasotherm @ 75% 02. Telesitter at bedside. Pt has been refusing bipap at night. Will continue to monitor.

## 2020-08-03 NOTE — PROGRESS NOTES
[facility]  Gastroenterology  Progress Note    Patient Name: Rajan Deluna  MRN: [id number]  Admission Date: 7/21/2020  Hospital Length of Stay: 13 days  Code Status: Full Code   Attending Provider: Rell Tolentino III, MD  Consulting Provider: Alvin Christie MD  Primary Care Physician: Jason Mccord MD  Principal Problem: Acute respiratory disease due to COVID-19 virus      Subjective:     Interval History: Patient feels well this morning. States he was uncomfortable with the HFNC, however states he has no current abdominal pain and has not had a bm overnight, however recalls 2-4 yesterday.    Review of Systems   Constitutional: Positive for fatigue. Negative for appetite change.   Respiratory: Positive for cough, chest tightness and shortness of breath.    Gastrointestinal: Positive for diarrhea. Negative for abdominal distention, abdominal pain, blood in stool, constipation, nausea and vomiting.     Objective:     Vital Signs (Most Recent):  Temp: 99.7 °F (37.6 °C) (08/03/20 0855)  Pulse: 103 (08/03/20 0855)  Resp: 20 (08/03/20 0855)  BP: 119/74 (08/03/20 0855)  SpO2: (!) 90 % (08/03/20 0855) Vital Signs (24h Range):  Temp:  [98.2 °F (36.8 °C)-99.7 °F (37.6 °C)] 99.7 °F (37.6 °C)  Pulse:  [] 103  Resp:  [16-21] 20  SpO2:  [90 %-100 %] 90 %  BP: (106-122)/(66-74) 119/74     Weight: 83 kg (182 lb 15.7 oz) (07/31/20 1500)  Body mass index is 25.52 kg/m².      Intake/Output Summary (Last 24 hours) at 8/3/2020 1120  Last data filed at 8/2/2020 1950  Gross per 24 hour   Intake 100 ml   Output 1200 ml   Net -1100 ml       Lines/Drains/Airways     Drain            Male External Urinary Catheter 07/31/20 0300 Small 3 days          Peripheral Intravenous Line                 Peripheral IV - Single Lumen 07/31/20 1822 22 G Anterior;Distal;Left Forearm 2 days                Physical Exam  Constitutional:       General: He is not in acute distress.  HENT:      Head: Normocephalic and  atraumatic.      Mouth/Throat:      Mouth: Mucous membranes are moist.   Cardiovascular:      Comments: Deferred auscultation due to covid19 infection  Pulmonary:      Comments: Deferred auscultation due to covid19 infection  Abdominal:      General: Abdomen is flat. There is no distension.      Palpations: Abdomen is soft. There is no mass.      Tenderness: There is no abdominal tenderness. There is no guarding or rebound.   Neurological:      Mental Status: He is alert.         Significant Labs:  CBC:   Recent Labs   Lab 08/02/20  0615 08/02/20  1802 08/03/20  0619   WBC 16.87*  --  12.54   HGB 8.8* 10.8* 9.1*   HCT 27.4* 33.7* 28.0*     --  289     CMP:   Recent Labs   Lab 08/03/20  0619   GLU 90  90   CALCIUM 8.0*  8.0*   ALBUMIN 2.3*  2.3*   PROT 5.4*  5.4*   *  135*   K 4.5  4.5   CO2 27  27     104   BUN 18  18   CREATININE 1.1  1.1   ALKPHOS 62  62   ALT 22  22   AST 20  20   BILITOT 0.9  0.9         Significant Imaging:  PCE- possible AVMs in duodenom, no active bleeding.    Assessment/Plan:     Clostridium difficile infection  - Diarrhea improving, complete vanc and flagyl    Anemia  - continue to trend Hgb and  transfuse as needed for goal >7 unless higher threshold indicated by comorbidities   - continue PPI therapy   - VCE with possible AVMs in duodenom, no active bleeding. No overt contraindications to full dose anticoagulation, however continue to monitor hgb.  - Will defer on anemia workup until respiratory stability.        Thank you for your consult. GI will follow-up with patient. Please contact us if you have any additional questions.    Discussed case with Dr. Garrett, attestation to follow.     Alvin Christie MD  [facility] Internal Medicine HO-II

## 2020-08-03 NOTE — ASSESSMENT & PLAN NOTE
- continue to trend Hgb and  transfuse as needed for goal >7 unless higher threshold indicated by comorbidities   - continue PPI therapy   - VCE with possible AVMs in duodenom, no active bleeding. No overt contraindications to full dose anticoagulation, however continue to monitor hgb.  - Will defer on anemia workup until respiratory stability.

## 2020-08-03 NOTE — HPI
74 year-old -American male with PMHx of HTN, COPD (on 2L baseline), MAC infection, aspergillosis infection who presents with acute onset black, tarry stools that are liquid in consistency associated with newly diagnosed C. difficile colitis and COVID-19 pneumonia infections. Initially presented with somnolence, fever, and liquid stools for 3-4 days prior to admission and was found down. In the ED, CXR revealed diffuse interstitial patchy infiltrates, COVID-19 screen was positive, and C diff antigen positive. Was in the ICU for hypotension and started on aggressive anti-coaulation with heparin gtt given severe covid infection with thrombogenic risk.  On IV PPI BID and was started on Remdesivir for severe COVID in addition to IV metronidazole and oral vancomycin. S/p 1 unit pRBCs transfusion. On HHFNC and Bipap nightly. Pill endoscopy done 7/30. Patient has also responded to cdiff treatment with improvement in diarrhea.

## 2020-08-03 NOTE — SUBJECTIVE & OBJECTIVE
Interval History:   GABBY, had chest pain, normal EKG and troponin overnight, resolved with GI cocktail. This AM, H/H decreased from 10.8/33.7 to 9.1/28. Will follow up with GI, no reported melena overnight. Patient camera pill results pending. ID recs include continuing voriconazole while inpatient, itraconazole outpatient, will follow up for duration. Patient currently satting 90% on 40L vapotherm at 65%, weaning as tolerated. Patient has been accepted to LTAC.    Review of Systems   Respiratory: Positive for cough. Negative for shortness of breath.    Cardiovascular: Negative for chest pain.   Gastrointestinal: Negative for abdominal pain, blood in stool, diarrhea and nausea.   Psychiatric/Behavioral: The patient is not nervous/anxious.      Objective:     Vital Signs (Most Recent):  Temp: 99.7 °F (37.6 °C) (08/03/20 0855)  Pulse: 103 (08/03/20 0855)  Resp: 20 (08/03/20 0855)  BP: 119/74 (08/03/20 0855)  SpO2: (!) 90 % (08/03/20 0855) Vital Signs (24h Range):  Temp:  [97.3 °F (36.3 °C)-99.7 °F (37.6 °C)] 99.7 °F (37.6 °C)  Pulse:  [] 103  Resp:  [16-21] 20  SpO2:  [90 %-100 %] 90 %  BP: (106-135)/(66-74) 119/74     Weight: 83 kg (182 lb 15.7 oz)  Body mass index is 25.52 kg/m².    Intake/Output Summary (Last 24 hours) at 8/3/2020 0921  Last data filed at 8/2/2020 1950  Gross per 24 hour   Intake 100 ml   Output 1700 ml   Net -1600 ml      Physical Exam  Vitals signs and nursing note reviewed.   Constitutional:       General: He is not in acute distress.  HENT:      Head: Normocephalic and atraumatic.   Eyes:      Extraocular Movements: Extraocular movements intact.   Cardiovascular:      Rate and Rhythm: Normal rate and regular rhythm.      Comments: As seen on monitor, patient appears to have sinus rhythm  Pulmonary:      Effort: Pulmonary effort is normal.      Comments: As seen on the monitor, patient peripheral oxygen saturation in low 90s.   Abdominal:      General: There is no distension.   Skin:      Coloration: Skin is not jaundiced.   Neurological:      Mental Status: He is alert.   Psychiatric:      Comments: Answers questions appropriately.          Significant Labs:   CBC:   Recent Labs   Lab 08/02/20  0615 08/02/20  1802 08/03/20 0619   WBC 16.87*  --  12.54   HGB 8.8* 10.8* 9.1*   HCT 27.4* 33.7* 28.0*     --  289     CMP:   Recent Labs   Lab 08/01/20  1014 08/02/20 0615 08/03/20 0619   * 133*  133* 135*  135*   K 4.9 4.7  4.7 4.5  4.5    105  105 104  104   CO2 22* 25  25 27  27   * 100  100 90  90   BUN 26* 24*  24* 18  18   CREATININE 1.1 1.1  1.1 1.1  1.1   CALCIUM 8.0* 7.9*  7.9* 8.0*  8.0*   PROT  --  5.2*  5.2* 5.4*  5.4*   ALBUMIN  --  2.3*  2.3* 2.3*  2.3*   BILITOT  --  0.8  0.8 0.9  0.9   ALKPHOS  --  60  60 62  62   AST  --  18  18 20  20   ALT  --  27  27 22  22   ANIONGAP 5* 3*  3* 4*  4*   EGFRNONAA >60 >60  >60 >60  >60     Magnesium:   Recent Labs   Lab 08/02/20 0615 08/03/20 0619   MG 1.9 1.8     Troponin:   Recent Labs   Lab 08/02/20  1900   TROPONINI 0.009     All pertinent labs within the past 24 hours have been reviewed.    Significant Imaging: I have reviewed and interpreted all pertinent imaging results/findings within the past 24 hours.   X-Ray Abdomen AP 1 View   Final Result      No acute process identified.         Electronically signed by: Johann Gold MD   Date:    07/25/2020   Time:    16:06      X-Ray Chest 1 View   Final Result      Stable examination findings of bilateral ground-glass airspace opacities, suggestive of pneumonitis.         Electronically signed by: Davon Santacruz MD   Date:    07/25/2020   Time:    16:03      X-Ray Chest 1 View   Final Result      No significant change.         Electronically signed by: Kari Becerra   Date:    07/22/2020   Time:    18:36      X-Ray Chest AP Portable   Final Result      Abnormal increased interstitial lung markings predominantly at the lung basis left more  than right, nonspecific could be acute or chronic.         Electronically signed by: Luda Powell MD   Date:    07/21/2020   Time:    13:54

## 2020-08-03 NOTE — SUBJECTIVE & OBJECTIVE
Subjective:     Interval History: Patient feels well this morning. States he was uncomfortable with the HFNC, however states he has no current abdominal pain and has not had a bm overnight, however recalls 2-4 yesterday.    Review of Systems   Constitutional: Positive for fatigue. Negative for appetite change.   Respiratory: Positive for cough, chest tightness and shortness of breath.    Gastrointestinal: Positive for diarrhea. Negative for abdominal distention, abdominal pain, blood in stool, constipation, nausea and vomiting.     Objective:     Vital Signs (Most Recent):  Temp: 99.7 °F (37.6 °C) (08/03/20 0855)  Pulse: 103 (08/03/20 0855)  Resp: 20 (08/03/20 0855)  BP: 119/74 (08/03/20 0855)  SpO2: (!) 90 % (08/03/20 0855) Vital Signs (24h Range):  Temp:  [98.2 °F (36.8 °C)-99.7 °F (37.6 °C)] 99.7 °F (37.6 °C)  Pulse:  [] 103  Resp:  [16-21] 20  SpO2:  [90 %-100 %] 90 %  BP: (106-122)/(66-74) 119/74     Weight: 83 kg (182 lb 15.7 oz) (07/31/20 1500)  Body mass index is 25.52 kg/m².      Intake/Output Summary (Last 24 hours) at 8/3/2020 1120  Last data filed at 8/2/2020 1950  Gross per 24 hour   Intake 100 ml   Output 1200 ml   Net -1100 ml       Lines/Drains/Airways     Drain            Male External Urinary Catheter 07/31/20 0300 Small 3 days          Peripheral Intravenous Line                 Peripheral IV - Single Lumen 07/31/20 1822 22 G Anterior;Distal;Left Forearm 2 days                Physical Exam  Constitutional:       General: He is not in acute distress.  HENT:      Head: Normocephalic and atraumatic.      Mouth/Throat:      Mouth: Mucous membranes are moist.   Cardiovascular:      Comments: Deferred auscultation due to covid19 infection  Pulmonary:      Comments: Deferred auscultation due to covid19 infection  Abdominal:      General: Abdomen is flat. There is no distension.      Palpations: Abdomen is soft. There is no mass.      Tenderness: There is no abdominal tenderness. There is no  guarding or rebound.   Neurological:      Mental Status: He is alert.         Significant Labs:  CBC:   Recent Labs   Lab 08/02/20  0615 08/02/20  1802 08/03/20  0619   WBC 16.87*  --  12.54   HGB 8.8* 10.8* 9.1*   HCT 27.4* 33.7* 28.0*     --  289     CMP:   Recent Labs   Lab 08/03/20  0619   GLU 90  90   CALCIUM 8.0*  8.0*   ALBUMIN 2.3*  2.3*   PROT 5.4*  5.4*   *  135*   K 4.5  4.5   CO2 27  27     104   BUN 18  18   CREATININE 1.1  1.1   ALKPHOS 62  62   ALT 22  22   AST 20  20   BILITOT 0.9  0.9         Significant Imaging:  PCE- possible AVMs in duodenom, no active bleeding.

## 2020-08-03 NOTE — PLAN OF CARE
GI Brief Plan of Care Note:    S/p VCE, see forthcoming procedure note and today's forthcoming progress note for full details of findings, impressions, and recommendations. In brief,    Impressions:  - No active GI bleeding or old blood on video capsule endoscopy  - A few probable angioectasias without bleeding in the duodenum, which may represent etiology of patient's prior, resolved melena.    Recommendations:  - Continue treatment for respiratory instability in setting of COVID-19 disease as underway by medical team  - Trend hgb and transfuse as needed to maintain goal hgb >7  - Defer a routine EGD/Push enteroscopy until medically appropriate (when at clinical baseline and recovered for respiratory illness/Covid) in the absence of overt GI bleeding.  - GI following, see progress note for additional recs    Jamison Gaitan MD PGY-VI  [employer] Gastroenterology Fellow

## 2020-08-03 NOTE — ASSESSMENT & PLAN NOTE
P/w consistent melanotic diarrhea, C diff toxin positive.     Continue treatment of severe C diff with PO vancomycin and IV metronidazole, ID recommends completing 14 day course with end date of 8/7 (started 7/24).     Continue PO tx, improved, only 2x loose stools yesterday 8/2

## 2020-08-03 NOTE — ASSESSMENT & PLAN NOTE
-Need to follow-up with GI for results of video capsule endoscopy.   - continue to trend H&H Q12H   - if Hgb < 7 will transfuse with PRBCs.   - continue IV Protonix BID

## 2020-08-03 NOTE — PT/OT/SLP PROGRESS
"Occupational Therapy   Treatment    Name: Rajan Deluna  MRN: [id number]  Admitting Diagnosis:  Acute respiratory disease due to COVID-19 virus  Day of Surgery (Imaging procedure)    Recommendations:     Discharge Recommendations: (TBD pending progress)  Discharge Equipment Recommendations:  none  Barriers to discharge:  Inaccessible home environment, Decreased caregiver support    Assessment:     Rajan Deluna is a 74 y.o. male with a medical diagnosis of Acute respiratory disease due to COVID-19 virus.  He presents with deconditioning but improving stability in spO2 sats with all functional mobility. Some limited safety awareness. Performance deficits affecting function are weakness, impaired endurance, impaired self care skills, impaired functional mobilty, gait instability, impaired balance, decreased coordination, decreased upper extremity function, decreased lower extremity function, impaired coordination, impaired cardiopulmonary response to activity.     Rehab Prognosis:  Good and Fair; patient would benefit from acute skilled OT services to address these deficits and reach maximum level of function.       Plan:     Patient to be seen 5 x/week to address the above listed problems via self-care/home management, therapeutic activities, therapeutic exercises  · Plan of Care Expires: 08/27/20  · Plan of Care Reviewed with: patient    Subjective     Pain/Comfort:  · Pain Rating 1: ("a little bit")  · Location - Side 1: Right  · Location - Orientation 1: (at PIV site)  · Location 1: wrist  · Pain Addressed 1: Reposition, Distraction, Cessation of Activity  · Pain Rating Post-Intervention 1: (did not rate)    Objective:     Communicated with: nsg prior to session.  Patient found HOB elevated with telemetry, pulse ox (continuous), oxygen, peripheral IV(vapotherm,RT adjusted during OT session prior to bed mobility) upon OT entry to room.    General Precautions: Standard, airborne, contact, droplet, fall   Orthopedic " Precautions:N/A   Braces: N/A     Occupational Performance:     Bed Mobility:    · Patient completed Rolling/Turning to Right with stand by assistance  · Patient completed Scooting/Bridging with stand by assistance  · Patient completed Supine to Sit with stand by assistance     Functional Mobility/Transfers:  · Patient completed Sit <> Stand Transfer with moderate assistance  with  hand-held assist   · Patient completed Bed <> Chair Transfer using Stand Pivot technique with minimum assistance with hand-held assist  Functional Mobility: Pt with fair dynamic seated and standing balance. Pt required x2 attempts to perform sit<>stand and with noted wide PREM used with BLE.     Activities of Daily Living:  · Feeding:  set up A to self feed from tray .  · Upper Body Dressing: moderate assistance to don gown as robe seated EOB      [facility] 6 Click ADL: 17    Treatment & Education:  Pt educated on role of OT and POC.   Pt performing skills as listed above.        Patient left up in chair with all lines intact, call button in reach, chair alarm on and nsg notifiedEducation:      GOALS:   Multidisciplinary Problems     Occupational Therapy Goals        Problem: Occupational Therapy Goal    Goal Priority Disciplines Outcome Interventions   Occupational Therapy Goal     OT, PT/OT Ongoing, Progressing    Description: Goals to be met by: 8/27/20     Patient will increase functional independence with ADLs by performing:    LE Dressing with Stand-by Assistance.  Grooming while standing with Stand-by Assistance.  Toileting from toilet with Stand-by Assistance for hygiene and clothing management.   Supine to sit with Stand-by Assistance.  Step transfer with Stand-by Assistance  Toilet transfer to toilet with Stand-by Assistance.  Upper extremity exercise program x10 reps per handout, with independence.                     Time Tracking:     OT Date of Treatment: 08/03/20  OT Start Time: 1156  OT Stop Time: 1225  OT Total Time (min): 29  min    Billable Minutes:Self Care/Home Management 14  Therapeutic Activity 15    Asia Griffiths OT  8/3/2020

## 2020-08-03 NOTE — PLAN OF CARE
VN cued into patients room for q2h rounding - patient resting in bed in no acute distress at this time. Call bell within reach. Will continue to monitor and be available to intervene PRN.

## 2020-08-03 NOTE — ASSESSMENT & PLAN NOTE
H/H decreased today, 9.1 from 10.8  Asymptomatic  No reports of Melena  Follow up with GI this AM  Patient on lovenox, COVID +  Continue to monitor

## 2020-08-03 NOTE — ASSESSMENT & PLAN NOTE
Patient has a known history of MAC infections as well as Aspergillus.    ID recs include continuing voriconazole, switch to itroconazole at DC  - continue voriconazole.

## 2020-08-03 NOTE — PLAN OF CARE
Patient lovenox increased to 1mg/kg q12 from q24, as renal function improved, and GI stated that there is a minimal risk of further bleeding, and patient requires anticoag for COVID 19. Patient satting well on 40L vapotherm 60%, requested further weaning as goal sat is 88%. Patient video capsule read pending. Patient had 1 loose BM today.     Emeli Adam MD  [employer] [facility] PGY2

## 2020-08-04 LAB
ALBUMIN SERPL BCP-MCNC: 2.3 G/DL (ref 3.5–5.2)
ALP SERPL-CCNC: 67 U/L (ref 55–135)
ALT SERPL W/O P-5'-P-CCNC: 19 U/L (ref 10–44)
ANION GAP SERPL CALC-SCNC: 6 MMOL/L (ref 8–16)
APTT BLDCRRT: 34.5 SEC (ref 21–32)
AST SERPL-CCNC: 21 U/L (ref 10–40)
BASOPHILS # BLD AUTO: 0.01 K/UL (ref 0–0.2)
BASOPHILS NFR BLD: 0.1 % (ref 0–1.9)
BILIRUB SERPL-MCNC: 0.8 MG/DL (ref 0.1–1)
BUN SERPL-MCNC: 19 MG/DL (ref 8–23)
CALCIUM SERPL-MCNC: 8.2 MG/DL (ref 8.7–10.5)
CHLORIDE SERPL-SCNC: 104 MMOL/L (ref 95–110)
CO2 SERPL-SCNC: 25 MMOL/L (ref 23–29)
CREAT SERPL-MCNC: 1.1 MG/DL (ref 0.5–1.4)
DIFFERENTIAL METHOD: ABNORMAL
EOSINOPHIL # BLD AUTO: 0.1 K/UL (ref 0–0.5)
EOSINOPHIL NFR BLD: 0.6 % (ref 0–8)
ERYTHROCYTE [DISTWIDTH] IN BLOOD BY AUTOMATED COUNT: 17.3 % (ref 11.5–14.5)
EST. GFR  (AFRICAN AMERICAN): >60 ML/MIN/1.73 M^2
EST. GFR  (NON AFRICAN AMERICAN): >60 ML/MIN/1.73 M^2
GLUCOSE SERPL-MCNC: 89 MG/DL (ref 70–110)
HCT VFR BLD AUTO: 30.3 % (ref 40–54)
HGB BLD-MCNC: 9.5 G/DL (ref 14–18)
IMM GRANULOCYTES # BLD AUTO: 0.06 K/UL (ref 0–0.04)
IMM GRANULOCYTES NFR BLD AUTO: 0.5 % (ref 0–0.5)
LYMPHOCYTES # BLD AUTO: 1 K/UL (ref 1–4.8)
LYMPHOCYTES NFR BLD: 7.7 % (ref 18–48)
MAGNESIUM SERPL-MCNC: 1.8 MG/DL (ref 1.6–2.6)
MCH RBC QN AUTO: 30.4 PG (ref 27–31)
MCHC RBC AUTO-ENTMCNC: 31.4 G/DL (ref 32–36)
MCV RBC AUTO: 97 FL (ref 82–98)
MONOCYTES # BLD AUTO: 1 K/UL (ref 0.3–1)
MONOCYTES NFR BLD: 7.4 % (ref 4–15)
NEUTROPHILS # BLD AUTO: 10.8 K/UL (ref 1.8–7.7)
NEUTROPHILS NFR BLD: 83.7 % (ref 38–73)
NRBC BLD-RTO: 0 /100 WBC
PHOSPHATE SERPL-MCNC: 2.6 MG/DL (ref 2.7–4.5)
PLATELET # BLD AUTO: 287 K/UL (ref 150–350)
PMV BLD AUTO: 9.7 FL (ref 9.2–12.9)
POTASSIUM SERPL-SCNC: 4.6 MMOL/L (ref 3.5–5.1)
PROT SERPL-MCNC: 5.7 G/DL (ref 6–8.4)
RBC # BLD AUTO: 3.13 M/UL (ref 4.6–6.2)
SODIUM SERPL-SCNC: 135 MMOL/L (ref 136–145)
WBC # BLD AUTO: 12.92 K/UL (ref 3.9–12.7)

## 2020-08-04 PROCEDURE — 27100171 HC OXYGEN HIGH FLOW UP TO 24 HOURS

## 2020-08-04 PROCEDURE — S0030 INJECTION, METRONIDAZOLE: HCPCS

## 2020-08-04 PROCEDURE — 25000003 PHARM REV CODE 250: Performed by: INTERNAL MEDICINE

## 2020-08-04 PROCEDURE — 94660 CPAP INITIATION&MGMT: CPT

## 2020-08-04 PROCEDURE — 80053 COMPREHEN METABOLIC PANEL: CPT

## 2020-08-04 PROCEDURE — 27000646 HC AEROBIKA DEVICE

## 2020-08-04 PROCEDURE — 11000001 HC ACUTE MED/SURG PRIVATE ROOM

## 2020-08-04 PROCEDURE — 97803 MED NUTRITION INDIV SUBSEQ: CPT

## 2020-08-04 PROCEDURE — 85730 THROMBOPLASTIN TIME PARTIAL: CPT

## 2020-08-04 PROCEDURE — 97110 THERAPEUTIC EXERCISES: CPT | Mod: CQ

## 2020-08-04 PROCEDURE — 25000003 PHARM REV CODE 250: Performed by: STUDENT IN AN ORGANIZED HEALTH CARE EDUCATION/TRAINING PROGRAM

## 2020-08-04 PROCEDURE — 63600175 PHARM REV CODE 636 W HCPCS: Performed by: INTERNAL MEDICINE

## 2020-08-04 PROCEDURE — 97530 THERAPEUTIC ACTIVITIES: CPT | Mod: CQ

## 2020-08-04 PROCEDURE — 36415 COLL VENOUS BLD VENIPUNCTURE: CPT

## 2020-08-04 PROCEDURE — 25000003 PHARM REV CODE 250

## 2020-08-04 PROCEDURE — 97530 THERAPEUTIC ACTIVITIES: CPT

## 2020-08-04 PROCEDURE — 85025 COMPLETE CBC W/AUTO DIFF WBC: CPT

## 2020-08-04 PROCEDURE — C9113 INJ PANTOPRAZOLE SODIUM, VIA: HCPCS | Performed by: INTERNAL MEDICINE

## 2020-08-04 PROCEDURE — 25000003 PHARM REV CODE 250: Performed by: FAMILY MEDICINE

## 2020-08-04 PROCEDURE — 94640 AIRWAY INHALATION TREATMENT: CPT

## 2020-08-04 PROCEDURE — 83735 ASSAY OF MAGNESIUM: CPT

## 2020-08-04 PROCEDURE — 84100 ASSAY OF PHOSPHORUS: CPT

## 2020-08-04 PROCEDURE — 94761 N-INVAS EAR/PLS OXIMETRY MLT: CPT

## 2020-08-04 PROCEDURE — 94664 DEMO&/EVAL PT USE INHALER: CPT

## 2020-08-04 PROCEDURE — 99900035 HC TECH TIME PER 15 MIN (STAT)

## 2020-08-04 PROCEDURE — 63600175 PHARM REV CODE 636 W HCPCS: Performed by: FAMILY MEDICINE

## 2020-08-04 PROCEDURE — 63600175 PHARM REV CODE 636 W HCPCS: Performed by: STUDENT IN AN ORGANIZED HEALTH CARE EDUCATION/TRAINING PROGRAM

## 2020-08-04 RX ORDER — ENOXAPARIN SODIUM 100 MG/ML
80 INJECTION SUBCUTANEOUS
Status: DISCONTINUED | OUTPATIENT
Start: 2020-08-04 | End: 2020-08-27 | Stop reason: HOSPADM

## 2020-08-04 RX ORDER — ENOXAPARIN SODIUM 100 MG/ML
80 INJECTION SUBCUTANEOUS
Status: DISCONTINUED | OUTPATIENT
Start: 2020-08-04 | End: 2020-08-04

## 2020-08-04 RX ORDER — PANTOPRAZOLE SODIUM 40 MG/1
40 TABLET, DELAYED RELEASE ORAL 2 TIMES DAILY
Status: DISCONTINUED | OUTPATIENT
Start: 2020-08-04 | End: 2020-08-27 | Stop reason: HOSPADM

## 2020-08-04 RX ADMIN — ALBUTEROL SULFATE 2 PUFF: 90 AEROSOL, METERED RESPIRATORY (INHALATION) at 12:08

## 2020-08-04 RX ADMIN — THERA TABS 1 TABLET: TAB at 10:08

## 2020-08-04 RX ADMIN — Medication 4 TABLET: at 06:08

## 2020-08-04 RX ADMIN — Medication 4 TABLET: at 12:08

## 2020-08-04 RX ADMIN — ALBUTEROL SULFATE 2 PUFF: 90 AEROSOL, METERED RESPIRATORY (INHALATION) at 04:08

## 2020-08-04 RX ADMIN — VORICONAZOLE 200 MG: 200 TABLET, FILM COATED ORAL at 04:08

## 2020-08-04 RX ADMIN — PANTOPRAZOLE SODIUM 40 MG: 40 INJECTION, POWDER, FOR SOLUTION INTRAVENOUS at 10:08

## 2020-08-04 RX ADMIN — Medication 4 TABLET: at 10:08

## 2020-08-04 RX ADMIN — LIDOCAINE 1 PATCH: 50 PATCH TOPICAL at 06:08

## 2020-08-04 RX ADMIN — METRONIDAZOLE 500 MG: 500 INJECTION, SOLUTION INTRAVENOUS at 02:08

## 2020-08-04 RX ADMIN — ACETAMINOPHEN 650 MG: 325 TABLET ORAL at 04:08

## 2020-08-04 RX ADMIN — METRONIDAZOLE 500 MG: 500 INJECTION, SOLUTION INTRAVENOUS at 12:08

## 2020-08-04 RX ADMIN — ALBUTEROL SULFATE 2 PUFF: 90 AEROSOL, METERED RESPIRATORY (INHALATION) at 08:08

## 2020-08-04 RX ADMIN — ENOXAPARIN SODIUM 80 MG: 80 INJECTION SUBCUTANEOUS at 02:08

## 2020-08-04 RX ADMIN — PANTOPRAZOLE SODIUM 40 MG: 40 TABLET, DELAYED RELEASE ORAL at 10:08

## 2020-08-04 RX ADMIN — ENOXAPARIN SODIUM 80 MG: 80 INJECTION SUBCUTANEOUS at 06:08

## 2020-08-04 RX ADMIN — VORICONAZOLE 200 MG: 200 TABLET, FILM COATED ORAL at 06:08

## 2020-08-04 RX ADMIN — ALBUTEROL SULFATE 2 PUFF: 90 AEROSOL, METERED RESPIRATORY (INHALATION) at 07:08

## 2020-08-04 NOTE — PROGRESS NOTES
Pharmacist Intervention IV to PO Note    Rajan Deluna is a 74 y.o. male being treated with IV medication pantoprazole    Patient Data:    Vital Signs (Most Recent):  Temp: 98.2 °F (36.8 °C) (08/04/20 0742)  Pulse: 100 (08/04/20 1221)  Resp: 18 (08/04/20 1221)  BP: 112/62 (08/04/20 0742)  SpO2: 98 % (08/04/20 1221) Vital Signs (72h Range):  Temp:  [96.9 °F (36.1 °C)-99.7 °F (37.6 °C)]   Pulse:  []   Resp:  [16-22]   BP: (102-135)/(56-74)   SpO2:  [77 %-100 %]      CBC:  Recent Labs   Lab 08/02/20  0615  08/03/20  0619 08/03/20  1840 08/04/20  0638   WBC 16.87*  --  12.54  --  12.92*   RBC 2.86*  --  2.97*  --  3.13*   HGB 8.8*   < > 9.1* 10.5* 9.5*   HCT 27.4*   < > 28.0* 32.9* 30.3*     --  289  --  287   MCV 96  --  94  --  97   MCH 30.8  --  30.6  --  30.4   MCHC 32.1  --  32.5  --  31.4*    < > = values in this interval not displayed.     CMP:     Recent Labs   Lab 08/02/20 0615 08/03/20  0619 08/04/20  0638     100 90  90 89   CALCIUM 7.9*  7.9* 8.0*  8.0* 8.2*   ALBUMIN 2.3*  2.3* 2.3*  2.3* 2.3*   PROT 5.2*  5.2* 5.4*  5.4* 5.7*   *  133* 135*  135* 135*   K 4.7  4.7 4.5  4.5 4.6   CO2 25  25 27  27 25     105 104  104 104   BUN 24*  24* 18  18 19   CREATININE 1.1  1.1 1.1  1.1 1.1   ALKPHOS 60  60 62  62 67   ALT 27  27 22  22 19   AST 18  18 20  20 21   BILITOT 0.8  0.8 0.9  0.9 0.8       Dietary Orders:  Diet Orders            Diet diabetic [facility]; 2000 Calorie: Diabetic starting at 07/31 0625            Based on the following criteria, this patient qualifies for intravenous to oral conversion:  [x] The patients gastrointestinal tract is functioning (tolerating medications via oral or enteral route for 24 hours and tolerating food or enteral feeds for 24 hours).  [x] The patient is hemodynamically stable for 24 hours (heart rate <100 beats per minute, systolic blood pressure >99 mm Hg, and respiratory rate <20 breaths per  minute).  [x] The patient shows clinical improvement (afebrile for at least 24 hours and white blood cell count downtrending or normalized). Additionally, the patient must be non-neutropenic (absolute neutrophil count >500 cells/mm3).  [x] For antimicrobials, the patient has received IV therapy for at least 24 hours.    IV medication pantoprazole will be changed to oral medication     Pharmacist's Name: Malcolm Wayne  Pharmacist's Extension: [phone]

## 2020-08-04 NOTE — PLAN OF CARE
VN Q2 hour round: VN cued into patients room. Patient sitting up in chair noted to be on oxygen. VN instructed pt to call for assistance or worsening of symptoms. NAD noted. Call light within reach. Will cont to monitor.

## 2020-08-04 NOTE — ASSESSMENT & PLAN NOTE
Patient uses 2L supplemental oxygen at home.  Currently on 6L High-flow NC  Continue to wean  Chest physio ongoing, albuterol q4h

## 2020-08-04 NOTE — PLAN OF CARE
"GI Plan of Care Note:    Hgb now stable/improving over last several days. VCE show a few nonbleeding small likely duodenal AVMs and no other evidence of overt GIB. These could have been the source while provoked with full dose antiocoagulation treatment for COVID-19. Currently he remains too unstable from pulmonary standpoint to undergo non-emergent endoscopy and this will likely need to be done in the future as an outpatient. No absolute indications to withhold AC for treatment of Covid seen on VCE. GI will sign off now. If he develops further evidence of GIB or any other concerns please feel free to contact us. We will set up outpatient follow up.    Yinka Moreno (Lee)  [facility] GI Fellow, PGY IV  Pager: [phone]    "

## 2020-08-04 NOTE — PROGRESS NOTES
[facility]  Critical Care - Medicine  History & Physical    Patient Name: Rajan Deluna  MRN: [id number]  Admission Date: 7/21/2020  Hospital Length of Stay: 14 days  Code Status: Full Code  Attending Physician: Rell Tolentino III, MD   Primary Care Provider: Jason Mccord MD   Principal Problem: Acute respiratory disease due to COVID-19 virus    Subjective:     Hospital/ICU Course: Patient remains on 40L and 65% Vapotherm.  He appears mildly dyspneic when seen on video.  He denies being in acute distress.  His O2 requirements have been stable for the last 24 hours.     Past Medical History:   Diagnosis Date    Arthritis     COPD (chronic obstructive pulmonary disease)     Hypertension     Smoker     Weakness        Past Surgical History:   Procedure Laterality Date    HERNIA REPAIR      INTRALUMINAL GASTROINTESTINAL TRACT IMAGING VIA CAPSULE N/A 8/3/2020    Procedure: IMAGING PROCEDURE, GI TRACT, INTRALUMINAL, VIA CAPSULE;  Surgeon: Rey Olivares MD;  Location: [facility];  Service: Endoscopy;  Laterality: N/A;    right knee surgery         Review of patient's allergies indicates:  No Known Allergies    Family History     None        Tobacco Use    Smoking status: Former Smoker    Smokeless tobacco: Never Used   Substance and Sexual Activity    Alcohol use: Not Currently    Drug use: Never    Sexual activity: Not on file     Objective:     Vital Signs (Most Recent):  Temp: 98.2 °F (36.8 °C) (08/04/20 0742)  Pulse: 97 (08/04/20 0817)  Resp: 18 (08/04/20 0817)  BP: 112/62 (08/04/20 0742)  SpO2: 95 % (08/04/20 0817) Vital Signs (24h Range):  Temp:  [97 °F (36.1 °C)-99.3 °F (37.4 °C)] 98.2 °F (36.8 °C)  Pulse:  [] 97  Resp:  [16-21] 18  SpO2:  [77 %-100 %] 95 %  BP: (102-123)/(56-62) 112/62     Weight: 83 kg (182 lb 15.7 oz)  Body mass index is 25.52 kg/m².      Intake/Output Summary (Last 24 hours) at 8/4/2020 1119  Last data filed at 8/4/2020 0600  Gross per 24 hour   Intake  380 ml   Output 300 ml   Net 80 ml       Physical Exam  Vitals reviewed: virtual visit.   Constitutional:       General: He is not in acute distress.     Appearance: Normal appearance.   HENT:      Head: Normocephalic.      Nose: Nose normal.   Pulmonary:      Comments: Mildly increased WOB.    Neurological:      General: No focal deficit present.      Mental Status: He is alert.         Vents:  Oxygen Concentration (%): 35 (08/04/20 0817)    Lines/Drains/Airways     Peripheral Intravenous Line                 Peripheral IV - Single Lumen 08/04/20 0313 20 G Right Hand less than 1 day         Peripheral IV - Single Lumen 08/04/20 0314 18 G Right Forearm less than 1 day                Significant Labs:    CBC/Anemia Profile:  Recent Labs   Lab 08/03/20  0619 08/03/20  1840 08/04/20  0638   WBC 12.54  --  12.92*   HGB 9.1* 10.5* 9.5*   HCT 28.0* 32.9* 30.3*     --  287   MCV 94  --  97   RDW 17.4*  --  17.3*        Chemistries:  Recent Labs   Lab 08/03/20 0619 08/04/20  0638   *  135* 135*   K 4.5  4.5 4.6     104 104   CO2 27  27 25   BUN 18  18 19   CREATININE 1.1  1.1 1.1   CALCIUM 8.0*  8.0* 8.2*   ALBUMIN 2.3*  2.3* 2.3*   PROT 5.4*  5.4* 5.7*   BILITOT 0.9  0.9 0.8   ALKPHOS 62  62 67   ALT 22  22 19   AST 20  20 21   MG 1.8 1.8   PHOS 2.5* 2.6*       All pertinent labs within the past 24 hours have been reviewed.    Significant Imaging: I have reviewed all pertinent imaging results/findings within the past 24 hours.    Assessment/Plan:     Active Diagnoses:    Diagnosis Date Noted POA    PRINCIPAL PROBLEM:  Acute respiratory disease due to COVID-19 virus [U07.1, J06.9]  Yes    GI bleed [K92.2] 07/29/2020 Clinically Undetermined    History of Aspergillus & MAC [B44.9]  Yes    Clostridium difficile infection [A49.8]  Clinically Undetermined    Acute respiratory failure with hypoxia [J96.01]  Yes    MARQUIS (acute kidney injury) [N17.9] 07/21/2020 Yes    COPD (chronic obstructive  pulmonary disease) [J44.9] 05/22/2017 Yes    Anemia [D64.9] 12/09/2016 No      Problems Resolved During this Admission:       * Acute respiratory disease due to COVID-19 virus  - 7/21 COVID(+)  - s/p remdesivir x5d, dexamethasone x10d  - full dose AC with lovenox  - proning if he can tolerate at all  - incentive spirometry   - please wean O2 as tolerated for goal SpO2 88%     History of Aspergillus & MAC  - serum aspergillus negative, loaded with voriconazole on  7/29   - no MAC treatment at this time per ID        Clostridium difficile infection  Patient positive on PCR, initiated on oral vancomycin & flagyl on 7/24  - 14 days PO vanc/flagyl  (today day 11)    - probiotics     Acute respiratory failure with hypoxia  See COVID problem       COPD (chronic obstructive pulmonary disease)  - Use of 2L NC at home  - metered dose inhaler q4h       Anemia 2/2 GI bleed  - do not transfuse unless Hgb is below 7g/dL  - heparin gtt stopped    - daily CBC     FEN   - ensure lytes aggressively repleted.     Out of bed to chair daily  Incentive spirometry   PT/OT       Bryan Swain MD  Critical Care - Medicine  [facility]  [phone]

## 2020-08-04 NOTE — PROGRESS NOTES
[facility] Hospital Medicine  Progress Note    Patient Name: Rajan Deluna  MRN: [id number]  Patient Class: IP- Inpatient   Admission Date: 7/21/2020  Length of Stay: 14 days  Attending Physician: Rell Tolentino III, MD  Primary Care Provider: Jason Mccord MD        Subjective:     Principal Problem:Pneumonia due to COVID-19 virus        HPI:  Patient is a 74-year-old male with a past medical history of hypertension, COPD, MAC infection who presented to the ED with altered mental status and fever.  As per the family, patient has had altered mental status for the past 3-4 days. Family endorses increased somnolence as well as urinating on himself. EMS was called two days prior to presentation but reportedly deemed that patient did not need to be brought in. Over the previous day, reportedly the patient was found stuck, staring in his sink, not responding. On the day of presentation, the patient was found down on the floor, family wasn't able to get patient up from the floor and was reportedly not responding as much as baseline. Family believed that patient may not have been wearing baseline 2L O2. Patient also endorses a cough and generalized weakness. Patient reportedly had a recent UTI.     In the ED, chest x-ray showed diffuse interstitial patchy infiltrates concerning for possible pneumonia and a COVID-19 screening test was positive. Fever was 101° F and patient was hypotensive 80s/40s. Patient given 30cc/kg bolus. BP very soft on presentation, adequately responded to volume resuscitation and then decreased again. Labs showed a BUN 45, creatinine 2.9, , , troponin 0.036, procalcitonin 1.13.     Overview/Hospital Course:  8/3 - NAEON, had chest pain, normal EKG and troponin overnight, resolved with GI cocktail. This AM, H/H decreased from 10.8/33.7 to 9.1/28.    Infectious Disease Recs:   - Continue oral Vancomycin and IV Metronidazole for C. Diff until 8/7.  - Continue  Voriconazole and discharge on Itraconazole 100mg BID and follow up with Pulmonary and ID for continued Aspergillosis and MAC treatment.    Patient currently satting 90% on 40L vapotherm at 65% FiO2, weaning as tolerated. Patient accepted to LTAC.    8/4 - NAEON. Patient is currently satting in the >90% on 20L vapotherm at 35% FiO2, weaning as tolerated. H/H stable 9.5/30.3. Per GI,VCE show a few nonbleeding small likely duodenal AVMs and no other evidence of overt GI Bleed and recommend outpatient evaluation due to the patient's current pulmonary status.      Interval History:   NAEON, ongoing weaning of O2, 6L High flow 35% at present, will further wean. Completed abx for Cdiff course today. Patient stable, states he feels well.     Review of Systems   Respiratory: Negative for cough and shortness of breath.    Cardiovascular: Negative for chest pain.   Gastrointestinal: Negative for abdominal pain, blood in stool, diarrhea and nausea.   Psychiatric/Behavioral: The patient is not nervous/anxious.      Objective:     Vital Signs (Most Recent):  Temp: 98.2 °F (36.8 °C) (08/04/20 0742)  Pulse: 100 (08/04/20 1221)  Resp: 18 (08/04/20 1221)  BP: 112/62 (08/04/20 0742)  SpO2: 98 % (08/04/20 1221) Vital Signs (24h Range):  Temp:  [97 °F (36.1 °C)-99.3 °F (37.4 °C)] 98.2 °F (36.8 °C)  Pulse:  [] 100  Resp:  [16-21] 18  SpO2:  [77 %-100 %] 98 %  BP: (102-123)/(56-62) 112/62     Weight: 83 kg (182 lb 15.7 oz)  Body mass index is 25.52 kg/m².    Intake/Output Summary (Last 24 hours) at 8/4/2020 1413  Last data filed at 8/4/2020 0600  Gross per 24 hour   Intake 280 ml   Output 300 ml   Net -20 ml      Physical Exam  Deferred in setting of COVID 19    Significant Labs:   CBC:   Recent Labs   Lab 08/03/20  0619 08/03/20  1840 08/04/20  0638   WBC 12.54  --  12.92*   HGB 9.1* 10.5* 9.5*   HCT 28.0* 32.9* 30.3*     --  287     CMP:   Recent Labs   Lab 08/03/20 0619 08/04/20  0638   *  135* 135*   K 4.5  4.5  4.6     104 104   CO2 27  27 25   GLU 90  90 89   BUN 18  18 19   CREATININE 1.1  1.1 1.1   CALCIUM 8.0*  8.0* 8.2*   PROT 5.4*  5.4* 5.7*   ALBUMIN 2.3*  2.3* 2.3*   BILITOT 0.9  0.9 0.8   ALKPHOS 62  62 67   AST 20  20 21   ALT 22  22 19   ANIONGAP 4*  4* 6*   EGFRNONAA >60  >60 >60     All pertinent labs within the past 24 hours have been reviewed.    Significant Imaging: I have reviewed and interpreted all pertinent imaging results/findings within the past 24 hours.      Assessment/Plan:      * Pneumonia due to COVID-19 virus  S/p Dexamethasone and remdesivir  -Full dose lovenox  -6L high flow   -Isolation ongoing    History of Aspergillus & MAC  Patient has a known history of MAC infections as well as Aspergillus.    ID recs include continuing voriconazole, switch to itroconazole at DC  - continue voriconazole.   -Follow up with Pulm, ID outpatient    GI bleed   - Continue IV Protonix BID  -Stable    Hypotension  Patient's most recent blood pressure 116/75  RESOLVED      Clostridium difficile infection  Fully treated, completed abx course  Asymptomatic  RESOLVED    Acute respiratory failure with hypoxia  6L high flow  Continue to wean  BiPap QHS        COVID-19 virus infection        MARQUIS (acute kidney injury)  RESOLVED  Avoid nephrotoxics      COVID-19  Patient was stepped up to the ICU yesterday due to increasing O2 demands and decreasing O2 saturation from baseline. Patient was started on continues BiPAP, currently satting at 95-95% at 50% oxygen concentration. Patient was approved for Remdesivir yesterday but morning labs indicated BUN 68 creatinine 2.3, will hold pending repeat CMP.    Plan:   - COVID Isolation per protocol   - Dexamethasone 6mg daily for 10 days   - continue Heparin gtt   - Levofloxacin 750mg daily   - Atorvastatin 40mg daily   - Multivitamin    Sepsis  RESOLVED  See plan for COVID 19    COPD (chronic obstructive pulmonary disease)  Patient uses 2L supplemental oxygen  at home.  Currently on 6L High-flow NC  Continue to wean  Chest physio ongoing, albuterol q4h      Anemia  H/H stable, 9.5/30.3  Asymptomatic  Continue to monitor daily CBC      VTE Risk Mitigation (From admission, onward)         Ordered     enoxaparin injection 80 mg  Every 12 hours (non-standard times)      08/04/20 1421     Place sequential compression device  Until discontinued      07/21/20 2142     IP VTE HIGH RISK PATIENT  Once      07/21/20 2142                      Emeli Adam MD   [employer] FM PGY2  Department of Hospital Medicine   [facility]

## 2020-08-04 NOTE — PT/OT/SLP PROGRESS
Physical Therapy Treatment    Patient Name:  Rajan Deluna   MRN:  [id number]    Recommendations:     Discharge Recommendations:  (TBD)   Discharge Equipment Recommendations: (TBD)   Barriers to discharge: decreased mobility,strength and endurance    Assessment:     Rajan Deluna is a 74 y.o. male admitted with a medical diagnosis of Pneumonia due to COVID-19 virus.  He presents with the following impairments/functional limitations:  weakness, impaired endurance, impaired functional mobilty, impaired balance, decreased lower extremity function, decreased ROM, impaired coordination, impaired cardiopulmonary response to activity,pt with good participation and limited by respiratory concerns and pain with coughing,pt's disposition TBD.    Rehab Prognosis: Fair; patient would benefit from acute skilled PT services to address these deficits and reach maximum level of function.    Recent Surgery: Procedure(s) (LRB):  IMAGING PROCEDURE, GI TRACT, INTRALUMINAL, VIA CAPSULE (N/A) 1 Day Post-Op    Plan:     During this hospitalization, patient to be seen 6 x/week to address the identified rehab impairments via gait training, therapeutic activities, therapeutic exercises, neuromuscular re-education and progress toward the following goals:    · Plan of Care Expires:  09/08/20    Subjective     Chief Complaint: n/a  Patient/Family Comments/goals: pt has pain with cough in chest.  Pain/Comfort:  · Pain Rating 1: (no rating)  · Location - Side 1: Bilateral  · Location - Orientation 1: generalized  · Location 1: chest(with coughing)  · Pain Addressed 1: Reposition, Distraction, Cessation of Activity      Objective:     Communicated with nsg prior to session.  Patient found supine with bed alarm, oxygen, peripheral IV, telemetry upon PT entry to room.     General Precautions: Standard, airborne, contact, droplet, fall   Orthopedic Precautions:N/A   Braces: N/A     Functional Mobility:  · Bed Mobility:     · Supine to Sit: stand by  assistance  · Sit to Supine: stand by assistance  · Transfers:     · Sit to Stand:  stand by assistance and contact guard assistance with hand-held assist  · Balance: fair sitting balance      AM-PAC 6 CLICK MOBILITY  Turning over in bed (including adjusting bedclothes, sheets and blankets)?: 3  Sitting down on and standing up from a chair with arms (e.g., wheelchair, bedside commode, etc.): 3  Moving from lying on back to sitting on the side of the bed?: 3  Moving to and from a bed to a chair (including a wheelchair)?: 2  Need to walk in hospital room?: 1  Climbing 3-5 steps with a railing?: 1  Basic Mobility Total Score: 13       Therapeutic Activities and Exercises: le seated ex's X 10-12 reps inc: ap,laq,hip flex,abd/add,pt sat EOB ~7 mins with S and HF O2 donned,sit-stand HHA X 2 trials with CGA/Min A.       Patient left supine with all lines intact, call button in reach and bed alarm on..    GOALS: see general POC  Multidisciplinary Problems     Physical Therapy Goals        Problem: Physical Therapy Goal    Goal Priority Disciplines Outcome Goal Variances Interventions   Physical Therapy Goal     PT, PT/OT Ongoing, Progressing     Description: Goals to be met by: 20     Patient will increase functional independence with mobility by performin. Supine <> sit with MInimal Assistance  2. Sit to stand transfer with Minimal Assistance  3. Bed to chair transfer with Minimal Assistance   4. Gait x 50 ft with min A with appropriate AD  5. Lower extremity exercise program x 10 reps per handout, with supervision                     Time Tracking:     PT Received On: 20  PT Start Time: 1345     PT Stop Time: 1411  PT Total Time (min): 26 min     Billable Minutes: Therapeutic Activity 16 and Therapeutic Exercise 10    Treatment Type: Treatment  PT/PTA: PTA     PTA Visit Number: 1     Humble Browning, PTA  2020

## 2020-08-04 NOTE — PT/OT/SLP PROGRESS
Occupational Therapy   Treatment    Name: Rajan Deluna  MRN: [id number]  Admitting Diagnosis:  Pneumonia due to COVID-19 virus  1 Day Post-Op    Recommendations:     Discharge Recommendations: (TBD pending progress)  Discharge Equipment Recommendations:  (TBD pending progress)  Barriers to discharge:  Inaccessible home environment, Decreased caregiver support    Assessment:     Rajan Deluna is a 74 y.o. male with a medical diagnosis of Pneumonia due to COVID-19 virus.  He presents with deconditioning. Performance deficits affecting function are weakness, impaired endurance, impaired functional mobilty, decreased upper extremity function, decreased lower extremity function, gait instability, impaired balance, impaired self care skills, impaired cognition, decreased coordination, pain, impaired fine motor, decreased safety awareness, impaired cardiopulmonary response to activity, impaired coordination, decreased ROM.     Rehab Prognosis:  Good; patient would benefit from acute skilled OT services to address these deficits and reach maximum level of function.       Plan:     Patient to be seen 5 x/week to address the above listed problems via self-care/home management, therapeutic activities, therapeutic exercises  · Plan of Care Expires: 08/27/20  · Plan of Care Reviewed with: patient    Subjective     Pain/Comfort:  Pain Rating 1: (did not rate)  Location - Side 1: Left  Location - Orientation 1: upper  Location 1: chest  Pain Addressed 1: Reposition, Distraction, Cessation of Activity, Nurse notified  Pain Rating Post-Intervention 1: (did not rate)    Objective:     Communicated with: nsfredy prior to session.  Patient found HOB elevated with bed alarm, pulse ox (continuous), telemetry, peripheral IV, oxygen upon OT entry to room.    General Precautions: Standard, airborne, contact, droplet, fall, special contact   Orthopedic Precautions:N/A   Braces: N/A     Occupational Performance:     Bed Mobility:    · Patient  completed Rolling/Turning to Left with  stand by assistance  · Patient completed Scooting/Bridging with contact guard assistance  · Patient completed Supine to Sit with minimum assistance     Functional Mobility/Transfers:  · Patient completed Sit <> Stand Transfer with minimum assistance and moderate assistance  with  rolling walker   · Patient completed Bed <> Chair Transfer using Step Transfer technique with contact guard assistance with rolling walker  Functional Mobility: Pt with fair dynamic seated and standing balance.      Activities of Daily Living:  · Upper Body Dressing: minimum assistance to don gown as robe  · Lower Body Dressing: maximal assistance to don B socks seated EOB      [facility] 6 Click ADL: 17    Treatment & Education:  Pt educated on role of OT and POC.   Pt performing skills as listed above.   Wound care nsg entered during session and able to assess sacral skin while pt in stance with RW. Pt with improving stability to spO2 sats with spO2 reading ~84% spO2 during t/f to bedside chair but after pt seated in bedside chair, reading ~60% spO2. Pt reported no SOB or adverse symptoms.    Patient left up in chair with all lines intact, call button in reach, chair alarm on and nsg notifiedEducation:      GOALS:   Multidisciplinary Problems     Occupational Therapy Goals        Problem: Occupational Therapy Goal    Goal Priority Disciplines Outcome Interventions   Occupational Therapy Goal     OT, PT/OT Ongoing, Progressing    Description: Goals to be met by: 8/27/20     Patient will increase functional independence with ADLs by performing:    LE Dressing with Stand-by Assistance.  Grooming while standing with Stand-by Assistance.  Toileting from toilet with Stand-by Assistance for hygiene and clothing management.   Supine to sit with Stand-by Assistance.  Step transfer with Stand-by Assistance  Toilet transfer to toilet with Stand-by Assistance.  Upper extremity exercise program x10 reps per handout,  with independence.                     Time Tracking:     OT Date of Treatment: 08/04/20  OT Start Time: 1430  OT Stop Time: 1453  OT Total Time (min): 23 min    Billable Minutes:Therapeutic Activity 23    Asia Griffiths OT  8/4/2020

## 2020-08-04 NOTE — PLAN OF CARE
VN Q2 hour round: VN cued into patients room. Patient resting in bed and denies any needs at this time. VN instructed pt to call for assistance or worsening of symptoms. Call light within reach. Will cont to monitor.

## 2020-08-04 NOTE — PLAN OF CARE
Problem: Occupational Therapy Goal  Goal: Occupational Therapy Goal  Description: Goals to be met by: 8/27/20     Patient will increase functional independence with ADLs by performing:    LE Dressing with Stand-by Assistance.  Grooming while standing with Stand-by Assistance.  Toileting from toilet with Stand-by Assistance for hygiene and clothing management.   Supine to sit with Stand-by Assistance.  Step transfer with Stand-by Assistance  Toilet transfer to toilet with Stand-by Assistance.  Upper extremity exercise program x10 reps per handout, with independence.    Outcome: Ongoing, Progressing   Pt progressing well towards goals. Improving stability to spO2 sats with spO2 reading ~84% spO2 during t/f to bedside chair but after pt seated in bedside chair, reading ~60% spO2. Pt reported no SOB or adverse symptoms. Cont OT POC

## 2020-08-04 NOTE — PLAN OF CARE
VN cued into patient's room with pt's permission.  Patient denies distress or any needs at this time. Informed patient that VN will round every 2 hours but to use call light for any other needs. Patient verbalized understanding.  Will continue to monitor closely.    Patient's progress notes, chart, and care plan reviewed.

## 2020-08-04 NOTE — PLAN OF CARE
Recommendation:   1. Continue current diabetic diet order.   2. Addition of Boost Glucose Control TID to supplement protein and calorie intake   3. RD to continue to follow and monitor intake and whether hyperglycemia is illness related or new onset DM.    Goals:   Pt intake >/= 75% EEN/EPN by RD follow up    Nutrition Goal Status: progressing towards goal  Communication of RD Recs: other (comment)(POC)      Problem: Nutrition Impaired (Sepsis/Septic Shock)  Goal: Optimal Nutrition Intake  Outcome: Ongoing, Progressing

## 2020-08-04 NOTE — PLAN OF CARE
VSS this shift. Vapotherm/BiPAP HS; maintained adequate O2 sats. Telesitter at bedside. PRN tylenol given for c/o back pain. Safety maintained. Will continue to monitor.

## 2020-08-04 NOTE — PROGRESS NOTES
"[facility]  Adult Nutrition  Progress Note    SUMMARY       Recommendations    Recommendation:   1. Continue current diabetic diet order.   2. Addition of Boost Glucose Control TID to supplement protein and calorie intake   3. RD to continue to follow and monitor intake and whether hyperglycemia is illness related or new onset DM.    Goals:   Pt intake >/= 75% EEN/EPN by RD follow up    Nutrition Goal Status: progressing towards goal  Communication of RD Recs: other (comment)(POC)    Reason for Assessment    Reason For Assessment: RD follow-up  Diagnosis: other (see comments)(Acute respiratory disease due to COVID-19 virus )  Relevant Medical History: HTN, COPD, former smoker  Interdisciplinary Rounds: did not attend  General Information Comments: Pt COVID+ with C diff. On diabetic 2000 kcal diet, consuming 50% of meals. Denies N/V/D/C at this time. C diff, hypotension and sepsis resolved, GI bleed stabilized, COPD and COVID being treated/improving. NFPE not completed today 2/2 COVID precautions.  Nutrition Discharge Planning: d/c on cardiac diabetic diet    Nutrition Risk Screen    Nutrition Risk Screen: no indicators present    Nutrition/Diet History    Food Preferences: ADA  Spiritual, Cultural Beliefs, Gnosticism Practices, Values that Affect Care: no  Food Allergies: NKFA  Factors Affecting Nutritional Intake: decreased appetite, other (see comments)(continuous resp support)    Anthropometrics    Temp: 98.2 °F (36.8 °C)  Height Method: Stated  Height: 5' 11" (180.3 cm)  Height (inches): 71 in  Weight Method: Bed Scale  Weight: 83 kg (182 lb 15.7 oz)  Weight (lb): 182.98 lb  Ideal Body Weight (IBW), Male: 172 lb  % Ideal Body Weight, Male (lb): 106.38 %  BMI (Calculated): 25.5  BMI Grade: 25 - 29.9 - overweight       Lab/Procedures/Meds    Pertinent Labs Reviewed: reviewed  Pertinent Labs Comments: H/H 10.5/32.9, Na 135, Ca 8.0, Total Pro 5.4, Alb 2.3, A1C 6.3  Pertinent Medications Reviewed: " reviewed  Pertinent Medications Comments: enoxaparin, lactobacillus acidoph, metronidazole, MVI, panrtoprazole, voriconazole    Physical Findings/Assessment    Yaw Score: 15    Estimated/Assessed Needs    Weight Used For Calorie Calculations: 83 kg (182 lb 15.7 oz)  Energy Calorie Requirements (kcal): 2057  Energy Need Method: CanÃ³vanas-St Jeor(x 1.3)  Protein Requirements: (1.0-1.2 gm/kg)  Weight Used For Protein Calculations: 83 kg (182 lb 15.7 oz)     Estimated Fluid Requirement Method: RDA Method(or PER MD)  RDA Method (mL): 2057         Nutrition Prescription Ordered    Current Diet Order: Diabetic, 2000 kcal    Evaluation of Received Nutrient/Fluid Intake    I/O: 380/800  Energy Calories Required: not meeting needs  Protein Required: not meeting needs  Fluid Required: not meeting needs  Comments: LBM 8/2  Tolerance: tolerating  % Intake of Estimated Energy Needs: 50 - 75 %  % Meal Intake: 50 - 75 %    Nutrition Risk    Level of Risk/Frequency of Follow-up: (2 x/week)     Assessment and Plan    * Pneumonia due to COVID-19 virus  Contributing Nutrition Diagnosis  Inadequate oral intake    Related to (etiology):   physiological state    Signs and Symptoms (as evidenced by):   Pt COVID+ needing continuous respiratory support, and NPO on 7/30 for GI video capsule endoscopy 2/2 GI bleed    Interventions:  Collaboration with other providers  Commercial Beverage: Boost Glucose Control TID to supplement protein and calorie intake      Nutrition Diagnosis Status:   Continues           Monitor and Evaluation    Food and Nutrient Intake: energy intake, food and beverage intake  Food and Nutrient Adminstration: diet order  Knowledge/Beliefs/Attitudes: food and nutrition knowledge/skill  Physical Activity and Function: nutrition-related ADLs and IADLs  Anthropometric Measurements: weight, weight change, body mass index  Biochemical Data, Medical Tests and Procedures: electrolyte and renal panel, gastrointestinal  profile, glucose/endocrine profile, inflammatory profile, lipid profile     Malnutrition Assessment     NFPE not performed, patient has been screened for possible COVID-19 and has been placed on airborne and contact precautions. Patient is noted as being positive for COVID-19.    Nutrition Follow-Up    RD Follow-up?: Yes

## 2020-08-04 NOTE — SUBJECTIVE & OBJECTIVE
Interval History:   NAEON, ongoing weaning of O2, 6L High flow 35% at present, will further wean. Completed abx for Cdiff course today. Patient stable, states he feels well.     Review of Systems   Respiratory: Negative for cough and shortness of breath.    Cardiovascular: Negative for chest pain.   Gastrointestinal: Negative for abdominal pain, blood in stool, diarrhea and nausea.   Psychiatric/Behavioral: The patient is not nervous/anxious.      Objective:     Vital Signs (Most Recent):  Temp: 98.2 °F (36.8 °C) (08/04/20 0742)  Pulse: 100 (08/04/20 1221)  Resp: 18 (08/04/20 1221)  BP: 112/62 (08/04/20 0742)  SpO2: 98 % (08/04/20 1221) Vital Signs (24h Range):  Temp:  [97 °F (36.1 °C)-99.3 °F (37.4 °C)] 98.2 °F (36.8 °C)  Pulse:  [] 100  Resp:  [16-21] 18  SpO2:  [77 %-100 %] 98 %  BP: (102-123)/(56-62) 112/62     Weight: 83 kg (182 lb 15.7 oz)  Body mass index is 25.52 kg/m².    Intake/Output Summary (Last 24 hours) at 8/4/2020 1413  Last data filed at 8/4/2020 0600  Gross per 24 hour   Intake 280 ml   Output 300 ml   Net -20 ml      Physical Exam  Deferred in setting of COVID 19    Significant Labs:   CBC:   Recent Labs   Lab 08/03/20  0619 08/03/20  1840 08/04/20  0638   WBC 12.54  --  12.92*   HGB 9.1* 10.5* 9.5*   HCT 28.0* 32.9* 30.3*     --  287     CMP:   Recent Labs   Lab 08/03/20  0619 08/04/20  0638   *  135* 135*   K 4.5  4.5 4.6     104 104   CO2 27  27 25   GLU 90  90 89   BUN 18  18 19   CREATININE 1.1  1.1 1.1   CALCIUM 8.0*  8.0* 8.2*   PROT 5.4*  5.4* 5.7*   ALBUMIN 2.3*  2.3* 2.3*   BILITOT 0.9  0.9 0.8   ALKPHOS 62  62 67   AST 20  20 21   ALT 22  22 19   ANIONGAP 4*  4* 6*   EGFRNONAA >60  >60 >60     All pertinent labs within the past 24 hours have been reviewed.    Significant Imaging: I have reviewed and interpreted all pertinent imaging results/findings within the past 24 hours.

## 2020-08-04 NOTE — PLAN OF CARE
Problem: Physical Therapy Goal  Goal: Physical Therapy Goal  Description: Goals to be met by: 20     Patient will increase functional independence with mobility by performin. Supine <> sit with MInimal Assistance  MET 20  2. Sit to stand transfer with Minimal Assistance  MET 20  3. Bed to chair transfer with Minimal Assistance   4. Gait x 50 ft with min A with appropriate AD  5. Lower extremity exercise program x 10 reps per handout, with supervision    Outcome: Ongoing, Progressing   Goals 1 ans 2 met

## 2020-08-04 NOTE — PLAN OF CARE
I spoke with Ailyn Cortes [phone] with [employer] managed Medicare.  She is submitting pt's information to her reviewing physician for [facility].         08/04/20 1412   Post-Acute Status   Post-Acute Authorization Placement  ([facility])     Pohng Suh RN,   [phone]

## 2020-08-04 NOTE — PLAN OF CARE
([id number]) BELTRAN notified Dr. Adam regarding increased MEWs score of 4 that prompted sepsis protocol. MD instructed to keep monitoring pt closely and make sure patient is hydrating. Bedside nurse notified. Pt sitting up in chair with O2 on stated he feels ok. Will cont to be available as needed.

## 2020-08-04 NOTE — PLAN OF CARE
VN Q2 hour round: VN cued into patients room. Patient denies any needs at this time. VN instructed pt to call for assistance or worsening of symptoms. Call light within reach. Will cont to monitor.

## 2020-08-04 NOTE — ASSESSMENT & PLAN NOTE
Patient has a known history of MAC infections as well as Aspergillus.    ID recs include continuing voriconazole, switch to itroconazole at DC  - continue voriconazole.   -Follow up with Pulm, ID outpatient

## 2020-08-04 NOTE — PLAN OF CARE
VN cued into patients room for rounding - Patient is in no acute distress at this time.  Call light within reach. Will continue to monitor closely. Cardiac monitor with HR 87

## 2020-08-04 NOTE — PLAN OF CARE
VN cued into patients room for rounding - Patient is in no acute distress at this time.  Call light within reach. Will continue to monitor closely.

## 2020-08-05 ENCOUNTER — TELEPHONE (OUTPATIENT)
Dept: NEUROLOGY | Facility: HOSPITAL | Age: 75
End: 2020-08-05

## 2020-08-05 LAB
ALBUMIN SERPL BCP-MCNC: 3 G/DL (ref 3.5–5.2)
ALLENS TEST: ABNORMAL
ALP SERPL-CCNC: 96 U/L (ref 55–135)
ALT SERPL W/O P-5'-P-CCNC: 21 U/L (ref 10–44)
ANION GAP SERPL CALC-SCNC: 11 MMOL/L (ref 8–16)
AST SERPL-CCNC: 26 U/L (ref 10–40)
BASOPHILS # BLD AUTO: 0.01 K/UL (ref 0–0.2)
BASOPHILS NFR BLD: 0.1 % (ref 0–1.9)
BILIRUB SERPL-MCNC: 0.9 MG/DL (ref 0.1–1)
BUN SERPL-MCNC: 13 MG/DL (ref 8–23)
CALCIUM SERPL-MCNC: 9.5 MG/DL (ref 8.7–10.5)
CHLORIDE SERPL-SCNC: 99 MMOL/L (ref 95–110)
CO2 SERPL-SCNC: 25 MMOL/L (ref 23–29)
CREAT SERPL-MCNC: 1 MG/DL (ref 0.5–1.4)
DIFFERENTIAL METHOD: ABNORMAL
EOSINOPHIL # BLD AUTO: 0.1 K/UL (ref 0–0.5)
EOSINOPHIL NFR BLD: 0.8 % (ref 0–8)
ERYTHROCYTE [DISTWIDTH] IN BLOOD BY AUTOMATED COUNT: 16.9 % (ref 11.5–14.5)
EST. GFR  (AFRICAN AMERICAN): >60 ML/MIN/1.73 M^2
EST. GFR  (NON AFRICAN AMERICAN): >60 ML/MIN/1.73 M^2
GLUCOSE SERPL-MCNC: 85 MG/DL (ref 70–110)
HCO3 UR-SCNC: 24.5 MMOL/L (ref 24–28)
HCT VFR BLD AUTO: 40.1 % (ref 40–54)
HGB BLD-MCNC: 12.4 G/DL (ref 14–18)
IMM GRANULOCYTES # BLD AUTO: 0.06 K/UL (ref 0–0.04)
IMM GRANULOCYTES NFR BLD AUTO: 0.5 % (ref 0–0.5)
LYMPHOCYTES # BLD AUTO: 1.2 K/UL (ref 1–4.8)
LYMPHOCYTES NFR BLD: 9.1 % (ref 18–48)
MAGNESIUM SERPL-MCNC: 2.1 MG/DL (ref 1.6–2.6)
MCH RBC QN AUTO: 29.8 PG (ref 27–31)
MCHC RBC AUTO-ENTMCNC: 30.9 G/DL (ref 32–36)
MCV RBC AUTO: 96 FL (ref 82–98)
MONOCYTES # BLD AUTO: 0.8 K/UL (ref 0.3–1)
MONOCYTES NFR BLD: 6 % (ref 4–15)
NEUTROPHILS # BLD AUTO: 10.5 K/UL (ref 1.8–7.7)
NEUTROPHILS NFR BLD: 83.5 % (ref 38–73)
NRBC BLD-RTO: 0 /100 WBC
PCO2 BLDA: 39.7 MMHG (ref 35–45)
PH SMN: 7.4 [PH] (ref 7.35–7.45)
PHOSPHATE SERPL-MCNC: 2.6 MG/DL (ref 2.7–4.5)
PLATELET # BLD AUTO: 377 K/UL (ref 150–350)
PMV BLD AUTO: 9.6 FL (ref 9.2–12.9)
PO2 BLDA: 57 MMHG (ref 80–100)
POC BE: 0 MMOL/L
POC SATURATED O2: 89 % (ref 95–100)
POC TCO2: 26 MMOL/L (ref 23–27)
POTASSIUM SERPL-SCNC: 5.2 MMOL/L (ref 3.5–5.1)
PROT SERPL-MCNC: 8 G/DL (ref 6–8.4)
RBC # BLD AUTO: 4.16 M/UL (ref 4.6–6.2)
SAMPLE: ABNORMAL
SITE: ABNORMAL
SODIUM SERPL-SCNC: 135 MMOL/L (ref 136–145)
WBC # BLD AUTO: 12.58 K/UL (ref 3.9–12.7)

## 2020-08-05 PROCEDURE — 94640 AIRWAY INHALATION TREATMENT: CPT

## 2020-08-05 PROCEDURE — 80053 COMPREHEN METABOLIC PANEL: CPT

## 2020-08-05 PROCEDURE — 25000003 PHARM REV CODE 250: Performed by: STUDENT IN AN ORGANIZED HEALTH CARE EDUCATION/TRAINING PROGRAM

## 2020-08-05 PROCEDURE — 36415 COLL VENOUS BLD VENIPUNCTURE: CPT

## 2020-08-05 PROCEDURE — 83735 ASSAY OF MAGNESIUM: CPT

## 2020-08-05 PROCEDURE — 36600 WITHDRAWAL OF ARTERIAL BLOOD: CPT

## 2020-08-05 PROCEDURE — 63600175 PHARM REV CODE 636 W HCPCS: Performed by: FAMILY MEDICINE

## 2020-08-05 PROCEDURE — 25000003 PHARM REV CODE 250: Performed by: FAMILY MEDICINE

## 2020-08-05 PROCEDURE — 94660 CPAP INITIATION&MGMT: CPT

## 2020-08-05 PROCEDURE — 82803 BLOOD GASES ANY COMBINATION: CPT

## 2020-08-05 PROCEDURE — 11000001 HC ACUTE MED/SURG PRIVATE ROOM

## 2020-08-05 PROCEDURE — 27100171 HC OXYGEN HIGH FLOW UP TO 24 HOURS

## 2020-08-05 PROCEDURE — 85025 COMPLETE CBC W/AUTO DIFF WBC: CPT

## 2020-08-05 PROCEDURE — 99900035 HC TECH TIME PER 15 MIN (STAT)

## 2020-08-05 PROCEDURE — 94664 DEMO&/EVAL PT USE INHALER: CPT

## 2020-08-05 PROCEDURE — 25000003 PHARM REV CODE 250: Performed by: INTERNAL MEDICINE

## 2020-08-05 PROCEDURE — 84100 ASSAY OF PHOSPHORUS: CPT

## 2020-08-05 PROCEDURE — 94761 N-INVAS EAR/PLS OXIMETRY MLT: CPT

## 2020-08-05 RX ADMIN — ALBUTEROL SULFATE 2 PUFF: 90 AEROSOL, METERED RESPIRATORY (INHALATION) at 03:08

## 2020-08-05 RX ADMIN — ALBUTEROL SULFATE 2 PUFF: 90 AEROSOL, METERED RESPIRATORY (INHALATION) at 07:08

## 2020-08-05 RX ADMIN — ENOXAPARIN SODIUM 80 MG: 80 INJECTION SUBCUTANEOUS at 05:08

## 2020-08-05 RX ADMIN — PANTOPRAZOLE SODIUM 40 MG: 40 TABLET, DELAYED RELEASE ORAL at 10:08

## 2020-08-05 RX ADMIN — LIDOCAINE 1 PATCH: 50 PATCH TOPICAL at 05:08

## 2020-08-05 RX ADMIN — Medication 4 TABLET: at 10:08

## 2020-08-05 RX ADMIN — VORICONAZOLE 200 MG: 200 TABLET, FILM COATED ORAL at 05:08

## 2020-08-05 RX ADMIN — ALBUTEROL SULFATE 2 PUFF: 90 AEROSOL, METERED RESPIRATORY (INHALATION) at 12:08

## 2020-08-05 RX ADMIN — Medication 4 TABLET: at 05:08

## 2020-08-05 RX ADMIN — Medication 4 TABLET: at 01:08

## 2020-08-05 RX ADMIN — PANTOPRAZOLE SODIUM 40 MG: 40 TABLET, DELAYED RELEASE ORAL at 09:08

## 2020-08-05 RX ADMIN — BENZONATATE 100 MG: 100 CAPSULE ORAL at 09:08

## 2020-08-05 RX ADMIN — VORICONAZOLE 200 MG: 200 TABLET, FILM COATED ORAL at 04:08

## 2020-08-05 RX ADMIN — THERA TABS 1 TABLET: TAB at 10:08

## 2020-08-05 RX ADMIN — ENOXAPARIN SODIUM 80 MG: 80 INJECTION SUBCUTANEOUS at 04:08

## 2020-08-05 NOTE — PLAN OF CARE
Dr Swain notified of Spo2 70's and need to increase oxygen to vapotherm 20L 70% with an  increase in Spo2 to 90's. New orders noted.

## 2020-08-05 NOTE — TELEPHONE ENCOUNTER
"----- Message from Yinka Moreno MD sent at 8/4/2020 12:16 PM CDT -----  Frankie Medellin,  Can you set up Mr. Deluna for follow up in around a month in Dr. Olivares clinic. Let me know if you need any further information or orders from me. Thanks for your help.    Yinka Moreno (Lee)  [facility] Fellow, PGY IV  Pager: [phone]      "

## 2020-08-05 NOTE — PLAN OF CARE
Notified Dr. Méndez of patients change in vital signs. Patient is 95/58. No signs/symptoms of distress noted. No new orders at this time. Will continue to monitor patient.

## 2020-08-05 NOTE — PROGRESS NOTES
Pt refusing to wear BiPAP. Pt continues to stay on High Flow NC on 15L with sats of 96%. RN notified. Will continue to monitor.

## 2020-08-05 NOTE — PLAN OF CARE
Problem: Adult Inpatient Plan of Care  Goal: Chart Reviewed  Description: Care plan reviewed.     Outcome: Ongoing, Progressing

## 2020-08-05 NOTE — ASSESSMENT & PLAN NOTE
Continuing voriconazole, switch to itroconazole at [place]   -Follow up with Pulm, ID outpatient  STABLE

## 2020-08-05 NOTE — SUBJECTIVE & OBJECTIVE
Interval History: NAEON, satting well on 10L NC. Will continue to determine placement/dispo. Consider walk test tomorrow.    Review of Systems   Respiratory: Negative for cough and shortness of breath.    Cardiovascular: Negative for chest pain.   Gastrointestinal: Negative for abdominal pain, blood in stool, diarrhea and nausea.   Psychiatric/Behavioral: The patient is not nervous/anxious.      Objective:     Vital Signs (Most Recent):  Temp: 98.9 °F (37.2 °C) (08/05/20 0929)  Pulse: 94 (08/05/20 0929)  Resp: 16 (08/05/20 0929)  BP: 118/72 (08/05/20 0929)  SpO2: (!) 92 % (08/05/20 0929) Vital Signs (24h Range):  Temp:  [97.6 °F (36.4 °C)-99.3 °F (37.4 °C)] 98.9 °F (37.2 °C)  Pulse:  [] 94  Resp:  [16-92] 16  SpO2:  [82 %-98 %] 92 %  BP: (104-137)/(59-72) 118/72     Weight: 83 kg (182 lb 15.7 oz)  Body mass index is 25.52 kg/m².    Intake/Output Summary (Last 24 hours) at 8/5/2020 1127  Last data filed at 8/5/2020 0409  Gross per 24 hour   Intake --   Output 401 ml   Net -401 ml      Physical Exam   Deferred in setting of COVID 19    Significant Labs: Pending, will change labs to QOD.    Significant Imaging: I have reviewed all pertinent imaging results/findings within the past 24 hours.

## 2020-08-05 NOTE — PLAN OF CARE
VN rounds: VN cued into pt's room with pt's permission. Pt resting in bed noted to be on oxygen. VN instructed pt to call for assistance. Pt aware and agreeable. Allowed time for questions. Pt states he is cold, but NAD noted.     Patient's progress notes, labs, and care plan reviewed. Will cont to be available as needed.

## 2020-08-05 NOTE — PLAN OF CARE
I called Sona Motta with UR at [facility] managed Medicare [phone] and left a VM because Ailyn Cortes has not called me back to set up peer to peer.         08/05/20 1316   Post-Acute Status   Post-Acute Authorization Placement     Phong Suh RN, CM  [phone]

## 2020-08-05 NOTE — PT/OT/SLP PROGRESS
Occupational Therapy      Patient Name:  Rajan Deluna   MRN:  [id number]    Patient not seen today secondary to (nurse hold 2/2 pt.s O2 desatted to 79%.). Will follow-up 8/6/2020.    Stephanie Ramsey, OT  8/5/2020

## 2020-08-05 NOTE — PLAN OF CARE
Plan of care reviewed with patient and wife. Patient and wife verbalized understanding of care. Patient is currently on high flow oxygen with a continuous pulse ox. Patient reports no SOB. No signs/symtpoms of distress. Telemetry applied. Zero true red alarms noted. Bed locked and low,call light within reach, bed alarm on, alexandra at bedside, non skid socks applied, side rails x2. Instructed to call if needing assistance. Will continue to monitor.

## 2020-08-05 NOTE — PROGRESS NOTES
[facility]  Critical Care - Medicine  History & Physical    Patient Name: Rajan Deluna  MRN: [id number]  Admission Date: 7/21/2020  Hospital Length of Stay: 15 days  Code Status: Full Code  Attending Physician: Rell Tolentino III, MD   Primary Care Provider: Jason Mccord MD   Principal Problem: Pneumonia due to COVID-19 virus    Subjective:     Hospital/ICU Course: When seen, the patient is sleeping comfortably.  He was weaned to 10LPM via wall nasal cannula and is off vapotherm.  He is improving well with regards to his Oxygen requirements.    Past Medical History:   Diagnosis Date    Arthritis     COPD (chronic obstructive pulmonary disease)     Hypertension     Smoker     Weakness        Past Surgical History:   Procedure Laterality Date    HERNIA REPAIR      INTRALUMINAL GASTROINTESTINAL TRACT IMAGING VIA CAPSULE N/A 8/3/2020    Procedure: IMAGING PROCEDURE, GI TRACT, INTRALUMINAL, VIA CAPSULE;  Surgeon: Rey Olivares MD;  [facility];  Service: Endoscopy;  Laterality: N/A;    right knee surgery         Review of patient's allergies indicates:  No Known Allergies    Family History     None        Tobacco Use    Smoking status: Former Smoker    Smokeless tobacco: Never Used   Substance and Sexual Activity    Alcohol use: Not Currently    Drug use: Never    Sexual activity: Not on file     Objective:     Vital Signs (Most Recent):  Temp: 98.9 °F (37.2 °C) (08/05/20 0929)  Pulse: 94 (08/05/20 0929)  Resp: 16 (08/05/20 0929)  BP: 118/72 (08/05/20 0929)  SpO2: (!) 92 % (08/05/20 0929) Vital Signs (24h Range):  Temp:  [97.6 °F (36.4 °C)-99.3 °F (37.4 °C)] 98.9 °F (37.2 °C)  Pulse:  [] 94  Resp:  [16-92] 16  SpO2:  [82 %-98 %] 92 %  BP: (104-137)/(59-72) 118/72     Weight: 83 kg (182 lb 15.7 oz)  Body mass index is 25.52 kg/m².      Intake/Output Summary (Last 24 hours) at 8/5/2020 1048  Last data filed at 8/5/2020 0409  Gross per 24 hour   Intake --   Output 401 ml    Net -401 ml       Physical Exam  Vitals reviewed: virtual visit.   Constitutional:       General: He is not in acute distress.     Appearance: Normal appearance.   HENT:      Head: Normocephalic.      Nose: Nose normal.   Neurological:      General: No focal deficit present.      Mental Status: He is alert.         Vents:  Oxygen Concentration (%): 35 (08/04/20 0817)    Lines/Drains/Airways     Peripheral Intravenous Line                 Peripheral IV - Single Lumen 08/04/20 0313 20 G Right Hand 1 day         Peripheral IV - Single Lumen 08/04/20 0314 18 G Right Forearm 1 day                Significant Labs:    CBC/Anemia Profile:  Recent Labs   Lab 08/03/20  1840 08/04/20  0638   WBC  --  12.92*   HGB 10.5* 9.5*   HCT 32.9* 30.3*   PLT  --  287   MCV  --  97   RDW  --  17.3*        Chemistries:  Recent Labs   Lab 08/04/20  0638   *   K 4.6      CO2 25   BUN 19   CREATININE 1.1   CALCIUM 8.2*   ALBUMIN 2.3*   PROT 5.7*   BILITOT 0.8   ALKPHOS 67   ALT 19   AST 21   MG 1.8   PHOS 2.6*       All pertinent labs within the past 24 hours have been reviewed.    Significant Imaging: I have reviewed all pertinent imaging results/findings within the past 24 hours.    Assessment/Plan:     Active Diagnoses:    Diagnosis Date Noted POA    PRINCIPAL PROBLEM:  Pneumonia due to COVID-19 virus [U07.1, J12.89]  Yes    History of MAC infection [Z86.19]  No    GI bleed [K92.2] 07/29/2020 Clinically Undetermined    History of Aspergillus & MAC [B44.9]  Yes    Clostridium difficile infection [A49.8]  Clinically Undetermined    Acute respiratory failure with hypoxia [J96.01]  Yes    MARQUIS (acute kidney injury) [N17.9] 07/21/2020 Yes    COPD (chronic obstructive pulmonary disease) [J44.9] 05/22/2017 Yes    Anemia [D64.9] 12/09/2016 No      Problems Resolved During this Admission:       * Acute respiratory disease due to COVID-19 virus  - 7/21 COVID(+)  - s/p remdesivir x5d, dexamethasone x10d  - full dose AC with  lovenox  - proning if he can tolerate at all  - incentive spirometry   - please wean O2 as tolerated for goal SpO2 88%     History of Aspergillus & MAC  - serum aspergillus negative, loaded with voriconazole on  7/29   - no MAC treatment at this time per ID        Clostridium difficile infection  Patient positive on PCR, initiated on oral vancomycin & flagyl on 7/24  - 14 days PO vanc/flagyl  (today day 12)    - probiotics     Acute respiratory failure with hypoxia  See COVID problem       COPD (chronic obstructive pulmonary disease)  - Use of 2L NC at home  - metered dose inhaler q4h       Anemia 2/2 GI bleed  - do not transfuse unless Hgb is below 7g/dL  - heparin gtt stopped    - daily CBC     FEN   - ensure lytes aggressively repleted.     Out of bed to chair daily  Incentive spirometry   PT/OT    Great improvement over the last few days.  Continue current management.     Bryan Swain MD  Critical Care - Medicine  [facility]  [phone]

## 2020-08-05 NOTE — PLAN OF CARE
Pt was denied LTAC placement at this time per Deb with [facility].  I called Ailyn Cortes with [facility] managed Medicare and left a VM to inquire about peer to peer that has to be done by 3pm 8/6/20.  Waiting on a call back.       08/05/20 0950   Post-Acute Status   Post-Acute Authorization Placement     Phong Suh RN, CM  [phone]

## 2020-08-05 NOTE — ASSESSMENT & PLAN NOTE
Patient uses 2L supplemental oxygen at home.  Currently on 10L High-flow NC  Continue to wean  Chest physio ongoing, albuterol q4h

## 2020-08-05 NOTE — TELEPHONE ENCOUNTER
Message placed on voicemail.  Attempt to schedule gi follow up in 4 weeks.  Pt inpatient, appt scheduled, will be able to view in discharge documents.

## 2020-08-05 NOTE — PT/OT/SLP PROGRESS
Physical Therapy      Patient Name:  Rajan Deluna   MRN:  [id number]    Patient not seen today secondary to (pt on nsg hold,O2 sats at 79%(1450)). Will follow-up tomorrow.    Humble Browning, PTA

## 2020-08-05 NOTE — PLAN OF CARE
I called Ailyn Cortes with [facility] managed Medicare again and left another VM to inquire about peer to peer that has to be done by 3pm 8/6/20.  Waiting on a call back.  Denial #[id number].         08/05/20 1241   Post-Acute Status   Post-Acute Authorization Placement     Phong Suh RN, CM  [phone]

## 2020-08-06 LAB
ALBUMIN SERPL BCP-MCNC: 2.4 G/DL (ref 3.5–5.2)
ALP SERPL-CCNC: 77 U/L (ref 55–135)
ALT SERPL W/O P-5'-P-CCNC: 16 U/L (ref 10–44)
ANION GAP SERPL CALC-SCNC: 5 MMOL/L (ref 8–16)
ANION GAP SERPL CALC-SCNC: 9 MMOL/L (ref 8–16)
ANISOCYTOSIS BLD QL SMEAR: SLIGHT
AST SERPL-CCNC: 21 U/L (ref 10–40)
BASOPHILS # BLD AUTO: ABNORMAL K/UL (ref 0–0.2)
BASOPHILS NFR BLD: 0 % (ref 0–1.9)
BILIRUB SERPL-MCNC: 0.6 MG/DL (ref 0.1–1)
BUN SERPL-MCNC: 15 MG/DL (ref 8–23)
BUN SERPL-MCNC: 17 MG/DL (ref 8–23)
CALCIUM SERPL-MCNC: 8.6 MG/DL (ref 8.7–10.5)
CALCIUM SERPL-MCNC: 8.6 MG/DL (ref 8.7–10.5)
CHLORIDE SERPL-SCNC: 101 MMOL/L (ref 95–110)
CHLORIDE SERPL-SCNC: 102 MMOL/L (ref 95–110)
CO2 SERPL-SCNC: 25 MMOL/L (ref 23–29)
CO2 SERPL-SCNC: 27 MMOL/L (ref 23–29)
CREAT SERPL-MCNC: 1 MG/DL (ref 0.5–1.4)
CREAT SERPL-MCNC: 1 MG/DL (ref 0.5–1.4)
DIFFERENTIAL METHOD: ABNORMAL
EOSINOPHIL # BLD AUTO: ABNORMAL K/UL (ref 0–0.5)
EOSINOPHIL NFR BLD: 1 % (ref 0–8)
ERYTHROCYTE [DISTWIDTH] IN BLOOD BY AUTOMATED COUNT: 16.6 % (ref 11.5–14.5)
EST. GFR  (AFRICAN AMERICAN): >60 ML/MIN/1.73 M^2
EST. GFR  (AFRICAN AMERICAN): >60 ML/MIN/1.73 M^2
EST. GFR  (NON AFRICAN AMERICAN): >60 ML/MIN/1.73 M^2
EST. GFR  (NON AFRICAN AMERICAN): >60 ML/MIN/1.73 M^2
GLUCOSE SERPL-MCNC: 108 MG/DL (ref 70–110)
GLUCOSE SERPL-MCNC: 78 MG/DL (ref 70–110)
HCT VFR BLD AUTO: 32 % (ref 40–54)
HGB BLD-MCNC: 10.2 G/DL (ref 14–18)
HYPOCHROMIA BLD QL SMEAR: ABNORMAL
IMM GRANULOCYTES # BLD AUTO: ABNORMAL K/UL (ref 0–0.04)
IMM GRANULOCYTES NFR BLD AUTO: ABNORMAL % (ref 0–0.5)
LYMPHOCYTES # BLD AUTO: ABNORMAL K/UL (ref 1–4.8)
LYMPHOCYTES NFR BLD: 2 % (ref 18–48)
MAGNESIUM SERPL-MCNC: 2 MG/DL (ref 1.6–2.6)
MCH RBC QN AUTO: 30.6 PG (ref 27–31)
MCHC RBC AUTO-ENTMCNC: 31.9 G/DL (ref 32–36)
MCV RBC AUTO: 96 FL (ref 82–98)
MONOCYTES # BLD AUTO: ABNORMAL K/UL (ref 0.3–1)
MONOCYTES NFR BLD: 2 % (ref 4–15)
NEUTROPHILS NFR BLD: 95 % (ref 38–73)
NRBC BLD-RTO: 0 /100 WBC
OVALOCYTES BLD QL SMEAR: ABNORMAL
PHOSPHATE SERPL-MCNC: 2.5 MG/DL (ref 2.7–4.5)
PLATELET # BLD AUTO: 298 K/UL (ref 150–350)
PLATELET BLD QL SMEAR: ABNORMAL
PMV BLD AUTO: 10 FL (ref 9.2–12.9)
POIKILOCYTOSIS BLD QL SMEAR: SLIGHT
POTASSIUM SERPL-SCNC: 4.9 MMOL/L (ref 3.5–5.1)
POTASSIUM SERPL-SCNC: 4.9 MMOL/L (ref 3.5–5.1)
PROT SERPL-MCNC: 6.3 G/DL (ref 6–8.4)
RBC # BLD AUTO: 3.33 M/UL (ref 4.6–6.2)
SODIUM SERPL-SCNC: 134 MMOL/L (ref 136–145)
SODIUM SERPL-SCNC: 135 MMOL/L (ref 136–145)
WBC # BLD AUTO: 11.32 K/UL (ref 3.9–12.7)

## 2020-08-06 PROCEDURE — 83735 ASSAY OF MAGNESIUM: CPT

## 2020-08-06 PROCEDURE — 97530 THERAPEUTIC ACTIVITIES: CPT | Performed by: PHYSICAL THERAPIST

## 2020-08-06 PROCEDURE — 80048 BASIC METABOLIC PNL TOTAL CA: CPT

## 2020-08-06 PROCEDURE — 25000003 PHARM REV CODE 250: Performed by: STUDENT IN AN ORGANIZED HEALTH CARE EDUCATION/TRAINING PROGRAM

## 2020-08-06 PROCEDURE — 11000001 HC ACUTE MED/SURG PRIVATE ROOM

## 2020-08-06 PROCEDURE — 36415 COLL VENOUS BLD VENIPUNCTURE: CPT

## 2020-08-06 PROCEDURE — 63600175 PHARM REV CODE 636 W HCPCS: Performed by: FAMILY MEDICINE

## 2020-08-06 PROCEDURE — 80053 COMPREHEN METABOLIC PANEL: CPT

## 2020-08-06 PROCEDURE — 84100 ASSAY OF PHOSPHORUS: CPT

## 2020-08-06 PROCEDURE — 85027 COMPLETE CBC AUTOMATED: CPT

## 2020-08-06 PROCEDURE — 99900035 HC TECH TIME PER 15 MIN (STAT)

## 2020-08-06 PROCEDURE — 97110 THERAPEUTIC EXERCISES: CPT | Performed by: PHYSICAL THERAPIST

## 2020-08-06 PROCEDURE — 97110 THERAPEUTIC EXERCISES: CPT

## 2020-08-06 PROCEDURE — 25000003 PHARM REV CODE 250: Performed by: FAMILY MEDICINE

## 2020-08-06 PROCEDURE — 85007 BL SMEAR W/DIFF WBC COUNT: CPT

## 2020-08-06 PROCEDURE — 27100098 HC SPACER

## 2020-08-06 PROCEDURE — 27000221 HC OXYGEN, UP TO 24 HOURS

## 2020-08-06 PROCEDURE — 27100171 HC OXYGEN HIGH FLOW UP TO 24 HOURS

## 2020-08-06 PROCEDURE — 94664 DEMO&/EVAL PT USE INHALER: CPT

## 2020-08-06 PROCEDURE — 25000003 PHARM REV CODE 250: Performed by: INTERNAL MEDICINE

## 2020-08-06 RX ORDER — SODIUM,POTASSIUM PHOSPHATES 280-250MG
1 POWDER IN PACKET (EA) ORAL 2 TIMES DAILY
Status: COMPLETED | OUTPATIENT
Start: 2020-08-06 | End: 2020-08-07

## 2020-08-06 RX ADMIN — ALBUTEROL SULFATE 2 PUFF: 90 AEROSOL, METERED RESPIRATORY (INHALATION) at 08:08

## 2020-08-06 RX ADMIN — THERA TABS 1 TABLET: TAB at 08:08

## 2020-08-06 RX ADMIN — Medication 4 TABLET: at 05:08

## 2020-08-06 RX ADMIN — POTASSIUM & SODIUM PHOSPHATES POWDER PACK 280-160-250 MG 1 PACKET: 280-160-250 PACK at 09:08

## 2020-08-06 RX ADMIN — VORICONAZOLE 200 MG: 200 TABLET, FILM COATED ORAL at 04:08

## 2020-08-06 RX ADMIN — ENOXAPARIN SODIUM 80 MG: 80 INJECTION SUBCUTANEOUS at 05:08

## 2020-08-06 RX ADMIN — VORICONAZOLE 200 MG: 200 TABLET, FILM COATED ORAL at 05:08

## 2020-08-06 RX ADMIN — ACETAMINOPHEN 650 MG: 325 TABLET ORAL at 10:08

## 2020-08-06 RX ADMIN — Medication 4 TABLET: at 08:08

## 2020-08-06 RX ADMIN — Medication 4 TABLET: at 01:08

## 2020-08-06 RX ADMIN — ENOXAPARIN SODIUM 80 MG: 80 INJECTION SUBCUTANEOUS at 04:08

## 2020-08-06 RX ADMIN — PANTOPRAZOLE SODIUM 40 MG: 40 TABLET, DELAYED RELEASE ORAL at 08:08

## 2020-08-06 RX ADMIN — PANTOPRAZOLE SODIUM 40 MG: 40 TABLET, DELAYED RELEASE ORAL at 09:08

## 2020-08-06 RX ADMIN — LIDOCAINE 1 PATCH: 50 PATCH TOPICAL at 05:08

## 2020-08-06 NOTE — PLAN OF CARE
Patient was chart reviewed today, but was unavailable on multiple attempts by video.  On chart review, he had a precipitous increase in his oxygen requirement following PT yesterday.  We discussed this with the primary team, who believes that the patient looks comfortable and is stable on current settings (30L and 70% vapotherm).  We discussed his care and agreed that we can watch him carefully on the floor at this time.  If his O2 requirements continue to increase, he would be better suited in the MICU for closer observation.    Bryan Swain MD  [facility] PGY6  [phone]

## 2020-08-06 NOTE — PLAN OF CARE
VN cued into patients room for rounding - Patient is in no acute distress at this time.  Pt has been changed to high flow oxygen at 10 liters   Pt verbally stated he is in no distress    Pt can see vn but vn cannot see pt    Requesting floor nurse to plug and replug in his camera connection

## 2020-08-06 NOTE — PROGRESS NOTES
[facility] Hospital Medicine  Progress Note    Patient Name: Rajan Deluna  MRN: [id number]  Patient Class: IP- Inpatient   Admission Date: 7/21/2020  Length of Stay: 16 days  Attending Physician: Fany Petersen MD  Primary Care Provider: Jason Mccord MD        Subjective:     Principal Problem:Pneumonia due to COVID-19 virus        HPI:  Patient is a 74-year-old male with a past medical history of hypertension, COPD, MAC infection who presented to the ED with altered mental status and fever.  As per the family, patient has had altered mental status for the past 3-4 days. Family endorses increased somnolence as well as urinating on himself. EMS was called two days prior to presentation but reportedly deemed that patient did not need to be brought in. Over the previous day, reportedly the patient was found stuck, staring in his sink, not responding. On the day of presentation, the patient was found down on the floor, family wasn't able to get patient up from the floor and was reportedly not responding as much as baseline. Family believed that patient may not have been wearing baseline 2L O2. Patient also endorses a cough and generalized weakness. Patient reportedly had a recent UTI.     In the ED, chest x-ray showed diffuse interstitial patchy infiltrates concerning for possible pneumonia and a COVID-19 screening test was positive. Fever was 101° F and patient was hypotensive 80s/40s. Patient given 30cc/kg bolus. BP very soft on presentation, adequately responded to volume resuscitation and then decreased again. Labs showed a BUN 45, creatinine 2.9, , , troponin 0.036, procalcitonin 1.13.     Overview/Hospital Course:  8/3 - NAEON, had chest pain, normal EKG and troponin overnight, resolved with GI cocktail. This AM, H/H decreased from 10.8/33.7 to 9.1/28. Infectious disease recommends oral Vancomycin and IV Metronidazole for C. Diff until 8/7 and to continue voriconazole and  discharge on Itraconazole 100mg BID and follow up with Pulmonary and ID for continued Aspergillosis and MAC treatment. Patient currently satting 90% on 40L vapotherm at 65% FiO2, weaning as tolerated.    8/4 - NAEON. Patient is currently satting in the >90% on 20L vapotherm at 35% FiO2, weaning as tolerated. H/H stable 9.5/30.3. Per GI, VCE show a few nonbleeding small likely duodenal AVMs and no other evidence of overt GI Bleed and recommend outpatient evaluation due to the patient's current pulmonary status.     8/5 - NAEON. Pending LTAC status. Spo2 70's and need to increase oxygen to vapotherm 20L 70% with an increase in Spo2 to 90's.       Interval History: Patient had episode of desaturation yesterday, required increase in O2, switch from high-flow NC to VapoTherm, now at 75% O2 with 30L flow. Concerning insofar as patient had previously been progressing.CXR ordered, is stable.     Unable to perform ROS at this time due to tech issues, will check back in with patient once these are resolved.     Objective:     Vital Signs (Most Recent):  Temp: 97.8 °F (36.6 °C) (08/06/20 0824)  Pulse: (!) 123 (08/06/20 1200)  Resp: 18 (08/06/20 0824)  BP: 124/73 (08/06/20 0824)  SpO2: (!) 91 % (08/06/20 0845) Vital Signs (24h Range):  Temp:  [97.8 °F (36.6 °C)-99.2 °F (37.3 °C)] 97.8 °F (36.6 °C)  Pulse:  [101-123] 123  Resp:  [16-24] 18  SpO2:  [89 %-98 %] 91 %  BP: ()/(58-77) 124/73     Weight: 83 kg (182 lb 15.7 oz)  Body mass index is 25.52 kg/m².    Intake/Output Summary (Last 24 hours) at 8/6/2020 1204  Last data filed at 8/6/2020 0700  Gross per 24 hour   Intake 125 ml   Output 500 ml   Net -375 ml      Physical Exam     Deferred in setting of COVID 19.     Significant Labs:   Recent Lab Results       08/06/20  0730   08/05/20  1541   08/05/20  1328        Albumin     3.0     Alkaline Phosphatase     96     Allens Test   Pass       ALT     21     Anion Gap 9   11     AST     26     Baso #     0.01     Basophil%      0.1     BILIRUBIN TOTAL     0.9  Comment:  For infants and newborns, interpretation of results should be based  on gestational age, weight and in agreement with clinical  observations.  Premature Infant recommended reference ranges:  Up to 24 hours.............<8.0 mg/dL  Up to 48 hours............<12.0 mg/dL  3-5 days..................<15.0 mg/dL  6-29 days.................<15.0 mg/dL       Site   LR       BUN, Bld 15   13     Calcium 8.6   9.5     Chloride 101   99     CO2 25   25     Creatinine 1.0   1.0     Differential Method     Automated     eGFR if  >60   >60     eGFR if non  >60  Comment:  Calculation used to obtain the estimated glomerular filtration  rate (eGFR) is the CKD-EPI equation.      >60  Comment:  Calculation used to obtain the estimated glomerular filtration  rate (eGFR) is the CKD-EPI equation.        Eos #     0.1     Eosinophil%     0.8     Glucose 78   85     Gran # (ANC)     10.5     Gran%     83.5     Hematocrit     40.1     Hemoglobin     12.4     Immature Grans (Abs)     0.06  Comment:  Mild elevation in immature granulocytes is non specific and   can be seen in a variety of conditions including stress response,   acute inflammation, trauma and pregnancy. Correlation with other   laboratory and clinical findings is essential.       Immature Granulocytes     0.5     Lymph #     1.2     Lymph%     9.1     Magnesium     2.1     MCH     29.8     MCHC     30.9     MCV     96     Mono #     0.8     Mono%     6.0     MPV     9.6     nRBC     0     Phosphorus     2.6     Platelets     377     POC BE   0       POC HCO3   24.5       POC PCO2   39.7       POC PH   7.398       POC PO2   57       POC SATURATED O2   89       POC TCO2   26       Potassium 4.9   5.2     PROTEIN TOTAL     8.0     RBC     4.16     RDW     16.9     Sample   ARTERIAL       Sodium 135   135     WBC     12.58         All pertinent labs within the past 24 hours have been  reviewed.    Significant Imaging: I have reviewed and interpreted all pertinent imaging results/findings within the past 24 hours.   X-Ray Chest 1 View   Final Result      Stable interstitial opacities which may reflect chronic interstitial lung disease versus COPD with superimposed pneumonitis.  Suggest correlation with clinical findings and consider follow-up with noncontrast CT chest after resolution of acute symptoms.         Electronically signed by: Jourdan Bhardwaj MD   Date:    08/06/2020   Time:    08:51      X-Ray Abdomen AP 1 View   Final Result      No acute process identified.         Electronically signed by: Johann Gold MD   Date:    07/25/2020   Time:    16:06      X-Ray Chest 1 View   Final Result      Stable examination findings of bilateral ground-glass airspace opacities, suggestive of pneumonitis.         Electronically signed by: Davon Santacruz MD   Date:    07/25/2020   Time:    16:03      X-Ray Chest 1 View   Final Result      No significant change.         Electronically signed by: Kari Becerra   Date:    07/22/2020   Time:    18:36      X-Ray Chest AP Portable   Final Result      Abnormal increased interstitial lung markings predominantly at the lung basis left more than right, nonspecific could be acute or chronic.         Electronically signed by: Luda Powell MD   Date:    07/21/2020   Time:    13:54      X-Ray Chest AP Portable    (Results Pending)           Assessment/Plan:      * Pneumonia due to COVID-19 virus  S/p Dexamethasone and remdesivir  -Full dose lovenox  -Monitor closely for worsening  -Isolation ongoing    History of MAC infection  See history of aspergillus and mac      History of Aspergillus & MAC  Continuing voriconazole, switch to itroconazole at [place]   -Follow up with Pulm, ID outpatient  STABLE    GI bleed   - Continue IV Protonix BID  -Stable  RESOLVED    Hypotension  Patient's most recent blood pressure 116/75  RESOLVED      Clostridium difficile  infection  Fully treated, completed abx course  Asymptomatic  RESOLVED    Acute respiratory failure with hypoxia  Increased O2 requirement, currently on 30L at 70% Highflow  Continue to wean  BiPap QHS      MARQUIS (acute kidney injury)  RESOLVED  Avoid nephrotoxics      COVID-19  Patient was stepped up to the ICU yesterday due to increasing O2 demands and decreasing O2 saturation from baseline. Patient was started on continues BiPAP, currently satting at 95-95% at 50% oxygen concentration. Patient was approved for Remdesivir yesterday but morning labs indicated BUN 68 creatinine 2.3, will hold pending repeat CMP.    Plan:   - COVID Isolation per protocol   - Dexamethasone 6mg daily for 10 days   - continue Heparin gtt   - Levofloxacin 750mg daily   - Atorvastatin 40mg daily   - Multivitamin    Sepsis  RESOLVED  See plan for COVID 19    COPD (chronic obstructive pulmonary disease)  Patient uses 2L supplemental oxygen at home.  Setback in terms of increased O2 requirement  Continue to wean  Chest physio ongoing, albuterol q4h      Anemia  Asymptomatic  Continue to monitor QOD CBC      VTE Risk Mitigation (From admission, onward)         Ordered     enoxaparin injection 80 mg  Every 12 hours (non-standard times)      08/04/20 1421     Place sequential compression device  Until discontinued      07/21/20 2142     IP VTE HIGH RISK PATIENT  Once      07/21/20 2142                      Emeli Adam MD   [employer] [facility] PGY2  Department of Hospital Medicine   [facility]

## 2020-08-06 NOTE — PLAN OF CARE
Plan of care reviewed with patient. Patient verbalized understanding of care. Patients oxygen was previously high flow oxygen and is currently vapo therm oxygen with a continuous pulse ox. Patient denies any SOB. No signs/symptoms of distress observed. Telemetry applied. Zero true red alarms noted. Bed locked and low,call light within reach, bed alarm on, alexandra at bedside, non skid socks applied, side rails x2. Instructed to call if needing assistance. Will continue to monitor.

## 2020-08-06 NOTE — CARE UPDATE
Spoke with pt's daughter, phone number [phone], to provide general update. All questions answered.     Ilana Saavedra MD  [facility] PGY-2  08/05/2020

## 2020-08-06 NOTE — ASSESSMENT & PLAN NOTE
S/p Dexamethasone and remdesivir  -Full dose lovenox  -Monitor closely for worsening  -Isolation ongoing

## 2020-08-06 NOTE — [employer] PATIENT INSTRUCTIONS
Discharge Summary/Instructions after an Endoscopic Procedure  Patient Name: Rajan Deluna  Patient MRN: [id number]  Patient YOB: 1945  Thursday, July 30, 2020  Rey Olivares MD  Your health is very important to us during the Covid Crisis. Following your   procedure today, you will receive a daily text for 2 weeks asking about   signs or symptoms of Covid 19.  Please respond to this text when you   receive it so we can follow up and keep you as safe as possible.   RESTRICTIONS:  During your procedure today, you received medications for sedation.  These   medications may affect your judgment, balance and coordination.  Therefore,   for 24 hours, you have the following restrictions:   - DO NOT drive a car, operate machinery, make legal/financial decisions,   sign important papers or drink alcohol.    ACTIVITY:  Today: no heavy lifting, straining or running due to procedural   sedation/anesthesia.  The following day: return to full activity including work.  DIET:  Eat and drink normally unless instructed otherwise.     TREATMENT FOR COMMON SIDE EFFECTS:  - Mild abdominal pain, nausea, belching, bloating or excessive gas:  rest,   eat lightly and use a heating pad.  - Sore Throat: treat with throat lozenges and/or gargle with warm salt   water.  - Because air was used during the procedure, expelling large amounts of air   from your rectum or belching is normal.  - If a bowel prep was taken, you may not have a bowel movement for 1-3 days.    This is normal.  SYMPTOMS TO WATCH FOR AND REPORT TO YOUR PHYSICIAN:  1. Abdominal pain or bloating, other than gas cramps.  2. Chest pain.  3. Back pain.  4. Signs of infection such as: chills or fever occurring within 24 hours   after the procedure.  5. Rectal bleeding, which would show as bright red, maroon, or black stools.   (A tablespoon of blood from the rectum is not serious, especially if   hemorrhoids are present.)  6. Vomiting.  7. Weakness or dizziness.  GO  DIRECTLY TO THE NEAREST EMERGENCY ROOM IF YOU HAVE ANY OF THE FOLLOWING:      Difficulty breathing              Chills and/or fever over 101 F   Persistent vomiting and/or vomiting blood   Severe abdominal pain   Severe chest pain   Black, tarry stools   Bleeding- more than one tablespoon   Any other symptom or condition that you feel may need urgent attention  Your doctor recommends these additional instructions:  If any biopsies were taken, your doctors clinic will contact you in 1 to 2   weeks with any results.  - Continue treatment for respiratory instability in setting of COVID-19   disease as underway by medical team  - Trend hgb and transfuse as needed to maintain goal hgb >7  - Perform upper single-balloon enteroscopy for ablation of angioectasias   when medically appropriate (when at clinical baseline) in the current   absence of overt GI bleeding.  - Following  For questions, problems or results please call your physician - Rey Olivares MD.  EMERGENCY PHONE NUMBER: [phone],  LAB RESULTS: [phone]  IF A COMPLICATION OR EMERGENCY SITUATION ARISES AND YOU ARE UNABLE TO REACH   YOUR PHYSICIAN - GO DIRECTLY TO THE EMERGENCY ROOM.  MD Rey Connolly MD  8/6/2020 3:30:59 PM  This report has been verified and signed electronically.  [employer]

## 2020-08-06 NOTE — ASSESSMENT & PLAN NOTE
Continuing voriconazole, switch to itroconazole at [place]   -Follow up with Pulm, ID outpatient  STABLE

## 2020-08-06 NOTE — PT/OT/SLP PROGRESS
Occupational Therapy   Treatment    Name: Rajan Deluna  MRN: [id number]  Admitting Diagnosis:  Pneumonia due to COVID-19 virus  3 Days Post-Op  Pre-op Diagnosis: Anemia, unspecified type [D64.9]  Melena [K92.1] s/p Procedure(s):  IMAGING PROCEDURE, GI TRACT, INTRALUMINAL, VIA CAPSULE   Recommendations:     Discharge Recommendations: (TBD)  Discharge Equipment Recommendations:  (TBD)  Barriers to discharge:  Decreased caregiver support, Inaccessible home environment    Assessment:     Rajan Deluna is a 74 y.o. male with a medical diagnosis of Pneumonia due to COVID-19 virus.  He presents with Performance deficits affecting function are weakness, impaired endurance, impaired self care skills, impaired functional mobilty, gait instability, impaired balance, decreased upper extremity function, decreased lower extremity function, decreased coordination, decreased safety awareness, impaired cardiopulmonary response to activity.     Pt. Presents with poor activity tolerance, O2 saturation pre-exercise 90% on HFNC, desats to 81%% with UE AROM ex. Needing frequent rest breaks for deep breathing and PLB  To recovery and takes along time for O2 sats to recover back to 90%. Pt. coughing during ex which desats O2 levels and increased recovery time.  Continue with OT POC.    Rehab Prognosis:  Fair; patient would benefit from acute skilled OT services to address these deficits and reach maximum level of function.       Plan:     Patient to be seen 5 x/week to address the above listed problems via self-care/home management, therapeutic activities, therapeutic exercises  · Plan of Care Expires: 08/27/20  · Plan of Care Reviewed with: patient    Subjective     Pain/Comfort:  · Pain Rating 1: 0/10  · Pain Rating Post-Intervention 1: 0/10    Objective:     Communicated with: nurse prior to session.  Patient found HOB elevated with pulse ox (continuous), oxygen, telemetry, bed alarm(HFNC) upon OT entry to room.    General Precautions:  Standard, special contact, fall, contact, droplet, airborne(Cdiff and COVID19+)   Orthopedic Precautions:N/A   Braces: N/A     Occupational Performance:     Bed Mobility:    · Patient completed Scooting/Bridging with minimum assistance and pt was sling down in bed, scooted with min A using drawsheet andbed flatten         [facility] 6 Click ADL: 14    Treatment & Education:  --purpose of OT visit and POC, verbalized understanding  --BUE AAROM and AROM x 10 reps. Initiated ex with AAROM for shld flex/ext to and abd/add to WFL, then pt performed AROM for shld hor abd/add, chest press with, frequent rest breaks, SOB, O2 desatted to 81%, recovery time ~ 1-2 mins to get O2 sats back up to 90% on HFNC.  Pt. with downward gaze, no real interaction unless stimulated.     Patient left HOB elevated with all lines intact, call button in reach and bed alarm onEducation:      GOALS:   Multidisciplinary Problems     Occupational Therapy Goals        Problem: Occupational Therapy Goal    Goal Priority Disciplines Outcome Interventions   Occupational Therapy Goal     OT, PT/OT Ongoing, Progressing    Description: Goals to be met by: 8/27/20     Patient will increase functional independence with ADLs by performing:    LE Dressing with Stand-by Assistance.  Grooming while standing with Stand-by Assistance.  Toileting from toilet with Stand-by Assistance for hygiene and clothing management.   Supine to sit with Stand-by Assistance.  Step transfer with Stand-by Assistance  Toilet transfer to toilet with Stand-by Assistance.  Upper extremity exercise program x10 reps per handout, with independence.                     Time Tracking:     OT Date of Treatment: 08/06/20  OT Start Time: 1805  OT Stop Time: 1828  OT Total Time (min): 23 min    Billable Minutes:Therapeutic Exercise 23  Total Time 23    Stephanie Ramsey OT  8/6/2020

## 2020-08-06 NOTE — PLAN OF CARE
.Vapotherm in use at 70% 02 flow 30.Patient on oxygen with documented flow.  Will attempt to wean per O2 order protocol.  Will continue to monitor.

## 2020-08-06 NOTE — PLAN OF CARE
Problem: Physical Therapy Goal  Goal: Physical Therapy Goal  Description: Goals to be met by: 20     Patient will increase functional independence with mobility by performin. Supine <> sit with MInimal Assistance - met 20  2. Sit to stand transfer with Minimal Assistance - met 20  3. Bed to chair transfer with Minimal Assistance   4. Gait x 50 ft with min A with appropriate AD  5. Lower extremity exercise program x 10 reps per handout, with supervision    Outcome: Ongoing, Progressing

## 2020-08-06 NOTE — SUBJECTIVE & OBJECTIVE
Interval History: Patient had episode of desaturation yesterday, required increase in O2, switch from high-flow NC to VapoTherm, now at 75% O2 with 30L flow. Concerning insofar as patient had previously been progressing.CXR ordered, is stable.     Unable to perform ROS at this time due to tech issues, will check back in with patient once these are resolved.     Objective:     Vital Signs (Most Recent):  Temp: 97.8 °F (36.6 °C) (08/06/20 0824)  Pulse: (!) 123 (08/06/20 1200)  Resp: 18 (08/06/20 0824)  BP: 124/73 (08/06/20 0824)  SpO2: (!) 91 % (08/06/20 0845) Vital Signs (24h Range):  Temp:  [97.8 °F (36.6 °C)-99.2 °F (37.3 °C)] 97.8 °F (36.6 °C)  Pulse:  [101-123] 123  Resp:  [16-24] 18  SpO2:  [89 %-98 %] 91 %  BP: ()/(58-77) 124/73     Weight: 83 kg (182 lb 15.7 oz)  Body mass index is 25.52 kg/m².    Intake/Output Summary (Last 24 hours) at 8/6/2020 1204  Last data filed at 8/6/2020 0700  Gross per 24 hour   Intake 125 ml   Output 500 ml   Net -375 ml      Physical Exam     Deferred in setting of COVID 19.     Significant Labs:   Recent Lab Results       08/06/20  0730   08/05/20  1541   08/05/20  1328        Albumin     3.0     Alkaline Phosphatase     96     Allens Test   Pass       ALT     21     Anion Gap 9   11     AST     26     Baso #     0.01     Basophil%     0.1     BILIRUBIN TOTAL     0.9  Comment:  For infants and newborns, interpretation of results should be based  on gestational age, weight and in agreement with clinical  observations.  Premature Infant recommended reference ranges:  Up to 24 hours.............<8.0 mg/dL  Up to 48 hours............<12.0 mg/dL  3-5 days..................<15.0 mg/dL  6-29 days.................<15.0 mg/dL       Site   LR       BUN, Bld 15   13     Calcium 8.6   9.5     Chloride 101   99     CO2 25   25     Creatinine 1.0   1.0     Differential Method     Automated     eGFR if  >60   >60     eGFR if non  >60  Comment:  Calculation  used to obtain the estimated glomerular filtration  rate (eGFR) is the CKD-EPI equation.      >60  Comment:  Calculation used to obtain the estimated glomerular filtration  rate (eGFR) is the CKD-EPI equation.        Eos #     0.1     Eosinophil%     0.8     Glucose 78   85     Gran # (ANC)     10.5     Gran%     83.5     Hematocrit     40.1     Hemoglobin     12.4     Immature Grans (Abs)     0.06  Comment:  Mild elevation in immature granulocytes is non specific and   can be seen in a variety of conditions including stress response,   acute inflammation, trauma and pregnancy. Correlation with other   laboratory and clinical findings is essential.       Immature Granulocytes     0.5     Lymph #     1.2     Lymph%     9.1     Magnesium     2.1     MCH     29.8     MCHC     30.9     MCV     96     Mono #     0.8     Mono%     6.0     MPV     9.6     nRBC     0     Phosphorus     2.6     Platelets     377     POC BE   0       POC HCO3   24.5       POC PCO2   39.7       POC PH   7.398       POC PO2   57       POC SATURATED O2   89       POC TCO2   26       Potassium 4.9   5.2     PROTEIN TOTAL     8.0     RBC     4.16     RDW     16.9     Sample   ARTERIAL       Sodium 135   135     WBC     12.58         All pertinent labs within the past 24 hours have been reviewed.    Significant Imaging: I have reviewed and interpreted all pertinent imaging results/findings within the past 24 hours.   X-Ray Chest 1 View   Final Result      Stable interstitial opacities which may reflect chronic interstitial lung disease versus COPD with superimposed pneumonitis.  Suggest correlation with clinical findings and consider follow-up with noncontrast CT chest after resolution of acute symptoms.         Electronically signed by: Jourdan Bhardwaj MD   Date:    08/06/2020   Time:    08:51      X-Ray Abdomen AP 1 View   Final Result      No acute process identified.         Electronically signed by: Johann Gold MD   Date:    07/25/2020    Time:    16:06      X-Ray Chest 1 View   Final Result      Stable examination findings of bilateral ground-glass airspace opacities, suggestive of pneumonitis.         Electronically signed by: Davon Santacruz MD   Date:    07/25/2020   Time:    16:03      X-Ray Chest 1 View   Final Result      No significant change.         Electronically signed by: Kari Becerra   Date:    07/22/2020   Time:    18:36      X-Ray Chest AP Portable   Final Result      Abnormal increased interstitial lung markings predominantly at the lung basis left more than right, nonspecific could be acute or chronic.         Electronically signed by: Luda Powell MD   Date:    07/21/2020   Time:    13:54      X-Ray Chest AP Portable    (Results Pending)

## 2020-08-06 NOTE — PLAN OF CARE
Ailyn Cortes from [facility] managed Medicare called back and said reason for LTAC denial was pt is receiving the care needed at the facility he is in.  They feel he should stay where he is at this time.  Cm will continue to follow pt through transitions of care and assist with any discharge needs.       08/06/20 0937   Post-Acute Status   Post-Acute Authorization Placement  (LTAC)     Phong Suh RN,   [phone]

## 2020-08-06 NOTE — PLAN OF CARE
Pt on vapotherm with documented setting. Pt in no apparent distress. Will place on Bipap (QHS).  Will continue to monitor.

## 2020-08-06 NOTE — PLAN OF CARE
Problem: Occupational Therapy Goal  Goal: Occupational Therapy Goal  Description: Goals to be met by: 8/27/20     Patient will increase functional independence with ADLs by performing:    LE Dressing with Stand-by Assistance.  Grooming while standing with Stand-by Assistance.  Toileting from toilet with Stand-by Assistance for hygiene and clothing management.   Supine to sit with Stand-by Assistance.  Step transfer with Stand-by Assistance  Toilet transfer to toilet with Stand-by Assistance.  Upper extremity exercise program x10 reps per handout, with independence.    Outcome: Ongoing, Progressing slowly

## 2020-08-06 NOTE — PT/OT/SLP PROGRESS
Physical Therapy Treatment    Patient Name:  Rajan Deluna   MRN:  [id number]    Recommendations:     Discharge Recommendations:  (TBD)   Discharge Equipment Recommendations: walker, rolling   Barriers to discharge: decreased functional mobility tolerance    Assessment:     Rajan Deluna is a 74 y.o. male admitted with a medical diagnosis of Pneumonia due to COVID-19 virus.  He presents with the following impairments/functional limitations:  weakness, impaired endurance, impaired self care skills, impaired functional mobilty, decreased lower extremity function, gait instability, impaired balance, impaired cardiopulmonary response to activity. Pt supine to/from sit with Min assist/CG.  Pt sit to stand x 2 with Min assist of 1 with RW.  Pt ambulated 3' to the right with RW with Min assist of 1. Pt on high flow O2 throughout with long rest periods between activities and exercises.    Rehab Prognosis: Good; patient would benefit from acute skilled PT services to address these deficits and reach maximum level of function.    Recent Surgery: Procedure(s) (LRB):  IMAGING PROCEDURE, GI TRACT, INTRALUMINAL, VIA CAPSULE (N/A) 3 Days Post-Op    Plan:     During this hospitalization, patient to be seen 6 x/week to address the identified rehab impairments via gait training, therapeutic activities, therapeutic exercises, neuromuscular re-education and progress toward the following goals:    · Plan of Care Expires:  09/07/20    Subjective     Chief Complaint: I want to get up  Patient/Family Comments/goals: to get up  Pain/Comfort:  · Pain Rating 1: 0/10      Objective:     Communicated with nurse prior to session.  Patient found HOB elevated with bed alarm, pulse ox (continuous), telemetry, peripheral IV, oxygen upon PT entry to room.     General Precautions: Standard, airborne, contact, fall, droplet, special contact   Orthopedic Precautions:N/A   Braces: N/A     Functional Mobility:   Pt supine to/from sit with Min assist/CG.   Pt sit to stand x 2 with Min assist of 1 with RW.  Pt ambulated 3' to the right with RW with Min assist of 1. Pt on high flow O2 throughout with long rest periods between activities and exercises      AM-PAC 6 CLICK MOBILITY  Turning over in bed (including adjusting bedclothes, sheets and blankets)?: 4  Sitting down on and standing up from a chair with arms (e.g., wheelchair, bedside commode, etc.): 3  Moving from lying on back to sitting on the side of the bed?: 3  Moving to and from a bed to a chair (including a wheelchair)?: 3  Need to walk in hospital room?: 2  Climbing 3-5 steps with a railing?: 2  Basic Mobility Total Score: 17       Therapeutic Activities and Exercises:   Pt performed BLE supine exercises 10 x 2 in all available motions with rest breaks.    Patient left HOB elevated with all lines intact, call button in reach, bed alarm on and nurse notified..    GOALS:   Multidisciplinary Problems     Physical Therapy Goals        Problem: Physical Therapy Goal    Goal Priority Disciplines Outcome Goal Variances Interventions   Physical Therapy Goal     PT, PT/OT Ongoing, Progressing     Description: Goals to be met by: 20     Patient will increase functional independence with mobility by performin. Supine <> sit with MInimal Assistance  2. Sit to stand transfer with Minimal Assistance  3. Bed to chair transfer with Minimal Assistance   4. Gait x 50 ft with min A with appropriate AD  5. Lower extremity exercise program x 10 reps per handout, with supervision                     Time Tracking:     PT Received On: 20  PT Start Time: 0105     PT Stop Time: 0144  PT Total Time (min): 39 min     Billable Minutes: Therapeutic Activity 26 and Therapeutic Exercise 13    Treatment Type: Treatment  PT/PTA: PT     PTA Visit Number: 1     Miri Dickinson, PT  2020

## 2020-08-06 NOTE — ASSESSMENT & PLAN NOTE
Patient uses 2L supplemental oxygen at home.  Setback in terms of increased O2 requirement  Continue to wean  Chest physio ongoing, albuterol q4h

## 2020-08-07 PROBLEM — K92.2 GI BLEED: Status: RESOLVED | Noted: 2020-07-29 | Resolved: 2020-08-07

## 2020-08-07 PROBLEM — N17.9 AKI (ACUTE KIDNEY INJURY): Status: RESOLVED | Noted: 2020-07-21 | Resolved: 2020-08-07

## 2020-08-07 LAB
ALBUMIN SERPL BCP-MCNC: 2.4 G/DL (ref 3.5–5.2)
ALP SERPL-CCNC: 80 U/L (ref 55–135)
ALT SERPL W/O P-5'-P-CCNC: 16 U/L (ref 10–44)
ANION GAP SERPL CALC-SCNC: 7 MMOL/L (ref 8–16)
APTT BLDCRRT: 29.3 SEC (ref 21–32)
AST SERPL-CCNC: 22 U/L (ref 10–40)
BASOPHILS # BLD AUTO: 0.01 K/UL (ref 0–0.2)
BASOPHILS NFR BLD: 0.1 % (ref 0–1.9)
BILIRUB SERPL-MCNC: 0.6 MG/DL (ref 0.1–1)
BUN SERPL-MCNC: 15 MG/DL (ref 8–23)
CALCIUM SERPL-MCNC: 8.7 MG/DL (ref 8.7–10.5)
CHLORIDE SERPL-SCNC: 102 MMOL/L (ref 95–110)
CO2 SERPL-SCNC: 27 MMOL/L (ref 23–29)
CREAT SERPL-MCNC: 1 MG/DL (ref 0.5–1.4)
DIFFERENTIAL METHOD: ABNORMAL
EOSINOPHIL # BLD AUTO: 0.1 K/UL (ref 0–0.5)
EOSINOPHIL NFR BLD: 0.7 % (ref 0–8)
ERYTHROCYTE [DISTWIDTH] IN BLOOD BY AUTOMATED COUNT: 16.7 % (ref 11.5–14.5)
EST. GFR  (AFRICAN AMERICAN): >60 ML/MIN/1.73 M^2
EST. GFR  (NON AFRICAN AMERICAN): >60 ML/MIN/1.73 M^2
GLUCOSE SERPL-MCNC: 97 MG/DL (ref 70–110)
HCT VFR BLD AUTO: 33.7 % (ref 40–54)
HGB BLD-MCNC: 10.5 G/DL (ref 14–18)
IMM GRANULOCYTES # BLD AUTO: 0.03 K/UL (ref 0–0.04)
IMM GRANULOCYTES NFR BLD AUTO: 0.3 % (ref 0–0.5)
LYMPHOCYTES # BLD AUTO: 0.9 K/UL (ref 1–4.8)
LYMPHOCYTES NFR BLD: 8.4 % (ref 18–48)
MAGNESIUM SERPL-MCNC: 2.1 MG/DL (ref 1.6–2.6)
MCH RBC QN AUTO: 30.1 PG (ref 27–31)
MCHC RBC AUTO-ENTMCNC: 31.2 G/DL (ref 32–36)
MCV RBC AUTO: 97 FL (ref 82–98)
MONOCYTES # BLD AUTO: 1.1 K/UL (ref 0.3–1)
MONOCYTES NFR BLD: 10.2 % (ref 4–15)
NEUTROPHILS # BLD AUTO: 8.7 K/UL (ref 1.8–7.7)
NEUTROPHILS NFR BLD: 80.3 % (ref 38–73)
NRBC BLD-RTO: 0 /100 WBC
PHOSPHATE SERPL-MCNC: 2.8 MG/DL (ref 2.7–4.5)
PLATELET # BLD AUTO: 316 K/UL (ref 150–350)
PMV BLD AUTO: 9.6 FL (ref 9.2–12.9)
POCT GLUCOSE: 119 MG/DL (ref 70–110)
POCT GLUCOSE: 143 MG/DL (ref 70–110)
POTASSIUM SERPL-SCNC: 4.9 MMOL/L (ref 3.5–5.1)
PROT SERPL-MCNC: 6.5 G/DL (ref 6–8.4)
RBC # BLD AUTO: 3.49 M/UL (ref 4.6–6.2)
SODIUM SERPL-SCNC: 136 MMOL/L (ref 136–145)
WBC # BLD AUTO: 10.83 K/UL (ref 3.9–12.7)

## 2020-08-07 PROCEDURE — 25000003 PHARM REV CODE 250: Performed by: STUDENT IN AN ORGANIZED HEALTH CARE EDUCATION/TRAINING PROGRAM

## 2020-08-07 PROCEDURE — 25000003 PHARM REV CODE 250: Performed by: FAMILY MEDICINE

## 2020-08-07 PROCEDURE — 36415 COLL VENOUS BLD VENIPUNCTURE: CPT

## 2020-08-07 PROCEDURE — 85730 THROMBOPLASTIN TIME PARTIAL: CPT

## 2020-08-07 PROCEDURE — 27100171 HC OXYGEN HIGH FLOW UP TO 24 HOURS

## 2020-08-07 PROCEDURE — 25000242 PHARM REV CODE 250 ALT 637 W/ HCPCS: Performed by: STUDENT IN AN ORGANIZED HEALTH CARE EDUCATION/TRAINING PROGRAM

## 2020-08-07 PROCEDURE — 94660 CPAP INITIATION&MGMT: CPT

## 2020-08-07 PROCEDURE — 97530 THERAPEUTIC ACTIVITIES: CPT

## 2020-08-07 PROCEDURE — 97110 THERAPEUTIC EXERCISES: CPT

## 2020-08-07 PROCEDURE — 85025 COMPLETE CBC W/AUTO DIFF WBC: CPT

## 2020-08-07 PROCEDURE — 84100 ASSAY OF PHOSPHORUS: CPT

## 2020-08-07 PROCEDURE — 97803 MED NUTRITION INDIV SUBSEQ: CPT

## 2020-08-07 PROCEDURE — 94640 AIRWAY INHALATION TREATMENT: CPT

## 2020-08-07 PROCEDURE — 80053 COMPREHEN METABOLIC PANEL: CPT

## 2020-08-07 PROCEDURE — 25000003 PHARM REV CODE 250: Performed by: INTERNAL MEDICINE

## 2020-08-07 PROCEDURE — 63600175 PHARM REV CODE 636 W HCPCS: Performed by: FAMILY MEDICINE

## 2020-08-07 PROCEDURE — 99900035 HC TECH TIME PER 15 MIN (STAT)

## 2020-08-07 PROCEDURE — 11000001 HC ACUTE MED/SURG PRIVATE ROOM

## 2020-08-07 PROCEDURE — 94761 N-INVAS EAR/PLS OXIMETRY MLT: CPT

## 2020-08-07 PROCEDURE — 83735 ASSAY OF MAGNESIUM: CPT

## 2020-08-07 PROCEDURE — 27000221 HC OXYGEN, UP TO 24 HOURS

## 2020-08-07 PROCEDURE — 94664 DEMO&/EVAL PT USE INHALER: CPT

## 2020-08-07 RX ADMIN — VORICONAZOLE 200 MG: 200 TABLET, FILM COATED ORAL at 05:08

## 2020-08-07 RX ADMIN — Medication 4 TABLET: at 11:08

## 2020-08-07 RX ADMIN — LIDOCAINE 1 PATCH: 50 PATCH TOPICAL at 04:08

## 2020-08-07 RX ADMIN — VORICONAZOLE 200 MG: 200 TABLET, FILM COATED ORAL at 04:08

## 2020-08-07 RX ADMIN — POTASSIUM & SODIUM PHOSPHATES POWDER PACK 280-160-250 MG 1 PACKET: 280-160-250 PACK at 08:08

## 2020-08-07 RX ADMIN — Medication 4 TABLET: at 04:08

## 2020-08-07 RX ADMIN — ACETAMINOPHEN 650 MG: 325 TABLET ORAL at 05:08

## 2020-08-07 RX ADMIN — THERA TABS 1 TABLET: TAB at 08:08

## 2020-08-07 RX ADMIN — ALBUTEROL SULFATE 2 PUFF: 90 AEROSOL, METERED RESPIRATORY (INHALATION) at 12:08

## 2020-08-07 RX ADMIN — Medication 4 TABLET: at 08:08

## 2020-08-07 RX ADMIN — ENOXAPARIN SODIUM 80 MG: 80 INJECTION SUBCUTANEOUS at 04:08

## 2020-08-07 RX ADMIN — ALBUTEROL SULFATE 2 PUFF: 90 AEROSOL, METERED RESPIRATORY (INHALATION) at 07:08

## 2020-08-07 RX ADMIN — ALBUTEROL SULFATE 2 PUFF: 90 AEROSOL, METERED RESPIRATORY (INHALATION) at 08:08

## 2020-08-07 RX ADMIN — PANTOPRAZOLE SODIUM 40 MG: 40 TABLET, DELAYED RELEASE ORAL at 08:08

## 2020-08-07 RX ADMIN — ALBUTEROL SULFATE 2 PUFF: 90 AEROSOL, METERED RESPIRATORY (INHALATION) at 04:08

## 2020-08-07 RX ADMIN — ENOXAPARIN SODIUM 80 MG: 80 INJECTION SUBCUTANEOUS at 05:08

## 2020-08-07 NOTE — PLAN OF CARE
Recommendation:   1. Continue to encourage intake of meals & Boost.   2. RD to continue to follow to monitor po intake    Goals:   Pt intake >/= 75% EEN/EPN by RD follow up  Nutrition Goal Status: progressing towards goal  Communication of RD Recs: reviewed with RN(Sherly)

## 2020-08-07 NOTE — PROGRESS NOTES
[facility]  Progress/Critical Care - Medicine  History & Physical    Patient Name: Rajan Deluna  MRN: [id number]  Admission Date: 7/21/2020  Hospital Length of Stay: 17 days  Code Status: Full Code  Attending Physician: Fany Petersen MD   Primary Care Provider: Jason Mccord MD   Principal Problem: Acute respiratory disease due to COVID-19 virus    Subjective:     Hospital/ICU Course: When seen, the patient is doing well.  He is now on 11L via NC.  He has no complaints and says his breathing is well.  Otherwise no other change.    Past Medical History:   Diagnosis Date    Arthritis     COPD (chronic obstructive pulmonary disease)     Hypertension     Smoker     Weakness        Past Surgical History:   Procedure Laterality Date    HERNIA REPAIR      INTRALUMINAL GASTROINTESTINAL TRACT IMAGING VIA CAPSULE N/A 8/3/2020    Procedure: IMAGING PROCEDURE, GI TRACT, INTRALUMINAL, VIA CAPSULE;  Surgeon: Rey Olivares MD;  Location: [facility];  Service: Endoscopy;  Laterality: N/A;    right knee surgery         Review of patient's allergies indicates:  No Known Allergies    Family History     None        Tobacco Use    Smoking status: Former Smoker    Smokeless tobacco: Never Used   Substance and Sexual Activity    Alcohol use: Not Currently    Drug use: Never    Sexual activity: Not on file     Objective:     Vital Signs (Most Recent):  Temp: 97.2 °F (36.2 °C) (08/07/20 0746)  Pulse: 68 (08/07/20 1249)  Resp: 18 (08/07/20 1249)  BP: 112/66 (08/07/20 0746)  SpO2: 96 % (08/07/20 1321) Vital Signs (24h Range):  Temp:  [97.2 °F (36.2 °C)-98.6 °F (37 °C)] 97.2 °F (36.2 °C)  Pulse:  [] 68  Resp:  [15-20] 18  SpO2:  [82 %-97 %] 96 %  BP: (112-131)/(66-70) 112/66     Weight: 83 kg (182 lb 15.7 oz)  Body mass index is 25.52 kg/m².      Intake/Output Summary (Last 24 hours) at 8/7/2020 1523  Last data filed at 8/7/2020 1248  Gross per 24 hour   Intake 220 ml   Output 226 ml   Net -6 ml        Physical Exam  Vitals reviewed: virtual visit.   Constitutional:       General: He is not in acute distress.     Appearance: Normal appearance.   HENT:      Head: Normocephalic.      Nose: Nose normal.   Neurological:      General: No focal deficit present.      Mental Status: He is alert.         Vents:  Oxygen Concentration (%): 45 (08/07/20 1311)    Lines/Drains/Airways     Peripheral Intravenous Line                 Peripheral IV - Single Lumen 08/04/20 0313 20 G Right Hand 3 days         Peripheral IV - Single Lumen 08/04/20 0314 18 G Right Forearm 3 days                Significant Labs:    CBC/Anemia Profile:  Recent Labs   Lab 08/06/20  1300 08/07/20  0516   WBC 11.32 10.83   HGB 10.2* 10.5*   HCT 32.0* 33.7*    316   MCV 96 97   RDW 16.6* 16.7*        Chemistries:  Recent Labs   Lab 08/06/20  0730 08/06/20  1300 08/07/20  0516   * 134* 136   K 4.9 4.9 4.9    102 102   CO2 25 27 27   BUN 15 17 15   CREATININE 1.0 1.0 1.0   CALCIUM 8.6* 8.6* 8.7   ALBUMIN  --  2.4* 2.4*   PROT  --  6.3 6.5   BILITOT  --  0.6 0.6   ALKPHOS  --  77 80   ALT  --  16 16   AST  --  21 22   MG  --  2.0 2.1   PHOS  --  2.5* 2.8       All pertinent labs within the past 24 hours have been reviewed.    Significant Imaging: I have reviewed all pertinent imaging results/findings within the past 24 hours.    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]  Yes    COPD (chronic obstructive pulmonary disease) [J44.9] 05/22/2017 Yes    Anemia [D64.9] 12/09/2016 No      Problems Resolved During this Admission:    Diagnosis Date Noted Date Resolved POA    GI bleed [K92.2] 07/29/2020 08/07/2020 Clinically Undetermined    Clostridium difficile infection [A49.8]  08/07/2020 Clinically Undetermined    Acute respiratory  failure with hypoxia [J96.01]  08/07/2020 Yes    MARQUIS (acute kidney injury) [N17.9] 07/21/2020 08/07/2020 Yes       * Acute respiratory disease due to COVID-19 virus  - 7/21 COVID(+)  - s/p remdesivir x5d, dexamethasone x10d  - full dose AC with lovenox  - proning if he can tolerate at all  - incentive spirometry   - please wean O2 as tolerated for goal SpO2 88%     History of Aspergillus & MAC  - serum aspergillus negative, loaded with voriconazole on  7/29   - no MAC treatment at this time per ID     Clostridium difficile infection  Patient positive on PCR, initiated on oral vancomycin & flagyl on 7/24  - 14 days PO vanc/flagyl  (today day 13)    - probiotics     Acute respiratory failure with hypoxia  See COVID problem       COPD (chronic obstructive pulmonary disease)  - Use of 2L NC at home  - metered dose inhaler q4h       Anemia 2/2 GI bleed  - do not transfuse unless Hgb is below 7g/dL  - heparin gtt stopped    - daily CBC     FEN   - ensure lytes aggressively repleted.     Out of bed to chair daily  Incentive spirometry   PT/OT    continued improvement over the last few days.  Continue current management.     Bryan Swain MD  Critical Care - Medicine  [facility]  [phone]

## 2020-08-07 NOTE — PLAN OF CARE
VN cued into patients room for rounding - Patient is in no acute distress at this time.  Call light within reach. Will continue to monitor closely.  Patient instructed to call immediately for SOB.     Pt awaiting assistance to eat his breakfast    Contacted attending nurse   Pt wearing oxygen per high flow nasal cannula at documented settings

## 2020-08-07 NOTE — PROGRESS NOTES
"[facility]  Adult Nutrition  Progress Note    SUMMARY       Recommendations    Recommendation:   1. Continue to encourage intake of meals & Boost.   2. RD to continue to follow to monitor po intake    Goals:   Pt intake >/= 75% EEN/EPN by RD follow up  Nutrition Goal Status: progressing towards goal  Communication of RD Recs: reviewed with RN(Sherly)    Reason for Assessment  Reason For Assessment: RD follow-up  Diagnosis: other (see comments)(Acute respiratory disease due to COVID-19 virus )  Relevant Medical History: HTN, COPD, former smoker  Interdisciplinary Rounds: did not attend  General Information Comments: Pt on 2000 ADA diet with Boost Glucose Control. RN reports pt with fair intake at meals. Pt c/o poor appetite. Unable to assess NFE 2/2 COVID +.  Nutrition Discharge Planning: d/c on cardiac diabetic diet    Nutrition Risk Screen  Nutrition Risk Screen: no indicators present    Nutrition/Diet History  Food Preferences: ADA  Spiritual, Cultural Beliefs, Jain Practices, Values that Affect Care: no  Food Allergies: NKFA  Factors Affecting Nutritional Intake: decreased appetite, other (see comments)(continuous resp support)    Anthropometrics  Temp: 97.2 °F (36.2 °C)  Height Method: Stated  Height: 5' 11" (180.3 cm)  Height (inches): 71 in  Weight Method: Bed Scale  Weight: 83 kg (182 lb 15.7 oz)  Weight (lb): 182.98 lb  Ideal Body Weight (IBW), Male: 172 lb  % Ideal Body Weight, Male (lb): 106.38 %  BMI (Calculated): 25.5  BMI Grade: 25 - 29.9 - overweight     Lab/Procedures/Meds  Pertinent Labs Reviewed: reviewed  Pertinent Labs Comments: Alb 2.4L  Pertinent Medications Reviewed: reviewed  Pertinent Medications Comments: lactobacillus, MIV, pantoprazole    Estimated/Assessed Needs  Weight Used For Calorie Calculations: 83 kg (182 lb 15.7 oz)  Energy Calorie Requirements (kcal): 2057  Energy Need Method: [place](x 1.3)  Protein Requirements: (1.0-1.2 gm/kg)  Weight Used " For Protein Calculations: 83 kg (182 lb 15.7 oz)  Estimated Fluid Requirement Method: RDA Method(or PER MD)  RDA Method (mL): 2057     Nutrition Prescription Ordered  Current Diet Order: 2000 ADA  Oral Nutrition Supplement: Boost Glucose Control    Evaluation of Received Nutrient/Fluid Intake  I/O: 125/500  Energy Calories Required: not meeting needs  Protein Required: not meeting needs  Fluid Required: not meeting needs  Comments: LBM 8/5  Tolerance: tolerating  % Intake of Estimated Energy Needs: 25 - 50 %  % Meal Intake: 25 - 50 %    Nutrition Risk  Level of Risk/Frequency of Follow-up: (2 x/week)     Assessment and Plan  * Pneumonia due to COVID-19 virus  Contributing Nutrition Diagnosis  Inadequate oral intake    Related to (etiology):   physiological state    Signs and Symptoms (as evidenced by):   Pt COVID+ needing continuous respiratory support, and NPO on 7/30 for GI video capsule endoscopy 2/2 GI bleed    Interventions:  Collaboration with other providers  Commercial Beverage: Boost Glucose Control TID to supplement protein and calorie intake      Nutrition Diagnosis Status:   Continues      Monitor and Evaluation  Food and Nutrient Intake: energy intake, food and beverage intake  Food and Nutrient Adminstration: diet order  Knowledge/Beliefs/Attitudes: food and nutrition knowledge/skill  Physical Activity and Function: nutrition-related ADLs and IADLs  Anthropometric Measurements: weight, weight change, body mass index  Biochemical Data, Medical Tests and Procedures: electrolyte and renal panel, gastrointestinal profile, glucose/endocrine profile, inflammatory profile, lipid profile     Malnutrition Assessment  Unable to assess NFPE 2/2 COVID +        Nutrition Follow-Up    RD Follow-up?: Yes

## 2020-08-07 NOTE — PT/OT/SLP PROGRESS
Physical Therapy Treatment    Patient Name:  Rajan Deluna   MRN:  [id number]    Recommendations:     Discharge Recommendations:  (TBD)   Discharge Equipment Recommendations: walker, rolling   Barriers to discharge: decreased tolerance to functional mobility    Assessment:     Rajan Deluna is a 74 y.o. male admitted with a medical diagnosis of Acute respiratory disease due to COVID-19 virus.  He presents with the following impairments/functional limitations:  weakness, impaired endurance, impaired functional mobilty, impaired self care skills, gait instability, impaired balance, decreased lower extremity function, impaired cardiopulmonary response to activity.  Pt would continue to benefit from P.T. To address impairments listed above.  .    Rehab Prognosis: Fair; patient would benefit from acute skilled PT services to address these deficits and reach maximum level of function.    Recent Surgery: Procedure(s) (LRB):  IMAGING PROCEDURE, GI TRACT, INTRALUMINAL, VIA CAPSULE (N/A) 4 Days Post-Op    Plan:     During this hospitalization, patient to be seen 6 x/week to address the identified rehab impairments via gait training, therapeutic activities, therapeutic exercises, neuromuscular re-education and progress toward the following goals:    · Plan of Care Expires:  09/07/20    Subjective       Patient/Family Comments/goals: Pt agreed to tx.  Pain/Comfort:  · Pain Rating 1: 0/10  · Pain Rating Post-Intervention 1: 0/10      Objective:     Communicated with RN (Sherly) prior to session.  Patient found supien with HOB elevated with oxygen, pulse ox (continuous), telemetry, bed alarm upon PT entry to room.     General Precautions: Standard, airborne, contact, fall, droplet, special contact   Orthopedic Precautions:N/A   Braces:         AM-PAC 6 CLICK MOBILITY  Turning over in bed (including adjusting bedclothes, sheets and blankets)?: 4  Sitting down on and standing up from a chair with arms (e.g., wheelchair, bedside  commode, etc.): 3  Moving from lying on back to sitting on the side of the bed?: 3  Need to walk in hospital room?: 2  Climbing 3-5 steps with a railing?: 2       Therapeutic Activities and Exercises:   Pt's O2 sats upon entering the room were 88% at rest with pt coughing. Pt was able to stop coughing and was instructed in PLB, but O2 sats remained at 88% to 87%.  BLE AA/PROM all available planes x 10 reps then an additional 5 to 6 as tolerated by pt with rest breaks as needed secondary to O2 sats dropping to 82% to 85% and once to 79%.  Pt was instructed in PLB and O2 sats increased up to 88% only.  Tx stopped secondary to HR increasing to 132bpm.  RN notified.  Pt was positioned sitting upright in bed with HOB elevated ~70-80* with dinner tray placed in front of pt.      Patient left Pt sitting up in bed with HOB elevated 70 to 80* with all lines intact, call button in reach, bed alarm on and RN notified..    GOALS:   Multidisciplinary Problems     Physical Therapy Goals        Problem: Physical Therapy Goal    Goal Priority Disciplines Outcome Goal Variances Interventions   Physical Therapy Goal     PT, PT/OT Ongoing, Progressing     Description: Goals to be met by: 20     Patient will increase functional independence with mobility by performin. Supine <> sit with MInimal Assistance  2. Sit to stand transfer with Minimal Assistance  3. Bed to chair transfer with Minimal Assistance   4. Gait x 50 ft with min A with appropriate AD  5. Lower extremity exercise program x 10 reps per handout, with supervision                     Time Tracking:     PT Received On: 20  PT Start Time: 1656     PT Stop Time: 1723  PT Total Time (min): 27 min     Billable Minutes: Therapeutic Activity 15 and Therapeutic Exercise 12    Treatment Type: Treatment  PT/PTA: PTA     PTA Visit Number: 2     Dee Urena PTA  2020

## 2020-08-07 NOTE — PLAN OF CARE
Problem: Physical Therapy Goal  Goal: Physical Therapy Goal  Description: Goals to be met by: 20     Patient will increase functional independence with mobility by performin. Supine <> sit with MInimal Assistance  2. Sit to stand transfer with Minimal Assistance  3. Bed to chair transfer with Minimal Assistance   4. Gait x 50 ft with min A with appropriate AD  5. Lower extremity exercise program x 10 reps per handout, with supervision    Outcome: Ongoing, Progressing   Continue working toward goals,

## 2020-08-07 NOTE — SUBJECTIVE & OBJECTIVE
Interval History: Stable, NAEON, on 11L HFNC. Continue to wean. Possible removal of isolation status on Monday 8/10.    Review of Systems   Respiratory: Negative for cough and shortness of breath.    Cardiovascular: Negative for chest pain.   Gastrointestinal: Negative for abdominal pain, blood in stool, diarrhea and nausea.   Psychiatric/Behavioral: The patient is not nervous/anxious.      Objective:     Vital Signs (Most Recent):  Temp: 97.2 °F (36.2 °C) (08/07/20 0746)  Pulse: 68 (08/07/20 1249)  Resp: 18 (08/07/20 1249)  BP: 112/66 (08/07/20 0746)  SpO2: 96 % (08/07/20 1321) Vital Signs (24h Range):  Temp:  [97.2 °F (36.2 °C)-98.6 °F (37 °C)] 97.2 °F (36.2 °C)  Pulse:  [] 68  Resp:  [15-20] 18  SpO2:  [82 %-97 %] 96 %  BP: (112-131)/(66-70) 112/66     Weight: 83 kg (182 lb 15.7 oz)  Body mass index is 25.52 kg/m².    Intake/Output Summary (Last 24 hours) at 8/7/2020 1514  Last data filed at 8/7/2020 1248  Gross per 24 hour   Intake 220 ml   Output 226 ml   Net -6 ml      Physical Exam   Deferred due to COVID 19    Significant Labs:   CBC:   Recent Labs   Lab 08/06/20  1300 08/07/20  0516   WBC 11.32 10.83   HGB 10.2* 10.5*   HCT 32.0* 33.7*    316     CMP:   Recent Labs   Lab 08/06/20  0730 08/06/20  1300 08/07/20  0516   * 134* 136   K 4.9 4.9 4.9    102 102   CO2 25 27 27   GLU 78 108 97   BUN 15 17 15   CREATININE 1.0 1.0 1.0   CALCIUM 8.6* 8.6* 8.7   PROT  --  6.3 6.5   ALBUMIN  --  2.4* 2.4*   BILITOT  --  0.6 0.6   ALKPHOS  --  77 80   AST  --  21 22   ALT  --  16 16   ANIONGAP 9 5* 7*   EGFRNONAA >60 >60 >60     All pertinent labs within the past 24 hours have been reviewed.    Significant Imaging: I have reviewed and interpreted all pertinent imaging results/findings within the past 24 hours.

## 2020-08-07 NOTE — PROGRESS NOTES
[facility] Hospital Medicine  Progress Note    Patient Name: Rajan Deluna  MRN: [id number]  Patient Class: IP- Inpatient   Admission Date: 7/21/2020  Length of Stay: 17 days  Attending Physician: Fany Petersen MD  Primary Care Provider: Jason Mccord MD        Subjective:     Principal Problem:Acute respiratory disease due to COVID-19 virus        HPI:  Patient is a 74-year-old male with a past medical history of hypertension, COPD, MAC infection who presented to the ED with altered mental status and fever.  As per the family, patient has had altered mental status for the past 3-4 days. Family endorses increased somnolence as well as urinating on himself. EMS was called two days prior to presentation but reportedly deemed that patient did not need to be brought in. Over the previous day, reportedly the patient was found stuck, staring in his sink, not responding. On the day of presentation, the patient was found down on the floor, family wasn't able to get patient up from the floor and was reportedly not responding as much as baseline. Family believed that patient may not have been wearing baseline 2L O2. Patient also endorses a cough and generalized weakness. Patient reportedly had a recent UTI.     In the ED, chest x-ray showed diffuse interstitial patchy infiltrates concerning for possible pneumonia and a COVID-19 screening test was positive. Fever was 101° F and patient was hypotensive 80s/40s. Patient given 30cc/kg bolus. BP very soft on presentation, adequately responded to volume resuscitation and then decreased again. Labs showed a BUN 45, creatinine 2.9, , , troponin 0.036, procalcitonin 1.13.     Overview/Hospital Course:  8/3 - NAEON, had chest pain, normal EKG and troponin overnight, resolved with GI cocktail. This AM, H/H decreased from 10.8/33.7 to 9.1/28. Infectious disease recommends oral Vancomycin and IV Metronidazole for C. Diff until 8/7 and to continue  voriconazole and discharge on Itraconazole 100mg BID and follow up with Pulmonary and ID for continued Aspergillosis and MAC treatment. Patient currently satting 90% on 40L vapotherm at 65% FiO2, weaning as tolerated.    8/4 - NAEON. Patient is currently satting in the >90% on 20L vapotherm at 35% FiO2, weaning as tolerated. H/H stable 9.5/30.3. Per GI, VCE show a few nonbleeding small likely duodenal AVMs and no other evidence of overt GI Bleed and recommend outpatient evaluation due to the patient's current pulmonary status.     8/5 - NAEON. Pending LTAC status. Spo2 70's with PT/OT and need to increase oxygen to vapotherm 20L 70% with an increase in Spo2 to 90's.     8/6 - NAEON. LTAC declined due to insurance. Currently on 30L 70% vapotherm.    8/7 - 11L 45% HFNL. Off covid precautions Monday 8/10    Interval History: Stable, NAEON, on 11L HFNC. Continue to wean. Possible removal of isolation status on Monday 8/10.    Review of Systems   Respiratory: Negative for cough and shortness of breath.    Cardiovascular: Negative for chest pain.   Gastrointestinal: Negative for abdominal pain, blood in stool, diarrhea and nausea.   Psychiatric/Behavioral: The patient is not nervous/anxious.      Objective:     Vital Signs (Most Recent):  Temp: 97.2 °F (36.2 °C) (08/07/20 0746)  Pulse: 68 (08/07/20 1249)  Resp: 18 (08/07/20 1249)  BP: 112/66 (08/07/20 0746)  SpO2: 96 % (08/07/20 1321) Vital Signs (24h Range):  Temp:  [97.2 °F (36.2 °C)-98.6 °F (37 °C)] 97.2 °F (36.2 °C)  Pulse:  [] 68  Resp:  [15-20] 18  SpO2:  [82 %-97 %] 96 %  BP: (112-131)/(66-70) 112/66     Weight: 83 kg (182 lb 15.7 oz)  Body mass index is 25.52 kg/m².    Intake/Output Summary (Last 24 hours) at 8/7/2020 1514  Last data filed at 8/7/2020 1248  Gross per 24 hour   Intake 220 ml   Output 226 ml   Net -6 ml      Physical Exam   Deferred due to COVID 19    Significant Labs:   CBC:   Recent Labs   Lab 08/06/20  1300 08/07/20  0516   WBC 11.32 10.83    HGB 10.2* 10.5*   HCT 32.0* 33.7*    316     CMP:   Recent Labs   Lab 08/06/20  0730 08/06/20  1300 08/07/20  0516   * 134* 136   K 4.9 4.9 4.9    102 102   CO2 25 27 27   GLU 78 108 97   BUN 15 17 15   CREATININE 1.0 1.0 1.0   CALCIUM 8.6* 8.6* 8.7   PROT  --  6.3 6.5   ALBUMIN  --  2.4* 2.4*   BILITOT  --  0.6 0.6   ALKPHOS  --  77 80   AST  --  21 22   ALT  --  16 16   ANIONGAP 9 5* 7*   EGFRNONAA >60 >60 >60     All pertinent labs within the past 24 hours have been reviewed.    Significant Imaging: I have reviewed and interpreted all pertinent imaging results/findings within the past 24 hours.      Assessment/Plan:      * Acute respiratory disease due to COVID-19 virus  Weaning O2  -Full dose lovenox  -Monitor closely for worsening  -Isolation ongoing    History of MAC infection  See history of aspergillus and mac      History of Aspergillus & MAC  Continuing voriconazole, switch to itroconazole at DC   -Follow up with Pulm, ID outpatient  STABLE    Hypotension  Patient's most recent blood pressure 116/75  RESOLVED      COVID-19 virus infection        COVID-19  Patient was stepped up to the ICU yesterday due to increasing O2 demands and decreasing O2 saturation from baseline. Patient was started on continues BiPAP, currently satting at 95-95% at 50% oxygen concentration. Patient was approved for Remdesivir yesterday but morning labs indicated BUN 68 creatinine 2.3, will hold pending repeat CMP.    Plan:   - COVID Isolation per protocol   - Dexamethasone 6mg daily for 10 days   - continue Heparin gtt   - Levofloxacin 750mg daily   - Atorvastatin 40mg daily   - Multivitamin    Sepsis  RESOLVED  See plan for COVID 19    COPD (chronic obstructive pulmonary disease)  11L HFNC  Continue to wean  Chest physio ongoing, albuterol q4h      Anemia  Asymptomatic  Continue to monitor QOD CBC  STABLE      VTE Risk Mitigation (From admission, onward)         Ordered     enoxaparin injection 80 mg  Every 12  hours (non-standard times)      08/04/20 1421     Place sequential compression device  Until discontinued      07/21/20 2142     IP VTE HIGH RISK PATIENT  Once      07/21/20 2142                      Emeli Adam MD   [employer] PGY2  Department of Hospital Medicine   [facility]

## 2020-08-07 NOTE — ASSESSMENT & PLAN NOTE
Continuing voriconazole, switch to itroconazole at ID   -Follow up with Pulm, ID outpatient  STABLE

## 2020-08-07 NOTE — PLAN OF CARE
Problem: Fall Injury Risk  Goal: Absence of Fall and Fall-Related Injury  Outcome: Ongoing, Progressing     Problem: Skin Injury Risk Increased  Goal: Skin Health and Integrity  Outcome: Ongoing, Progressing     Problem: Infection (Sepsis/Septic Shock)  Goal: Absence of Infection Signs/Symptoms  Outcome: Ongoing, Progressing     Problem: Respiratory Compromise (Sepsis/Septic Shock)  Goal: Effective Oxygenation and Ventilation  Outcome: Ongoing, Progressing

## 2020-08-07 NOTE — PLAN OF CARE
Patient on oxygen with documented flow.  Will attempt to wean per O2 order protocol. BIPAP and vapotherm on standby at patient bedside. No respiratory distress noted at this time. The proper method of use, as well as anticipated side effects, of this metered-dose inhaler are discussed and demonstrated to the patient. Will continue to monitor.

## 2020-08-08 LAB — POCT GLUCOSE: 91 MG/DL (ref 70–110)

## 2020-08-08 PROCEDURE — 11000001 HC ACUTE MED/SURG PRIVATE ROOM

## 2020-08-08 PROCEDURE — 99900035 HC TECH TIME PER 15 MIN (STAT)

## 2020-08-08 PROCEDURE — 25000003 PHARM REV CODE 250: Performed by: STUDENT IN AN ORGANIZED HEALTH CARE EDUCATION/TRAINING PROGRAM

## 2020-08-08 PROCEDURE — 94660 CPAP INITIATION&MGMT: CPT

## 2020-08-08 PROCEDURE — 27100171 HC OXYGEN HIGH FLOW UP TO 24 HOURS

## 2020-08-08 PROCEDURE — 94761 N-INVAS EAR/PLS OXIMETRY MLT: CPT

## 2020-08-08 PROCEDURE — 94664 DEMO&/EVAL PT USE INHALER: CPT

## 2020-08-08 PROCEDURE — 25000003 PHARM REV CODE 250: Performed by: INTERNAL MEDICINE

## 2020-08-08 PROCEDURE — 27000221 HC OXYGEN, UP TO 24 HOURS

## 2020-08-08 PROCEDURE — 94640 AIRWAY INHALATION TREATMENT: CPT

## 2020-08-08 PROCEDURE — 63600175 PHARM REV CODE 636 W HCPCS: Performed by: FAMILY MEDICINE

## 2020-08-08 PROCEDURE — 25000003 PHARM REV CODE 250: Performed by: FAMILY MEDICINE

## 2020-08-08 RX ORDER — DICLOFENAC SODIUM 10 MG/G
2 GEL TOPICAL 3 TIMES DAILY PRN
Status: DISCONTINUED | OUTPATIENT
Start: 2020-08-08 | End: 2020-08-14

## 2020-08-08 RX ADMIN — VORICONAZOLE 200 MG: 200 TABLET, FILM COATED ORAL at 03:08

## 2020-08-08 RX ADMIN — ALBUTEROL SULFATE 2 PUFF: 90 AEROSOL, METERED RESPIRATORY (INHALATION) at 12:08

## 2020-08-08 RX ADMIN — THERA TABS 1 TABLET: TAB at 09:08

## 2020-08-08 RX ADMIN — PANTOPRAZOLE SODIUM 40 MG: 40 TABLET, DELAYED RELEASE ORAL at 09:08

## 2020-08-08 RX ADMIN — ALBUTEROL SULFATE 2 PUFF: 90 AEROSOL, METERED RESPIRATORY (INHALATION) at 08:08

## 2020-08-08 RX ADMIN — LIDOCAINE 1 PATCH: 50 PATCH TOPICAL at 05:08

## 2020-08-08 RX ADMIN — Medication 4 TABLET: at 09:08

## 2020-08-08 RX ADMIN — ACETAMINOPHEN 650 MG: 325 TABLET ORAL at 11:08

## 2020-08-08 RX ADMIN — BENZONATATE 100 MG: 100 CAPSULE ORAL at 05:08

## 2020-08-08 RX ADMIN — Medication 4 TABLET: at 12:08

## 2020-08-08 RX ADMIN — VORICONAZOLE 200 MG: 200 TABLET, FILM COATED ORAL at 04:08

## 2020-08-08 RX ADMIN — ENOXAPARIN SODIUM 80 MG: 80 INJECTION SUBCUTANEOUS at 04:08

## 2020-08-08 RX ADMIN — PANTOPRAZOLE SODIUM 40 MG: 40 TABLET, DELAYED RELEASE ORAL at 08:08

## 2020-08-08 RX ADMIN — ALBUTEROL SULFATE 2 PUFF: 90 AEROSOL, METERED RESPIRATORY (INHALATION) at 04:08

## 2020-08-08 RX ADMIN — ENOXAPARIN SODIUM 80 MG: 80 INJECTION SUBCUTANEOUS at 05:08

## 2020-08-08 RX ADMIN — ACETAMINOPHEN 650 MG: 325 TABLET ORAL at 12:08

## 2020-08-08 RX ADMIN — Medication 4 TABLET: at 04:08

## 2020-08-08 NOTE — PROGRESS NOTES
[facility] Hospital Medicine  Progress Note    Patient Name: Rajan Deluna  MRN: [id number]  Patient Class: IP- Inpatient   Admission Date: 7/21/2020  Length of Stay: 18 days  Attending Physician: Fany Petersen MD  Primary Care Provider: Jason Mccord MD        Subjective:     Principal Problem:Acute respiratory disease due to COVID-19 virus        HPI:  Patient is a 74-year-old male with a past medical history of hypertension, COPD, MAC infection who presented to the ED with altered mental status and fever.  As per the family, patient has had altered mental status for the past 3-4 days. Family endorses increased somnolence as well as urinating on himself. EMS was called two days prior to presentation but reportedly deemed that patient did not need to be brought in. Over the previous day, reportedly the patient was found stuck, staring in his sink, not responding. On the day of presentation, the patient was found down on the floor, family wasn't able to get patient up from the floor and was reportedly not responding as much as baseline. Family believed that patient may not have been wearing baseline 2L O2. Patient also endorses a cough and generalized weakness. Patient reportedly had a recent UTI.     In the ED, chest x-ray showed diffuse interstitial patchy infiltrates concerning for possible pneumonia and a COVID-19 screening test was positive. Fever was 101° F and patient was hypotensive 80s/40s. Patient given 30cc/kg bolus. BP very soft on presentation, adequately responded to volume resuscitation and then decreased again. Labs showed a BUN 45, creatinine 2.9, , , troponin 0.036, procalcitonin 1.13.       Interval History: NAEO. Bipap overnight, HFNC 15L last night and this am. Feeling better. No cp. H/H stable    Review of Systems   Respiratory: Negative for cough and shortness of breath.    Cardiovascular: Negative for chest pain.   Gastrointestinal: Negative for  abdominal pain, blood in stool, diarrhea and nausea.   Psychiatric/Behavioral: The patient is not nervous/anxious.      Objective:     Vital Signs (Most Recent):  Temp: 98.1 °F (36.7 °C) (08/08/20 0637)  Pulse: 101 (08/08/20 0637)  Resp: 18 (08/08/20 0637)  BP: 111/67 (08/08/20 0637)  SpO2: 97 % (08/08/20 0637) Vital Signs (24h Range):  Temp:  [96.7 °F (35.9 °C)-100.6 °F (38.1 °C)] 98.1 °F (36.7 °C)  Pulse:  [] 101  Resp:  [18-22] 18  SpO2:  [84 %-98 %] 97 %  BP: (111-127)/(65-67) 111/67     Weight: 83 kg (182 lb 15.7 oz)  Body mass index is 25.52 kg/m².    Intake/Output Summary (Last 24 hours) at 8/8/2020 0738  Last data filed at 8/8/2020 0200  Gross per 24 hour   Intake 220 ml   Output 456 ml   Net -236 ml      Physical Exam  Video visit this am     Significant Labs:   CBC:   Recent Labs   Lab 08/06/20  1300 08/07/20  0516   WBC 11.32 10.83   HGB 10.2* 10.5*   HCT 32.0* 33.7*    316     CMP:   Recent Labs   Lab 08/06/20  1300 08/07/20  0516   * 136   K 4.9 4.9    102   CO2 27 27    97   BUN 17 15   CREATININE 1.0 1.0   CALCIUM 8.6* 8.7   PROT 6.3 6.5   ALBUMIN 2.4* 2.4*   BILITOT 0.6 0.6   ALKPHOS 77 80   AST 21 22   ALT 16 16   ANIONGAP 5* 7*   EGFRNONAA >60 >60       Significant Imaging: I have reviewed all pertinent imaging results/findings within the past 24 hours.      Assessment/Plan:      * Acute respiratory disease due to COVID-19 virus  -Weaning O2, on 15L HFNC overnight  -Full dose lovenox  -s/p remdesivir tx, finished 7/24  -Monitor closely for worsening  -Isolation ongoing, plan to dc Monday?    History of Aspergillus & MAC  Continuing voriconazole, switch to itroconazole at DC   -Follow up with Pulm, ID outpatient  STABLE    Hypotension  Patient's most recent blood pressure 116/75  RESOLVED      COVID-19 virus infection        COVID-19  Patient was stepped up to the ICU yesterday due to increasing O2 demands and decreasing O2 saturation from baseline. Patient was  started on continues BiPAP, currently satting at 95-95% at 50% oxygen concentration. Patient was approved for Remdesivir yesterday but morning labs indicated BUN 68 creatinine 2.3, will hold pending repeat CMP.    Plan:   - COVID Isolation per protocol   - Dexamethasone 6mg daily for 10 days   - continue Heparin gtt   - Levofloxacin 750mg daily   - Atorvastatin 40mg daily   - Multivitamin    Sepsis  RESOLVED  See plan for COVID 19    COPD (chronic obstructive pulmonary disease)  On HFNC, 15L overnight  Continue to wean  Chest physio ongoing, albuterol q4h      Anemia  Asymptomatic  Continue to monitor QOD CBC  STABLE      VTE Risk Mitigation (From admission, onward)         Ordered     enoxaparin injection 80 mg  Every 12 hours (non-standard times)      08/04/20 1421     Place sequential compression device  Until discontinued      07/21/20 2142     IP VTE HIGH RISK PATIENT  Once      07/21/20 2142                Dispo: weaning oxygen requirement     Arnie Gaston MD  Department of Hospital Medicine   [facility]

## 2020-08-08 NOTE — PLAN OF CARE
VN cued into patients room for rounding - room dark. Unable to visualize pt. Bedside nurse, liana, notified. o2 sats 94% on continuous pulse ox at bedside.  on telemetry.

## 2020-08-08 NOTE — SUBJECTIVE & OBJECTIVE
Interval History: NAEO. Bipap overnight, HFNC 15L last night and this am. Feeling better. No cp. H/H stable    Review of Systems   Respiratory: Negative for cough and shortness of breath.    Cardiovascular: Negative for chest pain.   Gastrointestinal: Negative for abdominal pain, blood in stool, diarrhea and nausea.   Psychiatric/Behavioral: The patient is not nervous/anxious.      Objective:     Vital Signs (Most Recent):  Temp: 98.1 °F (36.7 °C) (08/08/20 0637)  Pulse: 101 (08/08/20 0637)  Resp: 18 (08/08/20 0637)  BP: 111/67 (08/08/20 0637)  SpO2: 97 % (08/08/20 0637) Vital Signs (24h Range):  Temp:  [96.7 °F (35.9 °C)-100.6 °F (38.1 °C)] 98.1 °F (36.7 °C)  Pulse:  [] 101  Resp:  [18-22] 18  SpO2:  [84 %-98 %] 97 %  BP: (111-127)/(65-67) 111/67     Weight: 83 kg (182 lb 15.7 oz)  Body mass index is 25.52 kg/m².    Intake/Output Summary (Last 24 hours) at 8/8/2020 0738  Last data filed at 8/8/2020 0200  Gross per 24 hour   Intake 220 ml   Output 456 ml   Net -236 ml      Physical Exam  Video visit this am     Significant Labs:   CBC:   Recent Labs   Lab 08/06/20  1300 08/07/20  0516   WBC 11.32 10.83   HGB 10.2* 10.5*   HCT 32.0* 33.7*    316     CMP:   Recent Labs   Lab 08/06/20  1300 08/07/20  0516   * 136   K 4.9 4.9    102   CO2 27 27    97   BUN 17 15   CREATININE 1.0 1.0   CALCIUM 8.6* 8.7   PROT 6.3 6.5   ALBUMIN 2.4* 2.4*   BILITOT 0.6 0.6   ALKPHOS 77 80   AST 21 22   ALT 16 16   ANIONGAP 5* 7*   EGFRNONAA >60 >60       Significant Imaging: I have reviewed all pertinent imaging results/findings within the past 24 hours.

## 2020-08-08 NOTE — PLAN OF CARE
"VN Note: VN cued into patient's room for frequent rounds. Patient with 02 in place. His pulse ox reading in the 80's. I encouraged patient to take deep breaths since he did c/o SOB. Patient's 02 went up to 94%. Bedside nurse Myalique in room. She readjusted patient in bed. His pulse ox reading stayed in the 90's. Patient reports feeling "better."  VN will continue to follow.  "

## 2020-08-08 NOTE — PLAN OF CARE
"VN Note: VN cued into patient's room for frequent rounds. Patient reports trying to "rest" at this time. He denies any needs. His 02 is in place. VN will continue to follow.  "

## 2020-08-08 NOTE — PLAN OF CARE
Plan of care reviewed with patient. Patient voiced understanding. ST on monitor. No acute distress noted. Side rails x2, bed in lowest position, call bell within reach, pt advised to call for assistance. Maintain bed alarm for patient safety.

## 2020-08-08 NOTE — PLAN OF CARE
VN cued into patients room for rounding - Patient is in no acute distress at this time.  o2 nc intact. o2 sats 94% on continuous pulse ox at bedside. Call light within reach. Will continue to monitor closely.  Patient instructed to call immediately for SOB.

## 2020-08-08 NOTE — PLAN OF CARE
VN note: VN cued into patient's room for introduction. Patient with 02 in place. Denies any SOB at this time. VN unable to see pulse ox machine since Bipap blocking it. Allowed time for questions. VN will continue to be available to patient and intervene prn.

## 2020-08-08 NOTE — PROGRESS NOTES
[facility]  Progress/Critical Care - Medicine  History & Physical    Patient Name: Rajan Deluna  MRN: [id number]  Admission Date: 7/21/2020  Hospital Length of Stay: 18 days  Code Status: Full Code  Attending Physician: Fany Petersen MD   Primary Care Provider: Jason Mccord MD   Principal Problem: Acute respiratory disease due to COVID-19 virus    Subjective:     Hospital/ICU Course: When seen the patient is on 15LPM.  He is resting in bed, but requesting nursing assistance with repositioning (concerning for weakness). He is otherwise unchanged.    Past Medical History:   Diagnosis Date    Arthritis     COPD (chronic obstructive pulmonary disease)     Hypertension     Smoker     Weakness        Past Surgical History:   Procedure Laterality Date    HERNIA REPAIR      INTRALUMINAL GASTROINTESTINAL TRACT IMAGING VIA CAPSULE N/A 8/3/2020    Procedure: IMAGING PROCEDURE, GI TRACT, INTRALUMINAL, VIA CAPSULE;  Surgeon: Rey Olivares MD;  Location: [facility];  Service: Endoscopy;  Laterality: N/A;    right knee surgery         Review of patient's allergies indicates:  No Known Allergies    Family History     None        Tobacco Use    Smoking status: Former Smoker    Smokeless tobacco: Never Used   Substance and Sexual Activity    Alcohol use: Not Currently    Drug use: Never    Sexual activity: Not on file     Objective:     Vital Signs (Most Recent):  Temp: 98.3 °F (36.8 °C) (08/08/20 0922)  Pulse: (!) 112 (08/08/20 0922)  Resp: 18 (08/08/20 0922)  BP: 109/68 (08/08/20 0922)  SpO2: (!) 90 % (08/08/20 0922) Vital Signs (24h Range):  Temp:  [96.7 °F (35.9 °C)-100.6 °F (38.1 °C)] 98.3 °F (36.8 °C)  Pulse:  [] 112  Resp:  [18-22] 18  SpO2:  [90 %-98 %] 90 %  BP: (109-127)/(65-68) 109/68     Weight: 83 kg (182 lb 15.7 oz)  Body mass index is 25.52 kg/m².      Intake/Output Summary (Last 24 hours) at 8/8/2020 1144  Last data filed at 8/8/2020 0200  Gross per 24 hour   Intake 220  ml   Output 456 ml   Net -236 ml       Physical Exam  Vitals reviewed: virtual visit.   Constitutional:       General: He is not in acute distress.     Appearance: Normal appearance.   HENT:      Head: Normocephalic.      Nose: Nose normal.   Neurological:      General: No focal deficit present.      Mental Status: He is alert.         Vents:  Oxygen Concentration (%): 45 (08/08/20 0050)    Lines/Drains/Airways     Peripheral Intravenous Line                 Peripheral IV - Single Lumen 08/04/20 0313 20 G Right Hand 4 days         Peripheral IV - Single Lumen 08/04/20 0314 18 G Right Forearm 4 days                Significant Labs:    CBC/Anemia Profile:  Recent Labs   Lab 08/06/20  1300 08/07/20  0516   WBC 11.32 10.83   HGB 10.2* 10.5*   HCT 32.0* 33.7*    316   MCV 96 97   RDW 16.6* 16.7*        Chemistries:  Recent Labs   Lab 08/06/20  1300 08/07/20  0516   * 136   K 4.9 4.9    102   CO2 27 27   BUN 17 15   CREATININE 1.0 1.0   CALCIUM 8.6* 8.7   ALBUMIN 2.4* 2.4*   PROT 6.3 6.5   BILITOT 0.6 0.6   ALKPHOS 77 80   ALT 16 16   AST 21 22   MG 2.0 2.1   PHOS 2.5* 2.8       All pertinent labs within the past 24 hours have been reviewed.    Significant Imaging: I have reviewed all pertinent imaging results/findings within the past 24 hours.    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]  Yes    COPD (chronic obstructive pulmonary disease) [J44.9] 05/22/2017 Yes    Anemia [D64.9] 12/09/2016 No      Problems Resolved During this Admission:    Diagnosis Date Noted Date Resolved POA    History of MAC infection [Z86.19]  08/07/2020 No    GI bleed [K92.2] 07/29/2020 08/07/2020 Clinically Undetermined    Clostridium difficile infection [A49.8]  08/07/2020 Clinically Undetermined    Acute respiratory failure with hypoxia [J96.01]  08/07/2020 Yes     MARQUIS (acute kidney injury) [N17.9] 07/21/2020 08/07/2020 Yes       * Acute respiratory disease due to COVID-19 virus  - 7/21 COVID(+)  - s/p remdesivir x5d, dexamethasone x10d  - full dose AC with lovenox  - proning if he can tolerate at all  - incentive spirometry   - please wean O2 as tolerated for goal SpO2 88%  - BIPAP QHS  - currently on 15LPM via bubble flow.  May need vapotherm resumed if continues to worsen.       History of Aspergillus & MAC  - serum aspergillus negative, loaded with voriconazole on  7/29   - no MAC treatment at this time per ID     Clostridium difficile infection  Patient positive on PCR, initiated on oral vancomycin & flagyl on 7/24  - s/p treatment.    - probiotics     Acute respiratory failure with hypoxia  See COVID problem       COPD (chronic obstructive pulmonary disease)  - Use of 2L NC at home  - metered dose inhaler q4h       Anemia 2/2 GI bleed  - do not transfuse unless Hgb is below 7g/dL  - heparin gtt stopped    - daily CBC     FEN       - ensure lytes aggressively repleted.     Out of bed to chair daily  Incentive spirometry   PT/OT    continued improvement over the last few days.  Continue current management.     Bryan Swain MD  Critical Care - Medicine  [facility]  [phone]

## 2020-08-08 NOTE — PLAN OF CARE
VN cued into patients room for rounding - Patient is in no acute distress at this time.  o2 nc intact. o2 sats 96% on continuous pulse ox at bedside. Call light within reach. Will continue to monitor closely.

## 2020-08-08 NOTE — PLAN OF CARE
HR in 120s and 130s. Has been trending up since around 5pm this evening. Patient not complaining of pain aside from mild left shoulder pain that is always present. 94% on 15L HFNC. Dr Gaston notified. No new orders at this time. Will continue to monitor.

## 2020-08-08 NOTE — ASSESSMENT & PLAN NOTE
-Weaning O2, on 15L HFNC overnight  -Full dose lovenox  -s/p remdesivir tx, finished 7/24  -Monitor closely for worsening  -Isolation ongoing, plan to dc Monday?

## 2020-08-08 NOTE — PLAN OF CARE
"VN Note: VN cued into patient's room for frequent rounds. Patient's 02 in place. He stated, "I feel like I'm not getting enough oxygen." VN zoomed into pulse ox machine 02 currently 92%-93%. Bedside nurse Mushtaq entered room and PCT as well to assess patient. No further needs at this time. VN will continue to follow.  "

## 2020-08-08 NOTE — PLAN OF CARE
Plan of care reviewed with patient, understanding verbalized.  Pt remains on tele, HR currently in low 100s. 93% on 15L HFNC.  Bed alarm on, call light in reach, fall precautions in place.

## 2020-08-08 NOTE — PLAN OF CARE
VN cued into patients room for rounding - Patient is in no acute distress at this time.  o2 nc intact. o2 sats 92% on continuous pulse ox at bedside. Call light within reach. Will continue to monitor closely.

## 2020-08-09 LAB
ALBUMIN SERPL BCP-MCNC: 2.2 G/DL (ref 3.5–5.2)
ALP SERPL-CCNC: 112 U/L (ref 55–135)
ALT SERPL W/O P-5'-P-CCNC: 19 U/L (ref 10–44)
ANION GAP SERPL CALC-SCNC: 8 MMOL/L (ref 8–16)
APTT BLDCRRT: 29.2 SEC (ref 21–32)
AST SERPL-CCNC: 28 U/L (ref 10–40)
BASOPHILS # BLD AUTO: 0.03 K/UL (ref 0–0.2)
BASOPHILS NFR BLD: 0.3 % (ref 0–1.9)
BILIRUB SERPL-MCNC: 0.5 MG/DL (ref 0.1–1)
BUN SERPL-MCNC: 13 MG/DL (ref 8–23)
CALCIUM SERPL-MCNC: 8.4 MG/DL (ref 8.7–10.5)
CHLORIDE SERPL-SCNC: 101 MMOL/L (ref 95–110)
CO2 SERPL-SCNC: 28 MMOL/L (ref 23–29)
CREAT SERPL-MCNC: 1 MG/DL (ref 0.5–1.4)
DIFFERENTIAL METHOD: ABNORMAL
EOSINOPHIL # BLD AUTO: 0.1 K/UL (ref 0–0.5)
EOSINOPHIL NFR BLD: 1.3 % (ref 0–8)
ERYTHROCYTE [DISTWIDTH] IN BLOOD BY AUTOMATED COUNT: 16.2 % (ref 11.5–14.5)
EST. GFR  (AFRICAN AMERICAN): >60 ML/MIN/1.73 M^2
EST. GFR  (NON AFRICAN AMERICAN): >60 ML/MIN/1.73 M^2
GLUCOSE SERPL-MCNC: 100 MG/DL (ref 70–110)
HCT VFR BLD AUTO: 31.1 % (ref 40–54)
HGB BLD-MCNC: 9.8 G/DL (ref 14–18)
IMM GRANULOCYTES # BLD AUTO: 0.03 K/UL (ref 0–0.04)
IMM GRANULOCYTES NFR BLD AUTO: 0.3 % (ref 0–0.5)
LYMPHOCYTES # BLD AUTO: 0.9 K/UL (ref 1–4.8)
LYMPHOCYTES NFR BLD: 10.8 % (ref 18–48)
MAGNESIUM SERPL-MCNC: 2 MG/DL (ref 1.6–2.6)
MCH RBC QN AUTO: 30.5 PG (ref 27–31)
MCHC RBC AUTO-ENTMCNC: 31.5 G/DL (ref 32–36)
MCV RBC AUTO: 97 FL (ref 82–98)
MONOCYTES # BLD AUTO: 1 K/UL (ref 0.3–1)
MONOCYTES NFR BLD: 11.4 % (ref 4–15)
NEUTROPHILS # BLD AUTO: 6.6 K/UL (ref 1.8–7.7)
NEUTROPHILS NFR BLD: 75.9 % (ref 38–73)
NRBC BLD-RTO: 0 /100 WBC
PHOSPHATE SERPL-MCNC: 2.4 MG/DL (ref 2.7–4.5)
PLATELET # BLD AUTO: 249 K/UL (ref 150–350)
PMV BLD AUTO: 9.9 FL (ref 9.2–12.9)
POTASSIUM SERPL-SCNC: 4.9 MMOL/L (ref 3.5–5.1)
PROT SERPL-MCNC: 6.3 G/DL (ref 6–8.4)
RBC # BLD AUTO: 3.21 M/UL (ref 4.6–6.2)
SODIUM SERPL-SCNC: 137 MMOL/L (ref 136–145)
WBC # BLD AUTO: 8.72 K/UL (ref 3.9–12.7)

## 2020-08-09 PROCEDURE — 27000221 HC OXYGEN, UP TO 24 HOURS

## 2020-08-09 PROCEDURE — 36415 COLL VENOUS BLD VENIPUNCTURE: CPT

## 2020-08-09 PROCEDURE — 97110 THERAPEUTIC EXERCISES: CPT | Performed by: PHYSICAL THERAPIST

## 2020-08-09 PROCEDURE — 25000003 PHARM REV CODE 250: Performed by: STUDENT IN AN ORGANIZED HEALTH CARE EDUCATION/TRAINING PROGRAM

## 2020-08-09 PROCEDURE — 94664 DEMO&/EVAL PT USE INHALER: CPT

## 2020-08-09 PROCEDURE — 84100 ASSAY OF PHOSPHORUS: CPT

## 2020-08-09 PROCEDURE — 80053 COMPREHEN METABOLIC PANEL: CPT

## 2020-08-09 PROCEDURE — 83735 ASSAY OF MAGNESIUM: CPT

## 2020-08-09 PROCEDURE — 63600175 PHARM REV CODE 636 W HCPCS: Performed by: FAMILY MEDICINE

## 2020-08-09 PROCEDURE — 97530 THERAPEUTIC ACTIVITIES: CPT | Performed by: PHYSICAL THERAPIST

## 2020-08-09 PROCEDURE — 85025 COMPLETE CBC W/AUTO DIFF WBC: CPT

## 2020-08-09 PROCEDURE — 94660 CPAP INITIATION&MGMT: CPT

## 2020-08-09 PROCEDURE — 99900035 HC TECH TIME PER 15 MIN (STAT)

## 2020-08-09 PROCEDURE — 11000001 HC ACUTE MED/SURG PRIVATE ROOM

## 2020-08-09 PROCEDURE — 85730 THROMBOPLASTIN TIME PARTIAL: CPT

## 2020-08-09 PROCEDURE — 25000003 PHARM REV CODE 250: Performed by: INTERNAL MEDICINE

## 2020-08-09 PROCEDURE — 94640 AIRWAY INHALATION TREATMENT: CPT

## 2020-08-09 PROCEDURE — 94761 N-INVAS EAR/PLS OXIMETRY MLT: CPT

## 2020-08-09 PROCEDURE — 25000003 PHARM REV CODE 250: Performed by: FAMILY MEDICINE

## 2020-08-09 RX ORDER — SODIUM,POTASSIUM PHOSPHATES 280-250MG
1 POWDER IN PACKET (EA) ORAL EVERY 6 HOURS SCHEDULED
Status: COMPLETED | OUTPATIENT
Start: 2020-08-09 | End: 2020-08-09

## 2020-08-09 RX ADMIN — Medication 4 TABLET: at 04:08

## 2020-08-09 RX ADMIN — ALBUTEROL SULFATE 2 PUFF: 90 AEROSOL, METERED RESPIRATORY (INHALATION) at 12:08

## 2020-08-09 RX ADMIN — VORICONAZOLE 200 MG: 200 TABLET, FILM COATED ORAL at 05:08

## 2020-08-09 RX ADMIN — Medication 4 TABLET: at 11:08

## 2020-08-09 RX ADMIN — Medication 4 TABLET: at 08:08

## 2020-08-09 RX ADMIN — VORICONAZOLE 200 MG: 200 TABLET, FILM COATED ORAL at 03:08

## 2020-08-09 RX ADMIN — POTASSIUM & SODIUM PHOSPHATES POWDER PACK 280-160-250 MG 1 PACKET: 280-160-250 PACK at 06:08

## 2020-08-09 RX ADMIN — THERA TABS 1 TABLET: TAB at 08:08

## 2020-08-09 RX ADMIN — BENZONATATE 100 MG: 100 CAPSULE ORAL at 01:08

## 2020-08-09 RX ADMIN — BENZONATATE 100 MG: 100 CAPSULE ORAL at 04:08

## 2020-08-09 RX ADMIN — ENOXAPARIN SODIUM 80 MG: 80 INJECTION SUBCUTANEOUS at 04:08

## 2020-08-09 RX ADMIN — ALBUTEROL SULFATE 2 PUFF: 90 AEROSOL, METERED RESPIRATORY (INHALATION) at 04:08

## 2020-08-09 RX ADMIN — ALBUTEROL SULFATE 2 PUFF: 90 AEROSOL, METERED RESPIRATORY (INHALATION) at 07:08

## 2020-08-09 RX ADMIN — LIDOCAINE 1 PATCH: 50 PATCH TOPICAL at 04:08

## 2020-08-09 RX ADMIN — GUAIFENESIN AND DEXTROMETHORPHAN HYDROBROMIDE 1 TABLET: 600; 30 TABLET, EXTENDED RELEASE ORAL at 06:08

## 2020-08-09 RX ADMIN — PANTOPRAZOLE SODIUM 40 MG: 40 TABLET, DELAYED RELEASE ORAL at 08:08

## 2020-08-09 RX ADMIN — ALBUTEROL SULFATE 2 PUFF: 90 AEROSOL, METERED RESPIRATORY (INHALATION) at 09:08

## 2020-08-09 RX ADMIN — BENZONATATE 100 MG: 100 CAPSULE ORAL at 08:08

## 2020-08-09 RX ADMIN — POTASSIUM & SODIUM PHOSPHATES POWDER PACK 280-160-250 MG 1 PACKET: 280-160-250 PACK at 11:08

## 2020-08-09 RX ADMIN — ENOXAPARIN SODIUM 80 MG: 80 INJECTION SUBCUTANEOUS at 05:08

## 2020-08-09 NOTE — ASSESSMENT & PLAN NOTE
Continuing voriconazole, switch to itroconazole at [place]   -Follow up with Pulm, ID outpatient  STABLE

## 2020-08-09 NOTE — PLAN OF CARE
Problem: Adult Inpatient Plan of Care  Goal: Plan of Care Review  Description: Chart and Care plan reviewed.     Outcome: Ongoing, Progressing

## 2020-08-09 NOTE — ASSESSMENT & PLAN NOTE
Asymptomatic  H/H of 9.8/31.1 this AM, down slightly from 2 days ago 10.5/33.7  Continue to monitor QOD CBC

## 2020-08-09 NOTE — PROGRESS NOTES
[facility] Hospital Medicine  Progress Note    Patient Name: Rajan Deluna  MRN: [id number]  Patient Class: IP- Inpatient   Admission Date: 7/21/2020  Length of Stay: 19 days  Attending Physician: Fany Petersen MD  Primary Care Provider: Jason Mccord MD        Subjective:     Principal Problem:Acute respiratory disease due to COVID-19 virus        HPI:  Patient is a 74-year-old male with a past medical history of hypertension, COPD, MAC infection who presented to the ED with altered mental status and fever.  As per the family, patient has had altered mental status for the past 3-4 days. Family endorses increased somnolence as well as urinating on himself. EMS was called two days prior to presentation but reportedly deemed that patient did not need to be brought in. Over the previous day, reportedly the patient was found stuck, staring in his sink, not responding. On the day of presentation, the patient was found down on the floor, family wasn't able to get patient up from the floor and was reportedly not responding as much as baseline. Family believed that patient may not have been wearing baseline 2L O2. Patient also endorses a cough and generalized weakness. Patient reportedly had a recent UTI.     In the ED, chest x-ray showed diffuse interstitial patchy infiltrates concerning for possible pneumonia and a COVID-19 screening test was positive. Fever was 101° F and patient was hypotensive 80s/40s. Patient given 30cc/kg bolus. BP very soft on presentation, adequately responded to volume resuscitation and then decreased again. Labs showed a BUN 45, creatinine 2.9, , , troponin 0.036, procalcitonin 1.13.     Overview/Hospital Course:  8/3 - NAEON, had chest pain, normal EKG and troponin overnight, resolved with GI cocktail. This AM, H/H decreased from 10.8/33.7 to 9.1/28. Infectious disease recommends oral Vancomycin and IV Metronidazole for C. Diff until 8/7 and to continue  voriconazole and discharge on Itraconazole 100mg BID and follow up with Pulmonary and ID for continued Aspergillosis and MAC treatment. Patient currently satting 90% on 40L vapotherm at 65% FiO2, weaning as tolerated.    8/4 - NAEON. Patient is currently satting in the >90% on 20L vapotherm at 35% FiO2, weaning as tolerated. H/H stable 9.5/30.3. Per GI, VCE show a few nonbleeding small likely duodenal AVMs and no other evidence of overt GI Bleed and recommend outpatient evaluation due to the patient's current pulmonary status.     8/5 - NAEON. Pending LTAC status. Spo2 70's with PT/OT and need to increase oxygen to vapotherm 20L 70% with an increase in Spo2 to 90's.     8/6 - NAEON. LTAC declined due to insurance. Currently on 30L 70% vapotherm.    8/7 - 11L 45% HFNL. Off covid precautions Monday 8/10    Interval History: NAEON, patient on 15L HFNC. States he is coughing a bit more today, states it is nonproductive. Denies any discomfort, N/V/D. Feels well. On Monday, patient will meet criteria for isolation to be lifted, which would allow his daughter to visit.     Review of Systems   Respiratory: Positive for cough. Negative for shortness of breath.    Cardiovascular: Negative for chest pain.   Gastrointestinal: Negative for abdominal pain, blood in stool, diarrhea and nausea.   Psychiatric/Behavioral: The patient is not nervous/anxious.      Objective:     Vital Signs (Most Recent):  Temp: 99.9 °F (37.7 °C) (08/09/20 0355)  Pulse: 110 (08/09/20 0432)  Resp: 20 (08/09/20 0355)  BP: 114/66 (08/09/20 0355)  SpO2: 97 % (08/09/20 0005) Vital Signs (24h Range):  Temp:  [98.3 °F (36.8 °C)-99.9 °F (37.7 °C)] 99.9 °F (37.7 °C)  Pulse:  [] 110  Resp:  [17-20] 20  SpO2:  [90 %-97 %] 97 %  BP: (108-114)/(59-68) 114/66     Weight: 77.4 kg (170 lb 10.2 oz)  Body mass index is 23.8 kg/m².    Intake/Output Summary (Last 24 hours) at 8/9/2020 0664  Last data filed at 8/8/2020 2200  Gross per 24 hour   Intake 740 ml   Output  555 ml   Net 185 ml      Physical Exam  Vitals signs and nursing note reviewed.   Constitutional:       Appearance: Normal appearance.   Neurological:      Mental Status: He is alert.        Deferred in setting of COVID 19    Significant Labs:   CBC:   Recent Labs   Lab 08/09/20  0615   WBC 8.72   HGB 9.8*   HCT 31.1*        CMP: No results for input(s): NA, K, CL, CO2, GLU, BUN, CREATININE, CALCIUM, PROT, ALBUMIN, BILITOT, ALKPHOS, AST, ALT, ANIONGAP, EGFRNONAA in the last 48 hours.    Invalid input(s): [place]  All pertinent labs within the past 24 hours have been reviewed.  Patient labs QOD    Significant Imaging: I have reviewed all pertinent imaging results/findings within the past 24 hours.   X-Ray Chest 1 View   Final Result      Stable interstitial opacities which may reflect chronic interstitial lung disease versus COPD with superimposed pneumonitis.  Suggest correlation with clinical findings and consider follow-up with noncontrast CT chest after resolution of acute symptoms.         Electronically signed by: Jourdan Bhardwaj MD   Date:    08/06/2020   Time:    08:51      X-Ray Abdomen AP 1 View   Final Result      No acute process identified.         Electronically signed by: Johann Gold MD   Date:    07/25/2020   Time:    16:06      X-Ray Chest 1 View   Final Result      Stable examination findings of bilateral ground-glass airspace opacities, suggestive of pneumonitis.         Electronically signed by: Davon Santacruz MD   Date:    07/25/2020   Time:    16:03      X-Ray Chest 1 View   Final Result      No significant change.         Electronically signed by: Kari Becerra   Date:    07/22/2020   Time:    18:36      X-Ray Chest AP Portable   Final Result      Abnormal increased interstitial lung markings predominantly at the lung basis left more than right, nonspecific could be acute or chronic.         Electronically signed by: Luda Powell MD   Date:    07/21/2020   Time:    13:54       X-Ray Chest AP Portable    (Results Pending)           Assessment/Plan:      * Acute respiratory disease due to COVID-19 virus  -D/C isolation on Monday    History of Aspergillus & MAC  Continuing voriconazole, switch to itroconazole at [place]   -Follow up with Pulm, ID outpatient  STABLE    Hypotension  Patient's most recent blood pressure 116/75  RESOLVED      COVID-19 virus infection        COVID-19  Patient was stepped up to the ICU yesterday due to increasing O2 demands and decreasing O2 saturation from baseline. Patient was started on continues BiPAP, currently satting at 95-95% at 50% oxygen concentration. Patient was approved for Remdesivir yesterday but morning labs indicated BUN 68 creatinine 2.3, will hold pending repeat CMP.    Plan:   - COVID Isolation per protocol   - Dexamethasone 6mg daily for 10 days   - continue Heparin gtt   - Levofloxacin 750mg daily   - Atorvastatin 40mg daily   - Multivitamin    Sepsis  RESOLVED  See plan for COVID 19    COPD (chronic obstructive pulmonary disease)  On HFNC, 15L daily  Working with PT/OT  Patient BiPap overnight  Continue to wean  Chest physio ongoing, albuterol q4h   -Continue to monitor cough      Anemia  Asymptomatic  H/H of 9.8/31.1 this AM, down slightly from 2 days ago 10.5/33.7  Continue to monitor QOD CBC        VTE Risk Mitigation (From admission, onward)         Ordered     enoxaparin injection 80 mg  Every 12 hours (non-standard times)      08/04/20 1421     Place sequential compression device  Until discontinued      07/21/20 2142     IP VTE HIGH RISK PATIENT  Once      07/21/20 2142                      Emeli Adam MD   [employer] PGY2  Department of Hospital Medicine   [facility]

## 2020-08-09 NOTE — SUBJECTIVE & OBJECTIVE
Interval History: GABBY, patient on 15L HFNC. States he is coughing a bit more today, states it is nonproductive. Denies any discomfort, N/V/D. Feels well. On Monday, patient will meet criteria for isolation to be lifted, which would allow his daughter to visit.     Review of Systems   Respiratory: Positive for cough. Negative for shortness of breath.    Cardiovascular: Negative for chest pain.   Gastrointestinal: Negative for abdominal pain, blood in stool, diarrhea and nausea.   Psychiatric/Behavioral: The patient is not nervous/anxious.      Objective:     Vital Signs (Most Recent):  Temp: 99.9 °F (37.7 °C) (08/09/20 0355)  Pulse: 110 (08/09/20 0432)  Resp: 20 (08/09/20 0355)  BP: 114/66 (08/09/20 0355)  SpO2: 97 % (08/09/20 0005) Vital Signs (24h Range):  Temp:  [98.3 °F (36.8 °C)-99.9 °F (37.7 °C)] 99.9 °F (37.7 °C)  Pulse:  [] 110  Resp:  [17-20] 20  SpO2:  [90 %-97 %] 97 %  BP: (108-114)/(59-68) 114/66     Weight: 77.4 kg (170 lb 10.2 oz)  Body mass index is 23.8 kg/m².    Intake/Output Summary (Last 24 hours) at 8/9/2020 0656  Last data filed at 8/8/2020 2200  Gross per 24 hour   Intake 740 ml   Output 555 ml   Net 185 ml      Physical Exam  Vitals signs and nursing note reviewed.   Constitutional:       Appearance: Normal appearance.   Neurological:      Mental Status: He is alert.        Deferred in setting of COVID 19    Significant Labs:   CBC:   Recent Labs   Lab 08/09/20  0615   WBC 8.72   HGB 9.8*   HCT 31.1*        CMP: No results for input(s): NA, K, CL, CO2, GLU, BUN, CREATININE, CALCIUM, PROT, ALBUMIN, BILITOT, ALKPHOS, AST, ALT, ANIONGAP, EGFRNONAA in the last 48 hours.    Invalid input(s): [place]  All pertinent labs within the past 24 hours have been reviewed.  Patient labs QOD    Significant Imaging: I have reviewed all pertinent imaging results/findings within the past 24 hours.   X-Ray Chest 1 View   Final Result      Stable interstitial opacities which may reflect chronic  interstitial lung disease versus COPD with superimposed pneumonitis.  Suggest correlation with clinical findings and consider follow-up with noncontrast CT chest after resolution of acute symptoms.         Electronically signed by: Jourdan Bhardwaj MD   Date:    08/06/2020   Time:    08:51      X-Ray Abdomen AP 1 View   Final Result      No acute process identified.         Electronically signed by: Johann Gold MD   Date:    07/25/2020   Time:    16:06      X-Ray Chest 1 View   Final Result      Stable examination findings of bilateral ground-glass airspace opacities, suggestive of pneumonitis.         Electronically signed by: Davon Santacruz MD   Date:    07/25/2020   Time:    16:03      X-Ray Chest 1 View   Final Result      No significant change.         Electronically signed by: Kari Becerra   Date:    07/22/2020   Time:    18:36      X-Ray Chest AP Portable   Final Result      Abnormal increased interstitial lung markings predominantly at the lung basis left more than right, nonspecific could be acute or chronic.         Electronically signed by: Luda Powell MD   Date:    07/21/2020   Time:    13:54      X-Ray Chest AP Portable    (Results Pending)

## 2020-08-09 NOTE — PLAN OF CARE
"VN Note: VN cued into patient's room for frequent rounds. Patient's 02 in place. Pulse ox reading 93% via monitor. He reports to VN "I am doing a lot of coughing." MAR reviewed. Patient received an hour ago prn tessalon. Patient stated he doesn't think its working and would like something else. VN will message team. VN will continue to follow.    --Dr. Adam ordered mucinex for patient. She also requested if patient can have ice pops for his throat. PCT Meg in room notified.  "

## 2020-08-09 NOTE — PT/OT/SLP PROGRESS
Physical Therapy Treatment    Patient Name:  Rajan Deluna   MRN:  [id number]    Recommendations:     Discharge Recommendations:  (TBD)   Discharge Equipment Recommendations: walker, rolling   Barriers to discharge: decreased functional mobility    Assessment:     Rajan Deluna is a 74 y.o. male admitted with a medical diagnosis of Acute respiratory disease due to COVID-19 virus.  He presents with the following impairments/functional limitations:  weakness, impaired endurance, impaired balance, decreased lower extremity function, gait instability, impaired cardiopulmonary response to activity, impaired self care skills, impaired functional mobilty. Pt performed supine to sit x 3 with supervision.  Pt went sit to stand x 1 with CG and use of bed rail, standing for 15 seconds and taking 2 steps to HOB.  Pt sat at EOB 7 min x 1, 5 min x 1 and 4 min x 1.    Rehab Prognosis: Good; patient would benefit from acute skilled PT services to address these deficits and reach maximum level of function.    Recent Surgery: Procedure(s) (LRB):  IMAGING PROCEDURE, GI TRACT, INTRALUMINAL, VIA CAPSULE (N/A) 6 Days Post-Op    Plan:     During this hospitalization, patient to be seen 6 x/week to address the identified rehab impairments via gait training, therapeutic activities, therapeutic exercises, neuromuscular re-education and progress toward the following goals:    · Plan of Care Expires:  09/08/20    Subjective     Chief Complaint: need to lay down  Patient/Family Comments/goals: I would like to stand up  Pain/Comfort:  · Pain Rating 1: 0/10      Objective:     Communicated with nurse prior to session.  Patient found HOB elevated with oxygen, peripheral IV, pulse ox (continuous), telemetry, bed alarm upon PT entry to room.     General Precautions: Standard, special contact, fall, contact, droplet, airborne   Orthopedic Precautions:N/A   Braces:       Functional Mobility:   Pt performed supine to sit x 3 with supervision.  Pt went  sit to stand x 1 with CG and use of bed rail, standing for 15 seconds and taking 2 steps to HOB.  Pt sat at EOB 7 min x 1, 5 min x 1 and 4 min x 1.      AM-PAC 6 CLICK MOBILITY  Turning over in bed (including adjusting bedclothes, sheets and blankets)?: 4  Sitting down on and standing up from a chair with arms (e.g., wheelchair, bedside commode, etc.): 3  Moving from lying on back to sitting on the side of the bed?: 3  Moving to and from a bed to a chair (including a wheelchair)?: 3  Need to walk in hospital room?: 2  Climbing 3-5 steps with a railing?: 1  Basic Mobility Total Score: 16       Therapeutic Activities and Exercises:   Pt performed seated LAQ, AP and hip flexion 10 x 2 with rest breaks.  Pt performed supine SAQ, heel slide and hip abd/add 10 x 2.      Patient left HOB elevated with all lines intact, call button in reach, bed alarm on and nurse notified..    GOALS:   Multidisciplinary Problems     Physical Therapy Goals        Problem: Physical Therapy Goal    Goal Priority Disciplines Outcome Goal Variances Interventions   Physical Therapy Goal     PT, PT/OT Ongoing, Progressing     Description: Goals to be met by: 20     Patient will increase functional independence with mobility by performin. Supine <> sit with MInimal Assistance  2. Sit to stand transfer with Minimal Assistance  3. Bed to chair transfer with Minimal Assistance   4. Gait x 50 ft with min A with appropriate AD  5. Lower extremity exercise program x 10 reps per handout, with supervision                     Time Tracking:     PT Received On: 20  PT Start Time: 1355     PT Stop Time: 1421  PT Total Time (min): 26 min     Billable Minutes: Therapeutic Activity 13 and Therapeutic Exercise 13    Treatment Type: Treatment  PT/PTA: PT     PTA Visit Number: 0     Miri Dickinson, PT  2020

## 2020-08-09 NOTE — PLAN OF CARE
Plan of care reviewed with patient, understanding verbalized.  ST in low 100s. Bed alarm on, call light in reach, fall precautions in place.

## 2020-08-09 NOTE — PLAN OF CARE
"VN Note: VN cued into patient's room for frequent rounds. Patient trying to rest after doing his "exercises," he stated. He also inquired with VN if someone could check his lips since they felt dry. I asked PCT to bring items for patient. Patient denies any needs at this time. VN will continue to follow.  "

## 2020-08-09 NOTE — PLAN OF CARE
VN note: VN cued into patient's room for introduction. Patient eating breakfast at this time. 02 in place, sats 92% on pulse ox machine. He denies any SOB or needs at this time. Allowed time for questions. VN will continue to be available to patient and intervene prn.

## 2020-08-09 NOTE — PLAN OF CARE
VN Note: VN cued into patient's room for frequent rounds. Respiratory therapist Chaparrita at bedside. Patient denies any needs at this time. VN will continue to follow.

## 2020-08-09 NOTE — PROGRESS NOTES
[facility]  Progress/Critical Care - Medicine  History & Physical    Patient Name: Rajan Deluna  MRN: [id number]  Admission Date: 7/21/2020  Hospital Length of Stay: 19 days  Code Status: Full Code  Attending Physician: Fany Petersen MD   Primary Care Provider: Jason Mccord MD   Principal Problem: Acute respiratory disease due to COVID-19 virus    Subjective:     Hospital/ICU Course: When seen the patient is on 15LPM.  He is unchanged from yesterday and doesn't have any complaints.      Past Medical History:   Diagnosis Date    Arthritis     COPD (chronic obstructive pulmonary disease)     Hypertension     Smoker     Weakness        Past Surgical History:   Procedure Laterality Date    HERNIA REPAIR      INTRALUMINAL GASTROINTESTINAL TRACT IMAGING VIA CAPSULE N/A 8/3/2020    Procedure: IMAGING PROCEDURE, GI TRACT, INTRALUMINAL, VIA CAPSULE;  Surgeon: Rey Olivares MD;  Location: [facility];  Service: Endoscopy;  Laterality: N/A;    right knee surgery         Review of patient's allergies indicates:  No Known Allergies    Family History     None        Tobacco Use    Smoking status: Former Smoker    Smokeless tobacco: Never Used   Substance and Sexual Activity    Alcohol use: Not Currently    Drug use: Never    Sexual activity: Not on file     Objective:     Vital Signs (Most Recent):  Temp: 97.1 °F (36.2 °C) (08/09/20 0858)  Pulse: 100 (08/09/20 1641)  Resp: 20 (08/09/20 1641)  BP: (!) 113/55 (08/09/20 0858)  SpO2: (!) 88 % (08/09/20 1641) Vital Signs (24h Range):  Temp:  [97.1 °F (36.2 °C)-99.9 °F (37.7 °C)] 97.1 °F (36.2 °C)  Pulse:  [] 100  Resp:  [18-22] 20  SpO2:  [88 %-97 %] 88 %  BP: (108-114)/(55-66) 113/55     Weight: 77.4 kg (170 lb 10.2 oz)  Body mass index is 23.8 kg/m².      Intake/Output Summary (Last 24 hours) at 8/9/2020 1646  Last data filed at 8/9/2020 1200  Gross per 24 hour   Intake 740 ml   Output 361 ml   Net 379 ml       Physical Exam  Vitals  reviewed: virtual visit.   Constitutional:       General: He is not in acute distress.     Appearance: Normal appearance.   HENT:      Head: Normocephalic.      Nose: Nose normal.   Neurological:      General: No focal deficit present.      Mental Status: He is alert.         Vents:  Oxygen Concentration (%): 60 (08/09/20 0005)    Lines/Drains/Airways     Peripheral Intravenous Line                 Peripheral IV - Single Lumen 08/04/20 0313 20 G Right Hand 5 days         Peripheral IV - Single Lumen 08/04/20 0314 18 G Right Forearm 5 days                Significant Labs:    CBC/Anemia Profile:  Recent Labs   Lab 08/09/20  0615   WBC 8.72   HGB 9.8*   HCT 31.1*      MCV 97   RDW 16.2*        Chemistries:  Recent Labs   Lab 08/09/20  0615      K 4.9      CO2 28   BUN 13   CREATININE 1.0   CALCIUM 8.4*   ALBUMIN 2.2*   PROT 6.3   BILITOT 0.5   ALKPHOS 112   ALT 19   AST 28   MG 2.0   PHOS 2.4*       All pertinent labs within the past 24 hours have been reviewed.    Significant Imaging: I have reviewed all pertinent imaging results/findings within the past 24 hours.    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]  Yes    COPD (chronic obstructive pulmonary disease) [J44.9] 05/22/2017 Yes    Anemia [D64.9] 12/09/2016 No      Problems Resolved During this Admission:    Diagnosis Date Noted Date Resolved POA    History of MAC infection [Z86.19]  08/07/2020 No    GI bleed [K92.2] 07/29/2020 08/07/2020 Clinically Undetermined    Clostridium difficile infection [A49.8]  08/07/2020 Clinically Undetermined    Acute respiratory failure with hypoxia [J96.01]  08/07/2020 Yes    MARQUIS (acute kidney injury) [N17.9] 07/21/2020 08/07/2020 Yes       * Acute respiratory disease due to COVID-19 virus  - 7/21 COVID(+)  - s/p remdesivir x5d, dexamethasone x10d  -  full dose AC with lovenox  - proning if he can tolerate at all  - incentive spirometry   - please wean O2 as tolerated for goal SpO2 88%  - BIPAP QHS  - currently on 15LPM via bubble flow.  May need vapotherm resumed if continues to worsen.       History of Aspergillus & MAC  - serum aspergillus negative, loaded with voriconazole on  7/29   - no MAC treatment at this time per ID     Clostridium difficile infection  Patient positive on PCR, initiated on oral vancomycin & flagyl on 7/24  - s/p treatment.    - probiotics     Acute respiratory failure with hypoxia  See COVID problem       COPD (chronic obstructive pulmonary disease)  - Use of 2L NC at home  - metered dose inhaler q4h       Anemia 2/2 GI bleed  - do not transfuse unless Hgb is below 7g/dL  - heparin gtt stopped    - daily CBC     FEN       - ensure lytes aggressively repleted.     Out of bed to chair daily  Incentive spirometry   PT/OT    stable over the last few days.  Continue current management.     Bryan Swain MD  Critical Care - Medicine  [facility]  [phone]

## 2020-08-09 NOTE — ASSESSMENT & PLAN NOTE
On HFNC, 15L daily  Working with PT/OT  Patient BiPap overnight  Continue to wean  Chest physio ongoing, albuterol q4h   -Continue to monitor cough

## 2020-08-10 PROBLEM — U07.1 COVID-19: Status: RESOLVED | Noted: 2020-07-21 | Resolved: 2020-08-10

## 2020-08-10 PROBLEM — U07.1 COVID-19 VIRUS INFECTION: Status: RESOLVED | Noted: 2020-07-23 | Resolved: 2020-08-10

## 2020-08-10 PROCEDURE — 25000003 PHARM REV CODE 250: Performed by: STUDENT IN AN ORGANIZED HEALTH CARE EDUCATION/TRAINING PROGRAM

## 2020-08-10 PROCEDURE — 27100171 HC OXYGEN HIGH FLOW UP TO 24 HOURS

## 2020-08-10 PROCEDURE — 11000001 HC ACUTE MED/SURG PRIVATE ROOM

## 2020-08-10 PROCEDURE — 25000003 PHARM REV CODE 250: Performed by: FAMILY MEDICINE

## 2020-08-10 PROCEDURE — 63600175 PHARM REV CODE 636 W HCPCS: Performed by: FAMILY MEDICINE

## 2020-08-10 PROCEDURE — 27100098 HC SPACER

## 2020-08-10 PROCEDURE — 97535 SELF CARE MNGMENT TRAINING: CPT

## 2020-08-10 PROCEDURE — 94761 N-INVAS EAR/PLS OXIMETRY MLT: CPT

## 2020-08-10 PROCEDURE — 99900035 HC TECH TIME PER 15 MIN (STAT)

## 2020-08-10 PROCEDURE — 94664 DEMO&/EVAL PT USE INHALER: CPT

## 2020-08-10 PROCEDURE — 94640 AIRWAY INHALATION TREATMENT: CPT

## 2020-08-10 PROCEDURE — 97110 THERAPEUTIC EXERCISES: CPT

## 2020-08-10 PROCEDURE — 25000003 PHARM REV CODE 250: Performed by: INTERNAL MEDICINE

## 2020-08-10 RX ADMIN — GUAIFENESIN AND DEXTROMETHORPHAN HYDROBROMIDE 1 TABLET: 600; 30 TABLET, EXTENDED RELEASE ORAL at 09:08

## 2020-08-10 RX ADMIN — ALBUTEROL SULFATE 2 PUFF: 90 AEROSOL, METERED RESPIRATORY (INHALATION) at 11:08

## 2020-08-10 RX ADMIN — ENOXAPARIN SODIUM 80 MG: 80 INJECTION SUBCUTANEOUS at 04:08

## 2020-08-10 RX ADMIN — DICLOFENAC 2 G: 10 GEL TOPICAL at 11:08

## 2020-08-10 RX ADMIN — ACETAMINOPHEN 650 MG: 325 TABLET ORAL at 11:08

## 2020-08-10 RX ADMIN — ALBUTEROL SULFATE 2 PUFF: 90 AEROSOL, METERED RESPIRATORY (INHALATION) at 08:08

## 2020-08-10 RX ADMIN — ALBUTEROL SULFATE 2 PUFF: 90 AEROSOL, METERED RESPIRATORY (INHALATION) at 07:08

## 2020-08-10 RX ADMIN — Medication 4 TABLET: at 08:08

## 2020-08-10 RX ADMIN — LIDOCAINE 1 PATCH: 50 PATCH TOPICAL at 04:08

## 2020-08-10 RX ADMIN — PANTOPRAZOLE SODIUM 40 MG: 40 TABLET, DELAYED RELEASE ORAL at 08:08

## 2020-08-10 RX ADMIN — VORICONAZOLE 200 MG: 200 TABLET, FILM COATED ORAL at 04:08

## 2020-08-10 RX ADMIN — Medication 4 TABLET: at 02:08

## 2020-08-10 RX ADMIN — THERA TABS 1 TABLET: TAB at 08:08

## 2020-08-10 RX ADMIN — ALBUTEROL SULFATE 2 PUFF: 90 AEROSOL, METERED RESPIRATORY (INHALATION) at 03:08

## 2020-08-10 RX ADMIN — Medication 4 TABLET: at 06:08

## 2020-08-10 RX ADMIN — PANTOPRAZOLE SODIUM 40 MG: 40 TABLET, DELAYED RELEASE ORAL at 09:08

## 2020-08-10 NOTE — PLAN OF CARE
VN cued into patients room for rounding - patient resting in bed in no acute distress at this time. Call bell within reach. Will continue to monitor and be available to intervene PRN.

## 2020-08-10 NOTE — PLAN OF CARE
Problem: Occupational Therapy Goal  Goal: Occupational Therapy Goal  Description: Goals to be met by: 8/27/20     Patient will increase functional independence with ADLs by performing:    LE Dressing with Stand-by Assistance.  Grooming while standing with Stand-by Assistance.  Toileting from toilet with Stand-by Assistance for hygiene and clothing management.   Supine to sit with Stand-by Assistance.  Step transfer with Stand-by Assistance  Toilet transfer to toilet with Stand-by Assistance.  Upper extremity exercise program x10 reps per handout, with independence.    Outcome: Ongoing, Progressing

## 2020-08-10 NOTE — PLAN OF CARE
I sent Deb a message via [employer] and left a VM for her to review pt for LTAC placement.       08/10/20 1125   Post-Acute Status   Post-Acute Authorization Placement  (LTAC)     Phong Suh RN, CM  [phone]

## 2020-08-10 NOTE — PROGRESS NOTES
[facility]  Progress/Critical Care - Medicine  History & Physical    Patient Name: Rajan Deluna  MRN: [id number]  Admission Date: 7/21/2020  Hospital Length of Stay: 20 days  Code Status: Full Code  Attending Physician: Fany Petersen MD   Primary Care Provider: Jason Mccord MD   Principal Problem: Acute respiratory disease due to COVID-19 virus    Subjective:     Hospital/ICU Course: When seen the patient is on 10LPM. He seems to be doing well.  He has many questions about his disposition.    Past Medical History:   Diagnosis Date    Arthritis     COPD (chronic obstructive pulmonary disease)     Hypertension     Smoker     Weakness        Past Surgical History:   Procedure Laterality Date    HERNIA REPAIR      INTRALUMINAL GASTROINTESTINAL TRACT IMAGING VIA CAPSULE N/A 8/3/2020    Procedure: IMAGING PROCEDURE, GI TRACT, INTRALUMINAL, VIA CAPSULE;  Surgeon: Rey Olivares MD;  Location: [facility];  Service: Endoscopy;  Laterality: N/A;    right knee surgery         Review of patient's allergies indicates:  No Known Allergies    Family History     None        Tobacco Use    Smoking status: Former Smoker    Smokeless tobacco: Never Used   Substance and Sexual Activity    Alcohol use: Not Currently    Drug use: Never    Sexual activity: Not on file     Objective:     Vital Signs (Most Recent):  Temp: 100.2 °F (37.9 °C) (08/10/20 0811)  Pulse: 101 (08/10/20 1151)  Resp: 18 (08/10/20 1108)  BP: 112/72 (08/10/20 0811)  SpO2: (!) 90 % (08/10/20 1108) Vital Signs (24h Range):  Temp:  [98.8 °F (37.1 °C)-100.2 °F (37.9 °C)] 100.2 °F (37.9 °C)  Pulse:  [100-120] 101  Resp:  [16-20] 18  SpO2:  [88 %-98 %] 90 %  BP: (112-122)/(61-72) 112/72     Weight: 77.4 kg (170 lb 10.2 oz)  Body mass index is 23.8 kg/m².      Intake/Output Summary (Last 24 hours) at 8/10/2020 7134  Last data filed at 8/10/2020 0335  Gross per 24 hour   Intake --   Output 475 ml   Net -475 ml       Physical  Exam  Vitals reviewed: virtual visit.   Constitutional:       General: He is not in acute distress.     Appearance: Normal appearance.   HENT:      Head: Normocephalic.      Nose: Nose normal.   Neurological:      General: No focal deficit present.      Mental Status: He is alert.         Vents:  Oxygen Concentration (%): 60 (08/09/20 0005)    Lines/Drains/Airways     Peripheral Intravenous Line                 Peripheral IV - Single Lumen 08/04/20 0314 18 G Right Forearm 6 days                Significant Labs:    CBC/Anemia Profile:  Recent Labs   Lab 08/09/20  0615   WBC 8.72   HGB 9.8*   HCT 31.1*      MCV 97   RDW 16.2*        Chemistries:  Recent Labs   Lab 08/09/20 0615      K 4.9      CO2 28   BUN 13   CREATININE 1.0   CALCIUM 8.4*   ALBUMIN 2.2*   PROT 6.3   BILITOT 0.5   ALKPHOS 112   ALT 19   AST 28   MG 2.0   PHOS 2.4*       All pertinent labs within the past 24 hours have been reviewed.    Significant Imaging: I have reviewed all pertinent imaging results/findings within the past 24 hours.    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]  Yes    COPD (chronic obstructive pulmonary disease) [J44.9] 05/22/2017 Yes    Anemia [D64.9] 12/09/2016 No      Problems Resolved During this Admission:    Diagnosis Date Noted Date Resolved POA    History of MAC infection [Z86.19]  08/07/2020 No    GI bleed [K92.2] 07/29/2020 08/07/2020 Clinically Undetermined    Clostridium difficile infection [A49.8]  08/07/2020 Clinically Undetermined    Acute respiratory failure with hypoxia [J96.01]  08/07/2020 Yes    MARQUIS (acute kidney injury) [N17.9] 07/21/2020 08/07/2020 Yes       * Acute respiratory disease due to COVID-19 virus  - 7/21 COVID(+)  - s/p remdesivir x5d, dexamethasone x10d  - full dose AC with lovenox  - proning if he can tolerate at  all  - incentive spirometry   - please wean O2 as tolerated for goal SpO2 88%  - BIPAP QHS  - currently on 10LPM via bubble flow.  Improved in comparison to yesterday.     History of Aspergillus & MAC  - serum aspergillus negative, loaded with voriconazole on  7/29   - no MAC treatment at this time per ID     Acute on chronic respiratory failure with hypoxia  See COVID problem     - Use of 2L NC at home     COPD (chronic obstructive pulmonary disease)  - Use of 2L NC at home  - metered dose inhaler q4h      Out of bed to chair daily  Incentive spirometry   PT/OT    Improved since yesterday.  Continue current management.     Bryan Swain MD  Critical Care - Medicine  [facility]  [phone]

## 2020-08-10 NOTE — SUBJECTIVE & OBJECTIVE
Interval History: NAEON, 15L HFNC.Will continue to wean, VSS. Patient cough still present. As patient isolation is 20 days, consider lifting isolation status.     Review of Systems   Respiratory: Positive for cough. Negative for shortness of breath.    Cardiovascular: Negative for chest pain.   Gastrointestinal: Negative for abdominal pain, blood in stool, diarrhea and nausea.   Psychiatric/Behavioral: The patient is not nervous/anxious.      Objective:     Vital Signs (Most Recent):  Temp: 100.2 °F (37.9 °C) (08/10/20 0811)  Pulse: 107 (08/10/20 0811)  Resp: 20 (08/10/20 0811)  BP: 112/72 (08/10/20 0811)  SpO2: (!) 93 % (08/10/20 0811) Vital Signs (24h Range):  Temp:  [97.1 °F (36.2 °C)-100.2 °F (37.9 °C)] 100.2 °F (37.9 °C)  Pulse:  [] 107  Resp:  [16-22] 20  SpO2:  [88 %-98 %] 93 %  BP: (112-122)/(55-72) 112/72     Weight: 77.4 kg (170 lb 10.2 oz)  Body mass index is 23.8 kg/m².    Intake/Output Summary (Last 24 hours) at 8/10/2020 0840  Last data filed at 8/10/2020 0335  Gross per 24 hour   Intake 500 ml   Output 476 ml   Net 24 ml      Physical Exam  Deferred in setting of COVID 19. Patient appears well.  NAD. AAOX3      Significant Labs:   CBC:   Recent Labs   Lab 08/09/20  0615   WBC 8.72   HGB 9.8*   HCT 31.1*        All pertinent labs within the past 24 hours have been reviewed.    Significant Imaging: I have reviewed and interpreted all pertinent imaging results/findings within the past 24 hours.     X-Ray Chest 1 View   Final Result      Stable interstitial opacities which may reflect chronic interstitial lung disease versus COPD with superimposed pneumonitis.  Suggest correlation with clinical findings and consider follow-up with noncontrast CT chest after resolution of acute symptoms.         Electronically signed by: Jourdan Bhardwaj MD   Date:    08/06/2020   Time:    08:51      X-Ray Abdomen AP 1 View   Final Result      No acute process identified.         Electronically signed  by: Johann Gold MD   Date:    07/25/2020   Time:    16:06      X-Ray Chest 1 View   Final Result      Stable examination findings of bilateral ground-glass airspace opacities, suggestive of pneumonitis.         Electronically signed by: Davon Santacruz MD   Date:    07/25/2020   Time:    16:03      X-Ray Chest 1 View   Final Result      No significant change.         Electronically signed by: Kari Becerra   Date:    07/22/2020   Time:    18:36      X-Ray Chest AP Portable   Final Result      Abnormal increased interstitial lung markings predominantly at the lung basis left more than right, nonspecific could be acute or chronic.         Electronically signed by: Luda Powell MD   Date:    07/21/2020   Time:    13:54      X-Ray Chest AP Portable    (Results Pending)

## 2020-08-10 NOTE — PT/OT/SLP PROGRESS
Occupational Therapy   Treatment    Name: Rajan Deluna  MRN: [id number]  Admitting Diagnosis:  Acute respiratory disease due to COVID-19 virus  7 Days Post-Op    Recommendations:     Discharge Recommendations: (TBD)  Discharge Equipment Recommendations:  walker, rolling  Barriers to discharge:  Decreased caregiver support, Inaccessible home environment    Assessment:     Rajan Deluna is a 74 y.o. male with a medical diagnosis of Acute respiratory disease due to COVID-19 virus.  He presents with Performance deficits affecting function are weakness, impaired endurance, impaired self care skills, impaired functional mobilty, gait instability, impaired balance, decreased upper extremity function, decreased safety awareness, decreased lower extremity function, pain, impaired coordination, impaired cardiopulmonary response to activity.     Rehab Prognosis:  Good; patient would benefit from acute skilled OT services to address these deficits and reach maximum level of function.       Plan:     Patient to be seen 5 x/week to address the above listed problems via self-care/home management, therapeutic activities, therapeutic exercises  · Plan of Care Expires: 08/27/20  · Plan of Care Reviewed with: patient    Subjective     Pain/Comfort:  · Pain Rating 1: (not rated)  · Location - Side 1: Bilateral  · Location - Orientation 1: generalized  · Location 1: back  · Pain Addressed 1: Reposition, Distraction  · Pain Rating Post-Intervention 1: 0/10    Objective:     Communicated with: nurse prior to session.  Patient found HOB elevated with bed alarm, oxygen, pulse ox (continuous), telemetry upon OT entry to room.    General Precautions: Standard, airborne, droplet, fall, special contact, contact   Orthopedic Precautions:N/A   Braces: N/A     Occupational Performance:     Bed Mobility:    · Patient completed Rolling/Turning to Left with  stand by assistance and with side rail  · Patient completed Scooting/Bridging with stand by  assistance and with side rail  · Patient completed Supine to Sit with stand by assistance and with side rail     Functional Mobility/Transfers:  · Patient completed Sit <> Stand Transfer with contact guard assistance and minimum assistance  with  hand-held assist   · Patient completed Bed <> Chair Transfer using Step Transfer technique with minimum assistance with hand-held assist      Activities of Daily Living:  · Grooming: supervision seated rinsed mouth and washed face seated BS chair  · Lower Body Dressing: maximal assistance socks seated EOB, crossed leg/ankle over opposite knee      AMPAC 6 Click ADL: 14    Treatment & Education:  ---Role of OT and POC, verbalized understanding  --Pt. Performed BUE AROM ex 10 x 1 set;  Shld flex/ext/chest press, abd/add and elbow flex/ext with HFNC in place, O2 sats ranging from 87-90% with PLB, deep breathing and rest in between ex 10 reps.  Max vc to hold head up, good PLB tech.  Vitals pre exercise O2 96%, during ex: 87-90%, HR as high as 126,  Post ex:  90-91%,     Patient left up in chair with all lines intact, call button in reach and chair alarm onEducation:      GOALS:   Multidisciplinary Problems     Occupational Therapy Goals        Problem: Occupational Therapy Goal    Goal Priority Disciplines Outcome Interventions   Occupational Therapy Goal     OT, PT/OT Ongoing, Progressing    Description: Goals to be met by: 8/27/20     Patient will increase functional independence with ADLs by performing:    LE Dressing with Stand-by Assistance.  Grooming while standing with Stand-by Assistance.  Toileting from toilet with Stand-by Assistance for hygiene and clothing management.   Supine to sit with Stand-by Assistance.  Step transfer with Stand-by Assistance  Toilet transfer to toilet with Stand-by Assistance.  Upper extremity exercise program x10 reps per handout, with independence.                     Time Tracking:     OT Date of Treatment: 08/10/20  OT Start Time:  1036  OT Stop Time: 1101  OT Total Time (min): 25 min    Billable Minutes:Self Care/Home Management 10  Therapeutic Exercise 15  Total Time 25    Stephanie Ramsey OT  8/10/2020

## 2020-08-10 NOTE — PLAN OF CARE
Deb with [facility] is submitting for LTAC auth.       08/10/20 1310   Post-Acute Status   Post-Acute Authorization Placement  (LTAC)     Phong Suh RN, CM  [phone]

## 2020-08-10 NOTE — PLAN OF CARE
I tried to speak with pt about LTAC placement via [employer].  Pt could not understand me so I went in pt's room and discussed LTAC placement and reasons.  Pt verbalized understanding.         08/10/20 1518   Post-Acute Status   Post-Acute Authorization Placement  (LTAC)   Discharge Plan   Discharge Plan A Long-term acute care facility (LTAC)     Phong Suh RN,   [phone]

## 2020-08-10 NOTE — ASSESSMENT & PLAN NOTE
On HFNC, 15L daily  Working with PT/OT  Patient BiPap overnight  Continue to wean  Chest physio ongoing, albuterol q4h   -Continue to monitor cough  STABLE

## 2020-08-10 NOTE — PLAN OF CARE
Problem: Adult Inpatient Plan of Care  Goal: Plan of Care Review  Description: Chart and Care plan reviewed.     Outcome: Ongoing, Progressing     Problem: Respiratory Compromise (Sepsis/Septic Shock)  Goal: Effective Oxygenation and Ventilation  Intervention: Optimize Oxygenation and Ventilation  Flowsheets (Taken 8/10/2020 0050)  Airway/Ventilation Management: (Encourage the use of IS.) other (see comments)

## 2020-08-10 NOTE — PT/OT/SLP PROGRESS
"Physical Therapy      Patient Name:  Rajan Deluna   MRN:  [id number]    1600 -Patient declined tx secondary to fatigue stating, "I sat up earlier today."  "I just want to sleep right now." Will follow-up next tx date.    Dee Urena, PTA    "

## 2020-08-10 NOTE — PROGRESS NOTES
[facility] Hospital Medicine  Progress Note    Patient Name: Rajan Deluna  MRN: [id number]  Patient Class: IP- Inpatient   Admission Date: 7/21/2020  Length of Stay: 20 days  Attending Physician: Fany Petersen MD  Primary Care Provider: Jason Mccord MD        Subjective:     Principal Problem:Acute respiratory disease due to COVID-19 virus        HPI:  Patient is a 74-year-old male with a past medical history of hypertension, COPD, MAC infection who presented to the ED with altered mental status and fever.  As per the family, patient has had altered mental status for the past 3-4 days. Family endorses increased somnolence as well as urinating on himself. EMS was called two days prior to presentation but reportedly deemed that patient did not need to be brought in. Over the previous day, reportedly the patient was found stuck, staring in his sink, not responding. On the day of presentation, the patient was found down on the floor, family wasn't able to get patient up from the floor and was reportedly not responding as much as baseline. Family believed that patient may not have been wearing baseline 2L O2. Patient also endorses a cough and generalized weakness. Patient reportedly had a recent UTI.     In the ED, chest x-ray showed diffuse interstitial patchy infiltrates concerning for possible pneumonia and a COVID-19 screening test was positive. Fever was 101° F and patient was hypotensive 80s/40s. Patient given 30cc/kg bolus. BP very soft on presentation, adequately responded to volume resuscitation and then decreased again. Labs showed a BUN 45, creatinine 2.9, , , troponin 0.036, procalcitonin 1.13.     Overview/Hospital Course:  8/3 - NAEON, had chest pain, normal EKG and troponin overnight, resolved with GI cocktail. This AM, H/H decreased from 10.8/33.7 to 9.1/28. Infectious disease recommends oral Vancomycin and IV Metronidazole for C. Diff until 8/7 and to continue  voriconazole and discharge on Itraconazole 100mg BID and follow up with Pulmonary and ID for continued Aspergillosis and MAC treatment. Patient currently satting 90% on 40L vapotherm at 65% FiO2, weaning as tolerated.    8/4 - NAEON. Patient is currently satting in the >90% on 20L vapotherm at 35% FiO2, weaning as tolerated. H/H stable 9.5/30.3. Per GI, VCE show a few nonbleeding small likely duodenal AVMs and no other evidence of overt GI Bleed and recommend outpatient evaluation due to the patient's current pulmonary status.     8/5 - NAEON. Pending LTAC status. Spo2 70's with PT/OT and need to increase oxygen to vapotherm 20L 70% with an increase in Spo2 to 90's.     8/6 - NAEON. LTAC declined due to insurance. Currently on 30L 70% vapotherm.    8/7 - 11L 45% HFNL. Off covid precautions Monday 8/10    Interval History: NAEON, 15L HFNC.Will continue to wean, VSS. Patient cough still present. As patient isolation is 20 days, consider lifting isolation status.     Review of Systems   Respiratory: Positive for cough. Negative for shortness of breath.    Cardiovascular: Negative for chest pain.   Gastrointestinal: Negative for abdominal pain, blood in stool, diarrhea and nausea.   Psychiatric/Behavioral: The patient is not nervous/anxious.      Objective:     Vital Signs (Most Recent):  Temp: 100.2 °F (37.9 °C) (08/10/20 0811)  Pulse: 107 (08/10/20 0811)  Resp: 20 (08/10/20 0811)  BP: 112/72 (08/10/20 0811)  SpO2: (!) 93 % (08/10/20 0811) Vital Signs (24h Range):  Temp:  [97.1 °F (36.2 °C)-100.2 °F (37.9 °C)] 100.2 °F (37.9 °C)  Pulse:  [] 107  Resp:  [16-22] 20  SpO2:  [88 %-98 %] 93 %  BP: (112-122)/(55-72) 112/72     Weight: 77.4 kg (170 lb 10.2 oz)  Body mass index is 23.8 kg/m².    Intake/Output Summary (Last 24 hours) at 8/10/2020 0840  Last data filed at 8/10/2020 0335  Gross per 24 hour   Intake 500 ml   Output 476 ml   Net 24 ml      Physical Exam  Deferred in setting of COVID 19. Patient appears  well.  NAD. AAOX3      Significant Labs:   CBC:   Recent Labs   Lab 08/09/20  0615   WBC 8.72   HGB 9.8*   HCT 31.1*        All pertinent labs within the past 24 hours have been reviewed.    Significant Imaging: I have reviewed and interpreted all pertinent imaging results/findings within the past 24 hours.     X-Ray Chest 1 View   Final Result      Stable interstitial opacities which may reflect chronic interstitial lung disease versus COPD with superimposed pneumonitis.  Suggest correlation with clinical findings and consider follow-up with noncontrast CT chest after resolution of acute symptoms.         Electronically signed by: Jourdan Bhardwaj MD   Date:    08/06/2020   Time:    08:51      X-Ray Abdomen AP 1 View   Final Result      No acute process identified.         Electronically signed by: Johann Gold MD   Date:    07/25/2020   Time:    16:06      X-Ray Chest 1 View   Final Result      Stable examination findings of bilateral ground-glass airspace opacities, suggestive of pneumonitis.         Electronically signed by: Davon Santacruz MD   Date:    07/25/2020   Time:    16:03      X-Ray Chest 1 View   Final Result      No significant change.         Electronically signed by: Kari Becerra   Date:    07/22/2020   Time:    18:36      X-Ray Chest AP Portable   Final Result      Abnormal increased interstitial lung markings predominantly at the lung basis left more than right, nonspecific could be acute or chronic.         Electronically signed by: Luda Powell MD   Date:    07/21/2020   Time:    13:54      X-Ray Chest AP Portable    (Results Pending)           Assessment/Plan:      * Acute respiratory disease due to COVID-19 virus  -Consider lifting isolation today    History of Aspergillus & MAC  Continuing voriconazole, switch to itroconazole at [place]   -Follow up with Pulm, ID outpatient  STABLE    COPD (chronic obstructive pulmonary disease)  On HFNC, 15L daily  Working with PT/OT  Patient  BiPap overnight  Continue to wean  Chest physio ongoing, albuterol q4h   -Continue to monitor cough  STABLE    Anemia  Asymptomatic  H/H of 9.8/31.1 ON 8/9  Continue to monitor QOD CBC  STABLE      VTE Risk Mitigation (From admission, onward)         Ordered     enoxaparin injection 80 mg  Every 12 hours (non-standard times)      08/04/20 1421     Place sequential compression device  Until discontinued      07/21/20 2142     IP VTE HIGH RISK PATIENT  Once      07/21/20 2142                      Emeli Adam MD   [employer] PGY2  Department of Hospital Medicine   [facility]

## 2020-08-10 NOTE — ASSESSMENT & PLAN NOTE
Continuing voriconazole, switch to itroconazole at [facility]   -Follow up with Pulm, ID outpatient  STABLE

## 2020-08-10 NOTE — PLAN OF CARE
VN cued into pt's room for introduction. VN informed pt that VN would be working along side bedside nurse and PCT throughout shift. Level of present pain assessed. At present no distress noted. O2 cont to be in use via HFNC. Thoroughly discussed with patient the plan of care. Discussed with patient High fall risk protocol and interventions that have been initiated and cont be in place for safety. Patient verbalized clear understanding and cooperation using teach back method. Bed alarm presently activated and in use. Will cont to be available to patient and intervene prn.

## 2020-08-10 NOTE — PLAN OF CARE
Plan of care reviewed with patient. Pt AAO and able to let staff know his needs. Pt continues on 02 at 7L per high flow NC. Pt stable today with care. 02 sats drop  during coughing spells. Pt participated in PT and OT today. Goal is to DC to LTACH when stable. Tolerated meds today with 0 problems noted. Pt has pain patch to shoulder. Denies pain at this time. Pt discontinued from isolation status.Verbalizes to staff understanding. NSR on monitor with 0 red alarms noted.  No acute distress observed at this time. Side rails x2, bed in low position, call bell within reach. Bed alarm in place for patient safety. Will relay to oncoming nurse to monitor for changes in condition.

## 2020-08-10 NOTE — PROGRESS NOTES
Pt refusing to wear BiPAP. Pt continues to stay on High Flow NC 15L with adequate settings. Will continue to monitor.

## 2020-08-11 LAB
ALBUMIN SERPL BCP-MCNC: 2 G/DL (ref 3.5–5.2)
ALP SERPL-CCNC: 131 U/L (ref 55–135)
ALT SERPL W/O P-5'-P-CCNC: 23 U/L (ref 10–44)
ANION GAP SERPL CALC-SCNC: 6 MMOL/L (ref 8–16)
APTT BLDCRRT: 30.3 SEC (ref 21–32)
AST SERPL-CCNC: 38 U/L (ref 10–40)
BASOPHILS # BLD AUTO: 0.03 K/UL (ref 0–0.2)
BASOPHILS NFR BLD: 0.5 % (ref 0–1.9)
BILIRUB SERPL-MCNC: 0.5 MG/DL (ref 0.1–1)
BUN SERPL-MCNC: 12 MG/DL (ref 8–23)
CALCIUM SERPL-MCNC: 8.2 MG/DL (ref 8.7–10.5)
CHLORIDE SERPL-SCNC: 101 MMOL/L (ref 95–110)
CO2 SERPL-SCNC: 28 MMOL/L (ref 23–29)
CREAT SERPL-MCNC: 1 MG/DL (ref 0.5–1.4)
DIFFERENTIAL METHOD: ABNORMAL
EOSINOPHIL # BLD AUTO: 0.2 K/UL (ref 0–0.5)
EOSINOPHIL NFR BLD: 2.4 % (ref 0–8)
ERYTHROCYTE [DISTWIDTH] IN BLOOD BY AUTOMATED COUNT: 16.1 % (ref 11.5–14.5)
EST. GFR  (AFRICAN AMERICAN): >60 ML/MIN/1.73 M^2
EST. GFR  (NON AFRICAN AMERICAN): >60 ML/MIN/1.73 M^2
GLUCOSE SERPL-MCNC: 98 MG/DL (ref 70–110)
HCT VFR BLD AUTO: 30.5 % (ref 40–54)
HGB BLD-MCNC: 9.5 G/DL (ref 14–18)
IMM GRANULOCYTES # BLD AUTO: 0.01 K/UL (ref 0–0.04)
IMM GRANULOCYTES NFR BLD AUTO: 0.2 % (ref 0–0.5)
LYMPHOCYTES # BLD AUTO: 0.9 K/UL (ref 1–4.8)
LYMPHOCYTES NFR BLD: 14 % (ref 18–48)
MAGNESIUM SERPL-MCNC: 2.1 MG/DL (ref 1.6–2.6)
MCH RBC QN AUTO: 30.3 PG (ref 27–31)
MCHC RBC AUTO-ENTMCNC: 31.1 G/DL (ref 32–36)
MCV RBC AUTO: 97 FL (ref 82–98)
MONOCYTES # BLD AUTO: 0.6 K/UL (ref 0.3–1)
MONOCYTES NFR BLD: 9.4 % (ref 4–15)
NEUTROPHILS # BLD AUTO: 4.6 K/UL (ref 1.8–7.7)
NEUTROPHILS NFR BLD: 73.5 % (ref 38–73)
NRBC BLD-RTO: 0 /100 WBC
PHOSPHATE SERPL-MCNC: 2.5 MG/DL (ref 2.7–4.5)
PLATELET # BLD AUTO: 202 K/UL (ref 150–350)
PMV BLD AUTO: 9.8 FL (ref 9.2–12.9)
POTASSIUM SERPL-SCNC: 4.7 MMOL/L (ref 3.5–5.1)
PROT SERPL-MCNC: 6.1 G/DL (ref 6–8.4)
RBC # BLD AUTO: 3.14 M/UL (ref 4.6–6.2)
SODIUM SERPL-SCNC: 135 MMOL/L (ref 136–145)
WBC # BLD AUTO: 6.28 K/UL (ref 3.9–12.7)

## 2020-08-11 PROCEDURE — 85025 COMPLETE CBC W/AUTO DIFF WBC: CPT

## 2020-08-11 PROCEDURE — 25000003 PHARM REV CODE 250: Performed by: STUDENT IN AN ORGANIZED HEALTH CARE EDUCATION/TRAINING PROGRAM

## 2020-08-11 PROCEDURE — 63600175 PHARM REV CODE 636 W HCPCS: Performed by: FAMILY MEDICINE

## 2020-08-11 PROCEDURE — 30200315 PPD INTRADERMAL TEST REV CODE 302: Performed by: STUDENT IN AN ORGANIZED HEALTH CARE EDUCATION/TRAINING PROGRAM

## 2020-08-11 PROCEDURE — 25000003 PHARM REV CODE 250: Performed by: FAMILY MEDICINE

## 2020-08-11 PROCEDURE — 94799 UNLISTED PULMONARY SVC/PX: CPT

## 2020-08-11 PROCEDURE — 97530 THERAPEUTIC ACTIVITIES: CPT | Mod: CO

## 2020-08-11 PROCEDURE — 85730 THROMBOPLASTIN TIME PARTIAL: CPT

## 2020-08-11 PROCEDURE — 99900035 HC TECH TIME PER 15 MIN (STAT)

## 2020-08-11 PROCEDURE — 25000003 PHARM REV CODE 250: Performed by: INTERNAL MEDICINE

## 2020-08-11 PROCEDURE — 94660 CPAP INITIATION&MGMT: CPT

## 2020-08-11 PROCEDURE — 27100171 HC OXYGEN HIGH FLOW UP TO 24 HOURS

## 2020-08-11 PROCEDURE — 97530 THERAPEUTIC ACTIVITIES: CPT | Mod: CQ

## 2020-08-11 PROCEDURE — 94640 AIRWAY INHALATION TREATMENT: CPT

## 2020-08-11 PROCEDURE — 86580 TB INTRADERMAL TEST: CPT | Performed by: STUDENT IN AN ORGANIZED HEALTH CARE EDUCATION/TRAINING PROGRAM

## 2020-08-11 PROCEDURE — 94761 N-INVAS EAR/PLS OXIMETRY MLT: CPT

## 2020-08-11 PROCEDURE — 94664 DEMO&/EVAL PT USE INHALER: CPT

## 2020-08-11 PROCEDURE — 36415 COLL VENOUS BLD VENIPUNCTURE: CPT

## 2020-08-11 PROCEDURE — 84100 ASSAY OF PHOSPHORUS: CPT

## 2020-08-11 PROCEDURE — 80053 COMPREHEN METABOLIC PANEL: CPT

## 2020-08-11 PROCEDURE — 11000001 HC ACUTE MED/SURG PRIVATE ROOM

## 2020-08-11 PROCEDURE — 83735 ASSAY OF MAGNESIUM: CPT

## 2020-08-11 PROCEDURE — U0003 INFECTIOUS AGENT DETECTION BY NUCLEIC ACID (DNA OR RNA); SEVERE ACUTE RESPIRATORY SYNDROME CORONAVIRUS 2 (SARS-COV-2) (CORONAVIRUS DISEASE [COVID-19]), AMPLIFIED PROBE TECHNIQUE, MAKING USE OF HIGH THROUGHPUT TECHNOLOGIES AS DESCRIBED BY CMS-2020-01-R: HCPCS

## 2020-08-11 RX ORDER — SODIUM,POTASSIUM PHOSPHATES 280-250MG
1 POWDER IN PACKET (EA) ORAL EVERY 6 HOURS SCHEDULED
Status: COMPLETED | OUTPATIENT
Start: 2020-08-11 | End: 2020-08-11

## 2020-08-11 RX ADMIN — PANTOPRAZOLE SODIUM 40 MG: 40 TABLET, DELAYED RELEASE ORAL at 09:08

## 2020-08-11 RX ADMIN — ALBUTEROL SULFATE 2 PUFF: 90 AEROSOL, METERED RESPIRATORY (INHALATION) at 11:08

## 2020-08-11 RX ADMIN — TUBERCULIN PURIFIED PROTEIN DERIVATIVE 5 UNITS: 5 INJECTION INTRADERMAL at 11:08

## 2020-08-11 RX ADMIN — ENOXAPARIN SODIUM 80 MG: 80 INJECTION SUBCUTANEOUS at 05:08

## 2020-08-11 RX ADMIN — POTASSIUM & SODIUM PHOSPHATES POWDER PACK 280-160-250 MG 1 PACKET: 280-160-250 PACK at 05:08

## 2020-08-11 RX ADMIN — Medication 4 TABLET: at 09:08

## 2020-08-11 RX ADMIN — VORICONAZOLE 200 MG: 200 TABLET, FILM COATED ORAL at 04:08

## 2020-08-11 RX ADMIN — ENOXAPARIN SODIUM 80 MG: 80 INJECTION SUBCUTANEOUS at 04:08

## 2020-08-11 RX ADMIN — DICLOFENAC 2 G: 10 GEL TOPICAL at 11:08

## 2020-08-11 RX ADMIN — THERA TABS 1 TABLET: TAB at 09:08

## 2020-08-11 RX ADMIN — ACETAMINOPHEN 650 MG: 325 TABLET ORAL at 05:08

## 2020-08-11 RX ADMIN — ALBUTEROL SULFATE 2 PUFF: 90 AEROSOL, METERED RESPIRATORY (INHALATION) at 08:08

## 2020-08-11 RX ADMIN — Medication 4 TABLET: at 05:08

## 2020-08-11 RX ADMIN — BENZONATATE 100 MG: 100 CAPSULE ORAL at 11:08

## 2020-08-11 RX ADMIN — ALBUTEROL SULFATE 2 PUFF: 90 AEROSOL, METERED RESPIRATORY (INHALATION) at 03:08

## 2020-08-11 RX ADMIN — VORICONAZOLE 200 MG: 200 TABLET, FILM COATED ORAL at 03:08

## 2020-08-11 RX ADMIN — ALBUTEROL SULFATE 2 PUFF: 90 AEROSOL, METERED RESPIRATORY (INHALATION) at 07:08

## 2020-08-11 RX ADMIN — LIDOCAINE 1 PATCH: 50 PATCH TOPICAL at 05:08

## 2020-08-11 RX ADMIN — Medication 4 TABLET: at 11:08

## 2020-08-11 RX ADMIN — POTASSIUM & SODIUM PHOSPHATES POWDER PACK 280-160-250 MG 1 PACKET: 280-160-250 PACK at 11:08

## 2020-08-11 NOTE — PLAN OF CARE
sent SNF referrals to 1) [facility], 2) [facility] and [facility]. SW will continue to follow up pending approval.       08/11/20 0937   Post-Acute Status   Post-Acute Authorization Placement   Post-Acute Placement Status Referrals Sent

## 2020-08-11 NOTE — PLAN OF CARE
I called Thea Deluna (Daughter): [phone] and Tania Deluna ( Wife ) [phone] to speak with them about SNF placement.  [facility] says [facility] has called her and will set up time for her to sign paperwork.         08/11/20 1213   Post-Acute Status   Post-Acute Authorization Placement     Phong Suh RN,   [phone]

## 2020-08-11 NOTE — PLAN OF CARE
Problem: Physical Therapy Goal  Goal: Physical Therapy Goal  Description: Goals to be met by: 20     Patient will increase functional independence with mobility by performin. Supine <> sit with MInimal Assistance Met 20  2. Sit to stand transfer with Minimal Assistance met 20  3. Bed to chair transfer with Minimal Assistance   4. Gait x 50 ft with min A with appropriate AD  5. Lower extremity exercise program x 10 reps per handout, with supervision    Outcome: Ongoing, Progressing   Continue working toward goals.

## 2020-08-11 NOTE — PROGRESS NOTES
[facility] Medicine  Progress Note    Patient Name: Rajan Deluna  MRN: [id number]  Patient Class: IP- Inpatient   Admission Date: 7/21/2020  Length of Stay: 21 days  Attending Physician: Fany Petersen MD  Primary Care Provider: Jason Mccord MD        Subjective:     Principal Problem:Acute respiratory disease due to COVID-19 virus        HPI:  Patient is a 74-year-old male with a past medical history of hypertension, COPD, MAC infection who presented to the ED with altered mental status and fever.  As per the family, patient has had altered mental status for the past 3-4 days. Family endorses increased somnolence as well as urinating on himself. EMS was called two days prior to presentation but reportedly deemed that patient did not need to be brought in. Over the previous day, reportedly the patient was found stuck, staring in his sink, not responding. On the day of presentation, the patient was found down on the floor, family wasn't able to get patient up from the floor and was reportedly not responding as much as baseline. Family believed that patient may not have been wearing baseline 2L O2. Patient also endorses a cough and generalized weakness. Patient reportedly had a recent UTI.     In the ED, chest x-ray showed diffuse interstitial patchy infiltrates concerning for possible pneumonia and a COVID-19 screening test was positive. Fever was 101° F and patient was hypotensive 80s/40s. Patient given 30cc/kg bolus. BP very soft on presentation, adequately responded to volume resuscitation and then decreased again. Labs showed a BUN 45, creatinine 2.9, , , troponin 0.036, procalcitonin 1.13.     Overview/Hospital Course:  8/3 - NAEON, had chest pain, normal EKG and troponin overnight, resolved with GI cocktail. This AM, H/H decreased from 10.8/33.7 to 9.1/28. Infectious disease recommends oral Vancomycin and IV Metronidazole for C. Diff until 8/7 and to continue  voriconazole and discharge on Itraconazole 100mg BID and follow up with Pulmonary and ID for continued Aspergillosis and MAC treatment. Patient currently satting 90% on 40L vapotherm at 65% FiO2, weaning as tolerated.    8/4 - NAEON. Patient is currently satting in the >90% on 20L vapotherm at 35% FiO2, weaning as tolerated. H/H stable 9.5/30.3. Per GI, VCE show a few nonbleeding small likely duodenal AVMs and no other evidence of overt GI Bleed and recommend outpatient evaluation due to the patient's current pulmonary status.     8/5 - NAEON. Pending LTAC status. Spo2 70's with PT/OT and need to increase oxygen to vapotherm 20L 70% with an increase in Spo2 to 90's.     8/6 - NAEON. LTAC declined due to insurance. Currently on 30L 70% vapotherm.    8/7 - 11L 45% HFNL. Off covid precautions Monday 8/10    8/8 - NAEO. Bipap overnight, HFNC 15L last night and this am. Feeling better. No cp. H/H stable     8/9 - NAEON, patient on 15L HFNC. States he is coughing a bit more today, states it is nonproductive. Denies any discomfort, N/V/D. Feels well. On Monday, patient will meet criteria for isolation to be lifted, which would allow his daughter to visit.      8/10 - Oxygen weaned down to 10L HFNC over the weekend. Patient off COVID isolation precautions. Pending placement.    Interval History: NAEON. Anemia stable on labs, patient slightly hypomag, will replete. Currently on 5L HFNC, will continue to wean.     Review of Systems   Constitutional: Positive for fatigue. Negative for activity change, appetite change and chills.   HENT: Negative for congestion and dental problem.    Eyes: Negative for discharge and itching.   Respiratory: Positive for cough. Negative for shortness of breath.    Cardiovascular: Negative for chest pain.   Gastrointestinal: Negative for abdominal pain, blood in stool, diarrhea and nausea.   Endocrine: Positive for cold intolerance. Negative for heat intolerance.        Likely due to anemia,  patient has several blankets   Genitourinary: Negative for difficulty urinating.   Musculoskeletal: Negative for arthralgias, back pain and gait problem.   Skin: Negative.  Negative for color change and pallor.   Neurological: Positive for headaches. Negative for dizziness, facial asymmetry and light-headedness.   Psychiatric/Behavioral: The patient is not nervous/anxious.      Objective:     Vital Signs (Most Recent):  Temp: 98.1 °F (36.7 °C) (08/11/20 0447)  Pulse: 103 (08/11/20 0727)  Resp: 18 (08/11/20 0727)  BP: 112/67 (08/11/20 0447)  SpO2: (!) 92 % (08/11/20 0727) Vital Signs (24h Range):  Temp:  [97.8 °F (36.6 °C)-100.2 °F (37.9 °C)] 98.1 °F (36.7 °C)  Pulse:  [] 103  Resp:  [16-28] 18  SpO2:  [90 %-99 %] 92 %  BP: (112-123)/(58-72) 112/67     Weight: 77.4 kg (170 lb 10.2 oz)  Body mass index is 23.8 kg/m².  No intake or output data in the 24 hours ending 08/11/20 0739   Physical Exam  Vitals signs and nursing note reviewed.   Constitutional:       General: He is not in acute distress.     Appearance: Normal appearance. He is not ill-appearing.   HENT:      Head: Normocephalic and atraumatic.      Right Ear: External ear normal.      Left Ear: External ear normal.      Nose: Nose normal. No congestion or rhinorrhea.      Mouth/Throat:      Mouth: Mucous membranes are moist.      Pharynx: Oropharynx is clear.   Eyes:      Pupils: Pupils are equal, round, and reactive to light.   Neck:      Musculoskeletal: Normal range of motion and neck supple.   Cardiovascular:      Rate and Rhythm: Normal rate and regular rhythm.      Pulses: Normal pulses.      Heart sounds: Normal heart sounds.   Pulmonary:      Effort: Pulmonary effort is normal.      Comments: Patient has decreased breath sounds, no wheezing  Abdominal:      General: Abdomen is flat. There is no distension.      Palpations: Abdomen is soft.      Tenderness: There is no abdominal tenderness. There is no guarding.   Musculoskeletal: Normal range of  motion.         General: No swelling.   Neurological:      General: No focal deficit present.      Mental Status: He is alert and oriented to person, place, and time. Mental status is at baseline.   Psychiatric:         Mood and Affect: Mood normal.         Behavior: Behavior normal.         Thought Content: Thought content normal.         Judgment: Judgment normal.         Significant Labs:   CBC:   Recent Labs   Lab 20   WBC 6.28   HGB 9.5*   HCT 30.5*        CMP:   Recent Labs   Lab 20   *   K 4.7      CO2 28   GLU 98   BUN 12   CREATININE 1.0   CALCIUM 8.2*   PROT 6.1   ALBUMIN 2.0*   BILITOT 0.5   ALKPHOS 131   AST 38   ALT 23   ANIONGAP 6*   EGFRNONAA >60     Ma.1  Phos: 2.7    Significant Imaging: I have reviewed all pertinent imaging results/findings within the past 24 hours.      Assessment/Plan:      * Acute respiratory disease due to COVID-19 virus  -Isolation lifted     History of Aspergillus & MAC  Continuing voriconazole, switch to itroconazole at [place]   -Follow up with Pulm, ID outpatient  STABLE    COPD (chronic obstructive pulmonary disease)  On HFNC, 5L daily, will try to wean to NC today.   Working with PT/OT  Patient BiPap overnight  Continue to wean  Chest physio ongoing, albuterol q4h   -Continue to monitor cough  STABLE    Anemia  Asymptomatic  H/H of 9.8/31.1 ON   H/H of 9.5/30.5 on , stable  Continue to monitor QOD CBC  STABLE      VTE Risk Mitigation (From admission, onward)         Ordered     enoxaparin injection 80 mg  Every 12 hours (non-standard times)      20 1421     Place sequential compression device  Until discontinued      20     IP VTE HIGH RISK PATIENT  Once      20                      Emeli Adam MD   [employer] FM PGY2  Department of [facility] Medicine   [facility]

## 2020-08-11 NOTE — PLAN OF CARE
Patient is awake and orientedx3, reoriented to time. Care plan explained to patient; verbalized understanding. On 7L HFNC, O2 saturation at 93-99%. Hooked to heart monitor, running sinus tachycardia at 104-116bpm. Urinal in reach. Incontinent to bowel, kept clean and dry per staff. No N/V/D during shift. Due medications given. Encouraged/assisted to turn every 2 hours as tolerated. Maintained on fall risk precautions. Bed in lowest position, bed alarm on, call light/personal items within reach and instructed to call for help when needed. Will continue to monitor.

## 2020-08-11 NOTE — PLAN OF CARE
Pt denied by [facility] for [facility].  Pt is now on 3-5L overnight and is now more SNF appropriate.  Preferences for pt/family are 1. [facility]  2. [facility]  3. [facility].  DEEPA Genao notified.       08/11/20 0935   Post-Acute Status   Post-Acute Authorization Placement  (SNF)     Phong Suh RN,   [phone]

## 2020-08-11 NOTE — PLAN OF CARE
Patient stable on 4L O2, satting 88-93%. Patient seems to desat when active. Incentive spirometry, PT/OT should help with conditioning. PPD to be placed, placement pending, walk test potentially tomorrow.  Emeli Adam MD  [employer] [facility] PGY2

## 2020-08-11 NOTE — SUBJECTIVE & OBJECTIVE
Interval History: NAEON. Anemia stable on labs, patient slightly hypomag, will replete. Currently on 5L HFNC, will continue to wean.     Review of Systems   Constitutional: Positive for fatigue. Negative for activity change, appetite change and chills.   HENT: Negative for congestion and dental problem.    Eyes: Negative for discharge and itching.   Respiratory: Positive for cough. Negative for shortness of breath.    Cardiovascular: Negative for chest pain.   Gastrointestinal: Negative for abdominal pain, blood in stool, diarrhea and nausea.   Endocrine: Positive for cold intolerance. Negative for heat intolerance.        Likely due to anemia, patient has several blankets   Genitourinary: Negative for difficulty urinating.   Musculoskeletal: Negative for arthralgias, back pain and gait problem.   Skin: Negative.  Negative for color change and pallor.   Neurological: Positive for headaches. Negative for dizziness, facial asymmetry and light-headedness.   Psychiatric/Behavioral: The patient is not nervous/anxious.      Objective:     Vital Signs (Most Recent):  Temp: 98.1 °F (36.7 °C) (08/11/20 0447)  Pulse: 103 (08/11/20 0727)  Resp: 18 (08/11/20 0727)  BP: 112/67 (08/11/20 0447)  SpO2: (!) 92 % (08/11/20 0727) Vital Signs (24h Range):  Temp:  [97.8 °F (36.6 °C)-100.2 °F (37.9 °C)] 98.1 °F (36.7 °C)  Pulse:  [] 103  Resp:  [16-28] 18  SpO2:  [90 %-99 %] 92 %  BP: (112-123)/(58-72) 112/67     Weight: 77.4 kg (170 lb 10.2 oz)  Body mass index is 23.8 kg/m².  No intake or output data in the 24 hours ending 08/11/20 0739   Physical Exam  Vitals signs and nursing note reviewed.   Constitutional:       General: He is not in acute distress.     Appearance: Normal appearance. He is not ill-appearing.   HENT:      Head: Normocephalic and atraumatic.      Right Ear: External ear normal.      Left Ear: External ear normal.      Nose: Nose normal. No congestion or rhinorrhea.      Mouth/Throat:      Mouth: Mucous  membranes are moist.      Pharynx: Oropharynx is clear.   Eyes:      Pupils: Pupils are equal, round, and reactive to light.   Neck:      Musculoskeletal: Normal range of motion and neck supple.   Cardiovascular:      Rate and Rhythm: Normal rate and regular rhythm.      Pulses: Normal pulses.      Heart sounds: Normal heart sounds.   Pulmonary:      Effort: Pulmonary effort is normal.      Comments: Patient has decreased breath sounds, no wheezing  Abdominal:      General: Abdomen is flat. There is no distension.      Palpations: Abdomen is soft.      Tenderness: There is no abdominal tenderness. There is no guarding.   Musculoskeletal: Normal range of motion.         General: No swelling.   Neurological:      General: No focal deficit present.      Mental Status: He is alert and oriented to person, place, and time. Mental status is at baseline.   Psychiatric:         Mood and Affect: Mood normal.         Behavior: Behavior normal.         Thought Content: Thought content normal.         Judgment: Judgment normal.         Significant Labs:   CBC:   Recent Labs   Lab 20   WBC 6.28   HGB 9.5*   HCT 30.5*        CMP:   Recent Labs   Lab 20   *   K 4.7      CO2 28   GLU 98   BUN 12   CREATININE 1.0   CALCIUM 8.2*   PROT 6.1   ALBUMIN 2.0*   BILITOT 0.5   ALKPHOS 131   AST 38   ALT 23   ANIONGAP 6*   EGFRNONAA >60     Ma.1  Phos: 2.7    Significant Imaging: I have reviewed all pertinent imaging results/findings within the past 24 hours.

## 2020-08-11 NOTE — ASSESSMENT & PLAN NOTE
Continuing voriconazole, switch to itroconazole at [place]   -Follow up with Pulm, ID outpatient  STABLE

## 2020-08-11 NOTE — ASSESSMENT & PLAN NOTE
Asymptomatic  H/H of 9.8/31.1 ON 8/9  H/H of 9.5/30.5 on 8/11, stable  Continue to monitor QOD CBC  STABLE

## 2020-08-11 NOTE — PLAN OF CARE
spoke with Brigitte with [facility], [phone], patient currently under review  for placement. Brigitte informed SW she will reach out to family , Thea Deluna (Daughter): [phone] , Tania Deluna ( Wife ) [phone], to discuss placement. DEEPA will continue to follow up.    DEEPA spoke with Yany, [phone], with [facility], requested updated COVD result and is currently reviewing patient. DEEPA will continue to follow up pending approval.        08/11/20 1139   Post-Acute Status   Post-Acute Authorization Placement   Post-Acute Placement Status Patient Evaluation by Facility

## 2020-08-11 NOTE — PT/OT/SLP PROGRESS
Physical Therapy Treatment    Patient Name:  Rajan Deluna   MRN:  [id number]    Recommendations:     Discharge Recommendations:  (TBD)   Discharge Equipment Recommendations: walker, rolling   Barriers to discharge: decreased functional mobility with decreasing O2 sats with movement.    Assessment:     Rajna Deluna is a 74 y.o. male admitted with a medical diagnosis of Acute respiratory disease due to COVID-19 virus.  He presents with the following impairments/functional limitations:  weakness, impaired endurance, impaired self care skills, impaired functional mobilty, gait instability, impaired balance, decreased coordination, decreased upper extremity function, decreased lower extremity function, impaired cardiopulmonary response to activity.  Pt would continue to benefit from P.T. To address impairments listed above.  .    Rehab Prognosis: Fair; patient would benefit from acute skilled PT services to address these deficits and reach maximum level of function.    Recent Surgery: Procedure(s) (LRB):  IMAGING PROCEDURE, GI TRACT, INTRALUMINAL, VIA CAPSULE (N/A) 8 Days Post-Op    Plan:     During this hospitalization, patient to be seen 6 x/week to address the identified rehab impairments via gait training, therapeutic activities, therapeutic exercises, neuromuscular re-education and progress toward the following goals:    · Plan of Care Expires:  09/08/20    Subjective       Patient/Family Comments/goals: Pt agreed to tx.  Pain/Comfort:  · Pain Rating 1: 0/10  · Pain Rating Post-Intervention 1: 0/10      Objective:     Communicated with RN prior to session.  Patient found supine with bed alarm, oxygen, pulse ox (continuous), telemetry upon PT entry to room.     General Precautions: Standard, special contact, fall, contact, droplet, airborne   Orthopedic Precautions:N/A   Braces:       Functional Mobility:  · Bed Mobility:     · Rolling Left:  minimum assistance and assist with R hand placement on b/r to assist with  rolling left  · Scooting: stand by assistance and to EOB  · Supine to Sit: stand by assistance and contact guard assistance  · Transfers:     · Sit to Stand:  minimum assistance with rolling walker  · Bed to Chair: minimum assistance with  rolling walker  using  Step Transfer  · Gait: 4 small steps from EOB to b/s chair with Rw and Maurice and vc's for directional turning.  Pt required assistance for increased stability and RW management.  decreased kathy, step length, trunk flexion, and became SOB during t/f  · Balance: sitting good, standing fair, gait fair-      AM-PAC 6 CLICK MOBILITY  Sitting down on and standing up from a chair with arms (e.g., wheelchair, bedside commode, etc.): 3  Moving from lying on back to sitting on the side of the bed?: 3  Moving to and from a bed to a chair (including a wheelchair)?: 3  Need to walk in hospital room?: 3  Climbing 3-5 steps with a railing?: 1       Therapeutic Activities and Exercises:   Pt was on wall unit O2 NC @ 3lpm upon entering the room.  Pt performed supine > sit with O2 sats 85% after completing.  Pt was instructed in PLB and O2 sats increased to 87-88%.  Pt performed AP x 15 reps with O2 sats dropping to 83%.  Pt was then instructed in PLB and sitting upright.  O2 sats increased to 88%, but dropped to 80% when pt performed LAQs x 12 reps on LLE.  Seated rest and PLB required between left and right LE exercise secondary to O2 sats decreasing and pt SOB.  RT entered the room and also monitored O2 sats and HR.  Pt's O2 sats were 87 to 88%% prior to transfer to b/s chair and dropped to 78% during transfer.  O2 sats only increased to 85% with PLB on above O2.  RT switched pt to HF @ 10lpm, and pt stated he was feeling better and appeared to be breathing with greater ease after a few minutes on HF O2.  Pt's heart rate ranged from 118bpm to 132 bpm during tx.  RN notified.      Patient left up in chair with all lines intact, call button in reach, chair alarm on and RN  notified..    GOALS:   Multidisciplinary Problems     Physical Therapy Goals        Problem: Physical Therapy Goal    Goal Priority Disciplines Outcome Goal Variances Interventions   Physical Therapy Goal     PT, PT/OT Ongoing, Progressing     Description: Goals to be met by: 20     Patient will increase functional independence with mobility by performin. Supine <> sit with MInimal Assistance  2. Sit to stand transfer with Minimal Assistance  3. Bed to chair transfer with Minimal Assistance   4. Gait x 50 ft with min A with appropriate AD  5. Lower extremity exercise program x 10 reps per handout, with supervision                     Time Tracking:     PT Received On: 20  PT Start Time: 1239     PT Stop Time: 1309  PT Total Time (min): 30 min     Billable Minutes: Therapeutic Activity 30    Treatment Type: Treatment  PT/PTA: PTA     PTA Visit Number: 1     Dee Urena PTA  2020

## 2020-08-11 NOTE — PROGRESS NOTES
[facility]  Progress/Critical Care - Medicine  History & Physical    Patient Name: Rajan Deluna  MRN: [id number]  Admission Date: 7/21/2020  Hospital Length of Stay: 21 days  Code Status: Full Code  Attending Physician: Rell Tolentino III, MD   Primary Care Provider: Jason Mccord MD   Principal Problem: Acute respiratory disease due to COVID-19 virus    Subjective:     Hospital/ICU Course: When seen the patient is on 10LPM. He seems to be doing well.  He has many questions about his disposition.    Past Medical History:   Diagnosis Date    Arthritis     COPD (chronic obstructive pulmonary disease)     Hypertension     Smoker     Weakness        Past Surgical History:   Procedure Laterality Date    HERNIA REPAIR      INTRALUMINAL GASTROINTESTINAL TRACT IMAGING VIA CAPSULE N/A 8/3/2020    Procedure: IMAGING PROCEDURE, GI TRACT, INTRALUMINAL, VIA CAPSULE;  Surgeon: Rey Olivares MD;  [facility];  Service: Endoscopy;  Laterality: N/A;    right knee surgery         Review of patient's allergies indicates:  No Known Allergies    Family History     None        Tobacco Use    Smoking status: Former Smoker    Smokeless tobacco: Never Used   Substance and Sexual Activity    Alcohol use: Not Currently    Drug use: Never    Sexual activity: Not on file     Objective:     Vital Signs (Most Recent):  Temp: 98.1 °F (36.7 °C) (08/11/20 0908)  Pulse: 107 (08/11/20 1153)  Resp: 20 (08/11/20 1106)  BP: 107/62 (08/11/20 0908)  SpO2: (!) 88 % (08/11/20 1320) Vital Signs (24h Range):  Temp:  [97.8 °F (36.6 °C)-99.9 °F (37.7 °C)] 98.1 °F (36.7 °C)  Pulse:  [] 107  Resp:  [16-28] 20  SpO2:  [87 %-99 %] 88 %  BP: (107-123)/(58-69) 107/62     Weight: 77.4 kg (170 lb 10.2 oz)  Body mass index is 23.8 kg/m².    No intake or output data in the 24 hours ending 08/11/20 1447    Physical Exam  Vitals reviewed: virtual visit.   Constitutional:       General: He is not in acute distress.      Appearance: Normal appearance.   HENT:      Head: Normocephalic.      Nose: Nose normal.   Neurological:      General: No focal deficit present.      Mental Status: He is alert.         Vents:  Oxygen Concentration (%): 70 (08/11/20 0021)    Lines/Drains/Airways     Peripheral Intravenous Line                 Peripheral IV - Single Lumen 08/04/20 0314 18 G Right Forearm 7 days                Significant Labs:    CBC/Anemia Profile:  Recent Labs   Lab 08/11/20  0417   WBC 6.28   HGB 9.5*   HCT 30.5*      MCV 97   RDW 16.1*        Chemistries:  Recent Labs   Lab 08/11/20 0417   *   K 4.7      CO2 28   BUN 12   CREATININE 1.0   CALCIUM 8.2*   ALBUMIN 2.0*   PROT 6.1   BILITOT 0.5   ALKPHOS 131   ALT 23   AST 38   MG 2.1   PHOS 2.5*       All pertinent labs within the past 24 hours have been reviewed.    Significant Imaging: I have reviewed all pertinent imaging results/findings within the past 24 hours.    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]  Yes    COPD (chronic obstructive pulmonary disease) [J44.9] 05/22/2017 Yes    Anemia [D64.9] 12/09/2016 No      Problems Resolved During this Admission:    Diagnosis Date Noted Date Resolved POA    History of MAC infection [Z86.19]  08/07/2020 No    GI bleed [K92.2] 07/29/2020 08/07/2020 Clinically Undetermined    Clostridium difficile infection [A49.8]  08/07/2020 Clinically Undetermined    Acute respiratory failure with hypoxia [J96.01]  08/07/2020 Yes    MARQUIS (acute kidney injury) [N17.9] 07/21/2020 08/07/2020 Yes       * Acute respiratory disease due to COVID-19 virus  - 7/21 COVID(+)  - s/p remdesivir x5d, dexamethasone x10d  - full dose AC with lovenox  - proning if he can tolerate at all  - incentive spirometry   - please wean O2 as tolerated for goal SpO2 88%  - BIPAP QHS  - currently  on 10LPM via bubble flow.  Was as low as 3LPM today, but desaturates with movement, requiring increase of FiO2.       History of Aspergillus & MAC  - serum aspergillus negative, loaded with voriconazole on  7/29   - no MAC treatment at this time per ID     Acute on chronic respiratory failure with hypoxia  See COVID problem     - Use of 2L NC at home     COPD (chronic obstructive pulmonary disease)  - Use of 2L NC at home  - metered dose inhaler q4h      Out of bed to chair daily  Incentive spirometry   PT/OT    Improved since yesterday.  Continue current management.     Bryan Swain MD  Critical Care - Medicine  [facility]  [phone]

## 2020-08-11 NOTE — ASSESSMENT & PLAN NOTE
On HFNC, 5L daily, will try to wean to NC today.   Working with PT/OT  Patient BiPap overnight  Continue to wean  Chest physio ongoing, albuterol q4h   -Continue to monitor cough  STABLE

## 2020-08-11 NOTE — PLAN OF CARE
Problem: Occupational Therapy Goal  Goal: Occupational Therapy Goal  Description: Goals to be met by: 8/27/20     Patient will increase functional independence with ADLs by performing:    LE Dressing with Stand-by Assistance.  Grooming while standing with Stand-by Assistance.  Toileting from toilet with Stand-by Assistance for hygiene and clothing management.   Supine to sit with Stand-by Assistance.  Step transfer with Stand-by Assistance  Toilet transfer to toilet with Stand-by Assistance.  Upper extremity exercise program x10 reps per handout, with independence.    Outcome: Ongoing, Progressing     Patient reported he had been up in chair and never received lunch. O2 sats when reclined 91%, but dropped to low-mid 80s /c slight movement. Patient will benefit from SNF upon D/C to maximize strength and endurance for ADL tasks.

## 2020-08-11 NOTE — PLAN OF CARE
Patient A&Ox4.   Currently at 8L high lg. Bipap at night. Continuous pulse ox.  ST on telemetry.  Diabetic diet.  Incontinent x2. Will use urinal at times.  PT/OT working with patient. Desats with movement.  Skin intact.  No complaints of pain.  All questions answered.  Will continue to monitor.    Katherine Wu RN        Problem: Adult Inpatient Plan of Care  Goal: Plan of Care Review  Description: Chart and Care plan reviewed.     Outcome: Ongoing, Progressing  Flowsheets (Taken 8/11/2020 1637)  Plan of Care Reviewed With:   patient   son     Problem: Fall Injury Risk  Goal: Absence of Fall and Fall-Related Injury  Outcome: Ongoing, Progressing  Intervention: Identify and Manage Contributors to Fall Injury Risk  Flowsheets (Taken 8/11/2020 1637)  Self-Care Promotion: independence encouraged  Medication Review/Management: medications reviewed     Problem: Respiratory Compromise (Sepsis/Septic Shock)  Goal: Effective Oxygenation and Ventilation  Outcome: Ongoing, Progressing  Intervention: Promote Airway Secretion Clearance  Flowsheets (Taken 8/11/2020 1637)  Breathing Techniques/Airway Clearance: deep/controlled cough encouraged  Activity Management: activity encouraged

## 2020-08-11 NOTE — PLAN OF CARE
VN note: VN cued into patient's room for introduction. Respiratory therapist at bedside. Patient requested cough medication. I sent secure message to bedside nurse Katherine. No further needs at this time. VN will continue to be available to patient and intervene prn.

## 2020-08-11 NOTE — PT/OT/SLP PROGRESS
Occupational Therapy   Treatment    Name: Rajan Deluna  MRN: [id number]  Admitting Diagnosis:  Acute respiratory disease due to COVID-19 virus  8 Days Post-Op    Recommendations:     Discharge Recommendations: nursing facility, skilled  Discharge Equipment Recommendations:  walker, rolling, bedside commode  Barriers to discharge:  Decreased caregiver support, Inaccessible home environment    Assessment:   Patient reported he had been up in chair and never received lunch. O2 sats when reclined 91%, but dropped to low-mid 80s /c slight movement. Patient will benefit from SNF upon D/C to maximize strength and endurance for ADL tasks.     Rajan Deluna is a 74 y.o. male with a medical diagnosis of Acute respiratory disease due to COVID-19 virus. Performance deficits affecting function are weakness, impaired endurance, impaired self care skills, impaired functional mobilty, gait instability, impaired balance, decreased lower extremity function, decreased upper extremity function, impaired cardiopulmonary response to activity.     Rehab Prognosis:  Good and Fair; patient would benefit from acute skilled OT services to address these deficits and reach maximum level of function.       Plan:     Patient to be seen 5 x/week to address the above listed problems via self-care/home management, therapeutic activities, therapeutic exercises  · Plan of Care Expires: 08/27/20  · Plan of Care Reviewed with: patient    Subjective     Pain/Comfort:  · Pain Rating 1: 0/10  · Pain Rating Post-Intervention 1: 0/10    Objective:     Communicated with: nurseSania  prior to session.  Patient found up in chair with bed alarm, oxygen, pulse ox (continuous), telemetry upon OT entry to room.    General Precautions: Standard, airborne, droplet, fall, special contact, respiratory   Orthopedic Precautions:N/A   Braces: N/A     Bed Mobility:    · Patient completed Scooting/Bridging with stand by assistance  · Patient completed Sit to Supine with  stand by assistance     Functional Mobility/Transfers:  · Patient completed Sit <> Stand Transfer with minimum assistance and moderate assistance  with  rolling walker and 2/2 low surface height chair   · Patient completed Bed <> Chair Transfer using Step Transfer technique with contact guard assistance with rolling walker    Activities of Daily Living:  · Feeding:  set-up      [facility] 6 Click ADL: 15    Treatment & Education:  Reporting fatigue from being in chair x 2+ hours. Also c/o not receiving lunch tray (nsg notified and tray was heated up and brought in to patient). O2 sats drop with any slight movement. Increased effort to stand from low surface height and VCs to push from armrests. Patient able to stand and step to EOB as above. Sat EOB for rest break 2/2 dyspnea. VCs for PLB technique. Patient able to scoot laterally to HOB before returning self supine. Set-up /c lunch tray.     Patient left HOB elevated with all lines intact, call button in reach, bed alarm on and nsg notifiedEducation:      GOALS:   Multidisciplinary Problems     Occupational Therapy Goals        Problem: Occupational Therapy Goal    Goal Priority Disciplines Outcome Interventions   Occupational Therapy Goal     OT, PT/OT Ongoing, Progressing    Description: Goals to be met by: 8/27/20     Patient will increase functional independence with ADLs by performing:    LE Dressing with Stand-by Assistance.  Grooming while standing with Stand-by Assistance.  Toileting from toilet with Stand-by Assistance for hygiene and clothing management.   Supine to sit with Stand-by Assistance.  Step transfer with Stand-by Assistance  Toilet transfer to toilet with Stand-by Assistance.  Upper extremity exercise program x10 reps per handout, with independence.                     Time Tracking:     OT Date of Treatment: 08/11/20  OT Start Time: 1427  OT Stop Time: 1450  OT Total Time (min): 23 min    Billable Minutes:Therapeutic Activity 23    Jaye LEONARDO  Francis, MASON/L  8/11/2020

## 2020-08-12 LAB — SARS-COV-2 RNA RESP QL NAA+PROBE: NOT DETECTED

## 2020-08-12 PROCEDURE — 25000003 PHARM REV CODE 250: Performed by: INTERNAL MEDICINE

## 2020-08-12 PROCEDURE — 94761 N-INVAS EAR/PLS OXIMETRY MLT: CPT

## 2020-08-12 PROCEDURE — 25000003 PHARM REV CODE 250: Performed by: STUDENT IN AN ORGANIZED HEALTH CARE EDUCATION/TRAINING PROGRAM

## 2020-08-12 PROCEDURE — 99900035 HC TECH TIME PER 15 MIN (STAT)

## 2020-08-12 PROCEDURE — 94640 AIRWAY INHALATION TREATMENT: CPT

## 2020-08-12 PROCEDURE — 97530 THERAPEUTIC ACTIVITIES: CPT

## 2020-08-12 PROCEDURE — 94664 DEMO&/EVAL PT USE INHALER: CPT

## 2020-08-12 PROCEDURE — 97803 MED NUTRITION INDIV SUBSEQ: CPT

## 2020-08-12 PROCEDURE — 97110 THERAPEUTIC EXERCISES: CPT | Mod: CQ

## 2020-08-12 PROCEDURE — 27000221 HC OXYGEN, UP TO 24 HOURS

## 2020-08-12 PROCEDURE — 97530 THERAPEUTIC ACTIVITIES: CPT | Mod: CQ

## 2020-08-12 PROCEDURE — 63600175 PHARM REV CODE 636 W HCPCS: Performed by: FAMILY MEDICINE

## 2020-08-12 PROCEDURE — 25000242 PHARM REV CODE 250 ALT 637 W/ HCPCS: Performed by: STUDENT IN AN ORGANIZED HEALTH CARE EDUCATION/TRAINING PROGRAM

## 2020-08-12 PROCEDURE — 25000003 PHARM REV CODE 250: Performed by: FAMILY MEDICINE

## 2020-08-12 PROCEDURE — 11000001 HC ACUTE MED/SURG PRIVATE ROOM

## 2020-08-12 PROCEDURE — 94799 UNLISTED PULMONARY SVC/PX: CPT

## 2020-08-12 RX ORDER — IPRATROPIUM BROMIDE AND ALBUTEROL SULFATE 2.5; .5 MG/3ML; MG/3ML
3 SOLUTION RESPIRATORY (INHALATION)
Status: DISCONTINUED | OUTPATIENT
Start: 2020-08-12 | End: 2020-08-27 | Stop reason: HOSPADM

## 2020-08-12 RX ORDER — ACETAMINOPHEN 500 MG
1000 TABLET ORAL ONCE
Status: COMPLETED | OUTPATIENT
Start: 2020-08-12 | End: 2020-08-12

## 2020-08-12 RX ADMIN — ENOXAPARIN SODIUM 80 MG: 80 INJECTION SUBCUTANEOUS at 05:08

## 2020-08-12 RX ADMIN — LIDOCAINE 1 PATCH: 50 PATCH TOPICAL at 05:08

## 2020-08-12 RX ADMIN — IPRATROPIUM BROMIDE AND ALBUTEROL SULFATE 3 ML: 2.5; .5 SOLUTION RESPIRATORY (INHALATION) at 08:08

## 2020-08-12 RX ADMIN — VORICONAZOLE 200 MG: 200 TABLET, FILM COATED ORAL at 05:08

## 2020-08-12 RX ADMIN — ACETAMINOPHEN 650 MG: 325 TABLET ORAL at 01:08

## 2020-08-12 RX ADMIN — BENZONATATE 100 MG: 100 CAPSULE ORAL at 12:08

## 2020-08-12 RX ADMIN — THERA TABS 1 TABLET: TAB at 08:08

## 2020-08-12 RX ADMIN — ACETAMINOPHEN 650 MG: 325 TABLET ORAL at 03:08

## 2020-08-12 RX ADMIN — IPRATROPIUM BROMIDE AND ALBUTEROL SULFATE 3 ML: 2.5; .5 SOLUTION RESPIRATORY (INHALATION) at 04:08

## 2020-08-12 RX ADMIN — PANTOPRAZOLE SODIUM 40 MG: 40 TABLET, DELAYED RELEASE ORAL at 09:08

## 2020-08-12 RX ADMIN — Medication 4 TABLET: at 12:08

## 2020-08-12 RX ADMIN — Medication 4 TABLET: at 05:08

## 2020-08-12 RX ADMIN — ACETAMINOPHEN 1000 MG: 500 TABLET ORAL at 10:08

## 2020-08-12 RX ADMIN — PANTOPRAZOLE SODIUM 40 MG: 40 TABLET, DELAYED RELEASE ORAL at 08:08

## 2020-08-12 RX ADMIN — Medication 4 TABLET: at 08:08

## 2020-08-12 RX ADMIN — DICLOFENAC 2 G: 10 GEL TOPICAL at 08:08

## 2020-08-12 RX ADMIN — ALBUTEROL SULFATE 2 PUFF: 90 AEROSOL, METERED RESPIRATORY (INHALATION) at 12:08

## 2020-08-12 RX ADMIN — ALBUTEROL SULFATE 2 PUFF: 90 AEROSOL, METERED RESPIRATORY (INHALATION) at 09:08

## 2020-08-12 NOTE — PLAN OF CARE
Recommendation:   1. Add low Na restricitons to diet.   2. Encourage intake at meals as tolerated.   3. RD to follow to monitor po intake.    Goals:   Pt intake >/= 75% EEN/EPN by RD follow up  Nutrition Goal Status: progressing towards goal  Communication of RD Recs: reviewed with RN(Daya)

## 2020-08-12 NOTE — PLAN OF CARE
Patient is on high flow nasal cannula with documented flow and Spo2.  Breathing treatment and exercises are complete.  Patient is in no apparent distress, decreased flow from 8 to 7 and patient is maintaining sats.  Will continue to monitor. BIPAP at bedside.

## 2020-08-12 NOTE — PROGRESS NOTES
[facility] Hospital Medicine  Progress Note    Patient Name: Rajan Deluna  MRN: [id number]  Patient Class: IP- Inpatient   Admission Date: 7/21/2020  Length of Stay: 22 days  Attending Physician: Rell Tolentino III, MD  Primary Care Provider: Jason Mccord MD        Subjective:     Principal Problem:Acute respiratory disease due to COVID-19 virus        HPI:  Patient is a 74-year-old male with a past medical history of hypertension, COPD, MAC infection who presented to the ED with altered mental status and fever.  As per the family, patient has had altered mental status for the past 3-4 days. Family endorses increased somnolence as well as urinating on himself. EMS was called two days prior to presentation but reportedly deemed that patient did not need to be brought in. Over the previous day, reportedly the patient was found stuck, staring in his sink, not responding. On the day of presentation, the patient was found down on the floor, family wasn't able to get patient up from the floor and was reportedly not responding as much as baseline. Family believed that patient may not have been wearing baseline 2L O2. Patient also endorses a cough and generalized weakness. Patient reportedly had a recent UTI.     In the ED, chest x-ray showed diffuse interstitial patchy infiltrates concerning for possible pneumonia and a COVID-19 screening test was positive. Fever was 101° F and patient was hypotensive 80s/40s. Patient given 30cc/kg bolus. BP very soft on presentation, adequately responded to volume resuscitation and then decreased again. Labs showed a BUN 45, creatinine 2.9, , , troponin 0.036, procalcitonin 1.13.     Overview/Hospital Course:  8/3 - NAEON, had chest pain, normal EKG and troponin overnight, resolved with GI cocktail. This AM, H/H decreased from 10.8/33.7 to 9.1/28. Infectious disease recommends oral Vancomycin and IV Metronidazole for C. Diff until 8/7 and to continue  voriconazole and discharge on Itraconazole 100mg BID and follow up with Pulmonary and ID for continued Aspergillosis and MAC treatment. Patient currently satting 90% on 40L vapotherm at 65% FiO2, weaning as tolerated.    8/4 - NAEON. Patient is currently satting in the >90% on 20L vapotherm at 35% FiO2, weaning as tolerated. H/H stable 9.5/30.3. Per GI, VCE show a few nonbleeding small likely duodenal AVMs and no other evidence of overt GI Bleed and recommend outpatient evaluation due to the patient's current pulmonary status.     8/5 - NAEON. Pending LTAC status. Spo2 70's with PT/OT and need to increase oxygen to vapotherm 20L 70% with an increase in Spo2 to 90's.     8/6 - NAEON. LTAC declined due to insurance. Currently on 30L 70% vapotherm.    8/7 - 11L 45% HFNL. Off covid precautions Monday 8/10    8/8 - NAEO. Bipap overnight, HFNC 15L last night and this am. Feeling better. No cp. H/H stable     8/9 - NAEON, patient on 15L HFNC. States he is coughing a bit more today, states it is nonproductive. Denies any discomfort, N/V/D. Feels well. On Monday, patient will meet criteria for isolation to be lifted, which would allow his daughter to visit.      8/10 - Oxygen weaned down to 10L HFNC over the weekend. Patient off COVID isolation precautions. Pending placement.    8/11 - Oxygen weaned down to 4L NC    Interval History: Patient O2 requirement increases with exercise. Currently at 7L, satting in 90s. Will be up in chair x2 daily, PT/OT. Weaning O2 with goal of 5L NC. Patient labs QOD. Patient VSS.     Review of Systems   Constitutional: Positive for fatigue. Negative for activity change, appetite change and chills.   HENT: Negative for congestion and dental problem.    Eyes: Negative for discharge and itching.   Respiratory: Negative for cough and shortness of breath.    Cardiovascular: Negative for chest pain.   Gastrointestinal: Negative for abdominal pain, blood in stool, diarrhea and nausea.   Endocrine:  Negative for cold intolerance and heat intolerance.   Genitourinary: Negative for difficulty urinating.   Musculoskeletal: Negative for arthralgias, back pain and gait problem.   Skin: Negative.  Negative for color change and pallor.   Neurological: Negative for dizziness, facial asymmetry, light-headedness and headaches.   Psychiatric/Behavioral: The patient is not nervous/anxious.      Objective:     Vital Signs (Most Recent):  Temp: 97.5 °F (36.4 °C) (08/12/20 0322)  Pulse: 98 (08/12/20 0400)  Resp: 18 (08/12/20 0322)  BP: 109/63 (08/12/20 0322)  SpO2: (!) 94 % (08/12/20 0606) Vital Signs (24h Range):  Temp:  [97 °F (36.1 °C)-99.8 °F (37.7 °C)] 97.5 °F (36.4 °C)  Pulse:  [] 98  Resp:  [16-20] 18  SpO2:  [87 %-97 %] 94 %  BP: (102-115)/(56-69) 109/63     Weight: 71.6 kg (157 lb 13.6 oz)  Body mass index is 22.02 kg/m².    Intake/Output Summary (Last 24 hours) at 8/12/2020 0747  Last data filed at 8/11/2020 2348  Gross per 24 hour   Intake --   Output 575 ml   Net -575 ml      Physical Exam  Vitals signs and nursing note reviewed.   Constitutional:       General: He is not in acute distress.     Appearance: Normal appearance. He is not ill-appearing.   HENT:      Head: Normocephalic and atraumatic.      Right Ear: External ear normal.      Left Ear: External ear normal.      Nose: Nose normal. No congestion or rhinorrhea.      Mouth/Throat:      Mouth: Mucous membranes are moist.      Pharynx: Oropharynx is clear.   Eyes:      Pupils: Pupils are equal, round, and reactive to light.   Neck:      Musculoskeletal: Normal range of motion and neck supple.   Cardiovascular:      Rate and Rhythm: Normal rate and regular rhythm.      Pulses: Normal pulses.      Heart sounds: Normal heart sounds.   Pulmonary:      Effort: Pulmonary effort is normal.      Breath sounds: Wheezing present.      Comments: Wheezes this AM, likely COPD. O2 at 7L HFNC  Abdominal:      General: Abdomen is flat. There is no distension.       Palpations: Abdomen is soft.      Tenderness: There is no abdominal tenderness. There is no guarding.   Musculoskeletal: Normal range of motion.         General: No swelling.   Neurological:      General: No focal deficit present.      Mental Status: He is alert and oriented to person, place, and time. Mental status is at baseline.   Psychiatric:         Mood and Affect: Mood normal.         Behavior: Behavior normal.         Thought Content: Thought content normal.         Judgment: Judgment normal.         Significant Labs:   CBC:   Recent Labs   Lab 08/11/20  0417   WBC 6.28   HGB 9.5*   HCT 30.5*        CMP:   Recent Labs   Lab 08/11/20 0417   *   K 4.7      CO2 28   GLU 98   BUN 12   CREATININE 1.0   CALCIUM 8.2*   PROT 6.1   ALBUMIN 2.0*   BILITOT 0.5   ALKPHOS 131   AST 38   ALT 23   ANIONGAP 6*   EGFRNONAA >60     All pertinent labs within the past 24 hours have been reviewed.    Significant Imaging: I have reviewed all pertinent imaging results/findings within the past 24 hours.      Assessment/Plan:      * Acute respiratory disease due to COVID-19 virus  -Isolation lifted 8/11  RESOLVED    History of Aspergillus & MAC  Continuing voriconazole, switch to itroconazole at [place]   -Follow up with Pulm, ID outpatient  STABLE    Hypotension  Patient's most recent blood pressure 116/75  RESOLVED      Sepsis  RESOLVED  See plan for COVID 19    COPD (chronic obstructive pulmonary disease)  On HFNC 7L  Patient BiPap overnight  Continue to wean  Goal of 5L NC  Patient to sit up 2x daily in chair  Mild wheezes this AM, continue albuterol and chest physio/IS  -Continue to monitor cough        Anemia  Asymptomatic  H/H of 9.8/31.1 ON 8/9  H/H of 9.5/30.5 on 8/11, stable  Continue to monitor QOD CBC  STABLE      VTE Risk Mitigation (From admission, onward)         Ordered     enoxaparin injection 80 mg  Every 12 hours (non-standard times)      08/04/20 1421     Place sequential compression device  Until  discontinued      07/21/20 2142     IP VTE HIGH RISK PATIENT  Once      07/21/20 2142                      Emeli Adam MD   [employer] FM PGY2  Department of Hospital Medicine   [facility]

## 2020-08-12 NOTE — ASSESSMENT & PLAN NOTE
On HFNC 7L  Patient BiPap overnight  Continue to wean  Goal of 5L NC  Patient to sit up 2x daily in chair  Mild wheezes this AM, continue albuterol and chest physio/IS  -Continue to monitor cough

## 2020-08-12 NOTE — PLAN OF CARE
1900 Reported no  pain.  Requested blanket.      2200 RT barging w/pt to keep bipap on for sleep.     2330 reported pain to R shoulder, applied diclofenac       No accu ck.     Tele: ST,  HR low 100,  No alarms.     Bed in lowest position, wheels locked, non skid socks, ID band worn, personal items and call bell with in reach, bed alarm set.

## 2020-08-12 NOTE — PLAN OF CARE
VN note: VN cued into patient's room for introduction. MD team rounding in room. Preferred VN come back at a later time. Allowed time for questions. VN will continue to be available to patient and intervene prn.

## 2020-08-12 NOTE — PLAN OF CARE
"VN Note: VN cued into patient's room for frequent rounds. Patient's 02 in place, sats 92% on pulse ox in room. PCT at bedside. Patient requested prn pain medication due to his "sides hurting" from coughing. VN notified bedside nurse Gretchen. VN will continue to follow.  "

## 2020-08-12 NOTE — PLAN OF CARE
Pt was denied LTAC the other day 2/2 pt was on 3-5L oxygen and more SNF appropriate.  Pt now requiring 9L oxygen.  Deb with [facility] reached out to Prerna with [facility] [phone] and she says we can do an expedited appeal.  Expedited appeal can be faxed to [phone] reference # [id number].  I will send updated clinicals.  If pt approved for LTAC, [facility] says they will accept pt.    11:56  Expedited appeal faxed to number listed above.       08/12/20 1111   Post-Acute Status   Post-Acute Authorization Placement     Phong Suh RN, CM  [phone]

## 2020-08-12 NOTE — SUBJECTIVE & OBJECTIVE
Interval History: Patient O2 requirement increases with exercise. Currently at 7L, satting in 90s. Will be up in chair x2 daily, PT/OT. Weaning O2 with goal of 5L NC. Patient labs QOD. Patient VSS.     Review of Systems   Constitutional: Positive for fatigue. Negative for activity change, appetite change and chills.   HENT: Negative for congestion and dental problem.    Eyes: Negative for discharge and itching.   Respiratory: Negative for cough and shortness of breath.    Cardiovascular: Negative for chest pain.   Gastrointestinal: Negative for abdominal pain, blood in stool, diarrhea and nausea.   Endocrine: Negative for cold intolerance and heat intolerance.   Genitourinary: Negative for difficulty urinating.   Musculoskeletal: Negative for arthralgias, back pain and gait problem.   Skin: Negative.  Negative for color change and pallor.   Neurological: Negative for dizziness, facial asymmetry, light-headedness and headaches.   Psychiatric/Behavioral: The patient is not nervous/anxious.      Objective:     Vital Signs (Most Recent):  Temp: 97.5 °F (36.4 °C) (08/12/20 0322)  Pulse: 98 (08/12/20 0400)  Resp: 18 (08/12/20 0322)  BP: 109/63 (08/12/20 0322)  SpO2: (!) 94 % (08/12/20 0606) Vital Signs (24h Range):  Temp:  [97 °F (36.1 °C)-99.8 °F (37.7 °C)] 97.5 °F (36.4 °C)  Pulse:  [] 98  Resp:  [16-20] 18  SpO2:  [87 %-97 %] 94 %  BP: (102-115)/(56-69) 109/63     Weight: 71.6 kg (157 lb 13.6 oz)  Body mass index is 22.02 kg/m².    Intake/Output Summary (Last 24 hours) at 8/12/2020 0747  Last data filed at 8/11/2020 2348  Gross per 24 hour   Intake --   Output 575 ml   Net -575 ml      Physical Exam  Vitals signs and nursing note reviewed.   Constitutional:       General: He is not in acute distress.     Appearance: Normal appearance. He is not ill-appearing.   HENT:      Head: Normocephalic and atraumatic.      Right Ear: External ear normal.      Left Ear: External ear normal.      Nose: Nose normal. No  congestion or rhinorrhea.      Mouth/Throat:      Mouth: Mucous membranes are moist.      Pharynx: Oropharynx is clear.   Eyes:      Pupils: Pupils are equal, round, and reactive to light.   Neck:      Musculoskeletal: Normal range of motion and neck supple.   Cardiovascular:      Rate and Rhythm: Normal rate and regular rhythm.      Pulses: Normal pulses.      Heart sounds: Normal heart sounds.   Pulmonary:      Effort: Pulmonary effort is normal.      Breath sounds: Wheezing present.      Comments: Wheezes this AM, likely COPD. O2 at 7L HFNC  Abdominal:      General: Abdomen is flat. There is no distension.      Palpations: Abdomen is soft.      Tenderness: There is no abdominal tenderness. There is no guarding.   Musculoskeletal: Normal range of motion.         General: No swelling.   Neurological:      General: No focal deficit present.      Mental Status: He is alert and oriented to person, place, and time. Mental status is at baseline.   Psychiatric:         Mood and Affect: Mood normal.         Behavior: Behavior normal.         Thought Content: Thought content normal.         Judgment: Judgment normal.         Significant Labs:   CBC:   Recent Labs   Lab 08/11/20  0417   WBC 6.28   HGB 9.5*   HCT 30.5*        CMP:   Recent Labs   Lab 08/11/20  0417   *   K 4.7      CO2 28   GLU 98   BUN 12   CREATININE 1.0   CALCIUM 8.2*   PROT 6.1   ALBUMIN 2.0*   BILITOT 0.5   ALKPHOS 131   AST 38   ALT 23   ANIONGAP 6*   EGFRNONAA >60     All pertinent labs within the past 24 hours have been reviewed.    Significant Imaging: I have reviewed all pertinent imaging results/findings within the past 24 hours.

## 2020-08-12 NOTE — ASSESSMENT & PLAN NOTE
Continuing voriconazole, switch to itroconazole at [place]   -Follow up with Pulm, ID outpatient  STABLE

## 2020-08-12 NOTE — PLAN OF CARE
Problem: Physical Therapy Goal  Goal: Physical Therapy Goal  Description: Goals to be met by: 20     Patient will increase functional independence with mobility by performin. Supine <> sit with MInimal Assistance  2. Sit to stand transfer with Minimal Assistance  3. Bed to chair transfer with Minimal Assistance   4. Gait x 50 ft with min A with appropriate AD  5. Lower extremity exercise program x 10 reps per handout, with supervision    Outcome: Ongoing, Progressing   Pt continues to work to achieve all established goals. Sat at EOB ~15-20 minutes with SBA. Unable to complete sit to stand transfer this session (per doctors who were present in room) secondary to low SpO2 levels. Fluctuated with supine to sit and sitting at EOB talking 79-90%.

## 2020-08-12 NOTE — PT/OT/SLP PROGRESS
Occupational Therapy   Treatment    Name: Rajan Deluna  MRN: [id number]  Admitting Diagnosis:  Acute respiratory disease due to COVID-19 virus  9 Days Post-Op    Recommendations:     Discharge Recommendations: nursing facility, skilled  Discharge Equipment Recommendations:  walker, rolling, bedside commode  Barriers to discharge:  Decreased caregiver support, Inaccessible home environment    Assessment:     Rajan Deluna is a 74 y.o. male with a medical diagnosis of Acute respiratory disease due to COVID-19 virus.  He presents with  Performance deficits affecting function are weakness, impaired endurance, impaired self care skills, impaired functional mobilty, gait instability, impaired balance, decreased coordination, decreased upper extremity function, decreased lower extremity function, decreased safety awareness, pain, impaired cardiopulmonary response to activity.     Pt. presents with poor endurance, poor activity tolerance due to  quickly desats to 80% on 8-9 L HFNC with minimal activity and requires significantly increased time for O2 sats to rebound to 88-90% with PLB and deep breathing tech. Pt continues to cough during session but less than noted in previous sessions. He c/o pain in his low back, chest and across shoulder kimberly when coughing and says its constant ~ 8/10 pain.  Warm compresses applied to chest area slightly effective with relieving pain.Continue with OT POC.    Rehab Prognosis:  Fair; patient would benefit from acute skilled OT services to address these deficits and reach maximum level of function.       Plan:     Patient to be seen 5 x/week to address the above listed problems via self-care/home management, therapeutic activities, therapeutic exercises  · Plan of Care Expires: 08/27/20  · Plan of Care Reviewed with: patient    Subjective     Pain/Comfort:  · Pain Rating 1: 8/10  · Location - Side 1: Bilateral  · Location - Orientation 1: lower  · Location 1: back  · Pain Addressed 1:  "Reposition, Distraction, Nurse notified  · Pain Rating Post-Intervention 1: 8/10  · Pain Rating 2: 8/10  · Location - Side 2: Bilateral  · Location - Orientation 2: generalized  · Location 2: (chest, ribs and across shoulders)  · Pain Addressed 2: Reposition, Distraction, Cessation of Activity, Nurse notified  · Pain Rating Post-Intervention 2: 8/10    Objective:     Communicated with: nurse prior to session.  Patient found HOB elevated with telemetry, oxygen, pulse ox (continuous), bed alarm(telesitter, HFNC 9 L) upon OT entry to room.    General Precautions: Standard, respiratory, fall(COVID 19+ and c-diff precautions discontinued 8/12/2020)   Orthopedic Precautions:N/A   Braces: N/A     Occupational Performance:     Bed Mobility:    · Patient completed Rolling/Turning to Left with  stand by assistance and with side rail  · Patient completed Scooting/Bridging with stand by assistance and with side rail  · Patient completed Supine to Sit with stand by assistance and with side rail     Functional Mobility/Transfers:  · Patient completed Sit <> Stand Transfer with contact guard assistance  with  no assistive device   · Patient completed Bed <> Chair Transfer using Step Transfer technique with minimum assistance with no assistive device        [facility] 6 Click ADL: 16    Treatment & Education:  --Purpose of OT visit, importance of OOB activity, pt agreeable to OT.  -- O2 dropped to 80% with transitional movement to EOB, performed PLB and deep breathing exercises, took >6 min to recovery to 85%.  Pt. t/f with min A  to BS chair, O2 dropped to 83%. Nurse notified, arrived, said O2 sats drops when pt "moves around" but he "rebounds," she increased O2 to 10 L, pt. rebounded to 87%. He performed incentive spirometer exercises up in chair.  Pt performed standing prone ex over bed ~ 30 seconds x 2 trials (bed raised).  Vitals pre-treatment: O2 95% ,   During treatment; O2 80%,   Post treatment: O2 88%, HR " 115.    Patient left up in chair with all lines intact, call button in reach, chair alarm on and nurse notifiedEducation:      GOALS:   Multidisciplinary Problems     Occupational Therapy Goals        Problem: Occupational Therapy Goal    Goal Priority Disciplines Outcome Interventions   Occupational Therapy Goal     OT, PT/OT Ongoing, Progressing    Description: Goals to be met by: 8/27/20     Patient will increase functional independence with ADLs by performing:    LE Dressing with Stand-by Assistance.  Grooming while standing with Stand-by Assistance.  Toileting from toilet with Stand-by Assistance for hygiene and clothing management.   Supine to sit with Stand-by Assistance.  Step transfer with Stand-by Assistance  Toilet transfer to toilet with Stand-by Assistance.  Upper extremity exercise program x10 reps per handout, with independence.                     Time Tracking:     OT Date of Treatment: 08/12/20  OT Start Time: 1442  OT Stop Time: 1520  OT Total Time (min): 38 min    Billable Minutes:Therapeutic Activity 38  Total Time 38    Stephanie Ramsey OT  8/12/2020

## 2020-08-12 NOTE — PLAN OF CARE
Patient lying in bed, A/O. HOB elevated. No S/S of pain or discomfort noted at this time. NADN. Verbal, able to make needs known. Answers questions appropriately. O2 @ 8L/HFNC at this time. Patient unable to tolerate prone position- patient noted to become anxious and desat. Tele sinus tachycardia- no ectopy noted. Plan of care and medications reviewed with patient- verbalizes understanding. Bed in lowest position, side rails up x 2, call light within reach. Will endorse patient to oncoming nurse.

## 2020-08-12 NOTE — PT/OT/SLP PROGRESS
"Physical Therapy Treatment    Patient Name:  Rajan Deluna   MRN:  [id number]    Recommendations:     Discharge Recommendations:  nursing facility, skilled   Discharge Equipment Recommendations: walker, rolling   Barriers to discharge: Decreased O2 sats with all movements and decreased ablity to perform functional mobility secondary to decreasing O2    Assessment:     Rajan Deluna is a 74 y.o. male admitted with a medical diagnosis of Acute respiratory disease due to COVID-19 virus.  He presents with the following impairments/functional limitations:  weakness, impaired endurance, impaired self care skills, impaired functional mobilty, gait instability, impaired balance, decreased coordination, decreased upper extremity function, decreased lower extremity function, decreased safety awareness, pain, decreased ROM, impaired cardiopulmonary response to activity. Pt only able to sit at EOB this session secondary to decreasing SpO2 levels with transfer supine to sit. Able to perform supine therapeutic exercise of SLR's. SpO2 levels fluctuated between 79-90%. Would benefit from continued PT services to increase cardiorespiratory strength and endurance.    Rehab Prognosis: Good; patient would benefit from acute skilled PT services to address these deficits and reach maximum level of function.    Recent Surgery: Procedure(s) (LRB):  IMAGING PROCEDURE, GI TRACT, INTRALUMINAL, VIA CAPSULE (N/A) 9 Days Post-Op    Plan:     During this hospitalization, patient to be seen 6 x/week to address the identified rehab impairments via gait training, therapeutic activities, therapeutic exercises, neuromuscular re-education and progress toward the following goals:    · Plan of Care Expires:  09/08/20    Subjective     Chief Complaint: None Expressed  Patient/Family Comments/goals: "I don't understand why it is so hard to breath".  Pain/Comfort:  · Pain Rating 1: (Did not rate)  · Location - Side 1: Bilateral  · Location - Orientation 1: " "lower  · Location 1: back  · Pain Addressed 1: Reposition, Distraction, Cessation of Activity, Nurse notified  · Pain Rating Post-Intervention 1: (Did not rate)      Objective:     Communicated with nurse prior to session.  Patient found supine with bed alarm, oxygen, peripheral IV, pulse ox (continuous), telemetry upon PT entry to room.     General Precautions: Standard, fall, respiratory(Precautions discontinued)   Orthopedic Precautions:N/A   Braces: N/A     Functional Mobility:  · Bed Mobility:     · Rolling Left:  contact guard assistance and minimum assistance  · Rolling Right: contact guard assistance and minimum assistance  · Scooting: stand by assistance and  to EOB and HOB  · Supine to Sit: stand by assistance and contact guard assistance  · Sit to Supine: stand by assistance and contact guard assistance      AM-PAC 6 CLICK MOBILITY  Turning over in bed (including adjusting bedclothes, sheets and blankets)?: 4  Sitting down on and standing up from a chair with arms (e.g., wheelchair, bedside commode, etc.): 3  Moving from lying on back to sitting on the side of the bed?: 3  Moving to and from a bed to a chair (including a wheelchair)?: 3  Need to walk in hospital room?: 3  Climbing 3-5 steps with a railing?: 1  Basic Mobility Total Score: 17       Therapeutic Activities and Exercises:   Pt performed supine to sit with SBA/CGA with SpO2 level decreasing from 90-83%. At this point doctors walked into room. Originally doctor decreased pt to 7L from 9L. When doctor observed low SpO2 rate he increased back to 9L. PTA asked doctors if they wanted the pt to perform a sit to stand transfer and they want him to stand at this time secondary to low SpO2 rates. They wanted to see SpO2 levels increased before performing transfer. Pt instructed and performed scapula retraction in sitting x 5 reps. Pt stated, "It hurts in my chest when I do that. However, SpO2 rate would increase while performing and pt demonstrating " better posture. In supine pt performed SLR's 1 x 10 reps BLE's (with opposite knee bent and foot flat on bed) leg raised to mid calf of opposite(bent) leg. During supine exercise SpO2 level between 83-85%. Pt expresses a want to accomplish more in session but because of decreasing SpO2 level he cannot at this time.    Patient left supine with all lines intact, call button in reach, bed alarm on and  nurse notified..    GOALS:   Multidisciplinary Problems     Physical Therapy Goals        Problem: Physical Therapy Goal    Goal Priority Disciplines Outcome Goal Variances Interventions   Physical Therapy Goal     PT, PT/OT Ongoing, Progressing     Description: Goals to be met by: 20     Patient will increase functional independence with mobility by performin. Supine <> sit with MInimal Assistance  2. Sit to stand transfer with Minimal Assistance  3. Bed to chair transfer with Minimal Assistance   4. Gait x 50 ft with min A with appropriate AD  5. Lower extremity exercise program x 10 reps per handout, with supervision                     Time Tracking:     PT Received On: 20  PT Start Time: 1010     PT Stop Time: 1040  PT Total Time (min): 30 min     Billable Minutes: Therapeutic Activity  20 and Therapeutic Exercise  10    Treatment Type: Treatment  PT/PTA: PTA     PTA Visit Number: 2     Afshan Marin, PTA  2020

## 2020-08-12 NOTE — PROGRESS NOTES
[facility]  Progress/Critical Care - Medicine  History & Physical    Patient Name: Rajan Deluna  MRN: [id number]  Admission Date: 7/21/2020  Hospital Length of Stay: 22 days  Code Status: Full Code  Attending Physician: Rell Tolentino III, MD   Primary Care Provider: Jason Mccord MD   Principal Problem: Acute respiratory disease due to COVID-19 virus    Subjective:     Hospital/ICU Course: When seen the patient is on 9LPM.  He is still resting comfortably.  No acute changes.  He wore his BIPAP last night.    Past Medical History:   Diagnosis Date    Arthritis     COPD (chronic obstructive pulmonary disease)     Hypertension     Smoker     Weakness        Past Surgical History:   Procedure Laterality Date    HERNIA REPAIR      INTRALUMINAL GASTROINTESTINAL TRACT IMAGING VIA CAPSULE N/A 8/3/2020    Procedure: IMAGING PROCEDURE, GI TRACT, INTRALUMINAL, VIA CAPSULE;  Surgeon: Rey Olivares MD;  Location: [facility];  Service: Endoscopy;  Laterality: N/A;    right knee surgery         Review of patient's allergies indicates:  No Known Allergies    Family History     None        Tobacco Use    Smoking status: Former Smoker    Smokeless tobacco: Never Used   Substance and Sexual Activity    Alcohol use: Not Currently    Drug use: Never    Sexual activity: Not on file     Objective:     Vital Signs (Most Recent):  Temp: 97.7 °F (36.5 °C) (08/12/20 0814)  Pulse: 106 (08/12/20 0917)  Resp: 18 (08/12/20 0917)  BP: (!) 119/58 (08/12/20 0814)  SpO2: (!) 92 % (08/12/20 0917) Vital Signs (24h Range):  Temp:  [97 °F (36.1 °C)-99.8 °F (37.7 °C)] 97.7 °F (36.5 °C)  Pulse:  [] 106  Resp:  [18-20] 18  SpO2:  [87 %-97 %] 92 %  BP: (102-119)/(56-69) 119/58     Weight: 71.6 kg (157 lb 13.6 oz)  Body mass index is 22.02 kg/m².      Intake/Output Summary (Last 24 hours) at 8/12/2020 0973  Last data filed at 8/11/2020 2348  Gross per 24 hour   Intake --   Output 575 ml   Net -575 ml       Physical  Exam  Vitals reviewed: virtual visit.   Constitutional:       General: He is not in acute distress.     Appearance: Normal appearance.   HENT:      Head: Normocephalic.      Nose: Nose normal.   Neurological:      General: No focal deficit present.      Mental Status: He is alert.         Vents:  Oxygen Concentration (%): 70 (08/11/20 2342)    Lines/Drains/Airways     Peripheral Intravenous Line                 Peripheral IV - Single Lumen 08/04/20 0314 18 G Right Forearm 8 days                Significant Labs:    CBC/Anemia Profile:  Recent Labs   Lab 08/11/20 0417   WBC 6.28   HGB 9.5*   HCT 30.5*      MCV 97   RDW 16.1*        Chemistries:  Recent Labs   Lab 08/11/20 0417   *   K 4.7      CO2 28   BUN 12   CREATININE 1.0   CALCIUM 8.2*   ALBUMIN 2.0*   PROT 6.1   BILITOT 0.5   ALKPHOS 131   ALT 23   AST 38   MG 2.1   PHOS 2.5*       All pertinent labs within the past 24 hours have been reviewed.    Significant Imaging: I have reviewed all pertinent imaging results/findings within the past 24 hours.    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]  Yes    COPD (chronic obstructive pulmonary disease) [J44.9] 05/22/2017 Yes    Anemia [D64.9] 12/09/2016 No      Problems Resolved During this Admission:    Diagnosis Date Noted Date Resolved POA    History of MAC infection [Z86.19]  08/07/2020 No    GI bleed [K92.2] 07/29/2020 08/07/2020 Clinically Undetermined    Clostridium difficile infection [A49.8]  08/07/2020 Clinically Undetermined    Acute respiratory failure with hypoxia [J96.01]  08/07/2020 Yes    MARQUIS (acute kidney injury) [N17.9] 07/21/2020 08/07/2020 Yes       * Acute respiratory disease due to COVID-19 virus  - 7/21 COVID(+)  - s/p remdesivir x5d, dexamethasone x10d  - full dose AC with lovenox  - proning if he can tolerate at  all  - incentive spirometry   - please wean O2 as tolerated for goal SpO2 88%  - BIPAP QHS  - currently on 9LPM via bubble flow.  Mild improvement from 10LPM yesterday.       History of Aspergillus & MAC  - serum aspergillus negative, loaded with voriconazole on  7/29   - no MAC treatment at this time per ID     Acute on chronic respiratory failure with hypoxia  See COVID problem     - Use of 2L NC at home     COPD (chronic obstructive pulmonary disease)  - Use of 2L NC at home  - metered dose inhaler q4h      Out of bed to chair daily  Incentive spirometry   PT/OT    Improved since yesterday.  Continue current management.     Bryan Swain MD  Critical Care - Medicine  [facility]  [phone]

## 2020-08-12 NOTE — PROGRESS NOTES
"[facility]  Adult Nutrition  Progress Note    SUMMARY       Recommendations    Recommendation:   1. Add low Na restricitons to diet.   2. Encourage intake at meals as tolerated.   3. RD to follow to monitor po intake.    Goals:   Pt intake >/= 75% EEN/EPN by RD follow up  Nutrition Goal Status: progressing towards goal  Communication of RD Recs: reviewed with RN(Daya)    Reason for Assessment  Reason For Assessment: RD follow-up  Diagnosis: other (see comments)(Acute respiratory disease due to COVID-19 virus )  Relevant Medical History: HTN, COPD, former smoker  Interdisciplinary Rounds: did not attend  General Information Comments: Pt on 2000 ADA diet with Boost Glucose Control. Pt with ~75% intake at meals. On bipap at night. Unable to assess NFPE 2/2 COVID +. Noted possible weight discrepancy- admit weight of 93kg and weight today of 71.6kg (47lb weight loss)  Nutrition Discharge Planning: d/c on cardiac diabetic diet    Nutrition Risk Screen  Nutrition Risk Screen: no indicators present    Nutrition/Diet History  Food Preferences: ADA  Spiritual, Cultural Beliefs, Voodoo Practices, Values that Affect Care: no  Food Allergies: NKFA  Factors Affecting Nutritional Intake: decreased appetite, other (see comments)(continuous resp support)    Anthropometrics  Temp: 97.6 °F (36.4 °C)  Height Method: Stated  Height: 5' 11" (180.3 cm)  Height (inches): 71 in  Weight Method: Bed Scale  Weight: 71.6 kg (157 lb 13.6 oz)  Weight (lb): 157.85 lb  Ideal Body Weight (IBW), Male: 172 lb  % Ideal Body Weight, Male (lb): 106.38 %  BMI (Calculated): 22  BMI Grade: 25 - 29.9 - overweight     Lab/Procedures/Meds  Pertinent Labs Reviewed: reviewed  Pertinent Labs Comments: Na 135L, Ca 8.2L, Phos 2.5L, Alb 2.0L  Pertinent Medications Reviewed: reviewed  Pertinent Medications Comments: lactobacillus, MVI, pantoprazole    Estimated/Assessed Needs  Weight Used For Calorie Calculations: 71.6 kg (157 lb 13.6 " oz)  Energy Calorie Requirements (kcal): 2148 (30kcal/kg)  Energy Need Method: Kcal/kg  Protein Requirements: 71-85g (1.0-1.2g/kg)  Weight Used For Protein Calculations: 71.6 kg (157 lb 13.6 oz)  Estimated Fluid Requirement Method: RDA Method  RDA Method (mL): 2148     Nutrition Prescription Ordered  Current Diet Order: 2000 ADA  Oral Nutrition Supplement: Boost Glucose Control    Evaluation of Received Nutrient/Fluid Intake  I/O: 0/575  Energy Calories Required: meeting needs  Protein Required: meeting needs  Fluid Required: meeting needs  Comments: LBM 8/11  Tolerance: tolerating  % Intake of Estimated Energy Needs: 50 - 75 %  % Meal Intake: 50 - 75 %    Nutrition Risk  Level of Risk/Frequency of Follow-up: (2 x/week)     Assessment and Plan  * Acute respiratory disease due to COVID-19 virus  Contributing Nutrition Diagnosis  Inadequate oral intake    Related to (etiology):   physiological state    Signs and Symptoms (as evidenced by):   Pt COVID+ needing continuous respiratory support    Interventions:  Collaboration with other providers  Commercial Beverage: Boost Glucose Control TID to supplement protein and calorie intake      Nutrition Diagnosis Status:   Improving      Monitor and Evaluation  Food and Nutrient Intake: energy intake, food and beverage intake  Food and Nutrient Adminstration: diet order  Knowledge/Beliefs/Attitudes: food and nutrition knowledge/skill  Physical Activity and Function: nutrition-related ADLs and IADLs  Anthropometric Measurements: weight, weight change, body mass index  Biochemical Data, Medical Tests and Procedures: electrolyte and renal panel, gastrointestinal profile, glucose/endocrine profile, inflammatory profile, lipid profile     Malnutrition Assessment  Unable to assess NFPE 2/2 COVID precautions        Nutrition Follow-Up    RD Follow-up?: Yes

## 2020-08-12 NOTE — PLAN OF CARE
Pt wanted to take off BiPAP. Pt was placed back on the High Flow NC of 7L with adequate sats. Will continue to monitor.

## 2020-08-13 LAB
ALBUMIN SERPL BCP-MCNC: 2 G/DL (ref 3.5–5.2)
ALP SERPL-CCNC: 158 U/L (ref 55–135)
ALT SERPL W/O P-5'-P-CCNC: 25 U/L (ref 10–44)
ANION GAP SERPL CALC-SCNC: 8 MMOL/L (ref 8–16)
APTT BLDCRRT: 30.5 SEC (ref 21–32)
AST SERPL-CCNC: 40 U/L (ref 10–40)
BASOPHILS # BLD AUTO: 0.03 K/UL (ref 0–0.2)
BASOPHILS NFR BLD: 0.5 % (ref 0–1.9)
BILIRUB SERPL-MCNC: 0.5 MG/DL (ref 0.1–1)
BUN SERPL-MCNC: 12 MG/DL (ref 8–23)
CALCIUM SERPL-MCNC: 8.1 MG/DL (ref 8.7–10.5)
CHLORIDE SERPL-SCNC: 102 MMOL/L (ref 95–110)
CO2 SERPL-SCNC: 27 MMOL/L (ref 23–29)
CREAT SERPL-MCNC: 1 MG/DL (ref 0.5–1.4)
DIFFERENTIAL METHOD: ABNORMAL
EOSINOPHIL # BLD AUTO: 0.3 K/UL (ref 0–0.5)
EOSINOPHIL NFR BLD: 5.1 % (ref 0–8)
ERYTHROCYTE [DISTWIDTH] IN BLOOD BY AUTOMATED COUNT: 15.8 % (ref 11.5–14.5)
EST. GFR  (AFRICAN AMERICAN): >60 ML/MIN/1.73 M^2
EST. GFR  (NON AFRICAN AMERICAN): >60 ML/MIN/1.73 M^2
GLUCOSE SERPL-MCNC: 83 MG/DL (ref 70–110)
HCT VFR BLD AUTO: 29.8 % (ref 40–54)
HGB BLD-MCNC: 9.2 G/DL (ref 14–18)
IMM GRANULOCYTES # BLD AUTO: 0.02 K/UL (ref 0–0.04)
IMM GRANULOCYTES NFR BLD AUTO: 0.3 % (ref 0–0.5)
LYMPHOCYTES # BLD AUTO: 0.9 K/UL (ref 1–4.8)
LYMPHOCYTES NFR BLD: 15.1 % (ref 18–48)
MAGNESIUM SERPL-MCNC: 2.1 MG/DL (ref 1.6–2.6)
MCH RBC QN AUTO: 29.7 PG (ref 27–31)
MCHC RBC AUTO-ENTMCNC: 30.9 G/DL (ref 32–36)
MCV RBC AUTO: 96 FL (ref 82–98)
MONOCYTES # BLD AUTO: 0.7 K/UL (ref 0.3–1)
MONOCYTES NFR BLD: 11.4 % (ref 4–15)
NEUTROPHILS # BLD AUTO: 4 K/UL (ref 1.8–7.7)
NEUTROPHILS NFR BLD: 67.6 % (ref 38–73)
NRBC BLD-RTO: 0 /100 WBC
PHOSPHATE SERPL-MCNC: 2.4 MG/DL (ref 2.7–4.5)
PLATELET # BLD AUTO: 211 K/UL (ref 150–350)
PMV BLD AUTO: 10 FL (ref 9.2–12.9)
POTASSIUM SERPL-SCNC: 4.3 MMOL/L (ref 3.5–5.1)
PROT SERPL-MCNC: 6.2 G/DL (ref 6–8.4)
RBC # BLD AUTO: 3.1 M/UL (ref 4.6–6.2)
SODIUM SERPL-SCNC: 137 MMOL/L (ref 136–145)
WBC # BLD AUTO: 5.88 K/UL (ref 3.9–12.7)

## 2020-08-13 PROCEDURE — 11000001 HC ACUTE MED/SURG PRIVATE ROOM

## 2020-08-13 PROCEDURE — 25000242 PHARM REV CODE 250 ALT 637 W/ HCPCS: Performed by: STUDENT IN AN ORGANIZED HEALTH CARE EDUCATION/TRAINING PROGRAM

## 2020-08-13 PROCEDURE — 80053 COMPREHEN METABOLIC PANEL: CPT

## 2020-08-13 PROCEDURE — 99900035 HC TECH TIME PER 15 MIN (STAT)

## 2020-08-13 PROCEDURE — 25000003 PHARM REV CODE 250: Performed by: STUDENT IN AN ORGANIZED HEALTH CARE EDUCATION/TRAINING PROGRAM

## 2020-08-13 PROCEDURE — 94640 AIRWAY INHALATION TREATMENT: CPT

## 2020-08-13 PROCEDURE — 25000003 PHARM REV CODE 250: Performed by: INTERNAL MEDICINE

## 2020-08-13 PROCEDURE — 97110 THERAPEUTIC EXERCISES: CPT

## 2020-08-13 PROCEDURE — 94799 UNLISTED PULMONARY SVC/PX: CPT

## 2020-08-13 PROCEDURE — 84100 ASSAY OF PHOSPHORUS: CPT

## 2020-08-13 PROCEDURE — 25000003 PHARM REV CODE 250: Performed by: FAMILY MEDICINE

## 2020-08-13 PROCEDURE — 97110 THERAPEUTIC EXERCISES: CPT | Mod: CQ

## 2020-08-13 PROCEDURE — 27100171 HC OXYGEN HIGH FLOW UP TO 24 HOURS

## 2020-08-13 PROCEDURE — 63600175 PHARM REV CODE 636 W HCPCS: Performed by: FAMILY MEDICINE

## 2020-08-13 PROCEDURE — 94761 N-INVAS EAR/PLS OXIMETRY MLT: CPT

## 2020-08-13 PROCEDURE — 97530 THERAPEUTIC ACTIVITIES: CPT

## 2020-08-13 PROCEDURE — 83735 ASSAY OF MAGNESIUM: CPT

## 2020-08-13 PROCEDURE — 97530 THERAPEUTIC ACTIVITIES: CPT | Mod: CQ

## 2020-08-13 PROCEDURE — 94664 DEMO&/EVAL PT USE INHALER: CPT

## 2020-08-13 PROCEDURE — 63600175 PHARM REV CODE 636 W HCPCS: Performed by: STUDENT IN AN ORGANIZED HEALTH CARE EDUCATION/TRAINING PROGRAM

## 2020-08-13 PROCEDURE — 85730 THROMBOPLASTIN TIME PARTIAL: CPT

## 2020-08-13 PROCEDURE — 85025 COMPLETE CBC W/AUTO DIFF WBC: CPT

## 2020-08-13 RX ORDER — LIDOCAINE 50 MG/G
2 PATCH TOPICAL
Status: DISCONTINUED | OUTPATIENT
Start: 2020-08-13 | End: 2020-08-27 | Stop reason: HOSPADM

## 2020-08-13 RX ORDER — MAG HYDROX/ALUMINUM HYD/SIMETH 200-200-20
SUSPENSION, ORAL (FINAL DOSE FORM) ORAL
Status: DISCONTINUED | OUTPATIENT
Start: 2020-08-13 | End: 2020-08-27 | Stop reason: HOSPADM

## 2020-08-13 RX ORDER — HYDROCORTISONE 1 %
CREAM (GRAM) TOPICAL
Status: DISCONTINUED | OUTPATIENT
Start: 2020-08-13 | End: 2020-08-13

## 2020-08-13 RX ORDER — KETOROLAC TROMETHAMINE 30 MG/ML
15 INJECTION, SOLUTION INTRAMUSCULAR; INTRAVENOUS ONCE
Status: COMPLETED | OUTPATIENT
Start: 2020-08-13 | End: 2020-08-13

## 2020-08-13 RX ORDER — SODIUM,POTASSIUM PHOSPHATES 280-250MG
1 POWDER IN PACKET (EA) ORAL EVERY 6 HOURS SCHEDULED
Status: COMPLETED | OUTPATIENT
Start: 2020-08-13 | End: 2020-08-13

## 2020-08-13 RX ADMIN — KETOROLAC TROMETHAMINE 15 MG: 30 INJECTION, SOLUTION INTRAMUSCULAR at 03:08

## 2020-08-13 RX ADMIN — HYDROCORTISONE: 10 OINTMENT TOPICAL at 05:08

## 2020-08-13 RX ADMIN — IPRATROPIUM BROMIDE AND ALBUTEROL SULFATE 3 ML: 2.5; .5 SOLUTION RESPIRATORY (INHALATION) at 07:08

## 2020-08-13 RX ADMIN — Medication 4 TABLET: at 12:08

## 2020-08-13 RX ADMIN — DICLOFENAC 2 G: 10 GEL TOPICAL at 05:08

## 2020-08-13 RX ADMIN — IPRATROPIUM BROMIDE AND ALBUTEROL SULFATE 3 ML: 2.5; .5 SOLUTION RESPIRATORY (INHALATION) at 12:08

## 2020-08-13 RX ADMIN — PANTOPRAZOLE SODIUM 40 MG: 40 TABLET, DELAYED RELEASE ORAL at 08:08

## 2020-08-13 RX ADMIN — VORICONAZOLE 200 MG: 200 TABLET, FILM COATED ORAL at 03:08

## 2020-08-13 RX ADMIN — Medication 4 TABLET: at 08:08

## 2020-08-13 RX ADMIN — POTASSIUM & SODIUM PHOSPHATES POWDER PACK 280-160-250 MG 1 PACKET: 280-160-250 PACK at 12:08

## 2020-08-13 RX ADMIN — POTASSIUM & SODIUM PHOSPHATES POWDER PACK 280-160-250 MG 1 PACKET: 280-160-250 PACK at 05:08

## 2020-08-13 RX ADMIN — THERA TABS 1 TABLET: TAB at 08:08

## 2020-08-13 RX ADMIN — BENZONATATE 100 MG: 100 CAPSULE ORAL at 05:08

## 2020-08-13 RX ADMIN — VORICONAZOLE 200 MG: 200 TABLET, FILM COATED ORAL at 05:08

## 2020-08-13 RX ADMIN — POTASSIUM & SODIUM PHOSPHATES POWDER PACK 280-160-250 MG 1 PACKET: 280-160-250 PACK at 11:08

## 2020-08-13 RX ADMIN — ACETAMINOPHEN 650 MG: 325 TABLET ORAL at 05:08

## 2020-08-13 RX ADMIN — Medication 4 TABLET: at 05:08

## 2020-08-13 RX ADMIN — IPRATROPIUM BROMIDE AND ALBUTEROL SULFATE 3 ML: 2.5; .5 SOLUTION RESPIRATORY (INHALATION) at 03:08

## 2020-08-13 RX ADMIN — ENOXAPARIN SODIUM 80 MG: 80 INJECTION SUBCUTANEOUS at 05:08

## 2020-08-13 RX ADMIN — LIDOCAINE 2 PATCH: 50 PATCH TOPICAL at 05:08

## 2020-08-13 RX ADMIN — ACETAMINOPHEN 650 MG: 325 TABLET ORAL at 11:08

## 2020-08-13 RX ADMIN — GUAIFENESIN AND DEXTROMETHORPHAN HYDROBROMIDE 1 TABLET: 600; 30 TABLET, EXTENDED RELEASE ORAL at 11:08

## 2020-08-13 RX ADMIN — BENZONATATE 100 MG: 100 CAPSULE ORAL at 11:08

## 2020-08-13 NOTE — PROGRESS NOTES
[facility] Hospital Medicine  Progress Note    Patient Name: Rajan Deluna  MRN: [id number]  Patient Class: IP- Inpatient   Admission Date: 7/21/2020  Length of Stay: 23 days  Attending Physician: Rell Tolentino III, MD  Primary Care Provider: Jason Mccord MD        Subjective:     Principal Problem:Acute respiratory disease due to COVID-19 virus        HPI:  Patient is a 74-year-old male with a past medical history of hypertension, COPD, MAC infection who presented to the ED with altered mental status and fever.  As per the family, patient has had altered mental status for the past 3-4 days. Family endorses increased somnolence as well as urinating on himself. EMS was called two days prior to presentation but reportedly deemed that patient did not need to be brought in. Over the previous day, reportedly the patient was found stuck, staring in his sink, not responding. On the day of presentation, the patient was found down on the floor, family wasn't able to get patient up from the floor and was reportedly not responding as much as baseline. Family believed that patient may not have been wearing baseline 2L O2. Patient also endorses a cough and generalized weakness. Patient reportedly had a recent UTI.     In the ED, chest x-ray showed diffuse interstitial patchy infiltrates concerning for possible pneumonia and a COVID-19 screening test was positive. Fever was 101° F and patient was hypotensive 80s/40s. Patient given 30cc/kg bolus. BP very soft on presentation, adequately responded to volume resuscitation and then decreased again. Labs showed a BUN 45, creatinine 2.9, , , troponin 0.036, procalcitonin 1.13.     Overview/Hospital Course:  8/3 - NAEON, had chest pain, normal EKG and troponin overnight, resolved with GI cocktail. This AM, H/H decreased from 10.8/33.7 to 9.1/28. Infectious disease recommends oral Vancomycin and IV Metronidazole for C. Diff until 8/7 and to continue  voriconazole and discharge on Itraconazole 100mg BID and follow up with Pulmonary and ID for continued Aspergillosis and MAC treatment. Patient currently satting 90% on 40L vapotherm at 65% FiO2, weaning as tolerated.    8/4 - NAEON. Patient is currently satting in the >90% on 20L vapotherm at 35% FiO2, weaning as tolerated. H/H stable 9.5/30.3. Per GI, VCE show a few nonbleeding small likely duodenal AVMs and no other evidence of overt GI Bleed and recommend outpatient evaluation due to the patient's current pulmonary status.     8/5 - NAEON. Pending LTAC status. Spo2 70's with PT/OT and need to increase oxygen to vapotherm 20L 70% with an increase in Spo2 to 90's.     8/6 - NAEON. LTAC declined due to insurance. Currently on 30L 70% vapotherm.    8/7 - 11L 45% HFNL. Off covid precautions Monday 8/10    8/8 - NAEO. Bipap overnight, HFNC 15L last night and this am. Feeling better. No cp. H/H stable     8/9 - NAEON, patient on 15L HFNC. States he is coughing a bit more today, states it is nonproductive. Denies any discomfort, N/V/D. Feels well. On Monday, patient will meet criteria for isolation to be lifted, which would allow his daughter to visit.      8/10 - Oxygen weaned down to 10L HFNC over the weekend. Patient off COVID isolation precautions. Pending placement.    8/11 - Oxygen weaned down to 4L NC but patient desaturated throughout the day and ended the day on 7LPM O2. COVID isolation lifted.    8/12 - Patient on 7 LPM to start the day and increased to 9LPM O2. Patient continues to desat to the 80s upon minor exertion. Patient has become deconditioned from his time in hospital and will continue to benefit from PT/OT and inspiratory spirometry.         Interval History: NAEON, patient had some back pain, tylenol and 1x toradol given. Patient stable. Repeat COVID test negative. PPD placed. Unable to tolerate proning, desaturated. Up in chair at least BID.     Review of Systems   Constitutional: Positive for  fatigue. Negative for activity change, appetite change and chills.   HENT: Negative for congestion and dental problem.    Eyes: Negative for discharge and itching.   Respiratory: Negative for cough and shortness of breath.    Cardiovascular: Negative for chest pain.   Gastrointestinal: Negative for abdominal pain, blood in stool, diarrhea and nausea.   Endocrine: Negative for cold intolerance and heat intolerance.   Genitourinary: Negative for difficulty urinating.   Musculoskeletal: Positive for back pain. Negative for arthralgias and gait problem.        Patient back pain likely due to deconditioning   Skin: Negative.  Negative for color change and pallor.   Neurological: Negative for dizziness, facial asymmetry, light-headedness and headaches.   Psychiatric/Behavioral: The patient is not nervous/anxious.      Objective:     Vital Signs (Most Recent):  Temp: 97.4 °F (36.3 °C) (08/13/20 0710)  Pulse: 100 (08/13/20 0710)  Resp: 18 (08/13/20 0710)  BP: (!) 112/57 (08/13/20 0710)  SpO2: (!) 92 % (08/13/20 0543) Vital Signs (24h Range):  Temp:  [97 °F (36.1 °C)-98.1 °F (36.7 °C)] 97.4 °F (36.3 °C)  Pulse:  [] 100  Resp:  [18-22] 18  SpO2:  [90 %-97 %] 92 %  BP: ()/(55-62) 112/57     Weight: 71.6 kg (157 lb 13.6 oz)  Body mass index is 22.02 kg/m².    Intake/Output Summary (Last 24 hours) at 8/13/2020 0713  Last data filed at 8/13/2020 0231  Gross per 24 hour   Intake 465 ml   Output 625 ml   Net -160 ml      Physical Exam  Vitals signs and nursing note reviewed.   Constitutional:       General: He is not in acute distress.     Appearance: Normal appearance. He is not ill-appearing.   HENT:      Head: Normocephalic and atraumatic.      Right Ear: External ear normal.      Left Ear: External ear normal.      Nose: Nose normal. No congestion or rhinorrhea.      Mouth/Throat:      Mouth: Mucous membranes are moist.      Pharynx: Oropharynx is clear.   Eyes:      Pupils: Pupils are equal, round, and reactive to  light.   Neck:      Musculoskeletal: Normal range of motion and neck supple.   Cardiovascular:      Rate and Rhythm: Normal rate and regular rhythm.      Pulses: Normal pulses.      Heart sounds: Normal heart sounds.   Pulmonary:      Effort: Pulmonary effort is normal.      Breath sounds: Wheezing present.      Comments: Wheezes this AM, likely COPD. O2 at 7L HFNC  Abdominal:      General: Abdomen is flat. There is no distension.      Palpations: Abdomen is soft.      Tenderness: There is no abdominal tenderness. There is no guarding.   Musculoskeletal: Normal range of motion.         General: No swelling.   Neurological:      General: No focal deficit present.      Mental Status: He is alert and oriented to person, place, and time. Mental status is at baseline.   Psychiatric:         Mood and Affect: Mood normal.         Behavior: Behavior normal.         Thought Content: Thought content normal.         Judgment: Judgment normal.         Significant Labs:   CBC:   Recent Labs   Lab 08/13/20  0519   WBC 5.88   HGB 9.2*   HCT 29.8*        CMP:   Recent Labs   Lab 08/13/20  0519      K 4.3      CO2 27   GLU 83   BUN 12   CREATININE 1.0   CALCIUM 8.1*   PROT 6.2   ALBUMIN 2.0*   BILITOT 0.5   ALKPHOS 158*   AST 40   ALT 25   ANIONGAP 8   EGFRNONAA >60     All pertinent labs within the past 24 hours have been reviewed.    Significant Imaging: I have reviewed and interpreted all pertinent imaging results/findings within the past 24 hours.      Assessment/Plan:      * Acute respiratory disease due to COVID-19 virus  -Isolation lifted 8/11  RESOLVED    History of Aspergillus & MAC  Continuing voriconazole, switch to itroconazole at [place]   -Follow up with Pulm, ID outpatient  STABLE    Hypotension  Patient's most recent blood pressure 116/75  RESOLVED      Sepsis  RESOLVED  See plan for COVID 19    COPD (chronic obstructive pulmonary disease)  On HFNC 9L  Patient BiPap overnight  Continue to wean  Goal of  5L NC  Patient to sit up 2x daily in chair  Mild wheezes this AM, continue albuterol and chest physio/IS  -Continue to monitor cough  -Monitor for progress        Anemia  Asymptomatic  H/H stable at 9.2/28.9  STABLE      VTE Risk Mitigation (From admission, onward)         Ordered     enoxaparin injection 80 mg  Every 12 hours (non-standard times)      08/04/20 1421     Place sequential compression device  Until discontinued      07/21/20 2142     IP VTE HIGH RISK PATIENT  Once      07/21/20 2142                Discharge Planning   ADAM:      Code Status: Full Code   Is the patient medically ready for discharge?:     Reason for patient still in hospital (select all that apply): Patient trending condition  Discharge Plan A: Long-term acute care facility (LTAC)   Discharge Delays: None known at this time              Emeli Adam MD   [employer] PGY2  Department of Hospital Medicine   [facility]

## 2020-08-13 NOTE — PT/OT/SLP PROGRESS
"Physical Therapy Treatment    Patient Name:  Rajan Deluna   MRN:  [id number]    Recommendations:     Discharge Recommendations:  nursing facility, skilled   Discharge Equipment Recommendations: walker, rolling   Barriers to discharge: SOB with activity and decreased functional mobility    Assessment:     Rajan Deluna is a 74 y.o. male admitted with a medical diagnosis of Acute respiratory disease due to COVID-19 virus.  He presents with the following impairments/functional limitations:  weakness, impaired endurance, impaired self care skills, impaired functional mobilty, gait instability, impaired balance, decreased upper extremity function, decreased lower extremity function, decreased safety awareness, pain, impaired cardiopulmonary response to activity.  Pt would continue to benefit from P.T. To address impairments listed above.      Rehab Prognosis: Fair+; patient would benefit from acute skilled PT services to address these deficits and reach maximum level of function.    Recent Surgery: Procedure(s) (LRB):  IMAGING PROCEDURE, GI TRACT, INTRALUMINAL, VIA CAPSULE (N/A) 10 Days Post-Op    Plan:     During this hospitalization, patient to be seen 6 x/week to address the identified rehab impairments via gait training, therapeutic activities, therapeutic exercises, neuromuscular re-education and progress toward the following goals:    · Plan of Care Expires:  09/08/20    Subjective     Chief Complaint: neck and back pain  Patient/Family Comments/goals: Pt agreed to tx.  "I am ready to get back in bed.'  Pain/Comfort:  · Pain Rating 1: 7/10  · Location 1: (neck and low back)  · Pain Addressed 1: Reposition, Distraction, Nurse notified  · Pain Rating Post-Intervention 1: 7/10      Objective:     Communicated with RN (Flower) prior to session.  Patient found up in chair with telemetry(HF O2, chair alarm) upon PT entry to room.     General Precautions: Standard, fall, respiratory(Precautions discontinued)   Orthopedic " Precautions:N/A   Braces:       Functional Mobility:  · Bed Mobility:     · Scooting: stand by assistance and up to HOB using b/r on right in right sidelying  · Sit to Supine: stand by assistance  · Transfers:     · Sit to Stand:  contact guard assistance with rolling walker and x 2 reps  · Bed to Chair: contact guard assistance and close with  rolling walker  using  Step Transfer  · Gait: 4 small turning steps from b/s chair > EOB with Rw and close CGA with vc's to stay closer to RW.  · Balance: sitting good-, standing fair RW, gait fair/fair- RW      AM-PAC 6 CLICK MOBILITY  Turning over in bed (including adjusting bedclothes, sheets and blankets)?: 4  Sitting down on and standing up from a chair with arms (e.g., wheelchair, bedside commode, etc.): 3  Moving from lying on back to sitting on the side of the bed?: 3  Moving to and from a bed to a chair (including a wheelchair)?: 3  Need to walk in hospital room?: 3  Climbing 3-5 steps with a railing?: 1  Basic Mobility Total Score: 17       Therapeutic Activities and Exercises:   Pt sitting up in b/s chair on HF O2 @ 9lpm upon entering the room.  Seated bLE therex: AP and LAQs x 10 reps (sitting ), and in supine - heelslides, hip ABD/ADD 10 x 2 reps with Maurice and rest breaks as needed secondary to SOB.  O2 sats with activity 878 to 91% with brief drop to 87% twice.  Pt assisted with positioning to help decrease neck and low back pain.      Patient left supine with all lines intact, call button in reach, bed alarm on and RN notified..    GOALS:   Multidisciplinary Problems     Physical Therapy Goals        Problem: Physical Therapy Goal    Goal Priority Disciplines Outcome Goal Variances Interventions   Physical Therapy Goal     PT, PT/OT Ongoing, Progressing     Description: Goals to be met by: 20     Patient will increase functional independence with mobility by performin. Supine <> sit with MInimal Assistance  2. Sit to stand transfer with Minimal  Assistance  3. Bed to chair transfer with Minimal Assistance   4. Gait x 50 ft with min A with appropriate AD  5. Lower extremity exercise program x 10 reps per handout, with supervision                     Time Tracking:     PT Received On: 08/13/20  PT Start Time: 1407     PT Stop Time: 1433  PT Total Time (min): 26 min     Billable Minutes: Therapeutic Activity 15 and Therapeutic Exercise 11    Treatment Type: Treatment  PT/PTA: PTA     PTA Visit Number: 3     Dee Urena PTA  08/13/2020

## 2020-08-13 NOTE — PLAN OF CARE
Problem: Occupational Therapy Goal  Goal: Occupational Therapy Goal  Description: Goals to be met by: 8/27/20     Patient will increase functional independence with ADLs by performing:    LE Dressing with Stand-by Assistance.  Grooming while standing with Stand-by Assistance.  Toileting from toilet with Stand-by Assistance for hygiene and clothing management.   Supine to sit with Stand-by Assistance. Gaol met 8/13/2020  Step transfer with Stand-by Assistance  Toilet transfer to toilet with Stand-by Assistance.  Upper extremity exercise program x10 reps per handout, with independence.    Outcome: Ongoing, Progressing

## 2020-08-13 NOTE — PT/OT/SLP PROGRESS
Occupational Therapy   Treatment    Name: Rajan Deluna  MRN: [id number]  Admitting Diagnosis:  Acute respiratory disease due to COVID-19 virus  10 Days Post-Op    Recommendations:     Discharge Recommendations: nursing facility, skilled  Discharge Equipment Recommendations:  walker, rolling, bedside commode  Barriers to discharge:  Decreased caregiver support, Inaccessible home environment    Assessment:     Rajan Deluna is a 74 y.o. male with a medical diagnosis of Acute respiratory disease due to COVID-19 virus.  He presents with  Performance deficits affecting function are weakness, impaired endurance, impaired self care skills, impaired functional mobilty, gait instability, visual deficits, decreased upper extremity function, decreased lower extremity function, decreased safety awareness, pain, impaired cardiopulmonary response to activity.     Pt. desat to 81% with OOB transfer to [facility] and rebounded to 89-90% with PLB and deep breathing while actively raising arms up and down overhead, a bit quicker rebound today as compared to yesterday.  Continue with OT POC.    Rehab Prognosis:  Fair; patient would benefit from acute skilled OT services to address these deficits and reach maximum level of function.       Plan:     Patient to be seen 5 x/week to address the above listed problems via self-care/home management, therapeutic activities, therapeutic exercises  · Plan of Care Expires: 08/27/20  · Plan of Care Reviewed with: patient, sibling    Subjective     Pain/Comfort:  · Pain Rating 1: 8/10  · Location - Orientation 1: generalized  · Location 1: shoulder  · Pain Addressed 1: Reposition, Distraction, Nurse notified  · Pain Rating Post-Intervention 1: 8/10  · Pain Rating 2: 8/10  · Location - Side 2: Bilateral  · Location - Orientation 2: generalized  · Location 2: chest(muscular, from coughing)  · Pain Addressed 2: Pre-medicate for activity, Reposition, Distraction, Nurse notified  · Pain Rating  Post-Intervention 2: 8/10    Objective:     Communicated with: nurse prior to session.  Patient found HOB elevated with telemetry, oxygen, bed alarm(telesitter) upon OT entry to room.    General Precautions: Standard, respiratory, fall   Orthopedic Precautions:N/A   Braces: N/A     Occupational Performance:     Bed Mobility:    · Patient completed Rolling/Turning to Left with  stand by assistance  · Patient completed Scooting/Bridging with stand by assistance  · Patient completed Supine to Sit with stand by assistance     Functional Mobility/Transfers:  · Patient completed Sit <> Stand Transfer with contact guard assistance  with  rolling walker   · Patient completed Bed <> Chair Transfer using Step Transfer technique with contact guard assistance with rolling walker        [facility] 6 Click ADL: 16    Treatment & Education:  Pt. transitioned to EOB with SBA,  O2 desat to 83% on 9 L HFNC, rebounded to 85% with deep breathing, AROM ex x 3 sets with emphasis on deep inhalation while raising arms overhead but required extended time to get up 87% before transferred to BS chair. Pt desat to 81% with BS chair t/f with RW.  Pt. performed AAROM/AROM x 10 reps 3 sets in BS chair with emphasis on raising arms forward and out to the sides touching hands overhead, as able, with deep inhalation to increased O2 exchange, O2 stats up to 90% at end of session. Pt. Instructed to do deep breathing ex throughout the day, verbalized and  demonstrated understanding.  Shallow breaths with incentive spirometer, RT notified.     Patient left up in chair with all lines intact, call button in reach, nurse notified and RT and sister presentEducation:      GOALS:   Multidisciplinary Problems     Occupational Therapy Goals        Problem: Occupational Therapy Goal    Goal Priority Disciplines Outcome Interventions   Occupational Therapy Goal     OT, PT/OT Ongoing, Progressing    Description: Goals to be met by: 8/27/20     Patient will increase  functional independence with ADLs by performing:    LE Dressing with Stand-by Assistance.  Grooming while standing with Stand-by Assistance.  Toileting from toilet with Stand-by Assistance for hygiene and clothing management.   Supine to sit with Stand-by Assistance.  Step transfer with Stand-by Assistance  Toilet transfer to toilet with Stand-by Assistance.  Upper extremity exercise program x10 reps per handout, with independence.                     Time Tracking:     OT Date of Treatment: 08/13/20  OT Start Time: 1144  OT Stop Time: 1222  OT Total Time (min): 38 min    Billable Minutes:Therapeutic Activity 23  Total Time 15    Stephanie Ramsey OT  8/13/2020

## 2020-08-13 NOTE — ASSESSMENT & PLAN NOTE
On HFNC 9L  Patient BiPap overnight  Continue to wean  Goal of 5L NC  Patient to sit up 2x daily in chair  Mild wheezes this AM, continue albuterol and chest physio/IS  -Continue to monitor cough  -Monitor for progress

## 2020-08-13 NOTE — ASSESSMENT & PLAN NOTE
Continuing voriconazole, switch to itroconazole at [place]   -Follow up with Pulm, ID outpatient  STABLE

## 2020-08-13 NOTE — PLAN OF CARE
1900 Reported pain to upper back and neck, requested tylenol.  It was too early for tylenol gave diclofenac.      2004 reported 8/10 pain to neck/back. Call med team for 1 time dose of extra strength tylenol.      0300 pt reporting 7/10 pain to neck and upper back again. Med team ordered toradol IVP x1.     No accu ck.     Tele: SR,  HR 90,  No alarms.     Bed in lowest position, wheels locked, non skid socks, ID band worn, personal items and call bell with in reach, bed alarm set.

## 2020-08-13 NOTE — SUBJECTIVE & OBJECTIVE
Interval History: NAEON, patient had some back pain, tylenol and 1x toradol given. Patient stable. Repeat COVID test negative. PPD placed. Unable to tolerate proning, desaturated. Up in chair at least BID.     Review of Systems   Constitutional: Positive for fatigue. Negative for activity change, appetite change and chills.   HENT: Negative for congestion and dental problem.    Eyes: Negative for discharge and itching.   Respiratory: Negative for cough and shortness of breath.    Cardiovascular: Negative for chest pain.   Gastrointestinal: Negative for abdominal pain, blood in stool, diarrhea and nausea.   Endocrine: Negative for cold intolerance and heat intolerance.   Genitourinary: Negative for difficulty urinating.   Musculoskeletal: Positive for back pain. Negative for arthralgias and gait problem.        Patient back pain likely due to deconditioning   Skin: Negative.  Negative for color change and pallor.   Neurological: Negative for dizziness, facial asymmetry, light-headedness and headaches.   Psychiatric/Behavioral: The patient is not nervous/anxious.      Objective:     Vital Signs (Most Recent):  Temp: 97.4 °F (36.3 °C) (08/13/20 0710)  Pulse: 100 (08/13/20 0710)  Resp: 18 (08/13/20 0710)  BP: (!) 112/57 (08/13/20 0710)  SpO2: (!) 92 % (08/13/20 0543) Vital Signs (24h Range):  Temp:  [97 °F (36.1 °C)-98.1 °F (36.7 °C)] 97.4 °F (36.3 °C)  Pulse:  [] 100  Resp:  [18-22] 18  SpO2:  [90 %-97 %] 92 %  BP: ()/(55-62) 112/57     Weight: 71.6 kg (157 lb 13.6 oz)  Body mass index is 22.02 kg/m².    Intake/Output Summary (Last 24 hours) at 8/13/2020 0713  Last data filed at 8/13/2020 0231  Gross per 24 hour   Intake 465 ml   Output 625 ml   Net -160 ml      Physical Exam  Vitals signs and nursing note reviewed.   Constitutional:       General: He is not in acute distress.     Appearance: Normal appearance. He is not ill-appearing.   HENT:      Head: Normocephalic and atraumatic.      Right Ear:  External ear normal.      Left Ear: External ear normal.      Nose: Nose normal. No congestion or rhinorrhea.      Mouth/Throat:      Mouth: Mucous membranes are moist.      Pharynx: Oropharynx is clear.   Eyes:      Pupils: Pupils are equal, round, and reactive to light.   Neck:      Musculoskeletal: Normal range of motion and neck supple.   Cardiovascular:      Rate and Rhythm: Normal rate and regular rhythm.      Pulses: Normal pulses.      Heart sounds: Normal heart sounds.   Pulmonary:      Effort: Pulmonary effort is normal.      Breath sounds: Wheezing present.      Comments: Wheezes this AM, likely COPD. O2 at 7L HFNC  Abdominal:      General: Abdomen is flat. There is no distension.      Palpations: Abdomen is soft.      Tenderness: There is no abdominal tenderness. There is no guarding.   Musculoskeletal: Normal range of motion.         General: No swelling.   Neurological:      General: No focal deficit present.      Mental Status: He is alert and oriented to person, place, and time. Mental status is at baseline.   Psychiatric:         Mood and Affect: Mood normal.         Behavior: Behavior normal.         Thought Content: Thought content normal.         Judgment: Judgment normal.         Significant Labs:   CBC:   Recent Labs   Lab 08/13/20  0519   WBC 5.88   HGB 9.2*   HCT 29.8*        CMP:   Recent Labs   Lab 08/13/20  0519      K 4.3      CO2 27   GLU 83   BUN 12   CREATININE 1.0   CALCIUM 8.1*   PROT 6.2   ALBUMIN 2.0*   BILITOT 0.5   ALKPHOS 158*   AST 40   ALT 25   ANIONGAP 8   EGFRNONAA >60     All pertinent labs within the past 24 hours have been reviewed.    Significant Imaging: I have reviewed and interpreted all pertinent imaging results/findings within the past 24 hours.

## 2020-08-13 NOTE — PLAN OF CARE
Brannon with [facility] called and needed some more information to present this pt to his medical director.  The director will review appeal and we should hear back whether approved or denied for LTAC within 72 hours from yesterday when appeal was initiated.         08/13/20 1059   Post-Acute Status   Post-Acute Authorization Placement  ([facility])     Phong Suh RN,   [phone]

## 2020-08-13 NOTE — PROGRESS NOTES
[facility]  Progress/Critical Care - Medicine  History & Physical    Patient Name: Rajan Deluna  MRN: [id number]  Admission Date: 7/21/2020  Hospital Length of Stay: 23 days  Code Status: Full Code  Attending Physician: Rell Tolentino III, MD   Primary Care Provider: Jason Mccord MD   Principal Problem: Acute respiratory disease due to COVID-19 virus    Subjective:     Hospital/ICU Course: When seen the patient remains on 9LPM.  He is still resting comfortably.  No acute changes.    Past Medical History:   Diagnosis Date    Arthritis     COPD (chronic obstructive pulmonary disease)     Hypertension     Smoker     Weakness        Past Surgical History:   Procedure Laterality Date    HERNIA REPAIR      INTRALUMINAL GASTROINTESTINAL TRACT IMAGING VIA CAPSULE N/A 8/3/2020    Procedure: IMAGING PROCEDURE, GI TRACT, INTRALUMINAL, VIA CAPSULE;  Surgeon: Rey Olivares MD;  Location: [facility];  Service: Endoscopy;  Laterality: N/A;    right knee surgery         Review of patient's allergies indicates:  No Known Allergies    Family History     None        Tobacco Use    Smoking status: Former Smoker    Smokeless tobacco: Never Used   Substance and Sexual Activity    Alcohol use: Not Currently    Drug use: Never    Sexual activity: Not on file     Objective:     Vital Signs (Most Recent):  Temp: 97.4 °F (36.3 °C) (08/13/20 0710)  Pulse: 105 (08/13/20 1224)  Resp: (!) 23 (08/13/20 1224)  BP: (!) 112/57 (08/13/20 0710)  SpO2: (!) 93 % (08/13/20 1224) Vital Signs (24h Range):  Temp:  [97 °F (36.1 °C)-98.1 °F (36.7 °C)] 97.4 °F (36.3 °C)  Pulse:  [] 105  Resp:  [16-23] 23  SpO2:  [89 %-97 %] 93 %  BP: ()/(55-61) 112/57     Weight: 71.6 kg (157 lb 13.6 oz)  Body mass index is 22.02 kg/m².      Intake/Output Summary (Last 24 hours) at 8/13/2020 1416  Last data filed at 8/13/2020 0231  Gross per 24 hour   Intake 180 ml   Output 375 ml   Net -195 ml       Physical Exam  Vitals  reviewed: virtual visit.   Constitutional:       General: He is not in acute distress.     Appearance: Normal appearance.   HENT:      Head: Normocephalic.      Nose: Nose normal.   Neurological:      General: No focal deficit present.      Mental Status: He is alert.         Vents:  Oxygen Concentration (%): 70 (08/12/20 0911)    Lines/Drains/Airways     Peripheral Intravenous Line                 Peripheral IV - Single Lumen 08/04/20 0314 18 G Right Forearm 9 days                Significant Labs:    CBC/Anemia Profile:  Recent Labs   Lab 08/13/20 0519   WBC 5.88   HGB 9.2*   HCT 29.8*      MCV 96   RDW 15.8*        Chemistries:  Recent Labs   Lab 08/13/20 0519      K 4.3      CO2 27   BUN 12   CREATININE 1.0   CALCIUM 8.1*   ALBUMIN 2.0*   PROT 6.2   BILITOT 0.5   ALKPHOS 158*   ALT 25   AST 40   MG 2.1   PHOS 2.4*       All pertinent labs within the past 24 hours have been reviewed.    Significant Imaging: I have reviewed all pertinent imaging results/findings within the past 24 hours.    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]  Yes    COPD (chronic obstructive pulmonary disease) [J44.9] 05/22/2017 Yes    Anemia [D64.9] 12/09/2016 No      Problems Resolved During this Admission:    Diagnosis Date Noted Date Resolved POA    History of MAC infection [Z86.19]  08/07/2020 No    GI bleed [K92.2] 07/29/2020 08/07/2020 Clinically Undetermined    Clostridium difficile infection [A49.8]  08/07/2020 Clinically Undetermined    Acute respiratory failure with hypoxia [J96.01]  08/07/2020 Yes    MARQUIS (acute kidney injury) [N17.9] 07/21/2020 08/07/2020 Yes       * Acute respiratory disease due to COVID-19 virus  - 7/21 COVID(+)  - s/p remdesivir x5d, dexamethasone x10d  - full dose AC with lovenox  - proning if he can tolerate at all  -  incentive spirometry   - please wean O2 as tolerated for goal SpO2 88%  - BIPAP QHS  - still on 9LPM via bubble flow.  Stable since yesterda     History of Aspergillus & MAC  - serum aspergillus negative, loaded with voriconazole on  7/29   - no MAC treatment at this time per ID     Acute on chronic respiratory failure with hypoxia  See COVID problem     - Use of 2L NC at home     COPD (chronic obstructive pulmonary disease)  - Use of 2L NC at home  - metered dose inhaler q4h      Out of bed to chair daily  Incentive spirometry   PT/OT    Improved since yesterday.  Continue current management.     Bryan Swain MD  Critical Care - Medicine  [facility]  [phone]

## 2020-08-13 NOTE — PLAN OF CARE
Problem: Adult Inpatient Plan of Care  Goal: Chart Reviewed  Description: Chart and Care plan reviewed.     Outcome: Ongoing, Progressing   VN and patient discussed todays events and future plan of care.  Education coughing and deep breathing.  Safety measures maintained including bed locked and in lowest position with alarm on.  Call light within reach and patient verbalizes an understanding on how and why to use it.  Patient looking forward to getting up and moving with physical therapy later today.

## 2020-08-14 PROBLEM — M25.512 LEFT SHOULDER PAIN: Status: ACTIVE | Noted: 2020-08-14

## 2020-08-14 LAB — TB INDURATION 48 - 72 HR READ: 0 MM

## 2020-08-14 PROCEDURE — 94761 N-INVAS EAR/PLS OXIMETRY MLT: CPT

## 2020-08-14 PROCEDURE — 25000003 PHARM REV CODE 250: Performed by: STUDENT IN AN ORGANIZED HEALTH CARE EDUCATION/TRAINING PROGRAM

## 2020-08-14 PROCEDURE — 27100171 HC OXYGEN HIGH FLOW UP TO 24 HOURS

## 2020-08-14 PROCEDURE — 97530 THERAPEUTIC ACTIVITIES: CPT

## 2020-08-14 PROCEDURE — 25000003 PHARM REV CODE 250: Performed by: INTERNAL MEDICINE

## 2020-08-14 PROCEDURE — 97116 GAIT TRAINING THERAPY: CPT | Mod: CQ

## 2020-08-14 PROCEDURE — 94640 AIRWAY INHALATION TREATMENT: CPT

## 2020-08-14 PROCEDURE — 94799 UNLISTED PULMONARY SVC/PX: CPT

## 2020-08-14 PROCEDURE — 25000003 PHARM REV CODE 250: Performed by: FAMILY MEDICINE

## 2020-08-14 PROCEDURE — 63600175 PHARM REV CODE 636 W HCPCS: Performed by: FAMILY MEDICINE

## 2020-08-14 PROCEDURE — 99900035 HC TECH TIME PER 15 MIN (STAT)

## 2020-08-14 PROCEDURE — 11000001 HC ACUTE MED/SURG PRIVATE ROOM

## 2020-08-14 PROCEDURE — 94664 DEMO&/EVAL PT USE INHALER: CPT

## 2020-08-14 PROCEDURE — 25000242 PHARM REV CODE 250 ALT 637 W/ HCPCS: Performed by: STUDENT IN AN ORGANIZED HEALTH CARE EDUCATION/TRAINING PROGRAM

## 2020-08-14 RX ORDER — DICLOFENAC SODIUM 10 MG/G
2 GEL TOPICAL
Status: DISCONTINUED | OUTPATIENT
Start: 2020-08-14 | End: 2020-08-27 | Stop reason: HOSPADM

## 2020-08-14 RX ORDER — POLYETHYLENE GLYCOL 3350 17 G/17G
17 POWDER, FOR SOLUTION ORAL DAILY
Status: DISCONTINUED | OUTPATIENT
Start: 2020-08-14 | End: 2020-08-27 | Stop reason: HOSPADM

## 2020-08-14 RX ORDER — ACETAMINOPHEN 325 MG/1
650 TABLET ORAL EVERY 6 HOURS PRN
Status: DISCONTINUED | OUTPATIENT
Start: 2020-08-14 | End: 2020-08-27 | Stop reason: HOSPADM

## 2020-08-14 RX ADMIN — DICLOFENAC 2 G: 10 GEL TOPICAL at 02:08

## 2020-08-14 RX ADMIN — DICLOFENAC 2 G: 10 GEL TOPICAL at 08:08

## 2020-08-14 RX ADMIN — PANTOPRAZOLE SODIUM 40 MG: 40 TABLET, DELAYED RELEASE ORAL at 08:08

## 2020-08-14 RX ADMIN — IPRATROPIUM BROMIDE AND ALBUTEROL SULFATE 3 ML: 2.5; .5 SOLUTION RESPIRATORY (INHALATION) at 07:08

## 2020-08-14 RX ADMIN — ENOXAPARIN SODIUM 80 MG: 80 INJECTION SUBCUTANEOUS at 05:08

## 2020-08-14 RX ADMIN — Medication 4 TABLET: at 05:08

## 2020-08-14 RX ADMIN — LIDOCAINE 2 PATCH: 50 PATCH TOPICAL at 05:08

## 2020-08-14 RX ADMIN — ACETAMINOPHEN 650 MG: 325 TABLET ORAL at 05:08

## 2020-08-14 RX ADMIN — ACETAMINOPHEN 650 MG: 325 TABLET ORAL at 08:08

## 2020-08-14 RX ADMIN — THERA TABS 1 TABLET: TAB at 08:08

## 2020-08-14 RX ADMIN — VORICONAZOLE 200 MG: 200 TABLET, FILM COATED ORAL at 02:08

## 2020-08-14 RX ADMIN — IPRATROPIUM BROMIDE AND ALBUTEROL SULFATE 3 ML: 2.5; .5 SOLUTION RESPIRATORY (INHALATION) at 04:08

## 2020-08-14 RX ADMIN — Medication 4 TABLET: at 12:08

## 2020-08-14 RX ADMIN — Medication 4 TABLET: at 08:08

## 2020-08-14 RX ADMIN — GUAIFENESIN AND DEXTROMETHORPHAN HYDROBROMIDE 1 TABLET: 600; 30 TABLET, EXTENDED RELEASE ORAL at 08:08

## 2020-08-14 RX ADMIN — DICLOFENAC 2 G: 10 GEL TOPICAL at 05:08

## 2020-08-14 RX ADMIN — IPRATROPIUM BROMIDE AND ALBUTEROL SULFATE 3 ML: 2.5; .5 SOLUTION RESPIRATORY (INHALATION) at 11:08

## 2020-08-14 RX ADMIN — IPRATROPIUM BROMIDE AND ALBUTEROL SULFATE 3 ML: 2.5; .5 SOLUTION RESPIRATORY (INHALATION) at 08:08

## 2020-08-14 RX ADMIN — VORICONAZOLE 200 MG: 200 TABLET, FILM COATED ORAL at 04:08

## 2020-08-14 RX ADMIN — POLYETHYLENE GLYCOL (3350) 17 G: 17 POWDER, FOR SOLUTION ORAL at 08:08

## 2020-08-14 NOTE — PLAN OF CARE
Adrianna with [facility] called and said her medical director upheld the LTAC denial.  They are now sending pt's case to [facility].  This is a third party reviewer.  [facility] will overnight a package to them today and they have 72 hours to make a decision.  Pt's case # [id number].       08/14/20 1051   Post-Acute Status   Post-Acute Authorization Placement  (LTAC)     Phong Suh RN,   [phone]

## 2020-08-14 NOTE — PLAN OF CARE
Problem: Physical Therapy Goal  Goal: Physical Therapy Goal  Description: Goals to be met by: 20     Patient will increase functional independence with mobility by performin. Supine <> sit with MInimal Assistance  MET 20  2. Sit to stand transfer with Minimal Assistance  MET 20  3. Bed to chair transfer with Minimal Assistance   4. Gait x 50 ft with min A with appropriate AD  5. Lower extremity exercise program x 10 reps per handout, with supervision    Outcome: Ongoing, Progressing   Goals 1 and 2 met

## 2020-08-14 NOTE — PLAN OF CARE
Plan of care reviewed with patient.  Pt AAO. Continues on high flow at 9L per NC. Continue to attempt to wean down oxygen.Pt desats when exercising today, but once he relaxes goes back to 88-91%. Pt complained of back. Patches in place to back and shoulder. Pain cream applied to back as well. Med effective later. Pt awaiting facility placement.Verbalizes to staff understanding. NSR on monitor with 0 red alarms noted.  No acute distress observed at this time. Side rails x2, bed in low position, call bell within reach. Bed alarm in place for patient safety. Will relay to oncoming nurse to monitor for changes in condition.

## 2020-08-14 NOTE — SUBJECTIVE & OBJECTIVE
Interval History: Complains of L shoulder pain overnight. Patient has had a history of frozen shoulder before requiring surgery. Endorses numbness and tingling down to the tips of his fingers. Nursing applied Voltaren gel with improvement. Patient continues to require supplemental O2, on 8L HFNC this AM. Continues to have a dry cough with mild wheezing.    Review of Systems  Objective:     Vital Signs (Most Recent):  Temp: 98 °F (36.7 °C) (08/14/20 0249)  Pulse: 104 (08/14/20 0740)  Resp: (!) 24 (08/14/20 0740)  BP: (!) 102/57 (08/14/20 0249)  SpO2: (!) 92 % (08/14/20 0740) Vital Signs (24h Range):  Temp:  [97.6 °F (36.4 °C)-98 °F (36.7 °C)] 98 °F (36.7 °C)  Pulse:  [] 104  Resp:  [18-24] 24  SpO2:  [88 %-95 %] 92 %  BP: ()/(50-63) 102/57     Weight: 71.6 kg (157 lb 13.6 oz)  Body mass index is 22.02 kg/m².    Intake/Output Summary (Last 24 hours) at 8/14/2020 0752  Last data filed at 8/14/2020 0153  Gross per 24 hour   Intake 1290 ml   Output 575 ml   Net 715 ml      Physical Exam  Vitals signs and nursing note reviewed.   Constitutional:       General: He is not in acute distress.     Appearance: Normal appearance. He is not ill-appearing.   HENT:      Head: Normocephalic and atraumatic.      Right Ear: External ear normal.      Left Ear: External ear normal.      Nose: Nose normal. No congestion or rhinorrhea.      Mouth/Throat:      Mouth: Mucous membranes are moist.      Pharynx: Oropharynx is clear.   Eyes:      Pupils: Pupils are equal, round, and reactive to light.   Neck:      Musculoskeletal: Normal range of motion and neck supple.   Cardiovascular:      Rate and Rhythm: Normal rate and regular rhythm.      Pulses: Normal pulses.      Heart sounds: Normal heart sounds.   Pulmonary:      Effort: Pulmonary effort is normal.      Breath sounds: Wheezing present.      Comments: Wheezes this AM, likely COPD. O2 at 8L HFNC  Abdominal:      General: Abdomen is flat. There is no distension.       Palpations: Abdomen is soft.      Tenderness: There is no abdominal tenderness. There is no guarding.   Musculoskeletal: Normal range of motion.         General: Tenderness present. No swelling.      Comments: LUE: 5/5 strength, ROM limited to pain, good color and pulses, able to squeeze hand, no gross deformity noted   Neurological:      General: No focal deficit present.      Mental Status: He is alert and oriented to person, place, and time. Mental status is at baseline.   Psychiatric:         Mood and Affect: Mood normal.         Behavior: Behavior normal.         Thought Content: Thought content normal.         Judgment: Judgment normal.         Significant Labs: All pertinent labs within the past 24 hours have been reviewed.    Significant Imaging: I have reviewed all pertinent imaging results/findings within the past 24 hours.

## 2020-08-14 NOTE — PLAN OF CARE
Awaiting [facility] review, Sw will continue to follow up.       08/14/20 1013   Post-Acute Status   Post-Acute Authorization Placement   Post-Acute Placement Status Pending Payor Medical Review

## 2020-08-14 NOTE — PT/OT/SLP PROGRESS
Physical Therapy Treatment    Patient Name:  Rajan Deluna   MRN:  [id number]    Recommendations:     Discharge Recommendations:  nursing facility, skilled   Discharge Equipment Recommendations: walker, rolling   Barriers to discharge: decreased mobility,strength and impaired respiratory    Assessment:     Rajan Deluna is a 74 y.o. male admitted with a medical diagnosis of Acute respiratory disease due to COVID-19 virus.  He presents with the following impairments/functional limitations:  weakness, impaired endurance, impaired functional mobilty, gait instability, impaired balance, decreased lower extremity function, pain, decreased ROM, impaired cardiopulmonary response to activity,pt with increased participation and requires assistance with all mobility,pt with impaired respiratory and desats with exertion,pt will benefit from SNF upon discharge.    Rehab Prognosis: Fair; patient would benefit from acute skilled PT services to address these deficits and reach maximum level of function.    Recent Surgery: Procedure(s) (LRB):  IMAGING PROCEDURE, GI TRACT, INTRALUMINAL, VIA CAPSULE (N/A) 11 Days Post-Op    Plan:     During this hospitalization, patient to be seen 6 x/week to address the identified rehab impairments via gait training, therapeutic activities, therapeutic exercises, neuromuscular re-education and progress toward the following goals:    · Plan of Care Expires:  09/08/20    Subjective     Chief Complaint: n/a  Patient/Family Comments/goals: pt with L shoulder pain.  Pain/Comfort:  · Pain Rating 1: (no rating)  · Location - Side 1: Left  · Location - Orientation 1: generalized  · Location 1: shoulder  · Pain Addressed 1: Reposition, Distraction      Objective:     Communicated with nsg prior to session.  Patient found supine with bed alarm, oxygen, peripheral IV, telemetry upon PT entry to room.     General Precautions: Standard, fall, respiratory   Orthopedic Precautions:N/A   Braces: N/A     Functional  Mobility:  · Bed Mobility:     · Supine to Sit: stand by assistance  · Sit to Supine: stand by assistance  · Transfers:     · Sit to Stand:  contact guard assistance with rolling walker  · Gait: amb ~12' X 1 with RW and CGA with assistance for lines and O2/7L  · Balance: fair standing balance      AM-PAC 6 CLICK MOBILITY  Turning over in bed (including adjusting bedclothes, sheets and blankets)?: 3  Sitting down on and standing up from a chair with arms (e.g., wheelchair, bedside commode, etc.): 3  Moving from lying on back to sitting on the side of the bed?: 3  Moving to and from a bed to a chair (including a wheelchair)?: 3  Need to walk in hospital room?: 3  Climbing 3-5 steps with a railing?: 1  Basic Mobility Total Score: 16       Therapeutic Activities and Exercises: pt's O2 sats decreased to 81%post gait,spoke with nsg and raised to 8L and sats raised to 88-90%,supine-prone with Min A ~30 seconds and sats to 90%.       Patient left supine with all lines intact, call button in reach, bed alarm on and nsg notified..    GOALS: see general POC  Multidisciplinary Problems     Physical Therapy Goals        Problem: Physical Therapy Goal    Goal Priority Disciplines Outcome Goal Variances Interventions   Physical Therapy Goal     PT, PT/OT Ongoing, Progressing     Description: Goals to be met by: 20     Patient will increase functional independence with mobility by performin. Supine <> sit with MInimal Assistance  2. Sit to stand transfer with Minimal Assistance  3. Bed to chair transfer with Minimal Assistance   4. Gait x 50 ft with min A with appropriate AD  5. Lower extremity exercise program x 10 reps per handout, with supervision                     Time Tracking:     PT Received On: 20  PT Start Time: 1335     PT Stop Time: 1354  PT Total Time (min): 19 min     Billable Minutes: Gait Training 15 and Total Time 23    Treatment Type: Treatment  PT/PTA: PTA     PTA Visit Number: 4     Humble DORSEY  Stone, PTA  08/14/2020

## 2020-08-14 NOTE — ASSESSMENT & PLAN NOTE
Patient endorses long-term history of left shoulder pain, has had rotator cuff surgery in the past  Stated that he reached for something yesterday and it began to hurt more  Continue lidocaine patch, tylenol for pain  Stable pulses, ROM limited by patient effort.  Xray pending  Continue to monitor

## 2020-08-14 NOTE — PLAN OF CARE
Problem: Adult Inpatient Plan of Care  Goal: Plan of Care Review  Description: Chart and Care plan reviewed.   Outcome: Ongoing, Progressing  VIRTUAL NURSE:  Labs, notes, orders, and careplan reviewed.

## 2020-08-14 NOTE — PLAN OF CARE
Pt in bed asleep at this time. Pt AAO and continues to call staff for assistance. Pt up in the recliner today for meals. Continues on 7L high flow at this time. Continues to drop upon exertion to the low 80's. Pt got up and worked with PT and OT today. Able to transfer into bed with SBA. Continues to complain of pain in left shoulder/arm. Pt had xray today. Noted to have rotator cuff tear that he had before admission. Lidocaine patches applied and pain analgesic applied  to back and shoulder.Call light within reach, side rails up x 2, and urinal at bedside.

## 2020-08-14 NOTE — PLAN OF CARE
Patient on High flow nasal cannula with documented flow.  Will attempt to wean per O2 order protocol. Pt will wear Bipap at night. Pt in no apparent distress. Will continue to monitor.

## 2020-08-14 NOTE — PROGRESS NOTES
Attempted to place pt on Bipap. Pt tried it for a few minutes and asked for it to be removed. Placed pt back on 6L nasal cannula.

## 2020-08-14 NOTE — PROGRESS NOTES
[facility] Hospital Medicine  Progress Note    Patient Name: Rajan Deluna  MRN: [id number]  Patient Class: IP- Inpatient   Admission Date: 7/21/2020  Length of Stay: 24 days  Attending Physician: Rell Tolentino III, MD  Primary Care Provider: Jason Mccord MD        Subjective:     Principal Problem:Acute respiratory disease due to COVID-19 virus        HPI:  Patient is a 74-year-old male with a past medical history of hypertension, COPD, MAC infection who presented to the ED with altered mental status and fever.  As per the family, patient has had altered mental status for the past 3-4 days. Family endorses increased somnolence as well as urinating on himself. EMS was called two days prior to presentation but reportedly deemed that patient did not need to be brought in. Over the previous day, reportedly the patient was found stuck, staring in his sink, not responding. On the day of presentation, the patient was found down on the floor, family wasn't able to get patient up from the floor and was reportedly not responding as much as baseline. Family believed that patient may not have been wearing baseline 2L O2. Patient also endorses a cough and generalized weakness. Patient reportedly had a recent UTI.     In the ED, chest x-ray showed diffuse interstitial patchy infiltrates concerning for possible pneumonia and a COVID-19 screening test was positive. Fever was 101° F and patient was hypotensive 80s/40s. Patient given 30cc/kg bolus. BP very soft on presentation, adequately responded to volume resuscitation and then decreased again. Labs showed a BUN 45, creatinine 2.9, , , troponin 0.036, procalcitonin 1.13.     Overview/Hospital Course:  8/3 - NAEON, had chest pain, normal EKG and troponin overnight, resolved with GI cocktail. This AM, H/H decreased from 10.8/33.7 to 9.1/28. Infectious disease recommends oral Vancomycin and IV Metronidazole for C. Diff until 8/7 and to continue  voriconazole and discharge on Itraconazole 100mg BID and follow up with Pulmonary and ID for continued Aspergillosis and MAC treatment. Patient currently satting 90% on 40L vapotherm at 65% FiO2, weaning as tolerated.    8/4 - NAEON. Patient is currently satting in the >90% on 20L vapotherm at 35% FiO2, weaning as tolerated. H/H stable 9.5/30.3. Per GI, VCE show a few nonbleeding small likely duodenal AVMs and no other evidence of overt GI Bleed and recommend outpatient evaluation due to the patient's current pulmonary status.     8/5 - NAEON. Pending LTAC status. Spo2 70's with PT/OT and need to increase oxygen to vapotherm 20L 70% with an increase in Spo2 to 90's.     8/6 - NAEON. LTAC declined due to insurance. Currently on 30L 70% vapotherm.    8/7 - 11L 45% HFNL. Off covid precautions Monday 8/10    8/8 - NAEO. Bipap overnight, HFNC 15L last night and this am. Feeling better. No cp. H/H stable     8/9 - NAEON, patient on 15L HFNC. States he is coughing a bit more today, states it is nonproductive. Denies any discomfort, N/V/D. Feels well. On Monday, patient will meet criteria for isolation to be lifted, which would allow his daughter to visit.      8/10 - Oxygen weaned down to 10L HFNC over the weekend. Patient off COVID isolation precautions. Pending placement.    8/11 - Oxygen weaned down to 4L NC but patient desaturated throughout the day and ended the day on 7LPM O2. COVID isolation lifted.    8/12 - Patient on 7 LPM to start the day and increased to 9LPM O2. Patient continues to desat to the 80s upon minor exertion. Patient has become deconditioned from his time in hospital and will continue to benefit from PT/OT and inspiratory spirometry.     8/13 - Patient on 9LPM high flow O2. Patient refusing BIPAP use at night. OT noted improvement in respiratory status during therapy.     Interval History: Complains of L shoulder pain overnight. Patient has had a history of frozen shoulder before requiring surgery.  Endorses numbness and tingling down to the tips of his fingers. Nursing applied Voltaren gel with improvement. Patient continues to require supplemental O2, on 8L HFNC this AM. Continues to have a dry cough with mild wheezing.    Review of Systems  Objective:     Vital Signs (Most Recent):  Temp: 98 °F (36.7 °C) (08/14/20 0249)  Pulse: 104 (08/14/20 0740)  Resp: (!) 24 (08/14/20 0740)  BP: (!) 102/57 (08/14/20 0249)  SpO2: (!) 92 % (08/14/20 0740) Vital Signs (24h Range):  Temp:  [97.6 °F (36.4 °C)-98 °F (36.7 °C)] 98 °F (36.7 °C)  Pulse:  [] 104  Resp:  [18-24] 24  SpO2:  [88 %-95 %] 92 %  BP: ()/(50-63) 102/57     Weight: 71.6 kg (157 lb 13.6 oz)  Body mass index is 22.02 kg/m².    Intake/Output Summary (Last 24 hours) at 8/14/2020 0752  Last data filed at 8/14/2020 0153  Gross per 24 hour   Intake 1290 ml   Output 575 ml   Net 715 ml      Physical Exam  Vitals signs and nursing note reviewed.   Constitutional:       General: He is not in acute distress.     Appearance: Normal appearance. He is not ill-appearing.   HENT:      Head: Normocephalic and atraumatic.      Right Ear: External ear normal.      Left Ear: External ear normal.      Nose: Nose normal. No congestion or rhinorrhea.      Mouth/Throat:      Mouth: Mucous membranes are moist.      Pharynx: Oropharynx is clear.   Eyes:      Pupils: Pupils are equal, round, and reactive to light.   Neck:      Musculoskeletal: Normal range of motion and neck supple.   Cardiovascular:      Rate and Rhythm: Normal rate and regular rhythm.      Pulses: Normal pulses.      Heart sounds: Normal heart sounds.   Pulmonary:      Effort: Pulmonary effort is normal.      Breath sounds: Wheezing present.      Comments: Wheezes this AM, likely COPD. O2 at 8L HFNC  Abdominal:      General: Abdomen is flat. There is no distension.      Palpations: Abdomen is soft.      Tenderness: There is no abdominal tenderness. There is no guarding.   Musculoskeletal: Normal range  of motion.         General: Tenderness present. No swelling.      Comments: LUE: 5/5 strength, ROM limited to pain, good color and pulses, able to squeeze hand, no gross deformity noted   Neurological:      General: No focal deficit present.      Mental Status: He is alert and oriented to person, place, and time. Mental status is at baseline.   Psychiatric:         Mood and Affect: Mood normal.         Behavior: Behavior normal.         Thought Content: Thought content normal.         Judgment: Judgment normal.         Significant Labs: All pertinent labs within the past 24 hours have been reviewed.    Significant Imaging: I have reviewed all pertinent imaging results/findings within the past 24 hours.      Assessment/Plan:      * Acute respiratory disease due to COVID-19 virus  -Isolation lifted 8/11  RESOLVED    Left shoulder pain  Patient endorses long-term history of left shoulder pain, has had rotator cuff surgery in the past  Stated that he reached for something yesterday and it began to hurt more  Continue lidocaine patch, tylenol for pain  Stable pulses, ROM limited by patient effort.  Xray pending  Continue to monitor    History of Aspergillus & MAC  Continuing voriconazole, switch to itroconazole at NH   -Follow up with Pulm, ID outpatient  STABLE    Hypotension  Patient's most recent blood pressure 116/75  RESOLVED      Sepsis  RESOLVED  See plan for COVID 19    COPD (chronic obstructive pulmonary disease)  On HFNC 8L  Patient BiPap overnight  Continue to wean  Goal of 5L NC  Patient to sit up 2x daily in chair  Mild wheezes this AM, some cough   Continue albuterol and chest physio/IS  -Continue to monitor cough  -Monitor for progress        Anemia  Asymptomatic  H/H stable at 9.2/28.9  STABLE      VTE Risk Mitigation (From admission, onward)         Ordered     enoxaparin injection 80 mg  Every 12 hours (non-standard times)      08/04/20 1421     Place sequential compression device  Until discontinued       07/21/20 2142     IP VTE HIGH RISK PATIENT  Once      07/21/20 2142                Discharge Planning   ADAM:      Code Status: Full Code   Is the patient medically ready for discharge?:     Reason for patient still in hospital (select all that apply): Patient unstable and Treatment  Discharge Plan A: Long-term acute care facility (LTAC)   Discharge Delays: None known at this time              Emeli Adam MD   [employer] PGY2  Department of Hospital Medicine   [facility]

## 2020-08-14 NOTE — PLAN OF CARE
O2 saturation 89-93% on high flow nasal cannula 6 lpm. The proper method of use, as well as anticipated side effects, of this aerosol treatment are discussed and demonstrated to the patient.  Patient achieving 750 ml on incentive spirometer. Patient performing PEP therapy via [employer]. Will continue to monitor.

## 2020-08-14 NOTE — PROGRESS NOTES
[facility]  Progress/Critical Care - Medicine  History & Physical    Patient Name: Rajan Deluna  MRN: [id number]  Admission Date: 7/21/2020  Hospital Length of Stay: 24 days  Code Status: Full Code  Attending Physician: Rell Tolentino III, MD   Primary Care Provider: Jason Mccord MD   Principal Problem: Acute respiratory disease due to COVID-19 virus    Subjective:     Hospital/ICU Course: When seen the patient remains on 7LPM.  He otherwise is unchanged.    Past Medical History:   Diagnosis Date    Arthritis     COPD (chronic obstructive pulmonary disease)     Hypertension     Smoker     Weakness        Past Surgical History:   Procedure Laterality Date    HERNIA REPAIR      INTRALUMINAL GASTROINTESTINAL TRACT IMAGING VIA CAPSULE N/A 8/3/2020    Procedure: IMAGING PROCEDURE, GI TRACT, INTRALUMINAL, VIA CAPSULE;  Surgeon: Rey Olivares MD;  Location: [facility];  Service: Endoscopy;  Laterality: N/A;    right knee surgery         Review of patient's allergies indicates:  No Known Allergies    Family History     None        Tobacco Use    Smoking status: Former Smoker    Smokeless tobacco: Never Used   Substance and Sexual Activity    Alcohol use: Not Currently    Drug use: Never    Sexual activity: Not on file     Objective:     Vital Signs (Most Recent):  Temp: 98.1 °F (36.7 °C) (08/14/20 1553)  Pulse: 96 (08/14/20 1622)  Resp: (!) 22 (08/14/20 1622)  BP: (!) 112/58 (08/14/20 1553)  SpO2: (!) 92 % (08/14/20 1622) Vital Signs (24h Range):  Temp:  [97.4 °F (36.3 °C)-98.1 °F (36.7 °C)] 98.1 °F (36.7 °C)  Pulse:  [] 96  Resp:  [18-26] 22  SpO2:  [88 %-93 %] 92 %  BP: ()/(50-80) 112/58     Weight: 71.6 kg (157 lb 13.6 oz)  Body mass index is 22.02 kg/m².      Intake/Output Summary (Last 24 hours) at 8/14/2020 1646  Last data filed at 8/14/2020 1620  Gross per 24 hour   Intake 1602 ml   Output 625 ml   Net 977 ml       Physical Exam  Vitals reviewed: virtual visit.    Constitutional:       General: He is not in acute distress.     Appearance: Normal appearance.   HENT:      Head: Normocephalic.      Nose: Nose normal.   Neurological:      General: No focal deficit present.      Mental Status: He is alert.         Vents:  Oxygen Concentration (%): 70 (08/12/20 0911)    Lines/Drains/Airways     Peripheral Intravenous Line                 Peripheral IV - Single Lumen 08/04/20 0314 18 G Right Forearm 10 days                Significant Labs:    CBC/Anemia Profile:  Recent Labs   Lab 08/13/20 0519   WBC 5.88   HGB 9.2*   HCT 29.8*      MCV 96   RDW 15.8*        Chemistries:  Recent Labs   Lab 08/13/20 0519      K 4.3      CO2 27   BUN 12   CREATININE 1.0   CALCIUM 8.1*   ALBUMIN 2.0*   PROT 6.2   BILITOT 0.5   ALKPHOS 158*   ALT 25   AST 40   MG 2.1   PHOS 2.4*       All pertinent labs within the past 24 hours have been reviewed.    Significant Imaging: I have reviewed all pertinent imaging results/findings within the past 24 hours.    Assessment/Plan:     Active Diagnoses:    Diagnosis Date Noted POA    PRINCIPAL PROBLEM:  Acute respiratory disease due to COVID-19 virus [U07.1, J06.9]  Yes    Left shoulder pain [M25.512] 08/14/2020 Unknown    History of Aspergillus & MAC [B44.9]  Yes    COPD (chronic obstructive pulmonary disease) [J44.9] 05/22/2017 Yes    Anemia [D64.9] 12/09/2016 No      Problems Resolved During this Admission:    Diagnosis Date Noted Date Resolved POA    History of MAC infection [Z86.19]  08/07/2020 No    GI bleed [K92.2] 07/29/2020 08/07/2020 Clinically Undetermined    Clostridium difficile infection [A49.8]  08/07/2020 Clinically Undetermined    Acute respiratory failure with hypoxia [J96.01]  08/07/2020 Yes    MARQUIS (acute kidney injury) [N17.9] 07/21/2020 08/07/2020 Yes       * Acute respiratory disease due to COVID-19 virus  - 7/21 COVID(+)  - s/p remdesivir x5d, dexamethasone x10d  - full dose AC with lovenox  - proning if he  can tolerate at all  - incentive spirometry   - please wean O2 as tolerated for goal SpO2 88%  - BIPAP QHS  - On 8LPM via bubble flow.  Mildly improved.     History of Aspergillus & MAC  - serum aspergillus negative, loaded with voriconazole on  7/29   - no MAC treatment at this time per ID     Acute on chronic respiratory failure with hypoxia  See COVID problem     - Use of 2L NC at home     COPD (chronic obstructive pulmonary disease)  - Use of 2L NC at home  - metered dose inhaler q4h      Out of bed to chair daily  Incentive spirometry   PT/OT    Improved since yesterday.  Continue current management.     Bryan Swain MD  Critical Care - Medicine  [facility]  [phone]

## 2020-08-14 NOTE — PLAN OF CARE
Mr miguel is a pleasant 73 yo man. Overnight he was agreeable to all cares. RN was able to wean O2 from 9L to 6L. Patient still experiencing some tachypnea & SOB, but overall tolerated weaning very well. Still no BM overnight, may need suppository or enema in next few days. RN educated patient on importance of self-proning. Patient refused, but said if he was really feeling SOB he would consider it. No pain. BP was a bit lower overnight, but it is in patients baseline. Complaining of L shoulder pain. Voltaren given and improved pain. Will continue to monitor & attempt to wean.     Problem: Adult Inpatient Plan of Care  Goal: Optimal Comfort and Wellbeing  Outcome: Ongoing, Progressing     Problem: Infection (Sepsis/Septic Shock)  Goal: Absence of Infection Signs/Symptoms  Outcome: Ongoing, Progressing     Problem: Respiratory Compromise (Pneumonia)  Goal: Effective Oxygenation and Ventilation  Outcome: Ongoing, Progressing

## 2020-08-14 NOTE — ASSESSMENT & PLAN NOTE
On HFNC 8L  Patient BiPap overnight  Continue to wean  Goal of 5L NC  Patient to sit up 2x daily in chair  Mild wheezes this AM, some cough   Continue albuterol and chest physio/IS  -Continue to monitor cough  -Monitor for progress

## 2020-08-15 LAB
ALBUMIN SERPL BCP-MCNC: 1.9 G/DL (ref 3.5–5.2)
ALP SERPL-CCNC: 183 U/L (ref 55–135)
ALT SERPL W/O P-5'-P-CCNC: 30 U/L (ref 10–44)
ANION GAP SERPL CALC-SCNC: 6 MMOL/L (ref 8–16)
APTT BLDCRRT: 32.8 SEC (ref 21–32)
AST SERPL-CCNC: 60 U/L (ref 10–40)
BASOPHILS # BLD AUTO: 0.03 K/UL (ref 0–0.2)
BASOPHILS NFR BLD: 0.6 % (ref 0–1.9)
BILIRUB SERPL-MCNC: 0.5 MG/DL (ref 0.1–1)
BUN SERPL-MCNC: 10 MG/DL (ref 8–23)
CALCIUM SERPL-MCNC: 8.1 MG/DL (ref 8.7–10.5)
CHLORIDE SERPL-SCNC: 101 MMOL/L (ref 95–110)
CO2 SERPL-SCNC: 28 MMOL/L (ref 23–29)
CREAT SERPL-MCNC: 0.9 MG/DL (ref 0.5–1.4)
DIFFERENTIAL METHOD: ABNORMAL
EOSINOPHIL # BLD AUTO: 0.3 K/UL (ref 0–0.5)
EOSINOPHIL NFR BLD: 7.2 % (ref 0–8)
ERYTHROCYTE [DISTWIDTH] IN BLOOD BY AUTOMATED COUNT: 16 % (ref 11.5–14.5)
EST. GFR  (AFRICAN AMERICAN): >60 ML/MIN/1.73 M^2
EST. GFR  (NON AFRICAN AMERICAN): >60 ML/MIN/1.73 M^2
GLUCOSE SERPL-MCNC: 75 MG/DL (ref 70–110)
HCT VFR BLD AUTO: 29 % (ref 40–54)
HGB BLD-MCNC: 9.1 G/DL (ref 14–18)
IMM GRANULOCYTES # BLD AUTO: 0.01 K/UL (ref 0–0.04)
IMM GRANULOCYTES NFR BLD AUTO: 0.2 % (ref 0–0.5)
LYMPHOCYTES # BLD AUTO: 0.9 K/UL (ref 1–4.8)
LYMPHOCYTES NFR BLD: 19 % (ref 18–48)
MAGNESIUM SERPL-MCNC: 2 MG/DL (ref 1.6–2.6)
MCH RBC QN AUTO: 30 PG (ref 27–31)
MCHC RBC AUTO-ENTMCNC: 31.4 G/DL (ref 32–36)
MCV RBC AUTO: 96 FL (ref 82–98)
MONOCYTES # BLD AUTO: 0.6 K/UL (ref 0.3–1)
MONOCYTES NFR BLD: 12.4 % (ref 4–15)
NEUTROPHILS # BLD AUTO: 2.9 K/UL (ref 1.8–7.7)
NEUTROPHILS NFR BLD: 60.6 % (ref 38–73)
NRBC BLD-RTO: 0 /100 WBC
PHOSPHATE SERPL-MCNC: 2.3 MG/DL (ref 2.7–4.5)
PLATELET # BLD AUTO: 277 K/UL (ref 150–350)
PMV BLD AUTO: 10 FL (ref 9.2–12.9)
POTASSIUM SERPL-SCNC: 4.5 MMOL/L (ref 3.5–5.1)
PROT SERPL-MCNC: 6 G/DL (ref 6–8.4)
RBC # BLD AUTO: 3.03 M/UL (ref 4.6–6.2)
SODIUM SERPL-SCNC: 135 MMOL/L (ref 136–145)
WBC # BLD AUTO: 4.74 K/UL (ref 3.9–12.7)

## 2020-08-15 PROCEDURE — 36415 COLL VENOUS BLD VENIPUNCTURE: CPT

## 2020-08-15 PROCEDURE — 25000003 PHARM REV CODE 250: Performed by: STUDENT IN AN ORGANIZED HEALTH CARE EDUCATION/TRAINING PROGRAM

## 2020-08-15 PROCEDURE — 97110 THERAPEUTIC EXERCISES: CPT

## 2020-08-15 PROCEDURE — 94664 DEMO&/EVAL PT USE INHALER: CPT

## 2020-08-15 PROCEDURE — 94799 UNLISTED PULMONARY SVC/PX: CPT

## 2020-08-15 PROCEDURE — 11000001 HC ACUTE MED/SURG PRIVATE ROOM

## 2020-08-15 PROCEDURE — 83735 ASSAY OF MAGNESIUM: CPT

## 2020-08-15 PROCEDURE — 97116 GAIT TRAINING THERAPY: CPT

## 2020-08-15 PROCEDURE — 25000003 PHARM REV CODE 250: Performed by: INTERNAL MEDICINE

## 2020-08-15 PROCEDURE — 80053 COMPREHEN METABOLIC PANEL: CPT

## 2020-08-15 PROCEDURE — 99900035 HC TECH TIME PER 15 MIN (STAT)

## 2020-08-15 PROCEDURE — 85025 COMPLETE CBC W/AUTO DIFF WBC: CPT

## 2020-08-15 PROCEDURE — 63600175 PHARM REV CODE 636 W HCPCS: Performed by: FAMILY MEDICINE

## 2020-08-15 PROCEDURE — 25000003 PHARM REV CODE 250: Performed by: FAMILY MEDICINE

## 2020-08-15 PROCEDURE — 85730 THROMBOPLASTIN TIME PARTIAL: CPT

## 2020-08-15 PROCEDURE — 27100171 HC OXYGEN HIGH FLOW UP TO 24 HOURS

## 2020-08-15 PROCEDURE — 84100 ASSAY OF PHOSPHORUS: CPT

## 2020-08-15 PROCEDURE — 25000242 PHARM REV CODE 250 ALT 637 W/ HCPCS: Performed by: STUDENT IN AN ORGANIZED HEALTH CARE EDUCATION/TRAINING PROGRAM

## 2020-08-15 PROCEDURE — 94640 AIRWAY INHALATION TREATMENT: CPT

## 2020-08-15 PROCEDURE — 63600175 PHARM REV CODE 636 W HCPCS: Performed by: STUDENT IN AN ORGANIZED HEALTH CARE EDUCATION/TRAINING PROGRAM

## 2020-08-15 RX ORDER — KETOROLAC TROMETHAMINE 30 MG/ML
30 INJECTION, SOLUTION INTRAMUSCULAR; INTRAVENOUS ONCE
Status: COMPLETED | OUTPATIENT
Start: 2020-08-15 | End: 2020-08-15

## 2020-08-15 RX ORDER — AMOXICILLIN 250 MG
1 CAPSULE ORAL 2 TIMES DAILY
Status: DISCONTINUED | OUTPATIENT
Start: 2020-08-15 | End: 2020-08-27 | Stop reason: HOSPADM

## 2020-08-15 RX ORDER — SODIUM,POTASSIUM PHOSPHATES 280-250MG
1 POWDER IN PACKET (EA) ORAL EVERY 4 HOURS
Status: COMPLETED | OUTPATIENT
Start: 2020-08-15 | End: 2020-08-15

## 2020-08-15 RX ADMIN — IPRATROPIUM BROMIDE AND ALBUTEROL SULFATE 3 ML: 2.5; .5 SOLUTION RESPIRATORY (INHALATION) at 08:08

## 2020-08-15 RX ADMIN — POTASSIUM & SODIUM PHOSPHATES POWDER PACK 280-160-250 MG 1 PACKET: 280-160-250 PACK at 11:08

## 2020-08-15 RX ADMIN — ACETAMINOPHEN 650 MG: 325 TABLET ORAL at 04:08

## 2020-08-15 RX ADMIN — Medication 4 TABLET: at 12:08

## 2020-08-15 RX ADMIN — KETOROLAC TROMETHAMINE 30 MG: 30 INJECTION, SOLUTION INTRAMUSCULAR at 09:08

## 2020-08-15 RX ADMIN — IPRATROPIUM BROMIDE AND ALBUTEROL SULFATE 3 ML: 2.5; .5 SOLUTION RESPIRATORY (INHALATION) at 12:08

## 2020-08-15 RX ADMIN — POLYETHYLENE GLYCOL (3350) 17 G: 17 POWDER, FOR SOLUTION ORAL at 08:08

## 2020-08-15 RX ADMIN — Medication 4 TABLET: at 06:08

## 2020-08-15 RX ADMIN — ACETAMINOPHEN 650 MG: 325 TABLET ORAL at 08:08

## 2020-08-15 RX ADMIN — POTASSIUM & SODIUM PHOSPHATES POWDER PACK 280-160-250 MG 1 PACKET: 280-160-250 PACK at 07:08

## 2020-08-15 RX ADMIN — LIDOCAINE 2 PATCH: 50 PATCH TOPICAL at 06:08

## 2020-08-15 RX ADMIN — ACETAMINOPHEN 650 MG: 325 TABLET ORAL at 12:08

## 2020-08-15 RX ADMIN — STANDARDIZED SENNA CONCENTRATE AND DOCUSATE SODIUM 1 TABLET: 8.6; 5 TABLET ORAL at 08:08

## 2020-08-15 RX ADMIN — ENOXAPARIN SODIUM 80 MG: 80 INJECTION SUBCUTANEOUS at 06:08

## 2020-08-15 RX ADMIN — PANTOPRAZOLE SODIUM 40 MG: 40 TABLET, DELAYED RELEASE ORAL at 08:08

## 2020-08-15 RX ADMIN — ENOXAPARIN SODIUM 80 MG: 80 INJECTION SUBCUTANEOUS at 04:08

## 2020-08-15 RX ADMIN — SODIUM CHLORIDE, SODIUM LACTATE, POTASSIUM CHLORIDE, AND CALCIUM CHLORIDE 500 ML: .6; .31; .03; .02 INJECTION, SOLUTION INTRAVENOUS at 07:08

## 2020-08-15 RX ADMIN — VORICONAZOLE 200 MG: 200 TABLET, FILM COATED ORAL at 04:08

## 2020-08-15 RX ADMIN — DICLOFENAC 2 G: 10 GEL TOPICAL at 06:08

## 2020-08-15 RX ADMIN — Medication 4 TABLET: at 08:08

## 2020-08-15 RX ADMIN — IPRATROPIUM BROMIDE AND ALBUTEROL SULFATE 3 ML: 2.5; .5 SOLUTION RESPIRATORY (INHALATION) at 04:08

## 2020-08-15 RX ADMIN — POTASSIUM & SODIUM PHOSPHATES POWDER PACK 280-160-250 MG 1 PACKET: 280-160-250 PACK at 02:08

## 2020-08-15 RX ADMIN — VORICONAZOLE 200 MG: 200 TABLET, FILM COATED ORAL at 06:08

## 2020-08-15 RX ADMIN — GUAIFENESIN AND DEXTROMETHORPHAN HYDROBROMIDE 1 TABLET: 600; 30 TABLET, EXTENDED RELEASE ORAL at 04:08

## 2020-08-15 RX ADMIN — THERA TABS 1 TABLET: TAB at 08:08

## 2020-08-15 NOTE — PROGRESS NOTES
[facility] Hospital Medicine  Progress Note    Patient Name: Rajan Deluna  MRN: [id number]  Patient Class: IP- Inpatient   Admission Date: 7/21/2020  Length of Stay: 25 days  Attending Physician: Rell Tolentino III, MD  Primary Care Provider: Jason Mccord MD        Subjective:     Principal Problem:Acute respiratory disease due to COVID-19 virus        HPI:  Patient is a 74-year-old male with a past medical history of hypertension, COPD, MAC infection who presented to the ED with altered mental status and fever.  As per the family, patient has had altered mental status for the past 3-4 days. Family endorses increased somnolence as well as urinating on himself. EMS was called two days prior to presentation but reportedly deemed that patient did not need to be brought in. Over the previous day, reportedly the patient was found stuck, staring in his sink, not responding. On the day of presentation, the patient was found down on the floor, family wasn't able to get patient up from the floor and was reportedly not responding as much as baseline. Family believed that patient may not have been wearing baseline 2L O2. Patient also endorses a cough and generalized weakness. Patient reportedly had a recent UTI.     In the ED, chest x-ray showed diffuse interstitial patchy infiltrates concerning for possible pneumonia and a COVID-19 screening test was positive. Fever was 101° F and patient was hypotensive 80s/40s. Patient given 30cc/kg bolus. BP very soft on presentation, adequately responded to volume resuscitation and then decreased again. Labs showed a BUN 45, creatinine 2.9, , , troponin 0.036, procalcitonin 1.13.     Overview/Hospital Course:  8/3 - NAEON, had chest pain, normal EKG and troponin overnight, resolved with GI cocktail. This AM, H/H decreased from 10.8/33.7 to 9.1/28. Infectious disease recommends oral Vancomycin and IV Metronidazole for C. Diff until 8/7 and to continue  voriconazole and discharge on Itraconazole 100mg BID and follow up with Pulmonary and ID for continued Aspergillosis and MAC treatment. Patient currently satting 90% on 40L vapotherm at 65% FiO2, weaning as tolerated.    8/4 - NAEON. Patient is currently satting in the >90% on 20L vapotherm at 35% FiO2, weaning as tolerated. H/H stable 9.5/30.3. Per GI, VCE show a few nonbleeding small likely duodenal AVMs and no other evidence of overt GI Bleed and recommend outpatient evaluation due to the patient's current pulmonary status.     8/5 - NAEON. Pending LTAC status. Spo2 70's with PT/OT and need to increase oxygen to vapotherm 20L 70% with an increase in Spo2 to 90's.     8/6 - NAEON. LTAC declined due to insurance. Currently on 30L 70% vapotherm.    8/7 - 11L 45% HFNL. Off covid precautions Monday 8/10    8/8 - NAEO. Bipap overnight, HFNC 15L last night and this am. Feeling better. No cp. H/H stable     8/9 - NAEON, patient on 15L HFNC. States he is coughing a bit more today, states it is nonproductive. Denies any discomfort, N/V/D. Feels well. On Monday, patient will meet criteria for isolation to be lifted, which would allow his daughter to visit.      8/10 - Oxygen weaned down to 10L HFNC over the weekend. Patient off COVID isolation precautions. Pending placement.    8/11 - Oxygen weaned down to 4L NC but patient desaturated throughout the day and ended the day on 7LPM O2. COVID isolation lifted.    8/12 - Patient on 7 LPM to start the day and increased to 9LPM O2. Patient continues to desat to the 80s upon minor exertion. Patient has become deconditioned from his time in hospital and will continue to benefit from PT/OT and inspiratory spirometry.     8/13 - Patient on 9LPM high flow O2. Patient refusing BIPAP use at night. OT noted improvement in respiratory status during therapy.     8/14 - On 8L HFNC O2. Left shoulder pain, pending x-ray.    Interval History: still feeling short of breath and having pain in  his shoulder. Reports he used to get injections in his shoulder. States tylenol and lidocaine patch are not helping.     Review of Systems   Constitutional: Positive for fatigue. Negative for activity change, appetite change and chills.   HENT: Negative for congestion and dental problem.    Eyes: Negative for discharge and itching.   Respiratory: Negative for cough and shortness of breath.    Cardiovascular: Negative for chest pain.   Gastrointestinal: Negative for abdominal pain, blood in stool, diarrhea and nausea.   Endocrine: Negative for cold intolerance and heat intolerance.   Genitourinary: Negative for difficulty urinating.   Musculoskeletal: Positive for back pain. Negative for arthralgias and gait problem.        Patient back pain likely due to deconditioning   Skin: Negative.  Negative for color change and pallor.   Neurological: Negative for dizziness, facial asymmetry, light-headedness and headaches.   Psychiatric/Behavioral: The patient is not nervous/anxious.      Objective:     Vital Signs (Most Recent):  Temp: 97.6 °F (36.4 °C) (08/15/20 0749)  Pulse: 107 (08/15/20 0836)  Resp: 20 (08/15/20 0836)  BP: 123/62 (08/15/20 0749)  SpO2: (!) 92 % (08/15/20 0836) Vital Signs (24h Range):  Temp:  [97.4 °F (36.3 °C)-98.8 °F (37.1 °C)] 97.6 °F (36.4 °C)  Pulse:  [] 107  Resp:  [16-26] 20  SpO2:  [90 %-96 %] 92 %  BP: (101-123)/(57-80) 123/62     Weight: 71.6 kg (157 lb 13.6 oz)  Body mass index is 22.02 kg/m².    Intake/Output Summary (Last 24 hours) at 8/15/2020 0845  Last data filed at 8/14/2020 2058  Gross per 24 hour   Intake 476 ml   Output 850 ml   Net -374 ml      Physical Exam  Vitals signs and nursing note reviewed.   HENT:      Head: Normocephalic and atraumatic.     Virtual rounded on patient.     Significant Labs:   A1C:   Recent Labs   Lab 07/23/20  0403   HGBA1C 6.3*     CBC:   Recent Labs   Lab 08/15/20  0556   WBC 4.74   HGB 9.1*   HCT 29.0*        CMP:   Recent Labs   Lab  08/15/20  0556   *   K 4.5      CO2 28   GLU 75   BUN 10   CREATININE 0.9   CALCIUM 8.1*   PROT 6.0   ALBUMIN 1.9*   BILITOT 0.5   ALKPHOS 183*   AST 60*   ALT 30   ANIONGAP 6*   EGFRNONAA >60     Magnesium:   Recent Labs   Lab 08/15/20  0556   MG 2.0     Troponin: No results for input(s): TROPONINI in the last 48 hours.  All pertinent labs within the past 24 hours have been reviewed.    Significant Imaging: I have reviewed all pertinent imaging results/findings within the past 24 hours.      Assessment/Plan:      * Acute respiratory disease due to COVID-19 virus  -still requiring O2       Left shoulder pain  Patient endorses long-term history of left shoulder pain, has had rotator cuff surgery in the past  Stated that he reached for something yesterday and it began to hurt more  Continue lidocaine patch, tylenol for pain  Stable pulses, ROM limited by patient effort.    Added on toradol for pain.     History of Aspergillus & MAC  Continuing voriconazole, switch to itroconazole at [place]   -Follow up with Pulm, ID outpatient  STABLE    Hypotension  Patient's most recent blood pressure 116/75  RESOLVED      Sepsis  RESOLVED  See plan for COVID 19    COPD (chronic obstructive pulmonary disease)  On HFNC 8L  Patient BiPap overnight  Continue to wean  Goal of 5L NC  Patient to sit up 2x daily in chair  Mild wheezes this AM, some cough   Continue albuterol and chest physio/IS  -Continue to monitor cough  -Monitor for progress        Anemia  Asymptomatic  H/H stable at 9.2/28.9        VTE Risk Mitigation (From admission, onward)         Ordered     enoxaparin injection 80 mg  Every 12 hours (non-standard times)      08/04/20 1421     Place sequential compression device  Until discontinued      07/21/20 2142     IP VTE HIGH RISK PATIENT  Once      07/21/20 2142                Discharge Planning   ADAM:      Code Status: Full Code   Is the patient medically ready for discharge?:     Reason for patient still in  hospital (select all that apply): Treatment  Discharge Plan A: Long-term acute care facility (LTAC)   Discharge Delays: None known at this time        Case discussed with attending physician, Dr. Landin.       Jordy Edge MD [facility]  Department of Hospital Medicine   [facility]

## 2020-08-15 NOTE — PLAN OF CARE
Problem: Physical Therapy Goal  Goal: Physical Therapy Goal  Description: Goals to be met by: 20     Patient will increase functional independence with mobility by performin. Supine <> sit with MInimal Assistance   Met   Revised  supine<>sit MOD I  2. Sit to stand transfer with Minimal Assistance   Met   Revised sit to stand with supervision  3. Bed to chair transfer with Minimal Assistance   Met   Revised bed to chair transfer with supervision  4. Gait x 50 ft with min A with appropriate AD  5. Lower extremity exercise program x 10 reps per handout, with supervision  Met 8/15  Revised Lower extremity exercise program x 20 reps per handout, with supervision    Outcome: Ongoing, Progressing   Pt's O2 sat levels ranged between 91-92 % during seated LE exs but decreased to 80% during gait using a rw ( 10 ft).  Pt required cues for pursed lipped/ deep  breathing in sitting and after 5 minutes it increased to 91%. RN was notified of the above. Pt has met goals 1-3 and 5 which were revised.

## 2020-08-15 NOTE — PROGRESS NOTES
[facility]  Progress/Critical Care - Medicine  History & Physical    Patient Name: Rajan Deluna  MRN: [id number]  Admission Date: 7/21/2020  Hospital Length of Stay: 25 days  Code Status: Full Code  Attending Physician: Rell Tolentino III, MD   Primary Care Provider: Jason Mccord MD   Principal Problem: Acute respiratory disease due to COVID-19 virus    Subjective:     Hospital/ICU Course: . No new complaints today other than his butt being a little sore. Notes using his IS. Breathing is about the same.     Past Medical History:   Diagnosis Date    Arthritis     COPD (chronic obstructive pulmonary disease)     Hypertension     Smoker     Weakness        Past Surgical History:   Procedure Laterality Date    HERNIA REPAIR      INTRALUMINAL GASTROINTESTINAL TRACT IMAGING VIA CAPSULE N/A 8/3/2020    Procedure: IMAGING PROCEDURE, GI TRACT, INTRALUMINAL, VIA CAPSULE;  Surgeon: Rey Olivares MD;  Location: [facility];  Service: Endoscopy;  Laterality: N/A;    right knee surgery         Review of patient's allergies indicates:  No Known Allergies    Family History     None        Tobacco Use    Smoking status: Former Smoker    Smokeless tobacco: Never Used   Substance and Sexual Activity    Alcohol use: Not Currently    Drug use: Never    Sexual activity: Not on file     Objective:     Vital Signs (Most Recent):  Temp: 97.6 °F (36.4 °C) (08/15/20 0749)  Pulse: 107 (08/15/20 0836)  Resp: 20 (08/15/20 0836)  BP: 123/62 (08/15/20 0749)  SpO2: (!) 92 % (08/15/20 0836) Vital Signs (24h Range):  Temp:  [97.5 °F (36.4 °C)-98.8 °F (37.1 °C)] 97.6 °F (36.4 °C)  Pulse:  [] 107  Resp:  [16-26] 20  SpO2:  [90 %-96 %] 92 %  BP: (102-123)/(57-80) 123/62     Weight: 71.6 kg (157 lb 13.6 oz)  Body mass index is 22.02 kg/m².      Intake/Output Summary (Last 24 hours) at 8/15/2020 1023  Last data filed at 8/15/2020 0902  Gross per 24 hour   Intake 456 ml   Output 850 ml   Net -394 ml       Physical  Exam  Vitals reviewed: virtual visit.   Constitutional:       General: He is not in acute distress.     Appearance: Normal appearance.   HENT:      Head: Normocephalic.      Nose: Nose normal.   Neurological:      General: No focal deficit present.      Mental Status: He is alert.         Vents:  Oxygen Concentration (%): 70 (08/12/20 0911)    Lines/Drains/Airways     Peripheral Intravenous Line                 Peripheral IV - Single Lumen 08/04/20 0314 18 G Right Forearm 11 days                Significant Labs:    CBC/Anemia Profile:  Recent Labs   Lab 08/15/20  0556   WBC 4.74   HGB 9.1*   HCT 29.0*      MCV 96   RDW 16.0*        Chemistries:  Recent Labs   Lab 08/15/20  0556   *   K 4.5      CO2 28   BUN 10   CREATININE 0.9   CALCIUM 8.1*   ALBUMIN 1.9*   PROT 6.0   BILITOT 0.5   ALKPHOS 183*   ALT 30   AST 60*   MG 2.0   PHOS 2.3*       All pertinent labs within the past 24 hours have been reviewed.    Significant Imaging: I have reviewed all pertinent imaging results/findings within the past 24 hours.    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]  Yes    COPD (chronic obstructive pulmonary disease) [J44.9] 05/22/2017 Yes    Anemia [D64.9] 12/09/2016 No      Problems Resolved During this Admission:    Diagnosis Date Noted Date Resolved POA    History of MAC infection [Z86.19]  08/07/2020 No    GI bleed [K92.2] 07/29/2020 08/07/2020 Clinically Undetermined    Clostridium difficile infection [A49.8]  08/07/2020 Clinically Undetermined    Acute respiratory failure with hypoxia [J96.01]  08/07/2020 Yes    MARQUIS (acute kidney injury) [N17.9] 07/21/2020 08/07/2020 Yes       * Acute respiratory disease due to COVID-19 virus  - 7/21 COVID(+)  - s/p remdesivir x5d, dexamethasone x10d  - full dose  AC with lovenox  - proning if he can tolerate at all  - incentive spirometry   - please wean O2 as tolerated for goal SpO2 88%  - BIPAP QHS  - On 7LPM via bubble flow.  Mildly improved.     History of Aspergillus & MAC  - serum aspergillus negative, loaded with voriconazole on  7/29   - no MAC treatment at this time per ID     Acute on chronic respiratory failure with hypoxia  See COVID problem     - Use of 2L NC at home     COPD (chronic obstructive pulmonary disease)  - Use of 2L NC at home  - metered dose inhaler q4h      Out of bed to chair daily  Incentive spirometry   PT/OT         Emerson Juárez MD  Critical Care - Medicine  [facility]  My cell [phone]

## 2020-08-15 NOTE — PT/OT/SLP PROGRESS
"Physical Therapy Treatment    Patient Name:  Rajan Deluna   MRN:  [id number]    Recommendations:     Discharge Recommendations:  nursing facility, skilled   Discharge Equipment Recommendations: walker, rolling   Barriers to discharge: decreased functiuonal mobility and impaired respiration    Assessment:     Rajan Deluna is a 74 y.o. male admitted with a medical diagnosis of Acute respiratory disease due to COVID-19 virus.  He presents with the following impairments/functional limitations:  weakness, impaired endurance, impaired self care skills, impaired functional mobilty, gait instability, impaired balance, decreased upper extremity function, pain, decreased safety awareness, decreased ROM, impaired cardiopulmonary response to activity Pt's O2 sat levels ranged between 91-92 % during seated LE exs but decreased to 80% during gait using a rw ( 10 ft).  Pt required cues for pursed lipped/ deep  breathing in sitting and after 5 minutes it increased to 91%. RN was notified of the above..    Rehab Prognosis: Good; patient would benefit from acute skilled PT services to address these deficits and reach maximum level of function.    Recent Surgery: Procedure(s) (LRB):  IMAGING PROCEDURE, GI TRACT, INTRALUMINAL, VIA CAPSULE (N/A) 12 Days Post-Op    Plan:     During this hospitalization, patient to be seen 6 x/week to address the identified rehab impairments via gait training, therapeutic activities, therapeutic exercises, neuromuscular re-education and progress toward the following goals:    · Plan of Care Expires:  09/08/20    Subjective     Chief Complaint: L shoulder pain and " SOB"  Patient/Family Comments/goals: none stated  Pain/Comfort:  · Pain Rating 1: 9/10  · Location - Side 1: Left  · Location - Orientation 1: generalized  · Location 1: shoulder  · Pain Addressed 1: Pre-medicate for activity, Reposition, Distraction, Cessation of Activity, Nurse notified  · Pain Rating Post-Intervention 1: " 9/10      Objective:     Communicated with RN prior to session.  Patient found up in chair with pulse ox (continuous), telemetry, oxygen ( HFNC) upon PT entry to room.     General Precautions: Standard, respiratory, fall(covid precautions lifted 8/11)   Orthopedic Precautions:N/A   Braces: N/A     Functional Mobility:  · Transfers:     · Sit to Stand:  contact guard assistance and minimum assistance with rolling walker  · Gait: ambulated up to 10 ft using a rw, HFNC at 7 L requirinig CGAx  1  · Balance: Static stand: F  Dynamic Stand: F using a rw      AM-PAC 6 CLICK MOBILITY  Turning over in bed (including adjusting bedclothes, sheets and blankets)?: 3  Sitting down on and standing up from a chair with arms (e.g., wheelchair, bedside commode, etc.): 3  Moving from lying on back to sitting on the side of the bed?: 3  Moving to and from a bed to a chair (including a wheelchair)?: 3  Need to walk in hospital room?: 3  Climbing 3-5 steps with a railing?: 1  Basic Mobility Total Score: 16       Therapeutic Activities and Exercises:   Pt transferred sit<>stand from b/s chair to a rw with MIN/CGAx  1, O2 at 7 L and ambulated 10 ft using a rw with CGAx  1 and O2 sat level decreasing from 92% to 80%. Pt returned to b/s chair, RN was notified, pt was instructed in pursed lipped/ deep breathing. After 5 minutes of rest and pursed lipped breathing, pt's  O2 sat level increased to 91%.  Pt was instructed in seated B LE exs x 10 reps : AP, LAQ, hip flex/ext, hip abd/ add needing cues for proper technique and O2 sats remained at 92% throughout exs.    Patient left up in chair with all lines intact, call button in reach and RN notified..    GOALS:   Multidisciplinary Problems     Physical Therapy Goals        Problem: Physical Therapy Goal    Goal Priority Disciplines Outcome Goal Variances Interventions   Physical Therapy Goal     PT, PT/OT Ongoing, Progressing     Description: Goals to be met by: 8/27/20     Patient will  increase functional independence with mobility by performin. Supine <> sit with MInimal Assistance   Met   Revised  supine<>sit MOD I  2. Sit to stand transfer with Minimal Assistance   Met   Revised sit to stand with supervision  3. Bed to chair transfer with Minimal Assistance   Met   Revised bed to chair transfer with supervision  4. Gait x 50 ft with min A with appropriate AD  5. Lower extremity exercise program x 10 reps per handout, with supervision  Met 8/15  Revised Lower extremity exercise program x 20 reps per handout, with supervision                     Time Tracking:     PT Received On:  8/15/20  PT Start Time:     11:45  PT Stop Time:  12:09  PT Total Time (min):   24 min    Billable Minutes: Gait Training 9 and Therapeutic Exercise 15    Treatment Type: Treatment  PT/PTA: PT     PTA Visit Number: 0     Tiffany Schmitt, PT  08/15/2020

## 2020-08-15 NOTE — ASSESSMENT & PLAN NOTE
Patient endorses long-term history of left shoulder pain, has had rotator cuff surgery in the past  Stated that he reached for something yesterday and it began to hurt more  Continue lidocaine patch, tylenol for pain  Stable pulses, ROM limited by patient effort.    Added on toradol for pain.

## 2020-08-15 NOTE — PLAN OF CARE
Care plan explained & understood by pt. NSR on Tele. On High Flow O2 at 6L, Sats 92-94 %. Meds given as per MAR. Pt repositioned. Safety maintained at all times. Call bell within reach. Bed on low position. Bed alarm on. Will continue to monitor.

## 2020-08-15 NOTE — PLAN OF CARE
VN rounds completed.  The patient was about to get cleaned up.  He states he has no needs at this time.

## 2020-08-15 NOTE — PLAN OF CARE
Pt in bed resting at this time. Up in the recliner most of the day. Continues on 6L at this time  high flow nasal canula. Sats continues to drop  when pt is exerting himself.Pt does well at 86-93% .Continue to complain of shoulder pain r/t rotator cuff tear.Torodal given today with noted relief. Continues to apply pain cream to shoulder as well pain patches to shoulder and back. Sat up in recliner most of the day today. Did work with therapy today.Side rails up x2 urinal at bedside, and call light within reach.

## 2020-08-15 NOTE — PLAN OF CARE
Pt received on a high flow nasal cannula at  6  lpm.  SPO2   92%.  Pt in no apparent respiratory distress.  Will continue to monitor.

## 2020-08-15 NOTE — SUBJECTIVE & OBJECTIVE
Interval History: still feeling short of breath and having pain in his shoulder. Reports he used to get injections in his shoulder. States tylenol and lidocaine patch are not helping.     Review of Systems   Constitutional: Positive for fatigue. Negative for activity change, appetite change and chills.   HENT: Negative for congestion and dental problem.    Eyes: Negative for discharge and itching.   Respiratory: Negative for cough and shortness of breath.    Cardiovascular: Negative for chest pain.   Gastrointestinal: Negative for abdominal pain, blood in stool, diarrhea and nausea.   Endocrine: Negative for cold intolerance and heat intolerance.   Genitourinary: Negative for difficulty urinating.   Musculoskeletal: Positive for back pain. Negative for arthralgias and gait problem.        Patient back pain likely due to deconditioning   Skin: Negative.  Negative for color change and pallor.   Neurological: Negative for dizziness, facial asymmetry, light-headedness and headaches.   Psychiatric/Behavioral: The patient is not nervous/anxious.      Objective:     Vital Signs (Most Recent):  Temp: 97.6 °F (36.4 °C) (08/15/20 0749)  Pulse: 107 (08/15/20 0836)  Resp: 20 (08/15/20 0836)  BP: 123/62 (08/15/20 0749)  SpO2: (!) 92 % (08/15/20 0836) Vital Signs (24h Range):  Temp:  [97.4 °F (36.3 °C)-98.8 °F (37.1 °C)] 97.6 °F (36.4 °C)  Pulse:  [] 107  Resp:  [16-26] 20  SpO2:  [90 %-96 %] 92 %  BP: (101-123)/(57-80) 123/62     Weight: 71.6 kg (157 lb 13.6 oz)  Body mass index is 22.02 kg/m².    Intake/Output Summary (Last 24 hours) at 8/15/2020 0845  Last data filed at 8/14/2020 2058  Gross per 24 hour   Intake 476 ml   Output 850 ml   Net -374 ml      Physical Exam  Vitals signs and nursing note reviewed.   HENT:      Head: Normocephalic and atraumatic.     Virtual rounded on patient.     Significant Labs:   A1C:   Recent Labs   Lab 07/23/20  0403   HGBA1C 6.3*     CBC:   Recent Labs   Lab 08/15/20  0556   WBC 4.74    HGB 9.1*   HCT 29.0*        CMP:   Recent Labs   Lab 08/15/20  0556   *   K 4.5      CO2 28   GLU 75   BUN 10   CREATININE 0.9   CALCIUM 8.1*   PROT 6.0   ALBUMIN 1.9*   BILITOT 0.5   ALKPHOS 183*   AST 60*   ALT 30   ANIONGAP 6*   EGFRNONAA >60     Magnesium:   Recent Labs   Lab 08/15/20  0556   MG 2.0     Troponin: No results for input(s): TROPONINI in the last 48 hours.  All pertinent labs within the past 24 hours have been reviewed.    Significant Imaging: I have reviewed all pertinent imaging results/findings within the past 24 hours.

## 2020-08-15 NOTE — PLAN OF CARE
Patient on high flow nasal cannula with documented flow.  Will attempt to wean per O2 order protocol.

## 2020-08-15 NOTE — PT/OT/SLP PROGRESS
Occupational Therapy   Treatment    Name: Rajan Deluna  MRN: [id number]  Admitting Diagnosis:  Acute respiratory disease due to COVID-19 virus  11 Days Post-Op    Recommendations:     Discharge Recommendations: nursing facility, skilled  Discharge Equipment Recommendations:  walker, rolling, bedside commode  Barriers to discharge:  Decreased caregiver support, Inaccessible home environment    Assessment:     Rajan Deluna is a 74 y.o. male with a medical diagnosis of Acute respiratory disease due to COVID-19 virus.  He presents with Performance deficits affecting function are weakness, impaired endurance, impaired self care skills, impaired functional mobilty, gait instability, impaired balance, decreased upper extremity function, pain, decreased safety awareness, decreased ROM, impaired cardiopulmonary response to activity.     Rehab Prognosis:  Fair; patient would benefit from acute skilled OT services to address these deficits and reach maximum level of function.       Plan:     Patient to be seen 5 x/week to address the above listed problems via self-care/home management, therapeutic activities, therapeutic exercises  · Plan of Care Expires: 08/27/20  · Plan of Care Reviewed with: patient    Subjective     Pain/Comfort:  · Pain Rating 1: 10/10  · Location - Side 1: Left  · Location - Orientation 1: generalized  · Location 1: shoulder  · Pain Addressed 1: Pre-medicate for activity, Reposition, Distraction, Cessation of Activity, Nurse notified  · Pain Rating Post-Intervention 1: 10/10  · Pain Rating 2: 10/10  · Location - Side 2: Bilateral  · Location - Orientation 2: posterior  · Location 2: neck  · Pain Addressed 2: Pre-medicate for activity, Reposition, Distraction, Cessation of Activity, Nurse notified  · Pain Rating Post-Intervention 2: 10/10    Objective:     Communicated with: nurse  prior to session.  Patient found HOB elevated with pulse ox (continuous), oxygen, telemetry(telesitter, 7 L O2 HFNC) upon OT  entry to room.    General Precautions: Standard, respiratory, fall(COVID19 precautions lifted 8/11/2020)   Orthopedic Precautions:N/A   Braces: N/A     Occupational Performance:     Bed Mobility:  With bed rail  · Patient completed Rolling/Turning to Left with  stand by assistance  · Patient completed Scooting/Bridging with stand by assistance  · Patient completed Supine to Sit with stand by assistance  · Patient completed Sit to Supine with stand by assistance     Functional Mobility/Transfers:  · Patient completed Sit <> Stand Transfer with contact guard assistance  with  no assistive device   · Patient completed Bed <> Chair Transfer using Step Transfer technique with contact guard assistance with no assistive device  · Functional Mobility: ambulated CGA with RW ~ 12 in room, desat to 80-81% on 7 HFNC, with deep breathing and PLB, up to 80-87%,upon Nurse's ok,  increased O2 to 8L and sats quickly increased O2 88-90%        [facility] 6 Click ADL: 16    Treatment & Education:  Pt up to BS chair with OT earlier this morning with CGA, sat ~ 2 hrs until nurse put him BTB, OT/PT arrived later to ambulate pt . Pt. c/o L shld pain, xray done and no fx, but chronic RTC tear.  OT offered modalities cold/warm compress but he says thye don't work. Pt. says he was getting injections for his neck pain prior to this admit.  Nurse made aware of pt.s report.  --proning in bed x ~ 30 seconds after ambulating, pt assumed prone position with min A.  O2 sats up to 91% after.     Vitals: O2 saturation  Pre exercise:  95% on 7 L HFNC  with exercise:  80-81%, same  Post exercise: 91%    Patient left right sidelying with all lines intact, call button in reach, bed alarm on and nurse notifiedEducation:      GOALS:   Multidisciplinary Problems     Occupational Therapy Goals        Problem: Occupational Therapy Goal    Goal Priority Disciplines Outcome Interventions   Occupational Therapy Goal     OT, PT/OT Ongoing, Progressing    Description:  Goals to be met by: 8/27/20     Patient will increase functional independence with ADLs by performing:    LE Dressing with Stand-by Assistance.  Grooming while standing with Stand-by Assistance.  Toileting from toilet with Stand-by Assistance for hygiene and clothing management.   Supine to sit with Stand-by Assistance.  Step transfer with Stand-by Assistance  Toilet transfer to toilet with Stand-by Assistance.  Upper extremity exercise program x10 reps per handout, with independence.                     Time Tracking:     OT Date of Treatment: 08/14/20  OT Start Time: 1119(1335)  OT Stop Time: 1132(1354)  OT Total Time (min): 13 min+23=36 COTX with PTA    Billable Minutes:Therapeutic Activity 21  Total Time 21    Stephanie Ramsey OT  8/14/2020

## 2020-08-16 PROCEDURE — 63600175 PHARM REV CODE 636 W HCPCS: Performed by: FAMILY MEDICINE

## 2020-08-16 PROCEDURE — 99900035 HC TECH TIME PER 15 MIN (STAT)

## 2020-08-16 PROCEDURE — 94664 DEMO&/EVAL PT USE INHALER: CPT

## 2020-08-16 PROCEDURE — 25000003 PHARM REV CODE 250: Performed by: STUDENT IN AN ORGANIZED HEALTH CARE EDUCATION/TRAINING PROGRAM

## 2020-08-16 PROCEDURE — 25000003 PHARM REV CODE 250: Performed by: INTERNAL MEDICINE

## 2020-08-16 PROCEDURE — 11000001 HC ACUTE MED/SURG PRIVATE ROOM

## 2020-08-16 PROCEDURE — 94799 UNLISTED PULMONARY SVC/PX: CPT

## 2020-08-16 PROCEDURE — 27100171 HC OXYGEN HIGH FLOW UP TO 24 HOURS

## 2020-08-16 PROCEDURE — 94640 AIRWAY INHALATION TREATMENT: CPT

## 2020-08-16 PROCEDURE — 25000003 PHARM REV CODE 250: Performed by: FAMILY MEDICINE

## 2020-08-16 PROCEDURE — 94660 CPAP INITIATION&MGMT: CPT

## 2020-08-16 PROCEDURE — 25000242 PHARM REV CODE 250 ALT 637 W/ HCPCS: Performed by: STUDENT IN AN ORGANIZED HEALTH CARE EDUCATION/TRAINING PROGRAM

## 2020-08-16 RX ORDER — TAMSULOSIN HYDROCHLORIDE 0.4 MG/1
0.4 CAPSULE ORAL DAILY
Status: DISCONTINUED | OUTPATIENT
Start: 2020-08-16 | End: 2020-08-27 | Stop reason: HOSPADM

## 2020-08-16 RX ORDER — NAPROXEN 500 MG/1
500 TABLET ORAL 2 TIMES DAILY WITH MEALS
Status: DISCONTINUED | OUTPATIENT
Start: 2020-08-16 | End: 2020-08-27 | Stop reason: HOSPADM

## 2020-08-16 RX ADMIN — PANTOPRAZOLE SODIUM 40 MG: 40 TABLET, DELAYED RELEASE ORAL at 08:08

## 2020-08-16 RX ADMIN — VORICONAZOLE 200 MG: 200 TABLET, FILM COATED ORAL at 04:08

## 2020-08-16 RX ADMIN — STANDARDIZED SENNA CONCENTRATE AND DOCUSATE SODIUM 1 TABLET: 8.6; 5 TABLET ORAL at 08:08

## 2020-08-16 RX ADMIN — ENOXAPARIN SODIUM 80 MG: 80 INJECTION SUBCUTANEOUS at 04:08

## 2020-08-16 RX ADMIN — POLYETHYLENE GLYCOL (3350) 17 G: 17 POWDER, FOR SOLUTION ORAL at 08:08

## 2020-08-16 RX ADMIN — ENOXAPARIN SODIUM 80 MG: 80 INJECTION SUBCUTANEOUS at 05:08

## 2020-08-16 RX ADMIN — Medication 4 TABLET: at 08:08

## 2020-08-16 RX ADMIN — IPRATROPIUM BROMIDE AND ALBUTEROL SULFATE 3 ML: 2.5; .5 SOLUTION RESPIRATORY (INHALATION) at 04:08

## 2020-08-16 RX ADMIN — ACETAMINOPHEN 650 MG: 325 TABLET ORAL at 03:08

## 2020-08-16 RX ADMIN — THERA TABS 1 TABLET: TAB at 08:08

## 2020-08-16 RX ADMIN — IPRATROPIUM BROMIDE AND ALBUTEROL SULFATE 3 ML: 2.5; .5 SOLUTION RESPIRATORY (INHALATION) at 08:08

## 2020-08-16 RX ADMIN — Medication 4 TABLET: at 04:08

## 2020-08-16 RX ADMIN — NAPROXEN 500 MG: 500 TABLET ORAL at 04:08

## 2020-08-16 RX ADMIN — VORICONAZOLE 200 MG: 200 TABLET, FILM COATED ORAL at 03:08

## 2020-08-16 RX ADMIN — IPRATROPIUM BROMIDE AND ALBUTEROL SULFATE 3 ML: 2.5; .5 SOLUTION RESPIRATORY (INHALATION) at 12:08

## 2020-08-16 RX ADMIN — TAMSULOSIN HYDROCHLORIDE 0.4 MG: 0.4 CAPSULE ORAL at 08:08

## 2020-08-16 RX ADMIN — ACETAMINOPHEN 650 MG: 325 TABLET ORAL at 08:08

## 2020-08-16 RX ADMIN — LIDOCAINE 1 PATCH: 50 PATCH TOPICAL at 04:08

## 2020-08-16 RX ADMIN — Medication 4 TABLET: at 12:08

## 2020-08-16 NOTE — PLAN OF CARE
VN cued in to pt's room with permission for pm rounds. Plan of care reviewed with pt. Pt verbalizes understanding. Pt denies any questions/concerns. Call bell w/in reach. Instructed to call for needs/assist oob.     Pt c/o pain to lower back and shoulders. Bedside nurse, manuel, entered room and has prn tylenol to administer to pt

## 2020-08-16 NOTE — PLAN OF CARE
Permission attained to enter room via virtual system.  Virtual rounds completed as documented.  Today's lab values, notes, and vital signs up to now have been reviewed.     Mews of 3    no significant change in  b/p  majority of previous values   oxygen pulse ox in parameters ordered  oxygen cont high flow at adjusted rate    resp therapist presently at bedside.   Pt in no distress   resp even

## 2020-08-16 NOTE — ASSESSMENT & PLAN NOTE
On HFNC 6-8L  Patient BiPap overnight  Continue to wean  Goal of 5L NC  Patient to sit up 2x daily in chair  Mild wheezes this AM, some cough   Continue albuterol and chest physio/IS  -Continue to monitor cough  -Monitor for progress

## 2020-08-16 NOTE — PROGRESS NOTES
[facility] Hospital Medicine  Progress Note    Patient Name: Rajan Deluna  MRN: [id number]  Patient Class: IP- Inpatient   Admission Date: 7/21/2020  Length of Stay: 26 days  Attending Physician: Rell Tolentino III, MD  Primary Care Provider: Jason Mccord MD        Subjective:     Principal Problem:COVID-19        HPI:  Patient is a 74-year-old male with a past medical history of hypertension, COPD, MAC infection who presented to the ED with altered mental status and fever.  As per the family, patient has had altered mental status for the past 3-4 days. Family endorses increased somnolence as well as urinating on himself. EMS was called two days prior to presentation but reportedly deemed that patient did not need to be brought in. Over the previous day, reportedly the patient was found stuck, staring in his sink, not responding. On the day of presentation, the patient was found down on the floor, family wasn't able to get patient up from the floor and was reportedly not responding as much as baseline. Family believed that patient may not have been wearing baseline 2L O2. Patient also endorses a cough and generalized weakness. Patient reportedly had a recent UTI.     In the ED, chest x-ray showed diffuse interstitial patchy infiltrates concerning for possible pneumonia and a COVID-19 screening test was positive. Fever was 101° F and patient was hypotensive 80s/40s. Patient given 30cc/kg bolus. BP very soft on presentation, adequately responded to volume resuscitation and then decreased again. Labs showed a BUN 45, creatinine 2.9, , , troponin 0.036, procalcitonin 1.13.     Overview/Hospital Course:  8/3 - NAEON, had chest pain, normal EKG and troponin overnight, resolved with GI cocktail. This AM, H/H decreased from 10.8/33.7 to 9.1/28. Infectious disease recommends oral Vancomycin and IV Metronidazole for C. Diff until 8/7 and to continue voriconazole and discharge on Itraconazole  100mg BID and follow up with Pulmonary and ID for continued Aspergillosis and MAC treatment. Patient currently satting 90% on 40L vapotherm at 65% FiO2, weaning as tolerated.    8/4 - NAEON. Patient is currently satting in the >90% on 20L vapotherm at 35% FiO2, weaning as tolerated. H/H stable 9.5/30.3. Per GI, VCE show a few nonbleeding small likely duodenal AVMs and no other evidence of overt GI Bleed and recommend outpatient evaluation due to the patient's current pulmonary status.     8/5 - NAEON. Pending LTAC status. Spo2 70's with PT/OT and need to increase oxygen to vapotherm 20L 70% with an increase in Spo2 to 90's.     8/6 - NAEON. LTAC declined due to insurance. Currently on 30L 70% vapotherm.    8/7 - 11L 45% HFNL. Off covid precautions Monday 8/10    8/8 - NAEO. Bipap overnight, HFNC 15L last night and this am. Feeling better. No cp. H/H stable     8/9 - NAEON, patient on 15L HFNC. States he is coughing a bit more today, states it is nonproductive. Denies any discomfort, N/V/D. Feels well. On Monday, patient will meet criteria for isolation to be lifted, which would allow his daughter to visit.      8/10 - Oxygen weaned down to 10L HFNC over the weekend. Patient off COVID isolation precautions. Pending placement.    8/11 - Oxygen weaned down to 4L NC but patient desaturated throughout the day and ended the day on 7LPM O2. COVID isolation lifted.    8/12 - Patient on 7 LPM to start the day and increased to 9LPM O2. Patient continues to desat to the 80s upon minor exertion. Patient has become deconditioned from his time in hospital and will continue to benefit from PT/OT and inspiratory spirometry.     8/13 - Patient on 9LPM high flow O2. Patient refusing BIPAP use at night. OT noted improvement in respiratory status during therapy.     8/14 - On 8L HFNC O2. Left shoulder pain, pending x-ray.    Interval History: NAEON. Patient shoulder pain ongoing, added 500mg Naproxen BID. 6L HFNC. Continue to wean.      Review of Systems   Constitutional: Negative for activity change, appetite change, chills and fatigue.   HENT: Negative for congestion and dental problem.    Eyes: Negative for discharge and itching.   Respiratory: Negative for cough and shortness of breath.    Cardiovascular: Negative for chest pain.   Gastrointestinal: Negative for abdominal pain, blood in stool, diarrhea and nausea.   Endocrine: Negative for cold intolerance and heat intolerance.   Genitourinary: Negative for difficulty urinating.   Musculoskeletal: Positive for back pain. Negative for arthralgias and gait problem.        Patient back pain likely due to deconditioning   Skin: Negative.  Negative for color change and pallor.   Neurological: Negative for dizziness, facial asymmetry, light-headedness and headaches.   Psychiatric/Behavioral: The patient is not nervous/anxious.      Objective:     Vital Signs (Most Recent):  Temp: 97.5 °F (36.4 °C) (08/16/20 1151)  Pulse: 105 (08/16/20 1238)  Resp: 20 (08/16/20 1238)  BP: (!) 143/82 (08/16/20 1151)  SpO2: (!) 92 % (08/16/20 1238) Vital Signs (24h Range):  Temp:  [97.3 °F (36.3 °C)-98 °F (36.7 °C)] 97.5 °F (36.4 °C)  Pulse:  [] 105  Resp:  [18-22] 20  SpO2:  [85 %-94 %] 92 %  BP: ()/(55-82) 143/82     Weight: 71.6 kg (157 lb 13.6 oz)  Body mass index is 22.02 kg/m².    Intake/Output Summary (Last 24 hours) at 8/16/2020 1539  Last data filed at 8/16/2020 1342  Gross per 24 hour   Intake 560 ml   Output 575 ml   Net -15 ml      Physical Exam  Vitals signs and nursing note reviewed.   Constitutional:       General: He is not in acute distress.     Appearance: Normal appearance. He is not ill-appearing.   HENT:      Head: Normocephalic and atraumatic.      Right Ear: External ear normal.      Left Ear: External ear normal.      Nose: Nose normal. No congestion or rhinorrhea.      Mouth/Throat:      Mouth: Mucous membranes are moist.      Pharynx: Oropharynx is clear.   Eyes:      Pupils:  Pupils are equal, round, and reactive to light.   Neck:      Musculoskeletal: Normal range of motion and neck supple.   Cardiovascular:      Rate and Rhythm: Normal rate and regular rhythm.      Pulses: Normal pulses.      Heart sounds: Normal heart sounds.   Pulmonary:      Effort: Pulmonary effort is normal.      Breath sounds: Wheezing present.      Comments: Wheezes this AM, likely COPD. O2 at 6L HFNC  Abdominal:      General: Abdomen is flat. There is no distension.      Palpations: Abdomen is soft.      Tenderness: There is no abdominal tenderness. There is no guarding.   Musculoskeletal: Normal range of motion.         General: No swelling.   Neurological:      General: No focal deficit present.      Mental Status: He is alert and oriented to person, place, and time. Mental status is at baseline.   Psychiatric:         Mood and Affect: Mood normal.         Behavior: Behavior normal.         Thought Content: Thought content normal.         Judgment: Judgment normal.         Significant Labs:   CBC:   Recent Labs   Lab 08/15/20  0556   WBC 4.74   HGB 9.1*   HCT 29.0*        CMP:   Recent Labs   Lab 08/15/20  0556   *   K 4.5      CO2 28   GLU 75   BUN 10   CREATININE 0.9   CALCIUM 8.1*   PROT 6.0   ALBUMIN 1.9*   BILITOT 0.5   ALKPHOS 183*   AST 60*   ALT 30   ANIONGAP 6*   EGFRNONAA >60     All pertinent labs within the past 24 hours have been reviewed.    Significant Imaging: I have reviewed all pertinent imaging results/findings within the past 24 hours.      Assessment/Plan:      * COVID-19  -Lifted Isolation  -Continuing to wean O2    Left shoulder pain  Add naproxen scheduled BID  Continue lidocaine patch, tylenol for pain  Stable pulses, ROM limited by patient effort.      History of Aspergillus & MAC  Continuing voriconazole, switch to itroconazole at [facility]   -Follow up with Pulm, ID outpatient  STABLE    Hypotension  Patient's most recent blood pressure  116/75  RESOLVED      Sepsis  RESOLVED  See plan for COVID 19    COPD (chronic obstructive pulmonary disease)  On HFNC 6-8L  Patient BiPap overnight  Continue to wean  Goal of 5L NC  Patient to sit up 2x daily in chair  Mild wheezes this AM, some cough   Continue albuterol and chest physio/IS  -Continue to monitor cough  -Monitor for progress        Anemia  Asymptomatic  H/H stable at 9.1/29.0  Labs QOD      VTE Risk Mitigation (From admission, onward)         Ordered     enoxaparin injection 80 mg  Every 12 hours (non-standard times)      08/04/20 1421     Place sequential compression device  Until discontinued      07/21/20 2142     IP VTE HIGH RISK PATIENT  Once      07/21/20 2142                Discharge Planning   ADAM:      Code Status: Full Code   Is the patient medically ready for discharge?:     Reason for patient still in hospital (select all that apply): Patient trending condition  Discharge Plan A: Long-term acute care facility (LTAC)   Discharge Delays: None known at this time              Emeli Adam MD   [employer] PGY2  Department of Hospital Medicine   [facility]

## 2020-08-16 NOTE — PLAN OF CARE
Pt received on a high flow nasal cannula at  6  lpm.  SPO2   91%.  Pt in no apparent respiratory distress.  Will continue to monitor.

## 2020-08-16 NOTE — SUBJECTIVE & OBJECTIVE
Interval History: NAEON. Patient shoulder pain ongoing, added 500mg Naproxen BID. 6L HFNC. Continue to wean.     Review of Systems   Constitutional: Negative for activity change, appetite change, chills and fatigue.   HENT: Negative for congestion and dental problem.    Eyes: Negative for discharge and itching.   Respiratory: Negative for cough and shortness of breath.    Cardiovascular: Negative for chest pain.   Gastrointestinal: Negative for abdominal pain, blood in stool, diarrhea and nausea.   Endocrine: Negative for cold intolerance and heat intolerance.   Genitourinary: Negative for difficulty urinating.   Musculoskeletal: Positive for back pain. Negative for arthralgias and gait problem.        Patient back pain likely due to deconditioning   Skin: Negative.  Negative for color change and pallor.   Neurological: Negative for dizziness, facial asymmetry, light-headedness and headaches.   Psychiatric/Behavioral: The patient is not nervous/anxious.      Objective:     Vital Signs (Most Recent):  Temp: 97.5 °F (36.4 °C) (08/16/20 1151)  Pulse: 105 (08/16/20 1238)  Resp: 20 (08/16/20 1238)  BP: (!) 143/82 (08/16/20 1151)  SpO2: (!) 92 % (08/16/20 1238) Vital Signs (24h Range):  Temp:  [97.3 °F (36.3 °C)-98 °F (36.7 °C)] 97.5 °F (36.4 °C)  Pulse:  [] 105  Resp:  [18-22] 20  SpO2:  [85 %-94 %] 92 %  BP: ()/(55-82) 143/82     Weight: 71.6 kg (157 lb 13.6 oz)  Body mass index is 22.02 kg/m².    Intake/Output Summary (Last 24 hours) at 8/16/2020 1539  Last data filed at 8/16/2020 1342  Gross per 24 hour   Intake 560 ml   Output 575 ml   Net -15 ml      Physical Exam  Vitals signs and nursing note reviewed.   Constitutional:       General: He is not in acute distress.     Appearance: Normal appearance. He is not ill-appearing.   HENT:      Head: Normocephalic and atraumatic.      Right Ear: External ear normal.      Left Ear: External ear normal.      Nose: Nose normal. No congestion or rhinorrhea.       Mouth/Throat:      Mouth: Mucous membranes are moist.      Pharynx: Oropharynx is clear.   Eyes:      Pupils: Pupils are equal, round, and reactive to light.   Neck:      Musculoskeletal: Normal range of motion and neck supple.   Cardiovascular:      Rate and Rhythm: Normal rate and regular rhythm.      Pulses: Normal pulses.      Heart sounds: Normal heart sounds.   Pulmonary:      Effort: Pulmonary effort is normal.      Breath sounds: Wheezing present.      Comments: Wheezes this AM, likely COPD. O2 at 6L HFNC  Abdominal:      General: Abdomen is flat. There is no distension.      Palpations: Abdomen is soft.      Tenderness: There is no abdominal tenderness. There is no guarding.   Musculoskeletal: Normal range of motion.         General: No swelling.   Neurological:      General: No focal deficit present.      Mental Status: He is alert and oriented to person, place, and time. Mental status is at baseline.   Psychiatric:         Mood and Affect: Mood normal.         Behavior: Behavior normal.         Thought Content: Thought content normal.         Judgment: Judgment normal.         Significant Labs:   CBC:   Recent Labs   Lab 08/15/20  0556   WBC 4.74   HGB 9.1*   HCT 29.0*        CMP:   Recent Labs   Lab 08/15/20  0556   *   K 4.5      CO2 28   GLU 75   BUN 10   CREATININE 0.9   CALCIUM 8.1*   PROT 6.0   ALBUMIN 1.9*   BILITOT 0.5   ALKPHOS 183*   AST 60*   ALT 30   ANIONGAP 6*   EGFRNONAA >60     All pertinent labs within the past 24 hours have been reviewed.    Significant Imaging: I have reviewed all pertinent imaging results/findings within the past 24 hours.

## 2020-08-16 NOTE — PROGRESS NOTES
[facility]  Progress/Critical Care - Medicine  Progress Note    Patient Name: Rajan Deluna  MRN: [id number]  Admission Date: 7/21/2020  Hospital Length of Stay: 26 days  Code Status: Full Code  Attending Physician: Rell Tolentino III, MD   Primary Care Provider: Jason Mccord MD   Principal Problem: Acute respiratory disease due to COVID-19 virus    Subjective:     Hospital/ICU Course: . Continues to do well. No issues overnight. Using his IS daily    Past Medical History:   Diagnosis Date    Arthritis     COPD (chronic obstructive pulmonary disease)     Hypertension     Smoker     Weakness        Past Surgical History:   Procedure Laterality Date    HERNIA REPAIR      INTRALUMINAL GASTROINTESTINAL TRACT IMAGING VIA CAPSULE N/A 8/3/2020    Procedure: IMAGING PROCEDURE, GI TRACT, INTRALUMINAL, VIA CAPSULE;  Surgeon: Rey Olivares MD;  Location: [facility];  Service: Endoscopy;  Laterality: N/A;    right knee surgery         Review of patient's allergies indicates:  No Known Allergies    Family History     None        Tobacco Use    Smoking status: Former Smoker    Smokeless tobacco: Never Used   Substance and Sexual Activity    Alcohol use: Not Currently    Drug use: Never    Sexual activity: Not on file     Objective:     Vital Signs (Most Recent):  Temp: 97.9 °F (36.6 °C) (08/16/20 0809)  Pulse: 92 (08/16/20 0817)  Resp: 18 (08/16/20 0817)  BP: 105/60 (08/16/20 0809)  SpO2: (!) 91 % (08/16/20 0817) Vital Signs (24h Range):  Temp:  [97.3 °F (36.3 °C)-98 °F (36.7 °C)] 97.9 °F (36.6 °C)  Pulse:  [] 92  Resp:  [18-22] 18  SpO2:  [85 %-94 %] 91 %  BP: ()/(54-63) 105/60     Weight: 71.6 kg (157 lb 13.6 oz)  Body mass index is 22.02 kg/m².      Intake/Output Summary (Last 24 hours) at 8/16/2020 1017  Last data filed at 8/16/2020 0745  Gross per 24 hour   Intake 680 ml   Output 375 ml   Net 305 ml       Physical Exam  Vitals reviewed: virtual visit.   Constitutional:        General: He is not in acute distress.     Appearance: Normal appearance.   HENT:      Head: Normocephalic.      Nose: Nose normal.   Neurological:      General: No focal deficit present.      Mental Status: He is alert.         Vents:  Oxygen Concentration (%): 70 (08/12/20 0911)    Lines/Drains/Airways     Peripheral Intravenous Line                 Peripheral IV - Single Lumen 08/04/20 0314 18 G Right Forearm 12 days                Significant Labs:    CBC/Anemia Profile:  Recent Labs   Lab 08/15/20  0556   WBC 4.74   HGB 9.1*   HCT 29.0*      MCV 96   RDW 16.0*        Chemistries:  Recent Labs   Lab 08/15/20  0556   *   K 4.5      CO2 28   BUN 10   CREATININE 0.9   CALCIUM 8.1*   ALBUMIN 1.9*   PROT 6.0   BILITOT 0.5   ALKPHOS 183*   ALT 30   AST 60*   MG 2.0   PHOS 2.3*       All pertinent labs within the past 24 hours have been reviewed.    Significant Imaging: I have reviewed all pertinent imaging results/findings within the past 24 hours.    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]  Yes    COPD (chronic obstructive pulmonary disease) [J44.9] 05/22/2017 Yes    Anemia [D64.9] 12/09/2016 No      Problems Resolved During this Admission:    Diagnosis Date Noted Date Resolved POA    History of MAC infection [Z86.19]  08/07/2020 No    GI bleed [K92.2] 07/29/2020 08/07/2020 Clinically Undetermined    Clostridium difficile infection [A49.8]  08/07/2020 Clinically Undetermined    Acute respiratory failure with hypoxia [J96.01]  08/07/2020 Yes    MARQUIS (acute kidney injury) [N17.9] 07/21/2020 08/07/2020 Yes       * Acute respiratory disease due to COVID-19 virus  - 7/21 COVID(+)  - s/p remdesivir x5d, dexamethasone x10d  - full dose AC with lovenox  - proning if he can tolerate at all  -  incentive spirometry   - please wean O2 as tolerated for goal SpO2 88%  - BIPAP QHS  - On 6LPM via bubble flow.  Mildly improved.  -continue to wean     History of Aspergillus & MAC  - serum aspergillus negative, loaded with voriconazole on  7/29   -AFB stain negative; Culture Positive  -likely previous MAC and will not need tx     Acute on chronic respiratory failure with hypoxia  See COVID problem     - Use of 2L NC at home     COPD (chronic obstructive pulmonary disease)  - Use of 2L NC at home  - metered dose inhaler q4h      Out of bed to chair daily  Incentive spirometry   PT/OT  May need placement for more prolonged weaning.         Emerson Juárez MD  Critical Care - Medicine  [facility]  My cell [phone]

## 2020-08-17 LAB
ALBUMIN SERPL BCP-MCNC: 2 G/DL (ref 3.5–5.2)
ALP SERPL-CCNC: 234 U/L (ref 55–135)
ALT SERPL W/O P-5'-P-CCNC: 40 U/L (ref 10–44)
ANION GAP SERPL CALC-SCNC: 7 MMOL/L (ref 8–16)
APTT BLDCRRT: 32.3 SEC (ref 21–32)
AST SERPL-CCNC: 89 U/L (ref 10–40)
BASOPHILS # BLD AUTO: 0.02 K/UL (ref 0–0.2)
BASOPHILS NFR BLD: 0.4 % (ref 0–1.9)
BILIRUB SERPL-MCNC: 0.8 MG/DL (ref 0.1–1)
BUN SERPL-MCNC: 13 MG/DL (ref 8–23)
CALCIUM SERPL-MCNC: 8.3 MG/DL (ref 8.7–10.5)
CHLORIDE SERPL-SCNC: 102 MMOL/L (ref 95–110)
CO2 SERPL-SCNC: 26 MMOL/L (ref 23–29)
CREAT SERPL-MCNC: 1 MG/DL (ref 0.5–1.4)
DIFFERENTIAL METHOD: ABNORMAL
EOSINOPHIL # BLD AUTO: 0.3 K/UL (ref 0–0.5)
EOSINOPHIL NFR BLD: 7.4 % (ref 0–8)
ERYTHROCYTE [DISTWIDTH] IN BLOOD BY AUTOMATED COUNT: 16 % (ref 11.5–14.5)
EST. GFR  (AFRICAN AMERICAN): >60 ML/MIN/1.73 M^2
EST. GFR  (NON AFRICAN AMERICAN): >60 ML/MIN/1.73 M^2
GLUCOSE SERPL-MCNC: 92 MG/DL (ref 70–110)
HCT VFR BLD AUTO: 30.7 % (ref 40–54)
HGB BLD-MCNC: 9.5 G/DL (ref 14–18)
IMM GRANULOCYTES # BLD AUTO: 0.02 K/UL (ref 0–0.04)
IMM GRANULOCYTES NFR BLD AUTO: 0.4 % (ref 0–0.5)
LYMPHOCYTES # BLD AUTO: 1 K/UL (ref 1–4.8)
LYMPHOCYTES NFR BLD: 21.9 % (ref 18–48)
MAGNESIUM SERPL-MCNC: 2.1 MG/DL (ref 1.6–2.6)
MCH RBC QN AUTO: 29.3 PG (ref 27–31)
MCHC RBC AUTO-ENTMCNC: 30.9 G/DL (ref 32–36)
MCV RBC AUTO: 95 FL (ref 82–98)
MONOCYTES # BLD AUTO: 0.7 K/UL (ref 0.3–1)
MONOCYTES NFR BLD: 14.4 % (ref 4–15)
NEUTROPHILS # BLD AUTO: 2.5 K/UL (ref 1.8–7.7)
NEUTROPHILS NFR BLD: 55.5 % (ref 38–73)
NRBC BLD-RTO: 0 /100 WBC
PHOSPHATE SERPL-MCNC: 2.6 MG/DL (ref 2.7–4.5)
PLATELET # BLD AUTO: 432 K/UL (ref 150–350)
PLATELET BLD QL SMEAR: ABNORMAL
PMV BLD AUTO: 9.7 FL (ref 9.2–12.9)
POCT GLUCOSE: 107 MG/DL (ref 70–110)
POCT GLUCOSE: 114 MG/DL (ref 70–110)
POCT GLUCOSE: 90 MG/DL (ref 70–110)
POTASSIUM SERPL-SCNC: 4.5 MMOL/L (ref 3.5–5.1)
PROT SERPL-MCNC: 6.1 G/DL (ref 6–8.4)
RBC # BLD AUTO: 3.24 M/UL (ref 4.6–6.2)
SODIUM SERPL-SCNC: 135 MMOL/L (ref 136–145)
WBC # BLD AUTO: 4.57 K/UL (ref 3.9–12.7)

## 2020-08-17 PROCEDURE — 25000003 PHARM REV CODE 250: Performed by: INTERNAL MEDICINE

## 2020-08-17 PROCEDURE — 85730 THROMBOPLASTIN TIME PARTIAL: CPT

## 2020-08-17 PROCEDURE — 94640 AIRWAY INHALATION TREATMENT: CPT

## 2020-08-17 PROCEDURE — 84100 ASSAY OF PHOSPHORUS: CPT

## 2020-08-17 PROCEDURE — 36415 COLL VENOUS BLD VENIPUNCTURE: CPT

## 2020-08-17 PROCEDURE — 25000003 PHARM REV CODE 250: Performed by: STUDENT IN AN ORGANIZED HEALTH CARE EDUCATION/TRAINING PROGRAM

## 2020-08-17 PROCEDURE — 99223 PR INITIAL HOSPITAL CARE,LEVL III: ICD-10-PCS | Mod: ,,, | Performed by: NURSE PRACTITIONER

## 2020-08-17 PROCEDURE — 63600175 PHARM REV CODE 636 W HCPCS: Performed by: FAMILY MEDICINE

## 2020-08-17 PROCEDURE — 99497 ADVNCD CARE PLAN 30 MIN: CPT | Mod: 25,,, | Performed by: NURSE PRACTITIONER

## 2020-08-17 PROCEDURE — 94664 DEMO&/EVAL PT USE INHALER: CPT

## 2020-08-17 PROCEDURE — 94761 N-INVAS EAR/PLS OXIMETRY MLT: CPT

## 2020-08-17 PROCEDURE — 80053 COMPREHEN METABOLIC PANEL: CPT

## 2020-08-17 PROCEDURE — 99900035 HC TECH TIME PER 15 MIN (STAT)

## 2020-08-17 PROCEDURE — 94799 UNLISTED PULMONARY SVC/PX: CPT

## 2020-08-17 PROCEDURE — 97110 THERAPEUTIC EXERCISES: CPT | Mod: CQ

## 2020-08-17 PROCEDURE — 99497 PR ADVNCD CARE PLAN 30 MIN: ICD-10-PCS | Mod: 25,,, | Performed by: NURSE PRACTITIONER

## 2020-08-17 PROCEDURE — 25000242 PHARM REV CODE 250 ALT 637 W/ HCPCS: Performed by: STUDENT IN AN ORGANIZED HEALTH CARE EDUCATION/TRAINING PROGRAM

## 2020-08-17 PROCEDURE — 83735 ASSAY OF MAGNESIUM: CPT

## 2020-08-17 PROCEDURE — 27100171 HC OXYGEN HIGH FLOW UP TO 24 HOURS

## 2020-08-17 PROCEDURE — 97530 THERAPEUTIC ACTIVITIES: CPT

## 2020-08-17 PROCEDURE — 85025 COMPLETE CBC W/AUTO DIFF WBC: CPT

## 2020-08-17 PROCEDURE — 99223 1ST HOSP IP/OBS HIGH 75: CPT | Mod: ,,, | Performed by: NURSE PRACTITIONER

## 2020-08-17 PROCEDURE — 25000003 PHARM REV CODE 250: Performed by: FAMILY MEDICINE

## 2020-08-17 PROCEDURE — 11000001 HC ACUTE MED/SURG PRIVATE ROOM

## 2020-08-17 PROCEDURE — 94660 CPAP INITIATION&MGMT: CPT

## 2020-08-17 PROCEDURE — 27000221 HC OXYGEN, UP TO 24 HOURS

## 2020-08-17 RX ORDER — MONTELUKAST SODIUM 10 MG/1
10 TABLET ORAL NIGHTLY
Status: DISCONTINUED | OUTPATIENT
Start: 2020-08-17 | End: 2020-08-27 | Stop reason: HOSPADM

## 2020-08-17 RX ORDER — SODIUM,POTASSIUM PHOSPHATES 280-250MG
1 POWDER IN PACKET (EA) ORAL EVERY 6 HOURS SCHEDULED
Status: DISCONTINUED | OUTPATIENT
Start: 2020-08-17 | End: 2020-08-25

## 2020-08-17 RX ADMIN — IPRATROPIUM BROMIDE AND ALBUTEROL SULFATE 3 ML: 2.5; .5 SOLUTION RESPIRATORY (INHALATION) at 08:08

## 2020-08-17 RX ADMIN — POTASSIUM & SODIUM PHOSPHATES POWDER PACK 280-160-250 MG 1 PACKET: 280-160-250 PACK at 05:08

## 2020-08-17 RX ADMIN — IPRATROPIUM BROMIDE AND ALBUTEROL SULFATE 3 ML: 2.5; .5 SOLUTION RESPIRATORY (INHALATION) at 03:08

## 2020-08-17 RX ADMIN — POTASSIUM & SODIUM PHOSPHATES POWDER PACK 280-160-250 MG 1 PACKET: 280-160-250 PACK at 11:08

## 2020-08-17 RX ADMIN — POTASSIUM & SODIUM PHOSPHATES POWDER PACK 280-160-250 MG 1 PACKET: 280-160-250 PACK at 08:08

## 2020-08-17 RX ADMIN — TAMSULOSIN HYDROCHLORIDE 0.4 MG: 0.4 CAPSULE ORAL at 08:08

## 2020-08-17 RX ADMIN — ENOXAPARIN SODIUM 80 MG: 80 INJECTION SUBCUTANEOUS at 05:08

## 2020-08-17 RX ADMIN — STANDARDIZED SENNA CONCENTRATE AND DOCUSATE SODIUM 1 TABLET: 8.6; 5 TABLET ORAL at 08:08

## 2020-08-17 RX ADMIN — NAPROXEN 500 MG: 500 TABLET ORAL at 04:08

## 2020-08-17 RX ADMIN — THERA TABS 1 TABLET: TAB at 08:08

## 2020-08-17 RX ADMIN — Medication 4 TABLET: at 04:08

## 2020-08-17 RX ADMIN — BENZONATATE 100 MG: 100 CAPSULE ORAL at 09:08

## 2020-08-17 RX ADMIN — VORICONAZOLE 200 MG: 200 TABLET, FILM COATED ORAL at 05:08

## 2020-08-17 RX ADMIN — LIDOCAINE 2 PATCH: 50 PATCH TOPICAL at 04:08

## 2020-08-17 RX ADMIN — ENOXAPARIN SODIUM 80 MG: 80 INJECTION SUBCUTANEOUS at 04:08

## 2020-08-17 RX ADMIN — PANTOPRAZOLE SODIUM 40 MG: 40 TABLET, DELAYED RELEASE ORAL at 08:08

## 2020-08-17 RX ADMIN — MONTELUKAST 10 MG: 10 TABLET, FILM COATED ORAL at 09:08

## 2020-08-17 RX ADMIN — IPRATROPIUM BROMIDE AND ALBUTEROL SULFATE 3 ML: 2.5; .5 SOLUTION RESPIRATORY (INHALATION) at 12:08

## 2020-08-17 RX ADMIN — Medication 4 TABLET: at 11:08

## 2020-08-17 RX ADMIN — POLYETHYLENE GLYCOL (3350) 17 G: 17 POWDER, FOR SOLUTION ORAL at 08:08

## 2020-08-17 RX ADMIN — PANTOPRAZOLE SODIUM 40 MG: 40 TABLET, DELAYED RELEASE ORAL at 09:08

## 2020-08-17 RX ADMIN — VORICONAZOLE 200 MG: 200 TABLET, FILM COATED ORAL at 04:08

## 2020-08-17 RX ADMIN — NAPROXEN 500 MG: 500 TABLET ORAL at 08:08

## 2020-08-17 RX ADMIN — Medication 4 TABLET: at 08:08

## 2020-08-17 NOTE — PLAN OF CARE
Problem: Occupational Therapy Goal  Goal: Occupational Therapy Goal  Description: Goals to be met by: 8/27/20     Patient will increase functional independence with ADLs by performing:    LE Dressing with Stand-by Assistance.  Grooming while standing with Stand-by Assistance.  Toileting from toilet with Stand-by Assistance for hygiene and clothing management.   Supine to sit with Stand-by Assistance.  Step transfer with Stand-by Assistance  Toilet transfer to toilet with Stand-by Assistance.  Upper extremity exercise program x10 reps per handout, with independence.    Outcome: Ongoing, Progressing   Pt progressing slowly, remains limited in occupational performance this date 2/2 decreased spO2 perfusion with EOB and OOB activity requiring increased time and slight increased O2 from 8L O2 to 9L O2 via HFNC to return to 91-92% spO2 after short t/f to bed side chair. Pt with limited flexibility through B hips severely limiting ability to don/doff B socks this date. Cont OT POC

## 2020-08-17 NOTE — PLAN OF CARE
Rubencorinne from [facility] called and said she got a fax from [facility] saying they upheld the denial for LTAC for a second time.  Pt's SNF preference is [place].  They will not accept him unless oxygen 4L NC or less.  Irina will fax and email me the denial letter.  I also reached out to [facility] (pt's 2nd preference).  Waiting on a response.    I spoke with pt and let him know about LTAC denial and plan for discharge to SNF.  He verbalized understanding.  He is agreeable to go to SNF.  [facility] is asking for a negative c-diff result.  I notice pt has not had BM since 8/12/20.  I notified Dr. Adam.       08/17/20 0958   Post-Acute Status   Post-Acute Authorization Placement     Phong Suh RN,   [phone]

## 2020-08-17 NOTE — PLAN OF CARE
Care plan reviewed with pt. Cardiac monitoring and continuous pulse ox monitoring in place. Safety maintained, bed at lowest position, wheels locked, call light within reach, bed alarm on. Medications administered as ordered.

## 2020-08-17 NOTE — PLAN OF CARE
VN note: VN cued into patient's room for introduction. Patient's 02 is in place. He denies any SOB but c/o cough. MAR reviewed. I asked patient if he would like his prn cough medication. Patient agreed, message sent to bedside nurse Gerda. Patient denies any other needs at this time. Allowed time for questions. VN will continue to be available to patient and intervene prn.

## 2020-08-17 NOTE — ASSESSMENT & PLAN NOTE
On HFNC 6L, improvement  Continue to wean  Goal of 5L NC  Patient to sit up 2x daily in chair  Mild wheezes this AM  Some cough   Continue albuterol and chest physio/IS  -Continue to monitor cough  -Monitor for progress

## 2020-08-17 NOTE — ASSESSMENT & PLAN NOTE
-Lifted Isolation  -Continuing to wean O2  -Deconditioning and long illness has led to increased O2 requirement, continuing to wean and work with PT/OT, RT with goal of SNF (5L NC max)

## 2020-08-17 NOTE — ACP (ADVANCE CARE PLANNING)
".Advance Care Planning     [facility]  I engaged the patient in a conversation about advance care planning and we specifically addressed what the goals of care would be moving forward. Discussed with patient on today [facility]. Discussed diagnosis/prognoisis. "Albert got me. I want to be home with my family. I want to be made comfortable."   We did specifically address the patient's likely prognosis, which is fair .  We explored the patient's values and preferences for future care.  The patient endorses that what is most important right now is to focus on spending time at home, avoiding the hospital and symptom/pain control. Patient stated, "I want to go home and be with my family."     Accordingly, we have decided that the best plan to meet the patient's goals includes enrolling in hospice care    I did explain the role for hospice care at this stage of the patient's illness, including its ability to help the patient live with the best quality of life possible.  We will be making a hospice referral.    I spent a total of 30 minutes engaging the patient in this advance care planning discussion.         Code Status  In light of the patients advanced and life limiting illness,I engaged the the patient in a conversation about the patient's preferences for care  at the very end of life. The patient wishes to have a natural, peaceful death.  Along those lines, the patient does not wish to have CPR or other invasive treatments performed when his heart and/or breathing stops. I communicated to the patient that a LaPOST form was completed to reflect other EOL preferences of the patient such as comfort care."Let me go home to be with Albert. I have made my peace with Albert. He is my keeper  I spent a total of 30 minutes engaging the patient in this advance care planning discussion.               Natty Carlton, MSN, APRN, NP-C   Palliative Medicine   [facility]  [phone] or [phone]        >50% of 30  min visit spent in " chart review, face to face discussion of goals of care with patient, family, symptom assessment, coordination of care and emotional support.

## 2020-08-17 NOTE — PLAN OF CARE
Patient POA, Thea Deluna, states that she does not want patient on Hospice, and that his wife will not have him at home. She states he is to remain full code, and await placement at a facility. She also instructs that no one else is to have access to Mr. Sun's medical information other than herself. She requests that the password be changed to '[id number]'.     Emeli Adam MD  [facility] FM PGY2

## 2020-08-17 NOTE — CONSULTS
"[facility]  Palliative Medicine  Consult Note    Patient Name: Rajan Deluna  MRN: [id number]  Admission Date: 7/21/2020  Hospital Length of Stay: 27 days  Code Status: Full Code   Attending Provider: Rell Tolentino III, MD  Consulting Provider: Natty Carlton NP  Primary Care Physician: Jason Mccord MD  Principal Problem:COVID-19    Patient information was obtained from patient, past medical records and ER records.      Inpatient consult to Palliative Care  Consult performed by: Natty Carlton NP  Consult ordered by: D'Amico C. Johnson, MD        Assessment/Plan:     No new Assessment & Plan notes have been filed under this hospital service since the last note was generated.  Service: Palliative Medicine      Thank you for your consult. I will follow-up with patient. Please contact us if you have any additional questions.    Subjective:     HPI:   Patient is a 74-year-old male with a past medical history of hypertension, COPD, MAC infection who presented to the ED with altered mental status and fever.  As per the family, patient has had altered mental status for the past 3-4 days. Family endorses increased somnolence as well as urinating on himself. EMS was called two days prior to presentation but reportedly deemed that patient did not need to be brought in. Over the previous day, reportedly the patient was found stuck, staring in his sink, not responding. On the day of presentation, the patient was found down on the floor, family wasn't able to get patient up from the floor and was reportedly not responding as much as baseline. Family believed that patient may not have been wearing baseline 2L O2. Patient also endorses a cough and generalized weakness. Patient reportedly had a recent UTI.    Palliative medicine met with patient on today to discuss GOC. Patient is awake and alert. He is needing higher oxygen requirements. Mr. Deluna stated, "I just want to go home and be with my family. " "I don't want to go to no hospital or facility anymore." Discussed with patient his wishes and he stated, "I have made peace with the Lord. I know I am in his hand. I want to go home." Asked patient who he will care for him ? He stated, "I have my son, grandson, brother and other relatives in the community who will help care for me. Please let me go home." Discussed patient wishes of code status "Let me go with Albert. Don't bring me back. Make me comfortable."     Palliative medicine spoke with daughter who is ANT  Via telephone. Discussed with her patient wishes. Daughter became very  Upset and yelling on the phone. Recommended to daughter to speak with her father regarding his wishes.     Patient ANT called back and stated if he want to go home on hospice he not coming by me, He is going by his wife. I am not accepting him. No family members are accepting patient at this time. The spouse does not want him at the house and stated she will call the police if patient returns to the home. ANT stated that she wants patient to be placed at [facility]. Family does not want patient to be DNR. So patient will remain a Full code.     Hospital Course:  No notes on file        Past Medical History:   Diagnosis Date    Arthritis     COPD (chronic obstructive pulmonary disease)     Hypertension     Smoker     Weakness        Past Surgical History:   Procedure Laterality Date    HERNIA REPAIR      INTRALUMINAL GASTROINTESTINAL TRACT IMAGING VIA CAPSULE N/A 8/3/2020    Procedure: IMAGING PROCEDURE, GI TRACT, INTRALUMINAL, VIA CAPSULE;  Surgeon: Rey Olivares MD;  Location[facility];  Service: Endoscopy;  Laterality: N/A;    right knee surgery         Review of patient's allergies indicates:  No Known Allergies    Medications:  Continuous Infusions:  Scheduled Meds:   albuterol-ipratropium  3 mL Nebulization QID WAKE    enoxaparin  80 mg Subcutaneous Q12H    Lactobacillus acidoph-L.bulgar  4 tablet Oral " TID WM    lidocaine  2 patch Transdermal Q24H    montelukast  10 mg Oral QHS    multivitamin  1 tablet Oral Daily    naproxen  500 mg Oral BID WM    pantoprazole  40 mg Oral BID    polyethylene glycol  17 g Oral Daily    potassium, sodium phosphates  1 packet Oral Q6H PEDRO    senna-docusate 8.6-50 mg  1 tablet Oral BID    tamsulosin  0.4 mg Oral Daily    voriconazole  200 mg Oral Q12H     PRN Meds:sodium chloride, acetaminophen, benzonatate, dextromethorphan-guaifenesin  mg, diclofenac sodium, hydrocortisone, ondansetron, pneumoc 13-loy conj-dip cr(PF), simethicone, sodium chloride 0.9%    Family History     None        Tobacco Use    Smoking status: Former Smoker    Smokeless tobacco: Never Used   Substance and Sexual Activity    Alcohol use: Not Currently    Drug use: Never    Sexual activity: Not on file       Review of Systems   Constitutional: Negative for activity change, appetite change, chills and fatigue.   HENT: Negative for congestion and dental problem.    Eyes: Negative for discharge and itching.   Respiratory: Negative for cough and shortness of breath.    Cardiovascular: Negative for chest pain.   Gastrointestinal: Negative for abdominal pain, blood in stool, diarrhea and nausea.   Endocrine: Negative for cold intolerance and heat intolerance.   Genitourinary: Negative for difficulty urinating.   Musculoskeletal: Positive for back pain. Negative for arthralgias and gait problem.        Patient back pain likely due to deconditioning   Skin: Negative.  Negative for color change and pallor.   Neurological: Negative for dizziness, facial asymmetry, light-headedness and headaches.   Psychiatric/Behavioral: The patient is not nervous/anxious.      Objective:     Vital Signs (Most Recent):  Temp: 96.9 °F (36.1 °C) (08/17/20 1111)  Pulse: 87 (08/17/20 1200)  Resp: (!) 21 (08/17/20 1200)  BP: (!) 96/58 (08/17/20 1111)  SpO2: (!) 91 % (08/17/20 1217) Vital Signs (24h Range):  Temp:  [96.3 °F  (35.7 °C)-98.6 °F (37 °C)] 96.9 °F (36.1 °C)  Pulse:  [] 87  Resp:  [18-24] 21  SpO2:  [88 %-93 %] 91 %  BP: ()/(56-71) 96/58     Weight: 71.6 kg (157 lb 13.6 oz)  Body mass index is 22.02 kg/m².    Physical Exam  Vitals signs and nursing note reviewed.   Constitutional:       General: He is not in acute distress.     Appearance: Normal appearance. He is not ill-appearing.   HENT:      Head: Normocephalic and atraumatic.      Right Ear: External ear normal.      Left Ear: External ear normal.      Nose: Nose normal. No congestion or rhinorrhea.      Mouth/Throat:      Mouth: Mucous membranes are moist.      Pharynx: Oropharynx is clear.   Eyes:      Pupils: Pupils are equal, round, and reactive to light.   Neck:      Musculoskeletal: Normal range of motion and neck supple.   Cardiovascular:      Rate and Rhythm: Normal rate and regular rhythm.      Pulses: Normal pulses.      Heart sounds: Normal heart sounds.   Pulmonary:      Effort: Pulmonary effort is normal.      Breath sounds: Wheezing present.      Comments: Wheezes this AM, likely COPD. O2 at 6L HFNC  Abdominal:      General: Abdomen is flat. There is no distension.      Palpations: Abdomen is soft.      Tenderness: There is no abdominal tenderness. There is no guarding.   Musculoskeletal: Normal range of motion.         General: No swelling.   Neurological:      General: No focal deficit present.      Mental Status: He is alert and oriented to person, place, and time. Mental status is at baseline.   Psychiatric:         Mood and Affect: Mood normal.         Behavior: Behavior normal.         Thought Content: Thought content normal.         Judgment: Judgment normal.         Advance Care Planning   Palliative Exam       Significant Labs: All pertinent labs within the past 24 hours have been reviewed.  CBC:   Recent Labs   Lab 08/17/20  0444   WBC 4.57   HGB 9.5*   HCT 30.7*   MCV 95   *     BMP:  Recent Labs   Lab 08/17/20  0444   GLU 92    *   K 4.5      CO2 26   BUN 13   CREATININE 1.0   CALCIUM 8.3*   MG 2.1     LFT:  Lab Results   Component Value Date    AST 89 (H) 08/17/2020    ALKPHOS 234 (H) 08/17/2020    BILITOT 0.8 08/17/2020     Albumin:   Albumin   Date Value Ref Range Status   08/17/2020 2.0 (L) 3.5 - 5.2 g/dL Final     Protein:   Total Protein   Date Value Ref Range Status   08/17/2020 6.1 6.0 - 8.4 g/dL Final     Lactic acid:   Lab Results   Component Value Date    LACTATE 1.2 07/21/2020    LACTATE 0.6 07/21/2020       Significant Imaging: I have reviewed all pertinent imaging results/findings within the past 24 hours.       Recommendations  Continue treatment  Full code  Possible placement per family wishes          > 50% of 45 min visit spent in chart review, face to face discussion of goals of care,  symptom assessment, coordination of care and emotional support.    30 min Advance care planning    Natty Carlton NP  Palliative Medicine  [facility]

## 2020-08-17 NOTE — ASSESSMENT & PLAN NOTE
Add naproxen scheduled BID  Continue lidocaine patch, tylenol for pain  Stable pulses, ROM limited by patient effort  Xray shoulder stable  Chronic problem  Continue PT/OT as well as current pain regimen

## 2020-08-17 NOTE — PLAN OF CARE
POC reviewed, patient verbalize understanding. Patient complained of pain/discomfort, administered prn pain med as chart and applied warm compress to affected site. Sinus tach on tele monitor. Fall precaution maintained: bed on lowest position, call light within reach, bed alarm set, side rail up x 2, non skid sock on. Will continue to monitor.

## 2020-08-17 NOTE — PT/OT/SLP PROGRESS
Occupational Therapy   Treatment    Name: Rajan Deluna  MRN: [id number]  Admitting Diagnosis:  COVID-19  14 Days Post-Op    Recommendations:     Discharge Recommendations: nursing facility, skilled  Discharge Equipment Recommendations:  walker, rolling, wheelchair, bedside commode, shower chair, dressing device  Barriers to discharge:  Inaccessible home environment, Decreased caregiver support    Assessment:     Rajan Deluna is a 74 y.o. male with a medical diagnosis of COVID-19.  He presents with deconditioning, labile spO2 saturations while seated EOB which improved with upright posture during gait but dropped again after pt seated upright in bedside chair after short ambulation. Performance deficits affecting function are weakness, impaired endurance, impaired self care skills, impaired functional mobilty, decreased coordination, impaired balance, gait instability, decreased upper extremity function, decreased lower extremity function, decreased safety awareness, pain, decreased ROM, impaired cardiopulmonary response to activity.     Rehab Prognosis:  Good and Fair; patient would benefit from acute skilled OT services to address these deficits and reach maximum level of function.       Plan:     Patient to be seen 5 x/week to address the above listed problems via self-care/home management, therapeutic activities, therapeutic exercises  · Plan of Care Expires: 08/27/20  · Plan of Care Reviewed with: patient    Subjective     Pain/Comfort:  · Pain Rating 1: (did not rate)  · Location - Side 1: Left  · Location - Orientation 1: generalized  · Location 1: shoulder  · Pain Addressed 1: Reposition, Distraction, Cessation of Activity, Nurse notified  · Pain Rating Post-Intervention 1: 10/10    Objective:     Communicated with: willow prior to session.  Patient found HOB elevated with pulse ox (continuous), telemetry, peripheral IV, oxygen upon OT entry to room.    General Precautions: Standard, respiratory, fall    Orthopedic Precautions:N/A   Braces: N/A     Occupational Performance:     Bed Mobility:    · Patient completed Rolling/Turning to Left with  supervision  · Patient completed Scooting/Bridging with supervision  · Patient completed Supine to Sit with supervision     Functional Mobility/Transfers:  · Patient completed Sit <> Stand Transfer with contact guard assistance  with  rolling walker   · Patient completed Bed <> Chair Transfer using Step Transfer technique with stand by assistance with rolling walker  Functional Mobility: Pt with fair dynamic seated and standing balance.      Activities of Daily Living:  · Lower Body Dressing: maximal assistance to don B socks seated EOB after attempting to stretch B hips into external rotation to attempt side sitting on EOB or figure of four technique 2/2 SOB with forward fexion posture and limited flexibility to reach to B feet      [facility] 6 Click ADL: 16    Treatment & Education:  Pt educated on role of OT and POC.   Pt performing skills as listed above.   Pt on 8L HFNC with spO2 ~92-94% upon OT entrance to room, reading as low as 78% spO2 seated EOB but improved with anterior pelvic rotation, trunk extension, and cervical extension as pt continually reverted to forward flexed posture limiting inhalations. Pt with spO2 90-92% to take 3-4 steps to bed side chair but once seated in bed side chair ~45 seconds, pt sats dropped to 82% spO2, requiring reclined posture and increased time to minimall increase perfusion. Pt provided with increase to 9L O2 via HFNC and returned to 90-92% spO2, maintained when returned to 8L O2 at end of OT session    Patient left up in chair with all lines intact, call button in reach, chair alarm on and nsg notifiedEducation:      GOALS:   Multidisciplinary Problems     Occupational Therapy Goals        Problem: Occupational Therapy Goal    Goal Priority Disciplines Outcome Interventions   Occupational Therapy Goal     OT, PT/OT Ongoing, Progressing     Description: Goals to be met by: 8/27/20     Patient will increase functional independence with ADLs by performing:    LE Dressing with Stand-by Assistance.  Grooming while standing with Stand-by Assistance.  Toileting from toilet with Stand-by Assistance for hygiene and clothing management.   Supine to sit with Stand-by Assistance.  Step transfer with Stand-by Assistance  Toilet transfer to toilet with Stand-by Assistance.  Upper extremity exercise program x10 reps per handout, with independence.                     Time Tracking:     OT Date of Treatment: 08/17/20  OT Start Time: 1330  OT Stop Time: 1345  OT Total Time (min): 15 min    Billable Minutes:Therapeutic Activity 15    Asia Griffiths OT  8/17/2020

## 2020-08-17 NOTE — HPI
"Patient is a 74-year-old male with a past medical history of hypertension, COPD, MAC infection who presented to the ED with altered mental status and fever.  As per the family, patient has had altered mental status for the past 3-4 days. Family endorses increased somnolence as well as urinating on himself. EMS was called two days prior to presentation but reportedly deemed that patient did not need to be brought in. Over the previous day, reportedly the patient was found stuck, staring in his sink, not responding. On the day of presentation, the patient was found down on the floor, family wasn't able to get patient up from the floor and was reportedly not responding as much as baseline. Family believed that patient may not have been wearing baseline 2L O2. Patient also endorses a cough and generalized weakness. Patient reportedly had a recent UTI.    Palliative medicine met with patient on today to discuss GOC. Patient is awake and alert. He is needing higher oxygen requirements. Mr. Deluna stated, "I just want to go home and be with my family. I don't want to go to no hospital or facility anymore." Discussed with patient his wishes and he stated, "I have made peace with the Lord. I know I am in his hand. I want to go home." Asked patient who he will care for him ? He stated, "I have my son, grandson, brother and other relatives in the community who will help care for me. Please let me go home." Discussed patient wishes of code status "Let me go with [facility]. Don't bring me back. Make me comfortable."     Palliative medicine spoke with daughter who is HPMP  Via telephone. Discussed with her patient wishes. Daughter became very  Upset and yelling on the phone. Recommended to daughter to speak with her father regarding his wishes.     Patient ANT called back and stated if he want to go home on hospice he not coming by me, He is going by his wife. I am not accepting him. No family members are accepting patient at this " time. The spouse does not want him at the house and stated she will call the police if patient returns to the home. HPOA stated that she wants patient to be placed at [facility]. Family does not want patient to be DNR. So patient will remain a Full code.

## 2020-08-17 NOTE — SUBJECTIVE & OBJECTIVE
Past Medical History:   Diagnosis Date    Arthritis     COPD (chronic obstructive pulmonary disease)     Hypertension     Smoker     Weakness        Past Surgical History:   Procedure Laterality Date    HERNIA REPAIR      INTRALUMINAL GASTROINTESTINAL TRACT IMAGING VIA CAPSULE N/A 8/3/2020    Procedure: IMAGING PROCEDURE, GI TRACT, INTRALUMINAL, VIA CAPSULE;  Surgeon: Rey Olivares MD;  Location: [facility];  Service: Endoscopy;  Laterality: N/A;    right knee surgery         Review of patient's allergies indicates:  No Known Allergies    Medications:  Continuous Infusions:  Scheduled Meds:   albuterol-ipratropium  3 mL Nebulization QID WAKE    enoxaparin  80 mg Subcutaneous Q12H    Lactobacillus acidoph-L.bulgar  4 tablet Oral TID WM    lidocaine  2 patch Transdermal Q24H    montelukast  10 mg Oral QHS    multivitamin  1 tablet Oral Daily    naproxen  500 mg Oral BID WM    pantoprazole  40 mg Oral BID    polyethylene glycol  17 g Oral Daily    potassium, sodium phosphates  1 packet Oral Q6H PEDRO    senna-docusate 8.6-50 mg  1 tablet Oral BID    tamsulosin  0.4 mg Oral Daily    voriconazole  200 mg Oral Q12H     PRN Meds:sodium chloride, acetaminophen, benzonatate, dextromethorphan-guaifenesin  mg, diclofenac sodium, hydrocortisone, ondansetron, pneumoc 13-loy conj-dip cr(PF), simethicone, sodium chloride 0.9%    Family History     None        Tobacco Use    Smoking status: Former Smoker    Smokeless tobacco: Never Used   Substance and Sexual Activity    Alcohol use: Not Currently    Drug use: Never    Sexual activity: Not on file       Review of Systems   Constitutional: Negative for activity change, appetite change, chills and fatigue.   HENT: Negative for congestion and dental problem.    Eyes: Negative for discharge and itching.   Respiratory: Negative for cough and shortness of breath.    Cardiovascular: Negative for chest pain.   Gastrointestinal: Negative for abdominal  pain, blood in stool, diarrhea and nausea.   Endocrine: Negative for cold intolerance and heat intolerance.   Genitourinary: Negative for difficulty urinating.   Musculoskeletal: Positive for back pain. Negative for arthralgias and gait problem.        Patient back pain likely due to deconditioning   Skin: Negative.  Negative for color change and pallor.   Neurological: Negative for dizziness, facial asymmetry, light-headedness and headaches.   Psychiatric/Behavioral: The patient is not nervous/anxious.      Objective:     Vital Signs (Most Recent):  Temp: 96.9 °F (36.1 °C) (08/17/20 1111)  Pulse: 87 (08/17/20 1200)  Resp: (!) 21 (08/17/20 1200)  BP: (!) 96/58 (08/17/20 1111)  SpO2: (!) 91 % (08/17/20 1217) Vital Signs (24h Range):  Temp:  [96.3 °F (35.7 °C)-98.6 °F (37 °C)] 96.9 °F (36.1 °C)  Pulse:  [] 87  Resp:  [18-24] 21  SpO2:  [88 %-93 %] 91 %  BP: ()/(56-71) 96/58     Weight: 71.6 kg (157 lb 13.6 oz)  Body mass index is 22.02 kg/m².    Physical Exam  Vitals signs and nursing note reviewed.   Constitutional:       General: He is not in acute distress.     Appearance: Normal appearance. He is not ill-appearing.   HENT:      Head: Normocephalic and atraumatic.      Right Ear: External ear normal.      Left Ear: External ear normal.      Nose: Nose normal. No congestion or rhinorrhea.      Mouth/Throat:      Mouth: Mucous membranes are moist.      Pharynx: Oropharynx is clear.   Eyes:      Pupils: Pupils are equal, round, and reactive to light.   Neck:      Musculoskeletal: Normal range of motion and neck supple.   Cardiovascular:      Rate and Rhythm: Normal rate and regular rhythm.      Pulses: Normal pulses.      Heart sounds: Normal heart sounds.   Pulmonary:      Effort: Pulmonary effort is normal.      Breath sounds: Wheezing present.      Comments: Wheezes this AM, likely COPD. O2 at 6L HFNC  Abdominal:      General: Abdomen is flat. There is no distension.      Palpations: Abdomen is soft.       Tenderness: There is no abdominal tenderness. There is no guarding.   Musculoskeletal: Normal range of motion.         General: No swelling.   Neurological:      General: No focal deficit present.      Mental Status: He is alert and oriented to person, place, and time. Mental status is at baseline.   Psychiatric:         Mood and Affect: Mood normal.         Behavior: Behavior normal.         Thought Content: Thought content normal.         Judgment: Judgment normal.         Advance Care Planning   Palliative Exam       Significant Labs: All pertinent labs within the past 24 hours have been reviewed.  CBC:   Recent Labs   Lab 08/17/20  0444   WBC 4.57   HGB 9.5*   HCT 30.7*   MCV 95   *     BMP:  Recent Labs   Lab 08/17/20 0444   GLU 92   *   K 4.5      CO2 26   BUN 13   CREATININE 1.0   CALCIUM 8.3*   MG 2.1     LFT:  Lab Results   Component Value Date    AST 89 (H) 08/17/2020    ALKPHOS 234 (H) 08/17/2020    BILITOT 0.8 08/17/2020     Albumin:   Albumin   Date Value Ref Range Status   08/17/2020 2.0 (L) 3.5 - 5.2 g/dL Final     Protein:   Total Protein   Date Value Ref Range Status   08/17/2020 6.1 6.0 - 8.4 g/dL Final     Lactic acid:   Lab Results   Component Value Date    LACTATE 1.2 07/21/2020    LACTATE 0.6 07/21/2020       Significant Imaging: I have reviewed all pertinent imaging results/findings within the past 24 hours.       Recommendations  Continue treatment  Full code  Possible placement per family wishes

## 2020-08-17 NOTE — PLAN OF CARE
Spoke with patient at 3:40pm, stated he wanted to be full code for now.  Emeli Adam MD  [employer] [facility] PGY2

## 2020-08-17 NOTE — PT/OT/SLP PROGRESS
Physical Therapy Treatment    Patient Name:  Rajan Deluna   MRN:  [id number]    Recommendations:     Discharge Recommendations:  nursing facility, skilled   Discharge Equipment Recommendations: walker, rolling   Barriers to discharge: decreased functional mobility limited by SOB and decreased O2 sats with movement.    Assessment:     Rajan Deluna is a 74 y.o. male admitted with a medical diagnosis of COVID-19.  He presents with the following impairments/functional limitations:  weakness, impaired endurance, impaired self care skills, impaired functional mobilty, gait instability, impaired balance, decreased lower extremity function, decreased upper extremity function, decreased ROM, impaired cardiopulmonary response to activity.  Pt would continue to benefit from P.T. To address impairments listed above.  .    Rehab Prognosis: Fair; patient would benefit from acute skilled PT services to address these deficits and reach maximum level of function.    Recent Surgery: Procedure(s) (LRB):  IMAGING PROCEDURE, GI TRACT, INTRALUMINAL, VIA CAPSULE (N/A) 14 Days Post-Op    Plan:     During this hospitalization, patient to be seen 6 x/week to address the identified rehab impairments via gait training, therapeutic activities, therapeutic exercises, neuromuscular re-education and progress toward the following goals:    · Plan of Care Expires:  09/08/20    Subjective     Chief Complaint: Fatigue  Patient/Family Comments/goals: Pt agreed to tx in bed secondary to just returning to bed after sitting up in b/s chair for ~1 hr.  Pain/Comfort:  · Pain Rating 1: 0/10  · Pain Rating Post-Intervention 1: 0/10      Objective:     Communicated with RN (Gerda) prior to session.  Patient found supine with bed alarm, oxygen, peripheral IV, telemetry(HFO2) upon PT entry to room.     General Precautions: Standard, respiratory, fall(covid precautions lifted 8/11)   Orthopedic Precautions:N/A   Braces:             AM-PAC 6 CLICK  MOBILITY  Turning over in bed (including adjusting bedclothes, sheets and blankets)?: 3  Sitting down on and standing up from a chair with arms (e.g., wheelchair, bedside commode, etc.): 3  Moving from lying on back to sitting on the side of the bed?: 3  Moving to and from a bed to a chair (including a wheelchair)?: 3  Need to walk in hospital room?: 3  Climbing 3-5 steps with a railing?: 1  Basic Mobility Total Score: 16       Therapeutic Activities and Exercises:   Supine BLE therex/ROM:  AP, heelslides, hip ABD/ADD/IR/ER, SAQs 10 x 2, glut sets 10 x with vc's not to hold his breathing while performing glut sets.  PTA assisted pt with therex/ROM for increased ROM and secondary to weakness.  O2 sats 92 to 90% during tx with pt on HFNC 8lpm    Patient left supine with all lines intact, call button in reach, bed alarm on and RN notified..    GOALS:   Multidisciplinary Problems     Physical Therapy Goals        Problem: Physical Therapy Goal    Goal Priority Disciplines Outcome Goal Variances Interventions   Physical Therapy Goal     PT, PT/OT Ongoing, Progressing     Description: Goals to be met by: 20     Patient will increase functional independence with mobility by performin. Supine <> sit with MInimal Assistance   Met   Revised  supine<>sit MOD I  2. Sit to stand transfer with Minimal Assistance   Met   Revised sit to stand with supervision  3. Bed to chair transfer with Minimal Assistance   Met   Revised bed to chair transfer with supervision  4. Gait x 50 ft with min A with appropriate AD  5. Lower extremity exercise program x 10 reps per handout, with supervision  Met 8/15  Revised Lower extremity exercise program x 20 reps per handout, with supervision                     Time Tracking:     PT Received On: 20  PT Start Time: 1439     PT Stop Time: 1454  PT Total Time (min): 15 min     Billable Minutes: Therapeutic Exercise 15    Treatment Type: Treatment  PT/PTA: PTA     PTA  Visit Number: 1     Dee Urena, BRIANNA  08/17/2020

## 2020-08-17 NOTE — PLAN OF CARE
Problem: Physical Therapy Goal  Goal: Physical Therapy Goal  Description: Goals to be met by: 20     Patient will increase functional independence with mobility by performin. Supine <> sit with MInimal Assistance   Met   Revised  supine<>sit MOD I  2. Sit to stand transfer with Minimal Assistance   Met   Revised sit to stand with supervision  3. Bed to chair transfer with Minimal Assistance   Met   Revised bed to chair transfer with supervision  4. Gait x 50 ft with min A with appropriate AD  5. Lower extremity exercise program x 10 reps per handout, with supervision  Met 8/15  Revised Lower extremity exercise program x 20 reps per handout, with supervision    Outcome: Ongoing, Progressing   Continue working toward goals.

## 2020-08-17 NOTE — PROGRESS NOTES
[facility] Hospital Medicine  Progress Note    Patient Name: Rajan Deluna  MRN: [id number]  Patient Class: IP- Inpatient   Admission Date: 7/21/2020  Length of Stay: 27 days  Attending Physician: Rell Tolentino III, MD  Primary Care Provider: Jason Mccord MD        Subjective:     Principal Problem:COVID-19        HPI:  Patient is a 74-year-old male with a past medical history of hypertension, COPD, MAC infection who presented to the ED with altered mental status and fever.  As per the family, patient has had altered mental status for the past 3-4 days. Family endorses increased somnolence as well as urinating on himself. EMS was called two days prior to presentation but reportedly deemed that patient did not need to be brought in. Over the previous day, reportedly the patient was found stuck, staring in his sink, not responding. On the day of presentation, the patient was found down on the floor, family wasn't able to get patient up from the floor and was reportedly not responding as much as baseline. Family believed that patient may not have been wearing baseline 2L O2. Patient also endorses a cough and generalized weakness. Patient reportedly had a recent UTI.     In the ED, chest x-ray showed diffuse interstitial patchy infiltrates concerning for possible pneumonia and a COVID-19 screening test was positive. Fever was 101° F and patient was hypotensive 80s/40s. Patient given 30cc/kg bolus. BP very soft on presentation, adequately responded to volume resuscitation and then decreased again. Labs showed a BUN 45, creatinine 2.9, , , troponin 0.036, procalcitonin 1.13.     Overview/Hospital Course:  8/3 - NAEON, had chest pain, normal EKG and troponin overnight, resolved with GI cocktail. This AM, H/H decreased from 10.8/33.7 to 9.1/28. Infectious disease recommends oral Vancomycin and IV Metronidazole for C. Diff until 8/7 and to continue voriconazole and discharge on Itraconazole  100mg BID and follow up with Pulmonary and ID for continued Aspergillosis and MAC treatment. Patient currently satting 90% on 40L vapotherm at 65% FiO2, weaning as tolerated.    8/4 - NAEON. Patient is currently satting in the >90% on 20L vapotherm at 35% FiO2, weaning as tolerated. H/H stable 9.5/30.3. Per GI, VCE show a few nonbleeding small likely duodenal AVMs and no other evidence of overt GI Bleed and recommend outpatient evaluation due to the patient's current pulmonary status.     8/5 - NAEON. Pending LTAC status. Spo2 70's with PT/OT and need to increase oxygen to vapotherm 20L 70% with an increase in Spo2 to 90's.     8/6 - NAEON. LTAC declined due to insurance. Currently on 30L 70% vapotherm.    8/7 - 11L 45% HFNL. Off covid precautions Monday 8/10    8/8 - NAEO. Bipap overnight, HFNC 15L last night and this am. Feeling better. No cp. H/H stable     8/9 - NAEON, patient on 15L HFNC. States he is coughing a bit more today, states it is nonproductive. Denies any discomfort, N/V/D. Feels well. On Monday, patient will meet criteria for isolation to be lifted, which would allow his daughter to visit.      8/10 - Oxygen weaned down to 10L HFNC over the weekend. Patient off COVID isolation precautions. Pending placement.    8/11 - Oxygen weaned down to 4L NC but patient desaturated throughout the day and ended the day on 7LPM O2. COVID isolation lifted.    8/12 - Patient on 7 LPM to start the day and increased to 9LPM O2. Patient continues to desat to the 80s upon minor exertion. Patient has become deconditioned from his time in hospital and will continue to benefit from PT/OT and inspiratory spirometry.     8/13 - Patient on 9LPM high flow O2. Patient refusing BIPAP use at night. OT noted improvement in respiratory status during therapy.     8/14 - On 8L HFNC O2. Left shoulder pain, pending x-ray.    Interval History: NAEON. Currently on 6L HFNC. Tolerating breathing treatments. Wants to go home but discussed  goals of care with patient in terms of weaning O2 requirements. Continues to have shoulder pain but controlled with lidocaine patch and naproxen.    Review of Systems  Objective:     Vital Signs (Most Recent):  Temp: 96.3 °F (35.7 °C) (08/17/20 0740)  Pulse: 84 (08/17/20 0800)  Resp: (!) 24 (08/17/20 0800)  BP: 112/64 (08/17/20 0740)  SpO2: (!) 91 % (08/17/20 0800) Vital Signs (24h Range):  Temp:  [96.3 °F (35.7 °C)-98.6 °F (37 °C)] 96.3 °F (35.7 °C)  Pulse:  [] 84  Resp:  [18-24] 24  SpO2:  [88 %-93 %] 91 %  BP: ()/(56-82) 112/64     Weight: 71.6 kg (157 lb 13.6 oz)  Body mass index is 22.02 kg/m².    Intake/Output Summary (Last 24 hours) at 8/17/2020 0847  Last data filed at 8/17/2020 0400  Gross per 24 hour   Intake 450 ml   Output 650 ml   Net -200 ml      Physical Exam  Vitals signs and nursing note reviewed.   Constitutional:       General: He is not in acute distress.     Appearance: Normal appearance. He is not ill-appearing.   HENT:      Head: Normocephalic and atraumatic.      Right Ear: External ear normal.      Left Ear: External ear normal.      Nose: Nose normal. No congestion or rhinorrhea.      Mouth/Throat:      Mouth: Mucous membranes are moist.      Pharynx: Oropharynx is clear.   Eyes:      Pupils: Pupils are equal, round, and reactive to light.   Neck:      Musculoskeletal: Normal range of motion and neck supple.   Cardiovascular:      Rate and Rhythm: Normal rate and regular rhythm.      Pulses: Normal pulses.      Heart sounds: Normal heart sounds.   Pulmonary:      Effort: Pulmonary effort is normal.      Breath sounds: Wheezing present.      Comments: Wheezes this AM, likely COPD. O2 at 6L HFNC  Abdominal:      General: Abdomen is flat. There is no distension.      Palpations: Abdomen is soft.      Tenderness: There is no abdominal tenderness. There is no guarding.   Musculoskeletal: Normal range of motion.         General: No swelling.   Neurological:      General: No focal  deficit present.      Mental Status: He is alert and oriented to person, place, and time. Mental status is at baseline.   Psychiatric:         Mood and Affect: Mood normal.         Behavior: Behavior normal.         Thought Content: Thought content normal.         Judgment: Judgment normal.         Significant Labs:   A1C:   Recent Labs   Lab 07/23/20  0403   HGBA1C 6.3*     CBC:   Recent Labs   Lab 08/17/20 0444   WBC 4.57   HGB 9.5*   HCT 30.7*   *     CMP:   Recent Labs   Lab 08/17/20 0444   *   K 4.5      CO2 26   GLU 92   BUN 13   CREATININE 1.0   CALCIUM 8.3*   PROT 6.1   ALBUMIN 2.0*   BILITOT 0.8   ALKPHOS 234*   AST 89*   ALT 40   ANIONGAP 7*   EGFRNONAA >60     Coagulation:   Recent Labs   Lab 08/17/20 0444   APTT 32.3*     TSH:   Recent Labs   Lab 07/23/20 0403   TSH 0.604       Significant Imaging: I have reviewed all pertinent imaging results/findings within the past 24 hours.      Assessment/Plan:      * COVID-19  -Lifted Isolation  -Continuing to wean O2  -Deconditioning and long illness has led to increased O2 requirement, continuing to wean and work with PT/OT, RT with goal of SNF (5L NC max)    Left shoulder pain  Add naproxen scheduled BID  Continue lidocaine patch, tylenol for pain  Stable pulses, ROM limited by patient effort  Xray shoulder stable  Chronic problem  Continue PT/OT as well as current pain regimen      History of Aspergillus & MAC  Continuing voriconazole, switch to itroconazole at DC   -Follow up with Pulm, ID outpatient  STABLE    Hypotension  Patient's most recent blood pressure 116/75  RESOLVED      Sepsis  RESOLVED  See plan for COVID 19    COPD (chronic obstructive pulmonary disease)  On HFNC 6L, improvement  Continue to wean  Goal of 5L NC  Patient to sit up 2x daily in chair  Mild wheezes this AM  Some cough   Continue albuterol and chest physio/IS  -Continue to monitor cough  -Monitor for progress         Anemia  Asymptomatic  H/H stable at  9.5/30.7  Labs QOD      VTE Risk Mitigation (From admission, onward)         Ordered     enoxaparin injection 80 mg  Every 12 hours (non-standard times)      08/04/20 1421     Place sequential compression device  Until discontinued      07/21/20 2142     IP VTE HIGH RISK PATIENT  Once      07/21/20 2142                Discharge Planning   ADAM:      Code Status: Full Code   Is the patient medically ready for discharge?:     Reason for patient still in hospital (select all that apply): Patient trending condition  Discharge Plan A: Long-term acute care facility (LTAC)   Discharge Delays: None known at this time              Emeli Adam MD   [employer] PGY2  Department of Hospital Medicine   [facility]

## 2020-08-17 NOTE — PLAN OF CARE
VN cued in to pt's room with permission for pm rounds. Plan of care reviewed with pt. Pt verbalizes understanding. (limited- pt [facility]). Pt continues to complain of shoulder pain 10/10. Bedside nurse administered prn tylenol at 2010. Informed pt he could have prn voltarne gel later if he needed. Verbalized understanding. Denies any needs/questions at this time. Call bell w/in reach. Instructed to call for needs/assist oob.

## 2020-08-17 NOTE — SUBJECTIVE & OBJECTIVE
Interval History: NAEON. Currently on 6L HFNC. Tolerating breathing treatments. Wants to go home but discussed goals of care with patient in terms of weaning O2 requirements. Continues to have shoulder pain but controlled with lidocaine patch and naproxen.    Review of Systems  Objective:     Vital Signs (Most Recent):  Temp: 96.3 °F (35.7 °C) (08/17/20 0740)  Pulse: 84 (08/17/20 0800)  Resp: (!) 24 (08/17/20 0800)  BP: 112/64 (08/17/20 0740)  SpO2: (!) 91 % (08/17/20 0800) Vital Signs (24h Range):  Temp:  [96.3 °F (35.7 °C)-98.6 °F (37 °C)] 96.3 °F (35.7 °C)  Pulse:  [] 84  Resp:  [18-24] 24  SpO2:  [88 %-93 %] 91 %  BP: ()/(56-82) 112/64     Weight: 71.6 kg (157 lb 13.6 oz)  Body mass index is 22.02 kg/m².    Intake/Output Summary (Last 24 hours) at 8/17/2020 0847  Last data filed at 8/17/2020 0400  Gross per 24 hour   Intake 450 ml   Output 650 ml   Net -200 ml      Physical Exam  Vitals signs and nursing note reviewed.   Constitutional:       General: He is not in acute distress.     Appearance: Normal appearance. He is not ill-appearing.   HENT:      Head: Normocephalic and atraumatic.      Right Ear: External ear normal.      Left Ear: External ear normal.      Nose: Nose normal. No congestion or rhinorrhea.      Mouth/Throat:      Mouth: Mucous membranes are moist.      Pharynx: Oropharynx is clear.   Eyes:      Pupils: Pupils are equal, round, and reactive to light.   Neck:      Musculoskeletal: Normal range of motion and neck supple.   Cardiovascular:      Rate and Rhythm: Normal rate and regular rhythm.      Pulses: Normal pulses.      Heart sounds: Normal heart sounds.   Pulmonary:      Effort: Pulmonary effort is normal.      Breath sounds: Wheezing present.      Comments: Wheezes this AM, likely COPD. O2 at 6L HFNC  Abdominal:      General: Abdomen is flat. There is no distension.      Palpations: Abdomen is soft.      Tenderness: There is no abdominal tenderness. There is no guarding.    Musculoskeletal: Normal range of motion.         General: No swelling.   Neurological:      General: No focal deficit present.      Mental Status: He is alert and oriented to person, place, and time. Mental status is at baseline.   Psychiatric:         Mood and Affect: Mood normal.         Behavior: Behavior normal.         Thought Content: Thought content normal.         Judgment: Judgment normal.         Significant Labs:   A1C:   Recent Labs   Lab 07/23/20  0403   HGBA1C 6.3*     CBC:   Recent Labs   Lab 08/17/20 0444   WBC 4.57   HGB 9.5*   HCT 30.7*   *     CMP:   Recent Labs   Lab 08/17/20 0444   *   K 4.5      CO2 26   GLU 92   BUN 13   CREATININE 1.0   CALCIUM 8.3*   PROT 6.1   ALBUMIN 2.0*   BILITOT 0.8   ALKPHOS 234*   AST 89*   ALT 40   ANIONGAP 7*   EGFRNONAA >60     Coagulation:   Recent Labs   Lab 08/17/20 0444   APTT 32.3*     TSH:   Recent Labs   Lab 07/23/20 0403   TSH 0.604       Significant Imaging: I have reviewed all pertinent imaging results/findings within the past 24 hours.

## 2020-08-18 LAB
POCT GLUCOSE: 104 MG/DL (ref 70–110)
POCT GLUCOSE: 113 MG/DL (ref 70–110)
POCT GLUCOSE: 149 MG/DL (ref 70–110)
POCT GLUCOSE: 88 MG/DL (ref 70–110)

## 2020-08-18 PROCEDURE — 94799 UNLISTED PULMONARY SVC/PX: CPT

## 2020-08-18 PROCEDURE — 25000003 PHARM REV CODE 250: Performed by: STUDENT IN AN ORGANIZED HEALTH CARE EDUCATION/TRAINING PROGRAM

## 2020-08-18 PROCEDURE — 63600175 PHARM REV CODE 636 W HCPCS: Performed by: FAMILY MEDICINE

## 2020-08-18 PROCEDURE — 94660 CPAP INITIATION&MGMT: CPT

## 2020-08-18 PROCEDURE — 25000242 PHARM REV CODE 250 ALT 637 W/ HCPCS: Performed by: STUDENT IN AN ORGANIZED HEALTH CARE EDUCATION/TRAINING PROGRAM

## 2020-08-18 PROCEDURE — 27100171 HC OXYGEN HIGH FLOW UP TO 24 HOURS

## 2020-08-18 PROCEDURE — 25000003 PHARM REV CODE 250: Performed by: INTERNAL MEDICINE

## 2020-08-18 PROCEDURE — 11000001 HC ACUTE MED/SURG PRIVATE ROOM

## 2020-08-18 PROCEDURE — 97803 MED NUTRITION INDIV SUBSEQ: CPT

## 2020-08-18 PROCEDURE — 87045 FECES CULTURE AEROBIC BACT: CPT

## 2020-08-18 PROCEDURE — 99900035 HC TECH TIME PER 15 MIN (STAT)

## 2020-08-18 PROCEDURE — 94640 AIRWAY INHALATION TREATMENT: CPT

## 2020-08-18 PROCEDURE — 25000003 PHARM REV CODE 250: Performed by: FAMILY MEDICINE

## 2020-08-18 PROCEDURE — 94761 N-INVAS EAR/PLS OXIMETRY MLT: CPT

## 2020-08-18 PROCEDURE — 97530 THERAPEUTIC ACTIVITIES: CPT | Mod: CO

## 2020-08-18 PROCEDURE — 94664 DEMO&/EVAL PT USE INHALER: CPT

## 2020-08-18 PROCEDURE — 27000221 HC OXYGEN, UP TO 24 HOURS

## 2020-08-18 PROCEDURE — 87046 STOOL CULTR AEROBIC BACT EA: CPT

## 2020-08-18 PROCEDURE — 97530 THERAPEUTIC ACTIVITIES: CPT | Mod: CQ

## 2020-08-18 PROCEDURE — 87427 SHIGA-LIKE TOXIN AG IA: CPT

## 2020-08-18 PROCEDURE — 99223 1ST HOSP IP/OBS HIGH 75: CPT | Mod: ,,, | Performed by: PSYCHIATRY & NEUROLOGY

## 2020-08-18 PROCEDURE — 99223 PR INITIAL HOSPITAL CARE,LEVL III: ICD-10-PCS | Mod: ,,, | Performed by: PSYCHIATRY & NEUROLOGY

## 2020-08-18 RX ADMIN — VORICONAZOLE 200 MG: 200 TABLET, FILM COATED ORAL at 04:08

## 2020-08-18 RX ADMIN — ENOXAPARIN SODIUM 80 MG: 80 INJECTION SUBCUTANEOUS at 04:08

## 2020-08-18 RX ADMIN — POLYETHYLENE GLYCOL (3350) 17 G: 17 POWDER, FOR SOLUTION ORAL at 08:08

## 2020-08-18 RX ADMIN — Medication 4 TABLET: at 04:08

## 2020-08-18 RX ADMIN — STANDARDIZED SENNA CONCENTRATE AND DOCUSATE SODIUM 1 TABLET: 8.6; 5 TABLET ORAL at 08:08

## 2020-08-18 RX ADMIN — POTASSIUM & SODIUM PHOSPHATES POWDER PACK 280-160-250 MG 1 PACKET: 280-160-250 PACK at 11:08

## 2020-08-18 RX ADMIN — NAPROXEN 500 MG: 500 TABLET ORAL at 04:08

## 2020-08-18 RX ADMIN — NAPROXEN 500 MG: 500 TABLET ORAL at 08:08

## 2020-08-18 RX ADMIN — MONTELUKAST 10 MG: 10 TABLET, FILM COATED ORAL at 08:08

## 2020-08-18 RX ADMIN — THERA TABS 1 TABLET: TAB at 08:08

## 2020-08-18 RX ADMIN — LIDOCAINE 2 PATCH: 50 PATCH TOPICAL at 04:08

## 2020-08-18 RX ADMIN — GUAIFENESIN AND DEXTROMETHORPHAN HYDROBROMIDE 1 TABLET: 600; 30 TABLET, EXTENDED RELEASE ORAL at 04:08

## 2020-08-18 RX ADMIN — Medication 4 TABLET: at 11:08

## 2020-08-18 RX ADMIN — Medication 4 TABLET: at 08:08

## 2020-08-18 RX ADMIN — IPRATROPIUM BROMIDE AND ALBUTEROL SULFATE 3 ML: 2.5; .5 SOLUTION RESPIRATORY (INHALATION) at 12:08

## 2020-08-18 RX ADMIN — IPRATROPIUM BROMIDE AND ALBUTEROL SULFATE 3 ML: 2.5; .5 SOLUTION RESPIRATORY (INHALATION) at 03:08

## 2020-08-18 RX ADMIN — POTASSIUM & SODIUM PHOSPHATES POWDER PACK 280-160-250 MG 1 PACKET: 280-160-250 PACK at 05:08

## 2020-08-18 RX ADMIN — TAMSULOSIN HYDROCHLORIDE 0.4 MG: 0.4 CAPSULE ORAL at 08:08

## 2020-08-18 RX ADMIN — PANTOPRAZOLE SODIUM 40 MG: 40 TABLET, DELAYED RELEASE ORAL at 08:08

## 2020-08-18 RX ADMIN — IPRATROPIUM BROMIDE AND ALBUTEROL SULFATE 3 ML: 2.5; .5 SOLUTION RESPIRATORY (INHALATION) at 08:08

## 2020-08-18 RX ADMIN — IPRATROPIUM BROMIDE AND ALBUTEROL SULFATE 3 ML: 2.5; .5 SOLUTION RESPIRATORY (INHALATION) at 07:08

## 2020-08-18 RX ADMIN — ACETAMINOPHEN 650 MG: 325 TABLET ORAL at 01:08

## 2020-08-18 NOTE — PROGRESS NOTES
"[facility]  Adult Nutrition  Progress Note    SUMMARY       Recommendations    Recommendation:   1. Ecnourage intake of meals & supplements as tolerated. 2. RD to continue to follow to monitor po intake    Goals:   Pt intake >/= 75% EEN/EPN by RD follow up  Nutrition Goal Status: progressing towards goal  Communication of RD Recs: reviewed with RN(Daya)    Reason for Assessment  Reason For Assessment: RD follow-up  Diagnosis: other (see comments)(Acute respiratory disease due to COVID-19 virus )  Relevant Medical History: HTN, COPD, former smoker  Interdisciplinary Rounds: did not attend  General Information Comments: Pt on 2000 ADA low Na diwt with  Boost Glucose Control. Pt with fair intake at meals. Reports fair appetite. No longer on isolation for COVID. Unable to assess NFOE at this time.   Nutrition Discharge Planning: d/c on cardiac diabetic diet    Nutrition Risk Screen  Nutrition Risk Screen: no indicators present    Nutrition/Diet History  Food Preferences: no Sabianism or cultural food prefs identified  Spiritual, Cultural Beliefs, Mandaen Practices, Values that Affect Care: no  Food Allergies: NKFA  Factors Affecting Nutritional Intake: decreased appetite, other (see comments)(continuous resp support)    Anthropometrics  Temp: 96.9 °F (36.1 °C)  Height Method: Stated  Height: 5' 11" (180.3 cm)  Height (inches): 71 in  Weight Method: Bed Scale  Weight: 71.6 kg (157 lb 13.6 oz)  Weight (lb): 157.85 lb  Ideal Body Weight (IBW), Male: 172 lb  % Ideal Body Weight, Male (lb): 106.38 %  BMI (Calculated): 22  BMI Grade: 25 - 29.9 - overweight     Lab/Procedures/Meds  Pertinent Labs Reviewed: reviewed  Pertinent Labs Comments: Na 135L, Ca 8.3L, Phos 2.6L, Alb 2.0L  Pertinent Medications Reviewed: reviewed  Pertinent Medications Comments: lactobacillus, MVI, pantoprazole    Estimated/Assessed Needs  Weight Used For Calorie Calculations: 71.6 kg (157 lb 13.6 oz)  Energy Calorie Requirements " (kcal): 2148 (30kcal/kg)  Energy Need Method: Kcal/kg  Protein Requirements: 71-85g (1.0-1.2g/kg)  Weight Used For Protein Calculations: 71.6 kg (157 lb 13.6 oz)  Estimated Fluid Requirement Method: RDA Method  RDA Method (mL): 2148     Nutrition Prescription Ordered  Current Diet Order: 2000 ADA low Na  Oral Nutrition Supplement: Boost Glucose Control    Evaluation of Received Nutrient/Fluid Intake  I/O: 720/300  Energy Calories Required: meeting needs  Protein Required: meeting needs  Fluid Required: meeting needs  Comments: LBM 8/12  Tolerance: tolerating  % Intake of Estimated Energy Needs: 50 - 75 %  % Meal Intake: 50 - 75 %    Nutrition Risk  Level of Risk/Frequency of Follow-up: (2 x/week)     Assessment and Plan  Acute respiratory disease due to COVID-19 virus-resolved as of 8/16/2020  Contributing Nutrition Diagnosis  Inadequate oral intake    Related to (etiology):   physiological state    Signs and Symptoms (as evidenced by):   Pt COVID+ needing continuous respiratory support    Interventions:  Collaboration with other providers  Commercial Beverage: Boost Glucose Control TID to supplement protein and calorie intake      Nutrition Diagnosis Status:   Improving      Monitor and Evaluation  Food and Nutrient Intake: energy intake, food and beverage intake  Food and Nutrient Adminstration: diet order  Knowledge/Beliefs/Attitudes: food and nutrition knowledge/skill  Physical Activity and Function: nutrition-related ADLs and IADLs  Anthropometric Measurements: weight, weight change, body mass index  Biochemical Data, Medical Tests and Procedures: electrolyte and renal panel, gastrointestinal profile, glucose/endocrine profile, inflammatory profile, lipid profile     Malnutrition Assessment  Unable to assess NFPE at this time       Nutrition Follow-Up    RD Follow-up?: Yes

## 2020-08-18 NOTE — ASSESSMENT & PLAN NOTE
-Yesterday, daughter asserted her POA gave her control over father's medical decisions  -In this team's opinion, patient has capacity for decisionmaking at this time  -Consulted psychiatry for capacity evaluation  -Patient assented to full code for now  -Patient, daughter will need family meeting, will coordinate

## 2020-08-18 NOTE — PLAN OF CARE
Problem: Adult Inpatient Plan of Care  Goal: Plan of Care Review  Patient is awake and orientedx3, disoriented to situation. Care plan explained to patient; he verbalized understanding. On 7L via nasal cannula, O2 saturation at 95-99%. Hooked to heart monitor running sinus tachycardia at 100-110bpm. Urinal in reach. No pain/n/v/d during shift. Due medications given. Encouraged/assisted to turn every 2 hours as tolerated. Maintained on fall risk precaution. Bed in lowest position, bed alarm on, call light/personal items within reach and instructed to call for help when needed. Will continue to monitor.       Outcome: Ongoing, Progressing

## 2020-08-18 NOTE — PLAN OF CARE
Problem: Occupational Therapy Goal  Goal: Occupational Therapy Goal  Description: Goals to be met by: 8/27/20     Patient will increase functional independence with ADLs by performing:    LE Dressing with Stand-by Assistance.  Grooming while standing with Stand-by Assistance.  Toileting from toilet with Stand-by Assistance for hygiene and clothing management.   Supine to sit with Stand-by Assistance.  Step transfer with Stand-by Assistance  Toilet transfer to toilet with Stand-by Assistance.  Upper extremity exercise program x10 reps per handout, with independence.    Outcome: Ongoing, Progressing    Rajan Deluna is a 74 y.o. male with a medical diagnosis of COVID-19.  Performance deficits affecting function are weakness, impaired endurance, impaired self care skills, impaired functional mobilty, gait instability, impaired balance, decreased upper extremity function, decreased lower extremity function, impaired cardiopulmonary response to activity. Pt found in bed, agreeable to therapy.  Pt with poor tolerance of therapy secondary to oxygen desaturation. Attempted to transfer to chair however pt desatted to 84% and became SOB.  Increased O2 to 8L and increased to 86%, then returned to supine with HOB elevated and increased O2 to 10L after several minutes of PLB O2 up to 90%. RN notified and assessed pt.  Returned O2 to 6L and O2 sats up to 90%.  Continue OT services to address functional goals, progressing as able.      ISABELLA Reyna/L

## 2020-08-18 NOTE — PLAN OF CARE
"VN note: VN cued into patient's room for introduction. Patient's 02 is in place. He denies any SOB at this time. Informed VN "I feel good." Denies any needs at this time. Allowed time for questions. VN will continue to be available to patient and intervene prn.    "

## 2020-08-18 NOTE — PROGRESS NOTES
Pt has been refusing Bipap, and currently on 7L HFNC.   Sats 99% on documented o2, pt resting comfortably. RN notified of pt refusal. Will cont to monitor.

## 2020-08-18 NOTE — PROGRESS NOTES
[facility] Hospital Medicine  Progress Note    Patient Name: Rajan Deluna  MRN: [id number]  Patient Class: IP- Inpatient   Admission Date: 7/21/2020  Length of Stay: 28 days  Attending Physician: Rajan Rodríguez MD  Primary Care Provider: Jason Mccord MD        Subjective:     Principal Problem:COVID-19        HPI:  Patient is a 74-year-old male with a past medical history of hypertension, COPD, MAC infection who presented to the ED with altered mental status and fever.  As per the family, patient has had altered mental status for the past 3-4 days. Family endorses increased somnolence as well as urinating on himself. EMS was called two days prior to presentation but reportedly deemed that patient did not need to be brought in. Over the previous day, reportedly the patient was found stuck, staring in his sink, not responding. On the day of presentation, the patient was found down on the floor, family wasn't able to get patient up from the floor and was reportedly not responding as much as baseline. Family believed that patient may not have been wearing baseline 2L O2. Patient also endorses a cough and generalized weakness. Patient reportedly had a recent UTI.     In the ED, chest x-ray showed diffuse interstitial patchy infiltrates concerning for possible pneumonia and a COVID-19 screening test was positive. Fever was 101° F and patient was hypotensive 80s/40s. Patient given 30cc/kg bolus. BP very soft on presentation, adequately responded to volume resuscitation and then decreased again. Labs showed a BUN 45, creatinine 2.9, , , troponin 0.036, procalcitonin 1.13.     Overview/Hospital Course:  Patient has had an extensive hospital course 2/2 COVID-related sepsis. Patient required peripheral Levophed for 1 day (upon admission) and has had stable MAPs throughout his course off pressors. Initially, patient required continuous BiPap and was stepped up to the ICU on 7/22 for 6  days. Patient also presented with a high D-dimer at 7.22 and was started on a heparin drip. CTA was contraindicated due to MARQUIS upon initial presentation. MARQUIS has improved with current Cr of 1 down from 2.9. While in the ICU, patient required supplemental O2. Patient complained of melanic diarrhea and a rectal tube was placed. Patient anxious and disoriented in the ICU. Haldol PRN for agitation in the ICU. Mental status improved in the ICU. Good UOP.    ID workup of melanic stool showed C. diff positive antigen and C. diff PCR toxin positive. Patient's melanic stool improved and rectal tube removed. He completed treatment with IV metronidazole and P.O. vancomycin for a total of 14 days, last dose 8/7. Though patient's melanic stool and watery diarrhea improved, there were concerns for a GI bleed due to an H/H of 6.9/21.5. Heparin drip was discontinued, IV protonix started, and the patient received 1 unit of packed RBCs. 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 Started on probiotics. Most recent recent H&H 9.5/37, stable.    Patient transitioned off continuous BiPAP and stepped down to the floor 7/27 with BiPAP use only at night. Continued to require supplemental O2. Tolerated advances in diet, duoneb treatments, incentive spirometry and chest physiotherapy. Tolerates PT/OT daily. Discussed self-proning due to deconditioned respiratory status but patient unable to tolerate. Off COVID isolation on 8/10, per hospital policy of 20 days negative COVID status. Patient received Levoquin for 5 days, stopped after procal <1. IV dexamthasone for 10 days. Redemsivir for 5 days.     Patient with history of aspergillosis and was on itraconazole as outpatient. WBC count increased to 20 during his course but resolved after patient completed 10 day course of IV dexamethasone.  Per ID, Voriconazole 400 mg po every 12 h X 2 loading doses, starting 7/29, then change to voriconazole 200 mg po every 12 h thereafter. Plan is to continue voriconazole and discharge on Itraconazole 100mg BID and follow up with outpatient Pulmonary and ID for continued Aspergillosis and MAC treatment.     During the patient's hospital course, he also had complaints of chest pain which resolved with a GI cocktail. Normal EKG and troponin trend. He also complained about left shoulder pain, in which the patient has a history of chronic shoulder pain that has required orthopedic surgery in the past. A shoulder x-ray showed to acute changes and the pain was managed with lidocaine patches and naproxen.    LTAC placement denied multiple times due to patient's continued need of supplemental oxygen. Patient wishes to go home. Discussed hospice/palliative care with daughter, patient's power of , due to patient's new baseline for his COPD 2/2 COVID. She is not agreeable to the plan. Psych consulted to determine patient's capacity. Goals of care are to continue to wean the patient to an O2 requirement suitable for placement. Due to patient's extensive hospital course, the patient is deconditioned. The patient desats <88% with minimal exertion and supplemental O2 requirement increases. Able to wean the patient down to 8L HFNC thus far from a peak requirement of 30L 70% on vapotherm.      Interval History: NAEON. Refusing BiPAP. Stool cultures ordered for placement. Psych consulted to determine capacity as patient's wishes and daughter's wishes (POA) are not aligned. Will work with Palliative, SW, and coordinate a family meeting.     Review of Systems   Constitutional: Negative for activity change, appetite change, chills and fatigue.   HENT: Negative for congestion and dental problem.    Eyes: Negative for discharge and itching.   Respiratory: Negative for cough and shortness of breath.    Cardiovascular: Negative for  chest pain.   Gastrointestinal: Negative for abdominal pain, blood in stool, diarrhea and nausea.   Endocrine: Negative for cold intolerance and heat intolerance.   Genitourinary: Negative for difficulty urinating.   Musculoskeletal: Negative for arthralgias, back pain and gait problem.   Skin: Negative.  Negative for color change and pallor.   Neurological: Negative for dizziness, facial asymmetry, light-headedness and headaches.   Psychiatric/Behavioral: The patient is not nervous/anxious.      Objective:     Vital Signs (Most Recent):  Temp: 96.9 °F (36.1 °C) (08/18/20 0738)  Pulse: 99 (08/18/20 0758)  Resp: 18 (08/18/20 0758)  BP: 116/68 (08/18/20 0738)  SpO2: (!) 92 % (08/18/20 0758) Vital Signs (24h Range):  Temp:  [96.1 °F (35.6 °C)-98 °F (36.7 °C)] 96.9 °F (36.1 °C)  Pulse:  [] 99  Resp:  [16-22] 18  SpO2:  [87 %-99 %] 92 %  BP: ()/(58-68) 116/68     Weight: 71.6 kg (157 lb 13.6 oz)  Body mass index is 22.02 kg/m².    Intake/Output Summary (Last 24 hours) at 8/18/2020 0856  Last data filed at 8/18/2020 0000  Gross per 24 hour   Intake 480 ml   Output 300 ml   Net 180 ml      Physical Exam  Vitals signs and nursing note reviewed.   Constitutional:       General: He is not in acute distress.     Appearance: Normal appearance. He is not ill-appearing.   HENT:      Head: Normocephalic and atraumatic.      Right Ear: External ear normal.      Left Ear: External ear normal.      Nose: Nose normal. No congestion or rhinorrhea.      Mouth/Throat:      Mouth: Mucous membranes are moist.      Pharynx: Oropharynx is clear.   Eyes:      Pupils: Pupils are equal, round, and reactive to light.   Neck:      Musculoskeletal: Normal range of motion and neck supple.   Cardiovascular:      Rate and Rhythm: Normal rate and regular rhythm.      Pulses: Normal pulses.      Heart sounds: Normal heart sounds.   Pulmonary:      Effort: Pulmonary effort is normal.      Breath sounds: Wheezing present.      Comments:  Wheezes this AM, likely COPD. O2 at 6L HFNC  Abdominal:      General: Abdomen is flat. There is no distension.      Palpations: Abdomen is soft.      Tenderness: There is no abdominal tenderness. There is no guarding.   Musculoskeletal: Normal range of motion.         General: No swelling.   Neurological:      General: No focal deficit present.      Mental Status: He is alert and oriented to person, place, and time. Mental status is at baseline.   Psychiatric:         Mood and Affect: Mood normal.         Behavior: Behavior normal.         Thought Content: Thought content normal.         Judgment: Judgment normal.         Significant Labs:   POCT Glucose:   Recent Labs   Lab 08/17/20  1630 08/17/20  2101 08/18/20  0558   POCTGLUCOSE 107 114* 88     All pertinent labs within the past 24 hours have been reviewed.    Significant Imaging: I have reviewed all pertinent imaging results/findings within the past 24 hours.      Assessment/Plan:      * COVID-19  -Lifted Isolation 8 days ago  -Continuing to wean O2  -Deconditioning and long illness has led to increased O2 requirement, continuing to wean and work with PT/OT, RT with goal of SNF (5L NC max)  -Patient refused BiPap ovenright, will encourage    Goals of care, counseling/discussion  -Yesterday, daughter asserted her POA gave her control over father's medical decisions  -In this team's opinion, patient has capacity for decisionmaking at this time  -Consulted psychiatry for capacity evaluation  -Patient assented to full code for now  -Patient, daughter will need family meeting, will coordinate    Left shoulder pain  Add naproxen scheduled BID  Continue lidocaine patch, tylenol for pain  Stable pulses, ROM limited by patient effort  Continue PT/OT as well as current pain regimen  STABLE      History of Aspergillus & MAC  Continuing voriconazole, switch to itroconazole at [place]   -Follow up with Pulm, ID outpatient  STABLE    Hypotension  Patient's most recent blood  pressure 116/75  RESOLVED      Sepsis  RESOLVED  See plan for COVID 19    COPD (chronic obstructive pulmonary disease)  On HFNC 6L, improvement  Added Montelukast  Continue to wean  Goal of 5L NC  Patient to sit up 2x daily in chair  Mild wheezes present   Continue albuterol and chest physio/IS  -Monitor for progress         Anemia  Asymptomatic  H/H stable at 9.5/30.7 yesterday  Labs QOD      VTE Risk Mitigation (From admission, onward)         Ordered     enoxaparin injection 80 mg  Every 12 hours (non-standard times)      08/04/20 1421     Place sequential compression device  Until discontinued      07/21/20 2142     IP VTE HIGH RISK PATIENT  Once      07/21/20 2142                Discharge Planning   ADAM:      Code Status: Full Code   Is the patient medically ready for discharge?:     Reason for patient still in hospital (select all that apply): Patient unstable and Treatment  Discharge Plan A: Long-term acute care facility (LTAC)   Discharge Delays: None known at this time              Emeli Adam MD   [employer] PGY2  Department of Hospital Medicine   [facility]

## 2020-08-18 NOTE — PT/OT/SLP PROGRESS
Physical Therapy Treatment    Patient Name:  Rajan Deluna   MRN:  [id number]    Recommendations:     Discharge Recommendations:  nursing facility, skilled   Discharge Equipment Recommendations: walker, rolling   Barriers to discharge: decreased tolerance to functional mobility secondary to SOB and decreasing O2 sats with movement    Assessment:     Rajan Deluna is a 74 y.o. male admitted with a medical diagnosis of COVID-19.  He presents with the following impairments/functional limitations:  weakness, impaired endurance, impaired self care skills, impaired functional mobilty, gait instability, impaired balance, decreased upper extremity function, decreased lower extremity function, pain, decreased ROM, impaired cardiopulmonary response to activity.  Pt would continue to benefit from P.T. To address impairments listed above.  .    Rehab Prognosis: Fair; patient would benefit from acute skilled PT services to address these deficits and reach maximum level of function.    Recent Surgery: Procedure(s) (LRB):  IMAGING PROCEDURE, GI TRACT, INTRALUMINAL, VIA CAPSULE (N/A) 15 Days Post-Op    Plan:     During this hospitalization, patient to be seen 6 x/week to address the identified rehab impairments via gait training, therapeutic activities, therapeutic exercises, neuromuscular re-education and progress toward the following goals:    · Plan of Care Expires:  09/08/20    Subjective     Chief Complaint: cervical spine and lumbar pain.  Patient/Family Comments/goals: Pt requested assist to return to bed.  Pain/Comfort:  · Pain Rating 1: (c/o low back pain and neck pain.  Did not rate)  · Pain Rating Post-Intervention 1: (as above)      Objective:     Communicated with RN (Gerda) prior to session.  Patient found up in chair with oxygen, pulse ox (continuous), peripheral IV, telemetry upon PT entry to room.     General Precautions: Standard, respiratory, fall(covid precautions lifted 8/11)   Orthopedic Precautions:N/A    Braces:       Functional Mobility:  · Bed Mobility:     · Rolling Right: stand by assistance from left sidelying to right sidelying  · Scooting: stand by assistance in chair and lateral x 2 small scoots up to HOB  · Sit to Supine: contact guard assistance  · Transfers:     · Sit to Stand:  minimum assistance with rolling walker  · Chair to EOB: contact guard assistance and minimum assistance with  rolling walker  using  Step Transfer  · Gait: 3 small steps from b/s chair to EOB with Rw and Maurice/close CGA.  Pt was unsteady without LOB and required Occasional assist for RW management and stability.  · Balance: sitting good-, standing fair, gait fair-      AM-PAC 6 CLICK MOBILITY  Turning over in bed (including adjusting bedclothes, sheets and blankets)?: 3  Sitting down on and standing up from a chair with arms (e.g., wheelchair, bedside commode, etc.): 3  Moving from lying on back to sitting on the side of the bed?: 3  Moving to and from a bed to a chair (including a wheelchair)?: 3  Need to walk in hospital room?: 3  Climbing 3-5 steps with a railing?: 1  Basic Mobility Total Score: 16       Therapeutic Activities and Exercises:   Pt was on 6lpm HFO2 NC upon entering the room.  Pt c/o increased low back pain from sitting up in b/s chair and requested assist for BTB. Transfer as above.  Pt remained in left sidelying initially and was SOB with O2 sats 82% on HFO2 NC @ 6lpm and HR 134bpm.  O2 increased to 8lpm to assist pt with increasing O2 sats and comfort with breathing.  Pt's respirations improved with O2 sats 92% and HR 119bpm. O2 returned to orignial setting of 6lpm, and t rolled onto his right side.  PTA positioned pt for support and comfort with pillow at pt's back (support), between BLEs for pressure relief, and under LUE for positioning to decreased stress on shoulder and neck area.  New hot packs were placed on pt's neck and left shoulder for pain management.  RN entered room and was informed of pt's  status.  RN stated she was going to return O2 back to 5lpm now that pt's HR was 119bpm and O2 sats were 93%.      Patient left right sidelying with all lines intact, call button in reach, bed alarm on and RN present..    GOALS:   Multidisciplinary Problems     Physical Therapy Goals        Problem: Physical Therapy Goal    Goal Priority Disciplines Outcome Goal Variances Interventions   Physical Therapy Goal     PT, PT/OT Ongoing, Progressing     Description: Goals to be met by: 20     Patient will increase functional independence with mobility by performin. Supine <> sit with MInimal Assistance   Met   Revised  supine<>sit MOD I  2. Sit to stand transfer with Minimal Assistance   Met   Revised sit to stand with supervision  3. Bed to chair transfer with Minimal Assistance   Met   Revised bed to chair transfer with supervision  4. Gait x 50 ft with min A with appropriate AD  5. Lower extremity exercise program x 10 reps per handout, with supervision  Met 8/15  Revised Lower extremity exercise program x 20 reps per handout, with supervision                     Time Tracking:     PT Received On: 20  PT Start Time: 1421     PT Stop Time: 1445  PT Total Time (min): 24 min     Billable Minutes: Therapeutic Activity 24    Treatment Type: Treatment  PT/PTA: PTA     PTA Visit Number: 2     Dee Urena PTA  2020

## 2020-08-18 NOTE — ASSESSMENT & PLAN NOTE
Continuing voriconazole, switch to itroconazole at [place]   -Follow up with Pulm, ID outpatient  STABLE

## 2020-08-18 NOTE — ASSESSMENT & PLAN NOTE
-Lifted Isolation 8 days ago  -Continuing to wean O2  -Deconditioning and long illness has led to increased O2 requirement, continuing to wean and work with PT/OT, RT with goal of SNF (5L NC max)  -Patient refused BiPap ovenright, will encourage

## 2020-08-18 NOTE — ASSESSMENT & PLAN NOTE
Add naproxen scheduled BID  Continue lidocaine patch, tylenol for pain  Stable pulses, ROM limited by patient effort  Continue PT/OT as well as current pain regimen  STABLE

## 2020-08-18 NOTE — SUBJECTIVE & OBJECTIVE
Interval History: NAEON. Refusing BiPAP. Stool cultures ordered for placement. Psych consulted to determine capacity as patient's wishes and daughter's wishes (POA) are not aligned. Will work with Palliative, SW, and coordinate a family meeting.     Review of Systems   Constitutional: Negative for activity change, appetite change, chills and fatigue.   HENT: Negative for congestion and dental problem.    Eyes: Negative for discharge and itching.   Respiratory: Negative for cough and shortness of breath.    Cardiovascular: Negative for chest pain.   Gastrointestinal: Negative for abdominal pain, blood in stool, diarrhea and nausea.   Endocrine: Negative for cold intolerance and heat intolerance.   Genitourinary: Negative for difficulty urinating.   Musculoskeletal: Negative for arthralgias, back pain and gait problem.   Skin: Negative.  Negative for color change and pallor.   Neurological: Negative for dizziness, facial asymmetry, light-headedness and headaches.   Psychiatric/Behavioral: The patient is not nervous/anxious.      Objective:     Vital Signs (Most Recent):  Temp: 96.9 °F (36.1 °C) (08/18/20 0738)  Pulse: 99 (08/18/20 0758)  Resp: 18 (08/18/20 0758)  BP: 116/68 (08/18/20 0738)  SpO2: (!) 92 % (08/18/20 0758) Vital Signs (24h Range):  Temp:  [96.1 °F (35.6 °C)-98 °F (36.7 °C)] 96.9 °F (36.1 °C)  Pulse:  [] 99  Resp:  [16-22] 18  SpO2:  [87 %-99 %] 92 %  BP: ()/(58-68) 116/68     Weight: 71.6 kg (157 lb 13.6 oz)  Body mass index is 22.02 kg/m².    Intake/Output Summary (Last 24 hours) at 8/18/2020 0856  Last data filed at 8/18/2020 0000  Gross per 24 hour   Intake 480 ml   Output 300 ml   Net 180 ml      Physical Exam  Vitals signs and nursing note reviewed.   Constitutional:       General: He is not in acute distress.     Appearance: Normal appearance. He is not ill-appearing.   HENT:      Head: Normocephalic and atraumatic.      Right Ear: External ear normal.      Left Ear: External ear  normal.      Nose: Nose normal. No congestion or rhinorrhea.      Mouth/Throat:      Mouth: Mucous membranes are moist.      Pharynx: Oropharynx is clear.   Eyes:      Pupils: Pupils are equal, round, and reactive to light.   Neck:      Musculoskeletal: Normal range of motion and neck supple.   Cardiovascular:      Rate and Rhythm: Normal rate and regular rhythm.      Pulses: Normal pulses.      Heart sounds: Normal heart sounds.   Pulmonary:      Effort: Pulmonary effort is normal.      Breath sounds: Wheezing present.      Comments: Wheezes this AM, likely COPD. O2 at 6L HFNC  Abdominal:      General: Abdomen is flat. There is no distension.      Palpations: Abdomen is soft.      Tenderness: There is no abdominal tenderness. There is no guarding.   Musculoskeletal: Normal range of motion.         General: No swelling.   Neurological:      General: No focal deficit present.      Mental Status: He is alert and oriented to person, place, and time. Mental status is at baseline.   Psychiatric:         Mood and Affect: Mood normal.         Behavior: Behavior normal.         Thought Content: Thought content normal.         Judgment: Judgment normal.         Significant Labs:   POCT Glucose:   Recent Labs   Lab 08/17/20  1630 08/17/20  2101 08/18/20  0558   POCTGLUCOSE 107 114* 88     All pertinent labs within the past 24 hours have been reviewed.    Significant Imaging: I have reviewed all pertinent imaging results/findings within the past 24 hours.

## 2020-08-18 NOTE — PLAN OF CARE
Plan of care reviewed with pt. Cardiac monitoring and continues oxygen monitoring in place. RT, PT, OT continue to work with pt. Pt needing to titrate up on Oxygen during therapy due to decrease in O2 level. Pt is currently on 5L high flow NC and sating above 90% at this time. Safety in place, bed at lowest position, wheels locked, bed alarm on, call light within reach.

## 2020-08-18 NOTE — ASSESSMENT & PLAN NOTE
On HFNC 6L, improvement  Added Montelukast  Continue to wean  Goal of 5L NC  Patient to sit up 2x daily in chair  Mild wheezes present   Continue albuterol and chest physio/IS  -Monitor for progress

## 2020-08-18 NOTE — PT/OT/SLP PROGRESS
Occupational Therapy   Treatment    Name: Rajan Deluna  MRN: [id number]  Admitting Diagnosis:  COVID-19  15 Days Post-Op    Recommendations:     Discharge Recommendations: nursing facility, skilled  Discharge Equipment Recommendations:  walker, rolling  Barriers to discharge:  Inaccessible home environment, Decreased caregiver support    Assessment:     Rajan Deluna is a 74 y.o. male with a medical diagnosis of COVID-19.  Performance deficits affecting function are weakness, impaired endurance, impaired self care skills, impaired functional mobilty, gait instability, impaired balance, decreased upper extremity function, decreased lower extremity function, impaired cardiopulmonary response to activity. Pt found in bed, agreeable to therapy.  Pt with poor tolerance of therapy secondary to oxygen desaturation. Attempted to transfer to chair however pt desatted to 84% and became SOB.  Increased O2 to 8L and increased to 86%, then returned to supine with HOB elevated and increased O2 to 10L after several minutes of PLB O2 up to 90%. RN notified and assessed pt.  Returned O2 to 6L and O2 sats up to 90%.  Continue OT services to address functional goals, progressing as able.      Rehab Prognosis:  Fair; patient would benefit from acute skilled OT services to address these deficits and reach maximum level of function.       Plan:     Patient to be seen 5 x/week to address the above listed problems via self-care/home management, therapeutic activities, therapeutic exercises  · Plan of Care Expires: 08/27/20  · Plan of Care Reviewed with: patient    Subjective     Pain/Comfort:  · Pain Rating 1: 0/10  · Pain Rating Post-Intervention 1: 0/10    Objective:     Communicated with: RN prior to session.  Patient found HOB elevated with oxygen, peripheral IV, telemetry, pulse ox (continuous) upon OT entry to room.    General Precautions: Standard, fall, respiratory   Orthopedic Precautions:N/A   Braces: N/A     Occupational  Performance:     Bed Mobility:    · Patient completed Rolling/Turning to Left with  modified independence  · Patient completed Scooting/Bridging with modified independence  · Patient completed Supine to Sit with modified independence  · Patient completed Sit to Supine with modified independence     Functional Mobility/Transfers:  · Patient completed Sit <> Stand Transfer with stand by assistance  with  rolling walker   · Patient completed Bed <> Chair Transfer using Stand Pivot technique with stand by assistance with rolling walker  · Functional Mobility: Pt took 2-3 steps to/from chair with SBA using RW.     Activities of Daily Living:  · Grooming: modified independence bed level      [facility] 6 Click ADL: 16    Treatment & Education:  Pt instructed on PLB technique and performed efficiently with min vc's.     Patient left HOB elevated with all lines intact, call button in reach, bed alarm on and RN notifiedEducation:      GOALS:   Multidisciplinary Problems     Occupational Therapy Goals        Problem: Occupational Therapy Goal    Goal Priority Disciplines Outcome Interventions   Occupational Therapy Goal     OT, PT/OT Ongoing, Progressing    Description: Goals to be met by: 8/27/20     Patient will increase functional independence with ADLs by performing:    LE Dressing with Stand-by Assistance.  Grooming while standing with Stand-by Assistance.  Toileting from toilet with Stand-by Assistance for hygiene and clothing management.   Supine to sit with Stand-by Assistance.  Step transfer with Stand-by Assistance  Toilet transfer to toilet with Stand-by Assistance.  Upper extremity exercise program x10 reps per handout, with independence.                     Time Tracking:     OT Date of Treatment: 08/18/20  OT Start Time: 1000  OT Stop Time: 1023  OT Total Time (min): 23 min    Billable Minutes:Therapeutic Activity 23    TED Reyna  8/18/2020

## 2020-08-18 NOTE — CONSULTS
PSYCHIATRY INPATIENT CONSULT NOTE      8/18/2020 11:47 AM   Rajan Deluna   1945   [id number]           DATE OF ADMISSION: 7/21/2020 12:55 PM    SITE: [facility]    CURRENT LEGAL STATUS: Patient does not currently meet PEC/CEC criteria due to not currently being an imminent threat to self/others and not being gravely disabled 2/2 mental illness at this time.       HISTORY    Chief Complaint / Reason for Psychiatry Consult: medical decision making capacity regarding choosing code status and rehab placement       HPI   Rajan Deluna is a 74 y.o. male with a past medical history of hypertension, COPD, MAC infection and no known past psychiatric history, currently being treated by their inpatient primary treatment team for a principal problem of COVID-19 infection.  Psychiatry was originally consulted as noted above.  The patient was seen and examined.  The chart was reviewed.  On examination today, the patient was calm, cooperative, friendly, linear, logical, organized, and able to articulate his needs/wishes.  He denies any current or prior psychiatric s/s (see ROS below).  He denies any current/prior passive/active SI/HI.  He denies any adverse effects to his current medication regimen.  He endorses eating and sleeping without difficulty.  He denies any AH, VH, delusions, or paranoia.  He did not exhibit any object signs of depression, anxiety, wilner, psychosis, or notable cognitive deficits.  Regarding current medical/physical complaints, he denies any medical/physical complaints at this time.  NAD was observed during the examination.      Capacity: (capacity is determined by a clinician upon specific present-state elements of a mental status exam)  The patient is currently able to demonstrate medical decision making capacity regarding his code status (is requesting to be full code) as well as s/p discharge placement (is requesting to go to rehab facility) as a result of his appropriate ability to  cognitively utilize information provided to them:   He is currently able to express a choice that is stable over time (is requesting to be full code) (is requesting to go to rehab facility)  He currently understands the relevant information   He currently appreciates the consequences of the decisions (pt able to logically discuss risks vs benefits of DNR/DNI vs Full Code as well as home vs rehab placement)   He is currently able to manipulate all of the data in a logical fashion      Psychiatric Review Of Systems - Currently, the patient is endorsing and/or denying the following:    Patient denies symptoms of depression: diminished mood or loss of interest/anhedonia; irritability, diminished energy, change in sleep, change in appetite, diminished concentration or cognition or indecisiveness, PMA/R, excessive guilt or hopelessness or worthlessness, suicidal ideations    Patient denies issues with sleep: initiation, maintenance, early morning awakening with inability to return to sleep    Patient denies any Suicidal/Homicidal ideations: active/passive ideations, organized plans, future intentions    Patient denies symptoms of psychosis: hallucinations, delusions, disorganized thinking, disorganized behavior or abnormal motor behavior, or negative symptoms (diminshed emotional expression, avolition, anhedonia, alogia, asociality     Patient denies symptoms of wilner or hypomania: elevated, expansive, or irritable mood with increased energy or activity; with inflated self-esteem or grandiosity, decreased need for sleep, increased rate of speech, FOI or racing thoughts, distractibility, increased goal directed activity or PMA, risky/disinhibited behavior    Patient denies symptoms of LUIS: excessive anxiety/worry/fear, more days than not, about numerous issues, difficult to control, with restlessness, fatigue, poor concentration, irritability, muscle tension, sleep disturbance; causes functionally impairing distress      Patient denies symptoms of Panic Disorder: recurrent panic attacks, precipitated or un-precipitated, source of worry and/or behavioral changes secondary; with or without agoraphobia    Patient denies symptoms of PTSD: h/o trauma; re-experiencing/intrusive symptoms, avoidant behavior, negative alterations in cognition or mood, or hyperarousal symptoms; with or without dissociative symptoms     Patient denies symptoms of OCD: obsessions or compulsions     Patient denies symptoms of Eating Disorders: anorexia, bulimia or binging    Patient denies any Substance Use/Abuse: intoxication, withdrawal, tolerance, used in larger amounts or duration than intended, unsuccessful attempts to limit or quit, increased time engaging in or seeking out, cravings or strong desire to use, failure to fulfill obligations, negative consequences in social/interpersonal/occupational,/recreational areas, use in dangerous situations, medical or psychological consequences       ROS  General ROS: negative for - chills, fatigue, fever or night sweats  Ophthalmic ROS: negative for - blurry vision, double vision or eye pain  ENT ROS: negative for - sinus pain, headaches, sore throat or visual changes  Allergy and Immunology ROS: negative for - hives, itchy/watery eyes or nasal congestion  Hematological and Lymphatic ROS: negative for - bleeding problems, bruising, jaundice or pallor  Endocrine ROS: negative for - galactorrhea, hot flashes, mood swings, palpitations or temperature intolerance  Respiratory ROS: negative for - cough, hemoptysis, shortness of breath, tachypnea or wheezing  Cardiovascular ROS: negative for - chest pain, dyspnea on exertion, loss of consciousness, palpitations, rapid heart rate or shortness of breath  Gastrointestinal ROS: negative for - appetite loss, nausea, abdominal pain, blood in stools, change in bowel habits, constipation or diarrhea  Genito-Urinary ROS: negative for - incontinence, nocturia or pelvic  pain  Musculoskeletal ROS: negative for - joint stiffness, joint swelling, joint pain or muscle pain   Neurological ROS: negative for - behavioral changes, confusion, dizziness, memory loss, numbness/tingling or seizures  Dermatological ROS: negative for dry skin, hair changes, pruritus or rash  Psychiatric ROS: see detailed psychiatric ROS above in history section       Past Psychiatric History:  Previous Medication Trials: no   Previous Psychiatric Hospitalizations: no   Previous Suicide Attempts: no   History of Violence: no  Outpatient Psychiatrist: no    Social History:  Marital Status:   Children: 1   Employment Status/Info: retired  Education: denies  Special Ed: no  : no  Tenriism: Jain  Housing Status: [place] with wife  Hobbies/Leisure time: talk to friends  History of phys/sexual abuse: no  Access to gun: no     Family Psychiatric History: no     Substance Abuse History:  Recreational Drugs: no  Use of Alcohol: hx of intermittent social use; denies currently or recently   Rehab History:no   Tobacco Use:hx of 1 PPD x 20 yrs  Use of Caffeine: denies use  Use of OTC: denies  Legal consequences of chemical use: no     Legal History:  Past Charges/Incarcerations:no,    Pending charges:no      Psychosocial Stressors: health.   Functioning Relationships: good support system  Strengths AND Liabilities  Strength: Patient accepts guidance/feedback, Strength: Patient is expressive/articulate., Liability: Patient has poor health.      PAST MEDICAL & SURGICAL HISTORY   Past Medical History:   Diagnosis Date    Arthritis     COPD (chronic obstructive pulmonary disease)     Hypertension     Smoker     Weakness      Past Surgical History:   Procedure Laterality Date    HERNIA REPAIR      INTRALUMINAL GASTROINTESTINAL TRACT IMAGING VIA CAPSULE N/A 8/3/2020    Procedure: IMAGING PROCEDURE, GI TRACT, INTRALUMINAL, VIA CAPSULE;  Surgeon: Rey Olivares MD;  Location: [facility];  Service:  Endoscopy;  Laterality: N/A;    right knee surgery         NEUROLOGIC HISTORY  Seizures: denies  Head trauma: denies     FAMILY HISTORY   History reviewed. No pertinent family history.    ALLERGIES   Review of patient's allergies indicates:  No Known Allergies    CURRENT MEDICATION REGIMEN   Home Meds:   Prior to Admission medications    Medication Sig Start Date End Date Taking? Authorizing Provider   budesonide-formoterol 160-4.5 mcg (SYMBICORT) 160-4.5 mcg/actuation HFAA Inhale 2 puffs into the lungs every 12 (twelve) hours.    Historical Provider, MD   escitalopram oxalate (LEXAPRO) 10 MG tablet  11/18/16   Historical Provider, MD   etodolac (LODINE) 500 MG tablet Take 500 mg by mouth 2 (two) times daily.    Historical Provider, MD   gabapentin (NEURONTIN) 300 MG capsule Take 300 mg by mouth.    Historical Provider, MD   lisinopril-hydrochlorothiazide (PRINZIDE,ZESTORETIC) 20-12.5 mg per tablet Take 1 tablet by mouth.    Historical Provider, MD   losartan (COZAAR) 100 MG tablet  12/2/16   Historical Provider, MD   omeprazole (PRILOSEC) 20 MG capsule  11/30/16   Historical Provider, MD   pregabalin (LYRICA) 75 MG capsule Take 75 mg by mouth 2 (two) times daily.    Historical Provider, MD   PROAIR HFA 90 mcg/actuation inhaler  12/2/16   Historical Provider, MD   tramadol (ULTRAM) 50 mg tablet Take 50 mg by mouth every 6 (six) hours as needed for Pain.    Historical Provider, MD       OTC Meds: denies    Scheduled Meds:    albuterol-ipratropium  3 mL Nebulization QID WAKE    enoxaparin  80 mg Subcutaneous Q12H    Lactobacillus acidoph-L.bulgar  4 tablet Oral TID WM    lidocaine  2 patch Transdermal Q24H    montelukast  10 mg Oral QHS    multivitamin  1 tablet Oral Daily    naproxen  500 mg Oral BID WM    pantoprazole  40 mg Oral BID    polyethylene glycol  17 g Oral Daily    potassium, sodium phosphates  1 packet Oral Q6H PEDRO    senna-docusate 8.6-50 mg  1 tablet Oral BID    tamsulosin  0.4 mg Oral  Daily    voriconazole  200 mg Oral Q12H      PRN Meds: sodium chloride, acetaminophen, benzonatate, dextromethorphan-guaifenesin  mg, diclofenac sodium, hydrocortisone, ondansetron, pneumoc 13-loy conj-dip cr(PF), simethicone, sodium chloride 0.9%   Psychotherapeutics (From admission, onward)    None          LABORATORY DATA   Recent Results (from the past 72 hour(s))   Comprehensive metabolic panel    Collection Time: 08/17/20  4:44 AM   Result Value Ref Range    Sodium 135 (L) 136 - 145 mmol/L    Potassium 4.5 3.5 - 5.1 mmol/L    Chloride 102 95 - 110 mmol/L    CO2 26 23 - 29 mmol/L    Glucose 92 70 - 110 mg/dL    BUN, Bld 13 8 - 23 mg/dL    Creatinine 1.0 0.5 - 1.4 mg/dL    Calcium 8.3 (L) 8.7 - 10.5 mg/dL    Total Protein 6.1 6.0 - 8.4 g/dL    Albumin 2.0 (L) 3.5 - 5.2 g/dL    Total Bilirubin 0.8 0.1 - 1.0 mg/dL    Alkaline Phosphatase 234 (H) 55 - 135 U/L    AST 89 (H) 10 - 40 U/L    ALT 40 10 - 44 U/L    Anion Gap 7 (L) 8 - 16 mmol/L    eGFR if African American >60 >60 mL/min/1.73 m^2    eGFR if non African American >60 >60 mL/min/1.73 m^2   CBC auto differential    Collection Time: 08/17/20  4:44 AM   Result Value Ref Range    WBC 4.57 3.90 - 12.70 K/uL    RBC 3.24 (L) 4.60 - 6.20 M/uL    Hemoglobin 9.5 (L) 14.0 - 18.0 g/dL    Hematocrit 30.7 (L) 40.0 - 54.0 %    Mean Corpuscular Volume 95 82 - 98 fL    Mean Corpuscular Hemoglobin 29.3 27.0 - 31.0 pg    Mean Corpuscular Hemoglobin Conc 30.9 (L) 32.0 - 36.0 g/dL    RDW 16.0 (H) 11.5 - 14.5 %    Platelets 432 (H) 150 - 350 K/uL    MPV 9.7 9.2 - 12.9 fL    Immature Granulocytes 0.4 0.0 - 0.5 %    Gran # (ANC) 2.5 1.8 - 7.7 K/uL    Immature Grans (Abs) 0.02 0.00 - 0.04 K/uL    Lymph # 1.0 1.0 - 4.8 K/uL    Mono # 0.7 0.3 - 1.0 K/uL    Eos # 0.3 0.0 - 0.5 K/uL    Baso # 0.02 0.00 - 0.20 K/uL    nRBC 0 0 /100 WBC    Gran% 55.5 38.0 - 73.0 %    Lymph% 21.9 18.0 - 48.0 %    Mono% 14.4 4.0 - 15.0 %    Eosinophil% 7.4 0.0 - 8.0 %    Basophil% 0.4 0.0 - 1.9 %     Platelet Estimate Increased (A)     Differential Method Automated    Magnesium    Collection Time: 08/17/20  4:44 AM   Result Value Ref Range    Magnesium 2.1 1.6 - 2.6 mg/dL   Phosphorus    Collection Time: 08/17/20  4:44 AM   Result Value Ref Range    Phosphorus 2.6 (L) 2.7 - 4.5 mg/dL   APTT    Collection Time: 08/17/20  4:44 AM   Result Value Ref Range    aPTT 32.3 (H) 21.0 - 32.0 sec   POCT glucose    Collection Time: 08/17/20 11:03 AM   Result Value Ref Range    POCT Glucose 90 70 - 110 mg/dL   POCT glucose    Collection Time: 08/17/20  4:30 PM   Result Value Ref Range    POCT Glucose 107 70 - 110 mg/dL   POCT glucose    Collection Time: 08/17/20  9:01 PM   Result Value Ref Range    POCT Glucose 114 (H) 70 - 110 mg/dL   POCT glucose    Collection Time: 08/18/20  5:58 AM   Result Value Ref Range    POCT Glucose 88 70 - 110 mg/dL   POCT glucose    Collection Time: 08/18/20 11:13 AM   Result Value Ref Range    POCT Glucose 104 70 - 110 mg/dL      No results found for: PHENYTOIN, PHENOBARB, VALPROATE, CBMZ      EXAMINATION    VITALS   Vitals:    08/18/20 0726 08/18/20 0738 08/18/20 0758 08/18/20 1006   BP:  116/68     BP Location:  Right arm     Patient Position:  Lying     Pulse: 86 101 99    Resp:  18 18    Temp:  96.9 °F (36.1 °C)     TempSrc:  Oral     SpO2:  (!) 87% (!) 92% (!) 92%   Weight:       Height:            CONSTITUTIONAL  General Appearance: NAD, unremarkable, age appropriate, normal weight, lying in bed    MUSCULOSKELETAL  Muscle Strength and Tone: reduced in BUE and BLE 2/2 deconditioning    Abnormal Involuntary Movements: none observed   Gait and Station: unable to assess due to deconditioned state     PSYCHIATRIC   Behavior/Cooperation:  friendly and cooperative, eye contact normal  Speech:  normal tone, normal rate, normal pitch, normal volume  Language: grossly intact, able to name, able to repeat with spontaneous speech  Mood: euthymic  Affect:  congruent with mood and appropriate to  situation/content; full & reactive   Associations: intact; no DINORA  Thought Process: normal and logical, linear  Thought Content: normal, no suicidality, no homicidality, delusions, or paranoia; no AH/VH (no RIS)  Sensorium:  Awake  Alert and Oriented: to person, place, situation, month of year, year  Memory: 3/3 immediate, 2/3 at 5 minutes    Recent: Intact; able to report recent events   Remote:  Intact; Named 3/4 past presidents   Attention/concentration: appropriate for age/education. Able to spell [id number] & [id number]   Similarities: Intact; (difference between apple and orange?)  Abstract reasoning:  Intact  Insight:  Intact  Judgment: Intact    CAM ICU Delirium Assessment - NEGATIVE      Is the patient aware of the biomedical complications associated with substance abuse and mental illness? yes    Does the patient have an Advance Directive for Mental Health treatment? no  (If yes, inform patient to bring copy.)      MEDICAL DECISION MAKING    ASSESSMENT      Capacity: (capacity is determined by a clinician upon specific present-state elements of a mental status exam)  The patient is currently able to demonstrate medical decision making capacity regarding his code status (is requesting to be full code) as well as s/p discharge placement (is requesting to go to rehab facility) as a result of his appropriate ability to cognitively utilize information provided to them:   He is currently able to express a choice that is stable over time (is requesting to be full code) (is requesting to go to rehab facility)  He currently understands the relevant information   He currently appreciates the consequences of the decisions (pt able to logically discuss risks vs benefits of DNR/DNI vs Full Code as well as home vs rehab placement)   He is currently able to manipulate all of the data in a logical fashion    AMS (any prior altered mental status now appears resolved/improved; see capacity assessment above)    COVID 19  (recovering; most recent test is now negative)     RECOMMENDATIONS       - patient does not currently meet PEC/CEC criteria due to not being an imminent threat to self/others and not being gravely disabled 2/2 mental illness.      - With reasonable medical certainty, the patient appears to have medical decision making capacity as stated above.     - If patient has any future notable change in mental status resulting in loss of medical decision making capacity, please seek surrogate decision maker      - Thank you for this consult        Time spent with patient and/or on unit managing/coordinating patient's care: 70 minutes      More than 50% of the time was spent counseling/coordinating care      STAFF:  Gabe Gallegos MD  [facility]  8/18/2020

## 2020-08-18 NOTE — PLAN OF CARE
Recommendation:   1. Ecnourage intake of meals & supplements as tolerated. 2. RD to continue to follow to monitor po intake    Goals:   Pt intake >/= 75% EEN/EPN by RD follow up  Nutrition Goal Status: progressing towards goal  Communication of RD Recs: reviewed with RN(Daya)

## 2020-08-18 NOTE — PT/OT/SLP PROGRESS
Physical Therapy Treatment    Patient Name:  Rajan Deluna   MRN:  [id number]    Recommendations:     Discharge Recommendations:  nursing facility, skilled   Discharge Equipment Recommendations: walker, rolling   Barriers to discharge: decreased functional mobility with decreasing O2 sats with movement.    Assessment:     Rajan Deluna is a 74 y.o. male admitted with a medical diagnosis of COVID-19.  He presents with the following impairments/functional limitations:  weakness, impaired endurance, impaired self care skills, impaired functional mobilty, gait instability, impaired balance, decreased lower extremity function, decreased upper extremity function, pain, decreased ROM, impaired cardiopulmonary response to activity.  Pt would continue to benefit from P.T. To address impairments listed above.  .    Rehab Prognosis: Fair; patient would benefit from acute skilled PT services to address these deficits and reach maximum level of function.    Recent Surgery: Procedure(s) (LRB):  IMAGING PROCEDURE, GI TRACT, INTRALUMINAL, VIA CAPSULE (N/A) 15 Days Post-Op    Plan:     During this hospitalization, patient to be seen 6 x/week to address the identified rehab impairments via gait training, therapeutic activities, therapeutic exercises, neuromuscular re-education and progress toward the following goals:    · Plan of Care Expires:  09/08/20    Subjective     Chief Complaint: cervical and L shoulder pain.  Patient/Family Comments/goals: Pt agreed to tx.  Pain/Comfort:  · Pain Rating 1: (c/o left shoulder pain and cervical pain. Did not rate)  · Pain Rating Post-Intervention 1: (as above)      Objective:     Communicated with RN (Gerda) prior to session.  Patient found up in chair with oxygen, telemetry, pulse ox (continuous) upon PT entry to room.     General Precautions: Standard, respiratory, fall(covid precautions lifted 8/11)   Orthopedic Precautions:N/A   Braces:         AM-PAC 6 CLICK MOBILITY  Turning over in bed  (including adjusting bedclothes, sheets and blankets)?: 3  Sitting down on and standing up from a chair with arms (e.g., wheelchair, bedside commode, etc.): 3  Moving from lying on back to sitting on the side of the bed?: 3  Moving to and from a bed to a chair (including a wheelchair)?: 3  Need to walk in hospital room?: 3  Climbing 3-5 steps with a railing?: 1  Basic Mobility Total Score: 16       Therapeutic Activities and Exercises:   Pt was sitting up in a b/s chair upon entering the room on 5lpm HFO2 NC.  RN present with PTA, and asked PTA to hold tx at this time secondary to pt's HR 130bpm and O2 sats 88%.  RN increased HFO2 to 6lpm.  Pt stated he was uncomfortable in chair.  RN approved reposition for comfort.  Pt scooted back in b/s chair with SBA and pillows placed behind pt for improved upright posture and comfort.  Pt had hot packs on his neck and left UE/shoulder that had become cold.  New hot packs were placed on pt's cervical spine and shoulders to assist with pain relief/comfort.  BLEs were elevated in recliner for comfort.  BUEs rested on pillows for improved positioning and to decreased pull on upper traps and cervical area.      Patient left up in chair with all lines intact, call button in reach, chair alarm on and RN notified..    GOALS:   Multidisciplinary Problems     Physical Therapy Goals        Problem: Physical Therapy Goal    Goal Priority Disciplines Outcome Goal Variances Interventions   Physical Therapy Goal     PT, PT/OT Ongoing, Progressing     Description: Goals to be met by: 20     Patient will increase functional independence with mobility by performin. Supine <> sit with MInimal Assistance   Met   Revised  supine<>sit MOD I  2. Sit to stand transfer with Minimal Assistance   Met   Revised sit to stand with supervision  3. Bed to chair transfer with Minimal Assistance   Met   Revised bed to chair transfer with supervision  4. Gait x 50 ft with min A with  appropriate AD  5. Lower extremity exercise program x 10 reps per handout, with supervision  Met 8/15  Revised Lower extremity exercise program x 20 reps per handout, with supervision                     Time Tracking:     PT Received On: 08/18/20  PT Start Time: 1340     PT Stop Time: 1358  PT Total Time (min): 18 min     Billable Minutes: Therapeutic Activity 18    Treatment Type: Treatment  PT/PTA: PTA     PTA Visit Number: 2     Dee Urena PTA  08/18/2020

## 2020-08-18 NOTE — PROGRESS NOTES
[facility]  Progress/Critical Care - Medicine  Progress Note    Patient Name: Rajan Deluna  MRN: [id number]  Admission Date: 7/21/2020  Hospital Length of Stay: 28 days  Code Status: Full Code  Attending Physician: Rajan Rodríguez MD   Primary Care Provider: Jason Mccord MD   Principal Problem: COVID-19    Subjective:     Hospital/ICU Course: . No issues overnight. Pleasant this AM    Past Medical History:   Diagnosis Date    Arthritis     COPD (chronic obstructive pulmonary disease)     Hypertension     Smoker     Weakness        Past Surgical History:   Procedure Laterality Date    HERNIA REPAIR      INTRALUMINAL GASTROINTESTINAL TRACT IMAGING VIA CAPSULE N/A 8/3/2020    Procedure: IMAGING PROCEDURE, GI TRACT, INTRALUMINAL, VIA CAPSULE;  Surgeon: Rey Olivares MD;  Location: [facility];  Service: Endoscopy;  Laterality: N/A;    right knee surgery         Review of patient's allergies indicates:  No Known Allergies    Family History     None        Tobacco Use    Smoking status: Former Smoker    Smokeless tobacco: Never Used   Substance and Sexual Activity    Alcohol use: Not Currently    Drug use: Never    Sexual activity: Not on file     Objective:     Vital Signs (Most Recent):  Temp: 96.9 °F (36.1 °C) (08/18/20 0738)  Pulse: 99 (08/18/20 0758)  Resp: 18 (08/18/20 0758)  BP: 116/68 (08/18/20 0738)  SpO2: (!) 92 % (08/18/20 0758) Vital Signs (24h Range):  Temp:  [96.1 °F (35.6 °C)-98 °F (36.7 °C)] 96.9 °F (36.1 °C)  Pulse:  [] 99  Resp:  [16-22] 18  SpO2:  [87 %-99 %] 92 %  BP: ()/(58-68) 116/68     Weight: 71.6 kg (157 lb 13.6 oz)  Body mass index is 22.02 kg/m².      Intake/Output Summary (Last 24 hours) at 8/18/2020 1057  Last data filed at 8/18/2020 0000  Gross per 24 hour   Intake 480 ml   Output 300 ml   Net 180 ml       Physical Exam  Vitals reviewed: virtual visit.   Constitutional:       General: He is not in acute distress.     Appearance: Normal  appearance.   HENT:      Head: Normocephalic.      Nose: Nose normal.   Neurological:      General: No focal deficit present.      Mental Status: He is alert.         Vents:  Oxygen Concentration (%): 70 (08/12/20 0911)    Lines/Drains/Airways     Peripheral Intravenous Line                 Peripheral IV - Single Lumen 08/04/20 0314 18 G Right Forearm 14 days                Significant Labs:    CBC/Anemia Profile:  Recent Labs   Lab 08/17/20  0444   WBC 4.57   HGB 9.5*   HCT 30.7*   *   MCV 95   RDW 16.0*        Chemistries:  Recent Labs   Lab 08/17/20  0444   *   K 4.5      CO2 26   BUN 13   CREATININE 1.0   CALCIUM 8.3*   ALBUMIN 2.0*   PROT 6.1   BILITOT 0.8   ALKPHOS 234*   ALT 40   AST 89*   MG 2.1   PHOS 2.6*       All pertinent labs within the past 24 hours have been reviewed.    Significant Imaging: I have reviewed all pertinent imaging results/findings within the past 24 hours.    Assessment/Plan:     Active Diagnoses:    Diagnosis Date Noted POA    PRINCIPAL PROBLEM:  COVID-19 [U07.1] 07/21/2020 Unknown    Counseling regarding advance care planning and goals of care [Z71.89]  Not Applicable    Goals of care, counseling/discussion [Z71.89]  Not Applicable    Palliative care encounter [Z51.5]  Not Applicable    Left shoulder pain [M25.512] 08/14/2020 Yes    History of Aspergillus & MAC [B44.9]  Yes    COPD (chronic obstructive pulmonary disease) [J44.9] 05/22/2017 Yes    Anemia [D64.9] 12/09/2016 No      Problems Resolved During this Admission:    Diagnosis Date Noted Date Resolved POA    History of MAC infection [Z86.19]  08/07/2020 No    GI bleed [K92.2] 07/29/2020 08/07/2020 Clinically Undetermined    Clostridium difficile infection [A49.8]  08/07/2020 Clinically Undetermined    Acute respiratory disease due to COVID-19 virus [U07.1, J06.9]  08/16/2020 Yes    Acute respiratory failure with hypoxia [J96.01]  08/07/2020 Yes    MARQUIS (acute kidney injury) [N17.9] 07/21/2020  08/07/2020 Yes       * Acute respiratory disease due to COVID-19 virus  - 7/21 COVID(+)  - s/p remdesivir x5d, dexamethasone x10d  - full dose AC with lovenox  - proning if he can tolerate at all  - incentive spirometry   - please wean O2 as tolerated for goal SpO2 88%  - BIPAP QHS  - On 7L O2  -continue to wean     History of Aspergillus & MAC  - serum aspergillus negative, loaded with voriconazole on  7/29   -AFB stain negative; Culture Positive  -likely previous MAC and will not need tx     Acute on chronic respiratory failure with hypoxia  See COVID problem     - Use of 2L NC at home     COPD (chronic obstructive pulmonary disease)  - Use of 2L NC at home  - metered dose inhaler q4h      Out of bed to chair daily  Incentive spirometry   PT/OT  Consider placement SNF vs LTAC for more prolonged weaning.         Emerson Juárez MD  Critical Care - Medicine  [facility]  My cell [phone]

## 2020-08-19 LAB
ACID FAST MOD KINY STN SPEC: ABNORMAL
ALBUMIN SERPL BCP-MCNC: 1.9 G/DL (ref 3.5–5.2)
ALP SERPL-CCNC: 294 U/L (ref 55–135)
ALT SERPL W/O P-5'-P-CCNC: 61 U/L (ref 10–44)
ANION GAP SERPL CALC-SCNC: 5 MMOL/L (ref 8–16)
APTT BLDCRRT: 37.6 SEC (ref 21–32)
AST SERPL-CCNC: 169 U/L (ref 10–40)
BASOPHILS # BLD AUTO: 0.05 K/UL (ref 0–0.2)
BASOPHILS NFR BLD: 0.9 % (ref 0–1.9)
BILIRUB DIRECT SERPL-MCNC: 1 MG/DL (ref 0.1–0.3)
BILIRUB SERPL-MCNC: 1.1 MG/DL (ref 0.1–1)
BILIRUB SERPL-MCNC: 1.1 MG/DL (ref 0.1–1)
BUN SERPL-MCNC: 11 MG/DL (ref 8–23)
CALCIUM SERPL-MCNC: 8.3 MG/DL (ref 8.7–10.5)
CHLORIDE SERPL-SCNC: 103 MMOL/L (ref 95–110)
CO2 SERPL-SCNC: 30 MMOL/L (ref 23–29)
CREAT SERPL-MCNC: 1.1 MG/DL (ref 0.5–1.4)
DIFFERENTIAL METHOD: ABNORMAL
E COLI SXT1 STL QL IA: NEGATIVE
E COLI SXT2 STL QL IA: NEGATIVE
EOSINOPHIL # BLD AUTO: 0.3 K/UL (ref 0–0.5)
EOSINOPHIL NFR BLD: 5.4 % (ref 0–8)
ERYTHROCYTE [DISTWIDTH] IN BLOOD BY AUTOMATED COUNT: 16.4 % (ref 11.5–14.5)
EST. GFR  (AFRICAN AMERICAN): >60 ML/MIN/1.73 M^2
EST. GFR  (NON AFRICAN AMERICAN): >60 ML/MIN/1.73 M^2
GLUCOSE SERPL-MCNC: 89 MG/DL (ref 70–110)
HCT VFR BLD AUTO: 29.5 % (ref 40–54)
HGB BLD-MCNC: 9.3 G/DL (ref 14–18)
IMM GRANULOCYTES # BLD AUTO: 0.03 K/UL (ref 0–0.04)
IMM GRANULOCYTES NFR BLD AUTO: 0.6 % (ref 0–0.5)
LYMPHOCYTES # BLD AUTO: 1.4 K/UL (ref 1–4.8)
LYMPHOCYTES NFR BLD: 26.3 % (ref 18–48)
MAGNESIUM SERPL-MCNC: 2.2 MG/DL (ref 1.6–2.6)
MCH RBC QN AUTO: 30 PG (ref 27–31)
MCHC RBC AUTO-ENTMCNC: 31.5 G/DL (ref 32–36)
MCV RBC AUTO: 95 FL (ref 82–98)
MONOCYTES # BLD AUTO: 0.8 K/UL (ref 0.3–1)
MONOCYTES NFR BLD: 15.6 % (ref 4–15)
MYCOBACTERIUM SPEC QL CULT: ABNORMAL
MYCOBACTERIUM SPEC QL CULT: ABNORMAL
NEUTROPHILS # BLD AUTO: 2.8 K/UL (ref 1.8–7.7)
NEUTROPHILS NFR BLD: 51.2 % (ref 38–73)
NRBC BLD-RTO: 0 /100 WBC
PHOSPHATE SERPL-MCNC: 3.2 MG/DL (ref 2.7–4.5)
PLATELET # BLD AUTO: 519 K/UL (ref 150–350)
PMV BLD AUTO: 9.4 FL (ref 9.2–12.9)
POCT GLUCOSE: 116 MG/DL (ref 70–110)
POCT GLUCOSE: 91 MG/DL (ref 70–110)
POCT GLUCOSE: 93 MG/DL (ref 70–110)
POCT GLUCOSE: 99 MG/DL (ref 70–110)
POTASSIUM SERPL-SCNC: 5 MMOL/L (ref 3.5–5.1)
PROT SERPL-MCNC: 6 G/DL (ref 6–8.4)
RBC # BLD AUTO: 3.1 M/UL (ref 4.6–6.2)
SODIUM SERPL-SCNC: 138 MMOL/L (ref 136–145)
WBC # BLD AUTO: 5.4 K/UL (ref 3.9–12.7)

## 2020-08-19 PROCEDURE — 85025 COMPLETE CBC W/AUTO DIFF WBC: CPT

## 2020-08-19 PROCEDURE — 94664 DEMO&/EVAL PT USE INHALER: CPT

## 2020-08-19 PROCEDURE — 25000003 PHARM REV CODE 250: Performed by: STUDENT IN AN ORGANIZED HEALTH CARE EDUCATION/TRAINING PROGRAM

## 2020-08-19 PROCEDURE — 84100 ASSAY OF PHOSPHORUS: CPT

## 2020-08-19 PROCEDURE — 25000003 PHARM REV CODE 250: Performed by: FAMILY MEDICINE

## 2020-08-19 PROCEDURE — 99900035 HC TECH TIME PER 15 MIN (STAT)

## 2020-08-19 PROCEDURE — 63600175 PHARM REV CODE 636 W HCPCS: Performed by: FAMILY MEDICINE

## 2020-08-19 PROCEDURE — 82248 BILIRUBIN DIRECT: CPT

## 2020-08-19 PROCEDURE — 97110 THERAPEUTIC EXERCISES: CPT | Mod: CQ

## 2020-08-19 PROCEDURE — 80053 COMPREHEN METABOLIC PANEL: CPT

## 2020-08-19 PROCEDURE — 36415 COLL VENOUS BLD VENIPUNCTURE: CPT

## 2020-08-19 PROCEDURE — 94640 AIRWAY INHALATION TREATMENT: CPT

## 2020-08-19 PROCEDURE — 25000003 PHARM REV CODE 250: Performed by: INTERNAL MEDICINE

## 2020-08-19 PROCEDURE — 11000001 HC ACUTE MED/SURG PRIVATE ROOM

## 2020-08-19 PROCEDURE — 97530 THERAPEUTIC ACTIVITIES: CPT | Mod: CQ

## 2020-08-19 PROCEDURE — 25000242 PHARM REV CODE 250 ALT 637 W/ HCPCS: Performed by: STUDENT IN AN ORGANIZED HEALTH CARE EDUCATION/TRAINING PROGRAM

## 2020-08-19 PROCEDURE — 97110 THERAPEUTIC EXERCISES: CPT | Mod: CO

## 2020-08-19 PROCEDURE — 85730 THROMBOPLASTIN TIME PARTIAL: CPT

## 2020-08-19 PROCEDURE — 83735 ASSAY OF MAGNESIUM: CPT

## 2020-08-19 PROCEDURE — 94799 UNLISTED PULMONARY SVC/PX: CPT

## 2020-08-19 PROCEDURE — 27000221 HC OXYGEN, UP TO 24 HOURS

## 2020-08-19 PROCEDURE — 94761 N-INVAS EAR/PLS OXIMETRY MLT: CPT

## 2020-08-19 PROCEDURE — 82247 BILIRUBIN TOTAL: CPT

## 2020-08-19 RX ADMIN — PANTOPRAZOLE SODIUM 40 MG: 40 TABLET, DELAYED RELEASE ORAL at 08:08

## 2020-08-19 RX ADMIN — POLYETHYLENE GLYCOL (3350) 17 G: 17 POWDER, FOR SOLUTION ORAL at 08:08

## 2020-08-19 RX ADMIN — POTASSIUM & SODIUM PHOSPHATES POWDER PACK 280-160-250 MG 1 PACKET: 280-160-250 PACK at 05:08

## 2020-08-19 RX ADMIN — Medication 4 TABLET: at 08:08

## 2020-08-19 RX ADMIN — IPRATROPIUM BROMIDE AND ALBUTEROL SULFATE 3 ML: 2.5; .5 SOLUTION RESPIRATORY (INHALATION) at 08:08

## 2020-08-19 RX ADMIN — LIDOCAINE 1 PATCH: 50 PATCH TOPICAL at 05:08

## 2020-08-19 RX ADMIN — NAPROXEN 500 MG: 500 TABLET ORAL at 08:08

## 2020-08-19 RX ADMIN — POTASSIUM & SODIUM PHOSPHATES POWDER PACK 280-160-250 MG 1 PACKET: 280-160-250 PACK at 11:08

## 2020-08-19 RX ADMIN — VORICONAZOLE 200 MG: 200 TABLET, FILM COATED ORAL at 05:08

## 2020-08-19 RX ADMIN — MONTELUKAST 10 MG: 10 TABLET, FILM COATED ORAL at 08:08

## 2020-08-19 RX ADMIN — STANDARDIZED SENNA CONCENTRATE AND DOCUSATE SODIUM 1 TABLET: 8.6; 5 TABLET ORAL at 08:08

## 2020-08-19 RX ADMIN — IPRATROPIUM BROMIDE AND ALBUTEROL SULFATE 3 ML: 2.5; .5 SOLUTION RESPIRATORY (INHALATION) at 07:08

## 2020-08-19 RX ADMIN — ENOXAPARIN SODIUM 80 MG: 80 INJECTION SUBCUTANEOUS at 05:08

## 2020-08-19 RX ADMIN — NAPROXEN 500 MG: 500 TABLET ORAL at 05:08

## 2020-08-19 RX ADMIN — VORICONAZOLE 200 MG: 200 TABLET, FILM COATED ORAL at 04:08

## 2020-08-19 RX ADMIN — Medication 4 TABLET: at 05:08

## 2020-08-19 RX ADMIN — IPRATROPIUM BROMIDE AND ALBUTEROL SULFATE 3 ML: 2.5; .5 SOLUTION RESPIRATORY (INHALATION) at 04:08

## 2020-08-19 RX ADMIN — ACETAMINOPHEN 650 MG: 325 TABLET ORAL at 08:08

## 2020-08-19 RX ADMIN — TAMSULOSIN HYDROCHLORIDE 0.4 MG: 0.4 CAPSULE ORAL at 08:08

## 2020-08-19 RX ADMIN — IPRATROPIUM BROMIDE AND ALBUTEROL SULFATE 3 ML: 2.5; .5 SOLUTION RESPIRATORY (INHALATION) at 11:08

## 2020-08-19 RX ADMIN — THERA TABS 1 TABLET: TAB at 08:08

## 2020-08-19 NOTE — PLAN OF CARE
VN rounds completed.  The patient has no complaints at this time and states that he did have a bowel movement today.  IV sights were not visible due to Coban.  Patient reminded of fall precautions and verbalized understanding.

## 2020-08-19 NOTE — PLAN OF CARE
Problem: Occupational Therapy Goal  Goal: Occupational Therapy Goal  Description: Goals to be met by: 8/27/20     Patient will increase functional independence with ADLs by performing:    LE Dressing with Stand-by Assistance.  Grooming while standing with Stand-by Assistance.  Toileting from toilet with Stand-by Assistance for hygiene and clothing management.   Supine to sit with Stand-by Assistance.  Step transfer with Stand-by Assistance  Toilet transfer to toilet with Stand-by Assistance.  Upper extremity exercise program x10 reps per handout, with independence.    Outcome: Ongoing, Progressing       Rajan Deluan is a 74 y.o. male with a medical diagnosis of COVID-19.   Performance deficits affecting function are weakness, impaired endurance, impaired self care skills, impaired functional mobilty, gait instability, impaired balance, decreased upper extremity function, decreased lower extremity function, decreased safety awareness, impaired cardiopulmonary response to activity. Pt found in bed, agreeable to bed level activity secondary to just returned to bed and had bath.  Pt with fair tolerance of therex secondary to JACOME.  O2 sats fluctuating between 85-90% on 5L O2 during therapy.  Continue OT services to address functional goals, progressing as able.    ISABELLA Reyna/ARAM

## 2020-08-19 NOTE — PROGRESS NOTES
[facility] Hospital Medicine  Progress Note    Patient Name: Rajan Deluna  MRN: [id number]  Patient Class: IP- Inpatient   Admission Date: 7/21/2020  Length of Stay: 29 days  Attending Physician: Rajan Rodríguez MD  Primary Care Provider: Jason Mccord MD        Subjective:     Principal Problem:COVID-19        HPI:  Patient is a 74-year-old male with a past medical history of hypertension, COPD, MAC infection who presented to the ED with altered mental status and fever.  As per the family, patient has had altered mental status for the past 3-4 days. Family endorses increased somnolence as well as urinating on himself. EMS was called two days prior to presentation but reportedly deemed that patient did not need to be brought in. Over the previous day, reportedly the patient was found stuck, staring in his sink, not responding. On the day of presentation, the patient was found down on the floor, family wasn't able to get patient up from the floor and was reportedly not responding as much as baseline. Family believed that patient may not have been wearing baseline 2L O2. Patient also endorses a cough and generalized weakness. Patient reportedly had a recent UTI.     In the ED, chest x-ray showed diffuse interstitial patchy infiltrates concerning for possible pneumonia and a COVID-19 screening test was positive. Fever was 101° F and patient was hypotensive 80s/40s. Patient given 30cc/kg bolus. BP very soft on presentation, adequately responded to volume resuscitation and then decreased again. Labs showed a BUN 45, creatinine 2.9, , , troponin 0.036, procalcitonin 1.13.     Overview/Hospital Course:  Patient has had an extensive hospital course 2/2 COVID-related sepsis. Patient required peripheral Levophed for 1 day (upon admission) and has had stable MAPs throughout his course off pressors. Initially, patient required continuous BiPap and was stepped up to the ICU on 7/22 for 6  days. Patient also presented with a high D-dimer at 7.22 and was started on a heparin drip. CTA was contraindicated due to MARQUIS upon initial presentation. MARQUIS has improved with current Cr of 1 down from 2.9. While in the ICU, patient required supplemental O2. Patient complained of melanic diarrhea and a rectal tube was placed. Patient anxious and disoriented in the ICU. Haldol PRN for agitation in the ICU. Mental status improved in the ICU. Good UOP.    ID workup of melanic stool showed C. diff positive antigen and C. diff PCR toxin positive. Patient's melanic stool improved and rectal tube removed. He completed treatment with IV metronidazole and P.O. vancomycin for a total of 14 days, last dose 8/7. Though patient's melanic stool and watery diarrhea improved, there were concerns for a GI bleed due to an H/H of 6.9/21.5. Heparin drip was discontinued, IV protonix started, and the patient received 1 unit of packed RBCs. 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 Started on probiotics. Most recent recent H&H 9.5/37, stable.    Patient transitioned off continuous BiPAP and stepped down to the floor 7/27 with BiPAP use only at night. Continued to require supplemental O2. Tolerated advances in diet, duoneb treatments, incentive spirometry and chest physiotherapy. Tolerates PT/OT daily. Discussed self-proning due to deconditioned respiratory status but patient unable to tolerate. Off COVID isolation on 8/10, per hospital policy of 20 days negative COVID status. Patient received Levoquin for 5 days, stopped after procal <1. IV dexamthasone for 10 days. Redemsivir for 5 days.     Patient with history of aspergillosis and was on itraconazole as outpatient. WBC count increased to 20 during his course but resolved after patient completed 10 day course of IV dexamethasone.  Per ID, Voriconazole 400 mg po every 12 h X 2 loading doses, starting 7/29, then change to voriconazole 200 mg po every 12 h thereafter. Plan is to continue voriconazole and discharge on Itraconazole 100mg BID and follow up with outpatient Pulmonary and ID for continued Aspergillosis and MAC treatment.     During the patient's hospital course, he also had complaints of chest pain which resolved with a GI cocktail. Normal EKG and troponin trend. He also complained about left shoulder pain, in which the patient has a history of chronic shoulder pain that has required orthopedic surgery in the past. A shoulder x-ray showed to acute changes and the pain was managed with lidocaine patches and naproxen.    LTAC placement denied multiple times due to patient's continued need of supplemental oxygen. Patient expressed wanting to go home with possible hospice/palliative care but now is amendable to a type of rehabilitation facility. Multiple discussions about hospice/palliative care were held with patient. Discussed hospice/palliative care with daughter, patient's power of , due to patient's new baseline for his COPD 2/2 COVID. She is not agreeable to the plan and would like a type of rehabilitation facility too. Psych consulted to assess the patient's capacity and is in agreement with the primary team that the patient has the capacity to make his own medical decisions. A family meeting with palliative care is being coordinated with patient and daughter to discuss goals of care. Due to patient's extensive hospital course, the patient is deconditioned. The patient desats <88% with minimal exertion and supplemental O2 requirement increases during these episodes. Able to wean the patient down to 5L NC thus far from a peak requirement of 30L 70% on vapotherm.      Interval History: NAEON, on 5L HFNC. Patient AST/ALT and Alkphos on uptrend over past 6 days, no abdominal pain, will obtain bilirubin, total and direct.  Patient found to have capacity in decisionmaking by psychiatry. Will call daughter to arrange for family meeting Thursday.    Review of Systems   Constitutional: Negative for activity change, appetite change, chills and fatigue.   HENT: Negative for congestion and dental problem.    Eyes: Negative for discharge and itching.   Respiratory: Negative for cough and shortness of breath.    Cardiovascular: Negative for chest pain.   Gastrointestinal: Negative for abdominal pain, blood in stool, diarrhea and nausea.   Endocrine: Negative for cold intolerance and heat intolerance.   Genitourinary: Negative for difficulty urinating.   Musculoskeletal: Negative for arthralgias, back pain and gait problem.   Skin: Negative.  Negative for color change and pallor.   Neurological: Negative for dizziness, facial asymmetry, light-headedness and headaches.   Psychiatric/Behavioral: The patient is not nervous/anxious.      Objective:     Vital Signs (Most Recent):  Temp: 98.2 °F (36.8 °C) (08/19/20 0734)  Pulse: 100 (08/19/20 0759)  Resp: 18 (08/19/20 0749)  BP: (!) 116/59 (08/19/20 0734)  SpO2: 97 % (08/19/20 0749) Vital Signs (24h Range):  Temp:  [97 °F (36.1 °C)-98.2 °F (36.8 °C)] 98.2 °F (36.8 °C)  Pulse:  [] 100  Resp:  [18-22] 18  SpO2:  [90 %-97 %] 97 %  BP: ()/(53-70) 116/59     Weight: 74.1 kg (163 lb 5.8 oz)  Body mass index is 22.78 kg/m².    Intake/Output Summary (Last 24 hours) at 8/19/2020 0815  Last data filed at 8/18/2020 1800  Gross per 24 hour   Intake 750 ml   Output --   Net 750 ml      Physical Exam  Vitals signs and nursing note reviewed.   Constitutional:       General: He is not in acute distress.     Appearance: Normal appearance. He is not ill-appearing.   HENT:      Head: Normocephalic and atraumatic.      Right Ear: External ear normal.      Left Ear: External ear normal.      Nose: Nose normal. No congestion or rhinorrhea.      Mouth/Throat:      Mouth: Mucous membranes are moist.       Pharynx: Oropharynx is clear.   Eyes:      Pupils: Pupils are equal, round, and reactive to light.   Neck:      Musculoskeletal: Normal range of motion and neck supple.   Cardiovascular:      Rate and Rhythm: Normal rate and regular rhythm.      Pulses: Normal pulses.      Heart sounds: Normal heart sounds.   Pulmonary:      Effort: Pulmonary effort is normal.      Breath sounds: Wheezing present.      Comments: Wheezes this AM, likely COPD. O2 at 5L HFNC  Abdominal:      General: Abdomen is flat. There is no distension.      Palpations: Abdomen is soft.      Tenderness: There is no abdominal tenderness. There is no guarding.   Musculoskeletal: Normal range of motion.         General: No swelling.   Neurological:      General: No focal deficit present.      Mental Status: He is alert and oriented to person, place, and time. Mental status is at baseline.   Psychiatric:         Mood and Affect: Mood normal.         Behavior: Behavior normal.         Thought Content: Thought content normal.         Judgment: Judgment normal.         Significant Labs:   CBC:   Recent Labs   Lab 08/19/20 0428   WBC 5.40   HGB 9.3*   HCT 29.5*   *     CMP:   Recent Labs   Lab 08/19/20 0428      K 5.0      CO2 30*   GLU 89   BUN 11   CREATININE 1.1   CALCIUM 8.3*   PROT 6.0   ALBUMIN 1.9*   BILITOT 1.1*   ALKPHOS 294*   *   ALT 61*   ANIONGAP 5*   EGFRNONAA >60     All pertinent labs within the past 24 hours have been reviewed.    Significant Imaging: I have reviewed and interpreted all pertinent imaging results/findings within the past 24 hours.      Assessment/Plan:      * COVID-19  -Lifted Isolation 8 days ago  -Continuing to wean O2  -Deconditioning and long illness has led to increased O2 requirement, continuing to wean and work with PT/OT, RT with goal of SNF (5L NC max)  -Patient accepted BiPap overnight, improved to HFNC 5L this AM    Goals of care, counseling/discussion  -Yesterday, daughter asserted  her POA gave her control over father's medical decisions  -In this team's opinion, patient has capacity for decisionmaking at this time   -Consulted psychiatry for capacity evaluation on 8/18  -Patient found to have capacity for determining own medical decisions, dispo, code status  -Patient assented to full code for now  -Patient, daughter will need family meeting, will coordinate for Thursday    Left shoulder pain  Add naproxen scheduled BID  Continue lidocaine patch, tylenol for pain  Stable pulses, ROM limited by patient effort  Continue PT/OT as well as current pain regimen  In setting of elevated AST/ALT and Alk phos, obtain direct bilirubin/total  Consider d/c tylenol         History of Aspergillus & MAC  Continuing voriconazole, switch to itroconazole at DC   -Follow up with Pulm, ID outpatient  STABLE    Hypotension  Patient's most recent blood pressure 116/75  RESOLVED      Sepsis  RESOLVED  See plan for COVID 19    COPD (chronic obstructive pulmonary disease)  On HFNC 5L, improvement  Continue Montelukast  Continue to wean  Goal of 5L NC  Patient to sit up 2x daily in chair  Mild wheezes present   Continue albuterol and chest physio/IS  -Monitor for progress          Anemia  Asymptomatic  H/H stable at 9.3/29.5 today  Labs QUOD      VTE Risk Mitigation (From admission, onward)         Ordered     enoxaparin injection 80 mg  Every 12 hours (non-standard times)      08/04/20 1421     Place sequential compression device  Until discontinued      07/21/20 2142     IP VTE HIGH RISK PATIENT  Once      07/21/20 2142                Discharge Planning   ADAM:      Code Status: Full Code   Is the patient medically ready for discharge?:     Reason for patient still in hospital (select all that apply): Patient unstable, Patient trending condition, Laboratory test and Treatment  Discharge Plan A: Long-term acute care facility (LTAC)   Discharge Delays: None known at this time              Emeli Adam MD   [facility]  PGY2  Department of [facility] Medicine   [facility]

## 2020-08-19 NOTE — ASSESSMENT & PLAN NOTE
Add naproxen scheduled BID  Continue lidocaine patch, tylenol for pain  Stable pulses, ROM limited by patient effort  Continue PT/OT as well as current pain regimen  In setting of elevated AST/ALT and Alk phos, obtain direct bilirubin/total  Consider d/c tylenol

## 2020-08-19 NOTE — ASSESSMENT & PLAN NOTE
Continuing voriconazole, switch to itroconazole at [place]   -Follow up with Pulm, ID outpatient  STABLE

## 2020-08-19 NOTE — SUBJECTIVE & OBJECTIVE
Interval History: NAEON, on 5L HFNC. Patient AST/ALT and Alkphos on uptrend over past 6 days, no abdominal pain, will obtain bilirubin, total and direct. Patient found to have capacity in decisionmaking by psychiatry. Will call daughter to arrange for family meeting Thursday.    Review of Systems   Constitutional: Negative for activity change, appetite change, chills and fatigue.   HENT: Negative for congestion and dental problem.    Eyes: Negative for discharge and itching.   Respiratory: Negative for cough and shortness of breath.    Cardiovascular: Negative for chest pain.   Gastrointestinal: Negative for abdominal pain, blood in stool, diarrhea and nausea.   Endocrine: Negative for cold intolerance and heat intolerance.   Genitourinary: Negative for difficulty urinating.   Musculoskeletal: Negative for arthralgias, back pain and gait problem.   Skin: Negative.  Negative for color change and pallor.   Neurological: Negative for dizziness, facial asymmetry, light-headedness and headaches.   Psychiatric/Behavioral: The patient is not nervous/anxious.      Objective:     Vital Signs (Most Recent):  Temp: 98.2 °F (36.8 °C) (08/19/20 0734)  Pulse: 100 (08/19/20 0759)  Resp: 18 (08/19/20 0749)  BP: (!) 116/59 (08/19/20 0734)  SpO2: 97 % (08/19/20 0749) Vital Signs (24h Range):  Temp:  [97 °F (36.1 °C)-98.2 °F (36.8 °C)] 98.2 °F (36.8 °C)  Pulse:  [] 100  Resp:  [18-22] 18  SpO2:  [90 %-97 %] 97 %  BP: ()/(53-70) 116/59     Weight: 74.1 kg (163 lb 5.8 oz)  Body mass index is 22.78 kg/m².    Intake/Output Summary (Last 24 hours) at 8/19/2020 0815  Last data filed at 8/18/2020 1800  Gross per 24 hour   Intake 750 ml   Output --   Net 750 ml      Physical Exam  Vitals signs and nursing note reviewed.   Constitutional:       General: He is not in acute distress.     Appearance: Normal appearance. He is not ill-appearing.   HENT:      Head: Normocephalic and atraumatic.      Right Ear: External ear normal.       Left Ear: External ear normal.      Nose: Nose normal. No congestion or rhinorrhea.      Mouth/Throat:      Mouth: Mucous membranes are moist.      Pharynx: Oropharynx is clear.   Eyes:      Pupils: Pupils are equal, round, and reactive to light.   Neck:      Musculoskeletal: Normal range of motion and neck supple.   Cardiovascular:      Rate and Rhythm: Normal rate and regular rhythm.      Pulses: Normal pulses.      Heart sounds: Normal heart sounds.   Pulmonary:      Effort: Pulmonary effort is normal.      Breath sounds: Wheezing present.      Comments: Wheezes this AM, likely COPD. O2 at 5L HFNC  Abdominal:      General: Abdomen is flat. There is no distension.      Palpations: Abdomen is soft.      Tenderness: There is no abdominal tenderness. There is no guarding.   Musculoskeletal: Normal range of motion.         General: No swelling.   Neurological:      General: No focal deficit present.      Mental Status: He is alert and oriented to person, place, and time. Mental status is at baseline.   Psychiatric:         Mood and Affect: Mood normal.         Behavior: Behavior normal.         Thought Content: Thought content normal.         Judgment: Judgment normal.         Significant Labs:   CBC:   Recent Labs   Lab 08/19/20  0428   WBC 5.40   HGB 9.3*   HCT 29.5*   *     CMP:   Recent Labs   Lab 08/19/20  0428      K 5.0      CO2 30*   GLU 89   BUN 11   CREATININE 1.1   CALCIUM 8.3*   PROT 6.0   ALBUMIN 1.9*   BILITOT 1.1*   ALKPHOS 294*   *   ALT 61*   ANIONGAP 5*   EGFRNONAA >60     All pertinent labs within the past 24 hours have been reviewed.    Significant Imaging: I have reviewed and interpreted all pertinent imaging results/findings within the past 24 hours.

## 2020-08-19 NOTE — ASSESSMENT & PLAN NOTE
-Yesterday, daughter asserted her POA gave her control over father's medical decisions  -In this team's opinion, patient has capacity for decisionmaking at this time   -Consulted psychiatry for capacity evaluation on 8/18  -Patient found to have capacity for determining own medical decisions, dispo, code status  -Patient assented to full code for now  -Patient, daughter will need family meeting, will coordinate for Thursday

## 2020-08-19 NOTE — ASSESSMENT & PLAN NOTE
On HFNC 5L, improvement  Continue Montelukast  Continue to wean  Goal of 5L NC  Patient to sit up 2x daily in chair  Mild wheezes present   Continue albuterol and chest physio/IS  -Monitor for progress

## 2020-08-19 NOTE — ASSESSMENT & PLAN NOTE
-Lifted Isolation 8 days ago  -Continuing to wean O2  -Deconditioning and long illness has led to increased O2 requirement, continuing to wean and work with PT/OT, RT with goal of SNF (5L NC max)  -Patient accepted BiPap overnight, improved to HFNC 5L this AM

## 2020-08-19 NOTE — PT/OT/SLP PROGRESS
Occupational Therapy   Treatment    Name: Rajan Deluna  MRN: [id number]  Admitting Diagnosis:  COVID-19  16 Days Post-Op    Recommendations:     Discharge Recommendations: nursing facility, skilled  Discharge Equipment Recommendations:  walker, rolling  Barriers to discharge:  Inaccessible home environment, Decreased caregiver support    Assessment:     Rajan Deluna is a 74 y.o. male with a medical diagnosis of COVID-19.   Performance deficits affecting function are weakness, impaired endurance, impaired self care skills, impaired functional mobilty, gait instability, impaired balance, decreased upper extremity function, decreased lower extremity function, decreased safety awareness, impaired cardiopulmonary response to activity. Pt found in bed, agreeable to bed level activity secondary to just returned to bed and had bath.  Pt with fair tolerance of therex secondary to JACOME.  O2 sats fluctuating between 85-90% on 5L O2 during therapy.  Continue OT services to address functional goals, progressing as able.      Rehab Prognosis:  Fair; patient would benefit from acute skilled OT services to address these deficits and reach maximum level of function.       Plan:     Patient to be seen 5 x/week to address the above listed problems via therapeutic exercises, therapeutic activities, self-care/home management  · Plan of Care Expires: 08/27/20  · Plan of Care Reviewed with: patient    Subjective     Pain/Comfort:  · Pain Rating 1: 0/10  · Pain Rating Post-Intervention 1: 0/10    Objective:     Communicated with: RN prior to session.  Patient found left sidelying with pulse ox (continuous), telemetry, peripheral IV, oxygen upon OT entry to room.    General Precautions: Standard, fall, respiratory   Orthopedic Precautions:N/A   Braces: N/A     Occupational Performance:     Bed Mobility:    · Patient completed Rolling/Turning to Left <>supine with  stand by assistance     Functional  Mobility/Transfers:  · Declined    Activities of Daily Living:  · Grooming: stand by assistance bed level      [facility] 6 Click ADL: 16    Treatment & Education:  Pt performed BUE AROM ex 2 x 10 reps all major jts/planes.  Pt with fair tolerance requiring frequent rest breaks and PLB.      Patient left left sidelying with all lines intact, call button in reach and bed alarm onEducation:      GOALS:   Multidisciplinary Problems     Occupational Therapy Goals        Problem: Occupational Therapy Goal    Goal Priority Disciplines Outcome Interventions   Occupational Therapy Goal     OT, PT/OT Ongoing, Progressing    Description: Goals to be met by: 8/27/20     Patient will increase functional independence with ADLs by performing:    LE Dressing with Stand-by Assistance.  Grooming while standing with Stand-by Assistance.  Toileting from toilet with Stand-by Assistance for hygiene and clothing management.   Supine to sit with Stand-by Assistance.  Step transfer with Stand-by Assistance  Toilet transfer to toilet with Stand-by Assistance.  Upper extremity exercise program x10 reps per handout, with independence.                     Time Tracking:     OT Date of Treatment: 08/19/20  OT Start Time: 1107  OT Stop Time: 1126  OT Total Time (min): 19 min    Billable Minutes:Therapeutic Exercise 19    TED Reyna  8/19/2020

## 2020-08-19 NOTE — PLAN OF CARE
Problem: Adult Inpatient Plan of Care  Goal: Plan of Care Review  Patient is awake and orientedx4. Care plan explained to patient; he verbalized understanding. On 5L via nasal cannula, continuous O2 saturation monitor at %. Placed on BiPAP by respiratory. Hooked to heart monitor running NSR/sinus tachycardia at 96-115bpm. Incontinence care completed. Patient had 1 bowel movement overnight. No pain/n/v/d during shift. Due medications given. Encouraged/assisted to turn every 2 hours as tolerated. Maintained on fall risk precaution. Bed in lowest position, bed alarm on, call light/personal items within reach and instructed to call for help when needed. Will continue to monitor.       Outcome: Ongoing, Progressing

## 2020-08-19 NOTE — PLAN OF CARE
Patient daughter called again and states that she has had the POA revoked. She will be coming to the hospital tomorrow to deliver the paperwork so that it can be on file.     Traci Blackman MD, [facility] PGY-1  08/19/2020

## 2020-08-19 NOTE — PT/OT/SLP PROGRESS
Physical Therapy Treatment    Patient Name:  Rajan Deluna   MRN:  [id number]    Recommendations:     Discharge Recommendations:  nursing facility, skilled   Discharge Equipment Recommendations: walker, rolling   Barriers to discharge: Decreased caregiver support and decreased mobility,strength and endurance    Assessment:     Rajan Deluna is a 74 y.o. male admitted with a medical diagnosis of COVID-19.  He presents with the following impairments/functional limitations:  weakness, impaired endurance, impaired functional mobilty, gait instability, impaired balance, decreased lower extremity function, decreased ROM, impaired coordination, impaired cardiopulmonary response to activity,pt with good participation and requires assistance with all mobility,pt remains weak and with impaired cardiopulmonary,pt will benefit from SNF upon discharge.    Rehab Prognosis: Fair; patient would benefit from acute skilled PT services to address these deficits and reach maximum level of function.    Recent Surgery: Procedure(s) (LRB):  IMAGING PROCEDURE, GI TRACT, INTRALUMINAL, VIA CAPSULE (N/A) 16 Days Post-Op    Plan:     During this hospitalization, patient to be seen 6 x/week to address the identified rehab impairments via gait training, therapeutic activities, therapeutic exercises, neuromuscular re-education and progress toward the following goals:    · Plan of Care Expires:  09/08/20    Subjective     Chief Complaint: n/a  Patient/Family Comments/goals: pt agreeable to up in chair.  Pain/Comfort:  · Pain Rating 1: (no rating)  · Location - Orientation 1: generalized  · Location 1: neck  · Pain Addressed 1: Reposition, Distraction      Objective:     Communicated with nsg prior to session.  Patient found supine with bed alarm, oxygen, telemetry upon PT entry to room.     General Precautions: Standard, respiratory, fall(covid precautions lifted 8/11)   Orthopedic Precautions:N/A   Braces: N/A     Functional Mobility:  · Bed  Mobility:     · Supine to Sit: contact guard assistance  · Transfers:     · Sit to Stand:  contact guard assistance with rolling walker  · Bed to Chair: minimum assistance with  rolling walker  using  Step Transfer  · Gait: amb ~5 steps to b/s chair with RW and Min A,O2 donned  · Balance: fair standing balance with RW      AM-PAC 6 CLICK MOBILITY  Turning over in bed (including adjusting bedclothes, sheets and blankets)?: 3  Sitting down on and standing up from a chair with arms (e.g., wheelchair, bedside commode, etc.): 3  Moving from lying on back to sitting on the side of the bed?: 3  Moving to and from a bed to a chair (including a wheelchair)?: 3  Need to walk in hospital room?: 3  Climbing 3-5 steps with a railing?: 1  Basic Mobility Total Score: 16       Therapeutic Activities and Exercises: le supine ex's inc: ap,qs,hs rest periods PRN,Pt sat EOB ~ 12 mins secondary to decreased sats and SOB,rest periods throughout rx.       Patient left up in chair with all lines intact, call button in reach, chair alarm on and nsg present..    GOALS: see general POC  Multidisciplinary Problems     Physical Therapy Goals        Problem: Physical Therapy Goal    Goal Priority Disciplines Outcome Goal Variances Interventions   Physical Therapy Goal     PT, PT/OT Ongoing, Progressing     Description: Goals to be met by: 20     Patient will increase functional independence with mobility by performin. Supine <> sit with MInimal Assistance   Met   Revised  supine<>sit MOD I  2. Sit to stand transfer with Minimal Assistance   Met   Revised sit to stand with supervision  3. Bed to chair transfer with Minimal Assistance   Met   Revised bed to chair transfer with supervision  4. Gait x 50 ft with min A with appropriate AD  5. Lower extremity exercise program x 10 reps per handout, with supervision  Met 8/15  Revised Lower extremity exercise program x 20 reps per handout, with supervision                      Time Tracking:     PT Received On: 08/19/20  PT Start Time: 0824     PT Stop Time: 0904  PT Total Time (min): 40 min     Billable Minutes: Therapeutic Activity 28 and Therapeutic Exercise 12    Treatment Type: Treatment  PT/PTA: PTA     PTA Visit Number: 3     Humble Browning, PTA  08/19/2020

## 2020-08-19 NOTE — PLAN OF CARE
Patient Daughter called, discussed discharge plan. Ms. Deluna stated that she spoke with her father, and they are in agreement that he will go to a facility rather than home after his hospital stay. She said she will try to arrange for a family meeting on Sunday, as her family has transportation challenges during the week. Will continue to follow.    Emeli Adam MD  [employer] [facility] PGY2

## 2020-08-19 NOTE — PLAN OF CARE
Problem: Physical Therapy Goal  Goal: Physical Therapy Goal  Description: Goals to be met by: 20     Patient will increase functional independence with mobility by performin. Supine <> sit with MInimal Assistance   Met   Revised  supine<>sit MOD I  2. Sit to stand transfer with Minimal Assistance   Met   Revised sit to stand with supervision  3. Bed to chair transfer with Minimal Assistance   Met   Revised bed to chair transfer with supervision  4. Gait x 50 ft with min A with appropriate AD  5. Lower extremity exercise program x 10 reps per handout, with supervision  Met 8/15  Revised Lower extremity exercise program x 20 reps per handout, with supervision    Outcome: Ongoing, Progressing   Goals ongoing

## 2020-08-20 LAB
POCT GLUCOSE: 101 MG/DL (ref 70–110)
POCT GLUCOSE: 112 MG/DL (ref 70–110)
POCT GLUCOSE: 88 MG/DL (ref 70–110)

## 2020-08-20 PROCEDURE — 25000003 PHARM REV CODE 250: Performed by: STUDENT IN AN ORGANIZED HEALTH CARE EDUCATION/TRAINING PROGRAM

## 2020-08-20 PROCEDURE — 94640 AIRWAY INHALATION TREATMENT: CPT

## 2020-08-20 PROCEDURE — 94664 DEMO&/EVAL PT USE INHALER: CPT

## 2020-08-20 PROCEDURE — 27100171 HC OXYGEN HIGH FLOW UP TO 24 HOURS

## 2020-08-20 PROCEDURE — 25000242 PHARM REV CODE 250 ALT 637 W/ HCPCS: Performed by: STUDENT IN AN ORGANIZED HEALTH CARE EDUCATION/TRAINING PROGRAM

## 2020-08-20 PROCEDURE — 99900035 HC TECH TIME PER 15 MIN (STAT)

## 2020-08-20 PROCEDURE — 25000003 PHARM REV CODE 250: Performed by: FAMILY MEDICINE

## 2020-08-20 PROCEDURE — 63600175 PHARM REV CODE 636 W HCPCS: Performed by: FAMILY MEDICINE

## 2020-08-20 PROCEDURE — 25000003 PHARM REV CODE 250: Performed by: INTERNAL MEDICINE

## 2020-08-20 PROCEDURE — 11000001 HC ACUTE MED/SURG PRIVATE ROOM

## 2020-08-20 PROCEDURE — 94799 UNLISTED PULMONARY SVC/PX: CPT

## 2020-08-20 PROCEDURE — 94761 N-INVAS EAR/PLS OXIMETRY MLT: CPT

## 2020-08-20 RX ORDER — HYDROXYZINE PAMOATE 25 MG/1
25 CAPSULE ORAL NIGHTLY PRN
Status: DISCONTINUED | OUTPATIENT
Start: 2020-08-20 | End: 2020-08-27 | Stop reason: HOSPADM

## 2020-08-20 RX ORDER — FLUTICASONE PROPIONATE 110 UG/1
1 AEROSOL, METERED RESPIRATORY (INHALATION) EVERY 12 HOURS
Status: DISCONTINUED | OUTPATIENT
Start: 2020-08-20 | End: 2020-08-27 | Stop reason: HOSPADM

## 2020-08-20 RX ADMIN — Medication 4 TABLET: at 05:08

## 2020-08-20 RX ADMIN — IPRATROPIUM BROMIDE AND ALBUTEROL SULFATE 3 ML: 2.5; .5 SOLUTION RESPIRATORY (INHALATION) at 03:08

## 2020-08-20 RX ADMIN — THERA TABS 1 TABLET: TAB at 09:08

## 2020-08-20 RX ADMIN — Medication 4 TABLET: at 12:08

## 2020-08-20 RX ADMIN — PANTOPRAZOLE SODIUM 40 MG: 40 TABLET, DELAYED RELEASE ORAL at 08:08

## 2020-08-20 RX ADMIN — VORICONAZOLE 200 MG: 200 TABLET, FILM COATED ORAL at 03:08

## 2020-08-20 RX ADMIN — IPRATROPIUM BROMIDE AND ALBUTEROL SULFATE 3 ML: 2.5; .5 SOLUTION RESPIRATORY (INHALATION) at 11:08

## 2020-08-20 RX ADMIN — ENOXAPARIN SODIUM 80 MG: 80 INJECTION SUBCUTANEOUS at 05:08

## 2020-08-20 RX ADMIN — HYDROXYZINE PAMOATE 25 MG: 25 CAPSULE ORAL at 04:08

## 2020-08-20 RX ADMIN — NAPROXEN 500 MG: 500 TABLET ORAL at 09:08

## 2020-08-20 RX ADMIN — ENOXAPARIN SODIUM 80 MG: 80 INJECTION SUBCUTANEOUS at 04:08

## 2020-08-20 RX ADMIN — POTASSIUM & SODIUM PHOSPHATES POWDER PACK 280-160-250 MG 1 PACKET: 280-160-250 PACK at 06:08

## 2020-08-20 RX ADMIN — NAPROXEN 500 MG: 500 TABLET ORAL at 05:08

## 2020-08-20 RX ADMIN — IPRATROPIUM BROMIDE AND ALBUTEROL SULFATE 3 ML: 2.5; .5 SOLUTION RESPIRATORY (INHALATION) at 08:08

## 2020-08-20 RX ADMIN — FLUTICASONE PROPIONATE 1 PUFF: 110 AEROSOL, METERED RESPIRATORY (INHALATION) at 08:08

## 2020-08-20 RX ADMIN — POTASSIUM & SODIUM PHOSPHATES POWDER PACK 280-160-250 MG 1 PACKET: 280-160-250 PACK at 12:08

## 2020-08-20 RX ADMIN — TAMSULOSIN HYDROCHLORIDE 0.4 MG: 0.4 CAPSULE ORAL at 09:08

## 2020-08-20 RX ADMIN — LIDOCAINE 2 PATCH: 50 PATCH TOPICAL at 05:08

## 2020-08-20 RX ADMIN — POLYETHYLENE GLYCOL (3350) 17 G: 17 POWDER, FOR SOLUTION ORAL at 09:08

## 2020-08-20 RX ADMIN — MONTELUKAST 10 MG: 10 TABLET, FILM COATED ORAL at 08:08

## 2020-08-20 RX ADMIN — FLUTICASONE PROPIONATE 1 PUFF: 110 AEROSOL, METERED RESPIRATORY (INHALATION) at 11:08

## 2020-08-20 RX ADMIN — STANDARDIZED SENNA CONCENTRATE AND DOCUSATE SODIUM 1 TABLET: 8.6; 5 TABLET ORAL at 09:08

## 2020-08-20 RX ADMIN — POTASSIUM & SODIUM PHOSPHATES POWDER PACK 280-160-250 MG 1 PACKET: 280-160-250 PACK at 05:08

## 2020-08-20 RX ADMIN — PANTOPRAZOLE SODIUM 40 MG: 40 TABLET, DELAYED RELEASE ORAL at 09:08

## 2020-08-20 RX ADMIN — STANDARDIZED SENNA CONCENTRATE AND DOCUSATE SODIUM 1 TABLET: 8.6; 5 TABLET ORAL at 08:08

## 2020-08-20 RX ADMIN — IPRATROPIUM BROMIDE AND ALBUTEROL SULFATE 3 ML: 2.5; .5 SOLUTION RESPIRATORY (INHALATION) at 07:08

## 2020-08-20 RX ADMIN — Medication 4 TABLET: at 09:08

## 2020-08-20 NOTE — PT/OT/SLP PROGRESS
Physical Therapy      Patient Name:  Rajan Deluna   MRN:  [id number]    Patient not seen today secondary to (nsg hold,severe cardiopulmonary concerns). Will follow-up tomorrow(1579).    Humble Browning, PTA

## 2020-08-20 NOTE — PLAN OF CARE
I left a vm for Tania Deluna ( Wife ) : [phone] to discuss calling to file a grievance and appeal for LTAC denial.    0932  Tania called back.  She is calling the [facility] grievance and appeals line for [phone] to file an appeal.       08/20/20 0934   Post-Acute Status   Post-Acute Authorization Placement     Phong Suh RN,   [phone]

## 2020-08-20 NOTE — CONSULTS
Patient has been followed by Palliative medicine during this admission. See initial consult. At this time patient has many family dynamics. He is unable to go home with hospice. Family is not accepting patient home. Daughter has rescinded her HPOA and left voice message for provider. Patient will need placement with hospice care.     Natty Carlton, MSN, APRN, NP-C   Palliative Medicine   [facility]  [phone] or [phone]        >50% of   min visit spent in chart review, face to face discussion of goals of care with patient, family, symptom assessment, coordination of care and emotional support.

## 2020-08-20 NOTE — PLAN OF CARE
Patient is in bed, calm.  When receiving patient this morning patient was noted to be asleep. Night nurse informed that patient has trouble sleeping at night, night nurse did administer Vistirl to aid patient.  Patient has been supine in bed, with back and heel support.   It is noted that any kind of exertion or movement made by patient, oxygen levels drop.  On arrival to shift this morning patient was on HFNC 5l O2  After morning medication pass it was noted that his oxygen intake was increase to 7L HFNC.  Pt was not able to participate in OT/PT today due to drop in stats.  Patient will call when needing assistance. Patient has had good appetite tosay, for dinner patient at in recliner chair.  Pt did express lower back pain, 7/10  Lidocaine patches applied to left shoulder and to mid lower back.  Patient in bed, in lowest position, wheels locked and call light within reach. Oxygen on and NC in place, patient has O2 probe on left hand finger.  Will endorse patient to oncoming nurse.

## 2020-08-20 NOTE — ASSESSMENT & PLAN NOTE
On HFNC 5L, improvement  Continue Montelukast  Add Flovent  Continue to wean  Goal of 5L NC  Patient to sit up 2x daily in chair  Mild wheezes present   Continue albuterol and chest physio/IS  -Monitor for progress

## 2020-08-20 NOTE — ASSESSMENT & PLAN NOTE
-Lifted Isolation 9 days ago  -Continuing to wean O2  -Deconditioning and long illness has led to increased O2 requirement, continuing to wean and work with PT/OT, RT with goal of SNF (5L NC max)  -Patient did not accept BiPap overnight,HFNC 5L this AM  - Encourage BiPap QHS

## 2020-08-20 NOTE — PLAN OF CARE
Pt's wife Tania was unsuccessful filing an appeal this morning.  I have called multiple numbers and spent 2 hours today to initiate an appeal on the pt's behalf.  I finally was able to speak to someone that told me I need to write a letter and fax to appeals dept [phone].  I typed up a letter and faxed it along with pulmonology progress note suggesting LTAC.  Waiting on response.       08/20/20 4187   Post-Acute Status   Post-Acute Authorization Placement     Phong Suh RN,   [phone]

## 2020-08-20 NOTE — SUBJECTIVE & OBJECTIVE
Interval History: NAEON. Continues to refuse BiPAP. On 4L HFNC this AM, satting 90-97%. Continues to work with PT/OT/RT. Abdominal US non-distended gallbladder containing possible debris, no biliary dilation. Liver normal size. Patient's daughter, POA, plans to revoke her POA today. Patient continues to be interested in returning home and receiving rehab.    Review of Systems  Objective:     Vital Signs (Most Recent):  Temp: 97.9 °F (36.6 °C) (08/20/20 1052)  Pulse: (!) 111 (08/20/20 1052)  Resp: 18 (08/20/20 1052)  BP: 113/70 (08/20/20 1052)  SpO2: (!) 85 % (08/20/20 1052) Vital Signs (24h Range):  Temp:  [96.1 °F (35.6 °C)-97.9 °F (36.6 °C)] 97.9 °F (36.6 °C)  Pulse:  [] 111  Resp:  [18-28] 18  SpO2:  [85 %-97 %] 85 %  BP: ()/(51-70) 113/70     Weight: 74.1 kg (163 lb 5.8 oz)  Body mass index is 22.78 kg/m².    Intake/Output Summary (Last 24 hours) at 8/20/2020 1109  Last data filed at 8/20/2020 0400  Gross per 24 hour   Intake 615 ml   Output --   Net 615 ml      Physical Exam    Significant Labs:   POCT Glucose:   Recent Labs   Lab 08/19/20  1634 08/19/20  2036 08/20/20  0331   POCTGLUCOSE 99 116* 88       Significant Imaging: I have reviewed all pertinent imaging results/findings within the past 24 hours.     EXAMINATION:   US ABDOMEN LIMITED     CLINICAL HISTORY:  RUQ, elevated LFT;     TECHNIQUE:  Limited ultrasound of the right upper quadrant of the abdomen (including pancreas, liver, gallbladder, common bile duct, and spleen) was performed.     COMPARISON:  None available     FINDINGS:  Liver: 15.6 cm, normal in size.  Homogeneous echotexture without intrahepatic lesion.     Gallbladder: No wall thickening/hypervascularity or pericholecystic fluid.  Negative sonographic Paniagua's. Possible debris.     Biliary system: The common duct is not dilated, measuring 3 mm.  No intrahepatic ductal dilatation.     Spleen: Obscured by gas.     Miscellaneous: Pancreas  obscured.     Impression:     Nondistended gallbladder containing possible debris.  No biliary dilatation.        Electronically signed by: Olivier Farris  Date:                                            08/19/2020  Time:                                           18:16

## 2020-08-20 NOTE — ASSESSMENT & PLAN NOTE
-Daughter will terminate POA  -Patient has capacity  -Palliative will follow up to ensure patient goals of care clarified

## 2020-08-20 NOTE — PLAN OF CARE
Attempted to call wife x2, no answer. Called patient son, Rajan Deluna Jr. He stated that his mother has leg and hand weakness, and requires help at home herself. He states that she is unlikely to be able to care for him herself on home hospice. He said he will visit on Sunday, and let his mother know to call. Spoke with Mrs. Deluna, she agreed that she could not care for patient at home.    Emeli Adam MD  [employer] [facility] PGY2

## 2020-08-20 NOTE — PT/OT/SLP PROGRESS
Occupational Therapy      Patient Name:  Rajan Deluna   MRN:  [id number]    Patient not seen today secondary to Other (Comment)(AM 1009: RN reports pt desatting to low 80's just changing positions in bed.  Requested to hold therapy this am.). Will follow-up at later time.    TED Reyna  8/20/2020

## 2020-08-20 NOTE — PROGRESS NOTES
[facility] Hospital Medicine  Progress Note    Patient Name: Rajan Deluna  MRN: [id number]  Patient Class: IP- Inpatient   Admission Date: 7/21/2020  Length of Stay: 30 days  Attending Physician: Rajan Rodríguez MD  Primary Care Provider: Jason Mccord MD        Subjective:     Principal Problem:COVID-19        HPI:  Patient is a 74-year-old male with a past medical history of hypertension, COPD, MAC infection who presented to the ED with altered mental status and fever.  As per the family, patient has had altered mental status for the past 3-4 days. Family endorses increased somnolence as well as urinating on himself. EMS was called two days prior to presentation but reportedly deemed that patient did not need to be brought in. Over the previous day, reportedly the patient was found stuck, staring in his sink, not responding. On the day of presentation, the patient was found down on the floor, family wasn't able to get patient up from the floor and was reportedly not responding as much as baseline. Family believed that patient may not have been wearing baseline 2L O2. Patient also endorses a cough and generalized weakness. Patient reportedly had a recent UTI.     In the ED, chest x-ray showed diffuse interstitial patchy infiltrates concerning for possible pneumonia and a COVID-19 screening test was positive. Fever was 101° F and patient was hypotensive 80s/40s. Patient given 30cc/kg bolus. BP very soft on presentation, adequately responded to volume resuscitation and then decreased again. Labs showed a BUN 45, creatinine 2.9, , , troponin 0.036, procalcitonin 1.13.     Overview/Hospital Course:  Patient has had an extensive hospital course 2/2 COVID-related sepsis. Patient required peripheral Levophed for 1 day (upon admission) and has had stable MAPs throughout his course off pressors. Initially, patient required continuous BiPap and was stepped up to the ICU on 7/22 for 6  days. Patient also presented with a high D-dimer at 7.22 and was started on a heparin drip. CTA was contraindicated due to MARQUIS upon initial presentation. MARQUIS has improved with current Cr of 1 down from 2.9. While in the ICU, patient required supplemental O2. Patient complained of melanic diarrhea and a rectal tube was placed. Patient anxious and disoriented in the ICU. Haldol PRN for agitation in the ICU. Mental status improved in the ICU. Good UOP.    ID workup of melanic stool showed C. diff positive antigen and C. diff PCR toxin positive. Patient's melanic stool improved and rectal tube removed. He completed treatment with IV metronidazole and P.O. vancomycin for a total of 14 days, last dose 8/7. Though patient's melanic stool and watery diarrhea improved, there were concerns for a GI bleed due to an H/H of 6.9/21.5. Heparin drip was discontinued, IV protonix started, and the patient received 1 unit of packed RBCs. 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 Started on probiotics. Most recent recent H&H 9.5/37, stable.    Patient transitioned off continuous BiPAP and stepped down to the floor 7/27 with BiPAP use only at night. Continued to require supplemental O2. Tolerated advances in diet, duoneb treatments, incentive spirometry and chest physiotherapy. Tolerates PT/OT daily. Discussed self-proning due to deconditioned respiratory status but patient unable to tolerate. Off COVID isolation on 8/10, per hospital policy of 20 days negative COVID status. Patient received Levoquin for 5 days, stopped after procal <1. IV dexamthasone for 10 days. Redemsivir for 5 days.     Patient with history of aspergillosis and was on itraconazole as outpatient. WBC count increased to 20 during his course but resolved after patient completed 10 day course of IV dexamethasone.  Per ID, Voriconazole 400 mg po every 12 h X 2 loading doses, starting 7/29, then change to voriconazole 200 mg po every 12 h thereafter. Plan is to continue voriconazole and discharge on Itraconazole 100mg BID and follow up with outpatient Pulmonary and ID for continued Aspergillosis and MAC treatment.     During the patient's hospital course, he also had complaints of chest pain which resolved with a GI cocktail. Normal EKG and troponin trend. He also complained about left shoulder pain, in which the patient has a history of chronic shoulder pain that has required orthopedic surgery in the past. A shoulder x-ray showed to acute changes and the pain was managed with lidocaine patches and naproxen.    LTAC placement denied multiple times due to patient's continued need of supplemental oxygen. Patient expressed wanting to go home with possible hospice/palliative care but now is amendable to a type of rehabilitation facility. Multiple discussions about hospice/palliative care were held with patient. Discussed hospice/palliative care with daughter, patient's power of , due to patient's new baseline for his COPD 2/2 COVID. She is not agreeable to the plan and would like a type of rehabilitation facility too. Psych consulted to assess the patient's capacity and is in agreement with the primary team that the patient has the capacity to make his own medical decisions. A family meeting with palliative care is being coordinated with patient and daughter to discuss goals of care. Due to patient's extensive hospital course, the patient is deconditioned. The patient desats <88% with minimal exertion and supplemental O2 requirement increases during these episodes. Able to wean the patient down to 4L HFNC thus far from a peak requirement of 30L 70% on vapotherm. Patient's daughter decided to release being Power of .       Interval History: NAEON. Continues to refuse BiPAP. On 4L HFNC this AM, satting 90-97%.  Continues to work with PT/OT/RT. Abdominal US non-distended gallbladder containing possible debris, no biliary dilation. Liver normal size. Patient's daughter, POA, plans to revoke her POA today. Patient continues to be interested in returning home and receiving rehab.    Review of Systems  Objective:     Vital Signs (Most Recent):  Temp: 97.9 °F (36.6 °C) (08/20/20 1052)  Pulse: (!) 111 (08/20/20 1052)  Resp: 18 (08/20/20 1052)  BP: 113/70 (08/20/20 1052)  SpO2: (!) 85 % (08/20/20 1052) Vital Signs (24h Range):  Temp:  [96.1 °F (35.6 °C)-97.9 °F (36.6 °C)] 97.9 °F (36.6 °C)  Pulse:  [] 111  Resp:  [18-28] 18  SpO2:  [85 %-97 %] 85 %  BP: ()/(51-70) 113/70     Weight: 74.1 kg (163 lb 5.8 oz)  Body mass index is 22.78 kg/m².    Intake/Output Summary (Last 24 hours) at 8/20/2020 1109  Last data filed at 8/20/2020 0400  Gross per 24 hour   Intake 615 ml   Output --   Net 615 ml      Physical Exam    Significant Labs:   POCT Glucose:   Recent Labs   Lab 08/19/20  1634 08/19/20  2036 08/20/20  0331   POCTGLUCOSE 99 116* 88       Significant Imaging: I have reviewed all pertinent imaging results/findings within the past 24 hours.     EXAMINATION:   US ABDOMEN LIMITED     CLINICAL HISTORY:  RUQ, elevated LFT;     TECHNIQUE:  Limited ultrasound of the right upper quadrant of the abdomen (including pancreas, liver, gallbladder, common bile duct, and spleen) was performed.     COMPARISON:  None available     FINDINGS:  Liver: 15.6 cm, normal in size.  Homogeneous echotexture without intrahepatic lesion.     Gallbladder: No wall thickening/hypervascularity or pericholecystic fluid.  Negative sonographic Paniagua's. Possible debris.     Biliary system: The common duct is not dilated, measuring 3 mm.  No intrahepatic ductal dilatation.     Spleen: Obscured by gas.     Miscellaneous: Pancreas obscured.     Impression:     Nondistended gallbladder containing possible debris.  No biliary dilatation.        Electronically  signed by: Olivier Farris  Date:                                            08/19/2020  Time:                                           18:16      Assessment/Plan:      * COVID-19  -Lifted Isolation 9 days ago  -Continuing to wean O2  -Deconditioning and long illness has led to increased O2 requirement, continuing to wean and work with PT/OT, RT with goal of SNF (5L NC max)  -Patient did not accept BiPap overnight,HFNC 5L this AM  - Encourage BiPap QHS    Goals of care, counseling/discussion  -Daughter will terminate POA  -Patient has capacity  -Palliative will follow up to ensure patient goals of care clarified    Left shoulder pain  Add naproxen scheduled BID  Continue lidocaine patch, tylenol for pain  Stable pulses, ROM limited by patient effort  Continue PT/OT as well as current pain regimen  In setting of elevated AST/ALT and Alk phos, obtain direct bilirubin/total  Consider d/c tylenol         History of Aspergillus & MAC  Continuing voriconazole, switch to itroconazole at DC   -Follow up with Pulm, ID outpatient  STABLE    Hypotension  Patient's most recent blood pressure 116/75  RESOLVED      Sepsis  RESOLVED  See plan for COVID 19    COPD (chronic obstructive pulmonary disease)  On HFNC 5L, improvement  Continue Montelukast  Add Flovent  Continue to wean  Goal of 5L NC  Patient to sit up 2x daily in chair  Mild wheezes present   Continue albuterol and chest physio/IS  -Monitor for progress          Anemia  Asymptomatic  H/H stable at 9.3/29.5 yesterday  Labs QUOD  STABLE      VTE Risk Mitigation (From admission, onward)         Ordered     enoxaparin injection 80 mg  Every 12 hours (non-standard times)      08/04/20 1421     Place sequential compression device  Until discontinued      07/21/20 2142     IP VTE HIGH RISK PATIENT  Once      07/21/20 2142                Discharge Planning   ADAM:      Code Status: Full Code   Is the patient medically ready for discharge?:     Reason for patient still in  hospital (select all that apply): Patient unstable  Discharge Plan A: Long-term acute care facility (LTAC)   Discharge Delays: None known at this time              Emeli Adam MD   [employer] PGY2  Department of Hospital Medicine   [facility]

## 2020-08-20 NOTE — PLAN OF CARE
Problem: Adult Inpatient Plan of Care  Goal: Plan of Care Review  Patient is awake and orientedx4. Care plan explained to patient; he verbalized understanding. On 5L via nasal cannula, continuous O2 saturation monitor at 88-95%. Patient refused BiPAP. Hooked to heart monitor running NSR/sinus tachycardia at 95-120bpm. Incontinence care completed. Patient had 2 loose, seedy bowel movements overnight [notified Dr. Preciado. No n/v/d during shift. Patient had right shoulder pain that radiated to his hand, prn tylenol given [relief noted]. Due medications given. Encouraged/assisted to turn every 2 hours as tolerated. Maintained on fall risk precaution. Bed in lowest position, bed alarm on, call light/personal items within reach and instructed to call for help when needed. Will continue to monitor.       Outcome: Ongoing, Progressing

## 2020-08-21 LAB
ALBUMIN SERPL BCP-MCNC: 1.8 G/DL (ref 3.5–5.2)
ALP SERPL-CCNC: 324 U/L (ref 55–135)
ALT SERPL W/O P-5'-P-CCNC: 65 U/L (ref 10–44)
ANION GAP SERPL CALC-SCNC: 6 MMOL/L (ref 8–16)
AST SERPL-CCNC: 155 U/L (ref 10–40)
BACTERIA STL CULT: NORMAL
BASOPHILS # BLD AUTO: 0.05 K/UL (ref 0–0.2)
BASOPHILS NFR BLD: 0.8 % (ref 0–1.9)
BILIRUB SERPL-MCNC: 0.7 MG/DL (ref 0.1–1)
BUN SERPL-MCNC: 10 MG/DL (ref 8–23)
CALCIUM SERPL-MCNC: 8 MG/DL (ref 8.7–10.5)
CHLORIDE SERPL-SCNC: 104 MMOL/L (ref 95–110)
CO2 SERPL-SCNC: 26 MMOL/L (ref 23–29)
CREAT SERPL-MCNC: 1 MG/DL (ref 0.5–1.4)
DIFFERENTIAL METHOD: ABNORMAL
EOSINOPHIL # BLD AUTO: 0.2 K/UL (ref 0–0.5)
EOSINOPHIL NFR BLD: 3.1 % (ref 0–8)
ERYTHROCYTE [DISTWIDTH] IN BLOOD BY AUTOMATED COUNT: 16.6 % (ref 11.5–14.5)
EST. GFR  (AFRICAN AMERICAN): >60 ML/MIN/1.73 M^2
EST. GFR  (NON AFRICAN AMERICAN): >60 ML/MIN/1.73 M^2
GLUCOSE SERPL-MCNC: 82 MG/DL (ref 70–110)
HCT VFR BLD AUTO: 31 % (ref 40–54)
HGB BLD-MCNC: 9.6 G/DL (ref 14–18)
IMM GRANULOCYTES # BLD AUTO: 0.05 K/UL (ref 0–0.04)
IMM GRANULOCYTES NFR BLD AUTO: 0.8 % (ref 0–0.5)
LYMPHOCYTES # BLD AUTO: 1.7 K/UL (ref 1–4.8)
LYMPHOCYTES NFR BLD: 26.1 % (ref 18–48)
MAGNESIUM SERPL-MCNC: 2.1 MG/DL (ref 1.6–2.6)
MCH RBC QN AUTO: 29.4 PG (ref 27–31)
MCHC RBC AUTO-ENTMCNC: 31 G/DL (ref 32–36)
MCV RBC AUTO: 95 FL (ref 82–98)
MONOCYTES # BLD AUTO: 0.9 K/UL (ref 0.3–1)
MONOCYTES NFR BLD: 14.5 % (ref 4–15)
NEUTROPHILS # BLD AUTO: 3.5 K/UL (ref 1.8–7.7)
NEUTROPHILS NFR BLD: 54.7 % (ref 38–73)
NRBC BLD-RTO: 1 /100 WBC
PHOSPHATE SERPL-MCNC: 3.2 MG/DL (ref 2.7–4.5)
PLATELET # BLD AUTO: 613 K/UL (ref 150–350)
PMV BLD AUTO: 9.2 FL (ref 9.2–12.9)
POCT GLUCOSE: 100 MG/DL (ref 70–110)
POCT GLUCOSE: 110 MG/DL (ref 70–110)
POTASSIUM SERPL-SCNC: 4.4 MMOL/L (ref 3.5–5.1)
PROT SERPL-MCNC: 6 G/DL (ref 6–8.4)
RBC # BLD AUTO: 3.27 M/UL (ref 4.6–6.2)
SODIUM SERPL-SCNC: 136 MMOL/L (ref 136–145)
WBC # BLD AUTO: 6.4 K/UL (ref 3.9–12.7)

## 2020-08-21 PROCEDURE — 94761 N-INVAS EAR/PLS OXIMETRY MLT: CPT

## 2020-08-21 PROCEDURE — 25000003 PHARM REV CODE 250: Performed by: STUDENT IN AN ORGANIZED HEALTH CARE EDUCATION/TRAINING PROGRAM

## 2020-08-21 PROCEDURE — 83735 ASSAY OF MAGNESIUM: CPT

## 2020-08-21 PROCEDURE — 27000646 HC AEROBIKA DEVICE

## 2020-08-21 PROCEDURE — 80053 COMPREHEN METABOLIC PANEL: CPT

## 2020-08-21 PROCEDURE — 27000221 HC OXYGEN, UP TO 24 HOURS

## 2020-08-21 PROCEDURE — 94640 AIRWAY INHALATION TREATMENT: CPT

## 2020-08-21 PROCEDURE — 94799 UNLISTED PULMONARY SVC/PX: CPT

## 2020-08-21 PROCEDURE — 25000003 PHARM REV CODE 250: Performed by: FAMILY MEDICINE

## 2020-08-21 PROCEDURE — 36415 COLL VENOUS BLD VENIPUNCTURE: CPT

## 2020-08-21 PROCEDURE — 25000003 PHARM REV CODE 250: Performed by: INTERNAL MEDICINE

## 2020-08-21 PROCEDURE — 25000242 PHARM REV CODE 250 ALT 637 W/ HCPCS: Performed by: STUDENT IN AN ORGANIZED HEALTH CARE EDUCATION/TRAINING PROGRAM

## 2020-08-21 PROCEDURE — 97110 THERAPEUTIC EXERCISES: CPT | Performed by: PHYSICAL THERAPIST

## 2020-08-21 PROCEDURE — 97110 THERAPEUTIC EXERCISES: CPT | Mod: CO

## 2020-08-21 PROCEDURE — 99900035 HC TECH TIME PER 15 MIN (STAT)

## 2020-08-21 PROCEDURE — 85025 COMPLETE CBC W/AUTO DIFF WBC: CPT

## 2020-08-21 PROCEDURE — 94664 DEMO&/EVAL PT USE INHALER: CPT

## 2020-08-21 PROCEDURE — 63600175 PHARM REV CODE 636 W HCPCS: Performed by: FAMILY MEDICINE

## 2020-08-21 PROCEDURE — 27100098 HC SPACER

## 2020-08-21 PROCEDURE — 84100 ASSAY OF PHOSPHORUS: CPT

## 2020-08-21 PROCEDURE — 11000001 HC ACUTE MED/SURG PRIVATE ROOM

## 2020-08-21 RX ADMIN — IPRATROPIUM BROMIDE AND ALBUTEROL SULFATE 3 ML: 2.5; .5 SOLUTION RESPIRATORY (INHALATION) at 07:08

## 2020-08-21 RX ADMIN — TAMSULOSIN HYDROCHLORIDE 0.4 MG: 0.4 CAPSULE ORAL at 09:08

## 2020-08-21 RX ADMIN — STANDARDIZED SENNA CONCENTRATE AND DOCUSATE SODIUM 1 TABLET: 8.6; 5 TABLET ORAL at 09:08

## 2020-08-21 RX ADMIN — FLUTICASONE PROPIONATE 1 PUFF: 110 AEROSOL, METERED RESPIRATORY (INHALATION) at 07:08

## 2020-08-21 RX ADMIN — Medication 4 TABLET: at 09:08

## 2020-08-21 RX ADMIN — NAPROXEN 500 MG: 500 TABLET ORAL at 04:08

## 2020-08-21 RX ADMIN — POTASSIUM & SODIUM PHOSPHATES POWDER PACK 280-160-250 MG 1 PACKET: 280-160-250 PACK at 07:08

## 2020-08-21 RX ADMIN — IPRATROPIUM BROMIDE AND ALBUTEROL SULFATE 3 ML: 2.5; .5 SOLUTION RESPIRATORY (INHALATION) at 11:08

## 2020-08-21 RX ADMIN — THERA TABS 1 TABLET: TAB at 09:08

## 2020-08-21 RX ADMIN — Medication 4 TABLET: at 12:08

## 2020-08-21 RX ADMIN — PANTOPRAZOLE SODIUM 40 MG: 40 TABLET, DELAYED RELEASE ORAL at 09:08

## 2020-08-21 RX ADMIN — NAPROXEN 500 MG: 500 TABLET ORAL at 12:08

## 2020-08-21 RX ADMIN — FLUTICASONE PROPIONATE 1 PUFF: 110 AEROSOL, METERED RESPIRATORY (INHALATION) at 08:08

## 2020-08-21 RX ADMIN — VORICONAZOLE 200 MG: 200 TABLET, FILM COATED ORAL at 03:08

## 2020-08-21 RX ADMIN — POTASSIUM & SODIUM PHOSPHATES POWDER PACK 280-160-250 MG 1 PACKET: 280-160-250 PACK at 12:08

## 2020-08-21 RX ADMIN — ENOXAPARIN SODIUM 80 MG: 80 INJECTION SUBCUTANEOUS at 04:08

## 2020-08-21 RX ADMIN — POTASSIUM & SODIUM PHOSPHATES POWDER PACK 280-160-250 MG 1 PACKET: 280-160-250 PACK at 05:08

## 2020-08-21 RX ADMIN — VORICONAZOLE 200 MG: 200 TABLET, FILM COATED ORAL at 04:08

## 2020-08-21 RX ADMIN — POLYETHYLENE GLYCOL (3350) 17 G: 17 POWDER, FOR SOLUTION ORAL at 09:08

## 2020-08-21 RX ADMIN — LIDOCAINE 2 PATCH: 50 PATCH TOPICAL at 04:08

## 2020-08-21 RX ADMIN — Medication 4 TABLET: at 04:08

## 2020-08-21 RX ADMIN — MONTELUKAST 10 MG: 10 TABLET, FILM COATED ORAL at 09:08

## 2020-08-21 NOTE — ASSESSMENT & PLAN NOTE
-Lifted Isolation 10 days ago  -Continuing to wean O2  -Deconditioning and long illness has led to increased O2 requirement, continuing to wean and work with PT/OT, RT with goal of SNF (5L NC max)  - Encourage BiPap QHS

## 2020-08-21 NOTE — PLAN OF CARE
Problem: Occupational Therapy Goal  Goal: Occupational Therapy Goal  Description: Goals to be met by: 8/27/20     Patient will increase functional independence with ADLs by performing:    LE Dressing with Stand-by Assistance.  Grooming while standing with Stand-by Assistance.  Toileting from toilet with Stand-by Assistance for hygiene and clothing management.   Supine to sit with Stand-by Assistance.  Step transfer with Stand-by Assistance  Toilet transfer to toilet with Stand-by Assistance.  Upper extremity exercise program x10 reps per handout, with independence.    Outcome: Ongoing, Progressing    Rajan Deluna is a 74 y.o. male with a medical diagnosis of COVID-19.  Performance deficits affecting function are weakness, impaired endurance, impaired self care skills, impaired functional mobilty, gait instability, impaired balance, decreased upper extremity function, decreased lower extremity function, pain, impaired cardiopulmonary response to activity.  Pt found in bed, agreeable to bed level therex only secondary to just returned to bed for bed bath.  PCT reports pt OOB ~3 hours. Pt tolerated BUE therex well with rest breaks.  O2 sats 88-96% on 5L throughout.  Continue OT services to address functional goals, progressing as able.    ISABELLA Reyna/L

## 2020-08-21 NOTE — ASSESSMENT & PLAN NOTE
Continuing voriconazole, switch to itroconazole at NH   -Follow up with Pulm, ID outpatient  -Due to patient length of stay, will reevaluate

## 2020-08-21 NOTE — PT/OT/SLP PROGRESS
Occupational Therapy   Treatment    Name: Rajan Deluna  MRN: [id number]  Admitting Diagnosis:  COVID-19  18 Days Post-Op    Recommendations:     Discharge Recommendations: nursing facility, skilled  Discharge Equipment Recommendations:  other (see comments)(TBD)  Barriers to discharge:  Inaccessible home environment, Decreased caregiver support    Assessment:     Rajan Deluna is a 74 y.o. male with a medical diagnosis of COVID-19.  Performance deficits affecting function are weakness, impaired endurance, impaired self care skills, impaired functional mobilty, gait instability, impaired balance, decreased upper extremity function, decreased lower extremity function, pain, impaired cardiopulmonary response to activity.  Pt found in bed, agreeable to bed level therex only secondary to just returned to bed for bed bath.  PCT reports pt OOB ~3 hours. Pt tolerated BUE therex well with rest breaks.  O2 sats 88-96% on 5L throughout.  Continue OT services to address functional goals, progressing as able.      Rehab Prognosis:  Fair; patient would benefit from acute skilled OT services to address these deficits and reach maximum level of function.       Plan:     Patient to be seen 5 x/week to address the above listed problems via self-care/home management, therapeutic activities, therapeutic exercises  · Plan of Care Expires: 08/27/20  · Plan of Care Reviewed with: patient    Subjective     Pain/Comfort:  · Pain Rating 1: 0/10  · Pain Rating Post-Intervention 1: 0/10    Objective:     Communicated with: RN prior to session.  Patient found HOB elevated with pulse ox (continuous), oxygen, telemetry, peripheral IV upon OT entry to room.    General Precautions: Standard, fall, respiratory   Orthopedic Precautions:N/A   Braces: N/A         AMPAC 6 Click ADL: 16    Treatment & Education:  Pt performed BUE AROM ex 2 x 10 reps all major jts/planes.  Pt tolerated well with rest breaks.    Patient left HOB elevated with all lines  intact, call button in reach, bed alarm on and RN notifiedEducation:      GOALS:   Multidisciplinary Problems     Occupational Therapy Goals        Problem: Occupational Therapy Goal    Goal Priority Disciplines Outcome Interventions   Occupational Therapy Goal     OT, PT/OT Ongoing, Progressing    Description: Goals to be met by: 8/27/20     Patient will increase functional independence with ADLs by performing:    LE Dressing with Stand-by Assistance.  Grooming while standing with Stand-by Assistance.  Toileting from toilet with Stand-by Assistance for hygiene and clothing management.   Supine to sit with Stand-by Assistance.  Step transfer with Stand-by Assistance  Toilet transfer to toilet with Stand-by Assistance.  Upper extremity exercise program x10 reps per handout, with independence.                     Time Tracking:     OT Date of Treatment: 08/21/20  OT Start Time: 1308  OT Stop Time: 1324  OT Total Time (min): 16 min    Billable Minutes:Therapeutic Exercise 16    TED Reyna  8/21/2020

## 2020-08-21 NOTE — PLAN OF CARE
Problem: Physical Therapy Goal  Goal: Physical Therapy Goal  Description: Goals to be met by: 20     Patient will increase functional independence with mobility by performin. Supine <> sit with MInimal Assistance   Met   Revised  supine<>sit MOD I  2. Sit to stand transfer with Minimal Assistance   Met   Revised sit to stand with supervision  3. Bed to chair transfer with Minimal Assistance   Met   Revised bed to chair transfer with supervision  4. Gait x 50 ft with min A with appropriate AD  5. Lower extremity exercise program x 10 reps per handout, with supervision  Met 8/15  Revised Lower extremity exercise program x 20 reps per handout, with supervision    Outcome: Ongoing, Progressing

## 2020-08-21 NOTE — PLAN OF CARE
Plan of care reviewed and pt. verbalized understanding. Alert and oriented x 4. On 7L of high flow NC with O2 sats between %. Pt. does de-sat into the mid-80s upon exertion. Sinus tach on telemetry with heart rate in the low 100s. 18G to right forearm with dressing clean, dry, and intact. Small cut on pt. backside with mepilex dressing in place. Bed in lowest position with wheels locked, bed alarm on, call light within reach, and pt. told to call for assistance if needed.

## 2020-08-21 NOTE — ASSESSMENT & PLAN NOTE
On HFNC 7L  Continue Montelukast  Added Flovent yesterday  Continue to wean  Goal of 5L NC  Patient to sit up 2x daily in chair  Mild wheezes present   Continue albuterol and chest physio/IS  -Monitor for progress

## 2020-08-21 NOTE — PROGRESS NOTES
[facility] Hospital Medicine  Progress Note    Patient Name: Rajan Deluna  MRN: [id number]  Patient Class: IP- Inpatient   Admission Date: 7/21/2020  Length of Stay: 31 days  Attending Physician: Rajan Rodríguez MD  Primary Care Provider: Jason Mccord MD        Subjective:     Principal Problem:COVID-19        HPI:  Patient is a 74-year-old male with a past medical history of hypertension, COPD, MAC infection who presented to the ED with altered mental status and fever.  As per the family, patient has had altered mental status for the past 3-4 days. Family endorses increased somnolence as well as urinating on himself. EMS was called two days prior to presentation but reportedly deemed that patient did not need to be brought in. Over the previous day, reportedly the patient was found stuck, staring in his sink, not responding. On the day of presentation, the patient was found down on the floor, family wasn't able to get patient up from the floor and was reportedly not responding as much as baseline. Family believed that patient may not have been wearing baseline 2L O2. Patient also endorses a cough and generalized weakness. Patient reportedly had a recent UTI.     In the ED, chest x-ray showed diffuse interstitial patchy infiltrates concerning for possible pneumonia and a COVID-19 screening test was positive. Fever was 101° F and patient was hypotensive 80s/40s. Patient given 30cc/kg bolus. BP very soft on presentation, adequately responded to volume resuscitation and then decreased again. Labs showed a BUN 45, creatinine 2.9, , , troponin 0.036, procalcitonin 1.13.     Overview/Hospital Course:  Patient has had an extensive hospital course 2/2 COVID-related sepsis. Patient required peripheral Levophed for 1 day (upon admission) and has had stable MAPs throughout his course off pressors. Initially, patient required continuous BiPap and was stepped up to the ICU on 7/22 for 6  days. Patient also presented with a high D-dimer at 7.22 and was started on a heparin drip. CTA was contraindicated due to MARQUIS upon initial presentation. MARQUIS has improved with current Cr of 1 down from 2.9. While in the ICU, patient required supplemental O2. Patient complained of melanic diarrhea and a rectal tube was placed. Patient anxious and disoriented in the ICU. Haldol PRN for agitation in the ICU. Mental status improved in the ICU. Good UOP.    ID workup of melanic stool showed C. diff positive antigen and C. diff PCR toxin positive. Patient's melanic stool improved and rectal tube removed. He completed treatment with IV metronidazole and P.O. vancomycin for a total of 14 days, last dose 8/7. Though patient's melanic stool and watery diarrhea improved, there were concerns for a GI bleed due to an H/H of 6.9/21.5. Heparin drip was discontinued, IV protonix started, and the patient received 1 unit of packed RBCs. 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 Started on probiotics. Most recent recent H&H 9.5/37, stable.    Patient transitioned off continuous BiPAP and stepped down to the floor 7/27 with BiPAP use only at night. Continued to require supplemental O2. Tolerated advances in diet, duoneb treatments, incentive spirometry and chest physiotherapy. Tolerates PT/OT daily. Discussed self-proning due to deconditioned respiratory status but patient unable to tolerate. Off COVID isolation on 8/10, per hospital policy of 20 days negative COVID status. Patient received Levoquin for 5 days, stopped after procal <1. IV dexamthasone for 10 days. Redemsivir for 5 days.     Patient with history of aspergillosis and was on itraconazole as outpatient. WBC count increased to 20 during his course but resolved after patient completed 10 day course of IV dexamethasone.  Per ID, Voriconazole 400 mg po every 12 h X 2 loading doses, starting 7/29, then change to voriconazole 200 mg po every 12 h thereafter. Plan is to continue voriconazole and discharge on Itraconazole 100mg BID and follow up with outpatient Pulmonary and ID for continued Aspergillosis and MAC treatment.     During the patient's hospital course, he also had complaints of chest pain which resolved with a GI cocktail. Normal EKG and troponin trend. He also complained about left shoulder pain, in which the patient has a history of chronic shoulder pain that has required orthopedic surgery in the past. A shoulder x-ray showed to acute changes and the pain was managed with lidocaine patches and naproxen.    LTAC placement denied multiple times due to patient's continued need of supplemental oxygen. Patient expressed wanting to go home with possible hospice/palliative care but now is amendable to a type of rehabilitation facility. Multiple discussions about hospice/palliative care were held with patient. Discussed hospice/palliative care with daughter, patient's power of , due to patient's new baseline for his COPD 2/2 COVID. She is not agreeable to the plan and would like a type of rehabilitation facility too. Psych consulted to assess the patient's capacity and is in agreement with the primary team that the patient has the capacity to make his own medical decisions. A family meeting with palliative care is being coordinated with patient and daughter to discuss goals of care. Due to patient's extensive hospital course, the patient is deconditioned. The patient desats <88% with minimal exertion and supplemental O2 requirement increases during these episodes. Able to wean the patient down to 4L HFNC thus far from a peak requirement of 30L 70% on vapotherm. Patient's daughter decided to release being Power of .       Interval History: GABBY. Patient frustrated by his length of stay. Spoke with wife, son  yesterday, both agreed that in his condition they could not care for him at home. Patient goes back and forth on hospice vs. Rehab, will continue to discuss. Patient continues to refuse bipap, reemphasized differences between HFNC and bipap and the importance of it to his recovery.     Review of Systems   Constitutional: Negative for activity change, appetite change, chills and fatigue.   HENT: Negative for congestion and dental problem.    Eyes: Negative for discharge and itching.   Respiratory: Negative for cough and shortness of breath.    Cardiovascular: Negative for chest pain.   Gastrointestinal: Negative for abdominal pain, blood in stool, diarrhea and nausea.   Endocrine: Negative for cold intolerance and heat intolerance.   Genitourinary: Negative for difficulty urinating.   Musculoskeletal: Negative for arthralgias, back pain and gait problem.   Skin: Negative.  Negative for color change and pallor.   Neurological: Negative for dizziness, facial asymmetry, light-headedness and headaches.   Psychiatric/Behavioral: The patient is not nervous/anxious.      Objective:     Vital Signs (Most Recent):  Temp: 97.9 °F (36.6 °C) (08/21/20 0801)  Pulse: (!) 111 (08/21/20 0801)  Resp: 17 (08/21/20 0801)  BP: (!) 115/59 (08/21/20 0801)  SpO2: (!) 88 % (08/21/20 0801) Vital Signs (24h Range):  Temp:  [96.3 °F (35.7 °C)-98.2 °F (36.8 °C)] 97.9 °F (36.6 °C)  Pulse:  [] 111  Resp:  [16-22] 17  SpO2:  [84 %-100 %] 88 %  BP: ()/(58-70) 115/59     Weight: 74.1 kg (163 lb 5.8 oz)  Body mass index is 22.78 kg/m².  No intake or output data in the 24 hours ending 08/21/20 1001   Physical Exam  Vitals signs and nursing note reviewed.   Constitutional:       General: He is not in acute distress.     Appearance: Normal appearance. He is not ill-appearing.   HENT:      Head: Normocephalic and atraumatic.      Right Ear: External ear normal.      Left Ear: External ear normal.      Nose: Nose normal. No congestion or  rhinorrhea.      Mouth/Throat:      Mouth: Mucous membranes are moist.      Pharynx: Oropharynx is clear.   Eyes:      Pupils: Pupils are equal, round, and reactive to light.   Neck:      Musculoskeletal: Normal range of motion and neck supple.   Cardiovascular:      Rate and Rhythm: Normal rate and regular rhythm.      Pulses: Normal pulses.      Heart sounds: Normal heart sounds.   Pulmonary:      Effort: Pulmonary effort is normal.      Breath sounds: Wheezing present.      Comments: Wheezes this AM, likely COPD. O2 at 7L HFNC  Abdominal:      General: Abdomen is flat. There is no distension.      Palpations: Abdomen is soft.      Tenderness: There is no abdominal tenderness. There is no guarding.   Musculoskeletal: Normal range of motion.         General: No swelling.   Neurological:      General: No focal deficit present.      Mental Status: He is alert and oriented to person, place, and time. Mental status is at baseline.   Psychiatric:         Mood and Affect: Mood normal.         Behavior: Behavior normal.         Thought Content: Thought content normal.         Judgment: Judgment normal.         Significant Labs:   CBC:   Recent Labs   Lab 08/21/20  0414   WBC 6.40   HGB 9.6*   HCT 31.0*   *     CMP:   Recent Labs   Lab 08/21/20  0414      K 4.4      CO2 26   GLU 82   BUN 10   CREATININE 1.0   CALCIUM 8.0*   PROT 6.0   ALBUMIN 1.8*   BILITOT 0.7   ALKPHOS 324*   *   ALT 65*   ANIONGAP 6*   EGFRNONAA >60     All pertinent labs within the past 24 hours have been reviewed.    Significant Imaging: I have reviewed and interpreted all pertinent imaging results/findings within the past 24 hours.      Assessment/Plan:      * COVID-19  -Lifted Isolation 10 days ago  -Continuing to wean O2  -Deconditioning and long illness has led to increased O2 requirement, continuing to wean and work with PT/OT, RT with goal of SNF (5L NC max)  - Encourage BiPap QHS    Goals of care,  counseling/discussion  -Patient has capacity  -Per family, patient cannot return home as wife too weak to help him and son works full time  -Will continue to work toward discharge planning    Left shoulder pain  Naproxen scheduled BID  Continue lidocaine patch, tylenol for pain  U/S was negative for any gallbladder outlet obstruction, cholecystitis, etc        History of Aspergillus & MAC  Continuing voriconazole, switch to itroconazole at DC   -Follow up with Pulm, ID outpatient  -Due to patient length of stay, will reevaluate    Hypotension  Patient's most recent blood pressure 116/75  RESOLVED      Sepsis  RESOLVED  See plan for COVID 19    COPD (chronic obstructive pulmonary disease)  On HFNC 7L  Continue Montelukast  Added Flovent yesterday  Continue to wean  Goal of 5L NC  Patient to sit up 2x daily in chair  Mild wheezes present   Continue albuterol and chest physio/IS  -Monitor for progress          Anemia  Asymptomatic  H/H stable at 9.6/31.0  Labs QUOD  STABLE      VTE Risk Mitigation (From admission, onward)         Ordered     enoxaparin injection 80 mg  Every 12 hours (non-standard times)      08/04/20 1421     Place sequential compression device  Until discontinued      07/21/20 2142     IP VTE HIGH RISK PATIENT  Once      07/21/20 2142                Discharge Planning   ADAM:      Code Status: Full Code   Is the patient medically ready for discharge?:     Reason for patient still in hospital (select all that apply): Patient unstable  Discharge Plan A: Long-term acute care facility (LTAC)   Discharge Delays: None known at this time              Emeli Adam MD   [employer] PGY2  Department of Hospital Medicine   [facility]

## 2020-08-21 NOTE — SUBJECTIVE & OBJECTIVE
Interval History: NAEON. Patient frustrated by his length of stay. Spoke with wife, son yesterday, both agreed that in his condition they could not care for him at home. Patient goes back and forth on hospice vs. Rehab, will continue to discuss. Patient continues to refuse bipap, reemphasized differences between HFNC and bipap and the importance of it to his recovery.     Review of Systems   Constitutional: Negative for activity change, appetite change, chills and fatigue.   HENT: Negative for congestion and dental problem.    Eyes: Negative for discharge and itching.   Respiratory: Negative for cough and shortness of breath.    Cardiovascular: Negative for chest pain.   Gastrointestinal: Negative for abdominal pain, blood in stool, diarrhea and nausea.   Endocrine: Negative for cold intolerance and heat intolerance.   Genitourinary: Negative for difficulty urinating.   Musculoskeletal: Negative for arthralgias, back pain and gait problem.   Skin: Negative.  Negative for color change and pallor.   Neurological: Negative for dizziness, facial asymmetry, light-headedness and headaches.   Psychiatric/Behavioral: The patient is not nervous/anxious.      Objective:     Vital Signs (Most Recent):  Temp: 97.9 °F (36.6 °C) (08/21/20 0801)  Pulse: (!) 111 (08/21/20 0801)  Resp: 17 (08/21/20 0801)  BP: (!) 115/59 (08/21/20 0801)  SpO2: (!) 88 % (08/21/20 0801) Vital Signs (24h Range):  Temp:  [96.3 °F (35.7 °C)-98.2 °F (36.8 °C)] 97.9 °F (36.6 °C)  Pulse:  [] 111  Resp:  [16-22] 17  SpO2:  [84 %-100 %] 88 %  BP: ()/(58-70) 115/59     Weight: 74.1 kg (163 lb 5.8 oz)  Body mass index is 22.78 kg/m².  No intake or output data in the 24 hours ending 08/21/20 1001   Physical Exam  Vitals signs and nursing note reviewed.   Constitutional:       General: He is not in acute distress.     Appearance: Normal appearance. He is not ill-appearing.   HENT:      Head: Normocephalic and atraumatic.      Right Ear: External ear  normal.      Left Ear: External ear normal.      Nose: Nose normal. No congestion or rhinorrhea.      Mouth/Throat:      Mouth: Mucous membranes are moist.      Pharynx: Oropharynx is clear.   Eyes:      Pupils: Pupils are equal, round, and reactive to light.   Neck:      Musculoskeletal: Normal range of motion and neck supple.   Cardiovascular:      Rate and Rhythm: Normal rate and regular rhythm.      Pulses: Normal pulses.      Heart sounds: Normal heart sounds.   Pulmonary:      Effort: Pulmonary effort is normal.      Breath sounds: Wheezing present.      Comments: Wheezes this AM, likely COPD. O2 at 7L HFNC  Abdominal:      General: Abdomen is flat. There is no distension.      Palpations: Abdomen is soft.      Tenderness: There is no abdominal tenderness. There is no guarding.   Musculoskeletal: Normal range of motion.         General: No swelling.   Neurological:      General: No focal deficit present.      Mental Status: He is alert and oriented to person, place, and time. Mental status is at baseline.   Psychiatric:         Mood and Affect: Mood normal.         Behavior: Behavior normal.         Thought Content: Thought content normal.         Judgment: Judgment normal.         Significant Labs:   CBC:   Recent Labs   Lab 08/21/20  0414   WBC 6.40   HGB 9.6*   HCT 31.0*   *     CMP:   Recent Labs   Lab 08/21/20  0414      K 4.4      CO2 26   GLU 82   BUN 10   CREATININE 1.0   CALCIUM 8.0*   PROT 6.0   ALBUMIN 1.8*   BILITOT 0.7   ALKPHOS 324*   *   ALT 65*   ANIONGAP 6*   EGFRNONAA >60     All pertinent labs within the past 24 hours have been reviewed.    Significant Imaging: I have reviewed and interpreted all pertinent imaging results/findings within the past 24 hours.

## 2020-08-21 NOTE — ASSESSMENT & PLAN NOTE
Naproxen scheduled BID  Continue lidocaine patch, tylenol for pain  U/S was negative for any gallbladder outlet obstruction, cholecystitis, etc

## 2020-08-21 NOTE — ASSESSMENT & PLAN NOTE
-Patient has capacity  -Per family, patient cannot return home as wife too weak to help him and son works full time  -Will continue to work toward discharge planning

## 2020-08-21 NOTE — PLAN OF CARE
The proper method of use, as well as anticipated side effects, of this aerosol treatment are discussed and demonstrated to the patient.  The proper method of use, as well as anticipated side effects, of this metered-dose inhaler are discussed and demonstrated to the patient. Patient on oxygen with documented flow.  Will attempt to wean per O2 order protocol. Will continue to monitor.

## 2020-08-21 NOTE — PT/OT/SLP PROGRESS
Physical Therapy Treatment    Patient Name:  Rajan Deluna   MRN:  [id number]    Recommendations:     Discharge Recommendations:  nursing facility, skilled   Discharge Equipment Recommendations: walker, rolling   Barriers to discharge: Decreased caregiver support and decreased functional mobility tolerance    Assessment:     Raajn Deluna is a 74 y.o. male admitted with a medical diagnosis of COVID-19.  He presents with the following impairments/functional limitations:  weakness, gait instability, impaired cardiopulmonary response to activity, impaired endurance, decreased lower extremity function. Pt refused functional mobility 2/2 having just returned to bed after sitting a long time, walking and working with OT.      Rehab Prognosis: Good; patient would benefit from acute skilled PT services to address these deficits and reach maximum level of function.    Recent Surgery: Procedure(s) (LRB):  IMAGING PROCEDURE, GI TRACT, INTRALUMINAL, VIA CAPSULE (N/A) 18 Days Post-Op    Plan:     During this hospitalization, patient to be seen 6 x/week to address the identified rehab impairments via gait training, therapeutic activities, neuromuscular re-education, therapeutic exercises, wheelchair management/training and progress toward the following goals:    · Plan of Care Expires:  09/21/20    Subjective     Chief Complaint: I did a lot  Patient/Family Comments/goals: finish therapy  Pain/Comfort:  · Pain Rating 1: 0/10      Objective:     Communicated with nurse prior to session.  Patient found HOB elevated with pulse ox (continuous), telemetry, peripheral IV, oxygen upon PT entry to room.     General Precautions: Standard, fall, respiratory   Orthopedic Precautions:N/A   Braces:       Functional Mobility:  Pt refused functional mobility 2/2 having just returned to bed after sitting a long time, walking and working with OT.       AM-PAC 6 CLICK MOBILITY  Turning over in bed (including adjusting bedclothes, sheets and  blankets)?: 4  Sitting down on and standing up from a chair with arms (e.g., wheelchair, bedside commode, etc.): 3  Moving from lying on back to sitting on the side of the bed?: 3  Moving to and from a bed to a chair (including a wheelchair)?: 3  Need to walk in hospital room?: 3  Climbing 3-5 steps with a railing?: 1  Basic Mobility Total Score: 17       Therapeutic Activities and Exercises:   Pt performed BLE supine LAQ 15 x 3, AP 20 x 3, heel slide 10 x 3, hip abd 10 x 3, all with rest breaks and O2 sat remaining 88% and above    Patient left HOB elevated with all lines intact, call button in reach, bed alarm on and nurse notified..    GOALS:   Multidisciplinary Problems     Physical Therapy Goals        Problem: Physical Therapy Goal    Goal Priority Disciplines Outcome Goal Variances Interventions   Physical Therapy Goal     PT, PT/OT Ongoing, Progressing     Description: Goals to be met by: 20     Patient will increase functional independence with mobility by performin. Supine <> sit with MInimal Assistance   Met   Revised  supine<>sit MOD I  2. Sit to stand transfer with Minimal Assistance   Met   Revised sit to stand with supervision  3. Bed to chair transfer with Minimal Assistance   Met   Revised bed to chair transfer with supervision  4. Gait x 50 ft with min A with appropriate AD  5. Lower extremity exercise program x 10 reps per handout, with supervision  Met 8/15  Revised Lower extremity exercise program x 20 reps per handout, with supervision                     Time Tracking:     PT Received On: 20  PT Start Time: 1325     PT Stop Time: 1349  PT Total Time (min): 24 min     Billable Minutes: Therapeutic Exercise 24    Treatment Type: Treatment  PT/PTA: PT     PTA Visit Number: 0     Miri Dickinson, PT  2020

## 2020-08-22 LAB
POCT GLUCOSE: 107 MG/DL (ref 70–110)
POCT GLUCOSE: 111 MG/DL (ref 70–110)
POCT GLUCOSE: 136 MG/DL (ref 70–110)
POCT GLUCOSE: 80 MG/DL (ref 70–110)

## 2020-08-22 PROCEDURE — 94799 UNLISTED PULMONARY SVC/PX: CPT

## 2020-08-22 PROCEDURE — 63600175 PHARM REV CODE 636 W HCPCS: Performed by: FAMILY MEDICINE

## 2020-08-22 PROCEDURE — 94664 DEMO&/EVAL PT USE INHALER: CPT

## 2020-08-22 PROCEDURE — 94761 N-INVAS EAR/PLS OXIMETRY MLT: CPT

## 2020-08-22 PROCEDURE — 25000003 PHARM REV CODE 250: Performed by: STUDENT IN AN ORGANIZED HEALTH CARE EDUCATION/TRAINING PROGRAM

## 2020-08-22 PROCEDURE — 25000003 PHARM REV CODE 250: Performed by: FAMILY MEDICINE

## 2020-08-22 PROCEDURE — 25000242 PHARM REV CODE 250 ALT 637 W/ HCPCS: Performed by: STUDENT IN AN ORGANIZED HEALTH CARE EDUCATION/TRAINING PROGRAM

## 2020-08-22 PROCEDURE — 25000003 PHARM REV CODE 250: Performed by: INTERNAL MEDICINE

## 2020-08-22 PROCEDURE — 11000001 HC ACUTE MED/SURG PRIVATE ROOM

## 2020-08-22 PROCEDURE — 97530 THERAPEUTIC ACTIVITIES: CPT | Mod: CQ

## 2020-08-22 PROCEDURE — 97116 GAIT TRAINING THERAPY: CPT | Mod: CQ

## 2020-08-22 PROCEDURE — 94640 AIRWAY INHALATION TREATMENT: CPT

## 2020-08-22 PROCEDURE — 27100171 HC OXYGEN HIGH FLOW UP TO 24 HOURS

## 2020-08-22 PROCEDURE — 99900035 HC TECH TIME PER 15 MIN (STAT)

## 2020-08-22 RX ADMIN — IPRATROPIUM BROMIDE AND ALBUTEROL SULFATE 3 ML: 2.5; .5 SOLUTION RESPIRATORY (INHALATION) at 11:08

## 2020-08-22 RX ADMIN — TAMSULOSIN HYDROCHLORIDE 0.4 MG: 0.4 CAPSULE ORAL at 09:08

## 2020-08-22 RX ADMIN — NAPROXEN 500 MG: 500 TABLET ORAL at 05:08

## 2020-08-22 RX ADMIN — VORICONAZOLE 200 MG: 200 TABLET, FILM COATED ORAL at 04:08

## 2020-08-22 RX ADMIN — NAPROXEN 500 MG: 500 TABLET ORAL at 09:08

## 2020-08-22 RX ADMIN — IPRATROPIUM BROMIDE AND ALBUTEROL SULFATE 3 ML: 2.5; .5 SOLUTION RESPIRATORY (INHALATION) at 08:08

## 2020-08-22 RX ADMIN — POTASSIUM & SODIUM PHOSPHATES POWDER PACK 280-160-250 MG 1 PACKET: 280-160-250 PACK at 11:08

## 2020-08-22 RX ADMIN — ENOXAPARIN SODIUM 80 MG: 80 INJECTION SUBCUTANEOUS at 05:08

## 2020-08-22 RX ADMIN — MONTELUKAST 10 MG: 10 TABLET, FILM COATED ORAL at 08:08

## 2020-08-22 RX ADMIN — IPRATROPIUM BROMIDE AND ALBUTEROL SULFATE 3 ML: 2.5; .5 SOLUTION RESPIRATORY (INHALATION) at 04:08

## 2020-08-22 RX ADMIN — ENOXAPARIN SODIUM 80 MG: 80 INJECTION SUBCUTANEOUS at 04:08

## 2020-08-22 RX ADMIN — STANDARDIZED SENNA CONCENTRATE AND DOCUSATE SODIUM 1 TABLET: 8.6; 5 TABLET ORAL at 08:08

## 2020-08-22 RX ADMIN — POTASSIUM & SODIUM PHOSPHATES POWDER PACK 280-160-250 MG 1 PACKET: 280-160-250 PACK at 12:08

## 2020-08-22 RX ADMIN — PANTOPRAZOLE SODIUM 40 MG: 40 TABLET, DELAYED RELEASE ORAL at 08:08

## 2020-08-22 RX ADMIN — THERA TABS 1 TABLET: TAB at 09:08

## 2020-08-22 RX ADMIN — LIDOCAINE 2 PATCH: 50 PATCH TOPICAL at 05:08

## 2020-08-22 RX ADMIN — POTASSIUM & SODIUM PHOSPHATES POWDER PACK 280-160-250 MG 1 PACKET: 280-160-250 PACK at 06:08

## 2020-08-22 RX ADMIN — FLUTICASONE PROPIONATE 1 PUFF: 110 AEROSOL, METERED RESPIRATORY (INHALATION) at 08:08

## 2020-08-22 NOTE — PT/OT/SLP PROGRESS
"Physical Therapy Treatment    Patient Name:  Rajan Deluna   MRN:  [id number]    Recommendations:     Discharge Recommendations:  nursing facility, skilled   Discharge Equipment Recommendations: walker, rolling   Barriers to discharge: Decreased caregiver support and  Decreased functional mobility tolerance.     Assessment:     Rajan Deluna is a 74 y.o. male admitted with a medical diagnosis of COVID-19.  He presents with the following impairments/functional limitations:  weakness, impaired endurance, impaired self care skills, impaired functional mobilty, gait instability, impaired balance, decreased coordination, decreased upper extremity function, decreased lower extremity function, decreased safety awareness, impaired coordination, edema, impaired cardiopulmonary response to activity, pain. Pt able to perform 2 sit to stand transfer trials with RW, O2 donned, requiring close CGA. Also able to take ~4 steps fwd and ~3-4 side steps with RW, O2 donned, requiring close CGA. Pt's SpO2 levels fluctuated throughout session 77-92% with lower end registering after steps taken. Would benefit from continued PT services to increase pt's cardiorespiratory strength and endurance.    Rehab Prognosis: Fair; patient would benefit from acute skilled PT services to address these deficits and reach maximum level of function.    Recent Surgery: Procedure(s) (LRB):  IMAGING PROCEDURE, GI TRACT, INTRALUMINAL, VIA CAPSULE (N/A) 19 Days Post-Op    Plan:     During this hospitalization, patient to be seen 6 x/week to address the identified rehab impairments via gait training, therapeutic activities, therapeutic exercises, wheelchair management/training and progress toward the following goals:    · Plan of Care Expires:  09/21/20    Subjective     Chief Complaint: None Expressed  Patient/Family Comments/goals: "I just want to go home".  Pain/Comfort:  · Pain Rating 1: 0/10  · Pain Rating Post-Intervention 1: 0/10(Did not complain of " pain)      Objective:     Communicated with nurse prior to session.  Patient found supine with bed alarm, oxygen, peripheral IV, pulse ox (continuous) upon PT entry to room.     General Precautions: Standard, fall, respiratory   Orthopedic Precautions:N/A   Braces: N/A     Functional Mobility:  · Bed Mobility:     · Rolling Right: supervision and stand by assistance  · Scooting: supervision and stand by assistance  · Supine to Sit: contact guard assistance  · Sit to Supine: contact guard assistance  · Transfers:     · Sit to Stand:  contact guard assistance with rolling walker  · Gait: ~4 steps fwd and ~3-4 side steps with RW and close CGA      AM-PAC 6 CLICK MOBILITY  Turning over in bed (including adjusting bedclothes, sheets and blankets)?: 4  Sitting down on and standing up from a chair with arms (e.g., wheelchair, bedside commode, etc.): 3  Moving from lying on back to sitting on the side of the bed?: 3  Moving to and from a bed to a chair (including a wheelchair)?: 3  Need to walk in hospital room?: 3  Climbing 3-5 steps with a railing?: 1  Basic Mobility Total Score: 17       Therapeutic Activities and Exercises:   Pt performed 2 sit to stand transfer trials with RW, O2 donned, requiring close CGA. Required a seated rest break between reps of ~1 minute secondary to SpO2 level decreasing to 82%. With pursed lipped breathing pt able to increase to 91% within ~30 seconds. Able to take ~4 steps fwd and ~3-4 side steps with RW, O2 donned and requiring close CGA. While in sitting at EOB pt demonstrates increased trunk flexion with increased scapula protraction which does not improve much with verbal and tactile cueing. SpO2 levels fluctuated throughout session 77-92% with lower end after ambulation steps.Pt set up to eat breakfast.    Patient left HOB elevated with all lines intact, call button in reach, bed alarm on,  nurse notified and  set up to eat breakfast..    GOALS:   Multidisciplinary Problems     Physical  Therapy Goals        Problem: Physical Therapy Goal    Goal Priority Disciplines Outcome Goal Variances Interventions   Physical Therapy Goal     PT, PT/OT Ongoing, Progressing     Description: Goals to be met by: 20     Patient will increase functional independence with mobility by performin. Supine <> sit with MInimal Assistance   Met   Revised  supine<>sit MOD I  2. Sit to stand transfer with Minimal Assistance   Met   Revised sit to stand with supervision  3. Bed to chair transfer with Minimal Assistance   Met   Revised bed to chair transfer with supervision  4. Gait x 50 ft with min A with appropriate AD  5. Lower extremity exercise program x 10 reps per handout, with supervision  Met 8/15  Revised Lower extremity exercise program x 20 reps per handout, with supervision                     Time Tracking:     PT Received On: 20  PT Start Time: 749     PT Stop Time: 819  PT Total Time (min): 30 min     Billable Minutes: Gait Training  9 and Therapeutic Activity  21    Treatment Type: Treatment  PT/PTA: PTA     PTA Visit Number: 1     Afshan Marin, BRIANNA  2020

## 2020-08-22 NOTE — PLAN OF CARE
Plan of care reviewed and pt. verbalized understanding. Alert and oriented x 4. Sinus tach on telemetry with heart rate in the 100s. 18G on R forearm, with dressing clean, dry, and intact. Pt. Initially on 4L of high flow NC, however pt. had a nosebleed and O2 sats dropped to 75%. Pt. Was slowly moved up to 8L of high flow NC and O2 sats returned to 90%. MD was notified. Pt. has Mepilex dressing present on his bottom due to him having bruises there. Bed remains in lowest position with wheels locked, call light within reach, and pt. told to call for assistance if needed. Will notify oncoming nurse to monitor pt. O2 sats.

## 2020-08-22 NOTE — PLAN OF CARE
Problem: Adult Inpatient Plan of Care  Goal: Patient-Specific Goal (Individualization)  Outcome: Ongoing, Progressing Plan of care  Plan of care, labs, notes reviewed

## 2020-08-22 NOTE — SUBJECTIVE & OBJECTIVE
Interval History: NAEO. Increased HFNC to 8L.     Review of Systems   Constitutional: Negative for activity change, appetite change and fatigue.   HENT: Negative for congestion and dental problem.    Eyes: Negative for discharge and itching.   Respiratory: Negative for cough and shortness of breath.    Cardiovascular: Negative for chest pain.   Gastrointestinal: Negative for abdominal pain, blood in stool, diarrhea and nausea.   Endocrine: Negative for cold intolerance and heat intolerance.   Genitourinary: Negative for difficulty urinating.   Musculoskeletal: Negative for arthralgias, back pain and gait problem.   Skin: Negative.  Negative for color change and pallor.   Neurological: Negative for dizziness, facial asymmetry, light-headedness and headaches.   Psychiatric/Behavioral: The patient is not nervous/anxious.      Objective:     Vital Signs (Most Recent):  Temp: 98 °F (36.7 °C) (08/22/20 0720)  Pulse: 97 (08/22/20 0720)  Resp: 19 (08/22/20 0720)  BP: 109/65 (08/22/20 0720)  SpO2: (!) 91 % (08/22/20 0720) Vital Signs (24h Range):  Temp:  [96.1 °F (35.6 °C)-98 °F (36.7 °C)] 98 °F (36.7 °C)  Pulse:  [] 97  Resp:  [17-24] 19  SpO2:  [75 %-99 %] 91 %  BP: ()/(55-65) 109/65     Weight: 74.4 kg (164 lb 0.4 oz)  Body mass index is 22.88 kg/m².    Intake/Output Summary (Last 24 hours) at 8/22/2020 0745  Last data filed at 8/22/2020 0607  Gross per 24 hour   Intake 300 ml   Output --   Net 300 ml      Physical Exam  Vitals signs and nursing note reviewed.   Constitutional:       General: He is not in acute distress.     Appearance: Normal appearance. He is not ill-appearing.   HENT:      Head: Normocephalic and atraumatic.      Right Ear: External ear normal.      Left Ear: External ear normal.      Nose: Nose normal. No congestion or rhinorrhea.      Mouth/Throat:      Mouth: Mucous membranes are moist.      Pharynx: Oropharynx is clear.   Eyes:      Pupils: Pupils are equal, round, and reactive to  light.   Neck:      Musculoskeletal: Normal range of motion and neck supple.   Cardiovascular:      Rate and Rhythm: Normal rate and regular rhythm.      Pulses: Normal pulses.      Heart sounds: Normal heart sounds.   Pulmonary:      Effort: Pulmonary effort is normal.   Abdominal:      General: Abdomen is flat. There is no distension.      Palpations: Abdomen is soft.      Tenderness: There is no abdominal tenderness. There is no guarding.   Musculoskeletal: Normal range of motion.         General: No swelling.   Neurological:      General: No focal deficit present.      Mental Status: He is alert and oriented to person, place, and time. Mental status is at baseline.   Psychiatric:         Mood and Affect: Mood normal.         Behavior: Behavior normal.         Thought Content: Thought content normal.         Judgment: Judgment normal.         Significant Labs:   CBC:   Recent Labs   Lab 08/21/20 0414   WBC 6.40   HGB 9.6*   HCT 31.0*   *     CMP:   Recent Labs   Lab 08/21/20 0414      K 4.4      CO2 26   GLU 82   BUN 10   CREATININE 1.0   CALCIUM 8.0*   PROT 6.0   ALBUMIN 1.8*   BILITOT 0.7   ALKPHOS 324*   *   ALT 65*   ANIONGAP 6*   EGFRNONAA >60       Significant Imaging: I have reviewed all pertinent imaging results/findings within the past 24 hours.

## 2020-08-22 NOTE — PLAN OF CARE
Plan of care reviewed with patient. Pt AAO and able to call staff for assistance.Continues to tolerate 5L on high flow. Did well during therapy today.Naproxen for pain. Med helped.0 s/sx of hypo/hyperglycemia.Verbalizes to staff understanding. NSR on monitor with 0 red alarms noted.  No acute distress observed at this time. Side rails x2, bed in low position, call bell within reach. Bed alarm in place for patient safety. Will relay to oncoming nurse to monitor for changes in condition.

## 2020-08-22 NOTE — PROGRESS NOTES
[facility] Hospital Medicine  Progress Note    Patient Name: Rajan Deluna  MRN: [id number]  Patient Class: IP- Inpatient   Admission Date: 7/21/2020  Length of Stay: 32 days  Attending Physician: Rajan Rodríguez MD  Primary Care Provider: Jason Mccord MD        Subjective:     Principal Problem:COVID-19        HPI:  Patient is a 74-year-old male with a past medical history of hypertension, COPD, MAC infection who presented to the ED with altered mental status and fever.  As per the family, patient has had altered mental status for the past 3-4 days. Family endorses increased somnolence as well as urinating on himself. EMS was called two days prior to presentation but reportedly deemed that patient did not need to be brought in. Over the previous day, reportedly the patient was found stuck, staring in his sink, not responding. On the day of presentation, the patient was found down on the floor, family wasn't able to get patient up from the floor and was reportedly not responding as much as baseline. Family believed that patient may not have been wearing baseline 2L O2. Patient also endorses a cough and generalized weakness. Patient reportedly had a recent UTI.     In the ED, chest x-ray showed diffuse interstitial patchy infiltrates concerning for possible pneumonia and a COVID-19 screening test was positive. Fever was 101° F and patient was hypotensive 80s/40s. Patient given 30cc/kg bolus. BP very soft on presentation, adequately responded to volume resuscitation and then decreased again. Labs showed a BUN 45, creatinine 2.9, , , troponin 0.036, procalcitonin 1.13.     Overview/Hospital Course:  Patient has had an extensive hospital course 2/2 COVID-related sepsis. Patient required peripheral Levophed for 1 day (upon admission) and has had stable MAPs throughout his course off pressors. Initially, patient required continuous BiPap and was stepped up to the ICU on 7/22 for 6  days. Patient also presented with a high D-dimer at 7.22 and was started on a heparin drip. CTA was contraindicated due to MARQUIS upon initial presentation. MARQUIS has improved with current Cr of 1 down from 2.9. While in the ICU, patient required supplemental O2. Patient complained of melanic diarrhea and a rectal tube was placed. Patient anxious and disoriented in the ICU. Haldol PRN for agitation in the ICU. Mental status improved in the ICU. Good UOP.    ID workup of melanic stool showed C. diff positive antigen and C. diff PCR toxin positive. Patient's melanic stool improved and rectal tube removed. He completed treatment with IV metronidazole and P.O. vancomycin for a total of 14 days, last dose 8/7. Though patient's melanic stool and watery diarrhea improved, there were concerns for a GI bleed due to an H/H of 6.9/21.5. Heparin drip was discontinued, IV protonix started, and the patient received 1 unit of packed RBCs. 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 Started on probiotics. Most recent recent H&H 9.5/37, stable.    Patient transitioned off continuous BiPAP and stepped down to the floor 7/27 with BiPAP use only at night. Continued to require supplemental O2. Tolerated advances in diet, duoneb treatments, incentive spirometry and chest physiotherapy. Tolerates PT/OT daily. Discussed self-proning due to deconditioned respiratory status but patient unable to tolerate. Off COVID isolation on 8/10, per hospital policy of 20 days negative COVID status. Patient received Levoquin for 5 days, stopped after procal <1. IV dexamthasone for 10 days. Redemsivir for 5 days.     Patient with history of aspergillosis and was on itraconazole as outpatient. WBC count increased to 20 during his course but resolved after patient completed 10 day course of IV dexamethasone.  Per ID, Voriconazole 400 mg po every 12 h X 2 loading doses, starting 7/29, then change to voriconazole 200 mg po every 12 h thereafter. Plan is to continue voriconazole and discharge on Itraconazole 100mg BID and follow up with outpatient Pulmonary and ID for continued Aspergillosis and MAC treatment.     During the patient's hospital course, he also had complaints of chest pain which resolved with a GI cocktail. Normal EKG and troponin trend. He also complained about left shoulder pain, in which the patient has a history of chronic shoulder pain that has required orthopedic surgery in the past. A shoulder x-ray showed to acute changes and the pain was managed with lidocaine patches and naproxen.    LTAC placement denied multiple times due to patient's continued need of supplemental oxygen. Patient expressed wanting to go home with possible hospice/palliative care but now is amendable to a type of rehabilitation facility. Multiple discussions about hospice/palliative care were held with patient. Discussed hospice/palliative care with daughter, patient's power of , due to patient's new baseline for his COPD 2/2 COVID. She is not agreeable to the plan and would like a type of rehabilitation facility too. Psych consulted to assess the patient's capacity and is in agreement with the primary team that the patient has the capacity to make his own medical decisions. A family meeting with palliative care is being coordinated with patient and daughter to discuss goals of care. Due to patient's extensive hospital course, the patient is deconditioned. The patient desats <88% with minimal exertion and supplemental O2 requirement increases during these episodes. Able to wean the patient down to 4L HFNC thus far from a peak requirement of 30L 70% on vapotherm. Patient's daughter decided to release being Power of .       Interval History: NAEO. Increased HFNC to 8L.     Review of Systems   Constitutional: Negative  for activity change, appetite change and fatigue.   HENT: Negative for congestion and dental problem.    Eyes: Negative for discharge and itching.   Respiratory: Negative for cough and shortness of breath.    Cardiovascular: Negative for chest pain.   Gastrointestinal: Negative for abdominal pain, blood in stool, diarrhea and nausea.   Endocrine: Negative for cold intolerance and heat intolerance.   Genitourinary: Negative for difficulty urinating.   Musculoskeletal: Negative for arthralgias, back pain and gait problem.   Skin: Negative.  Negative for color change and pallor.   Neurological: Negative for dizziness, facial asymmetry, light-headedness and headaches.   Psychiatric/Behavioral: The patient is not nervous/anxious.      Objective:     Vital Signs (Most Recent):  Temp: 98 °F (36.7 °C) (08/22/20 0720)  Pulse: 97 (08/22/20 0720)  Resp: 19 (08/22/20 0720)  BP: 109/65 (08/22/20 0720)  SpO2: (!) 91 % (08/22/20 0720) Vital Signs (24h Range):  Temp:  [96.1 °F (35.6 °C)-98 °F (36.7 °C)] 98 °F (36.7 °C)  Pulse:  [] 97  Resp:  [17-24] 19  SpO2:  [75 %-99 %] 91 %  BP: ()/(55-65) 109/65     Weight: 74.4 kg (164 lb 0.4 oz)  Body mass index is 22.88 kg/m².    Intake/Output Summary (Last 24 hours) at 8/22/2020 0745  Last data filed at 8/22/2020 0607  Gross per 24 hour   Intake 300 ml   Output --   Net 300 ml      Physical Exam  Vitals signs and nursing note reviewed.   Constitutional:       General: He is not in acute distress.     Appearance: Normal appearance. He is not ill-appearing.   HENT:      Head: Normocephalic and atraumatic.      Right Ear: External ear normal.      Left Ear: External ear normal.      Nose: Nose normal. No congestion or rhinorrhea.      Mouth/Throat:      Mouth: Mucous membranes are moist.      Pharynx: Oropharynx is clear.   Eyes:      Pupils: Pupils are equal, round, and reactive to light.   Neck:      Musculoskeletal: Normal range of motion and neck supple.   Cardiovascular:       Rate and Rhythm: Normal rate and regular rhythm.      Pulses: Normal pulses.      Heart sounds: Normal heart sounds.   Pulmonary:      Effort: Pulmonary effort is normal.   Abdominal:      General: Abdomen is flat. There is no distension.      Palpations: Abdomen is soft.      Tenderness: There is no abdominal tenderness. There is no guarding.   Musculoskeletal: Normal range of motion.         General: No swelling.   Neurological:      General: No focal deficit present.      Mental Status: He is alert and oriented to person, place, and time. Mental status is at baseline.   Psychiatric:         Mood and Affect: Mood normal.         Behavior: Behavior normal.         Thought Content: Thought content normal.         Judgment: Judgment normal.         Significant Labs:   CBC:   Recent Labs   Lab 08/21/20 0414   WBC 6.40   HGB 9.6*   HCT 31.0*   *     CMP:   Recent Labs   Lab 08/21/20 0414      K 4.4      CO2 26   GLU 82   BUN 10   CREATININE 1.0   CALCIUM 8.0*   PROT 6.0   ALBUMIN 1.8*   BILITOT 0.7   ALKPHOS 324*   *   ALT 65*   ANIONGAP 6*   EGFRNONAA >60       Significant Imaging: I have reviewed all pertinent imaging results/findings within the past 24 hours.      Assessment/Plan:      * COVID-19  -Lifted Isolation 10 days ago  -Continuing to wean O2  -Deconditioning and long illness has led to increased O2 requirement, continuing to wean and work with PT/OT, RT with goal of SNF (5L NC max)  - Encourage BiPap QHS    Goals of care, counseling/discussion  -Patient has capacity  -Per family, patient cannot return home as wife too weak to help him and son works full time  -Will continue to work toward discharge planning    Left shoulder pain  Naproxen scheduled BID  Continue lidocaine patch, tylenol for pain  U/S was negative for any gallbladder outlet obstruction, cholecystitis, etc        History of Aspergillus & MAC  Continuing voriconazole, switch to itroconazole at [place]   -Follow up with  Pulm, ID outpatient  -Due to patient length of stay, will reevaluate    Hypotension  Patient's most recent blood pressure 116/75  RESOLVED      Sepsis  RESOLVED  See plan for COVID 19    COPD (chronic obstructive pulmonary disease)  On HFNC 7L  Continue Montelukast  Added Flovent yesterday  Continue to wean  Goal of 5L NC  Patient to sit up 2x daily in chair  Mild wheezes present   Continue albuterol and chest physio/IS  -Monitor for progress          Anemia  Asymptomatic  H/H stable at 9.6/31.0  Labs QUOD  STABLE      VTE Risk Mitigation (From admission, onward)         Ordered     enoxaparin injection 80 mg  Every 12 hours (non-standard times)      08/04/20 1421     Place sequential compression device  Until discontinued      07/21/20 2142     IP VTE HIGH RISK PATIENT  Once      07/21/20 2142                Discharge Planning   ADAM:      Code Status: Full Code   Is the patient medically ready for discharge?:     Reason for patient still in hospital (select all that apply): Other (specify) oxygen requirement  Discharge Plan A: Long-term acute care facility (LTAC)   Discharge Delays: None known at this time              Arnie Gaston MD  Department of Hospital Medicine   [facility]

## 2020-08-22 NOTE — PLAN OF CARE
Problem: Physical Therapy Goal  Goal: Physical Therapy Goal  Description: Goals to be met by: 20     Patient will increase functional independence with mobility by performin. Supine <> sit with MInimal Assistance   Met   Revised  supine<>sit MOD I  2. Sit to stand transfer with Minimal Assistance   Met   Revised sit to stand with supervision  3. Bed to chair transfer with Minimal Assistance   Met   Revised bed to chair transfer with supervision  4. Gait x 50 ft with min A with appropriate AD  5. Lower extremity exercise program x 10 reps per handout, with supervision  Met 8/15  Revised Lower extremity exercise program x 20 reps per handout, with supervision    Outcome: Ongoing, Progressing   Pt continues to work toward established goals. Able to perform 2 sit to stand transfer trials with RW and CGA. Able to also take ~4 steps fwd and ~3-4 side steps with RW requiring close CGA. Pt's SpO2 levels fluctuated throughout session 77-92%. 77% after taking steps.

## 2020-08-22 NOTE — PLAN OF CARE
Patient's daughter called about dropping off her revocation of medical power of . The notarized form was presented by the granddaughter. A copy was made and placed in his file.       Traci Blackman MD, [facility] PGY-1  08/22/2020

## 2020-08-23 LAB
ALBUMIN SERPL BCP-MCNC: 2 G/DL (ref 3.5–5.2)
ALP SERPL-CCNC: 358 U/L (ref 55–135)
ALT SERPL W/O P-5'-P-CCNC: 57 U/L (ref 10–44)
ANION GAP SERPL CALC-SCNC: 6 MMOL/L (ref 8–16)
AST SERPL-CCNC: 132 U/L (ref 10–40)
BASOPHILS # BLD AUTO: 0.05 K/UL (ref 0–0.2)
BASOPHILS NFR BLD: 0.7 % (ref 0–1.9)
BILIRUB SERPL-MCNC: 0.5 MG/DL (ref 0.1–1)
BUN SERPL-MCNC: 12 MG/DL (ref 8–23)
CALCIUM SERPL-MCNC: 8.2 MG/DL (ref 8.7–10.5)
CHLORIDE SERPL-SCNC: 103 MMOL/L (ref 95–110)
CO2 SERPL-SCNC: 27 MMOL/L (ref 23–29)
CREAT SERPL-MCNC: 1 MG/DL (ref 0.5–1.4)
DIFFERENTIAL METHOD: ABNORMAL
EOSINOPHIL # BLD AUTO: 0.1 K/UL (ref 0–0.5)
EOSINOPHIL NFR BLD: 2.1 % (ref 0–8)
ERYTHROCYTE [DISTWIDTH] IN BLOOD BY AUTOMATED COUNT: 16.5 % (ref 11.5–14.5)
EST. GFR  (AFRICAN AMERICAN): >60 ML/MIN/1.73 M^2
EST. GFR  (NON AFRICAN AMERICAN): >60 ML/MIN/1.73 M^2
GLUCOSE SERPL-MCNC: 83 MG/DL (ref 70–110)
HCT VFR BLD AUTO: 34.1 % (ref 40–54)
HGB BLD-MCNC: 10.3 G/DL (ref 14–18)
IMM GRANULOCYTES # BLD AUTO: 0.05 K/UL (ref 0–0.04)
IMM GRANULOCYTES NFR BLD AUTO: 0.7 % (ref 0–0.5)
LYMPHOCYTES # BLD AUTO: 1.6 K/UL (ref 1–4.8)
LYMPHOCYTES NFR BLD: 24.4 % (ref 18–48)
MAGNESIUM SERPL-MCNC: 2.1 MG/DL (ref 1.6–2.6)
MCH RBC QN AUTO: 28.9 PG (ref 27–31)
MCHC RBC AUTO-ENTMCNC: 30.2 G/DL (ref 32–36)
MCV RBC AUTO: 96 FL (ref 82–98)
MONOCYTES # BLD AUTO: 0.9 K/UL (ref 0.3–1)
MONOCYTES NFR BLD: 13.2 % (ref 4–15)
NEUTROPHILS # BLD AUTO: 3.9 K/UL (ref 1.8–7.7)
NEUTROPHILS NFR BLD: 58.9 % (ref 38–73)
NRBC BLD-RTO: 0 /100 WBC
PHOSPHATE SERPL-MCNC: 2.7 MG/DL (ref 2.7–4.5)
PLATELET # BLD AUTO: 617 K/UL (ref 150–350)
PMV BLD AUTO: 9.6 FL (ref 9.2–12.9)
POCT GLUCOSE: 101 MG/DL (ref 70–110)
POCT GLUCOSE: 103 MG/DL (ref 70–110)
POCT GLUCOSE: 128 MG/DL (ref 70–110)
POCT GLUCOSE: 147 MG/DL (ref 70–110)
POTASSIUM SERPL-SCNC: 4.6 MMOL/L (ref 3.5–5.1)
PROT SERPL-MCNC: 6.4 G/DL (ref 6–8.4)
RBC # BLD AUTO: 3.57 M/UL (ref 4.6–6.2)
SODIUM SERPL-SCNC: 136 MMOL/L (ref 136–145)
WBC # BLD AUTO: 6.67 K/UL (ref 3.9–12.7)

## 2020-08-23 PROCEDURE — 25000003 PHARM REV CODE 250: Performed by: FAMILY MEDICINE

## 2020-08-23 PROCEDURE — 80053 COMPREHEN METABOLIC PANEL: CPT

## 2020-08-23 PROCEDURE — 27000221 HC OXYGEN, UP TO 24 HOURS

## 2020-08-23 PROCEDURE — 97803 MED NUTRITION INDIV SUBSEQ: CPT

## 2020-08-23 PROCEDURE — 25000003 PHARM REV CODE 250: Performed by: STUDENT IN AN ORGANIZED HEALTH CARE EDUCATION/TRAINING PROGRAM

## 2020-08-23 PROCEDURE — 11000001 HC ACUTE MED/SURG PRIVATE ROOM

## 2020-08-23 PROCEDURE — 83735 ASSAY OF MAGNESIUM: CPT

## 2020-08-23 PROCEDURE — 99900035 HC TECH TIME PER 15 MIN (STAT)

## 2020-08-23 PROCEDURE — 94799 UNLISTED PULMONARY SVC/PX: CPT

## 2020-08-23 PROCEDURE — 94664 DEMO&/EVAL PT USE INHALER: CPT

## 2020-08-23 PROCEDURE — 94640 AIRWAY INHALATION TREATMENT: CPT

## 2020-08-23 PROCEDURE — 85025 COMPLETE CBC W/AUTO DIFF WBC: CPT

## 2020-08-23 PROCEDURE — 84100 ASSAY OF PHOSPHORUS: CPT

## 2020-08-23 PROCEDURE — 63600175 PHARM REV CODE 636 W HCPCS: Performed by: FAMILY MEDICINE

## 2020-08-23 PROCEDURE — 25000003 PHARM REV CODE 250: Performed by: INTERNAL MEDICINE

## 2020-08-23 PROCEDURE — 36415 COLL VENOUS BLD VENIPUNCTURE: CPT

## 2020-08-23 PROCEDURE — 94761 N-INVAS EAR/PLS OXIMETRY MLT: CPT

## 2020-08-23 PROCEDURE — 25000242 PHARM REV CODE 250 ALT 637 W/ HCPCS: Performed by: STUDENT IN AN ORGANIZED HEALTH CARE EDUCATION/TRAINING PROGRAM

## 2020-08-23 RX ORDER — SODIUM,POTASSIUM PHOSPHATES 280-250MG
1 POWDER IN PACKET (EA) ORAL EVERY 6 HOURS
Status: DISPENSED | OUTPATIENT
Start: 2020-08-23 | End: 2020-08-23

## 2020-08-23 RX ADMIN — IPRATROPIUM BROMIDE AND ALBUTEROL SULFATE 3 ML: 2.5; .5 SOLUTION RESPIRATORY (INHALATION) at 04:08

## 2020-08-23 RX ADMIN — PANTOPRAZOLE SODIUM 40 MG: 40 TABLET, DELAYED RELEASE ORAL at 08:08

## 2020-08-23 RX ADMIN — NAPROXEN 500 MG: 500 TABLET ORAL at 05:08

## 2020-08-23 RX ADMIN — POTASSIUM & SODIUM PHOSPHATES POWDER PACK 280-160-250 MG 1 PACKET: 280-160-250 PACK at 06:08

## 2020-08-23 RX ADMIN — NAPROXEN 500 MG: 500 TABLET ORAL at 07:08

## 2020-08-23 RX ADMIN — MONTELUKAST 10 MG: 10 TABLET, FILM COATED ORAL at 08:08

## 2020-08-23 RX ADMIN — ENOXAPARIN SODIUM 80 MG: 80 INJECTION SUBCUTANEOUS at 05:08

## 2020-08-23 RX ADMIN — THERA TABS 1 TABLET: TAB at 08:08

## 2020-08-23 RX ADMIN — ENOXAPARIN SODIUM 80 MG: 80 INJECTION SUBCUTANEOUS at 04:08

## 2020-08-23 RX ADMIN — FLUTICASONE PROPIONATE 1 PUFF: 110 AEROSOL, METERED RESPIRATORY (INHALATION) at 08:08

## 2020-08-23 RX ADMIN — LIDOCAINE 2 PATCH: 50 PATCH TOPICAL at 05:08

## 2020-08-23 RX ADMIN — Medication 4 TABLET: at 07:08

## 2020-08-23 RX ADMIN — IPRATROPIUM BROMIDE AND ALBUTEROL SULFATE 3 ML: 2.5; .5 SOLUTION RESPIRATORY (INHALATION) at 12:08

## 2020-08-23 RX ADMIN — IPRATROPIUM BROMIDE AND ALBUTEROL SULFATE 3 ML: 2.5; .5 SOLUTION RESPIRATORY (INHALATION) at 08:08

## 2020-08-23 RX ADMIN — VORICONAZOLE 200 MG: 200 TABLET, FILM COATED ORAL at 04:08

## 2020-08-23 RX ADMIN — TAMSULOSIN HYDROCHLORIDE 0.4 MG: 0.4 CAPSULE ORAL at 08:08

## 2020-08-23 RX ADMIN — VORICONAZOLE 200 MG: 200 TABLET, FILM COATED ORAL at 05:08

## 2020-08-23 NOTE — PLAN OF CARE
Discussed CT to evaluate lungs with patient, patient refused, stating he was too tired to do it today. He stated that he would consider the study tomorrow. Will revisit topic with patient tomorrow.    Emeli Adam MD  [facility] PGY2

## 2020-08-23 NOTE — ASSESSMENT & PLAN NOTE
On HFNC 9L  Continue Montelukast, Flovent  Continue to wean  Goal of 5L NC  Patient to sit up 2x daily in chair  Mild wheezes present   Continue albuterol and chest physio/IS  -Monitor for progress

## 2020-08-23 NOTE — ASSESSMENT & PLAN NOTE
Continuing voriconazole, switch to itroconazole at [place]   -Follow up with Pulm, ID outpatient  -Due to patient length of stay, will reevaluate

## 2020-08-23 NOTE — PLAN OF CARE
Problem: Adult Inpatient Plan of Care  Goal: Plan of Care Review     Outcome: Ongoing, Progressing

## 2020-08-23 NOTE — ASSESSMENT & PLAN NOTE
-Lifted Isolation 2 weeks ago  -Continuing to wean O2  -Deconditioning and long illness has led to increased O2 requirement, continuing to wean and work with PT/OT, RT with goal of SNF (5L NC max)  - Encourage BiPap QHS

## 2020-08-23 NOTE — PLAN OF CARE
Patient on oxygen with documented flow.  Will attempt to wean per O2 order protocol. BIPAP on standby at patient bedside. The proper method of use, as well as anticipated side effects, of this aerosol treatment are discussed and demonstrated to the patient.  The proper method of use, as well as anticipated side effects, of this metered-dose inhaler are discussed and demonstrated to the patient. No respiratory distress noted at this time. Will continue to monitor.

## 2020-08-23 NOTE — PROGRESS NOTES
[facility] Hospital Medicine  Progress Note    Patient Name: Rajan Deluna  MRN: [id number]  Patient Class: IP- Inpatient   Admission Date: 7/21/2020  Length of Stay: 33 days  Attending Physician: Rajan Rodríguez MD  Primary Care Provider: Jason Mccord MD        Subjective:     Principal Problem:COVID-19        HPI:  Patient is a 74-year-old male with a past medical history of hypertension, COPD, MAC infection who presented to the ED with altered mental status and fever.  As per the family, patient has had altered mental status for the past 3-4 days. Family endorses increased somnolence as well as urinating on himself. EMS was called two days prior to presentation but reportedly deemed that patient did not need to be brought in. Over the previous day, reportedly the patient was found stuck, staring in his sink, not responding. On the day of presentation, the patient was found down on the floor, family wasn't able to get patient up from the floor and was reportedly not responding as much as baseline. Family believed that patient may not have been wearing baseline 2L O2. Patient also endorses a cough and generalized weakness. Patient reportedly had a recent UTI.     In the ED, chest x-ray showed diffuse interstitial patchy infiltrates concerning for possible pneumonia and a COVID-19 screening test was positive. Fever was 101° F and patient was hypotensive 80s/40s. Patient given 30cc/kg bolus. BP very soft on presentation, adequately responded to volume resuscitation and then decreased again. Labs showed a BUN 45, creatinine 2.9, , , troponin 0.036, procalcitonin 1.13.     Overview/Hospital Course:  Patient has had an extensive hospital course 2/2 COVID-related sepsis. Patient required peripheral Levophed for 1 day (upon admission) and has had stable MAPs throughout his course off pressors. Initially, patient required continuous BiPap and was stepped up to the ICU on 7/22 for 6  days. Patient also presented with a high D-dimer at 7.22 and was started on a heparin drip. CTA was contraindicated due to MARQUIS upon initial presentation. MARQUIS has improved with current Cr of 1 down from 2.9. While in the ICU, patient required supplemental O2. Patient complained of melanic diarrhea and a rectal tube was placed. Patient anxious and disoriented in the ICU. Haldol PRN for agitation in the ICU. Mental status improved in the ICU. Good UOP.    ID workup of melanic stool showed C. diff positive antigen and C. diff PCR toxin positive. Patient's melanic stool improved and rectal tube removed. He completed treatment with IV metronidazole and P.O. vancomycin for a total of 14 days, last dose 8/7. Though patient's melanic stool and watery diarrhea improved, there were concerns for a GI bleed due to an H/H of 6.9/21.5. Heparin drip was discontinued, IV protonix started, and the patient received 1 unit of packed RBCs. 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 Started on probiotics. Most recent recent H&H 9.5/37, stable.    Patient transitioned off continuous BiPAP and stepped down to the floor 7/27 with BiPAP use only at night. Continued to require supplemental O2. Tolerated advances in diet, duoneb treatments, incentive spirometry and chest physiotherapy. Tolerates PT/OT daily. Discussed self-proning due to deconditioned respiratory status but patient unable to tolerate. Off COVID isolation on 8/10, per hospital policy of 20 days negative COVID status. Patient received Levoquin for 5 days, stopped after procal <1. IV dexamthasone for 10 days. Redemsivir for 5 days.     Patient with history of aspergillosis and was on itraconazole as outpatient. WBC count increased to 20 during his course but resolved after patient completed 10 day course of IV dexamethasone.  Per ID, Voriconazole 400 mg po every 12 h X 2 loading doses, starting 7/29, then change to voriconazole 200 mg po every 12 h thereafter. Plan is to continue voriconazole and discharge on Itraconazole 100mg BID and follow up with outpatient Pulmonary and ID for continued Aspergillosis and MAC treatment.     During the patient's hospital course, he also had complaints of chest pain which resolved with a GI cocktail. Normal EKG and troponin trend. He also complained about left shoulder pain, in which the patient has a history of chronic shoulder pain that has required orthopedic surgery in the past. A shoulder x-ray showed to acute changes and the pain was managed with lidocaine patches and naproxen.    LTAC placement denied multiple times due to patient's continued need of supplemental oxygen. Patient expressed wanting to go home with possible hospice/palliative care but now is amendable to a type of rehabilitation facility. Multiple discussions about hospice/palliative care were held with patient. Discussed hospice/palliative care with daughter, patient's power of , due to patient's new baseline for his COPD 2/2 COVID. She is not agreeable to the plan and would like a type of rehabilitation facility too. Psych consulted to assess the patient's capacity and is in agreement with the primary team that the patient has the capacity to make his own medical decisions. A family meeting with palliative care is being coordinated with patient and daughter to discuss goals of care. Due to patient's extensive hospital course, the patient is deconditioned. The patient desats <88% with minimal exertion and supplemental O2 requirement increases during these episodes. Able to wean the patient down to 4L HFNC thus far from a peak requirement of 30L 70% on vapotherm. Patient's daughter decided to release being Power of .       Interval History: NAEON. 9L HFNC at present. Patient frustrated, has been refusing BiPap QHS.  Shoulder pain present.     Review of Systems   Constitutional: Negative for activity change, appetite change, chills and fatigue.   HENT: Negative for congestion and dental problem.    Eyes: Negative for discharge and itching.   Respiratory: Negative for cough and shortness of breath.    Cardiovascular: Negative for chest pain.   Gastrointestinal: Negative for abdominal pain, blood in stool, diarrhea and nausea.   Endocrine: Negative for cold intolerance and heat intolerance.   Genitourinary: Negative for difficulty urinating.   Musculoskeletal: Negative for arthralgias, back pain and gait problem.   Skin: Negative.  Negative for color change and pallor.   Neurological: Negative for dizziness, facial asymmetry, light-headedness and headaches.   Psychiatric/Behavioral: The patient is not nervous/anxious.      Objective:     Vital Signs (Most Recent):  Temp: 97.6 °F (36.4 °C) (08/23/20 0800)  Pulse: 90 (08/23/20 0802)  Resp: 20 (08/23/20 0802)  BP: (!) 107/58 (08/23/20 0800)  SpO2: (!) 92 % (08/23/20 0802) Vital Signs (24h Range):  Temp:  [97.2 °F (36.2 °C)-98.2 °F (36.8 °C)] 97.6 °F (36.4 °C)  Pulse:  [] 90  Resp:  [16-24] 20  SpO2:  [79 %-98 %] 92 %  BP: (106-123)/(55-62) 107/58     Weight: 74.4 kg (164 lb 0.4 oz)  Body mass index is 22.88 kg/m².    Intake/Output Summary (Last 24 hours) at 8/23/2020 0820  Last data filed at 8/23/2020 0600  Gross per 24 hour   Intake --   Output 845 ml   Net -845 ml      Physical Exam  Vitals signs and nursing note reviewed.   Constitutional:       General: He is not in acute distress.     Appearance: Normal appearance. He is not ill-appearing.   HENT:      Head: Normocephalic and atraumatic.      Right Ear: External ear normal.      Left Ear: External ear normal.      Nose: Nose normal. No congestion or rhinorrhea.      Mouth/Throat:      Mouth: Mucous membranes are moist.      Pharynx: Oropharynx is clear.   Eyes:      Pupils: Pupils are equal, round, and reactive to light.    Neck:      Musculoskeletal: Normal range of motion and neck supple.   Cardiovascular:      Rate and Rhythm: Normal rate and regular rhythm.      Pulses: Normal pulses.      Heart sounds: Normal heart sounds.   Pulmonary:      Effort: Pulmonary effort is normal.      Breath sounds: Wheezing present.      Comments: Wheezes this AM, likely COPD. O2 at 7L HFNC  Abdominal:      General: Abdomen is flat. There is no distension.      Palpations: Abdomen is soft.      Tenderness: There is no abdominal tenderness. There is no guarding.   Musculoskeletal: Normal range of motion.         General: No swelling.   Neurological:      General: No focal deficit present.      Mental Status: He is alert and oriented to person, place, and time. Mental status is at baseline.   Psychiatric:         Mood and Affect: Mood normal.         Behavior: Behavior normal.         Thought Content: Thought content normal.         Judgment: Judgment normal.         Significant Labs:   CBC:   Recent Labs   Lab 08/23/20 0421   WBC 6.67   HGB 10.3*   HCT 34.1*   *     CMP:   Recent Labs   Lab 08/23/20 0421      K 4.6      CO2 27   GLU 83   BUN 12   CREATININE 1.0   CALCIUM 8.2*   PROT 6.4   ALBUMIN 2.0*   BILITOT 0.5   ALKPHOS 358*   *   ALT 57*   ANIONGAP 6*   EGFRNONAA >60     All pertinent labs within the past 24 hours have been reviewed.    Significant Imaging: I have reviewed all pertinent imaging results/findings within the past 24 hours.      Assessment/Plan:      * COVID-19  -Lifted Isolation 2 weeks ago  -Continuing to wean O2  -Deconditioning and long illness has led to increased O2 requirement, continuing to wean and work with PT/OT, RT with goal of SNF (5L NC max)  - Encourage BiPap QHS    Goals of care, counseling/discussion  -Patient has capacity  -Per family, patient cannot return home as wife too weak to help him and son works full time  -Will continue to work toward discharge planning    Left shoulder  pain  Naproxen scheduled BID  Continue lidocaine patch, tylenol for pain  U/S was negative for any gallbladder outlet obstruction, cholecystitis, etc        History of Aspergillus & MAC  Continuing voriconazole, switch to itroconazole at [place]   -Follow up with [facility], ID outpatient  -Due to patient length of stay, will reevaluate    Hypotension  Patient's most recent blood pressure 116/75  RESOLVED      Sepsis  RESOLVED  See plan for COVID 19    COPD (chronic obstructive pulmonary disease)  On HFNC 9L  Continue Montelukast, Flovent  Continue to wean  Goal of 5L NC  Patient to sit up 2x daily in chair  Mild wheezes present   Continue albuterol and chest physio/IS  -Monitor for progress          Anemia  Asymptomatic  H/H stable at 10.3/34.1  Labs QUOD  STABLE      VTE Risk Mitigation (From admission, onward)         Ordered     enoxaparin injection 80 mg  Every 12 hours (non-standard times)      08/04/20 1421     Place sequential compression device  Until discontinued      07/21/20 2142     IP VTE HIGH RISK PATIENT  Once      07/21/20 2142                Discharge Planning   ADAM:      Code Status: Full Code   Is the patient medically ready for discharge?:     Reason for patient still in hospital (select all that apply): Patient unstable  Discharge Plan A: Long-term acute care facility (LTAC)   Discharge Delays: None known at this time              Emeli Adam MD   [employer] PGY2  Department of Hospital Medicine   [facility]

## 2020-08-23 NOTE — PROGRESS NOTES
Entered pts room because continuous pulse ox was alarming. Pt's SpO2 on 9L HFNC was 79%. Coached pt to take deep breaths in through his nose. Also, coached pt through incentive spirometry. Pt's sats lorrie to 92%.  Pt in no apparent distress. Will continue to monitor.

## 2020-08-23 NOTE — SUBJECTIVE & OBJECTIVE
Interval History: NAEON. 9L HFNC at present. Patient frustrated, has been refusing BiPap QHS. Shoulder pain present.     Review of Systems   Constitutional: Negative for activity change, appetite change, chills and fatigue.   HENT: Negative for congestion and dental problem.    Eyes: Negative for discharge and itching.   Respiratory: Negative for cough and shortness of breath.    Cardiovascular: Negative for chest pain.   Gastrointestinal: Negative for abdominal pain, blood in stool, diarrhea and nausea.   Endocrine: Negative for cold intolerance and heat intolerance.   Genitourinary: Negative for difficulty urinating.   Musculoskeletal: Negative for arthralgias, back pain and gait problem.   Skin: Negative.  Negative for color change and pallor.   Neurological: Negative for dizziness, facial asymmetry, light-headedness and headaches.   Psychiatric/Behavioral: The patient is not nervous/anxious.      Objective:     Vital Signs (Most Recent):  Temp: 97.6 °F (36.4 °C) (08/23/20 0800)  Pulse: 90 (08/23/20 0802)  Resp: 20 (08/23/20 0802)  BP: (!) 107/58 (08/23/20 0800)  SpO2: (!) 92 % (08/23/20 0802) Vital Signs (24h Range):  Temp:  [97.2 °F (36.2 °C)-98.2 °F (36.8 °C)] 97.6 °F (36.4 °C)  Pulse:  [] 90  Resp:  [16-24] 20  SpO2:  [79 %-98 %] 92 %  BP: (106-123)/(55-62) 107/58     Weight: 74.4 kg (164 lb 0.4 oz)  Body mass index is 22.88 kg/m².    Intake/Output Summary (Last 24 hours) at 8/23/2020 0820  Last data filed at 8/23/2020 0600  Gross per 24 hour   Intake --   Output 845 ml   Net -845 ml      Physical Exam  Vitals signs and nursing note reviewed.   Constitutional:       General: He is not in acute distress.     Appearance: Normal appearance. He is not ill-appearing.   HENT:      Head: Normocephalic and atraumatic.      Right Ear: External ear normal.      Left Ear: External ear normal.      Nose: Nose normal. No congestion or rhinorrhea.      Mouth/Throat:      Mouth: Mucous membranes are moist.       Pharynx: Oropharynx is clear.   Eyes:      Pupils: Pupils are equal, round, and reactive to light.   Neck:      Musculoskeletal: Normal range of motion and neck supple.   Cardiovascular:      Rate and Rhythm: Normal rate and regular rhythm.      Pulses: Normal pulses.      Heart sounds: Normal heart sounds.   Pulmonary:      Effort: Pulmonary effort is normal.      Breath sounds: Wheezing present.      Comments: Wheezes this AM, likely COPD. O2 at 7L HFNC  Abdominal:      General: Abdomen is flat. There is no distension.      Palpations: Abdomen is soft.      Tenderness: There is no abdominal tenderness. There is no guarding.   Musculoskeletal: Normal range of motion.         General: No swelling.   Neurological:      General: No focal deficit present.      Mental Status: He is alert and oriented to person, place, and time. Mental status is at baseline.   Psychiatric:         Mood and Affect: Mood normal.         Behavior: Behavior normal.         Thought Content: Thought content normal.         Judgment: Judgment normal.         Significant Labs:   CBC:   Recent Labs   Lab 08/23/20  0421   WBC 6.67   HGB 10.3*   HCT 34.1*   *     CMP:   Recent Labs   Lab 08/23/20  0421      K 4.6      CO2 27   GLU 83   BUN 12   CREATININE 1.0   CALCIUM 8.2*   PROT 6.4   ALBUMIN 2.0*   BILITOT 0.5   ALKPHOS 358*   *   ALT 57*   ANIONGAP 6*   EGFRNONAA >60     All pertinent labs within the past 24 hours have been reviewed.    Significant Imaging: I have reviewed all pertinent imaging results/findings within the past 24 hours.

## 2020-08-24 LAB
POCT GLUCOSE: 103 MG/DL (ref 70–110)
POCT GLUCOSE: 106 MG/DL (ref 70–110)
POCT GLUCOSE: 121 MG/DL (ref 70–110)
POCT GLUCOSE: 91 MG/DL (ref 70–110)

## 2020-08-24 PROCEDURE — 97110 THERAPEUTIC EXERCISES: CPT | Mod: CQ

## 2020-08-24 PROCEDURE — 25000003 PHARM REV CODE 250: Performed by: STUDENT IN AN ORGANIZED HEALTH CARE EDUCATION/TRAINING PROGRAM

## 2020-08-24 PROCEDURE — 63600175 PHARM REV CODE 636 W HCPCS: Performed by: FAMILY MEDICINE

## 2020-08-24 PROCEDURE — 25000242 PHARM REV CODE 250 ALT 637 W/ HCPCS: Performed by: STUDENT IN AN ORGANIZED HEALTH CARE EDUCATION/TRAINING PROGRAM

## 2020-08-24 PROCEDURE — 97530 THERAPEUTIC ACTIVITIES: CPT | Mod: CQ

## 2020-08-24 PROCEDURE — 99900035 HC TECH TIME PER 15 MIN (STAT)

## 2020-08-24 PROCEDURE — 97530 THERAPEUTIC ACTIVITIES: CPT | Mod: CO

## 2020-08-24 PROCEDURE — 27000221 HC OXYGEN, UP TO 24 HOURS

## 2020-08-24 PROCEDURE — 94640 AIRWAY INHALATION TREATMENT: CPT

## 2020-08-24 PROCEDURE — 94799 UNLISTED PULMONARY SVC/PX: CPT

## 2020-08-24 PROCEDURE — 94761 N-INVAS EAR/PLS OXIMETRY MLT: CPT

## 2020-08-24 PROCEDURE — 94664 DEMO&/EVAL PT USE INHALER: CPT

## 2020-08-24 PROCEDURE — 11000001 HC ACUTE MED/SURG PRIVATE ROOM

## 2020-08-24 PROCEDURE — 25000003 PHARM REV CODE 250: Performed by: INTERNAL MEDICINE

## 2020-08-24 PROCEDURE — 25000003 PHARM REV CODE 250: Performed by: FAMILY MEDICINE

## 2020-08-24 RX ADMIN — PANTOPRAZOLE SODIUM 40 MG: 40 TABLET, DELAYED RELEASE ORAL at 08:08

## 2020-08-24 RX ADMIN — FLUTICASONE PROPIONATE 1 PUFF: 110 AEROSOL, METERED RESPIRATORY (INHALATION) at 07:08

## 2020-08-24 RX ADMIN — TAMSULOSIN HYDROCHLORIDE 0.4 MG: 0.4 CAPSULE ORAL at 08:08

## 2020-08-24 RX ADMIN — VORICONAZOLE 200 MG: 200 TABLET, FILM COATED ORAL at 05:08

## 2020-08-24 RX ADMIN — ENOXAPARIN SODIUM 80 MG: 80 INJECTION SUBCUTANEOUS at 04:08

## 2020-08-24 RX ADMIN — MONTELUKAST 10 MG: 10 TABLET, FILM COATED ORAL at 09:08

## 2020-08-24 RX ADMIN — LIDOCAINE 2 PATCH: 50 PATCH TOPICAL at 05:08

## 2020-08-24 RX ADMIN — POTASSIUM & SODIUM PHOSPHATES POWDER PACK 280-160-250 MG 1 PACKET: 280-160-250 PACK at 06:08

## 2020-08-24 RX ADMIN — VORICONAZOLE 200 MG: 200 TABLET, FILM COATED ORAL at 04:08

## 2020-08-24 RX ADMIN — IPRATROPIUM BROMIDE AND ALBUTEROL SULFATE 3 ML: 2.5; .5 SOLUTION RESPIRATORY (INHALATION) at 07:08

## 2020-08-24 RX ADMIN — ENOXAPARIN SODIUM 80 MG: 80 INJECTION SUBCUTANEOUS at 05:08

## 2020-08-24 RX ADMIN — THERA TABS 1 TABLET: TAB at 08:08

## 2020-08-24 RX ADMIN — IPRATROPIUM BROMIDE AND ALBUTEROL SULFATE 3 ML: 2.5; .5 SOLUTION RESPIRATORY (INHALATION) at 03:08

## 2020-08-24 RX ADMIN — STANDARDIZED SENNA CONCENTRATE AND DOCUSATE SODIUM 1 TABLET: 8.6; 5 TABLET ORAL at 09:08

## 2020-08-24 RX ADMIN — NAPROXEN 500 MG: 500 TABLET ORAL at 08:08

## 2020-08-24 RX ADMIN — IPRATROPIUM BROMIDE AND ALBUTEROL SULFATE 3 ML: 2.5; .5 SOLUTION RESPIRATORY (INHALATION) at 11:08

## 2020-08-24 RX ADMIN — NAPROXEN 500 MG: 500 TABLET ORAL at 05:08

## 2020-08-24 RX ADMIN — POTASSIUM & SODIUM PHOSPHATES POWDER PACK 280-160-250 MG 1 PACKET: 280-160-250 PACK at 12:08

## 2020-08-24 RX ADMIN — PANTOPRAZOLE SODIUM 40 MG: 40 TABLET, DELAYED RELEASE ORAL at 09:08

## 2020-08-24 NOTE — SUBJECTIVE & OBJECTIVE
Interval History: NAEON. Patient assented to CT chest to assess for further pathology.    Review of Systems   Constitutional: Negative for activity change, appetite change, chills and fatigue.   HENT: Negative for congestion and dental problem.    Eyes: Negative for discharge and itching.   Respiratory: Negative for cough and shortness of breath.    Cardiovascular: Negative for chest pain.   Gastrointestinal: Negative for abdominal pain, blood in stool, diarrhea and nausea.   Endocrine: Negative for cold intolerance and heat intolerance.   Genitourinary: Negative for difficulty urinating.   Musculoskeletal: Negative for arthralgias, back pain and gait problem.   Skin: Negative.  Negative for color change and pallor.   Neurological: Negative for dizziness, facial asymmetry, light-headedness and headaches.   Psychiatric/Behavioral: The patient is not nervous/anxious.      Objective:     Vital Signs (Most Recent):  Temp: 97.4 °F (36.3 °C) (08/24/20 0806)  Pulse: 91 (08/24/20 0806)  Resp: 18 (08/24/20 0806)  BP: (!) 121/56 (08/24/20 0806)  SpO2: (!) 93 % (08/24/20 0806) Vital Signs (24h Range):  Temp:  [97.4 °F (36.3 °C)-98.1 °F (36.7 °C)] 97.4 °F (36.3 °C)  Pulse:  [] 91  Resp:  [18-24] 18  SpO2:  [89 %-93 %] 93 %  BP: (105-121)/(56-68) 121/56     Weight: 77.1 kg (169 lb 15.6 oz)  Body mass index is 23.71 kg/m².    Intake/Output Summary (Last 24 hours) at 8/24/2020 1051  Last data filed at 8/24/2020 0602  Gross per 24 hour   Intake 120 ml   Output 516 ml   Net -396 ml      Physical Exam  Vitals signs and nursing note reviewed.   Constitutional:       General: He is not in acute distress.     Appearance: Normal appearance. He is not ill-appearing.   HENT:      Head: Normocephalic and atraumatic.      Right Ear: External ear normal.      Left Ear: External ear normal.      Nose: Nose normal. No congestion or rhinorrhea.      Mouth/Throat:      Mouth: Mucous membranes are moist.      Pharynx: Oropharynx is clear.    Eyes:      Pupils: Pupils are equal, round, and reactive to light.   Neck:      Musculoskeletal: Normal range of motion and neck supple.   Cardiovascular:      Rate and Rhythm: Normal rate and regular rhythm.      Pulses: Normal pulses.      Heart sounds: Normal heart sounds.   Pulmonary:      Effort: Pulmonary effort is normal.      Breath sounds: No wheezing.      Comments: Wheezes this AM, likely COPD. O2 at 6L HFNC  Abdominal:      General: Abdomen is flat. There is no distension.      Palpations: Abdomen is soft.      Tenderness: There is no abdominal tenderness. There is no guarding.   Musculoskeletal: Normal range of motion.         General: No swelling.   Skin:     General: Skin is warm and dry.   Neurological:      General: No focal deficit present.      Mental Status: He is alert and oriented to person, place, and time. Mental status is at baseline.   Psychiatric:         Mood and Affect: Mood normal.         Behavior: Behavior normal.         Thought Content: Thought content normal.         Judgment: Judgment normal.         Significant Labs:   CBC:   Recent Labs   Lab 08/23/20  0421   WBC 6.67   HGB 10.3*   HCT 34.1*   *     CMP:   Recent Labs   Lab 08/23/20  0421      K 4.6      CO2 27   GLU 83   BUN 12   CREATININE 1.0   CALCIUM 8.2*   PROT 6.4   ALBUMIN 2.0*   BILITOT 0.5   ALKPHOS 358*   *   ALT 57*   ANIONGAP 6*   EGFRNONAA >60       Significant Imaging: I have reviewed all pertinent imaging results/findings within the past 24 hours.

## 2020-08-24 NOTE — PLAN OF CARE
Plan of care reviewed with patient. Patient voiced understanding. NSR-ST on monitor. No acute distress noted. Side rails x 2, bed in lowest position, call bell within reach, pt advised to call for assistance. Maintain bed alarm for patient safety.

## 2020-08-24 NOTE — CONSULTS
Consult received for stg II buttock wound. RD already following pt. See nutrition note for details. Will continue to follow to assess nutrition status and monitor po intake    Amanda Joyner, PERNELL,LDN

## 2020-08-24 NOTE — PLAN OF CARE
The proper method of use, as well as anticipated side effects, of this aerosol treatment are discussed and demonstrated to the patient.  Patient on oxygen with documented flow.  Will attempt to wean per O2 order protocol. Will continue to monitor.

## 2020-08-24 NOTE — PLAN OF CARE
Recommendation:   1. Encourage intake of meals and Boost as tolerated.   2. RD to continue to follow to monitor po intake    Goals:   Pt intake >/= 75% EEN/EPN by RD follow up  Nutrition Goal Status: progressing towards goal  Communication of RD Recs: reviewed with RN(Daya)

## 2020-08-24 NOTE — PROGRESS NOTES
[facility] Hospital Medicine  Progress Note    Patient Name: Rajan Deluna  MRN: [id number]  Patient Class: IP- Inpatient   Admission Date: 7/21/2020  Length of Stay: 34 days  Attending Physician: Rajan Rodríguez MD  Primary Care Provider: Jason Mccord MD        Subjective:     Principal Problem:COVID-19        HPI:  Patient is a 74-year-old male with a past medical history of hypertension, COPD, MAC infection who presented to the ED with altered mental status and fever.  As per the family, patient has had altered mental status for the past 3-4 days. Family endorses increased somnolence as well as urinating on himself. EMS was called two days prior to presentation but reportedly deemed that patient did not need to be brought in. Over the previous day, reportedly the patient was found stuck, staring in his sink, not responding. On the day of presentation, the patient was found down on the floor, family wasn't able to get patient up from the floor and was reportedly not responding as much as baseline. Family believed that patient may not have been wearing baseline 2L O2. Patient also endorses a cough and generalized weakness. Patient reportedly had a recent UTI.     In the ED, chest x-ray showed diffuse interstitial patchy infiltrates concerning for possible pneumonia and a COVID-19 screening test was positive. Fever was 101° F and patient was hypotensive 80s/40s. Patient given 30cc/kg bolus. BP very soft on presentation, adequately responded to volume resuscitation and then decreased again. Labs showed a BUN 45, creatinine 2.9, , , troponin 0.036, procalcitonin 1.13.     Overview/Hospital Course:  Patient has had an extensive hospital course 2/2 COVID-related sepsis. Patient required peripheral Levophed for 1 day (upon admission) and has had stable MAPs throughout his course off pressors. Initially, patient required continuous BiPap and was stepped up to the ICU on 7/22 for 6  days. Patient also presented with a high D-dimer at 7.22 and was started on a heparin drip. CTA was contraindicated due to MARQUIS upon initial presentation. MARQUIS has improved with current Cr of 1 down from 2.9. While in the ICU, patient required supplemental O2. Patient complained of melanic diarrhea and a rectal tube was placed. Patient anxious and disoriented in the ICU. Haldol PRN for agitation in the ICU. Mental status improved in the ICU. Good UOP.    ID workup of melanic stool showed C. diff positive antigen and C. diff PCR toxin positive. Patient's melanic stool improved and rectal tube removed. He completed treatment with IV metronidazole and P.O. vancomycin for a total of 14 days, last dose 8/7. Though patient's melanic stool and watery diarrhea improved, there were concerns for a GI bleed due to an H/H of 6.9/21.5. Heparin drip was discontinued, IV protonix started, and the patient received 1 unit of packed RBCs. 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 Started on probiotics. Most recent recent H&H 9.5/37, stable.    Patient transitioned off continuous BiPAP and stepped down to the floor 7/27 with BiPAP use only at night. Continued to require supplemental O2. Tolerated advances in diet, duoneb treatments, incentive spirometry and chest physiotherapy. Tolerates PT/OT daily. Discussed self-proning due to deconditioned respiratory status but patient unable to tolerate. Off COVID isolation on 8/10, per hospital policy of 20 days negative COVID status. Patient received Levoquin for 5 days, stopped after procal <1. IV dexamthasone for 10 days. Redemsivir for 5 days.     Patient with history of aspergillosis and was on itraconazole as outpatient. WBC count increased to 20 during his course but resolved after patient completed 10 day course of IV dexamethasone.  Per ID, Voriconazole 400 mg po every 12 h X 2 loading doses, starting 7/29, then change to voriconazole 200 mg po every 12 h thereafter. Plan is to continue voriconazole and discharge on Itraconazole 100mg BID and follow up with outpatient Pulmonary and ID for continued Aspergillosis and MAC treatment.     During the patient's hospital course, he also had complaints of chest pain which resolved with a GI cocktail. Normal EKG and troponin trend. He also complained about left shoulder pain, in which the patient has a history of chronic shoulder pain that has required orthopedic surgery in the past. A shoulder x-ray showed to acute changes and the pain was managed with lidocaine patches and naproxen.    LTAC placement denied multiple times due to patient's continued need of supplemental oxygen. Patient expressed wanting to go home with possible hospice/palliative care but now is amendable to a type of rehabilitation facility. Multiple discussions about hospice/palliative care were held with patient. Discussed hospice/palliative care with daughter, patient's power of , due to patient's new baseline for his COPD 2/2 COVID. She is not agreeable to the plan and would like a type of rehabilitation facility too. Psych consulted to assess the patient's capacity and is in agreement with the primary team that the patient has the capacity to make his own medical decisions. A family meeting with palliative care is being coordinated with patient and daughter to discuss goals of care. Due to patient's extensive hospital course, the patient is deconditioned. The patient desats <88% with minimal exertion and supplemental O2 requirement increases during these episodes. Able to wean the patient down to 4L HFNC thus far from a peak requirement of 30L 70% on vapotherm. Patient's daughter decided to release being Power of .     8/23 Patient on 9L High flow oxygen. Spoke with pulmonology briefly about patient who suggested  a CT might be beneficial in identifying a possible correctable underlying etiology. Patient refused CT and stated that he just wants to go home.       Interval History: NAEON. Patient assented to CT chest to assess for further pathology.    Review of Systems   Constitutional: Negative for activity change, appetite change, chills and fatigue.   HENT: Negative for congestion and dental problem.    Eyes: Negative for discharge and itching.   Respiratory: Negative for cough and shortness of breath.    Cardiovascular: Negative for chest pain.   Gastrointestinal: Negative for abdominal pain, blood in stool, diarrhea and nausea.   Endocrine: Negative for cold intolerance and heat intolerance.   Genitourinary: Negative for difficulty urinating.   Musculoskeletal: Negative for arthralgias, back pain and gait problem.   Skin: Negative.  Negative for color change and pallor.   Neurological: Negative for dizziness, facial asymmetry, light-headedness and headaches.   Psychiatric/Behavioral: The patient is not nervous/anxious.      Objective:     Vital Signs (Most Recent):  Temp: 97.4 °F (36.3 °C) (08/24/20 0806)  Pulse: 91 (08/24/20 0806)  Resp: 18 (08/24/20 0806)  BP: (!) 121/56 (08/24/20 0806)  SpO2: (!) 93 % (08/24/20 0806) Vital Signs (24h Range):  Temp:  [97.4 °F (36.3 °C)-98.1 °F (36.7 °C)] 97.4 °F (36.3 °C)  Pulse:  [] 91  Resp:  [18-24] 18  SpO2:  [89 %-93 %] 93 %  BP: (105-121)/(56-68) 121/56     Weight: 77.1 kg (169 lb 15.6 oz)  Body mass index is 23.71 kg/m².    Intake/Output Summary (Last 24 hours) at 8/24/2020 1051  Last data filed at 8/24/2020 0602  Gross per 24 hour   Intake 120 ml   Output 516 ml   Net -396 ml      Physical Exam  Vitals signs and nursing note reviewed.   Constitutional:       General: He is not in acute distress.     Appearance: Normal appearance. He is not ill-appearing.   HENT:      Head: Normocephalic and atraumatic.      Right Ear: External ear normal.      Left Ear: External ear  normal.      Nose: Nose normal. No congestion or rhinorrhea.      Mouth/Throat:      Mouth: Mucous membranes are moist.      Pharynx: Oropharynx is clear.   Eyes:      Pupils: Pupils are equal, round, and reactive to light.   Neck:      Musculoskeletal: Normal range of motion and neck supple.   Cardiovascular:      Rate and Rhythm: Normal rate and regular rhythm.      Pulses: Normal pulses.      Heart sounds: Normal heart sounds.   Pulmonary:      Effort: Pulmonary effort is normal.      Breath sounds: No wheezing.      Comments: Wheezes this AM, likely COPD. O2 at 6L HFNC  Abdominal:      General: Abdomen is flat. There is no distension.      Palpations: Abdomen is soft.      Tenderness: There is no abdominal tenderness. There is no guarding.   Musculoskeletal: Normal range of motion.         General: No swelling.   Skin:     General: Skin is warm and dry.   Neurological:      General: No focal deficit present.      Mental Status: He is alert and oriented to person, place, and time. Mental status is at baseline.   Psychiatric:         Mood and Affect: Mood normal.         Behavior: Behavior normal.         Thought Content: Thought content normal.         Judgment: Judgment normal.         Significant Labs:   CBC:   Recent Labs   Lab 08/23/20  0421   WBC 6.67   HGB 10.3*   HCT 34.1*   *     CMP:   Recent Labs   Lab 08/23/20  0421      K 4.6      CO2 27   GLU 83   BUN 12   CREATININE 1.0   CALCIUM 8.2*   PROT 6.4   ALBUMIN 2.0*   BILITOT 0.5   ALKPHOS 358*   *   ALT 57*   ANIONGAP 6*   EGFRNONAA >60       Significant Imaging: I have reviewed all pertinent imaging results/findings within the past 24 hours.      Assessment/Plan:      * COVID-19  -Lifted Isolation 2 weeks ago  -Continuing to wean O2  -Deconditioning and long illness has led to increased O2 requirement, continuing to wean and work with PT/OT, RT with goal of SNF (5L NC max)  - Encourage BiPap QHS    Goals of care,  counseling/discussion  -Patient has capacity  -Per family, patient cannot return home as wife too weak to help him and son works full time  -Will continue to work toward discharge planning    Left shoulder pain  Naproxen scheduled BID  Continue lidocaine patch, tylenol for pain  U/S was negative for any gallbladder outlet obstruction, cholecystitis, etc        History of Aspergillus & MAC  Continuing voriconazole, switch to itroconazole at DC   -Follow up with Pulm, ID outpatient  -Due to patient length of stay, will reevaluate    Hypotension  Patient's most recent blood pressure 116/75  RESOLVED      Sepsis  RESOLVED  See plan for COVID 19    COPD (chronic obstructive pulmonary disease)  On HFNC 6L  Continue Montelukast, Flovent  Continue to wean  Goal of 5L NC  Patient to sit up 2x daily in chair  Mild wheezes present   Continue albuterol and chest physio/IS  -Monitor for progress    -CT chest pending to evaluate for any further pathologies        Anemia  Asymptomatic  H/H stable at 10.3/34.1 yesterday  Labs QUOD  STABLE      VTE Risk Mitigation (From admission, onward)         Ordered     enoxaparin injection 80 mg  Every 12 hours (non-standard times)      08/04/20 1421     Place sequential compression device  Until discontinued      07/21/20 2142     IP VTE HIGH RISK PATIENT  Once      07/21/20 2142                Discharge Planning   ADAM:      Code Status: Full Code   Is the patient medically ready for discharge?:     Reason for patient still in hospital (select all that apply): Patient unstable and Patient trending condition  Discharge Plan A: Long-term acute care facility (LTAC)   Discharge Delays: None known at this time              Emeli Adam MD   [employer] PGY2  Department of Hospital Medicine   [facility]

## 2020-08-24 NOTE — PLAN OF CARE
Patient on oxygen with HFNC with documented flow.  Will attempt to wean per O2 order protocol. Pt stated he will attempt to wear Bipap tonight.

## 2020-08-24 NOTE — PROGRESS NOTES
"[facility]  Adult Nutrition  Progress Note    SUMMARY       Recommendations    Recommendation:   1. Encourage intake of meals and Boost as tolerated.   2. RD to continue to follow to monitor po intake    Goals:   Pt intake >/= 75% EEN/EPN by RD follow up  Nutrition Goal Status: progressing towards goal  Communication of RD Recs: reviewed with RN(Daya)    Reason for Assessment  Reason For Assessment: RD follow-up  Diagnosis: other (see comments)(Acute respiratory disease due to COVID-19 virus )  Relevant Medical History: HTN, COPD, former smoker  Interdisciplinary Rounds: did not attend  General Information Comments: Pt on 2000 ADA low Na diet with Boost Glucose Control. Pt reports fair-good intake at meals and drinking some Boost. COVID isolation lifted. NFPE completed today 8/23-moderate temple wasting and mild hand wasting.  Nutrition Discharge Planning: d/c on cardiac diabetic diet    Nutrition Risk Screen  Nutrition Risk Screen: no indicators present    Nutrition/Diet History  Food Preferences: no Shinto or cultural food prefs identified  Spiritual, Cultural Beliefs, Anabaptist Practices, Values that Affect Care: no  Food Allergies: NKFA  Factors Affecting Nutritional Intake: None identified at this time    Anthropometrics  Temp: 97.4 °F (36.3 °C)  Height Method: Stated  Height: 5' 11" (180.3 cm)  Height (inches): 71 in  Weight Method: Bed Scale  Weight: 74.4 kg (164 lb 0.4 oz)  Weight (lb): 164.02 lb  Ideal Body Weight (IBW), Male: 172 lb  % Ideal Body Weight, Male (lb): 106.38 %  BMI (Calculated): 22.9  BMI Grade: 25 - 29.9 - overweight     Lab/Procedures/Meds  Pertinent Labs Reviewed: reviewed  Pertinent Labs Comments: Ca 8.2L, Alb 2.0L  Pertinent Medications Reviewed: reviewed  Pertinent Medications Comments: lactobacillus, MVI, pantoprazole    Estimated/Assessed Needs  Weight Used For Calorie Calculations: 74.4 kg (164 lb 0.4 oz)  Energy Calorie Requirements (kcal): 2232 (30 " kcal/kg)  Energy Need Method: Kcal/kg  Protein Requirements: 74g (1.0g/kg)  Weight Used For Protein Calculations: 74.4 kg (164 lb 0.4 oz)     Estimated Fluid Requirement Method: RDA Method  RDA Method (mL): 2232  CHO Requirement: 225g    Nutrition Prescription Ordered  Current Diet Order: 2000 ADA low Na  Oral Nutrition Supplement: Boost Glucose Control    Evaluation of Received Nutrient/Fluid Intake  I/O: 0/845  Energy Calories Required: meeting needs  Protein Required: meeting needs  Fluid Required: meeting needs  Comments: LBM 8/22  Tolerance: tolerating  % Intake of Estimated Energy Needs: 50 - 75 %  % Meal Intake: 50 - 75 %    Nutrition Risk  Level of Risk/Frequency of Follow-up: (2 x/week)     Assessment and Plan    Acute respiratory disease due to COVID-19 virus  Contributing Nutrition Diagnosis  Inadequate oral intake    Related to (etiology):   physiological state    Signs and Symptoms (as evidenced by):   Pt COVID+ needing continuous respiratory support    Interventions:  Collaboration with other providers  Commercial Beverage: Boost Glucose Control TID to supplement protein and calorie intake      Nutrition Diagnosis Status:   Improving      Monitor and Evaluation  Food and Nutrient Intake: energy intake, food and beverage intake  Food and Nutrient Adminstration: diet order  Knowledge/Beliefs/Attitudes: food and nutrition knowledge/skill  Physical Activity and Function: nutrition-related ADLs and IADLs  Anthropometric Measurements: weight, weight change, body mass index  Biochemical Data, Medical Tests and Procedures: electrolyte and renal panel, gastrointestinal profile, glucose/endocrine profile, inflammatory profile, lipid profile     Malnutrition Assessment  [place] (Muscle Loss): moderate depletion  [place] (Muscle Loss): mild depletion   Subcutaneous Fat Loss (Final Summary): well nourished  Muscle Loss Evaluation (Final Summary): mild protein-calorie malnutrition       Nutrition  Follow-Up  RD Follow-up?: Yes

## 2020-08-24 NOTE — ASSESSMENT & PLAN NOTE
On HFNC 6L  Continue Montelukast, Flovent  Continue to wean  Goal of 5L NC  Patient to sit up 2x daily in chair  Mild wheezes present   Continue albuterol and chest physio/IS  -Monitor for progress    -CT chest pending to evaluate for any further pathologies

## 2020-08-24 NOTE — PLAN OF CARE
Plan of care reviewed with patient, understanding verbalized.  SR/ST on tele. Repositioned every 2 hours.  Bed alarm on, call light in reach, fall precautions in place.

## 2020-08-24 NOTE — PROGRESS NOTES
Attempted to place pt on 35% venti mask to alleviate pts nose bleeding as well as speed up the weaning process. Pt sats remained stable but pt did not want to wear it. Placed pt back on HFNC at 6L. Pt in no resp distress. Pt continues to refuse Bipap at night. Nurse notified.

## 2020-08-24 NOTE — PT/OT/SLP PROGRESS
"Physical Therapy Treatment    Patient Name:  Rajan Deluna   MRN:  [id number]    Recommendations:     Discharge Recommendations:  nursing facility, skilled   Discharge Equipment Recommendations: walker, rolling   Barriers to discharge: decreased caregiver support, decreased O2 sats with functional mobility    Assessment:     Rajan Deluna is a 74 y.o. male admitted with a medical diagnosis of COVID-19.  He presents with the following impairments/functional limitations:  weakness, impaired endurance, impaired self care skills, impaired functional mobilty, gait instability, impaired balance, decreased upper extremity function, decreased lower extremity function, decreased ROM, impaired cardiopulmonary response to activity.  Pt would continue to benefit from P.T. To address impairments listed above.  .    Rehab Prognosis: Fair; patient would benefit from acute skilled PT services to address these deficits and reach maximum level of function.    Recent Surgery: Procedure(s) (LRB):  IMAGING PROCEDURE, GI TRACT, INTRALUMINAL, VIA CAPSULE (N/A) 21 Days Post-Op    Plan:     During this hospitalization, patient to be seen 6 x/week to address the identified rehab impairments via gait training, therapeutic activities, therapeutic exercises, wheelchair management/training and progress toward the following goals:    · Plan of Care Expires:  09/21/20    Subjective     Chief Complaint: fatigue  Patient/Family Comments/goals: Pt agreed to tx in bed.  "I got up earlier, and I am tired right now."  Pain/Comfort:  · Pain Rating 1: 0/10  · Pain Rating Post-Intervention 1: 0/10      Objective:     Communicated with RN (Madonna) prior to session.  Patient found supine with bed alarm, telemetry, oxygen, pulse ox (continuous) upon PT entry to room.     General Precautions: Standard, fall, respiratory   Orthopedic Precautions:N/A   Braces:       Functional Mobility:  · Bed Mobility:     · Scooting: maximal assistance, of 2 up to HOB using " drawsheet       AM-PAC 6 CLICK MOBILITY  Turning over in bed (including adjusting bedclothes, sheets and blankets)?: 4  Sitting down on and standing up from a chair with arms (e.g., wheelchair, bedside commode, etc.): 3  Moving from lying on back to sitting on the side of the bed?: 3  Moving to and from a bed to a chair (including a wheelchair)?: 3  Need to walk in hospital room?: 3  Climbing 3-5 steps with a railing?: 1  Basic Mobility Total Score: 17       Therapeutic Activities and Exercises:   Pt was on 6lpm HFO@ NC upon entering the room with O2 sats at 93%.  Supine BLE therex with assist(Maurice/CGA): AP, heelslides, hip ABD/ADD, SAQs 10 reps x 2.  Pt's O2 sats dropped to 85% after performing first two exercises listed and did not increased above 86%  After 3 mins with PLB on HFO2 @ 6lpm after.  PTA increased O2 to 7lpm with increase to 87% and then increased O2 to 8lpm and O2 sats returned to 90% after ~2 mins and performing bouts of PLB.  Pt continued the remainder of his LE exercises on 8lpm with O2 sats ranging from 88% to 90% with rest breaks and PLB.  Pt also instructed on energy conservation techniques to assist in keeping O2 sats from dropping with exertion.  O2 lowered back to 6lpm after tx with sats at 88%.  RN (Madonna) notified.  Pt was assisted with scooting up to HOB x 2 and positioning for comfort as well as pressure relief to avoid pressure ulcers.  LEs elevated on pillow with heels floating off, BUEs elevate on pillows for improved positioning of shoulders and for comfort.      Patient left supine with all lines intact, call button in reach, bed alarm on and RN notified..    GOALS:   Multidisciplinary Problems     Physical Therapy Goals        Problem: Physical Therapy Goal    Goal Priority Disciplines Outcome Goal Variances Interventions   Physical Therapy Goal     PT, PT/OT Ongoing, Progressing     Description: Goals to be met by: 8/27/20     Patient will increase functional independence with  mobility by performin. Supine <> sit with MInimal Assistance   Met   Revised  supine<>sit MOD I  2. Sit to stand transfer with Minimal Assistance   Met   Revised sit to stand with supervision  3. Bed to chair transfer with Minimal Assistance   Met   Revised bed to chair transfer with supervision  4. Gait x 50 ft with min A with appropriate AD  5. Lower extremity exercise program x 10 reps per handout, with supervision  Met 8/15  Revised Lower extremity exercise program x 20 reps per handout, with supervision                     Time Tracking:     PT Received On: 20  PT Start Time: 1624     PT Stop Time: 1647  PT Total Time (min): 23 min     Billable Minutes: Therapeutic Activity 10 and Therapeutic Exercise 13    Treatment Type: Treatment  PT/PTA: PTA     PTA Visit Number: 2     Dee Urena PTA  2020

## 2020-08-24 NOTE — PLAN OF CARE
Problem: Occupational Therapy Goal  Goal: Occupational Therapy Goal  Description: Goals to be met by: 8/27/20     Patient will increase functional independence with ADLs by performing:    LE Dressing with Stand-by Assistance.  Grooming while standing with Stand-by Assistance.  Toileting from toilet with Stand-by Assistance for hygiene and clothing management.   Supine to sit with Stand-by Assistance.  MET 8/24  Step transfer with Stand-by Assistance  MET 8/24  Toilet transfer to toilet with Stand-by Assistance.  Upper extremity exercise program x10 reps per handout, with independence.    Outcome: Ongoing, Progressing    Rajan Deluna is a 74 y.o. male with a medical diagnosis of COVID-19.   Performance deficits affecting function are weakness, impaired endurance, impaired self care skills, impaired functional mobilty, gait instability, impaired balance, decreased upper extremity function, decreased lower extremity function, decreased safety awareness, pain, impaired skin, impaired cardiopulmonary response to activity. Pt found in bed, agreeable to transfer to chair only with mod encouragement.  Pt reports he did not sleep at all last night secondary to having a lot on his mind.  Pt continues to desat with min exertion.  RN present for transfer to adjust O2 as needed.  Pt did require an increase in oxygen during session.  See RN note for details. Continue OT services to address functional goals, progressing as able.      TED Reyna

## 2020-08-24 NOTE — PLAN OF CARE
Patient stale. AAOx4. VSS. Plan of care reviewed with patient. Verbal reported of understanding. NSR to ST on tele monitoring with no red alarms noted. HR 90s to 100s. Safety and fall precaution maintained. Turned every two hours as tolerated. Patient still on O2 8L of high flow via NC and O2 sats  90 to 92% at this time.  Due meds given per ordered. Blood glucose closely monitored. No acute distress noted. Bed in lowest position. Bed alarm on. Head of bed elevated.  Side rails x 2 are up. Call bell with in reach. Patient will be monitored overnight.

## 2020-08-24 NOTE — PT/OT/SLP PROGRESS
Occupational Therapy   Treatment    Name: Rajan Deluna  MRN: [id number]  Admitting Diagnosis:  COVID-19  21 Days Post-Op    Recommendations:     Discharge Recommendations: nursing facility, skilled  Discharge Equipment Recommendations:  walker, rolling  Barriers to discharge:  Inaccessible home environment, Decreased caregiver support    Assessment:     Rajan Deluna is a 74 y.o. male with a medical diagnosis of COVID-19.   Performance deficits affecting function are weakness, impaired endurance, impaired self care skills, impaired functional mobilty, gait instability, impaired balance, decreased upper extremity function, decreased lower extremity function, decreased safety awareness, pain, impaired skin, impaired cardiopulmonary response to activity. Pt found in bed, agreeable to transfer to chair only with mod encouragement.  Pt reports he did not sleep at all last night secondary to having a lot on his mind.  Pt continues to desat with min exertion.  RN present for transfer to adjust O2 as needed.  Pt did require an increase in oxygen during session.  See RN note for details. Continue OT services to address functional goals, progressing as able.      Rehab Prognosis:  Fair; patient would benefit from acute skilled OT services to address these deficits and reach maximum level of function.       Plan:     Patient to be seen 5 x/week to address the above listed problems via self-care/home management, therapeutic activities, therapeutic exercises  · Plan of Care Expires: 08/27/20  · Plan of Care Reviewed with: patient    Subjective     Pain/Comfort:  · Pain Rating 1: 0/10  · Pain Rating Post-Intervention 1: 0/10    Objective:     Communicated with: RN prior to session.  Patient found HOB elevated with bed alarm, oxygen, pulse ox (continuous), peripheral IV upon OT entry to room.    General Precautions: Standard, fall, respiratory   Orthopedic Precautions:N/A   Braces: N/A     Occupational Performance:     Bed  Mobility:    · Patient completed Rolling/Turning to Left with  stand by assistance using bed rail.   · Patient completed Scooting/Bridging with stand by assistance to scoot seated to EOB  · Patient completed Supine to Sit with stand by assistance and HOB elevated, increased time and effort, vc's for effective technique    Functional Mobility/Transfers:  · Patient completed Sit <> Stand Transfer with stand by assistance  with  no assistive device   · Patient completed Bed <> Chair Transfer using Stand Pivot technique with stand by assistance with no assistive device  · Functional Mobility: Pt took 2 steps during transfer with SBA without AE.     Activities of Daily Living:  · Declined      [facility] 6 Click ADL: 16    Treatment & Education:  Pt sat EOB x ~6 min with SBA while performing PLB technique to increase O2 sats from low 80's.  RN present and increased O2.  O2 sats 89% when left up in chair. RN ok'ed.   Pt declined UE therex. Requesting to try and take nap.      Patient left up in chair with all lines intact, call button in reach, chair alarm on and RN notifiedEducation:      GOALS:   Multidisciplinary Problems     Occupational Therapy Goals        Problem: Occupational Therapy Goal    Goal Priority Disciplines Outcome Interventions   Occupational Therapy Goal     OT, PT/OT Ongoing, Progressing    Description: Goals to be met by: 8/27/20     Patient will increase functional independence with ADLs by performing:    LE Dressing with Stand-by Assistance.  Grooming while standing with Stand-by Assistance.  Toileting from toilet with Stand-by Assistance for hygiene and clothing management.   Supine to sit with Stand-by Assistance.  Step transfer with Stand-by Assistance  Toilet transfer to toilet with Stand-by Assistance.  Upper extremity exercise program x10 reps per handout, with independence.                     Time Tracking:     OT Date of Treatment: 08/24/20  OT Start Time: 1053  OT Stop Time: 1110  OT Total  Time (min): 17 min    Billable Minutes:Therapeutic Activity 17    TED Reyna  8/24/2020

## 2020-08-25 LAB
ALBUMIN SERPL BCP-MCNC: 2.1 G/DL (ref 3.5–5.2)
ALBUMIN SERPL BCP-MCNC: 2.1 G/DL (ref 3.5–5.2)
ALP SERPL-CCNC: 349 U/L (ref 55–135)
ALP SERPL-CCNC: 368 U/L (ref 55–135)
ALT SERPL W/O P-5'-P-CCNC: 49 U/L (ref 10–44)
ALT SERPL W/O P-5'-P-CCNC: 54 U/L (ref 10–44)
ANION GAP SERPL CALC-SCNC: 6 MMOL/L (ref 8–16)
ANION GAP SERPL CALC-SCNC: 6 MMOL/L (ref 8–16)
AST SERPL-CCNC: 101 U/L (ref 10–40)
AST SERPL-CCNC: 113 U/L (ref 10–40)
BASOPHILS # BLD AUTO: 0.08 K/UL (ref 0–0.2)
BASOPHILS NFR BLD: 0.9 % (ref 0–1.9)
BILIRUB SERPL-MCNC: 0.5 MG/DL (ref 0.1–1)
BILIRUB SERPL-MCNC: 0.5 MG/DL (ref 0.1–1)
BUN SERPL-MCNC: 12 MG/DL (ref 8–23)
BUN SERPL-MCNC: 13 MG/DL (ref 8–23)
CALCIUM SERPL-MCNC: 8.2 MG/DL (ref 8.7–10.5)
CALCIUM SERPL-MCNC: 8.5 MG/DL (ref 8.7–10.5)
CHLORIDE SERPL-SCNC: 102 MMOL/L (ref 95–110)
CHLORIDE SERPL-SCNC: 102 MMOL/L (ref 95–110)
CO2 SERPL-SCNC: 26 MMOL/L (ref 23–29)
CO2 SERPL-SCNC: 27 MMOL/L (ref 23–29)
CREAT SERPL-MCNC: 0.8 MG/DL (ref 0.5–1.4)
CREAT SERPL-MCNC: 1 MG/DL (ref 0.5–1.4)
DIFFERENTIAL METHOD: ABNORMAL
EOSINOPHIL # BLD AUTO: 0.1 K/UL (ref 0–0.5)
EOSINOPHIL NFR BLD: 1.4 % (ref 0–8)
ERYTHROCYTE [DISTWIDTH] IN BLOOD BY AUTOMATED COUNT: 16.4 % (ref 11.5–14.5)
EST. GFR  (AFRICAN AMERICAN): >60 ML/MIN/1.73 M^2
EST. GFR  (AFRICAN AMERICAN): >60 ML/MIN/1.73 M^2
EST. GFR  (NON AFRICAN AMERICAN): >60 ML/MIN/1.73 M^2
EST. GFR  (NON AFRICAN AMERICAN): >60 ML/MIN/1.73 M^2
FUNGUS SPEC CULT: ABNORMAL
GLUCOSE SERPL-MCNC: 100 MG/DL (ref 70–110)
GLUCOSE SERPL-MCNC: 80 MG/DL (ref 70–110)
HCT VFR BLD AUTO: 33.8 % (ref 40–54)
HGB BLD-MCNC: 10.3 G/DL (ref 14–18)
IMM GRANULOCYTES # BLD AUTO: 0.07 K/UL (ref 0–0.04)
IMM GRANULOCYTES NFR BLD AUTO: 0.8 % (ref 0–0.5)
LYMPHOCYTES # BLD AUTO: 2.2 K/UL (ref 1–4.8)
LYMPHOCYTES NFR BLD: 24.1 % (ref 18–48)
MAGNESIUM SERPL-MCNC: 2.2 MG/DL (ref 1.6–2.6)
MCH RBC QN AUTO: 28.8 PG (ref 27–31)
MCHC RBC AUTO-ENTMCNC: 30.5 G/DL (ref 32–36)
MCV RBC AUTO: 94 FL (ref 82–98)
MONOCYTES # BLD AUTO: 1.1 K/UL (ref 0.3–1)
MONOCYTES NFR BLD: 11.6 % (ref 4–15)
NEUTROPHILS # BLD AUTO: 5.6 K/UL (ref 1.8–7.7)
NEUTROPHILS NFR BLD: 61.2 % (ref 38–73)
NRBC BLD-RTO: 0 /100 WBC
PHOSPHATE SERPL-MCNC: 2.8 MG/DL (ref 2.7–4.5)
PLATELET # BLD AUTO: 594 K/UL (ref 150–350)
PMV BLD AUTO: 9.2 FL (ref 9.2–12.9)
POCT GLUCOSE: 116 MG/DL (ref 70–110)
POCT GLUCOSE: 117 MG/DL (ref 70–110)
POCT GLUCOSE: 140 MG/DL (ref 70–110)
POCT GLUCOSE: 90 MG/DL (ref 70–110)
POTASSIUM SERPL-SCNC: 4.7 MMOL/L (ref 3.5–5.1)
POTASSIUM SERPL-SCNC: 5.4 MMOL/L (ref 3.5–5.1)
PROT SERPL-MCNC: 6.5 G/DL (ref 6–8.4)
PROT SERPL-MCNC: 6.7 G/DL (ref 6–8.4)
RBC # BLD AUTO: 3.58 M/UL (ref 4.6–6.2)
SODIUM SERPL-SCNC: 134 MMOL/L (ref 136–145)
SODIUM SERPL-SCNC: 135 MMOL/L (ref 136–145)
WBC # BLD AUTO: 9.2 K/UL (ref 3.9–12.7)

## 2020-08-25 PROCEDURE — 94799 UNLISTED PULMONARY SVC/PX: CPT

## 2020-08-25 PROCEDURE — 27000221 HC OXYGEN, UP TO 24 HOURS

## 2020-08-25 PROCEDURE — 25000003 PHARM REV CODE 250: Performed by: FAMILY MEDICINE

## 2020-08-25 PROCEDURE — 83735 ASSAY OF MAGNESIUM: CPT

## 2020-08-25 PROCEDURE — 63600175 PHARM REV CODE 636 W HCPCS: Performed by: FAMILY MEDICINE

## 2020-08-25 PROCEDURE — 94660 CPAP INITIATION&MGMT: CPT

## 2020-08-25 PROCEDURE — 25000003 PHARM REV CODE 250: Performed by: STUDENT IN AN ORGANIZED HEALTH CARE EDUCATION/TRAINING PROGRAM

## 2020-08-25 PROCEDURE — 25000003 PHARM REV CODE 250: Performed by: INTERNAL MEDICINE

## 2020-08-25 PROCEDURE — 94640 AIRWAY INHALATION TREATMENT: CPT

## 2020-08-25 PROCEDURE — 80053 COMPREHEN METABOLIC PANEL: CPT | Mod: 91

## 2020-08-25 PROCEDURE — 84100 ASSAY OF PHOSPHORUS: CPT

## 2020-08-25 PROCEDURE — 11000001 HC ACUTE MED/SURG PRIVATE ROOM

## 2020-08-25 PROCEDURE — 99900035 HC TECH TIME PER 15 MIN (STAT)

## 2020-08-25 PROCEDURE — 97530 THERAPEUTIC ACTIVITIES: CPT | Mod: CQ

## 2020-08-25 PROCEDURE — 85025 COMPLETE CBC W/AUTO DIFF WBC: CPT

## 2020-08-25 PROCEDURE — 25000242 PHARM REV CODE 250 ALT 637 W/ HCPCS: Performed by: STUDENT IN AN ORGANIZED HEALTH CARE EDUCATION/TRAINING PROGRAM

## 2020-08-25 PROCEDURE — 94761 N-INVAS EAR/PLS OXIMETRY MLT: CPT

## 2020-08-25 PROCEDURE — 36415 COLL VENOUS BLD VENIPUNCTURE: CPT

## 2020-08-25 PROCEDURE — 94664 DEMO&/EVAL PT USE INHALER: CPT

## 2020-08-25 PROCEDURE — 27100171 HC OXYGEN HIGH FLOW UP TO 24 HOURS

## 2020-08-25 RX ADMIN — THERA TABS 1 TABLET: TAB at 08:08

## 2020-08-25 RX ADMIN — POTASSIUM & SODIUM PHOSPHATES POWDER PACK 280-160-250 MG 1 PACKET: 280-160-250 PACK at 05:08

## 2020-08-25 RX ADMIN — FLUTICASONE PROPIONATE 1 PUFF: 110 AEROSOL, METERED RESPIRATORY (INHALATION) at 07:08

## 2020-08-25 RX ADMIN — IPRATROPIUM BROMIDE AND ALBUTEROL SULFATE 3 ML: 2.5; .5 SOLUTION RESPIRATORY (INHALATION) at 11:08

## 2020-08-25 RX ADMIN — IPRATROPIUM BROMIDE AND ALBUTEROL SULFATE 3 ML: 2.5; .5 SOLUTION RESPIRATORY (INHALATION) at 03:08

## 2020-08-25 RX ADMIN — PANTOPRAZOLE SODIUM 40 MG: 40 TABLET, DELAYED RELEASE ORAL at 08:08

## 2020-08-25 RX ADMIN — NAPROXEN 500 MG: 500 TABLET ORAL at 05:08

## 2020-08-25 RX ADMIN — TAMSULOSIN HYDROCHLORIDE 0.4 MG: 0.4 CAPSULE ORAL at 08:08

## 2020-08-25 RX ADMIN — POTASSIUM & SODIUM PHOSPHATES POWDER PACK 280-160-250 MG 1 PACKET: 280-160-250 PACK at 12:08

## 2020-08-25 RX ADMIN — STANDARDIZED SENNA CONCENTRATE AND DOCUSATE SODIUM 1 TABLET: 8.6; 5 TABLET ORAL at 09:08

## 2020-08-25 RX ADMIN — ENOXAPARIN SODIUM 80 MG: 80 INJECTION SUBCUTANEOUS at 05:08

## 2020-08-25 RX ADMIN — PANTOPRAZOLE SODIUM 40 MG: 40 TABLET, DELAYED RELEASE ORAL at 09:08

## 2020-08-25 RX ADMIN — Medication 4 TABLET: at 05:08

## 2020-08-25 RX ADMIN — IPRATROPIUM BROMIDE AND ALBUTEROL SULFATE 3 ML: 2.5; .5 SOLUTION RESPIRATORY (INHALATION) at 07:08

## 2020-08-25 RX ADMIN — LIDOCAINE 2 PATCH: 50 PATCH TOPICAL at 05:08

## 2020-08-25 RX ADMIN — NAPROXEN 500 MG: 500 TABLET ORAL at 08:08

## 2020-08-25 RX ADMIN — MONTELUKAST 10 MG: 10 TABLET, FILM COATED ORAL at 09:08

## 2020-08-25 RX ADMIN — VORICONAZOLE 200 MG: 200 TABLET, FILM COATED ORAL at 03:08

## 2020-08-25 RX ADMIN — VORICONAZOLE 200 MG: 200 TABLET, FILM COATED ORAL at 05:08

## 2020-08-25 NOTE — PT/OT/SLP PROGRESS
Physical Therapy Treatment    Patient Name:  Rajan Deluna   MRN:  [id number]    Recommendations:     Discharge Recommendations:  nursing facility, skilled   Discharge Equipment Recommendations: walker, rolling   Barriers to discharge: Decreased caregiver support and decreased O2 sats with functional mobility     Assessment:     Rajan Deluna is a 74 y.o. male admitted with a medical diagnosis of COVID-19.  He presents with the following impairments/functional limitations:  weakness, impaired endurance, impaired functional mobilty, impaired self care skills, gait instability, impaired balance, decreased lower extremity function, decreased upper extremity function, decreased ROM, impaired cardiopulmonary response to activity.  Pt would continue to benefit from P.T. To address impairments listed above.  .    Rehab Prognosis: Fair; patient would benefit from acute skilled PT services to address these deficits and reach maximum level of function.    Recent Surgery: Procedure(s) (LRB):  IMAGING PROCEDURE, GI TRACT, INTRALUMINAL, VIA CAPSULE (N/A) 22 Days Post-Op    Plan:     During this hospitalization, patient to be seen 6 x/week to address the identified rehab impairments via gait training, therapeutic activities, therapeutic exercises, wheelchair management/training and progress toward the following goals:    · Plan of Care Expires:  09/21/20    Subjective     Patient/Family Comments/goals: Pt agreed to tx.  Pain/Comfort:  · Pain Rating 1: 0/10  · Pain Rating Post-Intervention 1: 0/10      Objective:     Communicated with RN (Madonna) prior to session.  Patient found supine with bed alarm, telemetry, oxygen, pulse ox (continuous) upon PT entry to room.     General Precautions: Standard, fall, respiratory   Orthopedic Precautions:N/A   Braces:       Functional Mobility:  · Bed Mobility:     · Rolling Left:  supervision  · Scooting: supervision and to EOB and back into chair.  · Supine to Sit: stand by  assistance  · Transfers:     · Sit to Stand:  contact guard assistance with hand-held assist  · Bed to Chair: minimum assistance with  hand-held assist  using  Step Transfer  · Gait: 4 small steps from EOB to b/s chair with close CGA and PTA facing pt and pt holding onto PTA's arms.  No LOB.  · Balance: sitting good-, standing fair RW, gait fair Rw      AM-PAC 6 CLICK MOBILITY  Turning over in bed (including adjusting bedclothes, sheets and blankets)?: 4  Sitting down on and standing up from a chair with arms (e.g., wheelchair, bedside commode, etc.): 3  Moving from lying on back to sitting on the side of the bed?: 3  Moving to and from a bed to a chair (including a wheelchair)?: 3  Need to walk in hospital room?: 3  Climbing 3-5 steps with a railing?: 1  Basic Mobility Total Score: 17       Therapeutic Activities and Exercises:  Pt on wall unit O2HF @ 6lpm NC upon entering the room with O2 sats 90-91% at rest.  Pt sat EOB with O2 sats dropping to 82% with transfer. PTA increased O2 to 8LPM (per RN with activity).  Pt sat EOB with O2 sats increasing to 88% after ~ 3 mins and instructed in/performing  PLB.   Seated BLE therex: APx 15, LAQ x 10 reps with pt instructed in performing slowly for energy conservation with rest breaks between bouts secondary to O2 sats dropping to 83-85%.  Pt again instructed in PLB with PTA monitoring O2 sats.  O2@ sats gradually increased to 88-89%.  Transfer EOB > b/s chair as above with O2 sats dropping to 81%.  Pt received a phone call from his wife immediately after transfer and spoke less than 30 secs to her with O2 sats dropping to 73%.  Pt got off the phone and was instructed in PLB with sats increasing to 82% after ~2 mins, but did not increase any further after ~3 to 4 mins.  O2 increased to 9lpm to assist pt with decreasing SOB and O2 sats increasing.  O2 sats increased slowly to 88% after ~ 4 mins with PLB.  Wall O2 was lowered back to 8lpm and sats remained 88%.  RN (Madonna)  notified of above and stated she would check on pt's sats and adjust O2 accordingly.    Patient left up in chair with all lines intact, call button in reach, chair alarm on and RN notified..    GOALS:   Multidisciplinary Problems     Physical Therapy Goals        Problem: Physical Therapy Goal    Goal Priority Disciplines Outcome Goal Variances Interventions   Physical Therapy Goal     PT, PT/OT Ongoing, Progressing     Description: Goals to be met by: 20     Patient will increase functional independence with mobility by performin. Supine <> sit with MInimal Assistance   Met   Revised  supine<>sit MOD I  2. Sit to stand transfer with Minimal Assistance   Met   Revised sit to stand with supervision  3. Bed to chair transfer with Minimal Assistance   Met   Revised bed to chair transfer with supervision  4. Gait x 50 ft with min A with appropriate AD  5. Lower extremity exercise program x 10 reps per handout, with supervision  Met 8/15  Revised Lower extremity exercise program x 20 reps per handout, with supervision                     Time Tracking:     PT Received On: 20  PT Start Time: 1139     PT Stop Time: 1203  PT Total Time (min): 24 min     Billable Minutes: Therapeutic Activity 30    Treatment Type: Treatment  PT/PTA: PTA     PTA Visit Number: 3     Dee Urena PTA  2020

## 2020-08-25 NOTE — PLAN OF CARE
I called Ariana with [employer] and she says she has not spoken with Dianne yet about removing the request for auth.  I asked that they get this done before the morning.  She says she is calling Dianne now.       08/25/20 1602   Post-Acute Status   Post-Acute Authorization Placement     Phong Suh RN, CM  [phone]

## 2020-08-25 NOTE — PT/OT/SLP PROGRESS
Occupational Therapy      Patient Name:  Rajan Deluna   MRN:  [id number]    Patient not seen today secondary to Patient unwilling to participate(AM 1038/1108: 1st attempt pt getting bed bath then 2nd attempt pt declined secondary anxious to wanting to talk to MD and TN about discharging to a rehab.  PM 1240: Pt in CT.). Will follow-up at later time.    TED Reyna  8/25/2020

## 2020-08-25 NOTE — PLAN OF CARE
VN cued in to pt's room with permission for pm rounds- pt [facility]. Communication bt vn and pt limited due to this. Respirations even and unlabored. No distress noted. Pt denies any pain or sob at this time. Pt denies any needs at this time. Call bell w/in reach. Side rails up times 2. Instructed to call for any needs/assist.

## 2020-08-25 NOTE — ASSESSMENT & PLAN NOTE
Naproxen scheduled BID  Continue lidocaine patch, tylenol for pain  U/S was negative for any gallbladder outlet obstruction, cholecystitis, etc  stable

## 2020-08-25 NOTE — ASSESSMENT & PLAN NOTE
On HFNC 6L  Continue Montelukast, Flovent  Continue to wean  Goal of 5L NC  Patient to sit up 2x daily in chair  Mild wheezes present   Continue albuterol and chest physio/IS  -Patient refused CT, will offer again today

## 2020-08-25 NOTE — PLAN OF CARE
Plan of care reviewed with patient. Patient voiced understanding. NSR on monitor. No acute distress noted. Side rails x 2, bed in lowest position, call bell within reach, patient advised to call for assistance. Maintain bed alarm for patient safety. Patient on 8L high flow NC.

## 2020-08-25 NOTE — PLAN OF CARE
Notified by nursing that patient needs new IV for IVF. Patient refusing IVF because he does not want a new IV.       Traci Blackman MD, MSPH  [facility] PGY-1  08/25/2020

## 2020-08-25 NOTE — PROGRESS NOTES
[facility] Hospital Medicine  Progress Note    Patient Name: Rajan Deluna  MRN: [id number]  Patient Class: IP- Inpatient   Admission Date: 7/21/2020  Length of Stay: 35 days  Attending Physician: Rajan Rodríguez MD  Primary Care Provider: Jason Mccord MD        Subjective:     Principal Problem:COVID-19        HPI:  Patient is a 74-year-old male with a past medical history of hypertension, COPD, MAC infection who presented to the ED with altered mental status and fever.  As per the family, patient has had altered mental status for the past 3-4 days. Family endorses increased somnolence as well as urinating on himself. EMS was called two days prior to presentation but reportedly deemed that patient did not need to be brought in. Over the previous day, reportedly the patient was found stuck, staring in his sink, not responding. On the day of presentation, the patient was found down on the floor, family wasn't able to get patient up from the floor and was reportedly not responding as much as baseline. Family believed that patient may not have been wearing baseline 2L O2. Patient also endorses a cough and generalized weakness. Patient reportedly had a recent UTI.     In the ED, chest x-ray showed diffuse interstitial patchy infiltrates concerning for possible pneumonia and a COVID-19 screening test was positive. Fever was 101° F and patient was hypotensive 80s/40s. Patient given 30cc/kg bolus. BP very soft on presentation, adequately responded to volume resuscitation and then decreased again. Labs showed a BUN 45, creatinine 2.9, , , troponin 0.036, procalcitonin 1.13.     Overview/Hospital Course:  Patient has had an extensive hospital course 2/2 COVID-related sepsis. Patient required peripheral Levophed for 1 day (upon admission) and has had stable MAPs throughout his course off pressors. Initially, patient required continuous BiPap and was stepped up to the ICU on 7/22 for 6  days. Patient also presented with a high D-dimer at 7.22 and was started on a heparin drip. CTA was contraindicated due to MARQUIS upon initial presentation. MARQUIS has improved with current Cr of 1 down from 2.9. While in the ICU, patient required supplemental O2. Patient complained of melanic diarrhea and a rectal tube was placed. Patient anxious and disoriented in the ICU. Haldol PRN for agitation in the ICU. Mental status improved in the ICU. Good UOP.    ID workup of melanic stool showed C. diff positive antigen and C. diff PCR toxin positive. Patient's melanic stool improved and rectal tube removed. He completed treatment with IV metronidazole and P.O. vancomycin for a total of 14 days, last dose 8/7. Though patient's melanic stool and watery diarrhea improved, there were concerns for a GI bleed due to an H/H of 6.9/21.5. Heparin drip was discontinued, IV protonix started, and the patient received 1 unit of packed RBCs. 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 Started on probiotics. Most recent recent H&H 9.5/37, stable.    Patient transitioned off continuous BiPAP and stepped down to the floor 7/27 with BiPAP use only at night. Continued to require supplemental O2. Tolerated advances in diet, duoneb treatments, incentive spirometry and chest physiotherapy. Tolerates PT/OT daily. Discussed self-proning due to deconditioned respiratory status but patient unable to tolerate. Off COVID isolation on 8/10, per hospital policy of 20 days negative COVID status. Patient received Levoquin for 5 days, stopped after procal <1. IV dexamthasone for 10 days. Redemsivir for 5 days.     Patient with history of aspergillosis and was on itraconazole as outpatient. WBC count increased to 20 during his course but resolved after patient completed 10 day course of IV dexamethasone.  Per ID, Voriconazole 400 mg po every 12 h X 2 loading doses, starting 7/29, then change to voriconazole 200 mg po every 12 h thereafter. Plan is to continue voriconazole and discharge on Itraconazole 100mg BID and follow up with outpatient Pulmonary and ID for continued Aspergillosis and MAC treatment.     During the patient's hospital course, he also had complaints of chest pain which resolved with a GI cocktail. Normal EKG and troponin trend. He also complained about left shoulder pain, in which the patient has a history of chronic shoulder pain that has required orthopedic surgery in the past. A shoulder x-ray showed to acute changes and the pain was managed with lidocaine patches and naproxen.    LTAC placement denied multiple times due to patient's continued need of supplemental oxygen. Patient expressed wanting to go home with possible hospice/palliative care but now is amendable to a type of rehabilitation facility. Multiple discussions about hospice/palliative care were held with patient. Discussed hospice/palliative care with daughter, patient's power of , due to patient's new baseline for his COPD 2/2 COVID. She is not agreeable to the plan and would like a type of rehabilitation facility too. Psych consulted to assess the patient's capacity and is in agreement with the primary team that the patient has the capacity to make his own medical decisions. A family meeting with palliative care is being coordinated with patient and daughter to discuss goals of care. Due to patient's extensive hospital course, the patient is deconditioned. The patient desats <88% with minimal exertion and supplemental O2 requirement increases during these episodes. Able to wean the patient down to 4L HFNC thus far from a peak requirement of 30L 70% on vapotherm. Patient's daughter decided to release being Power of .     8/23 Patient on 9L High flow oxygen. Spoke with pulmonology briefly about patient who suggested  a CT might be beneficial in identifying a possible correctable underlying etiology. Patient refused CT and stated that he just wants to go home.       Interval History: TJON, refused CT. Will continue to monitor. Slightly hyperK to 5.4 this AM, giving 1L bolus, will recheck in PM. Patient receiving duonebs, will likely assist in shift.     Review of Systems   Constitutional: Negative for activity change, appetite change, chills and fatigue.   HENT: Negative for congestion and dental problem.    Eyes: Negative for discharge and itching.   Respiratory: Negative for cough and shortness of breath.    Cardiovascular: Negative for chest pain.   Gastrointestinal: Negative for abdominal pain, blood in stool, diarrhea and nausea.   Endocrine: Negative for cold intolerance and heat intolerance.   Genitourinary: Negative for difficulty urinating.   Musculoskeletal: Negative for arthralgias, back pain and gait problem.   Skin: Negative.  Negative for color change and pallor.   Neurological: Negative for dizziness, facial asymmetry, light-headedness and headaches.   Psychiatric/Behavioral: The patient is not nervous/anxious.      Objective:     Vital Signs (Most Recent):  Temp: 97.5 °F (36.4 °C) (08/25/20 0802)  Pulse: 95 (08/25/20 0802)  Resp: 17 (08/25/20 0802)  BP: 109/61 (08/25/20 0802)  SpO2: (!) 91 % (08/25/20 0802) Vital Signs (24h Range):  Temp:  [97.4 °F (36.3 °C)-98 °F (36.7 °C)] 97.5 °F (36.4 °C)  Pulse:  [] 95  Resp:  [16-20] 17  SpO2:  [90 %-97 %] 91 %  BP: ()/(54-70) 109/61     Weight: 77.1 kg (169 lb 15.6 oz)  Body mass index is 23.71 kg/m².    Intake/Output Summary (Last 24 hours) at 8/25/2020 0920  Last data filed at 8/25/2020 0600  Gross per 24 hour   Intake 240 ml   Output 264 ml   Net -24 ml      Physical Exam  Vitals signs and nursing note reviewed.   Constitutional:       General: He is not in acute distress.     Appearance: Normal appearance. He is not ill-appearing.   HENT:      Head:  Normocephalic and atraumatic.      Right Ear: External ear normal.      Left Ear: External ear normal.      Nose: Nose normal. No congestion or rhinorrhea.      Mouth/Throat:      Mouth: Mucous membranes are moist.      Pharynx: Oropharynx is clear.   Eyes:      Pupils: Pupils are equal, round, and reactive to light.   Neck:      Musculoskeletal: Normal range of motion and neck supple.   Cardiovascular:      Rate and Rhythm: Normal rate and regular rhythm.      Pulses: Normal pulses.      Heart sounds: Normal heart sounds.   Pulmonary:      Effort: Pulmonary effort is normal.      Breath sounds: No wheezing.      Comments: Wheezes this AM, likely COPD. O2 at 6L HFNC  Abdominal:      General: Abdomen is flat. There is no distension.      Palpations: Abdomen is soft.      Tenderness: There is no abdominal tenderness. There is no guarding.   Musculoskeletal: Normal range of motion.         General: No swelling.   Skin:     General: Skin is warm and dry.   Neurological:      General: No focal deficit present.      Mental Status: He is alert and oriented to person, place, and time. Mental status is at baseline.   Psychiatric:         Mood and Affect: Mood normal.         Behavior: Behavior normal.         Thought Content: Thought content normal.         Judgment: Judgment normal.         Significant Labs:   CBC:   Recent Labs   Lab 08/25/20  0353   WBC 9.20   HGB 10.3*   HCT 33.8*   *     CMP:   Recent Labs   Lab 08/25/20  0353   *   K 5.4*      CO2 27   GLU 80   BUN 12   CREATININE 0.8   CALCIUM 8.5*   PROT 6.7   ALBUMIN 2.1*   BILITOT 0.5   ALKPHOS 368*   *   ALT 54*   ANIONGAP 6*   EGFRNONAA >60       Significant Imaging: I have reviewed all pertinent imaging results/findings within the past 24 hours.      Assessment/Plan:      * COVID-19  -Lifted Isolation 2 weeks ago  -Continuing to wean O2  -Deconditioning and long illness has led to increased O2 requirement, continuing to wean and work  with PT/OT, RT with goal of SNF vs LTAC  - Encourage BiPap QHS    Goals of care, counseling/discussion  -Patient has capacity  -Per family, patient cannot return home as wife too weak to help him and son works full time  -Will continue to work toward discharge planning    Left shoulder pain  Naproxen scheduled BID  Continue lidocaine patch, tylenol for pain  U/S was negative for any gallbladder outlet obstruction, cholecystitis, etc  stable      History of Aspergillus & MAC  Continuing voriconazole, switch to itroconazole at [place]   -Follow up with Pulm, ID outpatient  -Due to patient length of stay, will reevaluate    Hypotension  Patient's most recent blood pressure 116/75  RESOLVED      Sepsis  RESOLVED  See plan for COVID 19    COPD (chronic obstructive pulmonary disease)  On HFNC 6L  Continue Montelukast, Flovent  Continue to wean  Goal of 5L NC  Patient to sit up 2x daily in chair  Mild wheezes present   Continue albuterol and chest physio/IS  -Patient refused CT, will offer again today        Anemia  Asymptomatic  H/H stable at 10.3/33.8 today  Labs QUOD  STABLE      VTE Risk Mitigation (From admission, onward)         Ordered     enoxaparin injection 80 mg  Every 12 hours (non-standard times)      08/04/20 1421     Place sequential compression device  Until discontinued      07/21/20 2142     IP VTE HIGH RISK PATIENT  Once      07/21/20 2142                Discharge Planning   ADAM:      Code Status: Full Code   Is the patient medically ready for discharge?:     Reason for patient still in hospital (select all that apply): Patient unstable and Treatment  Discharge Plan A: Long-term acute care facility (LTAC)   Discharge Delays: None known at this time              Emeli Adam MD   [employer] PGY2  Department of Hospital Medicine   [facility]

## 2020-08-25 NOTE — ASSESSMENT & PLAN NOTE
-Lifted Isolation 2 weeks ago  -Continuing to wean O2  -Deconditioning and long illness has led to increased O2 requirement, continuing to wean and work with PT/OT, RT with goal of SNF vs LTAC  - Encourage BiPap QHS

## 2020-08-25 NOTE — SUBJECTIVE & OBJECTIVE
Interval History: NAEON, refused CT. Will continue to monitor.     Review of Systems   Constitutional: Negative for activity change, appetite change, chills and fatigue.   HENT: Negative for congestion and dental problem.    Eyes: Negative for discharge and itching.   Respiratory: Negative for cough and shortness of breath.    Cardiovascular: Negative for chest pain.   Gastrointestinal: Negative for abdominal pain, blood in stool, diarrhea and nausea.   Endocrine: Negative for cold intolerance and heat intolerance.   Genitourinary: Negative for difficulty urinating.   Musculoskeletal: Negative for arthralgias, back pain and gait problem.   Skin: Negative.  Negative for color change and pallor.   Neurological: Negative for dizziness, facial asymmetry, light-headedness and headaches.   Psychiatric/Behavioral: The patient is not nervous/anxious.      Objective:     Vital Signs (Most Recent):  Temp: 97.5 °F (36.4 °C) (08/25/20 0802)  Pulse: 95 (08/25/20 0802)  Resp: 17 (08/25/20 0802)  BP: 109/61 (08/25/20 0802)  SpO2: (!) 91 % (08/25/20 0802) Vital Signs (24h Range):  Temp:  [97.4 °F (36.3 °C)-98 °F (36.7 °C)] 97.5 °F (36.4 °C)  Pulse:  [] 95  Resp:  [16-20] 17  SpO2:  [90 %-97 %] 91 %  BP: ()/(54-70) 109/61     Weight: 77.1 kg (169 lb 15.6 oz)  Body mass index is 23.71 kg/m².    Intake/Output Summary (Last 24 hours) at 8/25/2020 0920  Last data filed at 8/25/2020 0600  Gross per 24 hour   Intake 240 ml   Output 264 ml   Net -24 ml      Physical Exam  Vitals signs and nursing note reviewed.   Constitutional:       General: He is not in acute distress.     Appearance: Normal appearance. He is not ill-appearing.   HENT:      Head: Normocephalic and atraumatic.      Right Ear: External ear normal.      Left Ear: External ear normal.      Nose: Nose normal. No congestion or rhinorrhea.      Mouth/Throat:      Mouth: Mucous membranes are moist.      Pharynx: Oropharynx is clear.   Eyes:      Pupils: Pupils are  equal, round, and reactive to light.   Neck:      Musculoskeletal: Normal range of motion and neck supple.   Cardiovascular:      Rate and Rhythm: Normal rate and regular rhythm.      Pulses: Normal pulses.      Heart sounds: Normal heart sounds.   Pulmonary:      Effort: Pulmonary effort is normal.      Breath sounds: No wheezing.      Comments: Wheezes this AM, likely COPD. O2 at 6L HFNC  Abdominal:      General: Abdomen is flat. There is no distension.      Palpations: Abdomen is soft.      Tenderness: There is no abdominal tenderness. There is no guarding.   Musculoskeletal: Normal range of motion.         General: No swelling.   Skin:     General: Skin is warm and dry.   Neurological:      General: No focal deficit present.      Mental Status: He is alert and oriented to person, place, and time. Mental status is at baseline.   Psychiatric:         Mood and Affect: Mood normal.         Behavior: Behavior normal.         Thought Content: Thought content normal.         Judgment: Judgment normal.         Significant Labs:   CBC:   Recent Labs   Lab 08/25/20  0353   WBC 9.20   HGB 10.3*   HCT 33.8*   *     CMP:   Recent Labs   Lab 08/25/20  0353   *   K 5.4*      CO2 27   GLU 80   BUN 12   CREATININE 0.8   CALCIUM 8.5*   PROT 6.7   ALBUMIN 2.1*   BILITOT 0.5   ALKPHOS 368*   *   ALT 54*   ANIONGAP 6*   EGFRNONAA >60       Significant Imaging: I have reviewed all pertinent imaging results/findings within the past 24 hours.

## 2020-08-25 NOTE — PLAN OF CARE
Patient is currently on 6L HFNC with documented spo2.  Patient completed IS and aerobika breathing exercises with no problem.  Patient achieved 750-1000 on IS, and was not experiencing any SOB after.  Breathing treatment also complete, and inhaler was given.  BiPAP is at bedside ready for patient use.  Will continue to monitor, and wean O2 as tolerated.

## 2020-08-25 NOTE — ASSESSMENT & PLAN NOTE
Continuing voriconazole, switch to itroconazole at [place]   -Follow up with Pulm, ID outpatient  -Due to patient length of stay, will reevaluate

## 2020-08-25 NOTE — PLAN OF CARE
VN rounds: VN cued into pt's room with pt's permission. Pt is currently getting a bed bath per pct. NAD noted.     Patient's progress notes, labs, and care plan reviewed. Will cont to be available as needed.

## 2020-08-26 LAB
POCT GLUCOSE: 113 MG/DL (ref 70–110)
POCT GLUCOSE: 126 MG/DL (ref 70–110)
POCT GLUCOSE: 91 MG/DL (ref 70–110)
POCT GLUCOSE: 99 MG/DL (ref 70–110)

## 2020-08-26 PROCEDURE — 94640 AIRWAY INHALATION TREATMENT: CPT

## 2020-08-26 PROCEDURE — 97116 GAIT TRAINING THERAPY: CPT | Mod: CQ

## 2020-08-26 PROCEDURE — 94761 N-INVAS EAR/PLS OXIMETRY MLT: CPT

## 2020-08-26 PROCEDURE — 25000003 PHARM REV CODE 250: Performed by: STUDENT IN AN ORGANIZED HEALTH CARE EDUCATION/TRAINING PROGRAM

## 2020-08-26 PROCEDURE — 25000003 PHARM REV CODE 250: Performed by: INTERNAL MEDICINE

## 2020-08-26 PROCEDURE — 99900035 HC TECH TIME PER 15 MIN (STAT)

## 2020-08-26 PROCEDURE — 97110 THERAPEUTIC EXERCISES: CPT | Mod: CQ

## 2020-08-26 PROCEDURE — 11000001 HC ACUTE MED/SURG PRIVATE ROOM

## 2020-08-26 PROCEDURE — 25000003 PHARM REV CODE 250: Performed by: FAMILY MEDICINE

## 2020-08-26 PROCEDURE — 97110 THERAPEUTIC EXERCISES: CPT | Mod: CO

## 2020-08-26 PROCEDURE — 25000242 PHARM REV CODE 250 ALT 637 W/ HCPCS: Performed by: STUDENT IN AN ORGANIZED HEALTH CARE EDUCATION/TRAINING PROGRAM

## 2020-08-26 PROCEDURE — 94799 UNLISTED PULMONARY SVC/PX: CPT

## 2020-08-26 PROCEDURE — 94664 DEMO&/EVAL PT USE INHALER: CPT

## 2020-08-26 PROCEDURE — 94660 CPAP INITIATION&MGMT: CPT

## 2020-08-26 PROCEDURE — 63600175 PHARM REV CODE 636 W HCPCS: Performed by: FAMILY MEDICINE

## 2020-08-26 PROCEDURE — 27000221 HC OXYGEN, UP TO 24 HOURS

## 2020-08-26 RX ADMIN — IPRATROPIUM BROMIDE AND ALBUTEROL SULFATE 3 ML: 2.5; .5 SOLUTION RESPIRATORY (INHALATION) at 08:08

## 2020-08-26 RX ADMIN — PANTOPRAZOLE SODIUM 40 MG: 40 TABLET, DELAYED RELEASE ORAL at 08:08

## 2020-08-26 RX ADMIN — IPRATROPIUM BROMIDE AND ALBUTEROL SULFATE 3 ML: 2.5; .5 SOLUTION RESPIRATORY (INHALATION) at 04:08

## 2020-08-26 RX ADMIN — VORICONAZOLE 200 MG: 200 TABLET, FILM COATED ORAL at 04:08

## 2020-08-26 RX ADMIN — THERA TABS 1 TABLET: TAB at 08:08

## 2020-08-26 RX ADMIN — NAPROXEN 500 MG: 500 TABLET ORAL at 04:08

## 2020-08-26 RX ADMIN — FLUTICASONE PROPIONATE 1 PUFF: 110 AEROSOL, METERED RESPIRATORY (INHALATION) at 08:08

## 2020-08-26 RX ADMIN — NAPROXEN 500 MG: 500 TABLET ORAL at 08:08

## 2020-08-26 RX ADMIN — MONTELUKAST 10 MG: 10 TABLET, FILM COATED ORAL at 08:08

## 2020-08-26 RX ADMIN — Medication 4 TABLET: at 12:08

## 2020-08-26 RX ADMIN — ACETAMINOPHEN 650 MG: 325 TABLET ORAL at 07:08

## 2020-08-26 RX ADMIN — TAMSULOSIN HYDROCHLORIDE 0.4 MG: 0.4 CAPSULE ORAL at 08:08

## 2020-08-26 RX ADMIN — HYDROXYZINE PAMOATE 25 MG: 25 CAPSULE ORAL at 07:08

## 2020-08-26 RX ADMIN — Medication 4 TABLET: at 04:08

## 2020-08-26 RX ADMIN — Medication 4 TABLET: at 08:08

## 2020-08-26 RX ADMIN — IPRATROPIUM BROMIDE AND ALBUTEROL SULFATE 3 ML: 2.5; .5 SOLUTION RESPIRATORY (INHALATION) at 12:08

## 2020-08-26 RX ADMIN — ENOXAPARIN SODIUM 80 MG: 80 INJECTION SUBCUTANEOUS at 04:08

## 2020-08-26 RX ADMIN — LIDOCAINE 2 PATCH: 50 PATCH TOPICAL at 04:08

## 2020-08-26 NOTE — PLAN OF CARE
I spoke with Dianne at [facility] and she says she is calling to remove request for SNF auth with [facility] first thing this morning.      Deb with [facility] is reviewing pt now.    10:44 Deb submitted for LT[facility] auth Ref#[id number]       08/26/20 0831   Post-Acute Status   Post-Acute Authorization Placement     Phong Suh RN, CM  [phone]

## 2020-08-26 NOTE — PT/OT/SLP PROGRESS
"Physical Therapy Treatment    Patient Name:  Rajan Deluna   MRN:  [id number]    Recommendations:     Discharge Recommendations:  nursing facility, skilled   Discharge Equipment Recommendations: walker, rolling   Barriers to discharge: Decreased caregiver support and  Decrease SpO2 levels with functional mobility    Assessment:     Rajan Deluna is a 74 y.o. male admitted with a medical diagnosis of COVID-19.  He presents with the following impairments/functional limitations:  weakness, impaired endurance, impaired self care skills, impaired functional mobilty, gait instability, impaired balance, decreased coordination, decreased upper extremity function, decreased lower extremity function, decreased safety awareness, decreased ROM, impaired cardiopulmonary response to activity. Pt's SpO2 level continues to decrease with all functional mobility. At 1st sit to stand pt decreased to 77%. Increased to 86/87% after sitting and performing pursued lipped breathing ~1 minute. Able to ambulate ~4 ft fwd and ~4 ft bwd, with ~3-4 additional side steps all with RW O2 at 9L, requiring min A. Would benefit from continued PT services to increase pt's cardiorespiratory strength and endurance.    Rehab Prognosis: Fair; patient would benefit from acute skilled PT services to address these deficits and reach maximum level of function.    Recent Surgery: Procedure(s) (LRB):  IMAGING PROCEDURE, GI TRACT, INTRALUMINAL, VIA CAPSULE (N/A) 23 Days Post-Op    Plan:     During this hospitalization, patient to be seen 6 x/week to address the identified rehab impairments via gait training, therapeutic activities, therapeutic exercises, wheelchair management/training and progress toward the following goals:    · Plan of Care Expires:  09/21/20    Subjective     Chief Complaint: None Expressed  Patient/Family Comments/goals: "I just want to go home".  Pain/Comfort:  · Pain Rating 1: 0/10(Did not express any pain)  · Pain Rating Post-Intervention " "1: 0/10      Objective:     Communicated with nurse prior to session.  Patient found supine with bed alarm, oxygen, peripheral IV, pulse ox (continuous) upon PT entry to room.     General Precautions: Standard, fall, respiratory   Orthopedic Precautions:N/A   Braces: N/A     Functional Mobility:  · Bed Mobility:     · Rolling Right: modified independence and supervision  · Scooting: modified independence and supervision  · Supine to Sit: supervision and stand by assistance  · Sit to Supine: supervision and stand by assistance  · Transfers:     · Sit to Stand:  contact guard assistance with rolling walker  · Gait:  ~4 ft fwd and ~4 ft bwd, also ~3-4 side steps all with RW, 9L of O2 donned, requiring Min A      AM-PAC 6 CLICK MOBILITY  Turning over in bed (including adjusting bedclothes, sheets and blankets)?: 4  Sitting down on and standing up from a chair with arms (e.g., wheelchair, bedside commode, etc.): 3  Moving from lying on back to sitting on the side of the bed?: 3  Moving to and from a bed to a chair (including a wheelchair)?: 3  Need to walk in hospital room?: 3  Climbing 3-5 steps with a railing?: 1  Basic Mobility Total Score: 17       Therapeutic Activities and Exercises:   Pt took some encouragement to begin treatment today. Pt stated, "I just want to be able to go home". Pt performed seated therapeutic exercises sitting at EOB 1 x 15 reps BLE's consisting of hip add/abd and LAQ's. SpO2 levels fluctuated throughout seated exercises 83-88% requiring rest breaks between BLE's and exercises. At 1st sit to stand transfer pt's SpO2 level decreased to 77% with respiratory therapist in room and increased O2 from 7L to 9L. Pt sat at EOB performing pursed lipped breathing with SpO2 level increasing to 86/87% with respiratory therapist still present. At 2nd sit to stand transfer pt able to ambulate ~4 ft fwd and ~4 ft bwd, and an additional ~3-4 side steps all with RW, 9L of O2 donned requiring min A. Pt " requested to return supine declining to sit in B/S chair. SpO2 level post exercise 93%.    Patient left supine with all lines intact, call button in reach, bed alarm on and  nurse notified..    GOALS:   Multidisciplinary Problems     Physical Therapy Goals        Problem: Physical Therapy Goal    Goal Priority Disciplines Outcome Goal Variances Interventions   Physical Therapy Goal     PT, PT/OT Ongoing, Progressing     Description: Goals to be met by: 20     Patient will increase functional independence with mobility by performin. Supine <> sit with MInimal Assistance   Met   Revised  supine<>sit MOD I  2. Sit to stand transfer with Minimal Assistance   Met   Revised sit to stand with supervision  3. Bed to chair transfer with Minimal Assistance   Met   Revised bed to chair transfer with supervision  4. Gait x 50 ft with min A with appropriate AD  5. Lower extremity exercise program x 10 reps per handout, with supervision  Met 8/15  Revised Lower extremity exercise program x 20 reps per handout, with supervision                     Time Tracking:     PT Received On: 20  PT Start Time: 743     PT Stop Time: 807  PT Total Time (min): 24 min     Billable Minutes: Therapeutic Activity  14 and Therapeutic Exercise  10    Treatment Type: Treatment  PT/PTA: PTA     PTA Visit Number: 4     Afshan Marin, PTA  2020

## 2020-08-26 NOTE — PLAN OF CARE
The FM team has spoken with Mr. Deluna at length about his plan of care and he is still stating that he will be refusing food and medications because he wants to leave.       Traci Blackman MD, [facility] PGY-1  08/26/2020

## 2020-08-26 NOTE — PLAN OF CARE
Problem: Physical Therapy Goal  Goal: Physical Therapy Goal  Description: Goals to be met by: 20     Patient will increase functional independence with mobility by performin. Supine <> sit with MInimal Assistance   Met   Revised  supine<>sit MOD I  2. Sit to stand transfer with Minimal Assistance   Met   Revised sit to stand with supervision  3. Bed to chair transfer with Minimal Assistance   Met   Revised bed to chair transfer with supervision  4. Gait x 50 ft with min A with appropriate AD  5. Lower extremity exercise program x 10 reps per handout, with supervision  Met 8/15  Revised Lower extremity exercise program x 20 reps per handout, with supervision    Outcome: Ongoing, Progressing   Pt continues to work to achieve all established goals. Pt performed seated therapeutic exercises 1 x 15 reps BLE's and ambulation training ~4 steps fwd and ~4 steps bwd, and ~3-4 side steps with RW requiring min A. Pt's SpO2 level decreased to a low of 77% with 1st sit to stand with recovery to 86/87% after ~1 minute with pursed lipped breathing.

## 2020-08-26 NOTE — PT/OT/SLP PROGRESS
Occupational Therapy   Treatment    Name: Rajan Deluna  MRN: [id number]  Admitting Diagnosis:  COVID-19  23 Days Post-Op    Recommendations:     Discharge Recommendations: nursing facility, skilled  Discharge Equipment Recommendations:  other (see comments)(TBD)  Barriers to discharge:  Inaccessible home environment, Decreased caregiver support    Assessment:     Rajan Deluna is a 74 y.o. male with a medical diagnosis of COVID-19.  Performance deficits affecting function are weakness, impaired endurance, impaired self care skills, impaired functional mobilty, gait instability, impaired balance, decreased upper extremity function, decreased lower extremity function, pain, impaired coordination, impaired cardiopulmonary response to activity. Pt found in bed, agreeable to therapy on 2nd attempt.  Pt agreeable to bed therex only secondary to fatigue.  He tolerated UE therex fairly well with rest breaks and PLB secondary to fatigue and JACOME.  O2 sats 87-96% throughout.  Continue OT services to address functional goals, progressing as able.      Rehab Prognosis:  Fair; patient would benefit from acute skilled OT services to address these deficits and reach maximum level of function.       Plan:     Patient to be seen 5 x/week to address the above listed problems via self-care/home management, therapeutic activities, therapeutic exercises  · Plan of Care Expires: 08/27/20  · Plan of Care Reviewed with: patient    Subjective     Pain/Comfort:  ·      Objective:     Communicated with: RN prior to session.  Patient found HOB elevated with pulse ox (continuous), peripheral IV, oxygen, telemetry upon OT entry to room.    General Precautions: Standard, fall, respiratory   Orthopedic Precautions:N/A   Braces: N/A     Occupational Performance:     Bed Mobility:    · Patient completed Scooting/Bridging with total assistance, 2 persons and supine to HOB     Functional Mobility/Transfers:  · Declined      [facility] 6 Click ADL:  16    Treatment & Education:  Pt performed BUE AROM ex 2 x 10 reps all jts/planes.  He tolerated fairly well with rest breaks and PLB secondary to fatigue and JACOME.  O2 sats 87-96% throughout.     Patient left HOB elevated with all lines intact, call button in reach and bed alarm onEducation:      GOALS:   Multidisciplinary Problems     Occupational Therapy Goals        Problem: Occupational Therapy Goal    Goal Priority Disciplines Outcome Interventions   Occupational Therapy Goal     OT, PT/OT Ongoing, Progressing    Description: Goals to be met by: 8/27/20     Patient will increase functional independence with ADLs by performing:    LE Dressing with Stand-by Assistance.  Grooming while standing with Stand-by Assistance.  Toileting from toilet with Stand-by Assistance for hygiene and clothing management.   Supine to sit with Stand-by Assistance.  Step transfer with Stand-by Assistance  Toilet transfer to toilet with Stand-by Assistance.  Upper extremity exercise program x10 reps per handout, with independence.                     Time Tracking:     OT Date of Treatment: 08/26/20  OT Start Time: 1258  OT Stop Time: 1314  OT Total Time (min): 16 min    Billable Minutes:Therapeutic Exercise 16    TED Reyna  8/26/2020

## 2020-08-26 NOTE — ASSESSMENT & PLAN NOTE
On HFNC 6L  Continue Montelukast, Flovent  Continue to wean  Goal of 5L NC  Patient to sit up 2x daily in chair  Mild wheezes present   Continue albuterol and chest physio/IS  -CT demonstrates new fibrosis, from prior CT in 2017

## 2020-08-26 NOTE — PROGRESS NOTES
[facility] Hospital Medicine  Progress Note    Patient Name: Rajan Deluna  MRN: [id number]  Patient Class: IP- Inpatient   Admission Date: 7/21/2020  Length of Stay: 36 days  Attending Physician: Rajan Rodríguez MD  Primary Care Provider: Jason Mccord MD        Subjective:     Principal Problem:COVID-19        HPI:  Patient is a 74-year-old male with a past medical history of hypertension, COPD, MAC infection who presented to the ED with altered mental status and fever.  As per the family, patient has had altered mental status for the past 3-4 days. Family endorses increased somnolence as well as urinating on himself. EMS was called two days prior to presentation but reportedly deemed that patient did not need to be brought in. Over the previous day, reportedly the patient was found stuck, staring in his sink, not responding. On the day of presentation, the patient was found down on the floor, family wasn't able to get patient up from the floor and was reportedly not responding as much as baseline. Family believed that patient may not have been wearing baseline 2L O2. Patient also endorses a cough and generalized weakness. Patient reportedly had a recent UTI.     In the ED, chest x-ray showed diffuse interstitial patchy infiltrates concerning for possible pneumonia and a COVID-19 screening test was positive. Fever was 101° F and patient was hypotensive 80s/40s. Patient given 30cc/kg bolus. BP very soft on presentation, adequately responded to volume resuscitation and then decreased again. Labs showed a BUN 45, creatinine 2.9, , , troponin 0.036, procalcitonin 1.13.     Overview/Hospital Course:  Patient has had an extensive hospital course 2/2 COVID-related sepsis. Patient required peripheral Levophed for 1 day (upon admission) and has had stable MAPs throughout his course off pressors. Initially, patient required continuous BiPap and was stepped up to the ICU on 7/22 for 6  days. Patient also presented with a high D-dimer at 7.22 and was started on a heparin drip. CTA was contraindicated due to MARQUIS upon initial presentation. MARQUIS has improved with current Cr of 1 down from 2.9. While in the ICU, patient required supplemental O2. Patient complained of melanic diarrhea and a rectal tube was placed. Patient anxious and disoriented in the ICU. Haldol PRN for agitation in the ICU. Mental status improved in the ICU. Good UOP.    ID workup of melanic stool showed C. diff positive antigen and C. diff PCR toxin positive. Patient's melanic stool improved and rectal tube removed. He completed treatment with IV metronidazole and P.O. vancomycin for a total of 14 days, last dose 8/7. Though patient's melanic stool and watery diarrhea improved, there were concerns for a GI bleed due to an H/H of 6.9/21.5. Heparin drip was discontinued, IV protonix started, and the patient received 1 unit of packed RBCs. 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 Started on probiotics. Most recent recent H&H 9.5/37, stable.    Patient transitioned off continuous BiPAP and stepped down to the floor 7/27 with BiPAP use only at night. Continued to require supplemental O2. Tolerated advances in diet, duoneb treatments, incentive spirometry and chest physiotherapy. Tolerates PT/OT daily. Discussed self-proning due to deconditioned respiratory status but patient unable to tolerate. Off COVID isolation on 8/10, per hospital policy of 20 days negative COVID status. Patient received Levoquin for 5 days, stopped after procal <1. IV dexamthasone for 10 days. Redemsivir for 5 days.     Patient with history of aspergillosis and was on itraconazole as outpatient. WBC count increased to 20 during his course but resolved after patient completed 10 day course of IV dexamethasone.  Per ID, Voriconazole 400 mg po every 12 h X 2 loading doses, starting 7/29, then change to voriconazole 200 mg po every 12 h thereafter. Plan is to continue voriconazole and discharge on Itraconazole 100mg BID and follow up with outpatient Pulmonary and ID for continued Aspergillosis and MAC treatment.     During the patient's hospital course, he also had complaints of chest pain which resolved with a GI cocktail. Normal EKG and troponin trend. He also complained about left shoulder pain, in which the patient has a history of chronic shoulder pain that has required orthopedic surgery in the past. A shoulder x-ray showed to acute changes and the pain was managed with lidocaine patches and naproxen.    LTAC placement denied multiple times due to patient's continued need of supplemental oxygen. Patient expressed wanting to go home with possible hospice/palliative care but now is amendable to a type of rehabilitation facility. Multiple discussions about hospice/palliative care were held with patient. Discussed hospice/palliative care with daughter, patient's power of , due to patient's new baseline for his COPD 2/2 COVID. She is not agreeable to the plan and would like a type of rehabilitation facility too. Psych consulted to assess the patient's capacity and is in agreement with the primary team that the patient has the capacity to make his own medical decisions. A family meeting with palliative care is being coordinated with patient and daughter to discuss goals of care. Due to patient's extensive hospital course, the patient is deconditioned. The patient desats <88% with minimal exertion and supplemental O2 requirement increases during these episodes. Able to wean the patient down to 4L HFNC thus far from a peak requirement of 30L 70% on vapotherm. Patient's daughter decided to release being Power of .     8/23 Patient on 9L High flow oxygen. Spoke with pulmonology briefly about patient who suggested  a CT might be beneficial in identifying a possible correctable underlying etiology. Patient refused CT and stated that he just wants to go home.       Interval History: NAEON. Patient on 6L, participating with PT. Patient CT significant for new fibrosis. Continue to monitor.     Review of Systems   Constitutional: Negative for activity change, appetite change, chills and fatigue.   HENT: Negative for congestion and dental problem.    Eyes: Negative for discharge and itching.   Respiratory: Negative for cough and shortness of breath.    Cardiovascular: Negative for chest pain.   Gastrointestinal: Negative for abdominal pain, blood in stool, diarrhea and nausea.   Endocrine: Negative for cold intolerance and heat intolerance.   Genitourinary: Negative for difficulty urinating.   Musculoskeletal: Negative for arthralgias, back pain and gait problem.   Skin: Negative.  Negative for color change and pallor.   Neurological: Negative for dizziness, facial asymmetry, light-headedness and headaches.   Psychiatric/Behavioral: The patient is not nervous/anxious.      Objective:     Vital Signs (Most Recent):  Temp: 97.5 °F (36.4 °C) (08/26/20 0736)  Pulse: 99 (08/26/20 0745)  Resp: 17 (08/26/20 0736)  BP: 109/62 (08/26/20 0736)  SpO2: (!) 91 % (08/26/20 0736) Vital Signs (24h Range):  Temp:  [97 °F (36.1 °C)-97.6 °F (36.4 °C)] 97.5 °F (36.4 °C)  Pulse:  [] 99  Resp:  [17-22] 17  SpO2:  [91 %-97 %] 91 %  BP: (106-118)/(55-62) 109/62     Weight: 77.1 kg (169 lb 15.6 oz)  Body mass index is 23.71 kg/m².    Intake/Output Summary (Last 24 hours) at 8/26/2020 0820  Last data filed at 8/26/2020 0522  Gross per 24 hour   Intake 250 ml   Output 275 ml   Net -25 ml      Physical Exam  Vitals signs and nursing note reviewed.   Constitutional:       General: He is not in acute distress.     Appearance: Normal appearance. He is not ill-appearing.   HENT:      Head: Normocephalic and atraumatic.      Right Ear: External ear normal.       Left Ear: External ear normal.      Nose: Nose normal. No congestion or rhinorrhea.      Mouth/Throat:      Mouth: Mucous membranes are moist.      Pharynx: Oropharynx is clear.   Eyes:      Pupils: Pupils are equal, round, and reactive to light.   Neck:      Musculoskeletal: Normal range of motion and neck supple.   Cardiovascular:      Rate and Rhythm: Normal rate and regular rhythm.      Pulses: Normal pulses.      Heart sounds: Normal heart sounds.   Pulmonary:      Effort: Pulmonary effort is normal.      Breath sounds: No wheezing.      Comments: Wheezes this AM, likely COPD. O2 at 6L HFNC  Abdominal:      General: Abdomen is flat. There is no distension.      Palpations: Abdomen is soft.      Tenderness: There is no abdominal tenderness. There is no guarding.   Musculoskeletal: Normal range of motion.         General: No swelling.   Skin:     General: Skin is warm and dry.   Neurological:      General: No focal deficit present.      Mental Status: He is alert and oriented to person, place, and time. Mental status is at baseline.   Psychiatric:         Mood and Affect: Mood normal.         Behavior: Behavior normal.         Thought Content: Thought content normal.         Judgment: Judgment normal.         Significant Labs:   CBC:   Recent Labs   Lab 08/25/20  0353   WBC 9.20   HGB 10.3*   HCT 33.8*   *     CMP:   Recent Labs   Lab 08/25/20  0353 08/25/20  1457   * 134*   K 5.4* 4.7    102   CO2 27 26   GLU 80 100   BUN 12 13   CREATININE 0.8 1.0   CALCIUM 8.5* 8.2*   PROT 6.7 6.5   ALBUMIN 2.1* 2.1*   BILITOT 0.5 0.5   ALKPHOS 368* 349*   * 101*   ALT 54* 49*   ANIONGAP 6* 6*   EGFRNONAA >60 >60     All pertinent labs within the past 24 hours have been reviewed.    Significant Imaging: I have reviewed all pertinent imaging results/findings within the past 24 hours.      Assessment/Plan:      * COVID-19  -Lifted Isolation 2 weeks ago  -Continuing to wean O2  -Deconditioning and long  illness has led to increased O2 requirement, continuing to wean and work with PT/OT, RT with goal of SNF vs LTAC  - Encourage BiPap QHS    Goals of care, counseling/discussion  -Patient has capacity  -Per family, patient cannot return home as wife too weak to help him and son works full time  -Will continue to work toward discharge planning    Left shoulder pain  Naproxen scheduled BID  Continue lidocaine patch, tylenol for pain  U/S was negative for any gallbladder outlet obstruction, cholecystitis, etc  stable      History of Aspergillus & MAC  Continuing voriconazole, switch to itroconazole at DC   -Follow up with Pulm, ID outpatient  -Due to patient length of stay, will reevaluate    Hypotension  Patient's most recent blood pressure 116/75  RESOLVED      Sepsis  RESOLVED  See plan for COVID 19    COPD (chronic obstructive pulmonary disease)  On HFNC 6L  Continue Montelukast, Flovent  Continue to wean  Goal of 5L NC  Patient to sit up 2x daily in chair  Mild wheezes present   Continue albuterol and chest physio/IS  -CT demonstrates new fibrosis, from prior CT in 2017        Anemia  Asymptomatic  H/H stable at 10.3/33.8 today  Labs QUOD  STABLE      VTE Risk Mitigation (From admission, onward)         Ordered     enoxaparin injection 80 mg  Every 12 hours (non-standard times)      08/04/20 1421     Place sequential compression device  Until discontinued      07/21/20 2142     IP VTE HIGH RISK PATIENT  Once      07/21/20 2142                Discharge Planning   ADAM:      Code Status: Full Code   Is the patient medically ready for discharge?:     Reason for patient still in hospital (select all that apply): Treatment  Discharge Plan A: Long-term acute care facility (LTAC)   Discharge Delays: None known at this time              Emeli Adam MD   [employer] PGY2  Department of Hospital Medicine   [facility]

## 2020-08-26 NOTE — PLAN OF CARE
Patient on oxygen with documented flow.  Will attempt to wean per O2 order protocol.  The proper method of use, as well as anticipated side effects, of this aerosol treatment are discussed and demonstrated to the patient.   The proper method of use, as well as anticipated side effects, of this metered-dose inhaler are discussed and demonstrated to the patient.  Patient perform PEP therapy.   Will continue to monitor.

## 2020-08-26 NOTE — PLAN OF CARE
Problem: Adult Inpatient Plan of Care  Goal: Chart reviewed  Description: Chart and Care plan reviewed.     Outcome: Ongoing, Progressing

## 2020-08-26 NOTE — PLAN OF CARE
[facility] approved for pt to go to [facility].  [facility] has beds for tomorrow per Deb.  [facility] notified.  Pt and his wife Tania [phone] notified as well.  Plan is for him to discharge there tomorrow.         08/26/20 1543   Post-Acute Status   Post-Acute Authorization Placement   Discharge Plan   Discharge Plan A Long-term acute care facility (LTAC)     Phong Suh RN,   [phone]

## 2020-08-26 NOTE — SUBJECTIVE & OBJECTIVE
Interval History: NAEON. Patient on 6L, participating with PT. Patient CT significant for new fibrosis. Continue to monitor.     Review of Systems   Constitutional: Negative for activity change, appetite change, chills and fatigue.   HENT: Negative for congestion and dental problem.    Eyes: Negative for discharge and itching.   Respiratory: Negative for cough and shortness of breath.    Cardiovascular: Negative for chest pain.   Gastrointestinal: Negative for abdominal pain, blood in stool, diarrhea and nausea.   Endocrine: Negative for cold intolerance and heat intolerance.   Genitourinary: Negative for difficulty urinating.   Musculoskeletal: Negative for arthralgias, back pain and gait problem.   Skin: Negative.  Negative for color change and pallor.   Neurological: Negative for dizziness, facial asymmetry, light-headedness and headaches.   Psychiatric/Behavioral: The patient is not nervous/anxious.      Objective:     Vital Signs (Most Recent):  Temp: 97.5 °F (36.4 °C) (08/26/20 0736)  Pulse: 99 (08/26/20 0745)  Resp: 17 (08/26/20 0736)  BP: 109/62 (08/26/20 0736)  SpO2: (!) 91 % (08/26/20 0736) Vital Signs (24h Range):  Temp:  [97 °F (36.1 °C)-97.6 °F (36.4 °C)] 97.5 °F (36.4 °C)  Pulse:  [] 99  Resp:  [17-22] 17  SpO2:  [91 %-97 %] 91 %  BP: (106-118)/(55-62) 109/62     Weight: 77.1 kg (169 lb 15.6 oz)  Body mass index is 23.71 kg/m².    Intake/Output Summary (Last 24 hours) at 8/26/2020 0820  Last data filed at 8/26/2020 0522  Gross per 24 hour   Intake 250 ml   Output 275 ml   Net -25 ml      Physical Exam  Vitals signs and nursing note reviewed.   Constitutional:       General: He is not in acute distress.     Appearance: Normal appearance. He is not ill-appearing.   HENT:      Head: Normocephalic and atraumatic.      Right Ear: External ear normal.      Left Ear: External ear normal.      Nose: Nose normal. No congestion or rhinorrhea.      Mouth/Throat:      Mouth: Mucous membranes are moist.       Pharynx: Oropharynx is clear.   Eyes:      Pupils: Pupils are equal, round, and reactive to light.   Neck:      Musculoskeletal: Normal range of motion and neck supple.   Cardiovascular:      Rate and Rhythm: Normal rate and regular rhythm.      Pulses: Normal pulses.      Heart sounds: Normal heart sounds.   Pulmonary:      Effort: Pulmonary effort is normal.      Breath sounds: No wheezing.      Comments: Wheezes this AM, likely COPD. O2 at 6L HFNC  Abdominal:      General: Abdomen is flat. There is no distension.      Palpations: Abdomen is soft.      Tenderness: There is no abdominal tenderness. There is no guarding.   Musculoskeletal: Normal range of motion.         General: No swelling.   Skin:     General: Skin is warm and dry.   Neurological:      General: No focal deficit present.      Mental Status: He is alert and oriented to person, place, and time. Mental status is at baseline.   Psychiatric:         Mood and Affect: Mood normal.         Behavior: Behavior normal.         Thought Content: Thought content normal.         Judgment: Judgment normal.         Significant Labs:   CBC:   Recent Labs   Lab 08/25/20  0353   WBC 9.20   HGB 10.3*   HCT 33.8*   *     CMP:   Recent Labs   Lab 08/25/20  0353 08/25/20  1457   * 134*   K 5.4* 4.7    102   CO2 27 26   GLU 80 100   BUN 12 13   CREATININE 0.8 1.0   CALCIUM 8.5* 8.2*   PROT 6.7 6.5   ALBUMIN 2.1* 2.1*   BILITOT 0.5 0.5   ALKPHOS 368* 349*   * 101*   ALT 54* 49*   ANIONGAP 6* 6*   EGFRNONAA >60 >60     All pertinent labs within the past 24 hours have been reviewed.    Significant Imaging: I have reviewed all pertinent imaging results/findings within the past 24 hours.

## 2020-08-26 NOTE — PLAN OF CARE
Problem: Occupational Therapy Goal  Goal: Occupational Therapy Goal  Description: Goals to be met by: 8/27/20     Patient will increase functional independence with ADLs by performing:    LE Dressing with Stand-by Assistance.  Grooming while standing with Stand-by Assistance.  Toileting from toilet with Stand-by Assistance for hygiene and clothing management.   Supine to sit with Stand-by Assistance.  Step transfer with Stand-by Assistance  Toilet transfer to toilet with Stand-by Assistance.  Upper extremity exercise program x10 reps per handout, with independence.    Outcome: Ongoing, Progressing       Rajan Deluna is a 74 y.o. male with a medical diagnosis of COVID-19.  Performance deficits affecting function are weakness, impaired endurance, impaired self care skills, impaired functional mobilty, gait instability, impaired balance, decreased upper extremity function, decreased lower extremity function, pain, impaired coordination, impaired cardiopulmonary response to activity. Pt found in bed, agreeable to therapy on 2nd attempt.  Pt agreeable to bed therex only secondary to fatigue.  He tolerated UE therex fairly well with rest breaks and PLB secondary to fatigue and JACOME.  O2 sats 87-96% throughout.  Continue OT services to address functional goals, progressing as able.    TED Reyna

## 2020-08-26 NOTE — PLAN OF CARE
Patient on oxygen with documented flow.  Will attempt to wean per O2 order protocol.  The proper method of use, as well as anticipated side effects, of this aerosol treatment are discussed and demonstrated to the patient.   The proper method of use, as well as anticipated side effects, of this metered-dose inhaler are discussed and demonstrated to the patient.  IS  Jas  Will continue to monitor.

## 2020-08-27 VITALS
SYSTOLIC BLOOD PRESSURE: 114 MMHG | DIASTOLIC BLOOD PRESSURE: 59 MMHG | HEART RATE: 94 BPM | BODY MASS INDEX: 23.8 KG/M2 | OXYGEN SATURATION: 95 % | HEIGHT: 71 IN | WEIGHT: 170 LBS | TEMPERATURE: 97 F | RESPIRATION RATE: 20 BRPM

## 2020-08-27 PROBLEM — M19.012 OSTEOARTHRITIS OF LEFT GLENOHUMERAL JOINT: Status: ACTIVE | Noted: 2020-08-27

## 2020-08-27 PROBLEM — E11.9 TYPE 2 DIABETES MELLITUS: Status: ACTIVE | Noted: 2020-08-27

## 2020-08-27 PROBLEM — Z86.16 HISTORY OF 2019 NOVEL CORONAVIRUS DISEASE (COVID-19): Status: ACTIVE | Noted: 2020-07-21

## 2020-08-27 PROBLEM — M75.100 ROTATOR CUFF TEAR: Status: ACTIVE | Noted: 2020-08-27

## 2020-08-27 LAB
ALBUMIN SERPL BCP-MCNC: 2.2 G/DL (ref 3.5–5.2)
ALP SERPL-CCNC: 344 U/L (ref 55–135)
ALT SERPL W/O P-5'-P-CCNC: 49 U/L (ref 10–44)
ANION GAP SERPL CALC-SCNC: 7 MMOL/L (ref 8–16)
AST SERPL-CCNC: 106 U/L (ref 10–40)
BASOPHILS # BLD AUTO: 0.06 K/UL (ref 0–0.2)
BASOPHILS NFR BLD: 0.7 % (ref 0–1.9)
BILIRUB SERPL-MCNC: 0.5 MG/DL (ref 0.1–1)
BUN SERPL-MCNC: 12 MG/DL (ref 8–23)
CALCIUM SERPL-MCNC: 8.6 MG/DL (ref 8.7–10.5)
CHLORIDE SERPL-SCNC: 104 MMOL/L (ref 95–110)
CO2 SERPL-SCNC: 24 MMOL/L (ref 23–29)
CREAT SERPL-MCNC: 0.9 MG/DL (ref 0.5–1.4)
DIFFERENTIAL METHOD: ABNORMAL
EOSINOPHIL # BLD AUTO: 0.1 K/UL (ref 0–0.5)
EOSINOPHIL NFR BLD: 1.5 % (ref 0–8)
ERYTHROCYTE [DISTWIDTH] IN BLOOD BY AUTOMATED COUNT: 16.4 % (ref 11.5–14.5)
EST. GFR  (AFRICAN AMERICAN): >60 ML/MIN/1.73 M^2
EST. GFR  (NON AFRICAN AMERICAN): >60 ML/MIN/1.73 M^2
GLUCOSE SERPL-MCNC: 73 MG/DL (ref 70–110)
HCT VFR BLD AUTO: 32.3 % (ref 40–54)
HGB BLD-MCNC: 9.8 G/DL (ref 14–18)
IMM GRANULOCYTES # BLD AUTO: 0.05 K/UL (ref 0–0.04)
IMM GRANULOCYTES NFR BLD AUTO: 0.6 % (ref 0–0.5)
LYMPHOCYTES # BLD AUTO: 1.9 K/UL (ref 1–4.8)
LYMPHOCYTES NFR BLD: 22.2 % (ref 18–48)
MAGNESIUM SERPL-MCNC: 2 MG/DL (ref 1.6–2.6)
MCH RBC QN AUTO: 28.2 PG (ref 27–31)
MCHC RBC AUTO-ENTMCNC: 30.3 G/DL (ref 32–36)
MCV RBC AUTO: 93 FL (ref 82–98)
MONOCYTES # BLD AUTO: 1 K/UL (ref 0.3–1)
MONOCYTES NFR BLD: 11.7 % (ref 4–15)
NEUTROPHILS # BLD AUTO: 5.3 K/UL (ref 1.8–7.7)
NEUTROPHILS NFR BLD: 63.3 % (ref 38–73)
NRBC BLD-RTO: 0 /100 WBC
PHOSPHATE SERPL-MCNC: 2.7 MG/DL (ref 2.7–4.5)
PLATELET # BLD AUTO: 509 K/UL (ref 150–350)
PMV BLD AUTO: 9.6 FL (ref 9.2–12.9)
POTASSIUM SERPL-SCNC: 4.8 MMOL/L (ref 3.5–5.1)
PROT SERPL-MCNC: 6.5 G/DL (ref 6–8.4)
RBC # BLD AUTO: 3.47 M/UL (ref 4.6–6.2)
SODIUM SERPL-SCNC: 135 MMOL/L (ref 136–145)
WBC # BLD AUTO: 8.44 K/UL (ref 3.9–12.7)

## 2020-08-27 PROCEDURE — 25000003 PHARM REV CODE 250: Performed by: STUDENT IN AN ORGANIZED HEALTH CARE EDUCATION/TRAINING PROGRAM

## 2020-08-27 PROCEDURE — 94640 AIRWAY INHALATION TREATMENT: CPT

## 2020-08-27 PROCEDURE — 99900035 HC TECH TIME PER 15 MIN (STAT)

## 2020-08-27 PROCEDURE — 84100 ASSAY OF PHOSPHORUS: CPT

## 2020-08-27 PROCEDURE — 36415 COLL VENOUS BLD VENIPUNCTURE: CPT

## 2020-08-27 PROCEDURE — 94761 N-INVAS EAR/PLS OXIMETRY MLT: CPT

## 2020-08-27 PROCEDURE — 25000242 PHARM REV CODE 250 ALT 637 W/ HCPCS: Performed by: STUDENT IN AN ORGANIZED HEALTH CARE EDUCATION/TRAINING PROGRAM

## 2020-08-27 PROCEDURE — 25000003 PHARM REV CODE 250: Performed by: FAMILY MEDICINE

## 2020-08-27 PROCEDURE — 80053 COMPREHEN METABOLIC PANEL: CPT

## 2020-08-27 PROCEDURE — 85025 COMPLETE CBC W/AUTO DIFF WBC: CPT

## 2020-08-27 PROCEDURE — 83735 ASSAY OF MAGNESIUM: CPT

## 2020-08-27 PROCEDURE — 27000221 HC OXYGEN, UP TO 24 HOURS

## 2020-08-27 PROCEDURE — 94664 DEMO&/EVAL PT USE INHALER: CPT

## 2020-08-27 PROCEDURE — 25000003 PHARM REV CODE 250: Performed by: INTERNAL MEDICINE

## 2020-08-27 RX ORDER — DICLOFENAC SODIUM 10 MG/G
2 GEL TOPICAL
Status: CANCELLED | OUTPATIENT
Start: 2020-08-27

## 2020-08-27 RX ORDER — LIDOCAINE 50 MG/G
2 PATCH TOPICAL
Status: CANCELLED | OUTPATIENT
Start: 2020-08-27

## 2020-08-27 RX ORDER — BENZONATATE 100 MG/1
100 CAPSULE ORAL EVERY 8 HOURS PRN
Status: CANCELLED | OUTPATIENT
Start: 2020-08-27

## 2020-08-27 RX ORDER — FLUTICASONE PROPIONATE 110 UG/1
1 AEROSOL, METERED RESPIRATORY (INHALATION) EVERY 12 HOURS
Status: CANCELLED | OUTPATIENT
Start: 2020-08-27

## 2020-08-27 RX ORDER — ENOXAPARIN SODIUM 100 MG/ML
80 INJECTION SUBCUTANEOUS
Status: CANCELLED | OUTPATIENT
Start: 2020-08-27

## 2020-08-27 RX ORDER — MAG HYDROX/ALUMINUM HYD/SIMETH 200-200-20
SUSPENSION, ORAL (FINAL DOSE FORM) ORAL
Status: CANCELLED | OUTPATIENT
Start: 2020-08-27

## 2020-08-27 RX ORDER — KETOROLAC TROMETHAMINE 10 MG/1
10 TABLET, FILM COATED ORAL ONCE
Status: COMPLETED | OUTPATIENT
Start: 2020-08-27 | End: 2020-08-27

## 2020-08-27 RX ORDER — POLYETHYLENE GLYCOL 3350 17 G/17G
17 POWDER, FOR SOLUTION ORAL DAILY
Status: CANCELLED | OUTPATIENT
Start: 2020-08-27

## 2020-08-27 RX ORDER — MONTELUKAST SODIUM 10 MG/1
10 TABLET ORAL NIGHTLY
Status: CANCELLED | OUTPATIENT
Start: 2020-08-27

## 2020-08-27 RX ORDER — SODIUM CHLORIDE 0.9 % (FLUSH) 0.9 %
10 SYRINGE (ML) INJECTION
Status: CANCELLED | OUTPATIENT
Start: 2020-08-27

## 2020-08-27 RX ORDER — SIMETHICONE 125 MG
125 TABLET,CHEWABLE ORAL EVERY 6 HOURS PRN
Status: CANCELLED | OUTPATIENT
Start: 2020-08-27

## 2020-08-27 RX ORDER — KETOROLAC TROMETHAMINE 30 MG/ML
15 INJECTION, SOLUTION INTRAMUSCULAR; INTRAVENOUS ONCE
Status: DISCONTINUED | OUTPATIENT
Start: 2020-08-27 | End: 2020-08-27

## 2020-08-27 RX ORDER — PANTOPRAZOLE SODIUM 40 MG/1
40 TABLET, DELAYED RELEASE ORAL 2 TIMES DAILY
Status: CANCELLED | OUTPATIENT
Start: 2020-08-27

## 2020-08-27 RX ORDER — AMOXICILLIN 250 MG
1 CAPSULE ORAL 2 TIMES DAILY
Status: CANCELLED | OUTPATIENT
Start: 2020-08-27

## 2020-08-27 RX ORDER — VORICONAZOLE 200 MG/1
200 TABLET, FILM COATED ORAL
Status: CANCELLED | OUTPATIENT
Start: 2020-08-27

## 2020-08-27 RX ORDER — IPRATROPIUM BROMIDE AND ALBUTEROL SULFATE 2.5; .5 MG/3ML; MG/3ML
3 SOLUTION RESPIRATORY (INHALATION)
Status: CANCELLED | OUTPATIENT
Start: 2020-08-27

## 2020-08-27 RX ORDER — ACETAMINOPHEN 325 MG/1
650 TABLET ORAL EVERY 6 HOURS PRN
Status: CANCELLED | OUTPATIENT
Start: 2020-08-27

## 2020-08-27 RX ORDER — HYDROCODONE BITARTRATE AND ACETAMINOPHEN 500; 5 MG/1; MG/1
TABLET ORAL
Status: CANCELLED | OUTPATIENT
Start: 2020-08-27

## 2020-08-27 RX ORDER — TAMSULOSIN HYDROCHLORIDE 0.4 MG/1
0.4 CAPSULE ORAL DAILY
Status: CANCELLED | OUTPATIENT
Start: 2020-08-27

## 2020-08-27 RX ORDER — HYDROXYZINE PAMOATE 25 MG/1
25 CAPSULE ORAL NIGHTLY PRN
Status: CANCELLED | OUTPATIENT
Start: 2020-08-27

## 2020-08-27 RX ORDER — NAPROXEN 500 MG/1
500 TABLET ORAL 2 TIMES DAILY WITH MEALS
Status: CANCELLED | OUTPATIENT
Start: 2020-08-27

## 2020-08-27 RX ORDER — ONDANSETRON 2 MG/ML
4 INJECTION INTRAMUSCULAR; INTRAVENOUS EVERY 8 HOURS PRN
Status: CANCELLED | OUTPATIENT
Start: 2020-08-27

## 2020-08-27 RX ADMIN — KETOROLAC TROMETHAMINE 10 MG: 10 TABLET, FILM COATED ORAL at 05:08

## 2020-08-27 RX ADMIN — PANTOPRAZOLE SODIUM 40 MG: 40 TABLET, DELAYED RELEASE ORAL at 09:08

## 2020-08-27 RX ADMIN — FLUTICASONE PROPIONATE 1 PUFF: 110 AEROSOL, METERED RESPIRATORY (INHALATION) at 07:08

## 2020-08-27 RX ADMIN — THERA TABS 1 TABLET: TAB at 09:08

## 2020-08-27 RX ADMIN — NAPROXEN 500 MG: 500 TABLET ORAL at 09:08

## 2020-08-27 RX ADMIN — VORICONAZOLE 200 MG: 200 TABLET, FILM COATED ORAL at 04:08

## 2020-08-27 RX ADMIN — IPRATROPIUM BROMIDE AND ALBUTEROL SULFATE 3 ML: 2.5; .5 SOLUTION RESPIRATORY (INHALATION) at 07:08

## 2020-08-27 RX ADMIN — TAMSULOSIN HYDROCHLORIDE 0.4 MG: 0.4 CAPSULE ORAL at 09:08

## 2020-08-27 NOTE — DISCHARGE SUMMARY
[facility] Hospital Medicine  Discharge Summary      Patient Name: Rajan Deluna  MRN: [id number]  Admission Date: 7/21/2020  Hospital Length of Stay: 37 days  Discharge Date and Time:  08/27/2020 10:08 AM  Attending Physician: Rajan Rodríguez MD   Discharging Provider: Emeli Adam MD  Primary Care Provider: Jason Mccord MD      HPI:   Patient is a 74-year-old male with a past medical history of hypertension, COPD, MAC infection who presented to the ED with altered mental status and fever.  As per the family, patient has had altered mental status for the past 3-4 days. Family endorses increased somnolence as well as urinating on himself. EMS was called two days prior to presentation but reportedly deemed that patient did not need to be brought in. Over the previous day, reportedly the patient was found stuck, staring in his sink, not responding. On the day of presentation, the patient was found down on the floor, family wasn't able to get patient up from the floor and was reportedly not responding as much as baseline. Family believed that patient may not have been wearing baseline 2L O2. Patient also endorses a cough and generalized weakness. Patient reportedly had a recent UTI.     In the ED, chest x-ray showed diffuse interstitial patchy infiltrates concerning for possible pneumonia and a COVID-19 screening test was positive. Fever was 101° F and patient was hypotensive 80s/40s. Patient given 30cc/kg bolus. BP very soft on presentation, adequately responded to volume resuscitation and then decreased again. Labs showed a BUN 45, creatinine 2.9, , , troponin 0.036, procalcitonin 1.13.     Procedure(s) (LRB):  IMAGING PROCEDURE, GI TRACT, INTRALUMINAL, VIA CAPSULE (N/A)      Hospital Course:   Patient has had an extensive hospital course 2/2 COVID-related sepsis. Patient required peripheral Levophed for 1 day (upon admission) and has had stable MAPs throughout his course  off pressors. Initially, patient required continuous BiPap and was stepped up to the ICU on 7/22 for 6 days. Patient also presented with a high D-dimer at 7.22 and was started on a heparin drip. CTA was contraindicated due to MARQUIS upon initial presentation. MARQUIS has improved with current Cr of 1 down from 2.9. While in the ICU, patient required supplemental O2. Patient complained of melanic diarrhea and a rectal tube was placed. Patient anxious and disoriented in the ICU. Haldol PRN for agitation in the ICU. Mental status improved in the ICU. Good UOP.    ID workup of melanic stool showed C. diff positive antigen and C. diff PCR toxin positive. Patient's melanic stool improved and rectal tube removed. He completed treatment with IV metronidazole and P.O. vancomycin for a total of 14 days, last dose 8/7. Though patient's melanic stool and watery diarrhea improved, there were concerns for a GI bleed due to an H/H of 6.9/21.5. Heparin drip was discontinued, IV protonix started, and the patient received 1 unit of packed RBCs. 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 Started on probiotics. Most recent recent H&H 9.5/37, stable.    Patient transitioned off continuous BiPAP and stepped down to the floor 7/27 with BiPAP use only at night. Continued to require supplemental O2. Tolerated advances in diet, duoneb treatments, incentive spirometry and chest physiotherapy. Tolerates PT/OT daily. Discussed self-proning due to deconditioned respiratory status but patient unable to tolerate. Off COVID isolation on 8/10, per hospital policy of 20 days negative COVID status. Patient received Levoquin for 5 days, stopped after procal <1. IV dexamthasone for 10 days. Redemsivir for 5 days.     Patient with history of aspergillosis and was on itraconazole as outpatient. WBC count  increased to 20 during his course but resolved after patient completed 10 day course of IV dexamethasone. Per ID, Voriconazole 400 mg po every 12 h X 2 loading doses, starting 7/29, then change to voriconazole 200 mg po every 12 h thereafter. Plan is to continue voriconazole and discharge on Itraconazole 100mg BID and follow up with outpatient Pulmonary and ID for continued Aspergillosis and MAC treatment.     During the patient's hospital course, he also had complaints of chest pain which resolved with a GI cocktail. Normal EKG and troponin trend. He also complained about left shoulder pain, in which the patient has a history of chronic shoulder pain that has required orthopedic surgery in the past. A shoulder x-ray showed to acute changes and the pain was managed with lidocaine patches and naproxen.    LTAC placement denied multiple times due to patient's continued need of supplemental oxygen. Patient expressed wanting to go home with possible hospice/palliative care but now is amendable to a type of rehabilitation facility. Multiple discussions about hospice/palliative care were held with patient. Discussed hospice/palliative care with daughter, patient's power of , due to patient's new baseline for his COPD 2/2 COVID. She is not agreeable to the plan and would like a type of rehabilitation facility too. Psych consulted to assess the patient's capacity and is in agreement with the primary team that the patient has the capacity to make his own medical decisions. A family meeting with palliative care is being coordinated with patient and daughter to discuss goals of care. Due to patient's extensive hospital course, the patient is deconditioned. The patient desats <88% with minimal exertion and supplemental O2 requirement increases during these episodes. Able to wean the patient down to 7L HFNC thus far from a peak requirement of 30L 70% on vapotherm. Patient's daughter decided to release being Power of  . 8/23 Patient on 9L High flow oxygen. Spoke with pulmonology briefly about patient who suggested a CT might be beneficial in identifying a possible correctable underlying etiology. Patient initially refused CT and stated that he just wants to go home. Patient CT demonstrated new fibrosis.  8/24 Patient on 7 L High flow. 8/25 Patient on 9L high flow. 8/26 Patient on 7L High flow oxygen. 8/27Patient stable for discharge to LTAC.     Consults:   Consults (From admission, onward)        Status Ordering Provider     Inpatient consult to Gastroenterology-[facility]  Once     Provider:  (Not yet assigned)    Completed EMMA MORALES     Inpatient consult to Infectious Diseases  Once     Provider:  (Not yet assigned)    Completed AFUA TATUM     Inpatient consult to Palliative Care  Once     Provider:  (Not yet assigned)    Completed JOHNSON, D'AMICO C.     Inpatient consult to Palliative Care  Once     Provider:  (Not yet assigned)    Completed PARAG TOWNSEND     Inpatient consult to Psychiatry  Once     Provider:  (Not yet assigned)    Completed PARAG TOWNSEND     Inpatient consult to Registered Dietitian/Nutritionist  Once     Provider:  (Not yet assigned)    Completed BRYAN PALOMINO     Pharmacy Remdesivir Consult  Once     Provider:  (Not yet assigned)    Acknowledged MEAGAN PALAFOX     Pulmonology  Once     Provider:  (Not yet assigned)    Completed ION NÚÑEZ          No new Assessment & Plan notes have been filed under this hospital service since the last note was generated.  Service: Hospital Medicine    Final Active Diagnoses:    Diagnosis Date Noted POA    PRINCIPAL PROBLEM:  COVID-19 [U07.1] 07/21/2020 Unknown    Weakness generalized [R53.1]  No    Post viral debility [R53.81]  No    Counseling regarding advance care planning and goals of care [Z71.89]  Not Applicable    Goals of care, counseling/discussion [Z71.89]  Not Applicable    Palliative care encounter [Z51.5]   Not Applicable    Left shoulder pain [M25.512] 08/14/2020 Yes    History of Aspergillus & MAC [B44.9]  Yes    COPD (chronic obstructive pulmonary disease) [J44.9] 05/22/2017 Yes    Anemia [D64.9] 12/09/2016 No      Problems Resolved During this Admission:    Diagnosis Date Noted Date Resolved POA    History of MAC infection [Z86.19]  08/07/2020 No    GI bleed [K92.2] 07/29/2020 08/07/2020 Clinically Undetermined    Clostridium difficile infection [A49.8]  08/07/2020 Clinically Undetermined    Acute respiratory disease due to COVID-19 virus [U07.1, J06.9]  08/16/2020 Yes    Acute respiratory failure with hypoxia [J96.01]  08/07/2020 Yes    MARQUIS (acute kidney injury) [N17.9] 07/21/2020 08/07/2020 Yes       Discharged Condition: stable    Disposition: Long Term Acute Care    Follow Up:  Please have patient follow up with his PCP, Pulmonologist, and ID specialist at Discharge from LTAC. Patient on full-dose lovenox due to COVID 19 prior infection and admission DDimer >7.    Follow-up Information     Jason Mccord MD In 1 week.    Specialty: Family Medicine  Contact information:  [address] [place] [id number]                 Patient Instructions:   No discharge procedures on file.    Significant Diagnostic Studies: Labs:   CMP   Recent Labs   Lab 08/25/20  1457 08/27/20  0330   * 135*   K 4.7 4.8    104   CO2 26 24    73   BUN 13 12   CREATININE 1.0 0.9   CALCIUM 8.2* 8.6*   PROT 6.5 6.5   ALBUMIN 2.1* 2.2*   BILITOT 0.5 0.5   ALKPHOS 349* 344*   * 106*   ALT 49* 49*   ANIONGAP 6* 7*   ESTGFRAFRICA >60 >60   EGFRNONAA >60 >60   , CBC   Recent Labs   Lab 08/27/20  0330   WBC 8.44   HGB 9.8*   HCT 32.3*   *   , INR   Lab Results   Component Value Date    INR 1.0 07/21/2020   , Troponin No results for input(s): TROPONINI in the last 168 hours., A1C:   Recent Labs   Lab 07/23/20  0403   HGBA1C 6.3*    and All labs within the past 24 hours have been  reviewed  Microbiology:   Blood Culture   Lab Results   Component Value Date    LABBLOO No growth after 5 days. 07/24/2020    LABBLOO No growth after 5 days. 07/24/2020       Pending Diagnostic Studies:     Procedure Component Value Units Date/Time    X-Ray Chest AP Portable [[id number]]     Order Status: Sent Lab Status: No result          Medications:  Transfer Medications (for Discharge Readmit only):   Current Facility-Administered Medications   Medication Dose Route Frequency Provider Last Rate Last Dose    0.9%  NaCl infusion (for blood administration)   Intravenous Q24H PRN Olivier Rowe DO        acetaminophen tablet 650 mg  650 mg Oral Q6H PRN Rell Tolentino III, MD   650 mg at 08/26/20 1946    albuterol-ipratropium 2.5 mg-0.5 mg/3 mL nebulizer solution 3 mL  3 mL Nebulization QID WAKE Emeli Adam MD   3 mL at 08/27/20 0729    benzonatate capsule 100 mg  100 mg Oral Q8H PRN Olivier Rowe DO   100 mg at 08/17/20 0916    dextromethorphan-guaifenesin  mg per 12 hr tablet 1 tablet  1 tablet Oral BID PRN Emeli Adam MD   1 tablet at 08/18/20 0447    diclofenac sodium 1 % gel 2 g  2 g Topical (Top) PRN Ilana Saavedra MD   2 g at 08/15/20 0636    enoxaparin injection 80 mg  80 mg Subcutaneous Q12H Rell Tolentino III, MD   80 mg at 08/26/20 0419    fluticasone propionate 110 mcg/actuation inhaler 1 puff  1 puff Inhalation Q12H Emeli Adam MD   1 puff at 08/27/20 0736    hydrocortisone 1 % ointment   Topical (Top) PRN Rell Tolentino III, MD        hydrOXYzine pamoate capsule 25 mg  25 mg Oral Nightly PRN Ilana Saavedra MD   25 mg at 08/26/20 1946    Lactobacillus acidoph-L.bulgar 1 million cell tablet 4 tablet  4 tablet Oral TID  Emile Grey MD   4 tablet at 08/26/20 1647    lidocaine 5 % patch 2 patch  2 patch Transdermal Q24H Emeli Adam MD   2 patch at 08/26/20 1657    montelukast tablet 10 mg  10 mg Oral QHS Emeli Adam MD    10 mg at 08/26/20 2005    multivitamin tablet  1 tablet Oral Daily Jordy Edge MD   1 tablet at 08/27/20 0923    naproxen tablet 500 mg  500 mg Oral BID WM Emeli Adam MD   500 mg at 08/27/20 0923    ondansetron injection 4 mg  4 mg Intravenous Q8H PRN Jordy Edge MD        pantoprazole EC tablet 40 mg  40 mg Oral BID Rell Tolentino III, MD   40 mg at 08/27/20 0923    pneumoc 13-loy conj-dip cr(PF) (PREVNAR 13 (PF)) 0.5 mL  0.5 mL Intramuscular vaccine x 1 dose Rajan Rodríguez MD        polyethylene glycol packet 17 g  17 g Oral Daily Rell Tolentino III, MD   17 g at 08/21/20 0920    senna-docusate 8.6-50 mg per tablet 1 tablet  1 tablet Oral BID Jordy Edge MD   1 tablet at 08/25/20 2151    simethicone chewable tablet 125 mg  125 mg Oral Q6H PRN Emile Grey MD   125 mg at 07/26/20 2215    sodium chloride 0.9% bolus 1,000 mL  1,000 mL Intravenous Once Emeli Adam MD        sodium chloride 0.9% flush 10 mL  10 mL Intravenous PRN Jordy Edge MD        tamsulosin 24 hr capsule 0.4 mg  0.4 mg Oral Daily Emeli Adam MD   0.4 mg at 08/27/20 0923    voriconazole tablet 200 mg  200 mg Oral Q12H Alesha Casanova MD   200 mg at 08/27/20 0400       Indwelling Lines/Drains at time of discharge:   Lines/Drains/Airways     None                 Time spent on the discharge of patient: 55 minutes  Patient was seen and examined on the date of discharge and determined to be suitable for discharge.         Emeli Adam MD  Department of Hospital Medicine  [facility]

## 2020-08-27 NOTE — PROGRESS NOTES
[facility] Medicine  Progress Note    Patient Name: Rajan Deluna  MRN: [id number]  Patient Class: IP- Inpatient   Admission Date: 7/21/2020  Length of Stay: 37 days  Attending Physician: Rajan Rodríguez MD  Primary Care Provider: Jason Mccord MD        Subjective:     Principal Problem:COVID-19        HPI:  Patient is a 74-year-old male with a past medical history of hypertension, COPD, MAC infection who presented to the ED with altered mental status and fever.  As per the family, patient has had altered mental status for the past 3-4 days. Family endorses increased somnolence as well as urinating on himself. EMS was called two days prior to presentation but reportedly deemed that patient did not need to be brought in. Over the previous day, reportedly the patient was found stuck, staring in his sink, not responding. On the day of presentation, the patient was found down on the floor, family wasn't able to get patient up from the floor and was reportedly not responding as much as baseline. Family believed that patient may not have been wearing baseline 2L O2. Patient also endorses a cough and generalized weakness. Patient reportedly had a recent UTI.     In the ED, chest x-ray showed diffuse interstitial patchy infiltrates concerning for possible pneumonia and a COVID-19 screening test was positive. Fever was 101° F and patient was hypotensive 80s/40s. Patient given 30cc/kg bolus. BP very soft on presentation, adequately responded to volume resuscitation and then decreased again. Labs showed a BUN 45, creatinine 2.9, , , troponin 0.036, procalcitonin 1.13.     Overview/Hospital Course:  Patient has had an extensive hospital course 2/2 COVID-related sepsis. Patient required peripheral Levophed for 1 day (upon admission) and has had stable MAPs throughout his course off pressors. Initially, patient required continuous BiPap and was stepped up to the ICU on 7/22 for 6  days. Patient also presented with a high D-dimer at 7.22 and was started on a heparin drip. CTA was contraindicated due to MARQUIS upon initial presentation. MARQUIS has improved with current Cr of 1 down from 2.9. While in the ICU, patient required supplemental O2. Patient complained of melanic diarrhea and a rectal tube was placed. Patient anxious and disoriented in the ICU. Haldol PRN for agitation in the ICU. Mental status improved in the ICU. Good UOP.    ID workup of melanic stool showed C. diff positive antigen and C. diff PCR toxin positive. Patient's melanic stool improved and rectal tube removed. He completed treatment with IV metronidazole and P.O. vancomycin for a total of 14 days, last dose 8/7. Though patient's melanic stool and watery diarrhea improved, there were concerns for a GI bleed due to an H/H of 6.9/21.5. Heparin drip was discontinued, IV protonix started, and the patient received 1 unit of packed RBCs. 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 Started on probiotics. Most recent recent H&H 9.5/37, stable.    Patient transitioned off continuous BiPAP and stepped down to the floor 7/27 with BiPAP use only at night. Continued to require supplemental O2. Tolerated advances in diet, duoneb treatments, incentive spirometry and chest physiotherapy. Tolerates PT/OT daily. Discussed self-proning due to deconditioned respiratory status but patient unable to tolerate. Off COVID isolation on 8/10, per hospital policy of 20 days negative COVID status. Patient received Levoquin for 5 days, stopped after procal <1. IV dexamthasone for 10 days. Redemsivir for 5 days.     Patient with history of aspergillosis and was on itraconazole as outpatient. WBC count increased to 20 during his course but resolved after patient completed 10 day course of IV dexamethasone.  Per ID, Voriconazole 400 mg po every 12 h X 2 loading doses, starting 7/29, then change to voriconazole 200 mg po every 12 h thereafter. Plan is to continue voriconazole and discharge on Itraconazole 100mg BID and follow up with outpatient Pulmonary and ID for continued Aspergillosis and MAC treatment.     During the patient's hospital course, he also had complaints of chest pain which resolved with a GI cocktail. Normal EKG and troponin trend. He also complained about left shoulder pain, in which the patient has a history of chronic shoulder pain that has required orthopedic surgery in the past. A shoulder x-ray showed to acute changes and the pain was managed with lidocaine patches and naproxen.    LTAC placement denied multiple times due to patient's continued need of supplemental oxygen. Patient expressed wanting to go home with possible hospice/palliative care but now is amendable to a type of rehabilitation facility. Multiple discussions about hospice/palliative care were held with patient. Discussed hospice/palliative care with daughter, patient's power of , due to patient's new baseline for his COPD 2/2 COVID. She is not agreeable to the plan and would like a type of rehabilitation facility too. Psych consulted to assess the patient's capacity and is in agreement with the primary team that the patient has the capacity to make his own medical decisions. A family meeting with palliative care is being coordinated with patient and daughter to discuss goals of care. Due to patient's extensive hospital course, the patient is deconditioned. The patient desats <88% with minimal exertion and supplemental O2 requirement increases during these episodes. Able to wean the patient down to 7L HFNC thus far from a peak requirement of 30L 70% on vapotherm. Patient's daughter decided to release being Power of . 8/23 Patient on 9L High flow oxygen. Spoke with pulmonology briefly about patient who suggested a CT  might be beneficial in identifying a possible correctable underlying etiology. Patient refused CT and stated that he just wants to go home. 8/24 Patient on 7 L High flow. 8/25 Patient on 9L high flow. 8/26 Patient on 7L High flow oxygen.     Interval History: Patient admitted to LTAC, ready for D/C with 7L HFNC. Will continue voriconazole during LTAC stay, switch to itroconazole at home discharge.     Review of Systems   Constitutional: Negative for activity change, appetite change, chills and fatigue.   HENT: Negative for congestion and dental problem.    Eyes: Negative for discharge and itching.   Respiratory: Negative for cough and shortness of breath.    Cardiovascular: Negative for chest pain.   Gastrointestinal: Negative for abdominal pain, blood in stool, diarrhea and nausea.   Endocrine: Negative for cold intolerance and heat intolerance.   Genitourinary: Negative for difficulty urinating.   Musculoskeletal: Negative for arthralgias, back pain and gait problem.   Skin: Negative.  Negative for color change and pallor.   Neurological: Negative for dizziness, facial asymmetry, light-headedness and headaches.   Psychiatric/Behavioral: The patient is not nervous/anxious.      Objective:     Vital Signs (Most Recent):  Temp: 97.3 °F (36.3 °C) (08/27/20 0746)  Pulse: 94 (08/27/20 0746)  Resp: 20 (08/27/20 0746)  BP: (!) 114/59 (08/27/20 0746)  SpO2: 95 % (08/27/20 0746) Vital Signs (24h Range):  Temp:  [97.3 °F (36.3 °C)-98.3 °F (36.8 °C)] 97.3 °F (36.3 °C)  Pulse:  [] 94  Resp:  [16-21] 20  SpO2:  [92 %-98 %] 95 %  BP: (108-133)/(59-76) 114/59     Weight: 77.1 kg (169 lb 15.6 oz)  Body mass index is 23.71 kg/m².    Intake/Output Summary (Last 24 hours) at 8/27/2020 0806  Last data filed at 8/27/2020 0600  Gross per 24 hour   Intake 30 ml   Output 1090 ml   Net -1060 ml      Physical Exam  Vitals signs and nursing note reviewed.   Constitutional:       General: He is not in acute distress.     Appearance:  Normal appearance. He is not ill-appearing.   HENT:      Head: Normocephalic and atraumatic.      Right Ear: External ear normal.      Left Ear: External ear normal.      Nose: Nose normal. No congestion or rhinorrhea.      Mouth/Throat:      Mouth: Mucous membranes are moist.      Pharynx: Oropharynx is clear.   Eyes:      Pupils: Pupils are equal, round, and reactive to light.   Neck:      Musculoskeletal: Normal range of motion and neck supple.   Cardiovascular:      Rate and Rhythm: Normal rate and regular rhythm.      Pulses: Normal pulses.      Heart sounds: Normal heart sounds.   Pulmonary:      Effort: Pulmonary effort is normal.      Breath sounds: No wheezing.      Comments: Wheezes this AM, likely COPD. O2 at 6L HFNC  Abdominal:      General: Abdomen is flat. There is no distension.      Palpations: Abdomen is soft.      Tenderness: There is no abdominal tenderness. There is no guarding.   Musculoskeletal: Normal range of motion.         General: No swelling.   Skin:     General: Skin is warm and dry.   Neurological:      General: No focal deficit present.      Mental Status: He is alert and oriented to person, place, and time. Mental status is at baseline.   Psychiatric:         Mood and Affect: Mood normal.         Behavior: Behavior normal.         Thought Content: Thought content normal.         Judgment: Judgment normal.         Significant Labs:   CBC:   Recent Labs   Lab 08/27/20  0330   WBC 8.44   HGB 9.8*   HCT 32.3*   *     CMP:   Recent Labs   Lab 08/25/20  1457 08/27/20  0330   * 135*   K 4.7 4.8    104   CO2 26 24    73   BUN 13 12   CREATININE 1.0 0.9   CALCIUM 8.2* 8.6*   PROT 6.5 6.5   ALBUMIN 2.1* 2.2*   BILITOT 0.5 0.5   ALKPHOS 349* 344*   * 106*   ALT 49* 49*   ANIONGAP 6* 7*   EGFRNONAA >60 >60     All pertinent labs within the past 24 hours have been reviewed.    Significant Imaging: I have reviewed all pertinent imaging results/findings within the  past 24 hours.      Assessment/Plan:      * COVID-19  -Lifted Isolation 2 weeks ago  -Continuing to wean O2  -Deconditioning and long illness has led to increased O2 requirement, continuing to wean and work with PT/OT, RT with goal of SNF vs LTAC  - Encourage BiPap QHS    Goals of care, counseling/discussion  -Patient has capacity  -Per family, patient cannot return home as wife too weak to help him and son works full time  -Will continue to work toward discharge planning    Left shoulder pain  Naproxen scheduled BID  Continue lidocaine patch, tylenol for pain  U/S was negative for any gallbladder outlet obstruction, cholecystitis, etc  stable      History of Aspergillus & MAC  Continuing voriconazole, switch to itroconazole at home DC  -Follow up with Pulm, ID outpatient    Hypotension  Patient's most recent blood pressure 116/75  RESOLVED      Sepsis  RESOLVED  See plan for COVID 19    COPD (chronic obstructive pulmonary disease)  On HFNC 6L  Continue Montelukast, Flovent  Continue to wean  Goal of 5L NC  Patient to sit up 2x daily in chair  Mild wheezes present   Continue albuterol and chest physio/IS  -CT demonstrates new fibrosis, from prior CT in 2017        Anemia  Asymptomatic  H/H stable at 9.8/32.3  Labs QUOD  STABLE      VTE Risk Mitigation (From admission, onward)         Ordered     enoxaparin injection 80 mg  Every 12 hours (non-standard times)      08/04/20 1421     Place sequential compression device  Until discontinued      07/21/20 2142     IP VTE HIGH RISK PATIENT  Once      07/21/20 2142                Discharge Planning   DAAM: 8/27/2020     Code Status: Full Code   Is the patient medically ready for discharge?:     Reason for patient still in hospital (select all that apply): Other (specify) transportation to LTAC  Discharge Plan A: Long-term acute care facility (LTAC)   Discharge Delays: None known at this time              Emeli Adam MD   [employer] PGY2  Department of Hospital Medicine    [facility]

## 2020-08-27 NOTE — PROGRESS NOTES
Pt refuses to be put on BiPAP. Will continue to stay on HF 7L. Pt has adequate sats. Will continue to monitor.

## 2020-08-27 NOTE — PLAN OF CARE
Ambulance transport set up for 10:15.  I spoke with pt and Tania Deluna ( Wife ) : [phone] and they are both aware of transport time.  Pt discharging to [facility] LT[facility] room 228.  Report can be called to [phone].  Rounds completed on pt.  All questions addressed.  Bedside nurse to discuss d/c medications.  Discussed importance to attend all f/u appts and take medications as prescribed.  Verbalized understanding.       08/27/20 0936   Final Note   Assessment Type Final Discharge Note   Anticipated Discharge Disposition LTAC   Hospital Follow Up  Appt(s) scheduled? No   Discharge plans and expectations educations in teach back method with documentation complete? Yes   Right Care Referral Info   Post Acute Recommendation Other   Post-Acute Status   Post-Acute Authorization Placement   Post-Acute Placement Status Set-up Complete     Phong Suh RN,   [phone]

## 2020-08-27 NOTE — DISCHARGE INSTRUCTIONS
Chronic Obstructive Pulmonary Disease (COPD), Discharge Instructions (English) View Edit Remove   Sepsis (English) View Edit Remove   [facility] COVID 19 PREVENTION View Edit Remove   Fibrosis, Pulmonary (English) View Edit Remove

## 2020-08-27 NOTE — PLAN OF CARE
Problem: Adult Inpatient Plan of Care  Goal: Plan of Care Review  Patient is awake and orientedx4. Care plan explained to patient; he verbalized understanding. On 7L via highflow nasal  cannula, O2 saturation at 95-97%. Hooked to heart monitor running normal sinus rhythm at 80-95bpm. Incontinence care completed, patient had 1 bowel movement overnight. No n/v/d during shift. Patient had left shoulder pain, prn tylenol given. Patient refused his nightly senna. Due medications given. Encouraged/assisted to turn every 2 hours as tolerated. Maintained on fall risk precaution. Bed in lowest position, bed alarm on, call light/personal items within reach and instructed to call for help when needed.    0550H: Patient had left shoulder pain that radiated to his left hand, notified Dr. Preciado one time dose of toradol PO given     Will continue to monitor.       Outcome: Ongoing, Progressing

## 2020-08-27 NOTE — SUBJECTIVE & OBJECTIVE
Interval History: Patient admitted to LTAC, ready for D/C with 7L HFNC. Will continue voriconazole during LTAC stay, switch to itroconazole at home discharge.     Review of Systems   Constitutional: Negative for activity change, appetite change, chills and fatigue.   HENT: Negative for congestion and dental problem.    Eyes: Negative for discharge and itching.   Respiratory: Negative for cough and shortness of breath.    Cardiovascular: Negative for chest pain.   Gastrointestinal: Negative for abdominal pain, blood in stool, diarrhea and nausea.   Endocrine: Negative for cold intolerance and heat intolerance.   Genitourinary: Negative for difficulty urinating.   Musculoskeletal: Negative for arthralgias, back pain and gait problem.   Skin: Negative.  Negative for color change and pallor.   Neurological: Negative for dizziness, facial asymmetry, light-headedness and headaches.   Psychiatric/Behavioral: The patient is not nervous/anxious.      Objective:     Vital Signs (Most Recent):  Temp: 97.3 °F (36.3 °C) (08/27/20 0746)  Pulse: 94 (08/27/20 0746)  Resp: 20 (08/27/20 0746)  BP: (!) 114/59 (08/27/20 0746)  SpO2: 95 % (08/27/20 0746) Vital Signs (24h Range):  Temp:  [97.3 °F (36.3 °C)-98.3 °F (36.8 °C)] 97.3 °F (36.3 °C)  Pulse:  [] 94  Resp:  [16-21] 20  SpO2:  [92 %-98 %] 95 %  BP: (108-133)/(59-76) 114/59     Weight: 77.1 kg (169 lb 15.6 oz)  Body mass index is 23.71 kg/m².    Intake/Output Summary (Last 24 hours) at 8/27/2020 0806  Last data filed at 8/27/2020 0600  Gross per 24 hour   Intake 30 ml   Output 1090 ml   Net -1060 ml      Physical Exam  Vitals signs and nursing note reviewed.   Constitutional:       General: He is not in acute distress.     Appearance: Normal appearance. He is not ill-appearing.   HENT:      Head: Normocephalic and atraumatic.      Right Ear: External ear normal.      Left Ear: External ear normal.      Nose: Nose normal. No congestion or rhinorrhea.      Mouth/Throat:       Mouth: Mucous membranes are moist.      Pharynx: Oropharynx is clear.   Eyes:      Pupils: Pupils are equal, round, and reactive to light.   Neck:      Musculoskeletal: Normal range of motion and neck supple.   Cardiovascular:      Rate and Rhythm: Normal rate and regular rhythm.      Pulses: Normal pulses.      Heart sounds: Normal heart sounds.   Pulmonary:      Effort: Pulmonary effort is normal.      Breath sounds: No wheezing.      Comments: Wheezes this AM, likely COPD. O2 at 6L HFNC  Abdominal:      General: Abdomen is flat. There is no distension.      Palpations: Abdomen is soft.      Tenderness: There is no abdominal tenderness. There is no guarding.   Musculoskeletal: Normal range of motion.         General: No swelling.   Skin:     General: Skin is warm and dry.   Neurological:      General: No focal deficit present.      Mental Status: He is alert and oriented to person, place, and time. Mental status is at baseline.   Psychiatric:         Mood and Affect: Mood normal.         Behavior: Behavior normal.         Thought Content: Thought content normal.         Judgment: Judgment normal.         Significant Labs:   CBC:   Recent Labs   Lab 08/27/20  0330   WBC 8.44   HGB 9.8*   HCT 32.3*   *     CMP:   Recent Labs   Lab 08/25/20  1457 08/27/20  0330   * 135*   K 4.7 4.8    104   CO2 26 24    73   BUN 13 12   CREATININE 1.0 0.9   CALCIUM 8.2* 8.6*   PROT 6.5 6.5   ALBUMIN 2.1* 2.2*   BILITOT 0.5 0.5   ALKPHOS 349* 344*   * 106*   ALT 49* 49*   ANIONGAP 6* 7*   EGFRNONAA >60 >60     All pertinent labs within the past 24 hours have been reviewed.    Significant Imaging: I have reviewed all pertinent imaging results/findings within the past 24 hours.

## 2020-08-31 PROBLEM — E43 SEVERE MALNUTRITION: Status: ACTIVE | Noted: 2020-08-31

## 2020-09-02 ENCOUNTER — TELEPHONE (OUTPATIENT)
Dept: NEUROLOGY | Facility: HOSPITAL | Age: 75
End: 2020-09-02
Payer: MEDICARE

## 2020-09-03 LAB — FUNGUS BLD CULT: NORMAL

## 2020-09-23 LAB
ACID FAST SUSCEPTIBILITY, SLOW GROWER: ABNORMAL
SPECIMEN SOURCE AND ORGANISM NAME: ABNORMAL

## 2020-09-27 LAB
ACID FAST MOD KINY STN SPEC: NORMAL
MYCOBACTERIUM SPEC QL CULT: NORMAL

## 2020-10-06 NOTE — PLAN OF CARE
Patient stale. AAOx4. VSS. Plan of care reviewed with patient. Verbal reported of understanding. NSR on tele monitoring with no red alarms noted. Safety maintained. Turned every two hours as tolerated. Patient still on O2 8L of high flow via NC and O2 sats  91 to 92% at this time.  Due meds given per ordered. Blood glucose closely monitored. No acute distress noted. Bed in lowest position. Bed alarm on. Head of bed elevated.  Side rails x 2 are up. Call bell with in reach. Patient will be monitored overnight.     Pt is unable to carry on a full conversation due to weakness in her vocal cords.     Pt does have some interest in learning about her diagnosis of vasculitis, she had been doing poorly so when she left the hospital, she was on hospice.  They did not discuss treatment options with providers before they left the hospital and so they would like learn more about what treatment would be.  If they are interested, she would be interested in coming off hospice and pursuing treatment.       Currently pt is quite weak, but is getting stronger every day.  Is able to feed herself now, gaining some trunk strength, does have weak voice, but it is getting better.  No more pain, as long as they are using homeopathy.  Pt is on prednisone at 60 mg a day-has been on since September 30th, and using Albuterol nebulizer BID.  She appears to be doing well on those medications.  She is on 2L NC Humidified oxygen.      Will discuss with Dr. Salas and figure out where to get pt into clinic.    Mayi Smith RN  [facility]

## 2020-10-16 PROBLEM — K92.2 GASTROINTESTINAL HEMORRHAGE: Status: RESOLVED | Noted: 2020-07-29 | Resolved: 2020-10-16

## 2020-10-16 PROBLEM — E43 SEVERE MALNUTRITION: Status: RESOLVED | Noted: 2020-08-31 | Resolved: 2020-10-16

## 2020-10-16 PROBLEM — A41.9 SEPSIS: Status: RESOLVED | Noted: 2020-07-21 | Resolved: 2020-10-16

## 2020-10-22 ENCOUNTER — HOSPITAL ENCOUNTER (INPATIENT)
Facility: HOSPITAL | Age: 75
LOS: 7 days | Discharge: HOME HEALTH | DRG: 189 | End: 2020-10-29
Attending: FAMILY MEDICINE | Admitting: FAMILY MEDICINE
Payer: MEDICARE

## 2020-10-22 ENCOUNTER — OUTSIDE PLACE OF SERVICE (OUTPATIENT)
Dept: CARDIOLOGY | Facility: CLINIC | Age: 75
End: 2020-10-22
Payer: MEDICARE

## 2020-10-22 DIAGNOSIS — B44.9 POSITIVE SPUTUM CULTURE FOR ASPERGILLUS: ICD-10-CM

## 2020-10-22 DIAGNOSIS — Z86.16 HISTORY OF 2019 NOVEL CORONAVIRUS DISEASE (COVID-19): ICD-10-CM

## 2020-10-22 DIAGNOSIS — J96.90 RESPIRATORY FAILURE: ICD-10-CM

## 2020-10-22 DIAGNOSIS — N40.0 BENIGN PROSTATIC HYPERPLASIA, UNSPECIFIED WHETHER LOWER URINARY TRACT SYMPTOMS PRESENT: ICD-10-CM

## 2020-10-22 DIAGNOSIS — J44.1 COPD EXACERBATION: Primary | ICD-10-CM

## 2020-10-22 DIAGNOSIS — J90 CHRONIC BILATERAL PLEURAL EFFUSIONS: ICD-10-CM

## 2020-10-22 DIAGNOSIS — J96.01 ACUTE RESPIRATORY FAILURE WITH HYPOXIA: ICD-10-CM

## 2020-10-22 LAB
ALBUMIN SERPL BCP-MCNC: 3.2 G/DL (ref 3.5–5.2)
ALP SERPL-CCNC: 108 U/L (ref 55–135)
ALT SERPL W/O P-5'-P-CCNC: 16 U/L (ref 10–44)
ANION GAP SERPL CALC-SCNC: 8 MMOL/L (ref 8–16)
AST SERPL-CCNC: 26 U/L (ref 10–40)
BASOPHILS # BLD AUTO: 0.03 K/UL (ref 0–0.2)
BASOPHILS NFR BLD: 0.3 % (ref 0–1.9)
BILIRUB SERPL-MCNC: 0.5 MG/DL (ref 0.1–1)
BNP SERPL-MCNC: 152 PG/ML (ref 0–99)
BUN SERPL-MCNC: 22 MG/DL (ref 8–23)
CALCIUM SERPL-MCNC: 8.7 MG/DL (ref 8.7–10.5)
CHLORIDE SERPL-SCNC: 106 MMOL/L (ref 95–110)
CO2 SERPL-SCNC: 28 MMOL/L (ref 23–29)
CREAT SERPL-MCNC: 1.3 MG/DL (ref 0.5–1.4)
DIFFERENTIAL METHOD: ABNORMAL
EOSINOPHIL # BLD AUTO: 0.2 K/UL (ref 0–0.5)
EOSINOPHIL NFR BLD: 2 % (ref 0–8)
ERYTHROCYTE [DISTWIDTH] IN BLOOD BY AUTOMATED COUNT: 18.6 % (ref 11.5–14.5)
EST. GFR  (AFRICAN AMERICAN): >60 ML/MIN/1.73 M^2
EST. GFR  (NON AFRICAN AMERICAN): 53 ML/MIN/1.73 M^2
ESTIMATED AVG GLUCOSE: 120 MG/DL (ref 68–131)
GLUCOSE SERPL-MCNC: 100 MG/DL (ref 70–110)
HBA1C MFR BLD HPLC: 5.8 % (ref 4–5.6)
HCT VFR BLD AUTO: 33.5 % (ref 40–54)
HGB BLD-MCNC: 9.9 G/DL (ref 14–18)
IMM GRANULOCYTES # BLD AUTO: 0.02 K/UL (ref 0–0.04)
IMM GRANULOCYTES NFR BLD AUTO: 0.2 % (ref 0–0.5)
LYMPHOCYTES # BLD AUTO: 1.6 K/UL (ref 1–4.8)
LYMPHOCYTES NFR BLD: 17.9 % (ref 18–48)
MAGNESIUM SERPL-MCNC: 2.2 MG/DL (ref 1.6–2.6)
MCH RBC QN AUTO: 25.6 PG (ref 27–31)
MCHC RBC AUTO-ENTMCNC: 29.6 G/DL (ref 32–36)
MCV RBC AUTO: 87 FL (ref 82–98)
MONOCYTES # BLD AUTO: 0.8 K/UL (ref 0.3–1)
MONOCYTES NFR BLD: 9.1 % (ref 4–15)
NEUTROPHILS # BLD AUTO: 6.1 K/UL (ref 1.8–7.7)
NEUTROPHILS NFR BLD: 70.5 % (ref 38–73)
NRBC BLD-RTO: 0 /100 WBC
PHOSPHATE SERPL-MCNC: 3.6 MG/DL (ref 2.7–4.5)
PLATELET # BLD AUTO: 256 K/UL (ref 150–350)
PMV BLD AUTO: 10.5 FL (ref 9.2–12.9)
POTASSIUM SERPL-SCNC: 4.1 MMOL/L (ref 3.5–5.1)
PROCALCITONIN SERPL IA-MCNC: 0.2 NG/ML
PROT SERPL-MCNC: 7.1 G/DL (ref 6–8.4)
RBC # BLD AUTO: 3.87 M/UL (ref 4.6–6.2)
SODIUM SERPL-SCNC: 142 MMOL/L (ref 136–145)
WBC # BLD AUTO: 8.71 K/UL (ref 3.9–12.7)

## 2020-10-22 PROCEDURE — 93010 ELECTROCARDIOGRAM REPORT: CPT | Mod: ,,, | Performed by: INTERNAL MEDICINE

## 2020-10-22 PROCEDURE — 85025 COMPLETE CBC W/AUTO DIFF WBC: CPT

## 2020-10-22 PROCEDURE — 84100 ASSAY OF PHOSPHORUS: CPT

## 2020-10-22 PROCEDURE — 83880 ASSAY OF NATRIURETIC PEPTIDE: CPT

## 2020-10-22 PROCEDURE — 25000003 PHARM REV CODE 250: Performed by: FAMILY MEDICINE

## 2020-10-22 PROCEDURE — 63600175 PHARM REV CODE 636 W HCPCS: Performed by: STUDENT IN AN ORGANIZED HEALTH CARE EDUCATION/TRAINING PROGRAM

## 2020-10-22 PROCEDURE — 87040 BLOOD CULTURE FOR BACTERIA: CPT | Mod: 59

## 2020-10-22 PROCEDURE — 80053 COMPREHEN METABOLIC PANEL: CPT

## 2020-10-22 PROCEDURE — 25000003 PHARM REV CODE 250: Performed by: STUDENT IN AN ORGANIZED HEALTH CARE EDUCATION/TRAINING PROGRAM

## 2020-10-22 PROCEDURE — 36415 COLL VENOUS BLD VENIPUNCTURE: CPT

## 2020-10-22 PROCEDURE — 83036 HEMOGLOBIN GLYCOSYLATED A1C: CPT

## 2020-10-22 PROCEDURE — 84145 PROCALCITONIN (PCT): CPT

## 2020-10-22 PROCEDURE — 93010 PR ELECTROCARDIOGRAM REPORT: ICD-10-PCS | Mod: ,,, | Performed by: INTERNAL MEDICINE

## 2020-10-22 PROCEDURE — 11000001 HC ACUTE MED/SURG PRIVATE ROOM

## 2020-10-22 PROCEDURE — 83735 ASSAY OF MAGNESIUM: CPT

## 2020-10-22 PROCEDURE — 63700000 PHARM REV CODE 250 ALT 637 W/O HCPCS: Performed by: FAMILY MEDICINE

## 2020-10-22 RX ORDER — GLUCAGON 1 MG
1 KIT INJECTION
Status: DISCONTINUED | OUTPATIENT
Start: 2020-10-22 | End: 2020-10-29 | Stop reason: HOSPADM

## 2020-10-22 RX ORDER — HYDROCODONE BITARTRATE AND ACETAMINOPHEN 10; 325 MG/1; MG/1
1 TABLET ORAL EVERY 4 HOURS PRN
Status: ON HOLD | COMMUNITY
End: 2020-10-27 | Stop reason: HOSPADM

## 2020-10-22 RX ORDER — PREDNISONE 20 MG/1
40 TABLET ORAL DAILY
Status: COMPLETED | OUTPATIENT
Start: 2020-10-23 | End: 2020-10-27

## 2020-10-22 RX ORDER — FUROSEMIDE 20 MG/1
20 TABLET ORAL DAILY
Status: DISCONTINUED | OUTPATIENT
Start: 2020-10-22 | End: 2020-10-24

## 2020-10-22 RX ORDER — AZITHROMYCIN 250 MG/1
500 TABLET, FILM COATED ORAL DAILY
Status: DISCONTINUED | OUTPATIENT
Start: 2020-10-22 | End: 2020-10-27

## 2020-10-22 RX ORDER — MONTELUKAST SODIUM 10 MG/1
10 TABLET ORAL NIGHTLY
Status: DISCONTINUED | OUTPATIENT
Start: 2020-10-22 | End: 2020-10-29 | Stop reason: HOSPADM

## 2020-10-22 RX ORDER — ENOXAPARIN SODIUM 100 MG/ML
40 INJECTION SUBCUTANEOUS EVERY 24 HOURS
Status: DISCONTINUED | OUTPATIENT
Start: 2020-10-22 | End: 2020-10-29 | Stop reason: HOSPADM

## 2020-10-22 RX ORDER — CELECOXIB 100 MG/1
200 CAPSULE ORAL DAILY
Status: DISCONTINUED | OUTPATIENT
Start: 2020-10-23 | End: 2020-10-29 | Stop reason: HOSPADM

## 2020-10-22 RX ORDER — LIDOCAINE 50 MG/G
1 PATCH TOPICAL
Status: DISCONTINUED | OUTPATIENT
Start: 2020-10-22 | End: 2020-10-24

## 2020-10-22 RX ORDER — MUPIROCIN 20 MG/G
OINTMENT TOPICAL 2 TIMES DAILY
Status: DISPENSED | OUTPATIENT
Start: 2020-10-22 | End: 2020-10-27

## 2020-10-22 RX ORDER — IBUPROFEN 200 MG
16 TABLET ORAL
Status: DISCONTINUED | OUTPATIENT
Start: 2020-10-22 | End: 2020-10-29 | Stop reason: HOSPADM

## 2020-10-22 RX ORDER — SODIUM CHLORIDE 0.9 % (FLUSH) 0.9 %
10 SYRINGE (ML) INJECTION
Status: DISCONTINUED | OUTPATIENT
Start: 2020-10-22 | End: 2020-10-29 | Stop reason: HOSPADM

## 2020-10-22 RX ORDER — L. ACIDOPHILUS/L.BULGARICUS 100MM CELL
1 GRANULES IN PACKET (EA) ORAL 2 TIMES DAILY WITH MEALS
Status: DISCONTINUED | OUTPATIENT
Start: 2020-10-23 | End: 2020-10-29 | Stop reason: HOSPADM

## 2020-10-22 RX ORDER — VORICONAZOLE 200 MG/1
200 TABLET, FILM COATED ORAL 2 TIMES DAILY
Status: DISCONTINUED | OUTPATIENT
Start: 2020-10-22 | End: 2020-10-29 | Stop reason: HOSPADM

## 2020-10-22 RX ORDER — TIOTROPIUM BROMIDE 18 UG/1
1 CAPSULE ORAL; RESPIRATORY (INHALATION) DAILY
Status: DISCONTINUED | OUTPATIENT
Start: 2020-10-23 | End: 2020-10-29 | Stop reason: HOSPADM

## 2020-10-22 RX ORDER — IPRATROPIUM BROMIDE AND ALBUTEROL SULFATE 2.5; .5 MG/3ML; MG/3ML
3 SOLUTION RESPIRATORY (INHALATION)
Status: DISCONTINUED | OUTPATIENT
Start: 2020-10-22 | End: 2020-10-29 | Stop reason: HOSPADM

## 2020-10-22 RX ORDER — INSULIN ASPART 100 [IU]/ML
0-5 INJECTION, SOLUTION INTRAVENOUS; SUBCUTANEOUS
Status: DISCONTINUED | OUTPATIENT
Start: 2020-10-22 | End: 2020-10-29 | Stop reason: HOSPADM

## 2020-10-22 RX ORDER — PREGABALIN 75 MG/1
75 CAPSULE ORAL 2 TIMES DAILY
Status: DISCONTINUED | OUTPATIENT
Start: 2020-10-22 | End: 2020-10-24

## 2020-10-22 RX ORDER — FLUTICASONE FUROATE AND VILANTEROL 100; 25 UG/1; UG/1
1 POWDER RESPIRATORY (INHALATION) DAILY
Status: DISCONTINUED | OUTPATIENT
Start: 2020-10-23 | End: 2020-10-29 | Stop reason: HOSPADM

## 2020-10-22 RX ORDER — IBUPROFEN 200 MG
24 TABLET ORAL
Status: DISCONTINUED | OUTPATIENT
Start: 2020-10-22 | End: 2020-10-29 | Stop reason: HOSPADM

## 2020-10-22 RX ORDER — TAMSULOSIN HYDROCHLORIDE 0.4 MG/1
0.4 CAPSULE ORAL DAILY
Status: DISCONTINUED | OUTPATIENT
Start: 2020-10-23 | End: 2020-10-29 | Stop reason: HOSPADM

## 2020-10-22 RX ADMIN — LIDOCAINE 1 PATCH: 50 PATCH TOPICAL at 11:10

## 2020-10-22 RX ADMIN — MONTELUKAST 10 MG: 10 TABLET, FILM COATED ORAL at 11:10

## 2020-10-22 RX ADMIN — PREGABALIN 75 MG: 75 CAPSULE ORAL at 11:10

## 2020-10-22 RX ADMIN — FUROSEMIDE 20 MG: 20 TABLET ORAL at 11:10

## 2020-10-22 RX ADMIN — VORICONAZOLE 200 MG: 200 TABLET, FILM COATED ORAL at 11:10

## 2020-10-22 RX ADMIN — AZITHROMYCIN 500 MG: 250 TABLET, FILM COATED ORAL at 11:10

## 2020-10-22 RX ADMIN — CEFTRIAXONE 1 G: 1 INJECTION, SOLUTION INTRAVENOUS at 11:10

## 2020-10-22 RX ADMIN — ENOXAPARIN SODIUM 40 MG: 40 INJECTION SUBCUTANEOUS at 11:10

## 2020-10-22 RX ADMIN — MUPIROCIN: 20 OINTMENT TOPICAL at 11:10

## 2020-10-22 NOTE — PLAN OF CARE
Outside Transfer Acceptance Note / Regional Referral Center      Date of acceptance:  10/22/2020    Referring facility / provider:       Destination Facility / Admitting Physician:       Reason for Transfer: Higher level of care /  acute/chronic respiratory failure/ chronic lung disease    Report from referring provider    74-year-old m with a PMH of HTN, COPD, MAC infection and pulmonary aspergillosis and recent extended hospitalization at [facility] (7/21-8/27) and [facility] (8/27-10/16) presented to [facility] today after being found at home short of breath. His SpO2 was 50% on 4 L in the field.  At [facility] he was placed on a NRB with SpO2 increasing to 91% %  T 98.1° HR 92 R 18 (?)  /70.  WBC 12.4 Hb 10.4 platelets 259 Cr 1.47 Na 142 K 4.5.  CXR showed increased markings in the mid and lower lung fields when compared to an earlier x-ray in July    As noted, the patient was admitted to [facility] on 7/21 with AMS and fever. At the time of presentation, he was febrile and hypotensive. CXR showed diffuse interstitial patchy infiltrates.  CT demonstrated new fibrosis when compared to prior CT in 2017. COVID-19 was pos.  Patient had an extensive hospital course secondary to COVID related sepsis, respiratory failure, C diff colitis, and GIB.  See EPIC record for the details.  He was transferred to the [facility] on 08/27 for continuation of IV antibiotic therapy, oxygen weaning and rehabilitation.  He was on voriconazole from 07/29 to the time of discharge with a plan to switch to itraconazole 100 mg bid and follow-up as an outpatient with Infectious Disease and Pulmonary Medicine.  His improvement in the hospital appear to coincide with a short course of prednisone. He was discharged from the [facility] on 10/16 after a 50 day stay.  At that time, he was on  5LHFNC, with O2 sats ranging from 88-92% Prior to admission he was on 2.5 L NC at home.  He had improved with physical therapy and was  ambulating with a walker prior to discharge    A GOC discussion was held with the patient at the LTAC.  The patient says that he would like to go on as long as he can but would not like to be on a ventilator long-term.  He is full code at the moment.  His daughter is his POA in the event that he is not able to make his wishes known at the present time        To Do List:     Admit to Family Medicine  Consult pulmonology and Infectious Disease  Consult palliative care    Upon patient arrival to floor, please contact [facility] Medicine on call.     Giovanni CORREIAA  / Accepting physician  [facility]

## 2020-10-23 PROBLEM — N17.9 AKI (ACUTE KIDNEY INJURY): Status: RESOLVED | Noted: 2020-07-21 | Resolved: 2020-10-23

## 2020-10-23 LAB
ALBUMIN SERPL BCP-MCNC: 2.8 G/DL (ref 3.5–5.2)
ALP SERPL-CCNC: 93 U/L (ref 55–135)
ALT SERPL W/O P-5'-P-CCNC: 13 U/L (ref 10–44)
ANION GAP SERPL CALC-SCNC: 7 MMOL/L (ref 8–16)
AST SERPL-CCNC: 22 U/L (ref 10–40)
BASOPHILS # BLD AUTO: 0.02 K/UL (ref 0–0.2)
BASOPHILS NFR BLD: 0.3 % (ref 0–1.9)
BILIRUB SERPL-MCNC: 0.5 MG/DL (ref 0.1–1)
BILIRUB UR QL STRIP: NEGATIVE
BUN SERPL-MCNC: 19 MG/DL (ref 8–23)
CALCIUM SERPL-MCNC: 8.2 MG/DL (ref 8.7–10.5)
CHLORIDE SERPL-SCNC: 107 MMOL/L (ref 95–110)
CLARITY UR: CLEAR
CO2 SERPL-SCNC: 28 MMOL/L (ref 23–29)
COLOR UR: NORMAL
CREAT SERPL-MCNC: 1.1 MG/DL (ref 0.5–1.4)
DIFFERENTIAL METHOD: ABNORMAL
EOSINOPHIL # BLD AUTO: 0.2 K/UL (ref 0–0.5)
EOSINOPHIL NFR BLD: 2.6 % (ref 0–8)
ERYTHROCYTE [DISTWIDTH] IN BLOOD BY AUTOMATED COUNT: 18.5 % (ref 11.5–14.5)
EST. GFR  (AFRICAN AMERICAN): >60 ML/MIN/1.73 M^2
EST. GFR  (NON AFRICAN AMERICAN): >60 ML/MIN/1.73 M^2
GLUCOSE SERPL-MCNC: 110 MG/DL (ref 70–110)
GLUCOSE UR QL STRIP: NEGATIVE
HCT VFR BLD AUTO: 30.6 % (ref 40–54)
HGB BLD-MCNC: 9.2 G/DL (ref 14–18)
HGB UR QL STRIP: NEGATIVE
IMM GRANULOCYTES # BLD AUTO: 0.02 K/UL (ref 0–0.04)
IMM GRANULOCYTES NFR BLD AUTO: 0.3 % (ref 0–0.5)
KETONES UR QL STRIP: NEGATIVE
LEUKOCYTE ESTERASE UR QL STRIP: NEGATIVE
LYMPHOCYTES # BLD AUTO: 1.5 K/UL (ref 1–4.8)
LYMPHOCYTES NFR BLD: 20.1 % (ref 18–48)
MCH RBC QN AUTO: 25.8 PG (ref 27–31)
MCHC RBC AUTO-ENTMCNC: 30.1 G/DL (ref 32–36)
MCV RBC AUTO: 86 FL (ref 82–98)
MONOCYTES # BLD AUTO: 0.7 K/UL (ref 0.3–1)
MONOCYTES NFR BLD: 9.6 % (ref 4–15)
NEUTROPHILS # BLD AUTO: 5 K/UL (ref 1.8–7.7)
NEUTROPHILS NFR BLD: 67.1 % (ref 38–73)
NITRITE UR QL STRIP: NEGATIVE
NRBC BLD-RTO: 0 /100 WBC
PH UR STRIP: 6 [PH] (ref 5–8)
PLATELET # BLD AUTO: 240 K/UL (ref 150–350)
PMV BLD AUTO: 10.2 FL (ref 9.2–12.9)
POCT GLUCOSE: 109 MG/DL (ref 70–110)
POCT GLUCOSE: 146 MG/DL (ref 70–110)
POCT GLUCOSE: 155 MG/DL (ref 70–110)
POTASSIUM SERPL-SCNC: 3.8 MMOL/L (ref 3.5–5.1)
PROT SERPL-MCNC: 6.3 G/DL (ref 6–8.4)
PROT UR QL STRIP: NEGATIVE
RBC # BLD AUTO: 3.56 M/UL (ref 4.6–6.2)
SARS-COV-2 RDRP RESP QL NAA+PROBE: NEGATIVE
SODIUM SERPL-SCNC: 142 MMOL/L (ref 136–145)
SP GR UR STRIP: 1.02 (ref 1–1.03)
URN SPEC COLLECT METH UR: NORMAL
UROBILINOGEN UR STRIP-ACNC: NEGATIVE EU/DL
WBC # BLD AUTO: 7.43 K/UL (ref 3.9–12.7)

## 2020-10-23 PROCEDURE — 94640 AIRWAY INHALATION TREATMENT: CPT

## 2020-10-23 PROCEDURE — 97530 THERAPEUTIC ACTIVITIES: CPT

## 2020-10-23 PROCEDURE — U0002 COVID-19 LAB TEST NON-CDC: HCPCS

## 2020-10-23 PROCEDURE — 85025 COMPLETE CBC W/AUTO DIFF WBC: CPT

## 2020-10-23 PROCEDURE — 99900035 HC TECH TIME PER 15 MIN (STAT)

## 2020-10-23 PROCEDURE — 25000003 PHARM REV CODE 250: Performed by: STUDENT IN AN ORGANIZED HEALTH CARE EDUCATION/TRAINING PROGRAM

## 2020-10-23 PROCEDURE — 25000242 PHARM REV CODE 250 ALT 637 W/ HCPCS: Performed by: STUDENT IN AN ORGANIZED HEALTH CARE EDUCATION/TRAINING PROGRAM

## 2020-10-23 PROCEDURE — 80053 COMPREHEN METABOLIC PANEL: CPT

## 2020-10-23 PROCEDURE — 81003 URINALYSIS AUTO W/O SCOPE: CPT

## 2020-10-23 PROCEDURE — 63600175 PHARM REV CODE 636 W HCPCS: Performed by: STUDENT IN AN ORGANIZED HEALTH CARE EDUCATION/TRAINING PROGRAM

## 2020-10-23 PROCEDURE — 63700000 PHARM REV CODE 250 ALT 637 W/O HCPCS: Performed by: FAMILY MEDICINE

## 2020-10-23 PROCEDURE — 36415 COLL VENOUS BLD VENIPUNCTURE: CPT

## 2020-10-23 PROCEDURE — 27000221 HC OXYGEN, UP TO 24 HOURS

## 2020-10-23 PROCEDURE — 94761 N-INVAS EAR/PLS OXIMETRY MLT: CPT

## 2020-10-23 PROCEDURE — 11000001 HC ACUTE MED/SURG PRIVATE ROOM

## 2020-10-23 PROCEDURE — 97165 OT EVAL LOW COMPLEX 30 MIN: CPT

## 2020-10-23 RX ADMIN — AZITHROMYCIN 500 MG: 250 TABLET, FILM COATED ORAL at 10:10

## 2020-10-23 RX ADMIN — FLUTICASONE FUROATE AND VILANTEROL TRIFENATATE 1 PUFF: 100; 25 POWDER RESPIRATORY (INHALATION) at 07:10

## 2020-10-23 RX ADMIN — VORICONAZOLE 200 MG: 200 TABLET, FILM COATED ORAL at 09:10

## 2020-10-23 RX ADMIN — CEFTRIAXONE 1 G: 1 INJECTION, SOLUTION INTRAVENOUS at 10:10

## 2020-10-23 RX ADMIN — IPRATROPIUM BROMIDE AND ALBUTEROL SULFATE 3 ML: .5; 3 SOLUTION RESPIRATORY (INHALATION) at 07:10

## 2020-10-23 RX ADMIN — PREDNISONE 40 MG: 20 TABLET ORAL at 09:10

## 2020-10-23 RX ADMIN — MONTELUKAST 10 MG: 10 TABLET, FILM COATED ORAL at 10:10

## 2020-10-23 RX ADMIN — IPRATROPIUM BROMIDE AND ALBUTEROL SULFATE 3 ML: .5; 3 SOLUTION RESPIRATORY (INHALATION) at 12:10

## 2020-10-23 RX ADMIN — TAMSULOSIN HYDROCHLORIDE 0.4 MG: 0.4 CAPSULE ORAL at 09:10

## 2020-10-23 RX ADMIN — TIOTROPIUM BROMIDE 18 MCG: 18 CAPSULE ORAL; RESPIRATORY (INHALATION) at 07:10

## 2020-10-23 RX ADMIN — PREGABALIN 75 MG: 75 CAPSULE ORAL at 09:10

## 2020-10-23 RX ADMIN — ENOXAPARIN SODIUM 40 MG: 40 INJECTION SUBCUTANEOUS at 05:10

## 2020-10-23 RX ADMIN — MUPIROCIN: 20 OINTMENT TOPICAL at 10:10

## 2020-10-23 RX ADMIN — FUROSEMIDE 20 MG: 20 TABLET ORAL at 09:10

## 2020-10-23 RX ADMIN — LACTOBACILLUS ACIDOPHILUS / LACTOBACILLUS BULGARICUS 1 EACH: 100 MILLION CFU STRENGTH GRANULES at 05:10

## 2020-10-23 RX ADMIN — MUPIROCIN: 20 OINTMENT TOPICAL at 09:10

## 2020-10-23 RX ADMIN — LIDOCAINE 1 PATCH: 50 PATCH TOPICAL at 10:10

## 2020-10-23 RX ADMIN — PREGABALIN 75 MG: 75 CAPSULE ORAL at 10:10

## 2020-10-23 RX ADMIN — VORICONAZOLE 200 MG: 200 TABLET, FILM COATED ORAL at 10:10

## 2020-10-23 RX ADMIN — THERA TABS 1 TABLET: TAB at 09:10

## 2020-10-23 RX ADMIN — CELECOXIB 200 MG: 100 CAPSULE ORAL at 09:10

## 2020-10-23 NOTE — H&P
"[facility] Medicine  History & Physical    Patient Name: Rajan Deluna  MRN: [id number]  Admission Date: 10/22/2020  Attending Physician: Rell Tolentino III, MD   Primary Care Provider: Jason Mccord MD         Patient information was obtained from patient and ER records.     Subjective:     Principal Problem:COPD exacerbation    Chief Complaint: No chief complaint on file.       HPI: Pt is a 75 yo AAM w/ pmhx of COPD on 4-5LNC at home, BPH, osteoarthritis, hx of MAC and aspergillosis pulmonary infection, hx of COVID19 (hospital admission 7/21/20 and recent d/c from rehab facility 10/16/20) presenting for SOB/anxiety. Pt states today while he was ambulating at home, he became anxious because his home O2 tubing was not long enough to reach his kitchen/living room from his bedroom. He states he was trying to move his O2 tank when the tubing became tangled which made him anxious because he had to untangle the tubing. He states he also had grandchildren at home to take care of which added to his anxiety. Pt states he became SOB after his anxiety started. He never was off of his home O2. He has been compliant with his home medications, which he received on discharge from the rehab facility. He state he productive yellow sputum intermittently, but this is not increased from baseline, he has been having productive cough "since I started seeing a lung doctor." Pt denies f/c/s/n/v, chest pain, palpitations, wheezing, increased cough/sputum production, abdominal pain, constipation, diarrhea, dysuria, lower extremity edema.     Per ED record, when EMS was called, his SpO2 was 50% on 5L, he was placed on non rebreather and SpO2 lorrie to 94-97%. ED tried to put on nasal canula at 5L but SpO2 decreased to mid 80s to was placed back on non rebreather. Otherwise vitals were stable. CBC unremarkable. CMP showed MARQUIS Cr 1.47, CXR showed b/l bibasilar interstitial infiltrate which could represent " infection/inflammatory process vs edema. Pt was transferred here w/ no treatment in ED (per patient).    Past Medical History:   Diagnosis Date    Arthritis     COPD (chronic obstructive pulmonary disease)     Encounter for blood transfusion     Hypertension     Smoker     Weakness        Past Surgical History:   Procedure Laterality Date    HERNIA REPAIR      INTRALUMINAL GASTROINTESTINAL TRACT IMAGING VIA CAPSULE N/A 8/3/2020    Procedure: IMAGING PROCEDURE, GI TRACT, INTRALUMINAL, VIA CAPSULE;  Surgeon: Rey Olivares MD;  Location: [facility];  Service: Endoscopy;  Laterality: N/A;    right knee surgery         Review of patient's allergies indicates:  No Known Allergies    No current facility-administered medications on file prior to encounter.      Current Outpatient Medications on File Prior to Encounter   Medication Sig    budesonide-formoterol 160-4.5 mcg (SYMBICORT) 160-4.5 mcg/actuation HFAA Inhale 2 puffs into the lungs every 12 (twelve) hours.    celecoxib (CELEBREX) 200 MG capsule Take 1 capsule (200 mg total) by mouth once daily.    furosemide (LASIX) 20 MG tablet Take 1 tablet (20 mg total) by mouth once daily.    gabapentin (NEURONTIN) 300 MG capsule Take 1 capsule (300 mg total) by mouth 3 (three) times daily.    hydrOXYzine HCL (ATARAX) 25 MG tablet Take 1 tablet (25 mg total) by mouth 3 (three) times daily as needed for Itching.    itraconazole (SPORANOX) 100 mg Cap Take 1 capsule (100 mg total) by mouth 2 (two) times daily.    lactobacillus acidophilus & bulgar (LACTINEX) 100 million cell packet Take 1 packet (1 each total) by mouth 2 (two) times daily with meals.    montelukast (SINGULAIR) 10 mg tablet Take 1 tablet (10 mg total) by mouth every evening.    multivitamin Tab Take 1 tablet by mouth once daily.    predniSONE (DELTASONE) 10 MG tablet Take 1 tablet (10 mg total) by mouth once daily.    pregabalin (LYRICA) 75 MG capsule Take 1 capsule (75 mg total) by mouth 2  (two) times daily.    PROAIR HFA 90 mcg/actuation inhaler Inhale 1-2 puffs into the lungs every 4 (four) hours as needed for Wheezing or Shortness of Breath.    tamsulosin (FLOMAX) 0.4 mg Cap Take 1 capsule (0.4 mg total) by mouth once daily.    tiotropium (SPIRIVA) 18 mcg inhalation capsule Inhale 1 capsule (18 mcg total) into the lungs once daily. Controller    traMADoL (ULTRAM) 50 mg tablet Take 1 tablet (50 mg total) by mouth every 6 (six) hours as needed for Pain.     Family History     None        Tobacco Use    Smoking status: Former Smoker    Smokeless tobacco: Never Used   Substance and Sexual Activity    Alcohol use: Not Currently    Drug use: Never    Sexual activity: Not on file     Review of Systems   Constitutional: Negative for chills, diaphoresis, fatigue and fever.   HENT: Negative for congestion, rhinorrhea, sore throat and trouble swallowing.    Eyes: Negative for itching and visual disturbance.   Respiratory: Positive for shortness of breath. Negative for cough, chest tightness and wheezing.    Cardiovascular: Negative for chest pain and palpitations.   Gastrointestinal: Negative for abdominal pain, constipation, diarrhea, nausea and vomiting.   Endocrine: Negative for polyphagia and polyuria.   Genitourinary: Negative for dysuria and flank pain.   Musculoskeletal: Negative for back pain, joint swelling and neck stiffness.   Skin: Negative for color change and rash.   Neurological: Negative for dizziness, weakness, light-headedness and headaches.   Psychiatric/Behavioral: Negative for confusion. The patient is nervous/anxious.      Objective:     Vital Signs (Most Recent):  Temp: 98.1 °F (36.7 °C) (10/22/20 2113)  Pulse: 94 (10/22/20 2113)  Resp: 20 (10/22/20 2113)  BP: 133/62 (10/22/20 2113)  SpO2: (!) 84 % (10/22/20 2113) Vital Signs (24h Range):  Temp:  [97.8 °F (36.6 °C)-98.6 °F (37 °C)] 98.1 °F (36.7 °C)  Pulse:  [84-94] 94  Resp:  [18-20] 20  SpO2:  [84 %-95 %] 84 %  BP:  (109-138)/(62-76) 133/62        There is no height or weight on file to calculate BMI.    Physical Exam  Vitals signs and nursing note reviewed.   Constitutional:       General: He is not in acute distress.     Appearance: Normal appearance. He is well-developed. He is not diaphoretic.      Comments: Non rebreather mask 15L  Intermittent conversational dyspnea  No accessory muscle use   HENT:      Head: Normocephalic and atraumatic.      Right Ear: External ear normal.      Left Ear: External ear normal.      Nose: Nose normal.      Mouth/Throat:      Mouth: Mucous membranes are moist.      Pharynx: Oropharynx is clear.   Eyes:      Extraocular Movements: Extraocular movements intact.      Conjunctiva/sclera: Conjunctivae normal.      Pupils: Pupils are equal, round, and reactive to light.   Neck:      Musculoskeletal: Normal range of motion and neck supple.   Cardiovascular:      Rate and Rhythm: Normal rate and regular rhythm.      Pulses: Normal pulses.      Heart sounds: Normal heart sounds.   Pulmonary:      Effort: Pulmonary effort is normal. No respiratory distress.      Breath sounds: Rhonchi and rales (b/l lower lobes) present. No wheezing.   Abdominal:      General: Bowel sounds are normal.      Palpations: Abdomen is soft.      Tenderness: There is no abdominal tenderness.   Musculoskeletal: Normal range of motion.   Skin:     General: Skin is warm and dry.      Capillary Refill: Capillary refill takes less than 2 seconds.      Findings: No erythema.   Neurological:      General: No focal deficit present.      Mental Status: He is alert and oriented to person, place, and time.   Psychiatric:         Mood and Affect: Mood normal.         Behavior: Behavior normal.           CRANIAL NERVES     CN III, IV, VI   Pupils are equal, round, and reactive to light.    Recent Labs   Lab 10/22/20  2222   WBC 8.71   HGB 9.9*   HCT 33.5*   MCV 87   RBC 3.87*   MCH 25.6*   MCHC 29.6*   RDW 18.6*      MPV 10.5    GRAN 70.5  6.1   LYMPH 17.9*  1.6   MONO 9.1  0.8   EOSINOPHIL 2.0   BASOPHIL 0.3       Lab Results   Component Value Date    HGBA1C 6.3 (H) 07/23/2020       Microbiology Results (last 7 days)     Procedure Component Value Units Date/Time    Blood culture (site 1) [[id number]] Collected: 10/22/20 2222    Order Status: Sent Specimen: Blood from Peripheral, Left Wrist Updated: 10/22/20 2222    Blood culture (site 2) [[id number]] Collected: 10/22/20 2212    Order Status: Sent Specimen: Blood from Peripheral, Right Hand Updated: 10/22/20 2222    Culture, Respiratory [[id number]]     Order Status: No result Specimen: Respiratory from Sputum, Expectorated         Labs and imaging reviewed from outside ED    Assessment/Plan:     * COPD exacerbation  -SOB, no increased sputum production  -On 5L NC at home  -Has only been taking spiriva, not symbicort  -Hx of COVID  -CXR w/ b/l lower lobe interstitial opacities could represent infection/inflammation  -Procal pending  -Duoneb q6hr  -fluticasone formetorol qd  -Continue home spiriva, singulair  -Empiric rocephin/azithro, has been in hospital within 90 days, but low suspicion for MRSA/Psuedomonas given 0/4 SIRS      History of 2019 novel coronavirus disease (COVID-19)  -Diagnosed in 7/2020  -Lung sounds coarse  -CXR b/l lower lobe intersitial opacities, could be sequela of COVID  -Monitor      History of Aspergillus & MAC  -Takes itraconazole at home  -Voriconazole 200 BID  -Needs f/u w/ pulmonary/ID on discharge      Type 2 diabetes mellitus  -A1c 6.8 (7/2020)  -Repeat A1c pending  -LDSSI  -Glucose 140-180 inpt      BPH (benign prostatic hyperplasia)  -Asymptomatic  -Continue home flomax        VTE Risk Mitigation (From admission, onward)         Ordered     enoxaparin injection 40 mg  Every 24 hours      10/22/20 2155     IP VTE HIGH RISK PATIENT  Once      10/22/20 2155     Place sequential compression device  Until discontinued      10/22/20 2155              Diet:  cardiac  Fluids: n/a  DVTppx: lovenox  Code: full     Robert Adams MD  Department of Hospital Medicine   [facility]

## 2020-10-23 NOTE — PLAN OF CARE
Problem: Occupational Therapy Goal  Goal: Occupational Therapy Goal  Description: Goals to be met by: 11/23     Patient will increase functional independence with ADLs by performing:    UE Dressing with Minimal Assistance.  LE Dressing with Stand-by Assistance and Assistive Devices as needed.  Grooming while standing at sink with Stand-by Assistance.  Toileting from toilet with Supervision for hygiene and clothing management.   Toilet transfer to toilet with Supervision.  Increased functional strength to WFL for ADLs.    Outcome: Ongoing, Progressing   Pt found in supine & agreeable to OT eval/tx this PM.  Pt on 15LHFNC & perf the following:  -sup-->EOB w/ Sup  -standing via RW w/ CGA  -sidestep L via RW w/ Min A for DME mgmt  -returned to sitting EOB w/ CGA  Pt w/ signif SOB & desat to 78%; required 2-3min rest w/ PLBing to recover to 85-88%  -returned to supine w/ SBA  Edu/tx re: PLBing, general safety techs & HEP. Pt verbalized understanding.    Pt presents w/ decreased overall endurance/conditioning, balance/mobility & coordination/respiration w/ subsequent decline in (I)/safety w/ BADLs, fxnl mobility & fxnl t/f's.  OT 5x/wk to increase phys/fxnl status & maximize potential to achieve established goals for d/c-->TBD pending progress.

## 2020-10-23 NOTE — SUBJECTIVE & OBJECTIVE
Past Medical History:   Diagnosis Date    Arthritis     COPD (chronic obstructive pulmonary disease)     Encounter for blood transfusion     Hypertension     Smoker     Weakness        Past Surgical History:   Procedure Laterality Date    HERNIA REPAIR      INTRALUMINAL GASTROINTESTINAL TRACT IMAGING VIA CAPSULE N/A 8/3/2020    Procedure: IMAGING PROCEDURE, GI TRACT, INTRALUMINAL, VIA CAPSULE;  Surgeon: Rey Olivares MD;  Location: [facility];  Service: Endoscopy;  Laterality: N/A;    right knee surgery         Review of patient's allergies indicates:  No Known Allergies    No current facility-administered medications on file prior to encounter.      Current Outpatient Medications on File Prior to Encounter   Medication Sig    budesonide-formoterol 160-4.5 mcg (SYMBICORT) 160-4.5 mcg/actuation HFAA Inhale 2 puffs into the lungs every 12 (twelve) hours.    celecoxib (CELEBREX) 200 MG capsule Take 1 capsule (200 mg total) by mouth once daily.    furosemide (LASIX) 20 MG tablet Take 1 tablet (20 mg total) by mouth once daily.    gabapentin (NEURONTIN) 300 MG capsule Take 1 capsule (300 mg total) by mouth 3 (three) times daily.    hydrOXYzine HCL (ATARAX) 25 MG tablet Take 1 tablet (25 mg total) by mouth 3 (three) times daily as needed for Itching.    itraconazole (SPORANOX) 100 mg Cap Take 1 capsule (100 mg total) by mouth 2 (two) times daily.    lactobacillus acidophilus & bulgar (LACTINEX) 100 million cell packet Take 1 packet (1 each total) by mouth 2 (two) times daily with meals.    montelukast (SINGULAIR) 10 mg tablet Take 1 tablet (10 mg total) by mouth every evening.    multivitamin Tab Take 1 tablet by mouth once daily.    predniSONE (DELTASONE) 10 MG tablet Take 1 tablet (10 mg total) by mouth once daily.    pregabalin (LYRICA) 75 MG capsule Take 1 capsule (75 mg total) by mouth 2 (two) times daily.    PROAIR HFA 90 mcg/actuation inhaler Inhale 1-2 puffs into the lungs every 4 (four)  hours as needed for Wheezing or Shortness of Breath.    tamsulosin (FLOMAX) 0.4 mg Cap Take 1 capsule (0.4 mg total) by mouth once daily.    tiotropium (SPIRIVA) 18 mcg inhalation capsule Inhale 1 capsule (18 mcg total) into the lungs once daily. Controller    traMADoL (ULTRAM) 50 mg tablet Take 1 tablet (50 mg total) by mouth every 6 (six) hours as needed for Pain.     Family History     None        Tobacco Use    Smoking status: Former Smoker    Smokeless tobacco: Never Used   Substance and Sexual Activity    Alcohol use: Not Currently    Drug use: Never    Sexual activity: Not on file     Review of Systems   Constitutional: Negative for chills, diaphoresis, fatigue and fever.   HENT: Negative for congestion, rhinorrhea, sore throat and trouble swallowing.    Eyes: Negative for itching and visual disturbance.   Respiratory: Positive for shortness of breath. Negative for cough, chest tightness and wheezing.    Cardiovascular: Negative for chest pain and palpitations.   Gastrointestinal: Negative for abdominal pain, constipation, diarrhea, nausea and vomiting.   Endocrine: Negative for polyphagia and polyuria.   Genitourinary: Negative for dysuria and flank pain.   Musculoskeletal: Negative for back pain, joint swelling and neck stiffness.   Skin: Negative for color change and rash.   Neurological: Negative for dizziness, weakness, light-headedness and headaches.   Psychiatric/Behavioral: Negative for confusion. The patient is nervous/anxious.      Objective:     Vital Signs (Most Recent):  Temp: 98.1 °F (36.7 °C) (10/22/20 2113)  Pulse: 94 (10/22/20 2113)  Resp: 20 (10/22/20 2113)  BP: 133/62 (10/22/20 2113)  SpO2: (!) 84 % (10/22/20 2113) Vital Signs (24h Range):  Temp:  [97.8 °F (36.6 °C)-98.6 °F (37 °C)] 98.1 °F (36.7 °C)  Pulse:  [84-94] 94  Resp:  [18-20] 20  SpO2:  [84 %-95 %] 84 %  BP: (109-138)/(62-76) 133/62        There is no height or weight on file to calculate BMI.    Physical Exam  Vitals  signs and nursing note reviewed.   Constitutional:       General: He is not in acute distress.     Appearance: Normal appearance. He is well-developed. He is not diaphoretic.      Comments: Non rebreather mask 15L  Intermittent conversational dyspnea  No accessory muscle use   HENT:      Head: Normocephalic and atraumatic.      Right Ear: External ear normal.      Left Ear: External ear normal.      Nose: Nose normal.      Mouth/Throat:      Mouth: Mucous membranes are moist.      Pharynx: Oropharynx is clear.   Eyes:      Extraocular Movements: Extraocular movements intact.      Conjunctiva/sclera: Conjunctivae normal.      Pupils: Pupils are equal, round, and reactive to light.   Neck:      Musculoskeletal: Normal range of motion and neck supple.   Cardiovascular:      Rate and Rhythm: Normal rate and regular rhythm.      Pulses: Normal pulses.      Heart sounds: Normal heart sounds.   Pulmonary:      Effort: Pulmonary effort is normal. No respiratory distress.      Breath sounds: Rhonchi and rales (b/l lower lobes) present. No wheezing.   Abdominal:      General: Bowel sounds are normal.      Palpations: Abdomen is soft.      Tenderness: There is no abdominal tenderness.   Musculoskeletal: Normal range of motion.   Skin:     General: Skin is warm and dry.      Capillary Refill: Capillary refill takes less than 2 seconds.      Findings: No erythema.   Neurological:      General: No focal deficit present.      Mental Status: He is alert and oriented to person, place, and time.   Psychiatric:         Mood and Affect: Mood normal.         Behavior: Behavior normal.           CRANIAL NERVES     CN III, IV, VI   Pupils are equal, round, and reactive to light.    Recent Labs   Lab 10/22/20  2222   WBC 8.71   HGB 9.9*   HCT 33.5*   MCV 87   RBC 3.87*   MCH 25.6*   MCHC 29.6*   RDW 18.6*      MPV 10.5   GRAN 70.5  6.1   LYMPH 17.9*  1.6   MONO 9.1  0.8   EOSINOPHIL 2.0   BASOPHIL 0.3       Lab Results    Component Value Date    HGBA1C 6.3 (H) 07/23/2020       Microbiology Results (last 7 days)     Procedure Component Value Units Date/Time    Blood culture (site 1) [[id number]] Collected: 10/22/20 2222    Order Status: Sent Specimen: Blood from Peripheral, Left Wrist Updated: 10/22/20 2222    Blood culture (site 2) [[id number]] Collected: 10/22/20 2212    Order Status: Sent Specimen: Blood from Peripheral, Right Hand Updated: 10/22/20 2222    Culture, Respiratory [[id number]]     Order Status: No result Specimen: Respiratory from Sputum, Expectorated         Labs and imaging reviewed from outside ED

## 2020-10-23 NOTE — HPI
"Pt is a 75 yo AAM w/ pmhx of COPD on 4-5LNC at home, BPH, osteoarthritis, hx of MAC and aspergillosis pulmonary infection, hx of COVID19 (hospital admission 7/21/20 and recent d/c from rehab facility 10/16/20) presenting for SOB/anxiety. Pt states today while he was ambulating at home, he became anxious because his home O2 tubing was not long enough to reach his kitchen/living room from his bedroom. He states he was trying to move his O2 tank when the tubing became tangled which made him anxious because he had to untangle the tubing. He states he also had grandchildren at home to take care of which added to his anxiety. Pt states he became SOB after his anxiety started. He never was off of his home O2. He has been compliant with his home medications, which he received on discharge from the rehab facility. He state he productive yellow sputum intermittently, but this is not increased from baseline, he has been having productive cough "since I started seeing a lung doctor." Pt denies f/c/s/n/v, chest pain, palpitations, wheezing, increased cough/sputum production, abdominal pain, constipation, diarrhea, dysuria, lower extremity edema.     Per ED record, when EMS was called, his SpO2 was 50% on 5L, he was placed on non rebreather and SpO2 lorrie to 94-97%. ED tried to put on nasal canula at 5L but SpO2 decreased to mid 80s to was placed back on non rebreather. Otherwise vitals were stable. CBC unremarkable. CMP showed MARQUIS Cr 1.47, CXR showed b/l bibasilar interstitial infiltrate which could represent infection/inflammatory process vs edema. Pt was transferred here w/ no treatment in ED (per patient).  "

## 2020-10-23 NOTE — SUBJECTIVE & OBJECTIVE
Past Medical History:   Diagnosis Date    Arthritis     COPD (chronic obstructive pulmonary disease)     Encounter for blood transfusion     Hypertension     Smoker     Weakness        Past Surgical History:   Procedure Laterality Date    HERNIA REPAIR      INTRALUMINAL GASTROINTESTINAL TRACT IMAGING VIA CAPSULE N/A 8/3/2020    Procedure: IMAGING PROCEDURE, GI TRACT, INTRALUMINAL, VIA CAPSULE;  Surgeon: Rey Olivares MD;  Location: [facility];  Service: Endoscopy;  Laterality: N/A;    right knee surgery         Review of patient's allergies indicates:  No Known Allergies    Medications:  Medications Prior to Admission   Medication Sig    celecoxib (CELEBREX) 200 MG capsule Take 1 capsule (200 mg total) by mouth once daily.    gabapentin (NEURONTIN) 300 MG capsule Take 1 capsule (300 mg total) by mouth 3 (three) times daily.    HYDROcodone-acetaminophen (NORCO)  mg per tablet Take 1 tablet by mouth every 4 (four) hours as needed for Pain.    hydrOXYzine HCL (ATARAX) 25 MG tablet Take 1 tablet (25 mg total) by mouth 3 (three) times daily as needed for Itching.    montelukast (SINGULAIR) 10 mg tablet Take 1 tablet (10 mg total) by mouth every evening.    pregabalin (LYRICA) 75 MG capsule Take 1 capsule (75 mg total) by mouth 2 (two) times daily.    tamsulosin (FLOMAX) 0.4 mg Cap Take 1 capsule (0.4 mg total) by mouth once daily.    tiotropium (SPIRIVA) 18 mcg inhalation capsule Inhale 1 capsule (18 mcg total) into the lungs once daily. Controller    budesonide-formoterol 160-4.5 mcg (SYMBICORT) 160-4.5 mcg/actuation HFAA Inhale 2 puffs into the lungs every 12 (twelve) hours.    furosemide (LASIX) 20 MG tablet Take 1 tablet (20 mg total) by mouth once daily.    itraconazole (SPORANOX) 100 mg Cap Take 1 capsule (100 mg total) by mouth 2 (two) times daily.    lactobacillus acidophilus & bulgar (LACTINEX) 100 million cell packet Take 1 packet (1 each total) by mouth 2 (two) times daily with  Requested Prescriptions   Pending Prescriptions Disp Refills     montelukast (SINGULAIR) 10 MG tablet 90 tablet 0     Sig: Take 1 tablet (10 mg) by mouth daily       There is no refill protocol information for this order            Roselyn Paulson MA     meals.    multivitamin Tab Take 1 tablet by mouth once daily.    predniSONE (DELTASONE) 10 MG tablet Take 1 tablet (10 mg total) by mouth once daily.    PROAIR HFA 90 mcg/actuation inhaler Inhale 1-2 puffs into the lungs every 4 (four) hours as needed for Wheezing or Shortness of Breath.    traMADoL (ULTRAM) 50 mg tablet Take 1 tablet (50 mg total) by mouth every 6 (six) hours as needed for Pain.     Antibiotics (From admission, onward)    Start     Stop Route Frequency Ordered    10/22/20 2300  cefTRIAXone (ROCEPHIN) 1 g/50 mL D5W IVPB      -- IV Every 24 hours (non-standard times) 10/22/20 2155    10/22/20 2300  mupirocin 2 % ointment      10/27 2059 Nasl 2 times daily 10/22/20 2159    10/22/20 2215  azithromycin tablet 500 mg      -- Oral Daily 10/22/20 2202        Antifungals (From admission, onward)    Start     Stop Route Frequency Ordered    10/22/20 2300  voriconazole tablet 200 mg      -- Oral 2 times daily 10/22/20 2155        Antivirals (From admission, onward)    None           Immunization History   Administered Date(s) Administered    PPD Test 08/11/2020       Family History     None        Social History     Socioeconomic History    Marital status: Unknown     Spouse name: Not on file    Number of children: Not on file    Years of education: Not on file    Highest education level: Not on file   Occupational History    Not on file   Social Needs    Financial resource strain: Not on file    Food insecurity     Worry: Not on file     Inability: Not on file    Transportation needs     Medical: Not on file     Non-medical: Not on file   Tobacco Use    Smoking status: Former Smoker    Smokeless tobacco: Never Used   Substance and Sexual Activity    Alcohol use: Not Currently    Drug use: Never    Sexual activity: Not on file   Lifestyle    Physical activity     Days per week: Not on file     Minutes per session: Not on file    Stress: Not on file   Relationships    Social connections      Talks on phone: Not on file     Gets together: Not on file     Attends Mandaeism service: Not on file     Active member of club or organization: Not on file     Attends meetings of clubs or organizations: Not on file     Relationship status: Not on file   Other Topics Concern    Not on file   Social History Narrative    Not on file     Review of Systems   Constitutional: Negative for chills, diaphoresis, fatigue and fever.   HENT: Negative for congestion, rhinorrhea, sore throat and trouble swallowing.    Eyes: Negative for itching and visual disturbance.   Respiratory: Positive for shortness of breath. Negative for cough, chest tightness and wheezing.    Cardiovascular: Negative for chest pain and palpitations.   Gastrointestinal: Negative for abdominal pain, constipation, diarrhea, nausea and vomiting.   Endocrine: Negative for polyphagia and polyuria.   Genitourinary: Negative for dysuria and flank pain.   Musculoskeletal: Negative for back pain, joint swelling and neck stiffness.   Skin: Negative for color change and rash.   Neurological: Negative for dizziness, weakness, light-headedness and headaches.   Psychiatric/Behavioral: Negative for confusion. The patient is nervous/anxious.      Objective:     Vital Signs (Most Recent):  Temp: 97.8 °F (36.6 °C) (10/23/20 1148)  Pulse: 90 (10/23/20 1208)  Resp: (!) 22 (10/23/20 1208)  BP: 119/74 (10/23/20 1148)  SpO2: (!) 92 % (10/23/20 1210) Vital Signs (24h Range):  Temp:  [97.4 °F (36.3 °C)-98.6 °F (37 °C)] 97.8 °F (36.6 °C)  Pulse:  [77-94] 90  Resp:  [16-22] 22  SpO2:  [78 %-95 %] 92 %  BP: (107-138)/(58-76) 119/74     Weight: 90.9 kg (200 lb 6.4 oz)  Body mass index is 26.44 kg/m².    Estimated Creatinine Clearance: 64.6 mL/min (based on SCr of 1.1 mg/dL).    Physical Exam  Vitals signs and nursing note reviewed.   Constitutional:       General: He is not in acute distress.     Appearance: Normal appearance. He is well-developed. He is not diaphoretic.       Comments: NC  No accessory muscle use   HENT:      Head: Normocephalic and atraumatic.      Right Ear: External ear normal.      Left Ear: External ear normal.      Nose: Nose normal.      Mouth/Throat:      Mouth: Mucous membranes are moist.      Pharynx: Oropharynx is clear.   Eyes:      Extraocular Movements: Extraocular movements intact.      Conjunctiva/sclera: Conjunctivae normal.      Pupils: Pupils are equal, round, and reactive to light.   Neck:      Musculoskeletal: Normal range of motion and neck supple.   Cardiovascular:      Rate and Rhythm: Normal rate and regular rhythm.      Pulses: Normal pulses.      Heart sounds: Normal heart sounds.   Pulmonary:      Effort: Pulmonary effort is normal. No respiratory distress.      Breath sounds: Rhonchi and rales (b/l lower lobes) present. No wheezing.   Abdominal:      General: Bowel sounds are normal.      Palpations: Abdomen is soft.      Tenderness: There is no abdominal tenderness.   Musculoskeletal: Normal range of motion.   Skin:     General: Skin is warm and dry.      Capillary Refill: Capillary refill takes less than 2 seconds.      Findings: No erythema.   Neurological:      General: No focal deficit present.      Mental Status: He is alert and oriented to person, place, and time.   Psychiatric:         Mood and Affect: Mood normal.         Behavior: Behavior normal.         Significant Labs: All pertinent labs within the past 24 hours have been reviewed.    Significant Imaging: I have reviewed all pertinent imaging results/findings within the past 24 hours.

## 2020-10-23 NOTE — PLAN OF CARE
Pt is AAOx4. Pt evaluated to see if transition from a rebreather mask at 15 L to his normal NC at 4 L would be successful. Pt O2 dropped into the 80's rapidly, rebreather placed back on pt. Pt later placed on High Flow NC at 15 L and maintaining >90%. Pt comfort and safety maintained. Pt appears to be sleeping between rounds. Will continue to monitor.

## 2020-10-23 NOTE — ASSESSMENT & PLAN NOTE
75 yo AAM w/ pmhx of COPD on 4-5LNC at home, BPH, osteoarthritis, hx of MAC and aspergillosis pulmonary infection, and hx of COVID19 (hospital admission 7/21/20 and recent d/c from rehab facility 10/16/20) on whom ID is consulted for MAC and pulmonary aspergillosis    -unclear if he has been taking the itraconazole since leaving rehab  -continue voriconazole  -unclear if he ever received treatment for MAC  -I attempted to call his outpatient ID physician, left my number, but have not heard back  -Primary team should also attempt to call - the number is in care everywhere  -he would meet criteria for MAC treatment if not already treated  -send sputum cx and AFB smear and culture  -continue ceftriaxone and azithro for now

## 2020-10-23 NOTE — PLAN OF CARE
Patient on oxygen with documented flow.  Will attempt to wean per O2 order protocol. The proper method of use, as well as anticipated side effects, of this aerosol treatment are discussed and demonstrated to the patient.  The proper method of use, as well as anticipated side effects, of this metered-dose inhaler are discussed and demonstrated to the patient. Will continue to monitor.

## 2020-10-23 NOTE — PROGRESS NOTES
"[facility] Hospital Medicine  Progress Note    Patient Name: Rajan Deluna  MRN: [id number]  Patient Class: IP- Inpatient   Admission Date: 10/22/2020  Length of Stay: 1 days  Attending Physician: Rell Tolentino III, MD  Primary Care Provider: Jason Mccord MD        Subjective:     Principal Problem:COPD exacerbation        HPI:  Pt is a 75 yo AAM w/ pmhx of COPD on 4-5LNC at home, BPH, osteoarthritis, hx of MAC and aspergillosis pulmonary infection, hx of COVID19 (hospital admission 7/21/20 and recent d/c from rehab facility 10/16/20) presenting for SOB/anxiety. Pt states today while he was ambulating at home, he became anxious because his home O2 tubing was not long enough to reach his kitchen/living room from his bedroom. He states he was trying to move his O2 tank when the tubing became tangled which made him anxious because he had to untangle the tubing. He states he also had grandchildren at home to take care of which added to his anxiety. Pt states he became SOB after his anxiety started. He never was off of his home O2. He has been compliant with his home medications, which he received on discharge from the rehab facility. He state he productive yellow sputum intermittently, but this is not increased from baseline, he has been having productive cough "since I started seeing a lung doctor." Pt denies f/c/s/n/v, chest pain, palpitations, wheezing, increased cough/sputum production, abdominal pain, constipation, diarrhea, dysuria, lower extremity edema.     Per ED record, when EMS was called, his SpO2 was 50% on 5L, he was placed on non rebreather and SpO2 lorrie to 94-97%. ED tried to put on nasal canula at 5L but SpO2 decreased to mid 80s to was placed back on non rebreather. Otherwise vitals were stable. CBC unremarkable. CMP showed MARQUIS Cr 1.47, CXR showed b/l bibasilar interstitial infiltrate which could represent infection/inflammatory process vs edema. Pt was transferred here w/ no " treatment in ED (per patient).    Overview/Hospital Course:  No notes on file    Interval History: Pt continues to be short of breath with increased oxygen requirement. O2 sats maintained on 15L high flow. Feels SOB because his nose feels congested making it difficult to breath trough his nose. COVID swab negative so pt removed from isolation. Pt reprots continuing to feel weak since leaving LTAC and has requested continued PT for rehabilitation.     Review of Systems   Constitutional: Negative for chills, diaphoresis, fatigue and fever.   HENT: Negative for congestion, rhinorrhea, sore throat and trouble swallowing.    Eyes: Negative for itching and visual disturbance.   Respiratory: Positive for shortness of breath. Negative for cough, chest tightness and wheezing.    Cardiovascular: Negative for chest pain and palpitations.   Gastrointestinal: Negative for abdominal pain, constipation, diarrhea, nausea and vomiting.   Endocrine: Negative for polyphagia and polyuria.   Genitourinary: Negative for decreased urine volume, dysuria and flank pain.   Musculoskeletal: Negative for back pain, joint swelling and neck stiffness.   Skin: Negative for color change and rash.   Neurological: Negative for dizziness, weakness, light-headedness and headaches.   Psychiatric/Behavioral: Negative for confusion. The patient is nervous/anxious.      Objective:     Vital Signs (Most Recent):  Temp: 97.8 °F (36.6 °C) (10/23/20 1148)  Pulse: 90 (10/23/20 1208)  Resp: (!) 22 (10/23/20 1208)  BP: 119/74 (10/23/20 1148)  SpO2: (!) 92 % (10/23/20 1210) Vital Signs (24h Range):  Temp:  [97.4 °F (36.3 °C)-98.6 °F (37 °C)] 97.8 °F (36.6 °C)  Pulse:  [77-94] 90  Resp:  [16-22] 22  SpO2:  [78 %-95 %] 92 %  BP: (107-138)/(58-76) 119/74     Weight: 90.9 kg (200 lb 6.4 oz)  Body mass index is 30.47 kg/m².    Intake/Output Summary (Last 24 hours) at 10/23/2020 2437  Last data filed at 10/23/2020 0837  Gross per 24 hour   Intake 120 ml   Output 700 ml    Net -580 ml      Physical Exam  Vitals signs and nursing note reviewed.   Constitutional:       General: He is not in acute distress.     Appearance: Normal appearance. He is well-developed. He is not diaphoretic.   HENT:      Head: Normocephalic and atraumatic.      Right Ear: External ear normal.      Left Ear: External ear normal.      Nose: Nose normal.      Mouth/Throat:      Mouth: Mucous membranes are moist.      Pharynx: Oropharynx is clear.   Eyes:      Extraocular Movements: Extraocular movements intact.      Conjunctiva/sclera: Conjunctivae normal.   Neck:      Musculoskeletal: Normal range of motion and neck supple.   Cardiovascular:      Rate and Rhythm: Normal rate and regular rhythm.      Pulses: Normal pulses.      Heart sounds: Normal heart sounds.   Pulmonary:      Effort: Pulmonary effort is normal. No respiratory distress.      Breath sounds: No wheezing. Rales: b/l lower lobes.      Comments: Slightly increased respiratory effort on 15L high flow oxygen. Restricted air movement on ascultation with slight expiratory wheeze diffusely throughout. Inspiratory rhonichi best heard in superior lung fields  Abdominal:      General: Bowel sounds are normal.      Palpations: Abdomen is soft.      Tenderness: There is no abdominal tenderness.   Musculoskeletal: Normal range of motion.   Skin:     General: Skin is warm and dry.      Capillary Refill: Capillary refill takes less than 2 seconds.      Findings: No erythema.   Neurological:      General: No focal deficit present.      Mental Status: He is alert and oriented to person, place, and time.   Psychiatric:         Mood and Affect: Mood normal.         Behavior: Behavior normal.         Significant Labs:   CBC:   Recent Labs   Lab 10/22/20  2222 10/23/20  0452   WBC 8.71 7.43   HGB 9.9* 9.2*   HCT 33.5* 30.6*    240     CMP:   Recent Labs   Lab 10/22/20  2213 10/23/20  0452    142   K 4.1 3.8    107   CO2 28 28    110   BUN  22 19   CREATININE 1.3 1.1   CALCIUM 8.7 8.2*   PROT 7.1 6.3   ALBUMIN 3.2* 2.8*   BILITOT 0.5 0.5   ALKPHOS 108 93   AST 26 22   ALT 16 13   ANIONGAP 8 7*   EGFRNONAA 53* >60     All pertinent labs within the past 24 hours have been reviewed.    Significant Imaging: I have reviewed all pertinent imaging results/findings within the past 24 hours.      Assessment/Plan:      * COPD exacerbation  Increased oxygen requirement from baseline 5L. History of COVID 19, negative this admission with CXR stable from previous. Non-compliant with medications at home.   -Duoneb q6hr  -fluticasone formetorol qd  -Continue home spiriva, singulair  -Continue empiric rocephin/azithro  - Appreciate ID recs      BPH (benign prostatic hyperplasia)  -Asymptomatic  -Continue home flomax      Type 2 diabetes mellitus  -A1c 6.8 (7/2020)  -Repeat A1c 5.8%  -LDSSI  -Glucose 140-180 inpt      History of Aspergillus & MAC  -Takes itraconazole at home  -Continue Voriconazole 200 BID  -Appreciate ID recs  - Unclear if MAC has been treated      Acute respiratory failure with hypoxia  - Plan as above.   - Goal O2 sat 88-92%  - Appreciate pulm recs      History of 2019 novel coronavirus disease (COVID-19)  Negative this admission. Low suspicion for re-infection given stable CXR and negative labs.         VTE Risk Mitigation (From admission, onward)         Ordered     enoxaparin injection 40 mg  Every 24 hours      10/22/20 2155     IP VTE HIGH RISK PATIENT  Once      10/22/20 2155     Place sequential compression device  Until discontinued      10/22/20 2155                Discharge Planning   ADAM:      Code Status: Full Code   Is the patient medically ready for discharge?:     Reason for patient still in hospital (select all that apply): Patient trending condition, Consult recommendations and PT / OT recommendations               Vinicio Méndez DO  Department of Hospital Medicine   [facility]

## 2020-10-23 NOTE — CONSULTS
Consult Note  [facility] Pulmonary & Critical Care Medicine    Attending: You Carmona  Fellow: Blaire Camarillo  Admit Date: 10/22/2020  Today's Date: 10/23/2020  Reason for Consult:  Chronic Hypoxic Respiratory Failure     SUBJECTIVE:     HPI:  Mr. Deluna is a 75yo with PMHx outlined below that is known to the [facility] Pulmonary service. Of note, Mr. Deluna was admitted to [facility] with COVID19+ PNA and discharged to [facility] for chronic hypoxic respiratory failure requiring HFNC. During his admission at the [facility] Pulmonary was consulted for increasing oxygen requirements with comfort flow despite diuresis. Review of his chart demonstrates that he has COPD with likely underlying pulmonary fibrosis from known occupational exposure to asbestos. Furthermore, Mr. 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 Overall at the [facility] Mr. Deluna was started on a steroid taper for his ongoing hypoxia with another trial of diuretics to assist with his recovery.   Patient was last seen by Dr. Camarillo 10/16/2020 on 5L NC with sats >90% and no increased work of breathing.     Today on exam Mr. Deluna is less upbeat than usual with some evidence of increased work of breathing from baseline on 15L with sats >88%. Patient reports compliance with therapies however does reports more dypsnea over the past week while at home.   Review of patient's allergies indicates:  No Known Allergies    Past Medical History:   Diagnosis Date    Arthritis     COPD (chronic obstructive pulmonary disease)     Encounter for blood transfusion     Hypertension     Smoker     Weakness      Past Surgical History:   Procedure Laterality Date    HERNIA REPAIR      INTRALUMINAL GASTROINTESTINAL TRACT IMAGING VIA CAPSULE N/A 8/3/2020    Procedure: IMAGING PROCEDURE, GI TRACT, INTRALUMINAL, VIA CAPSULE;  Surgeon: Rey Olivares MD;  Location: [facility];  Service: Endoscopy;  Laterality: N/A;    right knee surgery       History  "reviewed. No pertinent family history.  Social History     Tobacco Use    Smoking status: Former Smoker    Smokeless tobacco: Never Used   Substance Use Topics    Alcohol use: Not Currently    Drug use: Never       All medications reviewed.    ROS    OBJECTIVE:     Vital Signs Trends/Hx Reviewed  Vitals:    10/23/20 1208 10/23/20 1210 10/23/20 1432 10/23/20 1612   BP:    125/68   BP Location:    Left arm   Patient Position:    Lying   Pulse: 90   87   Resp: (!) 22   20   Temp:    98 °F (36.7 °C)   TempSrc:    Oral   SpO2: (!) 78% (!) 92%  95%   Weight:       Height:   5' 8" (1.727 m)          Physical Exam:  General: NAD, cooperative & interactive.  HEENT: AT/NC, PERRL, EOMI, oral and nasal mucosa moist.   Neck: Supple without JVD or palpable LAD.   Cardiac: normal rate, regular rhythm, with no MRG with brisk cap refill and symmetric pulses in distal extremities.  Respiratory: Coarse bibasilar crackles   Abdomen: Soft, NT/ND. +BS. No hepatosplenomegaly.   Extremities: Trace pitting edema   Neuro: Grossly intact to brief exam. Oriented x3 with appropriate mood/affect to situation.       Laboratory:  No results for input(s): PH, PCO2, PO2, HCO3, POCSATURATED, BE in the last 24 hours.  Recent Labs   Lab 10/23/20  0452   WBC 7.43   RBC 3.56*   HGB 9.2*   HCT 30.6*      MCV 86   MCH 25.8*   MCHC 30.1*     Recent Labs   Lab 10/22/20  2213 10/23/20  0452    142   K 4.1 3.8    107   CO2 28 28   BUN 22 19   CREATININE 1.3 1.1   MG 2.2  --        Microbiology Data:   Microbiology Results (last 7 days)     Procedure Component Value Units Date/Time    Blood culture (site 1) [[id number]] Collected: 10/22/20 2222    Order Status: Completed Specimen: Blood from Peripheral, Left Wrist Updated: 10/23/20 0715     Blood Culture, Routine No Growth to date    Narrative:      Site # 1, aerobic and anaerobic    Blood culture (site 2) [[id number]] Collected: 10/22/20 2212    Order Status: Completed Specimen: Blood from " Peripheral, Right Hand Updated: 10/23/20 0715     Blood Culture, Routine No Growth to date    Narrative:      Site # 2, aerobic only    Culture, Respiratory [[id number]]     Order Status: No result Specimen: Respiratory from Sputum, Expectorated            Chest Imaging:   There are bilateral interstitial opacities, similar in appearance to prior, most significant within the left lung base.  The pleural spaces are clear.  The cardiac silhouette is unremarkable.  There are calcifications of the aortic arch.  The visualized osseous structures demonstrate degenerative changes.  There are remote left-sided rib fracture deformities.     Impression:     Stable chronic lung findings.  No acute cardiopulmonary abnormality.    Infusions:        Scheduled Medications:    albuterol-ipratropium  3 mL Nebulization Q6H WAKE    azithromycin  500 mg Oral Daily    cefTRIAXone (ROCEPHIN) IVPB  1 g Intravenous Q24H    celecoxib  200 mg Oral Daily    enoxaparin  40 mg Subcutaneous Q24H    fluticasone furoate-vilanteroL  1 puff Inhalation Daily    furosemide  20 mg Oral Daily    lactobacillus acidophilus & bulgar  1 packet Oral BID WM    lidocaine  1 patch Transdermal Q24H    montelukast  10 mg Oral QHS    multivitamin  1 tablet Oral Daily    mupirocin   Nasal BID    predniSONE  40 mg Oral Daily    pregabalin  75 mg Oral BID    tamsulosin  0.4 mg Oral Daily    tiotropium  1 capsule Inhalation Daily    voriconazole  200 mg Oral BID       PRN Medications:   dextrose 50%, dextrose 50%, glucagon (human recombinant), glucose, glucose, influenza, insulin aspart U-100, pneumoc 13-loy conj-dip cr(PF), sodium chloride 0.9%    Assessment & Plan:   Patient Active Problem List   Diagnosis    Chest pain    Anemia    RBBB    JACOME (dyspnea on exertion)    COPD (chronic obstructive pulmonary disease)    Diastolic dysfunction without heart failure    History of 2019 novel coronavirus disease (COVID-19)    Acute respiratory failure  with hypoxia    Acute respiratory disease due to COVID-19 virus    History of Aspergillus & MAC    Left shoulder pain    Counseling regarding advance care planning and goals of care    Goals of care, counseling/discussion    Palliative care encounter    Impaired functional mobility and endurance    Post viral debility    Rotator cuff tear    Osteoarthritis of left glenohumeral joint    Type 2 diabetes mellitus    Weakness generalized    COPD exacerbation    BPH (benign prostatic hyperplasia)       ASSESSMENT & RECOMMENDATIONS     Mr. Deluna is a 73 yo male with the above history with increasing oxygen requirements over the past 3 days. CT on 9/11/20 with questionable left lower linear segmental defect that could suggest chronic thromboembolism. This would not explain the patient's worsening oxygen requirement and shortness of breath. CTA thorax noted enlarged pulmonary artery,. With the patient's history of MAC and COPD and evidence of architectural distortion on his lung, pulmonary hypertension is also a possible contributing factor.      #Hypoxic Respiratory Failure   · Likely ongoing hypoxic respiratory failure is 2.2 new insult with COVID19 superimposed on existing underlying structural disease   · Continue supplemental oxygen for goal SpO2 88-92% or PaO2 55-80mmHg   · Continue scheduled bronchodilators, with ICS/LAMA/LABA    · Completed course of oral prednisone 40mg daily for 7 days, 20mg for 5 days and will finish taper with 10mg for 5 days at LT[facility], not unreasonable to resume steroids given his increased oxygen requirements but must balance this as he is being treated for Aspergillosis   · Patient also with clinical evidence of hypervolemia, would suggest diuresis 40 Lasix IV daily with strict I&Os and fluid restriction to 1800cc daily.          Thank you for allowing us to participate in the care of this patient. We will follow along. Please call with questions.    Blaire Camarillo M.D.,  PGY-V  [employer] Pulmonary/Critical Care Fellow    Pt seen and examined with Pulmonary/Critical Care team and this note reviewed and validated with the following additional comments:    Pt likely has 2-3 diagnoses contributing to his hypoxemia:  1. He clearly has emphysema.  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  3. His bilateral pleural effusions are not explained by eityher #1 or #2.  The most like likely explanation is that these are hydrostatic. I would diurese.    If his hypoxemia and SOB don't get better with diuresis, we could consider a course of steroids. We will revisit him on Monday.    Deion Carmona MD  Phone [phone]

## 2020-10-23 NOTE — PLAN OF CARE
TN met with pt -- pt is awake, alert   pt at [facility]   7/21-8/27/20;   [facility]  8/27 - 10/16      pt discharged 10/16 --- readmitted to [facility]  10/22     pt current with home health:   [facility] [place] -- Fax [phone] [phone]  pt has home O2 - per pt - supplied by [employer] --- son will bring portable tank at discharge   also has a st cane, st walker, wc, bsc, sc      pt lives at home with Tania Deluna ;      also lives with son    Rajan Weller492 [phone]   daughter assists as needed   --- Susanna Deluna   [phone]       dx:  Resp failure;  pt is on 15l/min    IV steroids.          10/23/20 1515   Discharge Assessment   Assessment Type Discharge Planning Assessment   Confirmed/corrected address and phone number on facesheet? Yes   Assessment information obtained from? Patient;Medical Record;Caregiver   Expected Length of Stay (days) 5   Communicated expected length of stay with patient/caregiver yes   Prior to hospitilization cognitive status: Alert/Oriented   Prior to hospitalization functional status: Completely Dependent   Current cognitive status: Alert/Oriented   Current Functional Status: Completely Dependent   Lives With spouse;child(danita), adult   Able to Return to Prior Arrangements yes   Is patient able to care for self after discharge? Yes   Who are your caregiver(s) and their phone number(s)? son Rajan Deluna - [phone];  wife Tania Weller [phone] ; daughter Susanna Gutiérrez.  [phone]   Patient's perception of discharge disposition home or selfcare;home health   Readmission Within the Last 30 Days   ([facility] 7/21-8/27/20;  [facility]   8/27-10/16/20)   Patient currently being followed by outpatient case management? No   Patient currently receives any other outside agency services? No   Equipment Currently Used at Home wheelchair;bedside commode;shower chair;walker, standard;cane, straight   Do you have any problems affording any of your  prescribed medications? No  ([facility])   Does the patient have transportation home? Yes   Does the patient receive services at the [facility]? No   Discharge Plan A Home;Home with family;Home Health  (current with:  [facility] [place] -- Fax  [phone]  Lbsyz-[phone])   DME Needed Upon Discharge    (tbd)   Patient/Family in Agreement with Plan yes

## 2020-10-23 NOTE — CONSULTS
[facility]  Infectious Disease  Consult Note    Patient Name: Rajan Deluna  MRN: [id number]  Admission Date: 10/22/2020  Hospital Length of Stay: 1 days  Attending Physician: Rell Tolentino III, MD  Primary Care Provider: Jason Mccord MD     Isolation Status: No active isolations    Patient information was obtained from patient and past medical records.      Inpatient consult to Infectious Diseases  Consult performed by: Tl Salinas MD  Consult ordered by: Shyla Malik MD  Reason for consult: MAC and aspergillus        Assessment/Plan:     History of Aspergillus & MAC  73 yo AAM w/ pmhx of COPD on 4-5LNC at home, BPH, osteoarthritis, hx of MAC and aspergillosis pulmonary infection, and hx of COVID19 (hospital admission 7/21/20 and recent d/c from rehab facility 10/16/20) on whom ID is consulted for MAC and pulmonary aspergillosis    -unclear if he has been taking the itraconazole since leaving rehab  -continue voriconazole  -unclear if he ever received treatment for MAC  -I attempted to call his outpatient ID physician, left my number, but have not heard back  -Primary team should also attempt to call - the number is in care everywhere  -he would meet criteria for MAC treatment if not already treated  -send sputum cx and AFB smear and culture  -continue ceftriaxone and azithro for now        Thank you for your consult. I will follow-up with patient. Please contact us if you have any additional questions.    Tl Salinas MD  Infectious Disease  [facility]    Subjective:     Principal Problem: COPD exacerbation    HPI: 73 yo AAM w/ pmhx of COPD on 4-5LNC at home, BPH, osteoarthritis, hx of MAC and aspergillosis pulmonary infection, hx of COVID19 (hospital admission 7/21/20 and recent d/c from rehab facility 10/16/20) presenting for SOB/anxiety. Pt states on the day of admission while he was ambulating at home, he became anxious because his home O2 tubing was not long  "enough to reach his kitchen/living room from his bedroom. He states he was trying to move his O2 tank when the tubing became tangled which made him anxious because he had to untangle the tubing. He states he also had grandchildren at home to take care of which added to his anxiety. Pt states he became SOB after his anxiety started. He never was off of his home O2. He has been compliant with his home medications, which he received on discharge from the rehab facility. He states he is productive yellow sputum intermittently, but this is not increased from baseline, he has been having productive cough "since I started seeing a lung doctor." Pt denies f/c/s/n/v, chest pain, palpitations, wheezing, increased cough/sputum production, abdominal pain, constipation, diarrhea, dysuria, lower extremity edema. Per ED record, when EMS was called, his SpO2 was 50% on 5L, he was placed on non rebreather and SpO2 lorrie to 94-97%. ED tried to put on nasal canula at 5L but SpO2 decreased to mid 80s to was placed back on non rebreather. CXR showed b/l bibasilar interstitial infiltrate which could represent infection/inflammatory process vs edema.     I did not find his itraconazole among his home medications    Past Medical History:   Diagnosis Date    Arthritis     COPD (chronic obstructive pulmonary disease)     Encounter for blood transfusion     Hypertension     Smoker     Weakness        Past Surgical History:   Procedure Laterality Date    HERNIA REPAIR      INTRALUMINAL GASTROINTESTINAL TRACT IMAGING VIA CAPSULE N/A 8/3/2020    Procedure: IMAGING PROCEDURE, GI TRACT, INTRALUMINAL, VIA CAPSULE;  Surgeon: Rey Olivares MD;  Location: [facility];  Service: Endoscopy;  Laterality: N/A;    right knee surgery         Review of patient's allergies indicates:  No Known Allergies    Medications:  Medications Prior to Admission   Medication Sig    celecoxib (CELEBREX) 200 MG capsule Take 1 capsule (200 mg total) by mouth once " daily.    gabapentin (NEURONTIN) 300 MG capsule Take 1 capsule (300 mg total) by mouth 3 (three) times daily.    HYDROcodone-acetaminophen (NORCO)  mg per tablet Take 1 tablet by mouth every 4 (four) hours as needed for Pain.    hydrOXYzine HCL (ATARAX) 25 MG tablet Take 1 tablet (25 mg total) by mouth 3 (three) times daily as needed for Itching.    montelukast (SINGULAIR) 10 mg tablet Take 1 tablet (10 mg total) by mouth every evening.    pregabalin (LYRICA) 75 MG capsule Take 1 capsule (75 mg total) by mouth 2 (two) times daily.    tamsulosin (FLOMAX) 0.4 mg Cap Take 1 capsule (0.4 mg total) by mouth once daily.    tiotropium (SPIRIVA) 18 mcg inhalation capsule Inhale 1 capsule (18 mcg total) into the lungs once daily. Controller    budesonide-formoterol 160-4.5 mcg (SYMBICORT) 160-4.5 mcg/actuation HFAA Inhale 2 puffs into the lungs every 12 (twelve) hours.    furosemide (LASIX) 20 MG tablet Take 1 tablet (20 mg total) by mouth once daily.    itraconazole (SPORANOX) 100 mg Cap Take 1 capsule (100 mg total) by mouth 2 (two) times daily.    lactobacillus acidophilus & bulgar (LACTINEX) 100 million cell packet Take 1 packet (1 each total) by mouth 2 (two) times daily with meals.    multivitamin Tab Take 1 tablet by mouth once daily.    predniSONE (DELTASONE) 10 MG tablet Take 1 tablet (10 mg total) by mouth once daily.    PROAIR HFA 90 mcg/actuation inhaler Inhale 1-2 puffs into the lungs every 4 (four) hours as needed for Wheezing or Shortness of Breath.    traMADoL (ULTRAM) 50 mg tablet Take 1 tablet (50 mg total) by mouth every 6 (six) hours as needed for Pain.     Antibiotics (From admission, onward)    Start     Stop Route Frequency Ordered    10/22/20 2300  cefTRIAXone (ROCEPHIN) 1 g/50 mL D5W IVPB      -- IV Every 24 hours (non-standard times) 10/22/20 2155    10/22/20 2300  mupirocin 2 % ointment      10/27 2059 Nasl 2 times daily 10/22/20 2159    10/22/20 2219  azithromycin tablet 500  mg      -- Oral Daily 10/22/20 2202        Antifungals (From admission, onward)    Start     Stop Route Frequency Ordered    10/22/20 2300  voriconazole tablet 200 mg      -- Oral 2 times daily 10/22/20 2155        Antivirals (From admission, onward)    None           Immunization History   Administered Date(s) Administered    PPD Test 08/11/2020       Family History     None        Social History     Socioeconomic History    Marital status: Unknown     Spouse name: Not on file    Number of children: Not on file    Years of education: Not on file    Highest education level: Not on file   Occupational History    Not on file   Social Needs    Financial resource strain: Not on file    Food insecurity     Worry: Not on file     Inability: Not on file    Transportation needs     Medical: Not on file     Non-medical: Not on file   Tobacco Use    Smoking status: Former Smoker    Smokeless tobacco: Never Used   Substance and Sexual Activity    Alcohol use: Not Currently    Drug use: Never    Sexual activity: Not on file   Lifestyle    Physical activity     Days per week: Not on file     Minutes per session: Not on file    Stress: Not on file   Relationships    Social connections     Talks on phone: Not on file     Gets together: Not on file     Attends Holiness service: Not on file     Active member of club or organization: Not on file     Attends meetings of clubs or organizations: Not on file     Relationship status: Not on file   Other Topics Concern    Not on file   Social History Narrative    Not on file     Review of Systems   Constitutional: Negative for chills, diaphoresis, fatigue and fever.   HENT: Negative for congestion, rhinorrhea, sore throat and trouble swallowing.    Eyes: Negative for itching and visual disturbance.   Respiratory: Positive for shortness of breath. Negative for cough, chest tightness and wheezing.    Cardiovascular: Negative for chest pain and palpitations.    Gastrointestinal: Negative for abdominal pain, constipation, diarrhea, nausea and vomiting.   Endocrine: Negative for polyphagia and polyuria.   Genitourinary: Negative for dysuria and flank pain.   Musculoskeletal: Negative for back pain, joint swelling and neck stiffness.   Skin: Negative for color change and rash.   Neurological: Negative for dizziness, weakness, light-headedness and headaches.   Psychiatric/Behavioral: Negative for confusion. The patient is nervous/anxious.      Objective:     Vital Signs (Most Recent):  Temp: 97.8 °F (36.6 °C) (10/23/20 1148)  Pulse: 90 (10/23/20 1208)  Resp: (!) 22 (10/23/20 1208)  BP: 119/74 (10/23/20 1148)  SpO2: (!) 92 % (10/23/20 1210) Vital Signs (24h Range):  Temp:  [97.4 °F (36.3 °C)-98.6 °F (37 °C)] 97.8 °F (36.6 °C)  Pulse:  [77-94] 90  Resp:  [16-22] 22  SpO2:  [78 %-95 %] 92 %  BP: (107-138)/(58-76) 119/74     Weight: 90.9 kg (200 lb 6.4 oz)  Body mass index is 26.44 kg/m².    Estimated Creatinine Clearance: 64.6 mL/min (based on SCr of 1.1 mg/dL).    Physical Exam  Vitals signs and nursing note reviewed.   Constitutional:       General: He is not in acute distress.     Appearance: Normal appearance. He is well-developed. He is not diaphoretic.      Comments: NC  No accessory muscle use   HENT:      Head: Normocephalic and atraumatic.      Right Ear: External ear normal.      Left Ear: External ear normal.      Nose: Nose normal.      Mouth/Throat:      Mouth: Mucous membranes are moist.      Pharynx: Oropharynx is clear.   Eyes:      Extraocular Movements: Extraocular movements intact.      Conjunctiva/sclera: Conjunctivae normal.      Pupils: Pupils are equal, round, and reactive to light.   Neck:      Musculoskeletal: Normal range of motion and neck supple.   Cardiovascular:      Rate and Rhythm: Normal rate and regular rhythm.      Pulses: Normal pulses.      Heart sounds: Normal heart sounds.   Pulmonary:      Effort: Pulmonary effort is normal. No  respiratory distress.      Breath sounds: Rhonchi and rales (b/l lower lobes) present. No wheezing.   Abdominal:      General: Bowel sounds are normal.      Palpations: Abdomen is soft.      Tenderness: There is no abdominal tenderness.   Musculoskeletal: Normal range of motion.   Skin:     General: Skin is warm and dry.      Capillary Refill: Capillary refill takes less than 2 seconds.      Findings: No erythema.   Neurological:      General: No focal deficit present.      Mental Status: He is alert and oriented to person, place, and time.   Psychiatric:         Mood and Affect: Mood normal.         Behavior: Behavior normal.         Significant Labs: All pertinent labs within the past 24 hours have been reviewed.    Significant Imaging: I have reviewed all pertinent imaging results/findings within the past 24 hours.

## 2020-10-23 NOTE — CARE UPDATE
Patient was off of O2 while wiping his nose.  O2 sats 78%.  Placed back on 15L .  O2 sats 92%.  Albuterol treatment given.  Will continue to monitor.

## 2020-10-23 NOTE — NURSING
RAPID RESPONSE NURSE PROACTIVE ROUNDING NOTE     Time of Visit: 0740    Admit Date: 10/22/2020  LOS: 1  Code Status: Full Code   Date of Visit: 10/23/2020  : 1945  Age: 74 y.o.  Sex: male  Race: Black or   Bed: K528/K528 A:   MRN: [id number]  Was the patient discharged from an ICU this admission? no   Was the patient discharged from a PACU within last 24 hours?  no  Did the patient receive conscious sedation/general anesthesia in last 24 hours?  no  Was the patient in the ED within the past 24 hours?  yes  Was the patient started on NIPPV within the past 24 hours?  no  Attending Physician: Rell Tolentino III, MD  Primary Service: Networked reference to record PCT     ASSESSMENT     Notified by charge RN via phone call.  Reason for alert: high O2 requirements, retesting for Covid    Diagnosis: COPD exacerbation    Abnormal Vital Signs: /62 (BP Location: Left arm, Patient Position: Lying)   Pulse 84   Temp 98.3 °F (36.8 °C) (Oral)   Resp 18   Wt 90.9 kg (200 lb 6.4 oz)   SpO2 (!) 94%   BMI 26.44 kg/m²      Clinical Issues: Respiratory    Patient  has a past medical history of Arthritis, COPD (chronic obstructive pulmonary disease), Encounter for blood transfusion, Hypertension, Smoker, and Weakness.           INTERVENTIONS/ RECOMMENDATIONS     Upon speaking with m/s charge nurseFiorella, and reviewing patient chart, patient is on HFNC at 15L with an SpO2 of 94%, respiratory rate of 18 and in NAD. He is currently on steroids for COPD and antibiotics for a possible infectious process in lungs. Most recent CXR shows a stable chest. Also spoke with KELLY Lacey and instructed her to notify me if patient had an increased WOB, increased O2 requirement, or overall worsening appearance. Also recommend getting a pulmonary consult for this patient.       Discussed plan of care with Deepthi MENDOZA and Fiorella .    PHYSICIAN ESCALATION     Yes/No  no    Orders received and case discussed with  NA.    Disposition: Remain in room 528.    FOLLOW-UP     Call back the Rapid Response Nurse, Darlin Mckeon RN at [phone] for additional questions or concerns.

## 2020-10-23 NOTE — ASSESSMENT & PLAN NOTE
-SOB, no increased sputum production  -On 5L NC at home  -Has only been taking spiriva, not symbicort  -Hx of COVID  -CXR w/ b/l lower lobe interstitial opacities could represent infection/inflammation  -Procal pending  -Duoneb q6hr  -fluticasone formetorol qd  -Continue home spiriva, singulair  -Empiric rocephin/azithro, has been in hospital within 90 days, but low suspicion for MRSA/Psuedomonas given 0/4 SIRS

## 2020-10-23 NOTE — ASSESSMENT & PLAN NOTE
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

## 2020-10-23 NOTE — ASSESSMENT & PLAN NOTE
-Diagnosed in 7/2020  -Lung sounds coarse  -CXR b/l lower lobe intersitial opacities, could be sequela of COVID  -Monitor

## 2020-10-23 NOTE — SUBJECTIVE & OBJECTIVE
Interval History: Pt continues to be short of breath with increased oxygen requirement. O2 sats maintained on 15L high flow. Feels SOB because his nose feels congested making it difficult to breath trough his nose. COVID swab negative so pt removed from isolation. Pt reprots continuing to feel weak since leaving LTAC and has requested continued PT for rehabilitation.     Review of Systems   Constitutional: Negative for chills, diaphoresis, fatigue and fever.   HENT: Negative for congestion, rhinorrhea, sore throat and trouble swallowing.    Eyes: Negative for itching and visual disturbance.   Respiratory: Positive for shortness of breath. Negative for cough, chest tightness and wheezing.    Cardiovascular: Negative for chest pain and palpitations.   Gastrointestinal: Negative for abdominal pain, constipation, diarrhea, nausea and vomiting.   Endocrine: Negative for polyphagia and polyuria.   Genitourinary: Negative for decreased urine volume, dysuria and flank pain.   Musculoskeletal: Negative for back pain, joint swelling and neck stiffness.   Skin: Negative for color change and rash.   Neurological: Negative for dizziness, weakness, light-headedness and headaches.   Psychiatric/Behavioral: Negative for confusion. The patient is nervous/anxious.      Objective:     Vital Signs (Most Recent):  Temp: 97.8 °F (36.6 °C) (10/23/20 1148)  Pulse: 90 (10/23/20 1208)  Resp: (!) 22 (10/23/20 1208)  BP: 119/74 (10/23/20 1148)  SpO2: (!) 92 % (10/23/20 1210) Vital Signs (24h Range):  Temp:  [97.4 °F (36.3 °C)-98.6 °F (37 °C)] 97.8 °F (36.6 °C)  Pulse:  [77-94] 90  Resp:  [16-22] 22  SpO2:  [78 %-95 %] 92 %  BP: (107-138)/(58-76) 119/74     Weight: 90.9 kg (200 lb 6.4 oz)  Body mass index is 30.47 kg/m².    Intake/Output Summary (Last 24 hours) at 10/23/2020 6770  Last data filed at 10/23/2020 0838  Gross per 24 hour   Intake 120 ml   Output 700 ml   Net -580 ml      Physical Exam  Vitals signs and nursing note reviewed.    Constitutional:       General: He is not in acute distress.     Appearance: Normal appearance. He is well-developed. He is not diaphoretic.   HENT:      Head: Normocephalic and atraumatic.      Right Ear: External ear normal.      Left Ear: External ear normal.      Nose: Nose normal.      Mouth/Throat:      Mouth: Mucous membranes are moist.      Pharynx: Oropharynx is clear.   Eyes:      Extraocular Movements: Extraocular movements intact.      Conjunctiva/sclera: Conjunctivae normal.   Neck:      Musculoskeletal: Normal range of motion and neck supple.   Cardiovascular:      Rate and Rhythm: Normal rate and regular rhythm.      Pulses: Normal pulses.      Heart sounds: Normal heart sounds.   Pulmonary:      Effort: Pulmonary effort is normal. No respiratory distress.      Breath sounds: No wheezing. Rales: b/l lower lobes.      Comments: Slightly increased respiratory effort on 15L high flow oxygen. Restricted air movement on ascultation with slight expiratory wheeze diffusely throughout. Inspiratory rhonichi best heard in superior lung fields  Abdominal:      General: Bowel sounds are normal.      Palpations: Abdomen is soft.      Tenderness: There is no abdominal tenderness.   Musculoskeletal: Normal range of motion.   Skin:     General: Skin is warm and dry.      Capillary Refill: Capillary refill takes less than 2 seconds.      Findings: No erythema.   Neurological:      General: No focal deficit present.      Mental Status: He is alert and oriented to person, place, and time.   Psychiatric:         Mood and Affect: Mood normal.         Behavior: Behavior normal.         Significant Labs:   CBC:   Recent Labs   Lab 10/22/20  2222 10/23/20  0452   WBC 8.71 7.43   HGB 9.9* 9.2*   HCT 33.5* 30.6*    240     CMP:   Recent Labs   Lab 10/22/20  2213 10/23/20  0452    142   K 4.1 3.8    107   CO2 28 28    110   BUN 22 19   CREATININE 1.3 1.1   CALCIUM 8.7 8.2*   PROT 7.1 6.3   ALBUMIN  3.2* 2.8*   BILITOT 0.5 0.5   ALKPHOS 108 93   AST 26 22   ALT 16 13   ANIONGAP 8 7*   EGFRNONAA 53* >60     All pertinent labs within the past 24 hours have been reviewed.    Significant Imaging: I have reviewed all pertinent imaging results/findings within the past 24 hours.

## 2020-10-23 NOTE — PLAN OF CARE
Problem: Adult Inpatient Plan of Care  Goal: Plan of Care Review  10/22/2020 4076 by Riccardo Amaya RN  Outcome: Ongoing, Progressing   VN completed admission questions with patient. Plan of care was discussed including home medications.  All questions answered.  Dr. Adams notified of patients request for lidocaine patches for shoulder pain and will place appropriate orders. Education given on safety measures and using call light before getting out of bed by himself. Patient verbalized understanding. Bed locked and in lowest position. Alarm on.

## 2020-10-23 NOTE — PT/OT/SLP EVAL
Occupational Therapy   Evaluation/tx    Name: Rajan Deluna  MRN: [id number]  Admitting Diagnosis:  COPD exacerbation      Recommendations:     Discharge Recommendations: (TBD pending progress)  Discharge Equipment Recommendations:  bath bench, rollator, hip kit  Barriers to discharge:  None    Assessment:      Pt presents w/ decreased overall endurance/conditioning, balance/mobility & coordination/respiration w/ subsequent decline in (I)/safety w/ BADLs, fxnl mobility & fxnl t/f's.  OT 5x/wk to increase phys/fxnl status & maximize potential to achieve established goals for d/c-->TBD pending progress.                  Rajan Deluna is a 74 y.o. male with a medical diagnosis of COPD exacerbation.  He presents with . Performance deficits affecting function: weakness, impaired endurance, impaired self care skills, impaired functional mobilty, gait instability, decreased upper extremity function, decreased coordination, impaired balance, impaired cognition, decreased safety awareness, decreased ROM, impaired cardiopulmonary response to activity.      Rehab Prognosis: Fair; patient would benefit from acute skilled OT services to address these deficits and reach maximum level of function.       Plan:     Patient to be seen 5 x/week to address the above listed problems via self-care/home management, therapeutic activities, therapeutic exercises  · Plan of Care Expires: 11/23/20  · Plan of Care Reviewed with: patient    Subjective     Chief Complaint: SOB  Patient/Family Comments/goals: return home    Occupational Profile:  Living Environment: w/ spouse/son/grdson in 2SH w/ THE; bedroom & t/s on 1st level  Previous level of function: Prior to Sept, MI via SPC  Roles and Routines: O2cont; sits on tub wall for baths  Equipment Used at Home:  grab bar, walker, rolling, cane, straight, wheelchair, bedside commode, shower chair  Assistance upon Discharge: fly    Pain/Comfort:  · Pain Rating 1: 0/10  · Pain Rating  Post-Intervention 1: 0/10    Patients cultural, spiritual, Methodist conflicts given the current situation:      Objective:     Communicated with: nsg prior to session.  Patient found supine with bed alarm, pulse ox (continuous), oxygen, peripheral IV upon OT entry to room.    General Precautions: Standard, fall, hearing impaired, respiratory   Orthopedic Precautions:N/A   Braces: N/A     Occupational Performance:    Bed Mobility:    · Patient completed Scooting/Bridging with supervision  · Patient completed Supine to Sit with supervision  · Patient completed Sit to Supine with stand by assistance    Functional Mobility/Transfers:  · Patient completed Sit <> Stand Transfer with contact guard assistance  with  rolling walker   · Functional Mobility: sidestep L via RW w/ Min A for DME mgmt    Activities of Daily Living:  · Lower Body Dressing: maximal assistance don socks    Cognitive/Visual Perceptual:  AO4  Mildly confused    Physical Exam:  L shldr ~30* 2/2 RTC; elb-->Ds WFL  RUE WFL at 4/5    Sit balance: F+  Stand balance: F- toF    Endurance: signif SOB & desat w/ Mod exertion    AMPAC 6 Click ADL:  AMPAC Total Score: 14    Treatment & Education:   Pt found in supine & agreeable to OT eval/tx this PM.  Pt on 15LHFNC & perf the following:  -sup-->EOB w/ Sup  -standing via RW w/ CGA  -sidestep L via RW w/ Min A for DME mgmt  -returned to sitting EOB w/ CGA  Pt w/ signif SOB & desat to 78%; required 2-3min rest w/ PLBing to recover to 85-88%  -returned to supine w/ SBA  Edu/tx re: PLBing, general safety techs & HEP. Pt verbalized understanding.                Education:    Patient left HOB elevated with all lines intact, call button in reach, bed alarm on and nsg notified    GOALS:   Multidisciplinary Problems     Occupational Therapy Goals        Problem: Occupational Therapy Goal    Goal Priority Disciplines Outcome Interventions   Occupational Therapy Goal     OT, PT/OT Ongoing, Progressing    Description:  Goals to be met by: 11/23     Patient will increase functional independence with ADLs by performing:    UE Dressing with Minimal Assistance.  LE Dressing with Stand-by Assistance and Assistive Devices as needed.  Grooming while standing at sink with Stand-by Assistance.  Toileting from toilet with Supervision for hygiene and clothing management.   Toilet transfer to toilet with Supervision.  Increased functional strength to WFL for ADLs.                     History:     Past Medical History:   Diagnosis Date    Arthritis     COPD (chronic obstructive pulmonary disease)     Encounter for blood transfusion     Hypertension     Smoker     Weakness        Past Surgical History:   Procedure Laterality Date    HERNIA REPAIR      INTRALUMINAL GASTROINTESTINAL TRACT IMAGING VIA CAPSULE N/A 8/3/2020    Procedure: IMAGING PROCEDURE, GI TRACT, INTRALUMINAL, VIA CAPSULE;  Surgeon: Rey Olivares MD;  Location: [facility];  Service: Endoscopy;  Laterality: N/A;    right knee surgery         Time Tracking:     OT Date of Treatment: 10/23/20  OT Start Time: 1430  OT Stop Time: 1504  OT Total Time (min): 34 min    Billable Minutes:Evaluation 10  Therapeutic Activity 24  Total Time 34    LIVAN Gonzalez  10/23/2020

## 2020-10-23 NOTE — HPI
"75 yo AAM w/ pmhx of COPD on 4-5LNC at home, BPH, osteoarthritis, hx of MAC and aspergillosis pulmonary infection, hx of COVID19 (hospital admission 7/21/20 and recent d/c from rehab facility 10/16/20) presenting for SOB/anxiety. Pt states on the day of admission while he was ambulating at home, he became anxious because his home O2 tubing was not long enough to reach his kitchen/living room from his bedroom. He states he was trying to move his O2 tank when the tubing became tangled which made him anxious because he had to untangle the tubing. He states he also had grandchildren at home to take care of which added to his anxiety. Pt states he became SOB after his anxiety started. He never was off of his home O2. He has been compliant with his home medications, which he received on discharge from the rehab facility. He states he is productive yellow sputum intermittently, but this is not increased from baseline, he has been having productive cough "since I started seeing a lung doctor." Pt denies f/c/s/n/v, chest pain, palpitations, wheezing, increased cough/sputum production, abdominal pain, constipation, diarrhea, dysuria, lower extremity edema. Per ED record, when EMS was called, his SpO2 was 50% on 5L, he was placed on non rebreather and SpO2 lorrie to 94-97%. ED tried to put on nasal canula at 5L but SpO2 decreased to mid 80s to was placed back on non rebreather. CXR showed b/l bibasilar interstitial infiltrate which could represent infection/inflammatory process vs edema.     I did not find his itraconazole among his home medications  "

## 2020-10-24 LAB
ALBUMIN SERPL BCP-MCNC: 2.8 G/DL (ref 3.5–5.2)
ALP SERPL-CCNC: 94 U/L (ref 55–135)
ALT SERPL W/O P-5'-P-CCNC: 13 U/L (ref 10–44)
ANION GAP SERPL CALC-SCNC: 8 MMOL/L (ref 8–16)
AST SERPL-CCNC: 16 U/L (ref 10–40)
BASOPHILS # BLD AUTO: 0 K/UL (ref 0–0.2)
BASOPHILS NFR BLD: 0 % (ref 0–1.9)
BILIRUB SERPL-MCNC: 0.4 MG/DL (ref 0.1–1)
BUN SERPL-MCNC: 17 MG/DL (ref 8–23)
CALCIUM SERPL-MCNC: 8.6 MG/DL (ref 8.7–10.5)
CHLORIDE SERPL-SCNC: 102 MMOL/L (ref 95–110)
CO2 SERPL-SCNC: 27 MMOL/L (ref 23–29)
CREAT SERPL-MCNC: 1.1 MG/DL (ref 0.5–1.4)
DIFFERENTIAL METHOD: ABNORMAL
EOSINOPHIL # BLD AUTO: 0 K/UL (ref 0–0.5)
EOSINOPHIL NFR BLD: 0.1 % (ref 0–8)
ERYTHROCYTE [DISTWIDTH] IN BLOOD BY AUTOMATED COUNT: 18.1 % (ref 11.5–14.5)
EST. GFR  (AFRICAN AMERICAN): >60 ML/MIN/1.73 M^2
EST. GFR  (NON AFRICAN AMERICAN): >60 ML/MIN/1.73 M^2
GLUCOSE SERPL-MCNC: 156 MG/DL (ref 70–110)
HCT VFR BLD AUTO: 32.4 % (ref 40–54)
HGB BLD-MCNC: 9.8 G/DL (ref 14–18)
IMM GRANULOCYTES # BLD AUTO: 0.03 K/UL (ref 0–0.04)
IMM GRANULOCYTES NFR BLD AUTO: 0.3 % (ref 0–0.5)
LYMPHOCYTES # BLD AUTO: 1.2 K/UL (ref 1–4.8)
LYMPHOCYTES NFR BLD: 12.4 % (ref 18–48)
MCH RBC QN AUTO: 25.4 PG (ref 27–31)
MCHC RBC AUTO-ENTMCNC: 30.2 G/DL (ref 32–36)
MCV RBC AUTO: 84 FL (ref 82–98)
MONOCYTES # BLD AUTO: 0.6 K/UL (ref 0.3–1)
MONOCYTES NFR BLD: 6.6 % (ref 4–15)
NEUTROPHILS # BLD AUTO: 7.7 K/UL (ref 1.8–7.7)
NEUTROPHILS NFR BLD: 80.6 % (ref 38–73)
NRBC BLD-RTO: 0 /100 WBC
PLATELET # BLD AUTO: 245 K/UL (ref 150–350)
PMV BLD AUTO: 9.3 FL (ref 9.2–12.9)
POCT GLUCOSE: 111 MG/DL (ref 70–110)
POCT GLUCOSE: 134 MG/DL (ref 70–110)
POCT GLUCOSE: 139 MG/DL (ref 70–110)
POCT GLUCOSE: 157 MG/DL (ref 70–110)
POTASSIUM SERPL-SCNC: 3.9 MMOL/L (ref 3.5–5.1)
PROT SERPL-MCNC: 6.4 G/DL (ref 6–8.4)
RBC # BLD AUTO: 3.86 M/UL (ref 4.6–6.2)
SODIUM SERPL-SCNC: 137 MMOL/L (ref 136–145)
WBC # BLD AUTO: 9.5 K/UL (ref 3.9–12.7)

## 2020-10-24 PROCEDURE — 85025 COMPLETE CBC W/AUTO DIFF WBC: CPT

## 2020-10-24 PROCEDURE — 25000003 PHARM REV CODE 250: Performed by: FAMILY MEDICINE

## 2020-10-24 PROCEDURE — 25000003 PHARM REV CODE 250: Performed by: STUDENT IN AN ORGANIZED HEALTH CARE EDUCATION/TRAINING PROGRAM

## 2020-10-24 PROCEDURE — 63600175 PHARM REV CODE 636 W HCPCS: Performed by: STUDENT IN AN ORGANIZED HEALTH CARE EDUCATION/TRAINING PROGRAM

## 2020-10-24 PROCEDURE — 97162 PT EVAL MOD COMPLEX 30 MIN: CPT

## 2020-10-24 PROCEDURE — 63700000 PHARM REV CODE 250 ALT 637 W/O HCPCS: Performed by: FAMILY MEDICINE

## 2020-10-24 PROCEDURE — 27000190 HC CPAP FULL FACE MASK W/VALVE

## 2020-10-24 PROCEDURE — 94761 N-INVAS EAR/PLS OXIMETRY MLT: CPT

## 2020-10-24 PROCEDURE — 94640 AIRWAY INHALATION TREATMENT: CPT

## 2020-10-24 PROCEDURE — 27100171 HC OXYGEN HIGH FLOW UP TO 24 HOURS

## 2020-10-24 PROCEDURE — 80053 COMPREHEN METABOLIC PANEL: CPT

## 2020-10-24 PROCEDURE — 97110 THERAPEUTIC EXERCISES: CPT

## 2020-10-24 PROCEDURE — 99900035 HC TECH TIME PER 15 MIN (STAT)

## 2020-10-24 PROCEDURE — 94660 CPAP INITIATION&MGMT: CPT

## 2020-10-24 PROCEDURE — 11000001 HC ACUTE MED/SURG PRIVATE ROOM

## 2020-10-24 PROCEDURE — 25000242 PHARM REV CODE 250 ALT 637 W/ HCPCS: Performed by: STUDENT IN AN ORGANIZED HEALTH CARE EDUCATION/TRAINING PROGRAM

## 2020-10-24 PROCEDURE — 36415 COLL VENOUS BLD VENIPUNCTURE: CPT

## 2020-10-24 RX ORDER — LIDOCAINE 50 MG/G
2 PATCH TOPICAL
Status: DISCONTINUED | OUTPATIENT
Start: 2020-10-24 | End: 2020-10-29 | Stop reason: HOSPADM

## 2020-10-24 RX ORDER — TRAMADOL HYDROCHLORIDE 50 MG/1
50 TABLET ORAL DAILY PRN
Status: DISCONTINUED | OUTPATIENT
Start: 2020-10-24 | End: 2020-10-29 | Stop reason: HOSPADM

## 2020-10-24 RX ORDER — PREGABALIN 75 MG/1
150 CAPSULE ORAL 2 TIMES DAILY
Status: DISCONTINUED | OUTPATIENT
Start: 2020-10-25 | End: 2020-10-29 | Stop reason: HOSPADM

## 2020-10-24 RX ORDER — TALC
6 POWDER (GRAM) TOPICAL NIGHTLY PRN
Status: DISCONTINUED | OUTPATIENT
Start: 2020-10-24 | End: 2020-10-29 | Stop reason: HOSPADM

## 2020-10-24 RX ORDER — KETOROLAC TROMETHAMINE 30 MG/ML
30 INJECTION, SOLUTION INTRAMUSCULAR; INTRAVENOUS ONCE
Status: COMPLETED | OUTPATIENT
Start: 2020-10-24 | End: 2020-10-24

## 2020-10-24 RX ORDER — ACETAMINOPHEN 325 MG/1
650 TABLET ORAL EVERY 6 HOURS PRN
Status: DISCONTINUED | OUTPATIENT
Start: 2020-10-24 | End: 2020-10-29 | Stop reason: HOSPADM

## 2020-10-24 RX ORDER — PREGABALIN 75 MG/1
75 CAPSULE ORAL ONCE
Status: COMPLETED | OUTPATIENT
Start: 2020-10-24 | End: 2020-10-24

## 2020-10-24 RX ORDER — IBUPROFEN 600 MG/1
600 TABLET ORAL EVERY 6 HOURS PRN
Status: DISCONTINUED | OUTPATIENT
Start: 2020-10-24 | End: 2020-10-29 | Stop reason: HOSPADM

## 2020-10-24 RX ORDER — FUROSEMIDE 10 MG/ML
40 INJECTION INTRAMUSCULAR; INTRAVENOUS
Status: DISCONTINUED | OUTPATIENT
Start: 2020-10-24 | End: 2020-10-27

## 2020-10-24 RX ADMIN — KETOROLAC TROMETHAMINE 30 MG: 30 INJECTION, SOLUTION INTRAMUSCULAR at 11:10

## 2020-10-24 RX ADMIN — AZITHROMYCIN 500 MG: 250 TABLET, FILM COATED ORAL at 08:10

## 2020-10-24 RX ADMIN — VORICONAZOLE 200 MG: 200 TABLET, FILM COATED ORAL at 08:10

## 2020-10-24 RX ADMIN — ENOXAPARIN SODIUM 40 MG: 40 INJECTION SUBCUTANEOUS at 04:10

## 2020-10-24 RX ADMIN — PREGABALIN 75 MG: 75 CAPSULE ORAL at 08:10

## 2020-10-24 RX ADMIN — LACTOBACILLUS ACIDOPHILUS / LACTOBACILLUS BULGARICUS 1 EACH: 100 MILLION CFU STRENGTH GRANULES at 04:10

## 2020-10-24 RX ADMIN — LACTOBACILLUS ACIDOPHILUS / LACTOBACILLUS BULGARICUS 1 EACH: 100 MILLION CFU STRENGTH GRANULES at 08:10

## 2020-10-24 RX ADMIN — MUPIROCIN: 20 OINTMENT TOPICAL at 08:10

## 2020-10-24 RX ADMIN — IPRATROPIUM BROMIDE AND ALBUTEROL SULFATE 3 ML: .5; 3 SOLUTION RESPIRATORY (INHALATION) at 07:10

## 2020-10-24 RX ADMIN — IPRATROPIUM BROMIDE AND ALBUTEROL SULFATE 3 ML: .5; 3 SOLUTION RESPIRATORY (INHALATION) at 08:10

## 2020-10-24 RX ADMIN — CEFTRIAXONE 1 G: 1 INJECTION, SOLUTION INTRAVENOUS at 11:10

## 2020-10-24 RX ADMIN — THERA TABS 1 TABLET: TAB at 08:10

## 2020-10-24 RX ADMIN — IPRATROPIUM BROMIDE AND ALBUTEROL SULFATE 3 ML: .5; 3 SOLUTION RESPIRATORY (INHALATION) at 01:10

## 2020-10-24 RX ADMIN — MONTELUKAST 10 MG: 10 TABLET, FILM COATED ORAL at 08:10

## 2020-10-24 RX ADMIN — IBUPROFEN 600 MG: 600 TABLET, FILM COATED ORAL at 08:10

## 2020-10-24 RX ADMIN — TIOTROPIUM BROMIDE 18 MCG: 18 CAPSULE ORAL; RESPIRATORY (INHALATION) at 07:10

## 2020-10-24 RX ADMIN — FUROSEMIDE 20 MG: 20 TABLET ORAL at 08:10

## 2020-10-24 RX ADMIN — FLUTICASONE FUROATE AND VILANTEROL TRIFENATATE 1 PUFF: 100; 25 POWDER RESPIRATORY (INHALATION) at 07:10

## 2020-10-24 RX ADMIN — CELECOXIB 200 MG: 100 CAPSULE ORAL at 08:10

## 2020-10-24 RX ADMIN — LIDOCAINE 2 PATCH: 50 PATCH TOPICAL at 11:10

## 2020-10-24 RX ADMIN — PREGABALIN 75 MG: 75 CAPSULE ORAL at 11:10

## 2020-10-24 RX ADMIN — PREDNISONE 40 MG: 20 TABLET ORAL at 08:10

## 2020-10-24 RX ADMIN — FUROSEMIDE 40 MG: 10 INJECTION, SOLUTION INTRAVENOUS at 01:10

## 2020-10-24 RX ADMIN — TAMSULOSIN HYDROCHLORIDE 0.4 MG: 0.4 CAPSULE ORAL at 08:10

## 2020-10-24 NOTE — PROGRESS NOTES
"[facility] Medicine  Progress Note    Patient Name: Rajan Deluna  MRN: [id number]  Patient Class: IP- Inpatient   Admission Date: 10/22/2020  Length of Stay: 2 days  Attending Physician: Rell Tolentino III, MD  Primary Care Provider: Jason Mccord MD        Subjective:     Principal Problem:COPD exacerbation        HPI:  Pt is a 73 yo AAM w/ pmhx of COPD on 4-5LNC at home, BPH, osteoarthritis, hx of MAC and aspergillosis pulmonary infection, hx of COVID19 (hospital admission 7/21/20 and recent d/c from rehab facility 10/16/20) presenting for SOB/anxiety. Pt states today while he was ambulating at home, he became anxious because his home O2 tubing was not long enough to reach his kitchen/living room from his bedroom. He states he was trying to move his O2 tank when the tubing became tangled which made him anxious because he had to untangle the tubing. He states he also had grandchildren at home to take care of which added to his anxiety. Pt states he became SOB after his anxiety started. He never was off of his home O2. He has been compliant with his home medications, which he received on discharge from the rehab facility. He state he productive yellow sputum intermittently, but this is not increased from baseline, he has been having productive cough "since I started seeing a lung doctor." Pt denies f/c/s/n/v, chest pain, palpitations, wheezing, increased cough/sputum production, abdominal pain, constipation, diarrhea, dysuria, lower extremity edema.     Per ED record, when EMS was called, his SpO2 was 50% on 5L, he was placed on non rebreather and SpO2 lorrie to 94-97%. ED tried to put on nasal canula at 5L but SpO2 decreased to mid 80s to was placed back on non rebreather. Otherwise vitals were stable. CBC unremarkable. CMP showed MARQUIS Cr 1.47, CXR showed b/l bibasilar interstitial infiltrate which could represent infection/inflammatory process vs edema. Pt was transferred here w/ no " treatment in ED (per patient).    Overview/Hospital Course:  No notes on file    Interval History: Pt is doing well this morning. Had some shoulder pain, this is chronic for him. Able to be weaned down to 5L oxygen and with O2 sat 93-94%.     Review of Systems   Constitutional: Negative for chills, diaphoresis, fatigue and fever.   HENT: Negative for congestion, rhinorrhea, sore throat and trouble swallowing.    Eyes: Negative for itching and visual disturbance.   Respiratory: Positive for shortness of breath (improved). Negative for cough, chest tightness and wheezing.    Cardiovascular: Negative for chest pain and palpitations.   Gastrointestinal: Negative for abdominal pain, constipation, diarrhea, nausea and vomiting.   Endocrine: Negative for polyphagia and polyuria.   Genitourinary: Negative for decreased urine volume, dysuria and flank pain.   Musculoskeletal: Negative for back pain, joint swelling and neck stiffness.   Skin: Negative for color change and rash.   Neurological: Negative for dizziness, weakness, light-headedness and headaches.   Psychiatric/Behavioral: Negative for confusion. The patient is not nervous/anxious.      Objective:     Vital Signs (Most Recent):  Temp: 98 °F (36.7 °C) (10/24/20 1127)  Pulse: 92 (10/24/20 1151)  Resp: 18 (10/24/20 1127)  BP: 117/67 (10/24/20 1127)  SpO2: 98 % (10/24/20 0737) Vital Signs (24h Range):  Temp:  [97.4 °F (36.3 °C)-98.1 °F (36.7 °C)] 98 °F (36.7 °C)  Pulse:  [75-94] 92  Resp:  [16-20] 18  SpO2:  [95 %-98 %] 98 %  BP: (110-135)/(59-68) 117/67     Weight: 88.6 kg (195 lb 5.2 oz)  Body mass index is 29.7 kg/m².    Intake/Output Summary (Last 24 hours) at 10/24/2020 1309  Last data filed at 10/23/2020 1800  Gross per 24 hour   Intake 140 ml   Output 725 ml   Net -585 ml      Physical Exam  Vitals signs and nursing note reviewed.   Constitutional:       General: He is not in acute distress.     Appearance: Normal appearance. He is well-developed. He is not  diaphoretic.   HENT:      Head: Normocephalic and atraumatic.      Right Ear: External ear normal.      Left Ear: External ear normal.      Nose: Nose normal.      Mouth/Throat:      Mouth: Mucous membranes are moist.      Pharynx: Oropharynx is clear.   Eyes:      Extraocular Movements: Extraocular movements intact.      Conjunctiva/sclera: Conjunctivae normal.   Neck:      Musculoskeletal: Normal range of motion and neck supple.   Cardiovascular:      Rate and Rhythm: Normal rate and regular rhythm.      Pulses: Normal pulses.      Heart sounds: Normal heart sounds.   Pulmonary:      Effort: Pulmonary effort is normal. No respiratory distress.      Breath sounds: No wheezing.      Comments: Slightly increased respiratory effort on 5L high flow oxygen. Restricted air movement on ascultation with slight expiratory wheeze diffusely throughout, significantly improved from yesterday.   Abdominal:      General: Bowel sounds are normal.      Palpations: Abdomen is soft.      Tenderness: There is no abdominal tenderness.   Musculoskeletal: Normal range of motion.   Skin:     General: Skin is warm and dry.      Capillary Refill: Capillary refill takes less than 2 seconds.      Findings: No erythema.   Neurological:      General: No focal deficit present.      Mental Status: He is alert and oriented to person, place, and time.   Psychiatric:         Mood and Affect: Mood normal.         Behavior: Behavior normal.         Significant Labs:   CBC:   Recent Labs   Lab 10/22/20  2222 10/23/20  0452 10/24/20  0534   WBC 8.71 7.43 9.50   HGB 9.9* 9.2* 9.8*   HCT 33.5* 30.6* 32.4*    240 245     CMP:   Recent Labs   Lab 10/22/20  2213 10/23/20  0452 10/24/20  0534    142 137   K 4.1 3.8 3.9    107 102   CO2 28 28 27    110 156*   BUN 22 19 17   CREATININE 1.3 1.1 1.1   CALCIUM 8.7 8.2* 8.6*   PROT 7.1 6.3 6.4   ALBUMIN 3.2* 2.8* 2.8*   BILITOT 0.5 0.5 0.4   ALKPHOS 108 93 94   AST 26 22 16   ALT 16 13 13    ANIONGAP 8 7* 8   EGFRNONAA 53* >60 >60     All pertinent labs within the past 24 hours have been reviewed.    Significant Imaging: I have reviewed all pertinent imaging results/findings within the past 24 hours.      Assessment/Plan:      * COPD exacerbation  Increased oxygen requirement from baseline 5L, however improved today and stable on 5L NC. History of COVID 19, negative this admission with CXR stable from previous. Non-compliant with medications at home.   -Duoneb q6hr  -fluticasone formetorol qd  -Continue home spiriva, singulair  -Continue empiric rocephin/azithro  - Appreciate ID recs  - Continue to titrate down O2 with goal 88-92%      Chronic bilateral pleural effusions  - Appreciate pulm/cc recs  - Start lasix 40mg IV BID  - Strict I's/O's    BPH (benign prostatic hyperplasia)  -Asymptomatic  -Continue home flomax      Type 2 diabetes mellitus  -A1c 6.8 (7/2020)  -Repeat A1c 5.8%  -LDSSI  -Glucose 140-180 inpt      History of Aspergillus & MAC  -Takes itraconazole at home  -Continue Voriconazole 200 BID  -Appreciate ID recs  - Unclear if MAC has been treated      Acute respiratory failure with hypoxia  - Plan as above.   - Goal O2 sat 88-92%  - Appreciate pulm recs      History of 2019 novel coronavirus disease (COVID-19)  Negative this admission. Low suspicion for re-infection given stable CXR and negative labs.         VTE Risk Mitigation (From admission, onward)         Ordered     enoxaparin injection 40 mg  Every 24 hours      10/22/20 2155     IP VTE HIGH RISK PATIENT  Once      10/22/20 2155     Place sequential compression device  Until discontinued      10/22/20 2155                Discharge Planning   ADAM:      Code Status: Full Code   Is the patient medically ready for discharge?:     Reason for patient still in hospital (select all that apply): Patient trending condition and Consult recommendations  Discharge Plan A: Home, Home with family, Home Health(current with:  [facility] [place]  [facility] -- Fax  [phone]  [phone])            Vinicio Méndez DO  Department of Hospital Medicine   [facility]

## 2020-10-24 NOTE — PLAN OF CARE
"Pt is AAOx4. Pt has c/o pain in his shoulder and heels, so given support with pillows and positioned to relieve pain. Pt noncompliant on wearing the cipap ordered for him because "it is too uncomfortable." Pt safety and comfort maintained. Pt appears to be sleeping between shifts. Will continue to monitor.   "

## 2020-10-24 NOTE — PLAN OF CARE
Pt received on a high flow nasal cannula at  15  lpm.  SPO2   98%.  Pt in no apparent respiratory distress.  Will continue to monitor.

## 2020-10-24 NOTE — PT/OT/SLP EVAL
Physical Therapy Evaluation    Patient Name:  Rajan Deluna   MRN:  [id number]    Recommendations:     Discharge Recommendations:  other (see comments)(TBD pending progress)   Discharge Equipment Recommendations: bath bench, rollator, hip kit   Barriers to discharge: decreased cardiopulmonary condition, currently reliant on 15 liters of oxygen    Assessment:     Rajan Deluna is a 74 y.o. male admitted with a medical diagnosis of COPD exacerbation.  He presents with the following impairments/functional limitations:  weakness, impaired endurance, impaired self care skills, impaired functional mobilty, gait instability, impaired balance, decreased lower extremity function, decreased safety awareness, pain, impaired skin, impaired cardiopulmonary response to activity and patient is motivated to participate in physical therapy services at this time.    Rehab Prognosis: Good; patient would benefit from acute skilled PT services to address these deficits and reach maximum level of function.    Recent Surgery: * No surgery found *      Plan:     During this hospitalization, patient to be seen 6 x/week to address the identified rehab impairments via gait training, therapeutic activities, therapeutic exercises, therapeutic groups, neuromuscular re-education and progress toward the following goals:    · Plan of Care Expires:  11/24/20    Subjective     Chief Complaint: SOB  Patient/Family Comments/goals: I want to be able to breath better and not get so SOB when I move. Also, I don't want to have to rely on so much oxygen.   Pain/Comfort:  · Pain Rating 1: 7/10  · Location - Side 1: Left  · Location - Orientation 1: generalized  · Location 1: arm  · Pain Addressed 1: Reposition, Distraction  · Pain Rating Post-Intervention 1: 7/10    Patients cultural, spiritual, Rastafari conflicts given the current situation: no    Living Environment: patient lives with his wife in a 2 story home with patient's bathroom/bedroom located  downstairs  Prior to admission, patients level of function was mod I with use of RW with gait, intermittent assistance with ADLs, driving, retired.  Equipment used at home: grab bar, walker, rolling, cane, straight, wheelchair, bedside commode, shower chair.  DME owned (not currently used): rolling walker, SPC  Upon discharge, patient will have assistance from his wife.    Objective:     Communicated with KELLY Prather prior to session.  Patient found HOB elevated with bed alarm, pulse ox (continuous), peripheral IV, oxygen, pressure relief boots  upon PT entry to room.    General Precautions: Standard, fall, hearing impaired, respiratory   Orthopedic Precautions:N/A   Braces: N/A     Exams:  · RLE ROM: WFL  · RLE Strength: WFL  · LLE ROM: WFL  · LLE Strength: WFL    Functional Mobility:  · Bed Mobility:     · Supine to Sit: contact guard assistance  · Sit to Supine: contact guard assistance  · Transfers:     · Sit to Stand:  contact guard assistance with rolling walker  · Gait: patient was able to ambulate 5 feet to the head of bed and then take 10 steps in place once located at head of bed with close CGA and increase in bilateral UE support at RW with 15 liters of oyxgen in place and continuous pulse ox with O2 sats dropping to 87% requiring seated rest break to recover and return to 96% oxygen saturation; patient also demonstrating forward flexion of trunk, decreased kathy, decreased step length, and decreased ability to clear each LE from floor to step requiring verbal instruction to lift each LE higher  · Balance: good seated balance, fair standing balance    Therapeutic Activities and Exercises:   patient was able to complete seated therex including ankle pumps, LAQS, hip flexion and hip abduction for 2 sets of 10 repetitions with 15 liters of oxygen in pulse and continuous pulse oxygen with saturation dropping to 92% at it's lowest and intermittent pursed lip breathing and seated rest break to  recover    AM-PAC 6 CLICK MOBILITY  Total Score:17     Patient left HOB elevated with all lines intact, call button in reach, bed alarm on and KELLY Varela present.    GOALS:   Multidisciplinary Problems     Physical Therapy Goals        Problem: Physical Therapy Goal    Goal Priority Disciplines Outcome Goal Variances Interventions   Physical Therapy Goal     PT, PT/OT Ongoing, Progressing     Description: Goals to be met by: 2020     Patient will increase functional independence with mobility by performin. Supine to sit with Modified [place]  2. Sit to supine with Modified [place]  3. Sit to stand transfer with Supervision  4. Gait  x  feet with Supervision using Rolling Walker.                      History:     Past Medical History:   Diagnosis Date    Arthritis     COPD (chronic obstructive pulmonary disease)     Encounter for blood transfusion     Hypertension     Smoker     Weakness        Past Surgical History:   Procedure Laterality Date    HERNIA REPAIR      INTRALUMINAL GASTROINTESTINAL TRACT IMAGING VIA CAPSULE N/A 8/3/2020    Procedure: IMAGING PROCEDURE, GI TRACT, INTRALUMINAL, VIA CAPSULE;  Surgeon: Rey Olivares MD;  Location: [facility];  Service: Endoscopy;  Laterality: N/A;    right knee surgery         Time Tracking:     PT Received On: 10/24/20  PT Start Time: 1058     PT Stop Time: 1128  PT Total Time (min): 30 min     Billable Minutes: Evaluation 20 and Therapeutic Exercise 10      Xiao Downey, PT  10/24/2020

## 2020-10-24 NOTE — ASSESSMENT & PLAN NOTE
Increased oxygen requirement from baseline 5L. History of COVID 19, negative this admission with CXR stable from previous. Non-compliant with medications at home.   -Duoneb q6hr  -fluticasone formetorol qd  -Continue home spiriva, singulair  -Continue empiric rocephin/azithro  - Appreciate ID recs

## 2020-10-24 NOTE — PROGRESS NOTES
[facility]  Infectious Disease  Progress Note    Patient Name: Rajan Deluna  MRN: [id number]  Admission Date: 10/22/2020  Length of Stay: 2 days  Attending Physician: Rell Tolentino III, MD  Primary Care Provider: Jason Mccord MD    Isolation Status: No active isolations  Assessment/Plan:      History of Aspergillus & MAC  75 yo AAM w/ pmhx of COPD on 4-5LNC at home, BPH, osteoarthritis, hx of MAC and aspergillosis pulmonary infection, and hx of COVID19 (hospital admission 7/21/20 and recent d/c from rehab facility 10/16/20) on whom ID is consulted for MAC and pulmonary aspergillosis    -unclear if he has been taking the itraconazole since leaving rehab  -continue voriconazole  -unclear if he ever received treatment for MAC  -I attempted to call his outpatient ID physician, left my number, but have not heard back  -Primary team should also attempt to call - the number is in care everywhere - unlikely to reach over the weekend  -he would meet criteria for MAC treatment if not already treated  -send sputum cx and AFB smear and culture  -continue ceftriaxone and azithro for now            Thank you for your consult. I will follow-up with patient. Please contact us if you have any additional questions.    Tl Salinas MD  Infectious Disease  [facility]    Subjective:     Principal Problem:COPD exacerbation    HPI: 75 yo AAM w/ pmhx of COPD on 4-5LNC at home, BPH, osteoarthritis, hx of MAC and aspergillosis pulmonary infection, hx of COVID19 (hospital admission 7/21/20 and recent d/c from rehab facility 10/16/20) presenting for SOB/anxiety. Pt states on the day of admission while he was ambulating at home, he became anxious because his home O2 tubing was not long enough to reach his kitchen/living room from his bedroom. He states he was trying to move his O2 tank when the tubing became tangled which made him anxious because he had to untangle the tubing. He states he also had  "grandchildren at home to take care of which added to his anxiety. Pt states he became SOB after his anxiety started. He never was off of his home O2. He has been compliant with his home medications, which he received on discharge from the rehab facility. He states he is productive yellow sputum intermittently, but this is not increased from baseline, he has been having productive cough "since I started seeing a lung doctor." Pt denies f/c/s/n/v, chest pain, palpitations, wheezing, increased cough/sputum production, abdominal pain, constipation, diarrhea, dysuria, lower extremity edema. Per ED record, when EMS was called, his SpO2 was 50% on 5L, he was placed on non rebreather and SpO2 lorrie to 94-97%. ED tried to put on nasal canula at 5L but SpO2 decreased to mid 80s to was placed back on non rebreather. CXR showed b/l bibasilar interstitial infiltrate which could represent infection/inflammatory process vs edema.     I did not find his itraconazole among his home medications  Interval History: No acute events    Review of Systems   Constitutional: Negative for chills, diaphoresis, fatigue and fever.   HENT: Negative for congestion, rhinorrhea, sore throat and trouble swallowing.    Eyes: Negative for itching and visual disturbance.   Respiratory: Positive for shortness of breath. Negative for cough, chest tightness and wheezing.    Cardiovascular: Negative for chest pain and palpitations.   Gastrointestinal: Negative for abdominal pain, constipation, diarrhea, nausea and vomiting.   Endocrine: Negative for polyphagia and polyuria.   Genitourinary: Negative for dysuria and flank pain.   Musculoskeletal: Negative for back pain, joint swelling and neck stiffness.   Skin: Negative for color change and rash.   Neurological: Negative for dizziness, weakness, light-headedness and headaches.   Psychiatric/Behavioral: Negative for confusion. The patient is nervous/anxious.      Objective:     Vital Signs (Most " Recent):  Temp: 98 °F (36.7 °C) (10/24/20 1127)  Pulse: 92 (10/24/20 1151)  Resp: 18 (10/24/20 1127)  BP: 117/67 (10/24/20 1127)  SpO2: 98 % (10/24/20 0737) Vital Signs (24h Range):  Temp:  [97.4 °F (36.3 °C)-98.1 °F (36.7 °C)] 98 °F (36.7 °C)  Pulse:  [75-94] 92  Resp:  [16-20] 18  SpO2:  [95 %-98 %] 98 %  BP: (110-135)/(59-68) 117/67     Weight: 88.6 kg (195 lb 5.2 oz)  Body mass index is 29.7 kg/m².    Estimated Creatinine Clearance: 61.8 mL/min (based on SCr of 1.1 mg/dL).    Physical Exam  Vitals signs and nursing note reviewed.   Constitutional:       General: He is not in acute distress.     Appearance: Normal appearance. He is well-developed. He is not diaphoretic.      Comments: NC  No accessory muscle use   HENT:      Head: Normocephalic and atraumatic.      Right Ear: External ear normal.      Left Ear: External ear normal.      Nose: Nose normal.      Mouth/Throat:      Mouth: Mucous membranes are moist.      Pharynx: Oropharynx is clear.   Eyes:      Extraocular Movements: Extraocular movements intact.      Conjunctiva/sclera: Conjunctivae normal.      Pupils: Pupils are equal, round, and reactive to light.   Neck:      Musculoskeletal: Normal range of motion and neck supple.   Cardiovascular:      Rate and Rhythm: Normal rate and regular rhythm.      Pulses: Normal pulses.      Heart sounds: Normal heart sounds.   Pulmonary:      Effort: Pulmonary effort is normal. No respiratory distress.      Breath sounds: Rhonchi and rales (b/l lower lobes) present. No wheezing.   Abdominal:      General: Bowel sounds are normal.      Palpations: Abdomen is soft.      Tenderness: There is no abdominal tenderness.   Musculoskeletal: Normal range of motion.   Skin:     General: Skin is warm and dry.      Capillary Refill: Capillary refill takes less than 2 seconds.      Findings: No erythema.   Neurological:      General: No focal deficit present.      Mental Status: He is alert and oriented to person, place, and  time.   Psychiatric:         Mood and Affect: Mood normal.         Behavior: Behavior normal.         Significant Labs: All pertinent labs within the past 24 hours have been reviewed.    Significant Imaging: I have reviewed all pertinent imaging results/findings within the past 24 hours.

## 2020-10-24 NOTE — PLAN OF CARE
Patient is on high flow nasal cannula with documented SpO2.  Breathing treatment given, and tolerated well.  Pt is on continuous pulse ox.  Will continue to monitor, and wean O2 as tolerated as per oxygen protocol.

## 2020-10-24 NOTE — SUBJECTIVE & OBJECTIVE
Interval History: Pt is doing well this morning. Had some shoulder pain, this is chronic for him. Able to be weaned down to 5L oxygen and with O2 sat 93-94%.     Review of Systems   Constitutional: Negative for chills, diaphoresis, fatigue and fever.   HENT: Negative for congestion, rhinorrhea, sore throat and trouble swallowing.    Eyes: Negative for itching and visual disturbance.   Respiratory: Positive for shortness of breath (improved). Negative for cough, chest tightness and wheezing.    Cardiovascular: Negative for chest pain and palpitations.   Gastrointestinal: Negative for abdominal pain, constipation, diarrhea, nausea and vomiting.   Endocrine: Negative for polyphagia and polyuria.   Genitourinary: Negative for decreased urine volume, dysuria and flank pain.   Musculoskeletal: Negative for back pain, joint swelling and neck stiffness.   Skin: Negative for color change and rash.   Neurological: Negative for dizziness, weakness, light-headedness and headaches.   Psychiatric/Behavioral: Negative for confusion. The patient is not nervous/anxious.      Objective:     Vital Signs (Most Recent):  Temp: 98 °F (36.7 °C) (10/24/20 1127)  Pulse: 92 (10/24/20 1151)  Resp: 18 (10/24/20 1127)  BP: 117/67 (10/24/20 1127)  SpO2: 98 % (10/24/20 0737) Vital Signs (24h Range):  Temp:  [97.4 °F (36.3 °C)-98.1 °F (36.7 °C)] 98 °F (36.7 °C)  Pulse:  [75-94] 92  Resp:  [16-20] 18  SpO2:  [95 %-98 %] 98 %  BP: (110-135)/(59-68) 117/67     Weight: 88.6 kg (195 lb 5.2 oz)  Body mass index is 29.7 kg/m².    Intake/Output Summary (Last 24 hours) at 10/24/2020 1309  Last data filed at 10/23/2020 1800  Gross per 24 hour   Intake 140 ml   Output 725 ml   Net -585 ml      Physical Exam  Vitals signs and nursing note reviewed.   Constitutional:       General: He is not in acute distress.     Appearance: Normal appearance. He is well-developed. He is not diaphoretic.   HENT:      Head: Normocephalic and atraumatic.      Right Ear:  External ear normal.      Left Ear: External ear normal.      Nose: Nose normal.      Mouth/Throat:      Mouth: Mucous membranes are moist.      Pharynx: Oropharynx is clear.   Eyes:      Extraocular Movements: Extraocular movements intact.      Conjunctiva/sclera: Conjunctivae normal.   Neck:      Musculoskeletal: Normal range of motion and neck supple.   Cardiovascular:      Rate and Rhythm: Normal rate and regular rhythm.      Pulses: Normal pulses.      Heart sounds: Normal heart sounds.   Pulmonary:      Effort: Pulmonary effort is normal. No respiratory distress.      Breath sounds: No wheezing.      Comments: Slightly increased respiratory effort on 5L high flow oxygen. Restricted air movement on ascultation with slight expiratory wheeze diffusely throughout, significantly improved from yesterday.   Abdominal:      General: Bowel sounds are normal.      Palpations: Abdomen is soft.      Tenderness: There is no abdominal tenderness.   Musculoskeletal: Normal range of motion.   Skin:     General: Skin is warm and dry.      Capillary Refill: Capillary refill takes less than 2 seconds.      Findings: No erythema.   Neurological:      General: No focal deficit present.      Mental Status: He is alert and oriented to person, place, and time.   Psychiatric:         Mood and Affect: Mood normal.         Behavior: Behavior normal.         Significant Labs:   CBC:   Recent Labs   Lab 10/22/20  2222 10/23/20  0452 10/24/20  0534   WBC 8.71 7.43 9.50   HGB 9.9* 9.2* 9.8*   HCT 33.5* 30.6* 32.4*    240 245     CMP:   Recent Labs   Lab 10/22/20  2213 10/23/20  0452 10/24/20  0534    142 137   K 4.1 3.8 3.9    107 102   CO2 28 28 27    110 156*   BUN 22 19 17   CREATININE 1.3 1.1 1.1   CALCIUM 8.7 8.2* 8.6*   PROT 7.1 6.3 6.4   ALBUMIN 3.2* 2.8* 2.8*   BILITOT 0.5 0.5 0.4   ALKPHOS 108 93 94   AST 26 22 16   ALT 16 13 13   ANIONGAP 8 7* 8   EGFRNONAA 53* >60 >60     All pertinent labs within the  past 24 hours have been reviewed.    Significant Imaging: I have reviewed all pertinent imaging results/findings within the past 24 hours.

## 2020-10-24 NOTE — ASSESSMENT & PLAN NOTE
75 yo AAM w/ pmhx of COPD on 4-5LNC at home, BPH, osteoarthritis, hx of MAC and aspergillosis pulmonary infection, and hx of COVID19 (hospital admission 7/21/20 and recent d/c from rehab facility 10/16/20) on whom ID is consulted for MAC and pulmonary aspergillosis    -unclear if he has been taking the itraconazole since leaving rehab  -continue voriconazole  -unclear if he ever received treatment for MAC  -I attempted to call his outpatient ID physician, left my number, but have not heard back  -Primary team should also attempt to call - the number is in care everywhere - unlikely to reach over the weekend  -he would meet criteria for MAC treatment if not already treated  -send sputum cx and AFB smear and culture  -continue ceftriaxone and azithro for now

## 2020-10-24 NOTE — PLAN OF CARE
Patient is awake and alert. DeSATs with movement, otherwise in high 80's and 90's when resting.  Responded to Lasix received, voids without difficulty.  Has complaints of pain and Ibuprofen given with relief.  Safety is maintained with bed low, wheels locked and side rails up.  Bed alarm on.  Will continue to monitor.

## 2020-10-24 NOTE — PLAN OF CARE
Problem: Physical Therapy Goal  Goal: Physical Therapy Goal  Description: Goals to be met by: 2020     Patient will increase functional independence with mobility by performin. Supine to sit with Modified [place]  2. Sit to supine with Modified [place]  3. Sit to stand transfer with Supervision  4. Gait  x  feet with Supervision using Rolling Walker.     Outcome: Ongoing, Progressing     PT evaluation completed. Patient presents on 15 liters of oxygen with patient requiring intermittent rest breaks throughout session with oxygen saturation dropping to 87% when attempting standing and gait activities able to increase back up to 96% with seated rest break and education in pursed lip breathing with patient able to return demonstration.

## 2020-10-24 NOTE — SUBJECTIVE & OBJECTIVE
Interval History: No acute events    Review of Systems   Constitutional: Negative for chills, diaphoresis, fatigue and fever.   HENT: Negative for congestion, rhinorrhea, sore throat and trouble swallowing.    Eyes: Negative for itching and visual disturbance.   Respiratory: Positive for shortness of breath. Negative for cough, chest tightness and wheezing.    Cardiovascular: Negative for chest pain and palpitations.   Gastrointestinal: Negative for abdominal pain, constipation, diarrhea, nausea and vomiting.   Endocrine: Negative for polyphagia and polyuria.   Genitourinary: Negative for dysuria and flank pain.   Musculoskeletal: Negative for back pain, joint swelling and neck stiffness.   Skin: Negative for color change and rash.   Neurological: Negative for dizziness, weakness, light-headedness and headaches.   Psychiatric/Behavioral: Negative for confusion. The patient is nervous/anxious.      Objective:     Vital Signs (Most Recent):  Temp: 98 °F (36.7 °C) (10/24/20 1127)  Pulse: 92 (10/24/20 1151)  Resp: 18 (10/24/20 1127)  BP: 117/67 (10/24/20 1127)  SpO2: 98 % (10/24/20 0737) Vital Signs (24h Range):  Temp:  [97.4 °F (36.3 °C)-98.1 °F (36.7 °C)] 98 °F (36.7 °C)  Pulse:  [75-94] 92  Resp:  [16-20] 18  SpO2:  [95 %-98 %] 98 %  BP: (110-135)/(59-68) 117/67     Weight: 88.6 kg (195 lb 5.2 oz)  Body mass index is 29.7 kg/m².    Estimated Creatinine Clearance: 61.8 mL/min (based on SCr of 1.1 mg/dL).    Physical Exam  Vitals signs and nursing note reviewed.   Constitutional:       General: He is not in acute distress.     Appearance: Normal appearance. He is well-developed. He is not diaphoretic.      Comments: NC  No accessory muscle use   HENT:      Head: Normocephalic and atraumatic.      Right Ear: External ear normal.      Left Ear: External ear normal.      Nose: Nose normal.      Mouth/Throat:      Mouth: Mucous membranes are moist.      Pharynx: Oropharynx is clear.   Eyes:      Extraocular Movements:  Extraocular movements intact.      Conjunctiva/sclera: Conjunctivae normal.      Pupils: Pupils are equal, round, and reactive to light.   Neck:      Musculoskeletal: Normal range of motion and neck supple.   Cardiovascular:      Rate and Rhythm: Normal rate and regular rhythm.      Pulses: Normal pulses.      Heart sounds: Normal heart sounds.   Pulmonary:      Effort: Pulmonary effort is normal. No respiratory distress.      Breath sounds: Rhonchi and rales (b/l lower lobes) present. No wheezing.   Abdominal:      General: Bowel sounds are normal.      Palpations: Abdomen is soft.      Tenderness: There is no abdominal tenderness.   Musculoskeletal: Normal range of motion.   Skin:     General: Skin is warm and dry.      Capillary Refill: Capillary refill takes less than 2 seconds.      Findings: No erythema.   Neurological:      General: No focal deficit present.      Mental Status: He is alert and oriented to person, place, and time.   Psychiatric:         Mood and Affect: Mood normal.         Behavior: Behavior normal.         Significant Labs: All pertinent labs within the past 24 hours have been reviewed.    Significant Imaging: I have reviewed all pertinent imaging results/findings within the past 24 hours.

## 2020-10-25 LAB
ALBUMIN SERPL BCP-MCNC: 3 G/DL (ref 3.5–5.2)
ALP SERPL-CCNC: 94 U/L (ref 55–135)
ALT SERPL W/O P-5'-P-CCNC: 13 U/L (ref 10–44)
ANION GAP SERPL CALC-SCNC: 9 MMOL/L (ref 8–16)
AST SERPL-CCNC: 16 U/L (ref 10–40)
BASOPHILS # BLD AUTO: 0.02 K/UL (ref 0–0.2)
BASOPHILS NFR BLD: 0.2 % (ref 0–1.9)
BILIRUB SERPL-MCNC: 0.3 MG/DL (ref 0.1–1)
BUN SERPL-MCNC: 22 MG/DL (ref 8–23)
CALCIUM SERPL-MCNC: 8.5 MG/DL (ref 8.7–10.5)
CHLORIDE SERPL-SCNC: 100 MMOL/L (ref 95–110)
CO2 SERPL-SCNC: 30 MMOL/L (ref 23–29)
CREAT SERPL-MCNC: 1.3 MG/DL (ref 0.5–1.4)
DIFFERENTIAL METHOD: ABNORMAL
EOSINOPHIL # BLD AUTO: 0 K/UL (ref 0–0.5)
EOSINOPHIL NFR BLD: 0.1 % (ref 0–8)
ERYTHROCYTE [DISTWIDTH] IN BLOOD BY AUTOMATED COUNT: 18.1 % (ref 11.5–14.5)
EST. GFR  (AFRICAN AMERICAN): >60 ML/MIN/1.73 M^2
EST. GFR  (NON AFRICAN AMERICAN): 53 ML/MIN/1.73 M^2
GLUCOSE SERPL-MCNC: 118 MG/DL (ref 70–110)
HCT VFR BLD AUTO: 33.8 % (ref 40–54)
HGB BLD-MCNC: 10.3 G/DL (ref 14–18)
IMM GRANULOCYTES # BLD AUTO: 0.02 K/UL (ref 0–0.04)
IMM GRANULOCYTES NFR BLD AUTO: 0.2 % (ref 0–0.5)
LYMPHOCYTES # BLD AUTO: 1.5 K/UL (ref 1–4.8)
LYMPHOCYTES NFR BLD: 14.3 % (ref 18–48)
MAGNESIUM SERPL-MCNC: 2.1 MG/DL (ref 1.6–2.6)
MCH RBC QN AUTO: 25.2 PG (ref 27–31)
MCHC RBC AUTO-ENTMCNC: 30.5 G/DL (ref 32–36)
MCV RBC AUTO: 83 FL (ref 82–98)
MONOCYTES # BLD AUTO: 0.9 K/UL (ref 0.3–1)
MONOCYTES NFR BLD: 8.7 % (ref 4–15)
NEUTROPHILS # BLD AUTO: 7.8 K/UL (ref 1.8–7.7)
NEUTROPHILS NFR BLD: 76.5 % (ref 38–73)
NRBC BLD-RTO: 0 /100 WBC
PHOSPHATE SERPL-MCNC: 3.6 MG/DL (ref 2.7–4.5)
PLATELET # BLD AUTO: 285 K/UL (ref 150–350)
PMV BLD AUTO: 10.1 FL (ref 9.2–12.9)
POCT GLUCOSE: 110 MG/DL (ref 70–110)
POCT GLUCOSE: 117 MG/DL (ref 70–110)
POCT GLUCOSE: 136 MG/DL (ref 70–110)
POCT GLUCOSE: 149 MG/DL (ref 70–110)
POTASSIUM SERPL-SCNC: 3.6 MMOL/L (ref 3.5–5.1)
PROT SERPL-MCNC: 6.8 G/DL (ref 6–8.4)
RBC # BLD AUTO: 4.08 M/UL (ref 4.6–6.2)
SODIUM SERPL-SCNC: 139 MMOL/L (ref 136–145)
WBC # BLD AUTO: 10.25 K/UL (ref 3.9–12.7)

## 2020-10-25 PROCEDURE — 25000003 PHARM REV CODE 250: Performed by: STUDENT IN AN ORGANIZED HEALTH CARE EDUCATION/TRAINING PROGRAM

## 2020-10-25 PROCEDURE — 63700000 PHARM REV CODE 250 ALT 637 W/O HCPCS: Performed by: FAMILY MEDICINE

## 2020-10-25 PROCEDURE — 94640 AIRWAY INHALATION TREATMENT: CPT

## 2020-10-25 PROCEDURE — 80053 COMPREHEN METABOLIC PANEL: CPT

## 2020-10-25 PROCEDURE — 63600175 PHARM REV CODE 636 W HCPCS: Performed by: STUDENT IN AN ORGANIZED HEALTH CARE EDUCATION/TRAINING PROGRAM

## 2020-10-25 PROCEDURE — 84100 ASSAY OF PHOSPHORUS: CPT

## 2020-10-25 PROCEDURE — 25000242 PHARM REV CODE 250 ALT 637 W/ HCPCS: Performed by: STUDENT IN AN ORGANIZED HEALTH CARE EDUCATION/TRAINING PROGRAM

## 2020-10-25 PROCEDURE — 83735 ASSAY OF MAGNESIUM: CPT

## 2020-10-25 PROCEDURE — 27000221 HC OXYGEN, UP TO 24 HOURS

## 2020-10-25 PROCEDURE — 85025 COMPLETE CBC W/AUTO DIFF WBC: CPT

## 2020-10-25 PROCEDURE — 11000001 HC ACUTE MED/SURG PRIVATE ROOM

## 2020-10-25 PROCEDURE — 94761 N-INVAS EAR/PLS OXIMETRY MLT: CPT

## 2020-10-25 PROCEDURE — 36415 COLL VENOUS BLD VENIPUNCTURE: CPT

## 2020-10-25 RX ADMIN — IPRATROPIUM BROMIDE AND ALBUTEROL SULFATE 3 ML: .5; 3 SOLUTION RESPIRATORY (INHALATION) at 08:10

## 2020-10-25 RX ADMIN — THERA TABS 1 TABLET: TAB at 08:10

## 2020-10-25 RX ADMIN — AZITHROMYCIN 500 MG: 250 TABLET, FILM COATED ORAL at 09:10

## 2020-10-25 RX ADMIN — CEFTRIAXONE 1 G: 1 INJECTION, SOLUTION INTRAVENOUS at 11:10

## 2020-10-25 RX ADMIN — FLUTICASONE FUROATE AND VILANTEROL TRIFENATATE 1 PUFF: 100; 25 POWDER RESPIRATORY (INHALATION) at 08:10

## 2020-10-25 RX ADMIN — PREGABALIN 150 MG: 75 CAPSULE ORAL at 08:10

## 2020-10-25 RX ADMIN — ENOXAPARIN SODIUM 40 MG: 40 INJECTION SUBCUTANEOUS at 04:10

## 2020-10-25 RX ADMIN — PREGABALIN 150 MG: 75 CAPSULE ORAL at 09:10

## 2020-10-25 RX ADMIN — FUROSEMIDE 40 MG: 10 INJECTION, SOLUTION INTRAVENOUS at 01:10

## 2020-10-25 RX ADMIN — LACTOBACILLUS ACIDOPHILUS / LACTOBACILLUS BULGARICUS 1 EACH: 100 MILLION CFU STRENGTH GRANULES at 04:10

## 2020-10-25 RX ADMIN — MUPIROCIN: 20 OINTMENT TOPICAL at 09:10

## 2020-10-25 RX ADMIN — PREDNISONE 40 MG: 20 TABLET ORAL at 08:10

## 2020-10-25 RX ADMIN — TIOTROPIUM BROMIDE 18 MCG: 18 CAPSULE ORAL; RESPIRATORY (INHALATION) at 08:10

## 2020-10-25 RX ADMIN — LACTOBACILLUS ACIDOPHILUS / LACTOBACILLUS BULGARICUS 1 EACH: 100 MILLION CFU STRENGTH GRANULES at 08:10

## 2020-10-25 RX ADMIN — IPRATROPIUM BROMIDE AND ALBUTEROL SULFATE 3 ML: .5; 3 SOLUTION RESPIRATORY (INHALATION) at 01:10

## 2020-10-25 RX ADMIN — TAMSULOSIN HYDROCHLORIDE 0.4 MG: 0.4 CAPSULE ORAL at 08:10

## 2020-10-25 RX ADMIN — MONTELUKAST 10 MG: 10 TABLET, FILM COATED ORAL at 09:10

## 2020-10-25 RX ADMIN — MUPIROCIN: 20 OINTMENT TOPICAL at 08:10

## 2020-10-25 RX ADMIN — CELECOXIB 200 MG: 100 CAPSULE ORAL at 08:10

## 2020-10-25 RX ADMIN — LIDOCAINE 2 PATCH: 50 PATCH TOPICAL at 11:10

## 2020-10-25 RX ADMIN — VORICONAZOLE 200 MG: 200 TABLET, FILM COATED ORAL at 09:10

## 2020-10-25 RX ADMIN — VORICONAZOLE 200 MG: 200 TABLET, FILM COATED ORAL at 08:10

## 2020-10-25 NOTE — PROGRESS NOTES
"[facility] Medicine  Progress Note    Patient Name: Rajan Deluna  MRN: [id number]  Patient Class: IP- Inpatient   Admission Date: 10/22/2020  Length of Stay: 3 days  Attending Physician: Rell Tolentino III, MD  Primary Care Provider: Jason Mccord MD        Subjective:     Principal Problem:COPD exacerbation        HPI:  Pt is a 75 yo AAM w/ pmhx of COPD on 4-5LNC at home, BPH, osteoarthritis, hx of MAC and aspergillosis pulmonary infection, hx of COVID19 (hospital admission 7/21/20 and recent d/c from rehab facility 10/16/20) presenting for SOB/anxiety. Pt states today while he was ambulating at home, he became anxious because his home O2 tubing was not long enough to reach his kitchen/living room from his bedroom. He states he was trying to move his O2 tank when the tubing became tangled which made him anxious because he had to untangle the tubing. He states he also had grandchildren at home to take care of which added to his anxiety. Pt states he became SOB after his anxiety started. He never was off of his home O2. He has been compliant with his home medications, which he received on discharge from the rehab facility. He state he productive yellow sputum intermittently, but this is not increased from baseline, he has been having productive cough "since I started seeing a lung doctor." Pt denies f/c/s/n/v, chest pain, palpitations, wheezing, increased cough/sputum production, abdominal pain, constipation, diarrhea, dysuria, lower extremity edema.     Per ED record, when EMS was called, his SpO2 was 50% on 5L, he was placed on non rebreather and SpO2 lorrie to 94-97%. ED tried to put on nasal canula at 5L but SpO2 decreased to mid 80s to was placed back on non rebreather. Otherwise vitals were stable. CBC unremarkable. CMP showed MARQUIS Cr 1.47, CXR showed b/l bibasilar interstitial infiltrate which could represent infection/inflammatory process vs edema. Pt was transferred here w/ no " treatment in ED (per patient).    Overview/Hospital Course:  No notes on file    Interval History: NAEON. Vital signs stable. Patient on 6 L of supplemental oxygen via nasal cannula, satting in the lower 90s. Pain well controlled. On IV Lasix 40 mg BID. UOP 2.65 L.    Review of Systems   Respiratory: Positive for shortness of breath (improved). Negative for cough, chest tightness and wheezing.    Cardiovascular: Negative for chest pain.   Gastrointestinal: Negative for abdominal pain, constipation, diarrhea, nausea and vomiting.   Genitourinary: Negative for decreased urine volume.   Musculoskeletal: Negative for back pain, joint swelling and neck stiffness.   Skin: Negative for color change and rash.   Neurological: Positive for weakness (Bilateral lower extremity).   Psychiatric/Behavioral: Negative for confusion. The patient is not nervous/anxious.      Objective:     Vital Signs (Most Recent):  Temp: 98.2 °F (36.8 °C) (10/25/20 0807)  Pulse: 67 (10/25/20 0827)  Resp: 20 (10/25/20 0807)  BP: 122/60 (10/25/20 0807)  SpO2: 95 % (10/25/20 0849) Vital Signs (24h Range):  Temp:  [97.5 °F (36.4 °C)-98.6 °F (37 °C)] 98.2 °F (36.8 °C)  Pulse:  [64-95] 67  Resp:  [18-20] 20  SpO2:  [91 %-97 %] 95 %  BP: (110-126)/(58-72) 122/60     Weight: 89.7 kg (197 lb 12 oz)  Body mass index is 30.07 kg/m².    Intake/Output Summary (Last 24 hours) at 10/25/2020 0852  Last data filed at 10/25/2020 0518  Gross per 24 hour   Intake 1070 ml   Output 2650 ml   Net -1580 ml      Physical Exam  Vitals signs and nursing note reviewed.   Constitutional:       General: He is not in acute distress.     Appearance: Normal appearance. He is well-developed. He is not diaphoretic.   HENT:      Head: Normocephalic and atraumatic.   Eyes:      Extraocular Movements: Extraocular movements intact.      Conjunctiva/sclera: Conjunctivae normal.      Pupils: Pupils are equal, round, and reactive to light.   Cardiovascular:      Rate and Rhythm: Normal rate  and regular rhythm.      Pulses: Normal pulses.      Heart sounds: Normal heart sounds. No murmur. No friction rub. No gallop.    Pulmonary:      Effort: Pulmonary effort is normal. No respiratory distress.      Breath sounds: Rales (Mild bibasilar) present. No wheezing or rhonchi.      Comments: Currently on 6L supplemental O2 via nasal cannula, satting at 92%. In no acute respiratory distress.  Completes full sentences without difficulty.  Abdominal:      General: Bowel sounds are normal.      Palpations: Abdomen is soft.      Tenderness: There is no abdominal tenderness.   Musculoskeletal: Normal range of motion.   Skin:     General: Skin is warm and dry.      Capillary Refill: Capillary refill takes less than 2 seconds.      Findings: No erythema.   Neurological:      General: No focal deficit present.      Mental Status: He is alert and oriented to person, place, and time.   Psychiatric:         Mood and Affect: Mood normal.         Behavior: Behavior normal.         Significant Labs:   A1C:   Recent Labs   Lab 07/23/20  0403 10/22/20  2222   HGBA1C 6.3* 5.8*     Bilirubin:   Recent Labs   Lab 10/01/20  0417 10/05/20  0441 10/08/20  0416 10/12/20  0431 10/15/20  0419 10/22/20  2213 10/23/20  0452 10/24/20  0534 10/25/20  0523   BILIDIR 0.2 0.2 0.1 0.1 0.1  --   --   --   --    BILITOT 0.3 0.3 0.3 0.1 0.3 0.5 0.5 0.4 0.3     BMP:   Recent Labs   Lab 10/25/20  0523   *      K 3.6      CO2 30*   BUN 22   CREATININE 1.3   CALCIUM 8.5*   MG 2.1     CBC:   Recent Labs   Lab 10/24/20  0534 10/25/20  0523   WBC 9.50 10.25   HGB 9.8* 10.3*   HCT 32.4* 33.8*    285     CMP:   Recent Labs   Lab 10/24/20  0534 10/25/20  0523    139   K 3.9 3.6    100   CO2 27 30*   * 118*   BUN 17 22   CREATININE 1.1 1.3   CALCIUM 8.6* 8.5*   PROT 6.4 6.8   ALBUMIN 2.8* 3.0*   BILITOT 0.4 0.3   ALKPHOS 94 94   AST 16 16   ALT 13 13   ANIONGAP 8 9   EGFRNONAA >60 53*     Magnesium:   Recent Labs    Lab 10/25/20  0523   MG 2.1     POCT Glucose:   Recent Labs   Lab 10/24/20  1619 10/24/20  2015 10/25/20  0514   POCTGLUCOSE 157* 139* 117*     TSH:   Recent Labs   Lab 07/23/20  0403   TSH 0.604     All pertinent labs within the past 24 hours have been reviewed.    Significant Imaging: I have reviewed all pertinent imaging results/findings within the past 24 hours.      Assessment/Plan:      * COPD exacerbation  Increased oxygen requirement from baseline 5L at admission. History of COVID 19, negative this admission with CXR stable from previous. Currently at baseline oxygen requirements.     Plan:  -Duoneb q6hr  -fluticasone formetorol qd  -Continue home spiriva, singulair  -Continue empiric rocephin/azithro  - Appreciate any further ID recs  - follow-up with final PT/OT recommendations regarding evaluation and treatment    Acute respiratory failure with hypoxia  - Plan as above.   - Goal O2 sat 88-92%  - Appreciate pulm recs  STABLE    Chronic bilateral pleural effusions  Plan:  - Appreciate pulm/cc recs  - continue lasix 40mg IV BID  - UOP: 2.650L (10/25)  - Strict I's/O's    BPH (benign prostatic hyperplasia)  -Asymptomatic  -Continue home flomax      Type 2 diabetes mellitus  -A1c 6.8 (7/2020)  -Repeat A1c 5.8%  -LDSSI  -Glucose 140-180 inpt      History of Aspergillus & MAC  -Takes itraconazole at home  - Continue Voriconazole 200 BID the ID recommendation  - ID consulted and will appreciate any further recommendations.  - Unclear if MAC has been treated      History of 2019 novel coronavirus disease (COVID-19)  Negative this admission. Low suspicion for re-infection given stable CXR and negative labs.         VTE Risk Mitigation (From admission, onward)         Ordered     enoxaparin injection 40 mg  Every 24 hours      10/22/20 2155     IP VTE HIGH RISK PATIENT  Once      10/22/20 2155     Place sequential compression device  Until discontinued      10/22/20 2155                Discharge Planning   ADAM:       Code Status: Full Code   Is the patient medically ready for discharge?:     Reason for patient still in hospital (select all that apply): Treatment  Discharge Plan A: Home, Home with family, Home Health(current with:  [facility] [place] -- Fax  [phone]  [place]-[phone])            Olivier Rowe DO   [facility] [place] [facility], PGY-1  Department of Hospital Medicine   [facility]

## 2020-10-25 NOTE — SUBJECTIVE & OBJECTIVE
Interval History: NAEON. Vital signs stable. Patient on 6 L of supplemental oxygen via nasal cannula, satting in the lower 90s. Pain well controlled. On IV Lasix 40 mg BID. UOP 2.65 L.    Review of Systems   Respiratory: Positive for shortness of breath (improved). Negative for cough, chest tightness and wheezing.    Cardiovascular: Negative for chest pain.   Gastrointestinal: Negative for abdominal pain, constipation, diarrhea, nausea and vomiting.   Genitourinary: Negative for decreased urine volume.   Musculoskeletal: Negative for back pain, joint swelling and neck stiffness.   Skin: Negative for color change and rash.   Neurological: Positive for weakness (Bilateral lower extremity).   Psychiatric/Behavioral: Negative for confusion. The patient is not nervous/anxious.      Objective:     Vital Signs (Most Recent):  Temp: 98.2 °F (36.8 °C) (10/25/20 0807)  Pulse: 67 (10/25/20 0827)  Resp: 20 (10/25/20 0807)  BP: 122/60 (10/25/20 0807)  SpO2: 95 % (10/25/20 0849) Vital Signs (24h Range):  Temp:  [97.5 °F (36.4 °C)-98.6 °F (37 °C)] 98.2 °F (36.8 °C)  Pulse:  [64-95] 67  Resp:  [18-20] 20  SpO2:  [91 %-97 %] 95 %  BP: (110-126)/(58-72) 122/60     Weight: 89.7 kg (197 lb 12 oz)  Body mass index is 30.07 kg/m².    Intake/Output Summary (Last 24 hours) at 10/25/2020 0852  Last data filed at 10/25/2020 0518  Gross per 24 hour   Intake 1070 ml   Output 2650 ml   Net -1580 ml      Physical Exam  Vitals signs and nursing note reviewed.   Constitutional:       General: He is not in acute distress.     Appearance: Normal appearance. He is well-developed. He is not diaphoretic.   HENT:      Head: Normocephalic and atraumatic.   Eyes:      Extraocular Movements: Extraocular movements intact.      Conjunctiva/sclera: Conjunctivae normal.      Pupils: Pupils are equal, round, and reactive to light.   Cardiovascular:      Rate and Rhythm: Normal rate and regular rhythm.      Pulses: Normal pulses.      Heart sounds: Normal heart  sounds. No murmur. No friction rub. No gallop.    Pulmonary:      Effort: Pulmonary effort is normal. No respiratory distress.      Breath sounds: Rales (Mild bibasilar) present. No wheezing or rhonchi.      Comments: Currently on 6L supplemental O2 via nasal cannula, satting at 92%. In no acute respiratory distress.  Completes full sentences without difficulty.  Abdominal:      General: Bowel sounds are normal.      Palpations: Abdomen is soft.      Tenderness: There is no abdominal tenderness.   Musculoskeletal: Normal range of motion.   Skin:     General: Skin is warm and dry.      Capillary Refill: Capillary refill takes less than 2 seconds.      Findings: No erythema.   Neurological:      General: No focal deficit present.      Mental Status: He is alert and oriented to person, place, and time.   Psychiatric:         Mood and Affect: Mood normal.         Behavior: Behavior normal.         Significant Labs:   A1C:   Recent Labs   Lab 07/23/20  0403 10/22/20  2222   HGBA1C 6.3* 5.8*     Bilirubin:   Recent Labs   Lab 10/01/20  0417 10/05/20  0441 10/08/20  0416 10/12/20  0431 10/15/20  0419 10/22/20  2213 10/23/20  0452 10/24/20  0534 10/25/20  0523   BILIDIR 0.2 0.2 0.1 0.1 0.1  --   --   --   --    BILITOT 0.3 0.3 0.3 0.1 0.3 0.5 0.5 0.4 0.3     BMP:   Recent Labs   Lab 10/25/20  0523   *      K 3.6      CO2 30*   BUN 22   CREATININE 1.3   CALCIUM 8.5*   MG 2.1     CBC:   Recent Labs   Lab 10/24/20  0534 10/25/20  0523   WBC 9.50 10.25   HGB 9.8* 10.3*   HCT 32.4* 33.8*    285     CMP:   Recent Labs   Lab 10/24/20  0534 10/25/20  0523    139   K 3.9 3.6    100   CO2 27 30*   * 118*   BUN 17 22   CREATININE 1.1 1.3   CALCIUM 8.6* 8.5*   PROT 6.4 6.8   ALBUMIN 2.8* 3.0*   BILITOT 0.4 0.3   ALKPHOS 94 94   AST 16 16   ALT 13 13   ANIONGAP 8 9   EGFRNONAA >60 53*     Magnesium:   Recent Labs   Lab 10/25/20  0523   MG 2.1     POCT Glucose:   Recent Labs   Lab  10/24/20  1619 10/24/20  2015 10/25/20  0514   POCTGLUCOSE 157* 139* 117*     TSH:   Recent Labs   Lab 07/23/20  0403   TSH 0.604     All pertinent labs within the past 24 hours have been reviewed.    Significant Imaging: I have reviewed all pertinent imaging results/findings within the past 24 hours.

## 2020-10-25 NOTE — ASSESSMENT & PLAN NOTE
-Asymptomatic  -Continue home flomax     

## 2020-10-25 NOTE — NURSING
Dr Aydin WRIGHT at bedside patient is requesting his Norco and his Gabapentin he takes at home. Orders given and acknowledge. Will continue monitor.

## 2020-10-25 NOTE — PLAN OF CARE
New IV access obtained. Medicated for pain with prn medication. Antibiotics IV given as ordered. Accurate I & O's recorded, patient on Lasix. On 6 L hi flow O2 saturations 97% Patient de-sats when he talks a lot to the low 80's. Encouraged to rest his voice but patient can be very argumentative at times. Remains on telemetry call light in reach, remains free from falls, bed alarm in use.

## 2020-10-25 NOTE — PLAN OF CARE
Patient is awake and alert.  Tolerates all meals without any nausea or vomiting.  Son visited.  Continues to receive antibiotics.  Has mild and tolerable pain.  Voids per urinal without any difficulty.  Safety maintained.  Will continue to monitor.

## 2020-10-25 NOTE — ASSESSMENT & PLAN NOTE
Plan:  - Appreciate pulm/cc recs  - continue lasix 40mg IV BID  - UOP: 2.650L (10/25)  - Strict I's/O's

## 2020-10-25 NOTE — ASSESSMENT & PLAN NOTE
-Takes itraconazole at home  - Continue Voriconazole 200 BID the ID recommendation  - ID consulted and will appreciate any further recommendations.  - Unclear if MAC has been treated

## 2020-10-25 NOTE — ASSESSMENT & PLAN NOTE
Increased oxygen requirement from baseline 5L at admission. History of COVID 19, negative this admission with CXR stable from previous. Currently at baseline oxygen requirements.     Plan:  -Duoneb q6hr  -fluticasone formetorol qd  -Continue home spiriva, singulair  -Continue empiric rocephin/azithro  - Appreciate any further ID recs  - follow-up with final PT/OT recommendations regarding evaluation and treatment

## 2020-10-26 LAB
ALBUMIN SERPL BCP-MCNC: 3.2 G/DL (ref 3.5–5.2)
ALP SERPL-CCNC: 94 U/L (ref 55–135)
ALT SERPL W/O P-5'-P-CCNC: 13 U/L (ref 10–44)
ANION GAP SERPL CALC-SCNC: 7 MMOL/L (ref 8–16)
AST SERPL-CCNC: 14 U/L (ref 10–40)
BASOPHILS # BLD AUTO: 0.03 K/UL (ref 0–0.2)
BASOPHILS NFR BLD: 0.3 % (ref 0–1.9)
BILIRUB SERPL-MCNC: 0.4 MG/DL (ref 0.1–1)
BUN SERPL-MCNC: 30 MG/DL (ref 8–23)
CALCIUM SERPL-MCNC: 8.9 MG/DL (ref 8.7–10.5)
CHLORIDE SERPL-SCNC: 101 MMOL/L (ref 95–110)
CO2 SERPL-SCNC: 32 MMOL/L (ref 23–29)
CREAT SERPL-MCNC: 1.4 MG/DL (ref 0.5–1.4)
DIFFERENTIAL METHOD: ABNORMAL
EOSINOPHIL # BLD AUTO: 0 K/UL (ref 0–0.5)
EOSINOPHIL NFR BLD: 0.4 % (ref 0–8)
ERYTHROCYTE [DISTWIDTH] IN BLOOD BY AUTOMATED COUNT: 18 % (ref 11.5–14.5)
EST. GFR  (AFRICAN AMERICAN): 56 ML/MIN/1.73 M^2
EST. GFR  (NON AFRICAN AMERICAN): 48 ML/MIN/1.73 M^2
GLUCOSE SERPL-MCNC: 93 MG/DL (ref 70–110)
HCT VFR BLD AUTO: 35.6 % (ref 40–54)
HGB BLD-MCNC: 10.9 G/DL (ref 14–18)
IMM GRANULOCYTES # BLD AUTO: 0.03 K/UL (ref 0–0.04)
IMM GRANULOCYTES NFR BLD AUTO: 0.3 % (ref 0–0.5)
LYMPHOCYTES # BLD AUTO: 2.2 K/UL (ref 1–4.8)
LYMPHOCYTES NFR BLD: 21.7 % (ref 18–48)
MAGNESIUM SERPL-MCNC: 2.2 MG/DL (ref 1.6–2.6)
MCH RBC QN AUTO: 25.2 PG (ref 27–31)
MCHC RBC AUTO-ENTMCNC: 30.6 G/DL (ref 32–36)
MCV RBC AUTO: 82 FL (ref 82–98)
MONOCYTES # BLD AUTO: 0.9 K/UL (ref 0.3–1)
MONOCYTES NFR BLD: 9.1 % (ref 4–15)
NEUTROPHILS # BLD AUTO: 6.8 K/UL (ref 1.8–7.7)
NEUTROPHILS NFR BLD: 68.2 % (ref 38–73)
NRBC BLD-RTO: 0 /100 WBC
PHOSPHATE SERPL-MCNC: 3.9 MG/DL (ref 2.7–4.5)
PLATELET # BLD AUTO: 310 K/UL (ref 150–350)
PMV BLD AUTO: 9.6 FL (ref 9.2–12.9)
POCT GLUCOSE: 108 MG/DL (ref 70–110)
POCT GLUCOSE: 158 MG/DL (ref 70–110)
POCT GLUCOSE: 159 MG/DL (ref 70–110)
POCT GLUCOSE: 209 MG/DL (ref 70–110)
POTASSIUM SERPL-SCNC: 3.7 MMOL/L (ref 3.5–5.1)
PROT SERPL-MCNC: 7.2 G/DL (ref 6–8.4)
RBC # BLD AUTO: 4.32 M/UL (ref 4.6–6.2)
SODIUM SERPL-SCNC: 140 MMOL/L (ref 136–145)
WBC # BLD AUTO: 9.96 K/UL (ref 3.9–12.7)

## 2020-10-26 PROCEDURE — 63600175 PHARM REV CODE 636 W HCPCS: Performed by: STUDENT IN AN ORGANIZED HEALTH CARE EDUCATION/TRAINING PROGRAM

## 2020-10-26 PROCEDURE — 83735 ASSAY OF MAGNESIUM: CPT

## 2020-10-26 PROCEDURE — 25000003 PHARM REV CODE 250: Performed by: STUDENT IN AN ORGANIZED HEALTH CARE EDUCATION/TRAINING PROGRAM

## 2020-10-26 PROCEDURE — 63700000 PHARM REV CODE 250 ALT 637 W/O HCPCS: Performed by: FAMILY MEDICINE

## 2020-10-26 PROCEDURE — 25000242 PHARM REV CODE 250 ALT 637 W/ HCPCS: Performed by: STUDENT IN AN ORGANIZED HEALTH CARE EDUCATION/TRAINING PROGRAM

## 2020-10-26 PROCEDURE — 97535 SELF CARE MNGMENT TRAINING: CPT

## 2020-10-26 PROCEDURE — 36415 COLL VENOUS BLD VENIPUNCTURE: CPT

## 2020-10-26 PROCEDURE — 27000221 HC OXYGEN, UP TO 24 HOURS

## 2020-10-26 PROCEDURE — 94640 AIRWAY INHALATION TREATMENT: CPT

## 2020-10-26 PROCEDURE — 97116 GAIT TRAINING THERAPY: CPT | Mod: CQ

## 2020-10-26 PROCEDURE — 99900035 HC TECH TIME PER 15 MIN (STAT)

## 2020-10-26 PROCEDURE — 11000001 HC ACUTE MED/SURG PRIVATE ROOM

## 2020-10-26 PROCEDURE — 80053 COMPREHEN METABOLIC PANEL: CPT

## 2020-10-26 PROCEDURE — 84100 ASSAY OF PHOSPHORUS: CPT

## 2020-10-26 PROCEDURE — 97110 THERAPEUTIC EXERCISES: CPT | Mod: CQ

## 2020-10-26 PROCEDURE — 85025 COMPLETE CBC W/AUTO DIFF WBC: CPT

## 2020-10-26 RX ADMIN — FUROSEMIDE 40 MG: 10 INJECTION, SOLUTION INTRAVENOUS at 01:10

## 2020-10-26 RX ADMIN — PREGABALIN 150 MG: 75 CAPSULE ORAL at 09:10

## 2020-10-26 RX ADMIN — IPRATROPIUM BROMIDE AND ALBUTEROL SULFATE 3 ML: .5; 3 SOLUTION RESPIRATORY (INHALATION) at 08:10

## 2020-10-26 RX ADMIN — TIOTROPIUM BROMIDE 18 MCG: 18 CAPSULE ORAL; RESPIRATORY (INHALATION) at 08:10

## 2020-10-26 RX ADMIN — CELECOXIB 200 MG: 100 CAPSULE ORAL at 08:10

## 2020-10-26 RX ADMIN — PREDNISONE 40 MG: 20 TABLET ORAL at 08:10

## 2020-10-26 RX ADMIN — MUPIROCIN: 20 OINTMENT TOPICAL at 08:10

## 2020-10-26 RX ADMIN — FLUTICASONE FUROATE AND VILANTEROL TRIFENATATE 1 PUFF: 100; 25 POWDER RESPIRATORY (INHALATION) at 08:10

## 2020-10-26 RX ADMIN — CEFTRIAXONE 1 G: 1 INJECTION, SOLUTION INTRAVENOUS at 10:10

## 2020-10-26 RX ADMIN — AZITHROMYCIN 500 MG: 250 TABLET, FILM COATED ORAL at 09:10

## 2020-10-26 RX ADMIN — LIDOCAINE 2 PATCH: 50 PATCH TOPICAL at 10:10

## 2020-10-26 RX ADMIN — LACTOBACILLUS ACIDOPHILUS / LACTOBACILLUS BULGARICUS 1 EACH: 100 MILLION CFU STRENGTH GRANULES at 08:10

## 2020-10-26 RX ADMIN — IPRATROPIUM BROMIDE AND ALBUTEROL SULFATE 3 ML: .5; 3 SOLUTION RESPIRATORY (INHALATION) at 01:10

## 2020-10-26 RX ADMIN — VORICONAZOLE 200 MG: 200 TABLET, FILM COATED ORAL at 08:10

## 2020-10-26 RX ADMIN — PREGABALIN 150 MG: 75 CAPSULE ORAL at 08:10

## 2020-10-26 RX ADMIN — VORICONAZOLE 200 MG: 200 TABLET, FILM COATED ORAL at 09:10

## 2020-10-26 RX ADMIN — MUPIROCIN: 20 OINTMENT TOPICAL at 09:10

## 2020-10-26 RX ADMIN — MONTELUKAST 10 MG: 10 TABLET, FILM COATED ORAL at 09:10

## 2020-10-26 RX ADMIN — TAMSULOSIN HYDROCHLORIDE 0.4 MG: 0.4 CAPSULE ORAL at 08:10

## 2020-10-26 RX ADMIN — LACTOBACILLUS ACIDOPHILUS / LACTOBACILLUS BULGARICUS 1 EACH: 100 MILLION CFU STRENGTH GRANULES at 05:10

## 2020-10-26 RX ADMIN — THERA TABS 1 TABLET: TAB at 08:10

## 2020-10-26 RX ADMIN — ENOXAPARIN SODIUM 40 MG: 40 INJECTION SUBCUTANEOUS at 05:10

## 2020-10-26 NOTE — PLAN OF CARE
Problem: Physical Therapy Goal  Goal: Physical Therapy Goal  Description: Goals to be met by: 2020     Patient will increase functional independence with mobility by performin. Supine to sit with Modified [place]  2. Sit to supine with Modified [place]  3. Sit to stand transfer with Supervision  4. Gait  x  feet with Supervision using Rolling Walker.     Outcome: Ongoing, Progressing

## 2020-10-26 NOTE — SUBJECTIVE & OBJECTIVE
Interval History: Pt doing well this morning. Saturating low 90's on baseline 5L nasal cannula. Pt still feels weak and would like more rehabilitation prior to going home. Breathing is improved and no longer endorses feeling short of breath.     Review of Systems   Constitutional: Negative for chills, diaphoresis, fatigue and fever.   HENT: Negative for congestion, rhinorrhea, sore throat and trouble swallowing.    Eyes: Negative for itching and visual disturbance.   Respiratory: Negative for cough, chest tightness, shortness of breath and wheezing.    Cardiovascular: Negative for chest pain and palpitations.   Gastrointestinal: Negative for abdominal pain, constipation, diarrhea, nausea and vomiting.   Endocrine: Negative for polyphagia and polyuria.   Genitourinary: Negative for decreased urine volume, dysuria and flank pain.   Musculoskeletal: Negative for back pain, joint swelling and neck stiffness.   Skin: Negative for color change and rash.   Neurological: Positive for weakness. Negative for dizziness, light-headedness and headaches.   Psychiatric/Behavioral: Negative for confusion. The patient is not nervous/anxious.      Objective:     Vital Signs (Most Recent):  Temp: 97.5 °F (36.4 °C) (10/26/20 1239)  Pulse: 90 (10/26/20 1300)  Resp: 18 (10/26/20 1300)  BP: 119/63 (10/26/20 1239)  SpO2: 96 % (10/26/20 1300) Vital Signs (24h Range):  Temp:  [97.5 °F (36.4 °C)-98.4 °F (36.9 °C)] 97.5 °F (36.4 °C)  Pulse:  [68-94] 90  Resp:  [18-20] 18  SpO2:  [95 %-97 %] 96 %  BP: (108-120)/(60-73) 119/63     Weight: 87.2 kg (192 lb 3.9 oz)  Body mass index is 29.23 kg/m².    Intake/Output Summary (Last 24 hours) at 10/26/2020 1348  Last data filed at 10/26/2020 0600  Gross per 24 hour   Intake 810 ml   Output 1975 ml   Net -1165 ml      Physical Exam  Vitals signs and nursing note reviewed.   Constitutional:       General: He is not in acute distress.     Appearance: Normal appearance. He is well-developed. He is not  diaphoretic.   HENT:      Head: Normocephalic and atraumatic.      Right Ear: External ear normal.      Left Ear: External ear normal.      Nose: Nose normal.      Mouth/Throat:      Mouth: Mucous membranes are moist.      Pharynx: Oropharynx is clear.   Eyes:      Extraocular Movements: Extraocular movements intact.      Conjunctiva/sclera: Conjunctivae normal.   Neck:      Musculoskeletal: Normal range of motion and neck supple.   Cardiovascular:      Rate and Rhythm: Normal rate and regular rhythm.      Pulses: Normal pulses.      Heart sounds: Normal heart sounds.   Pulmonary:      Effort: Pulmonary effort is normal. No respiratory distress.      Breath sounds: No wheezing.      Comments: Breathing comfortably on baseline 5L nasal cannula.   Abdominal:      General: Bowel sounds are normal.      Palpations: Abdomen is soft.      Tenderness: There is no abdominal tenderness.   Musculoskeletal: Normal range of motion.   Skin:     General: Skin is warm and dry.      Capillary Refill: Capillary refill takes less than 2 seconds.      Findings: No erythema.   Neurological:      General: No focal deficit present.      Mental Status: He is alert and oriented to person, place, and time.   Psychiatric:         Mood and Affect: Mood normal.         Behavior: Behavior normal.         Significant Labs:   CBC:   Recent Labs   Lab 10/25/20  0523 10/26/20  0609   WBC 10.25 9.96   HGB 10.3* 10.9*   HCT 33.8* 35.6*    310     CMP:   Recent Labs   Lab 10/25/20  0523 10/26/20  0609    140   K 3.6 3.7    101   CO2 30* 32*   * 93   BUN 22 30*   CREATININE 1.3 1.4   CALCIUM 8.5* 8.9   PROT 6.8 7.2   ALBUMIN 3.0* 3.2*   BILITOT 0.3 0.4   ALKPHOS 94 94   AST 16 14   ALT 13 13   ANIONGAP 9 7*   EGFRNONAA 53* 48*     All pertinent labs within the past 24 hours have been reviewed.    Significant Imaging: I have reviewed all pertinent imaging results/findings within the past 24 hours.

## 2020-10-26 NOTE — ASSESSMENT & PLAN NOTE
Increased oxygen requirement from baseline 5L at admission. History of COVID 19, negative this admission with CXR stable from previous. Currently at baseline oxygen requirements.   -Duoneb q6hr  -fluticasone formetorol qd  -Continue home spiriva, singulair  -Continue empiric rocephin/azithro  - Appreciate any further ID recs  - follow-up with final PT/OT recommendations regarding evaluation and treatment

## 2020-10-26 NOTE — SUBJECTIVE & OBJECTIVE
Interval History: No acute events    Review of Systems   Constitutional: Negative for chills, diaphoresis, fatigue and fever.   HENT: Negative for congestion, rhinorrhea, sore throat and trouble swallowing.    Eyes: Negative for itching and visual disturbance.   Respiratory: Positive for shortness of breath. Negative for cough, chest tightness and wheezing.    Cardiovascular: Negative for chest pain and palpitations.   Gastrointestinal: Negative for abdominal pain, constipation, diarrhea, nausea and vomiting.   Endocrine: Negative for polyphagia and polyuria.   Genitourinary: Negative for dysuria and flank pain.   Musculoskeletal: Negative for back pain, joint swelling and neck stiffness.   Skin: Negative for color change and rash.   Neurological: Negative for dizziness, weakness, light-headedness and headaches.   Psychiatric/Behavioral: Negative for confusion. The patient is nervous/anxious.      Objective:     Vital Signs (Most Recent):  Temp: 97.8 °F (36.6 °C) (10/26/20 0819)  Pulse: 91 (10/26/20 0820)  Resp: 18 (10/26/20 0820)  BP: 108/62 (10/26/20 0819)  SpO2: 96 % (10/26/20 0820) Vital Signs (24h Range):  Temp:  [97.8 °F (36.6 °C)-98.4 °F (36.9 °C)] 97.8 °F (36.6 °C)  Pulse:  [68-97] 91  Resp:  [18-20] 18  SpO2:  [93 %-97 %] 96 %  BP: (108-120)/(60-73) 108/62     Weight: 87.2 kg (192 lb 3.9 oz)  Body mass index is 29.23 kg/m².    Estimated Creatinine Clearance: 47.4 mL/min (based on SCr of 1.4 mg/dL).    Physical Exam  Vitals signs and nursing note reviewed.   Constitutional:       General: He is not in acute distress.     Appearance: Normal appearance. He is well-developed. He is not diaphoretic.      Comments: NC  No accessory muscle use   HENT:      Head: Normocephalic and atraumatic.      Right Ear: External ear normal.      Left Ear: External ear normal.      Nose: Nose normal.      Mouth/Throat:      Mouth: Mucous membranes are moist.      Pharynx: Oropharynx is clear.   Eyes:      Extraocular Movements:  Extraocular movements intact.      Conjunctiva/sclera: Conjunctivae normal.      Pupils: Pupils are equal, round, and reactive to light.   Neck:      Musculoskeletal: Normal range of motion and neck supple.   Cardiovascular:      Rate and Rhythm: Normal rate and regular rhythm.      Pulses: Normal pulses.      Heart sounds: Normal heart sounds.   Pulmonary:      Effort: Pulmonary effort is normal. No respiratory distress.      Breath sounds: Rhonchi and rales (b/l lower lobes) present. No wheezing.   Abdominal:      General: Bowel sounds are normal.      Palpations: Abdomen is soft.      Tenderness: There is no abdominal tenderness.   Musculoskeletal: Normal range of motion.   Skin:     General: Skin is warm and dry.      Capillary Refill: Capillary refill takes less than 2 seconds.      Findings: No erythema.   Neurological:      General: No focal deficit present.      Mental Status: He is alert and oriented to person, place, and time.   Psychiatric:         Mood and Affect: Mood normal.         Behavior: Behavior normal.         Significant Labs: All pertinent labs within the past 24 hours have been reviewed.    Significant Imaging: I have reviewed all pertinent imaging results/findings within the past 24 hours.

## 2020-10-26 NOTE — PT/OT/SLP PROGRESS
Occupational Therapy   Treatment    Name: Rajan Deluna  MRN: [id number]  Admitting Diagnosis:  COPD exacerbation       Recommendations:     Discharge Recommendations: home health PT, home health OT  Discharge Equipment Recommendations:  bath bench, rollator, hip kit  Barriers to discharge:  None    Assessment:   Pt w/ signif SOB during LBD tasks requiring ~2min RB after donning 1st sock 2/2 desat to mid 80s. Pt declined direct instruction/return demo of hip kit use w/ LBD stating that he'd be just fine doing it the regular way he does so long as he doesn't over-react to stressors at home which in turn make him SOB & desat. Cont w/ OT per POC.    Rajan Deluna is a 75 y.o. male with a medical diagnosis of COPD exacerbation.  He presents with . Performance deficits affecting function are weakness, impaired endurance, impaired self care skills, impaired functional mobilty, gait instability, decreased upper extremity function, decreased coordination, impaired balance, decreased safety awareness, decreased ROM, impaired cardiopulmonary response to activity.     Rehab Prognosis:  Good; patient would benefit from acute skilled OT services to address these deficits and reach maximum level of function.       Plan:     Patient to be seen 5 x/week to address the above listed problems via self-care/home management, therapeutic activities, therapeutic exercises  · Plan of Care Expires: 11/23/20  · Plan of Care Reviewed with: patient    Subjective     Pain/Comfort:  · Pain Rating 1: 0/10  · Pain Rating Post-Intervention 1: 0/10    Objective:     Communicated with: nsfredy prior to session.  Patient found HOB elevated with bed alarm, oxygen, pressure relief boots, pulse ox (continuous) upon OT entry to room.    General Precautions: Standard, fall, hearing impaired, respiratory   Orthopedic Precautions:N/A   Braces: N/A     Occupational Performance:     Bed Mobility:    · Patient completed Supine to Sit with supervision     Functional  Mobility/Transfers:  · Patient completed Sit <> Stand Transfer with supervision  with  no assistive device   · Patient completed Bed <> Chair Transfer using Step Transfer technique with supervision with no assistive device  · Functional Mobility: .    Activities of Daily Living:  · Lower Body Dressing: stand by assistance don socks at EOB      [facility] 6 Click ADL: 15    Treatment & Education:  Pt found in supine & agreeable to OT this AM.  Pt w/o c/o pain & perf the following:  -sup-->EOB w/ Sup  -don socks via both figure 4 techs & leaning forward to floor level w/ SBA  -standing w/o DME w/ Sup  -t/f EOB-->b/s chair w/ Sup  Edu/tx re: rec hip kit/TTB, O2 use in showers, general safety techs, PLBing techs & HEP. Pt verbalized understanding.  Pt left [facility] w/ alarm & nsg notified.    Patient left up in chair with all lines intact, call button in reach, chair alarm on and nsg notifiedEducation:      GOALS:   Multidisciplinary Problems     Occupational Therapy Goals        Problem: Occupational Therapy Goal    Goal Priority Disciplines Outcome Interventions   Occupational Therapy Goal     OT, PT/OT Ongoing, Progressing    Description: Goals to be met by: 11/23     Patient will increase functional independence with ADLs by performing:    UE Dressing with Minimal Assistance.  LE Dressing with Stand-by Assistance and Assistive Devices as needed.--MET 10/26  Grooming while standing at sink with Stand-by Assistance.  Toileting from toilet with Supervision for hygiene and clothing management.   Toilet transfer to toilet with Supervision.  Increased functional strength to WFL for ADLs.                     Time Tracking:     OT Date of Treatment: 10/26/20  OT Start Time: 0958  OT Stop Time: 1027  OT Total Time (min): 29 min    Billable Minutes:Self Care/Home Management 29  Total Time 29    LIVAN Gonzalez  10/26/2020

## 2020-10-26 NOTE — PROGRESS NOTES
Consult Note  [facility] Pulmonary & Critical Care Medicine    Attending: You Carmona  Fellow: Alvin Christie  Admit Date: 10/22/2020  Today's Date: 10/26/2020  Reason for Consult:  Chronic Hypoxic Respiratory Failure     SUBJECTIVE:     HPI:  Mr. Deluan is a 73yo with PMHx outlined below that is known to the [facility] Pulmonary service. Of note, [place]. Deluna [place] [facility] [place] and discharged to [facility] for chronic hypoxic respiratory failure requiring HFNC. During his admission at the [facility] Pulmonary was consulted for increasing oxygen requirements with comfort flow despite diuresis. Review of his chart demonstrates that he has COPD with likely underlying pulmonary fibrosis from known occupational exposure to asbestos. Furthermore, Mr. 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 Overall at the [facility] Mr. Deluna was started on a steroid taper for his ongoing hypoxia with another trial of diuretics to assist with his recovery. Patient was last seen by Dr. Camarillo 10/16/2020 on 5L NC with sats >90% and no increased work of breathing.     Interval History:  Patient states he has been diuresing well and feels improvement in his breathing. States he still becomes short of breath if he over exerts himself. No fever, cough, light headedness.    Review of patient's allergies indicates:  No Known Allergies    Past Medical History:   Diagnosis Date    Arthritis     COPD (chronic obstructive pulmonary disease)     Encounter for blood transfusion     Hypertension     Smoker     Weakness      Past Surgical History:   Procedure Laterality Date    HERNIA REPAIR      INTRALUMINAL GASTROINTESTINAL TRACT IMAGING VIA CAPSULE N/A 8/3/2020    Procedure: IMAGING PROCEDURE, GI TRACT, INTRALUMINAL, VIA CAPSULE;  Surgeon: Rey Olivares MD;  Location: [facility];  Service: Endoscopy;  Laterality: N/A;    right knee surgery       History reviewed. No pertinent family  history.  Social History     Tobacco Use    Smoking status: Former Smoker    Smokeless tobacco: Never Used   Substance Use Topics    Alcohol use: Not Currently    Drug use: Never       All medications reviewed.    OBJECTIVE:     Vital Signs Trends/Hx Reviewed  Vitals:    10/26/20 0006 10/26/20 0149 10/26/20 0400 10/26/20 0457   BP: 117/60   116/73   BP Location: Left arm   Left arm   Patient Position: Lying   Lying   Pulse: 88  74 73   Resp: 19   19   Temp: 98.3 °F (36.8 °C)   98.2 °F (36.8 °C)   TempSrc: Oral   Oral   SpO2:  95%     Weight: 87.2 kg (192 lb 3.9 oz)      Height:             Physical Exam:  General: NAD, cooperative & interactive.  HEENT: AT/NC, PERRL, EOMI, oral and nasal mucosa moist.   Neck: Supple without JVD or palpable LAD.   Cardiac: normal rate, regular rhythm, with no MRG with brisk cap refill and symmetric pulses in distal extremities.  Respiratory: Prolonged expiration with end expiratory wheezing. Use of accessory muscles with breathing.   Abdomen: Soft, NT/ND. +BS. No hepatosplenomegaly.   Extremities: Trace pitting edema   Neuro: Grossly intact to brief exam. Oriented x3 with appropriate mood/affect to situation.       Laboratory:  No results for input(s): PH, PCO2, PO2, HCO3, POCSATURATED, BE in the last 24 hours.  Recent Labs   Lab 10/26/20  0609   WBC 9.96   RBC 4.32*   HGB 10.9*   HCT 35.6*      MCV 82   MCH 25.2*   MCHC 30.6*     Recent Labs   Lab 10/26/20  0609      K 3.7      CO2 32*   BUN 30*   CREATININE 1.4   MG 2.2       Microbiology Data:   Microbiology Results (last 7 days)     Procedure Component Value Units Date/Time    Blood culture (site 1) [[id number]] Collected: 10/22/20 2222    Order Status: Completed Specimen: Blood from Peripheral, Left Wrist Updated: 10/26/20 0612     Blood Culture, Routine No Growth to date      No Growth to date      No Growth to date      No Growth to date    Narrative:      Site # 1, aerobic and anaerobic    Blood culture  (site 2) [[id number]] Collected: 10/22/20 2212    Order Status: Completed Specimen: Blood from Peripheral, Right Hand Updated: 10/26/20 0612     Blood Culture, Routine No Growth to date      No Growth to date      No Growth to date      No Growth to date    Narrative:      Site # 2, aerobic only    Culture, Respiratory [[id number]]     Order Status: Canceled Specimen: Respiratory from Sputum, Expectorated            Chest Imaging:   CXR 10/22  There are bilateral interstitial opacities, similar in appearance to prior, most significant within the left lung base.  The pleural spaces are clear.  The cardiac silhouette is unremarkable.  There are calcifications of the aortic arch.  The visualized osseous structures demonstrate degenerative changes.  There are remote left-sided rib fracture deformities.     Impression:     Stable chronic lung findings.  No acute cardiopulmonary abnormality.    Infusions:        Scheduled Medications:    albuterol-ipratropium  3 mL Nebulization Q6H WAKE    azithromycin  500 mg Oral Daily    cefTRIAXone (ROCEPHIN) IVPB  1 g Intravenous Q24H    celecoxib  200 mg Oral Daily    enoxaparin  40 mg Subcutaneous Q24H    fluticasone furoate-vilanteroL  1 puff Inhalation Daily    furosemide (LASIX) IV  40 mg Intravenous Q12H    lactobacillus acidophilus & bulgar  1 packet Oral BID WM    lidocaine  2 patch Transdermal Q24H    montelukast  10 mg Oral QHS    multivitamin  1 tablet Oral Daily    mupirocin   Nasal BID    predniSONE  40 mg Oral Daily    pregabalin  150 mg Oral BID    tamsulosin  0.4 mg Oral Daily    tiotropium  1 capsule Inhalation Daily    voriconazole  200 mg Oral BID       PRN Medications:   acetaminophen, dextrose 50%, dextrose 50%, glucagon (human recombinant), glucose, glucose, ibuprofen, influenza, insulin aspart U-100, melatonin, pneumoc 13-loy conj-dip cr(PF), sodium chloride 0.9%, traMADoL    Assessment & Plan:   Patient Active Problem List   Diagnosis    Chest  pain    Anemia    RBBB    JACOME (dyspnea on exertion)    COPD (chronic obstructive pulmonary disease)    Diastolic dysfunction without heart failure    History of 2019 novel coronavirus disease (COVID-19)    Acute respiratory failure with hypoxia    Acute respiratory disease due to COVID-19 virus    History of Aspergillus & MAC    Left shoulder pain    Counseling regarding advance care planning and goals of care    Goals of care, counseling/discussion    Palliative care encounter    Impaired functional mobility and endurance    Post viral debility    Rotator cuff tear    Osteoarthritis of left glenohumeral joint    Type 2 diabetes mellitus    Weakness generalized    COPD exacerbation    BPH (benign prostatic hyperplasia)    Chronic bilateral pleural effusions       ASSESSMENT & RECOMMENDATIONS     Mr. Deluna is a 75 yo male with the above history with increasing oxygen requirements over the past 3 days. CT on 9/11/20 with questionable left lower linear segmental defect that could suggest chronic thromboembolism. This would not explain the patient's worsening oxygen requirement and shortness of breath. CTA thorax noted enlarged pulmonary artery,. With the patient's history of MAC and COPD and evidence of architectural distortion on his lung, pulmonary hypertension is also a possible contributing factor.      #Hypoxic Respiratory Failure   · Likely ongoing hypoxic respiratory failure 2/2 emphysema and was likely exacerbated by COVID19. Most recent CT with diffuse fibrosis and bilateral pleural effusions which are also demonstrated in most recent CXR.  · Continue supplemental oxygen for goal SpO2 88-92% or PaO2 55-80mmHg   · Continue scheduled bronchodilators, with ICS/LAMA/LABA     · Would decreased lasix to daily dosing.  · Patient started on Rocephin and Azithro 500mg as well as steroids on Friday. If continuing antibiotics, would decrease azithro to 250mg. Do not believe much benefit to  antibiotic treatment.  · Appears to be clinically improving and nearing baseline. Bedside ultrasound with improvement in pleural effusions.        Alvin Christie MD  [facility]    Pt seen and examined with Pulmonary/Critical Care team. Time was spent validating the history and physical exam, reviewing the lab and imaging results, and discussing the care of the patient with the bedside nurse. The following additional comments are made:    Less SOB following diuresis. My POCUS shows much smaller effusions and fewer B-lines. Pt able to get OOB on his own.  This is a significant improvement.    Deion Carmona MD  Phone [phone]

## 2020-10-26 NOTE — PLAN OF CARE
Problem: Occupational Therapy Goal  Goal: Occupational Therapy Goal  Description: Goals to be met by: 11/23     Patient will increase functional independence with ADLs by performing:    UE Dressing with Minimal Assistance.  LE Dressing with Stand-by Assistance and Assistive Devices as needed.--MET 10/26  Grooming while standing at sink with Stand-by Assistance.  Toileting from toilet with Supervision for hygiene and clothing management.   Toilet transfer to toilet with Supervision.  Increased functional strength to WFL for ADLs.    Outcome: Ongoing, Progressing   Pt found in supine & agreeable to OT this AM.  Pt w/o c/o pain & perf the following:  -sup-->EOB w/ Sup  -don socks via both figure 4 techs & leaning forward to floor level w/ SBA  -standing w/o DME w/ Sup  -t/f EOB-->b/s chair w/ Sup  Edu/tx re: rec hip kit/TTB, O2 use in showers, general safety techs, PLBing techs & HEP. Pt verbalized understanding.  Pt left [facility] w/ alarm & nsg notified.    Pt w/ signif SOB during LBD tasks requiring ~2min RB after donning 1st sock 2/2 desat to mid 80s. Pt declined direct instruction/return demo of hip kit use w/ LBD stating that he'd be just fine doing it the regular way he does so long as he doesn't over-react to stressors at home which in turn make him SOB & desat. Cont w/ OT per POC.

## 2020-10-26 NOTE — PROGRESS NOTES
TN communicated with MD and spoke with pt -   pt doesn't want to return to home at d/c - wants to return to LTAC or go to [facility].   pt doesn't want placement post d/c in a NH setting nor at [facility].       Referral sent to [facility], [facility] and SNF.   pt will be reviewed by Deb with LTAC and Rona with I[facility] as well as Rehana with SNF.   currently pt has home health  - recent therapy [facility] with pt/ot.

## 2020-10-26 NOTE — PROGRESS NOTES
[facility]  Infectious Disease  Progress Note    Patient Name: Rajan Deluna  MRN: [id number]  Admission Date: 10/22/2020  Length of Stay: 4 days  Attending Physician: Rell Tolentino III, MD  Primary Care Provider: Jason Mccord MD    Isolation Status: No active isolations  Assessment/Plan:      History of Aspergillus & MAC  75 yo AAM w/ pmhx of COPD on 4-5LNC at home, BPH, osteoarthritis, hx of MAC and aspergillosis pulmonary infection, and hx of COVID19 (hospital admission 7/21/20 and recent d/c from rehab facility 10/16/20) on whom ID is consulted for MAC and pulmonary aspergillosis    -unclear if he has been taking the itraconazole since leaving rehab  -continue voriconazole while inpatient  -unclear if he ever received treatment for MAC  -I attempted to call his outpatient ID physician, left my number, but have not heard back  -continue ceftriaxone and azithro for 5 days total  -please start itraconazole 200mg daily on discharge  -would not start MAC therapy unless hx of any treatment can be confirmed  -needs follow up with his outpatient ID physician    -ID will sign off              Thank you for your consult. I will sign off. Please contact us if you have any additional questions.    Tl Salinas MD  Infectious Disease  [facility]    Subjective:     Principal Problem:COPD exacerbation    HPI: 75 yo AAM w/ pmhx of COPD on 4-5LNC at home, BPH, osteoarthritis, hx of MAC and aspergillosis pulmonary infection, hx of COVID19 (hospital admission 7/21/20 and recent d/c from rehab facility 10/16/20) presenting for SOB/anxiety. Pt states on the day of admission while he was ambulating at home, he became anxious because his home O2 tubing was not long enough to reach his kitchen/living room from his bedroom. He states he was trying to move his O2 tank when the tubing became tangled which made him anxious because he had to untangle the tubing. He states he also had grandchildren at  "home to take care of which added to his anxiety. Pt states he became SOB after his anxiety started. He never was off of his home O2. He has been compliant with his home medications, which he received on discharge from the rehab facility. He states he is productive yellow sputum intermittently, but this is not increased from baseline, he has been having productive cough "since I started seeing a lung doctor." Pt denies f/c/s/n/v, chest pain, palpitations, wheezing, increased cough/sputum production, abdominal pain, constipation, diarrhea, dysuria, lower extremity edema. Per ED record, when EMS was called, his SpO2 was 50% on 5L, he was placed on non rebreather and SpO2 lorrie to 94-97%. ED tried to put on nasal canula at 5L but SpO2 decreased to mid 80s to was placed back on non rebreather. CXR showed b/l bibasilar interstitial infiltrate which could represent infection/inflammatory process vs edema.     I did not find his itraconazole among his home medications  Interval History: No acute events    Review of Systems   Constitutional: Negative for chills, diaphoresis, fatigue and fever.   HENT: Negative for congestion, rhinorrhea, sore throat and trouble swallowing.    Eyes: Negative for itching and visual disturbance.   Respiratory: Positive for shortness of breath. Negative for cough, chest tightness and wheezing.    Cardiovascular: Negative for chest pain and palpitations.   Gastrointestinal: Negative for abdominal pain, constipation, diarrhea, nausea and vomiting.   Endocrine: Negative for polyphagia and polyuria.   Genitourinary: Negative for dysuria and flank pain.   Musculoskeletal: Negative for back pain, joint swelling and neck stiffness.   Skin: Negative for color change and rash.   Neurological: Negative for dizziness, weakness, light-headedness and headaches.   Psychiatric/Behavioral: Negative for confusion. The patient is nervous/anxious.      Objective:     Vital Signs (Most Recent):  Temp: 97.8 °F (36.6 " °C) (10/26/20 0819)  Pulse: 91 (10/26/20 0820)  Resp: 18 (10/26/20 0820)  BP: 108/62 (10/26/20 0819)  SpO2: 96 % (10/26/20 0820) Vital Signs (24h Range):  Temp:  [97.8 °F (36.6 °C)-98.4 °F (36.9 °C)] 97.8 °F (36.6 °C)  Pulse:  [68-97] 91  Resp:  [18-20] 18  SpO2:  [93 %-97 %] 96 %  BP: (108-120)/(60-73) 108/62     Weight: 87.2 kg (192 lb 3.9 oz)  Body mass index is 29.23 kg/m².    Estimated Creatinine Clearance: 47.4 mL/min (based on SCr of 1.4 mg/dL).    Physical Exam  Vitals signs and nursing note reviewed.   Constitutional:       General: He is not in acute distress.     Appearance: Normal appearance. He is well-developed. He is not diaphoretic.      Comments: NC  No accessory muscle use   HENT:      Head: Normocephalic and atraumatic.      Right Ear: External ear normal.      Left Ear: External ear normal.      Nose: Nose normal.      Mouth/Throat:      Mouth: Mucous membranes are moist.      Pharynx: Oropharynx is clear.   Eyes:      Extraocular Movements: Extraocular movements intact.      Conjunctiva/sclera: Conjunctivae normal.      Pupils: Pupils are equal, round, and reactive to light.   Neck:      Musculoskeletal: Normal range of motion and neck supple.   Cardiovascular:      Rate and Rhythm: Normal rate and regular rhythm.      Pulses: Normal pulses.      Heart sounds: Normal heart sounds.   Pulmonary:      Effort: Pulmonary effort is normal. No respiratory distress.      Breath sounds: Rhonchi and rales (b/l lower lobes) present. No wheezing.   Abdominal:      General: Bowel sounds are normal.      Palpations: Abdomen is soft.      Tenderness: There is no abdominal tenderness.   Musculoskeletal: Normal range of motion.   Skin:     General: Skin is warm and dry.      Capillary Refill: Capillary refill takes less than 2 seconds.      Findings: No erythema.   Neurological:      General: No focal deficit present.      Mental Status: He is alert and oriented to person, place, and time.   Psychiatric:          Mood and Affect: Mood normal.         Behavior: Behavior normal.         Significant Labs: All pertinent labs within the past 24 hours have been reviewed.    Significant Imaging: I have reviewed all pertinent imaging results/findings within the past 24 hours.

## 2020-10-26 NOTE — PLAN OF CARE
Antibiotics given as ordered remains on telemetry. Continuous pulse ox sats 95-97% on 6 L Hi lg O2. Blood glucose checked as ordered. No complaints of pain overnight. Call light in reach, remains free from falls, bed alarm in use.

## 2020-10-26 NOTE — PLAN OF CARE
Pt on documented O2; high flow nasal cannula 6 lpm , no respiratory distress noted. Will continue to monitor.

## 2020-10-26 NOTE — PROGRESS NOTES
"[facility] Hospital Medicine  Progress Note    Patient Name: Rajan Deluna  MRN: [id number]  Patient Class: IP- Inpatient   Admission Date: 10/22/2020  Length of Stay: 4 days  Attending Physician: Rell Tolentino III, MD  Primary Care Provider: Jason Mccord MD        Subjective:     Principal Problem:COPD exacerbation        HPI:  Pt is a 75 yo AAM w/ pmhx of COPD on 4-5LNC at home, BPH, osteoarthritis, hx of MAC and aspergillosis pulmonary infection, hx of COVID19 (hospital admission 7/21/20 and recent d/c from rehab facility 10/16/20) presenting for SOB/anxiety. Pt states today while he was ambulating at home, he became anxious because his home O2 tubing was not long enough to reach his kitchen/living room from his bedroom. He states he was trying to move his O2 tank when the tubing became tangled which made him anxious because he had to untangle the tubing. He states he also had grandchildren at home to take care of which added to his anxiety. Pt states he became SOB after his anxiety started. He never was off of his home O2. He has been compliant with his home medications, which he received on discharge from the rehab facility. He state he productive yellow sputum intermittently, but this is not increased from baseline, he has been having productive cough "since I started seeing a lung doctor." Pt denies f/c/s/n/v, chest pain, palpitations, wheezing, increased cough/sputum production, abdominal pain, constipation, diarrhea, dysuria, lower extremity edema.     Per ED record, when EMS was called, his SpO2 was 50% on 5L, he was placed on non rebreather and SpO2 lorrie to 94-97%. ED tried to put on nasal canula at 5L but SpO2 decreased to mid 80s to was placed back on non rebreather. Otherwise vitals were stable. CBC unremarkable. CMP showed MARQUIS Cr 1.47, CXR showed b/l bibasilar interstitial infiltrate which could represent infection/inflammatory process vs edema. Pt was transferred here w/ no " treatment in ED (per patient).    Overview/Hospital Course:  No notes on file    Interval History: Pt doing well this morning. Saturating low 90's on baseline 5L nasal cannula. Pt still feels weak and would like more rehabilitation prior to going home. Breathing is improved and no longer endorses feeling short of breath.     Review of Systems   Constitutional: Negative for chills, diaphoresis, fatigue and fever.   HENT: Negative for congestion, rhinorrhea, sore throat and trouble swallowing.    Eyes: Negative for itching and visual disturbance.   Respiratory: Negative for cough, chest tightness, shortness of breath and wheezing.    Cardiovascular: Negative for chest pain and palpitations.   Gastrointestinal: Negative for abdominal pain, constipation, diarrhea, nausea and vomiting.   Endocrine: Negative for polyphagia and polyuria.   Genitourinary: Negative for decreased urine volume, dysuria and flank pain.   Musculoskeletal: Negative for back pain, joint swelling and neck stiffness.   Skin: Negative for color change and rash.   Neurological: Positive for weakness. Negative for dizziness, light-headedness and headaches.   Psychiatric/Behavioral: Negative for confusion. The patient is not nervous/anxious.      Objective:     Vital Signs (Most Recent):  Temp: 97.5 °F (36.4 °C) (10/26/20 1239)  Pulse: 90 (10/26/20 1300)  Resp: 18 (10/26/20 1300)  BP: 119/63 (10/26/20 1239)  SpO2: 96 % (10/26/20 1300) Vital Signs (24h Range):  Temp:  [97.5 °F (36.4 °C)-98.4 °F (36.9 °C)] 97.5 °F (36.4 °C)  Pulse:  [68-94] 90  Resp:  [18-20] 18  SpO2:  [95 %-97 %] 96 %  BP: (108-120)/(60-73) 119/63     Weight: 87.2 kg (192 lb 3.9 oz)  Body mass index is 29.23 kg/m².    Intake/Output Summary (Last 24 hours) at 10/26/2020 1348  Last data filed at 10/26/2020 0600  Gross per 24 hour   Intake 810 ml   Output 1975 ml   Net -1165 ml      Physical Exam  Vitals signs and nursing note reviewed.   Constitutional:       General: He is not in acute  distress.     Appearance: Normal appearance. He is well-developed. He is not diaphoretic.   HENT:      Head: Normocephalic and atraumatic.      Right Ear: External ear normal.      Left Ear: External ear normal.      Nose: Nose normal.      Mouth/Throat:      Mouth: Mucous membranes are moist.      Pharynx: Oropharynx is clear.   Eyes:      Extraocular Movements: Extraocular movements intact.      Conjunctiva/sclera: Conjunctivae normal.   Neck:      Musculoskeletal: Normal range of motion and neck supple.   Cardiovascular:      Rate and Rhythm: Normal rate and regular rhythm.      Pulses: Normal pulses.      Heart sounds: Normal heart sounds.   Pulmonary:      Effort: Pulmonary effort is normal. No respiratory distress.      Breath sounds: No wheezing.      Comments: Breathing comfortably on baseline 5L nasal cannula.   Abdominal:      General: Bowel sounds are normal.      Palpations: Abdomen is soft.      Tenderness: There is no abdominal tenderness.   Musculoskeletal: Normal range of motion.   Skin:     General: Skin is warm and dry.      Capillary Refill: Capillary refill takes less than 2 seconds.      Findings: No erythema.   Neurological:      General: No focal deficit present.      Mental Status: He is alert and oriented to person, place, and time.   Psychiatric:         Mood and Affect: Mood normal.         Behavior: Behavior normal.         Significant Labs:   CBC:   Recent Labs   Lab 10/25/20  0523 10/26/20  0609   WBC 10.25 9.96   HGB 10.3* 10.9*   HCT 33.8* 35.6*    310     CMP:   Recent Labs   Lab 10/25/20  0523 10/26/20  0609    140   K 3.6 3.7    101   CO2 30* 32*   * 93   BUN 22 30*   CREATININE 1.3 1.4   CALCIUM 8.5* 8.9   PROT 6.8 7.2   ALBUMIN 3.0* 3.2*   BILITOT 0.3 0.4   ALKPHOS 94 94   AST 16 14   ALT 13 13   ANIONGAP 9 7*   EGFRNONAA 53* 48*     All pertinent labs within the past 24 hours have been reviewed.    Significant Imaging: I have reviewed all pertinent  imaging results/findings within the past 24 hours.      Assessment/Plan:      * COPD exacerbation  Increased oxygen requirement from baseline 5L at admission. History of COVID 19, negative this admission with CXR stable from previous. Currently at baseline oxygen requirements.   -Duoneb q6hr  -fluticasone formetorol qd  -Continue home spiriva, singulair  -Continue empiric rocephin/azithro  - Appreciate any further ID recs  - follow-up with final PT/OT recommendations regarding evaluation and treatment    Chronic bilateral pleural effusions  - Appreciate pulm/cc recs  - continue lasix 40mg IV BID  - UOP: 2.650L (10/25)  - Strict I's/O's    BPH (benign prostatic hyperplasia)  -Asymptomatic  -Continue home flomax      Type 2 diabetes mellitus  -A1c 6.8 (7/2020)  -Repeat A1c 5.8%  -LDSSI  -Glucose 140-180 inpt      History of Aspergillus & MAC  -Takes itraconazole at home  - Continue Voriconazole 200 BID the ID recommendation  - ID consulted and will appreciate any further recommendations.  - Unclear if MAC has been treated      Acute respiratory failure with hypoxia  - Plan as above.   - Goal O2 sat 88-92%  - Appreciate pulm recs    History of 2019 novel coronavirus disease (COVID-19)  Negative this admission. Low suspicion for re-infection given stable CXR and negative labs.         VTE Risk Mitigation (From admission, onward)         Ordered     enoxaparin injection 40 mg  Every 24 hours      10/22/20 2155     IP VTE HIGH RISK PATIENT  Once      10/22/20 2155     Place sequential compression device  Until discontinued      10/22/20 2155                Discharge Planning   ADAM:      Code Status: Full Code   Is the patient medically ready for discharge?:     Reason for patient still in hospital (select all that apply): PT / OT recommendations and Pending disposition  Discharge Plan A: Home, Home with family, Home Health(current with:  [facility] [place] -- Fax  [phone]  [phone])            Vinicio  Baltazar Méndez DO  Department of Hospital Medicine   [facility]

## 2020-10-26 NOTE — ASSESSMENT & PLAN NOTE
73 yo AAM w/ pmhx of COPD on 4-5LNC at home, BPH, osteoarthritis, hx of MAC and aspergillosis pulmonary infection, and hx of COVID19 (hospital admission 7/21/20 and recent d/c from rehab facility 10/16/20) on whom ID is consulted for MAC and pulmonary aspergillosis    -unclear if he has been taking the itraconazole since leaving rehab  -continue voriconazole while inpatient  -unclear if he ever received treatment for MAC  -I attempted to call his outpatient ID physician, left my number, but have not heard back  -continue ceftriaxone and azithro for 5 days total  -please start itraconazole 200mg daily on discharge  -would not start MAC therapy unless hx of any treatment can be confirmed  -needs follow up with his outpatient ID physician    -ID will sign off

## 2020-10-26 NOTE — PT/OT/SLP PROGRESS
Physical Therapy Treatment    Patient Name:  Rajan Deluna   MRN:  [id number]    Recommendations:     Discharge Recommendations:  (possible HH PT)   Discharge Equipment Recommendations: bath bench, rollator, hip kit   Barriers to discharge: decreased mobility,strength and endurance    Assessment:     Rajan Deluna is a 75 y.o. male admitted with a medical diagnosis of COPD exacerbation.  He presents with the following impairments/functional limitations:  weakness, impaired endurance, impaired functional mobilty, gait instability, impaired balance, decreased ROM, impaired coordination, impaired cardiopulmonary response to activity,pt with good participation and improving endurance,pt requires assistance with mobility and will benefit from continuing PT services upon discharge.    Rehab Prognosis: Good; patient would benefit from acute skilled PT services to address these deficits and reach maximum level of function.    Recent Surgery: * No surgery found *      Plan:     During this hospitalization, patient to be seen 6 x/week to address the identified rehab impairments via gait training, therapeutic activities, therapeutic exercises, neuromuscular re-education and progress toward the following goals:    · Plan of Care Expires:  11/24/20    Subjective     Chief Complaint: n/a  Patient/Family Comments/goals: pt states walking felt good.  Pain/Comfort:  · Pain Rating 1: 0/10      Objective:     Communicated with nsg prior to session.  Patient found up in chair with oxygen, pulse ox (continuous), telemetry(chair alarm) upon PT entry to room.     General Precautions: Standard, fall, hearing impaired, respiratory   Orthopedic Precautions:N/A   Braces: N/A     Functional Mobility:  · Transfers:     · Sit to Stand:  contact guard assistance with rolling walker  · Gait: amb ~45' X 2 seperate trials with CGA/Min A and O2/6L  · Balance: fair standing balance with RW      AM-PAC 6 CLICK MOBILITY  Turning over in bed (including  adjusting bedclothes, sheets and blankets)?: 3  Sitting down on and standing up from a chair with arms (e.g., wheelchair, bedside commode, etc.): 3  Moving from lying on back to sitting on the side of the bed?: 3  Moving to and from a bed to a chair (including a wheelchair)?: 3  Need to walk in hospital room?: 3  Climbing 3-5 steps with a railing?: 2  Basic Mobility Total Score: 17       Therapeutic Activities and Exercises: le supine ex's X 10-12 reps inc: ap,qs,hs,abd/add,slr,O2 donned at all times,pt's sats on cont pulse ox at rest was 90-92%.       Patient left up in chair with all lines intact, call button in reach, chair alarm on and nsg notified..    GOALS: see general POC  Multidisciplinary Problems     Physical Therapy Goals        Problem: Physical Therapy Goal    Goal Priority Disciplines Outcome Goal Variances Interventions   Physical Therapy Goal     PT, PT/OT Ongoing, Progressing     Description: Goals to be met by: 2020     Patient will increase functional independence with mobility by performin. Supine to sit with Modified [place]  2. Sit to supine with Modified [place]  3. Sit to stand transfer with Supervision  4. Gait  x  feet with Supervision using Rolling Walker.                      Time Tracking:     PT Received On: 10/26/20  PT Start Time: 1332     PT Stop Time: 1359  PT Total Time (min): 27 min     Billable Minutes: Gait Training 16 and Therapeutic Exercise 11    Treatment Type: Treatment  PT/PTA: PTA     PTA Visit Number: 1     Humble Browning, PTA  10/26/2020

## 2020-10-27 LAB
ALBUMIN SERPL BCP-MCNC: 3.3 G/DL (ref 3.5–5.2)
ALP SERPL-CCNC: 98 U/L (ref 55–135)
ALT SERPL W/O P-5'-P-CCNC: 13 U/L (ref 10–44)
ANION GAP SERPL CALC-SCNC: 7 MMOL/L (ref 8–16)
AST SERPL-CCNC: 15 U/L (ref 10–40)
BASOPHILS # BLD AUTO: 0.01 K/UL (ref 0–0.2)
BASOPHILS NFR BLD: 0.1 % (ref 0–1.9)
BILIRUB SERPL-MCNC: 0.5 MG/DL (ref 0.1–1)
BUN SERPL-MCNC: 32 MG/DL (ref 8–23)
CALCIUM SERPL-MCNC: 9.1 MG/DL (ref 8.7–10.5)
CHLORIDE SERPL-SCNC: 100 MMOL/L (ref 95–110)
CO2 SERPL-SCNC: 33 MMOL/L (ref 23–29)
CREAT SERPL-MCNC: 1.3 MG/DL (ref 0.5–1.4)
DIFFERENTIAL METHOD: ABNORMAL
EOSINOPHIL # BLD AUTO: 0 K/UL (ref 0–0.5)
EOSINOPHIL NFR BLD: 0.2 % (ref 0–8)
ERYTHROCYTE [DISTWIDTH] IN BLOOD BY AUTOMATED COUNT: 18.1 % (ref 11.5–14.5)
EST. GFR  (AFRICAN AMERICAN): >60 ML/MIN/1.73 M^2
EST. GFR  (NON AFRICAN AMERICAN): 53 ML/MIN/1.73 M^2
GLUCOSE SERPL-MCNC: 103 MG/DL (ref 70–110)
HCT VFR BLD AUTO: 36.7 % (ref 40–54)
HGB BLD-MCNC: 11.1 G/DL (ref 14–18)
IMM GRANULOCYTES # BLD AUTO: 0.03 K/UL (ref 0–0.04)
IMM GRANULOCYTES NFR BLD AUTO: 0.3 % (ref 0–0.5)
LYMPHOCYTES # BLD AUTO: 1.9 K/UL (ref 1–4.8)
LYMPHOCYTES NFR BLD: 18 % (ref 18–48)
MAGNESIUM SERPL-MCNC: 2.2 MG/DL (ref 1.6–2.6)
MCH RBC QN AUTO: 24.8 PG (ref 27–31)
MCHC RBC AUTO-ENTMCNC: 30.2 G/DL (ref 32–36)
MCV RBC AUTO: 82 FL (ref 82–98)
MONOCYTES # BLD AUTO: 0.9 K/UL (ref 0.3–1)
MONOCYTES NFR BLD: 7.9 % (ref 4–15)
NEUTROPHILS # BLD AUTO: 7.9 K/UL (ref 1.8–7.7)
NEUTROPHILS NFR BLD: 73.5 % (ref 38–73)
NRBC BLD-RTO: 0 /100 WBC
PHOSPHATE SERPL-MCNC: 3.3 MG/DL (ref 2.7–4.5)
PLATELET # BLD AUTO: 337 K/UL (ref 150–350)
PMV BLD AUTO: 9.3 FL (ref 9.2–12.9)
POCT GLUCOSE: 110 MG/DL (ref 70–110)
POCT GLUCOSE: 177 MG/DL (ref 70–110)
POCT GLUCOSE: 180 MG/DL (ref 70–110)
POCT GLUCOSE: 184 MG/DL (ref 70–110)
POTASSIUM SERPL-SCNC: 3.9 MMOL/L (ref 3.5–5.1)
PROT SERPL-MCNC: 7.3 G/DL (ref 6–8.4)
RBC # BLD AUTO: 4.47 M/UL (ref 4.6–6.2)
SODIUM SERPL-SCNC: 140 MMOL/L (ref 136–145)
WBC # BLD AUTO: 10.76 K/UL (ref 3.9–12.7)

## 2020-10-27 PROCEDURE — 63600175 PHARM REV CODE 636 W HCPCS: Performed by: STUDENT IN AN ORGANIZED HEALTH CARE EDUCATION/TRAINING PROGRAM

## 2020-10-27 PROCEDURE — 97530 THERAPEUTIC ACTIVITIES: CPT | Mod: CO

## 2020-10-27 PROCEDURE — 36415 COLL VENOUS BLD VENIPUNCTURE: CPT

## 2020-10-27 PROCEDURE — 84100 ASSAY OF PHOSPHORUS: CPT

## 2020-10-27 PROCEDURE — 85025 COMPLETE CBC W/AUTO DIFF WBC: CPT

## 2020-10-27 PROCEDURE — 94660 CPAP INITIATION&MGMT: CPT

## 2020-10-27 PROCEDURE — 25000003 PHARM REV CODE 250: Performed by: STUDENT IN AN ORGANIZED HEALTH CARE EDUCATION/TRAINING PROGRAM

## 2020-10-27 PROCEDURE — 94640 AIRWAY INHALATION TREATMENT: CPT

## 2020-10-27 PROCEDURE — 83735 ASSAY OF MAGNESIUM: CPT

## 2020-10-27 PROCEDURE — 97110 THERAPEUTIC EXERCISES: CPT | Mod: CQ

## 2020-10-27 PROCEDURE — 80053 COMPREHEN METABOLIC PANEL: CPT

## 2020-10-27 PROCEDURE — 97116 GAIT TRAINING THERAPY: CPT | Mod: CQ

## 2020-10-27 PROCEDURE — 94761 N-INVAS EAR/PLS OXIMETRY MLT: CPT

## 2020-10-27 PROCEDURE — 99900035 HC TECH TIME PER 15 MIN (STAT)

## 2020-10-27 PROCEDURE — 27000221 HC OXYGEN, UP TO 24 HOURS

## 2020-10-27 PROCEDURE — 25000242 PHARM REV CODE 250 ALT 637 W/ HCPCS: Performed by: STUDENT IN AN ORGANIZED HEALTH CARE EDUCATION/TRAINING PROGRAM

## 2020-10-27 PROCEDURE — 11000001 HC ACUTE MED/SURG PRIVATE ROOM

## 2020-10-27 RX ORDER — FUROSEMIDE 40 MG/1
40 TABLET ORAL DAILY
Status: DISCONTINUED | OUTPATIENT
Start: 2020-10-27 | End: 2020-10-29 | Stop reason: HOSPADM

## 2020-10-27 RX ORDER — AZITHROMYCIN 250 MG/1
500 TABLET, FILM COATED ORAL DAILY
Status: DISCONTINUED | OUTPATIENT
Start: 2020-10-27 | End: 2020-10-27

## 2020-10-27 RX ORDER — FLUTICASONE PROPIONATE 50 MCG
2 SPRAY, SUSPENSION (ML) NASAL DAILY
Status: DISCONTINUED | OUTPATIENT
Start: 2020-10-27 | End: 2020-10-29 | Stop reason: HOSPADM

## 2020-10-27 RX ORDER — FUROSEMIDE 40 MG/1
40 TABLET ORAL DAILY
Status: DISCONTINUED | OUTPATIENT
Start: 2020-10-28 | End: 2020-10-27

## 2020-10-27 RX ADMIN — FUROSEMIDE 40 MG: 10 INJECTION, SOLUTION INTRAVENOUS at 01:10

## 2020-10-27 RX ADMIN — TAMSULOSIN HYDROCHLORIDE 0.4 MG: 0.4 CAPSULE ORAL at 08:10

## 2020-10-27 RX ADMIN — LACTOBACILLUS ACIDOPHILUS / LACTOBACILLUS BULGARICUS 1 EACH: 100 MILLION CFU STRENGTH GRANULES at 05:10

## 2020-10-27 RX ADMIN — PREGABALIN 150 MG: 75 CAPSULE ORAL at 08:10

## 2020-10-27 RX ADMIN — LACTOBACILLUS ACIDOPHILUS / LACTOBACILLUS BULGARICUS 1 EACH: 100 MILLION CFU STRENGTH GRANULES at 08:10

## 2020-10-27 RX ADMIN — THERA TABS 1 TABLET: TAB at 08:10

## 2020-10-27 RX ADMIN — PREDNISONE 40 MG: 20 TABLET ORAL at 08:10

## 2020-10-27 RX ADMIN — VORICONAZOLE 200 MG: 200 TABLET, FILM COATED ORAL at 08:10

## 2020-10-27 RX ADMIN — MONTELUKAST 10 MG: 10 TABLET, FILM COATED ORAL at 08:10

## 2020-10-27 RX ADMIN — IPRATROPIUM BROMIDE AND ALBUTEROL SULFATE 3 ML: .5; 3 SOLUTION RESPIRATORY (INHALATION) at 07:10

## 2020-10-27 RX ADMIN — IPRATROPIUM BROMIDE AND ALBUTEROL SULFATE 3 ML: .5; 3 SOLUTION RESPIRATORY (INHALATION) at 11:10

## 2020-10-27 RX ADMIN — TIOTROPIUM BROMIDE 18 MCG: 18 CAPSULE ORAL; RESPIRATORY (INHALATION) at 07:10

## 2020-10-27 RX ADMIN — LIDOCAINE 2 PATCH: 50 PATCH TOPICAL at 10:10

## 2020-10-27 RX ADMIN — FLUTICASONE PROPIONATE 100 MCG: 50 SPRAY, METERED NASAL at 10:10

## 2020-10-27 RX ADMIN — FLUTICASONE FUROATE AND VILANTEROL TRIFENATATE 1 PUFF: 100; 25 POWDER RESPIRATORY (INHALATION) at 07:10

## 2020-10-27 RX ADMIN — CELECOXIB 200 MG: 100 CAPSULE ORAL at 08:10

## 2020-10-27 RX ADMIN — ENOXAPARIN SODIUM 40 MG: 40 INJECTION SUBCUTANEOUS at 04:10

## 2020-10-27 RX ADMIN — FUROSEMIDE 40 MG: 40 TABLET ORAL at 11:10

## 2020-10-27 NOTE — PLAN OF CARE
"Pt AAOx4. On cont. Pulse ox sats above 95% on 5LNC. Antibiotics given as ordered. IV site CDI, flushed and saline locked. PT remains on telemetry. BG monitored. No complaints of pain overnight. Education about tobacco use provided, pt states "I will never smoke again". Call light in reach, remains free from falls, bed alarm in use. Safety maintained. Will continue to monitor.   "

## 2020-10-27 NOTE — PROGRESS NOTES
"[facility] Hospital Medicine  Progress Note    Patient Name: Rajan Deluna  MRN: [id number]  Patient Class: IP- Inpatient   Admission Date: 10/22/2020  Length of Stay: 5 days  Attending Physician: Rell Tolentino III, MD  Primary Care Provider: Jason Mccord MD        Subjective:     Principal Problem:COPD exacerbation        HPI:  Pt is a 73 yo AAM w/ pmhx of COPD on 4-5LNC at home, BPH, osteoarthritis, hx of MAC and aspergillosis pulmonary infection, hx of COVID19 (hospital admission 7/21/20 and recent d/c from rehab facility 10/16/20) presenting for SOB/anxiety. Pt states today while he was ambulating at home, he became anxious because his home O2 tubing was not long enough to reach his kitchen/living room from his bedroom. He states he was trying to move his O2 tank when the tubing became tangled which made him anxious because he had to untangle the tubing. He states he also had grandchildren at home to take care of which added to his anxiety. Pt states he became SOB after his anxiety started. He never was off of his home O2. He has been compliant with his home medications, which he received on discharge from the rehab facility. He state he productive yellow sputum intermittently, but this is not increased from baseline, he has been having productive cough "since I started seeing a lung doctor." Pt denies f/c/s/n/v, chest pain, palpitations, wheezing, increased cough/sputum production, abdominal pain, constipation, diarrhea, dysuria, lower extremity edema.     Per ED record, when EMS was called, his SpO2 was 50% on 5L, he was placed on non rebreather and SpO2 lorrie to 94-97%. ED tried to put on nasal canula at 5L but SpO2 decreased to mid 80s to was placed back on non rebreather. Otherwise vitals were stable. CBC unremarkable. CMP showed MARQUIS Cr 1.47, CXR showed b/l bibasilar interstitial infiltrate which could represent infection/inflammatory process vs edema. Pt was transferred here w/ no " treatment in ED (per patient).    Interval History: Pt is doing well this morning. Breathing comfortably on 3L nasal cannula, less than his baseline 5L. Continues to be insistent about discharging to a rehab facility and not going home. PT/OT continues to recommend home with home health.     Review of Systems   Constitutional: Negative for chills, diaphoresis, fatigue and fever.   HENT: Negative for congestion, rhinorrhea, sore throat and trouble swallowing.    Eyes: Negative for itching and visual disturbance.   Respiratory: Negative for cough, chest tightness, shortness of breath and wheezing.    Cardiovascular: Negative for chest pain and palpitations.   Gastrointestinal: Negative for abdominal pain, constipation, diarrhea, nausea and vomiting.   Endocrine: Negative for polyphagia and polyuria.   Genitourinary: Negative for decreased urine volume, dysuria and flank pain.   Musculoskeletal: Negative for back pain, joint swelling and neck stiffness.   Skin: Negative for color change and rash.   Neurological: Positive for weakness. Negative for dizziness, light-headedness and headaches.   Psychiatric/Behavioral: Negative for confusion. The patient is not nervous/anxious.      Objective:     Vital Signs (Most Recent):  Temp: 98 °F (36.7 °C) (10/27/20 0841)  Pulse: 88 (10/27/20 0841)  Resp: 18 (10/27/20 0841)  BP: 135/72 (10/27/20 0841)  SpO2: (!) 90 % (10/27/20 0841) Vital Signs (24h Range):  Temp:  [97.5 °F (36.4 °C)-98.5 °F (36.9 °C)] 98 °F (36.7 °C)  Pulse:  [] 88  Resp:  [17-20] 18  SpO2:  [90 %-99 %] 90 %  BP: (110-135)/(59-72) 135/72     Weight: 87.2 kg (192 lb 3.9 oz)  Body mass index is 29.23 kg/m².    Intake/Output Summary (Last 24 hours) at 10/27/2020 0801  Last data filed at 10/27/2020 0508  Gross per 24 hour   Intake 100 ml   Output 400 ml   Net -300 ml      Physical Exam  Vitals signs and nursing note reviewed.   Constitutional:       General: He is not in acute distress.     Appearance: Normal  appearance. He is well-developed. He is not diaphoretic.   HENT:      Head: Normocephalic and atraumatic.      Right Ear: External ear normal.      Left Ear: External ear normal.      Nose: Nose normal.      Mouth/Throat:      Mouth: Mucous membranes are moist.      Pharynx: Oropharynx is clear.   Eyes:      Extraocular Movements: Extraocular movements intact.      Conjunctiva/sclera: Conjunctivae normal.   Neck:      Musculoskeletal: Normal range of motion and neck supple.   Cardiovascular:      Rate and Rhythm: Normal rate and regular rhythm.      Pulses: Normal pulses.      Heart sounds: Normal heart sounds.   Pulmonary:      Effort: Pulmonary effort is normal. No respiratory distress.      Breath sounds: No wheezing.      Comments: Breathing comfortably on baseline 3L nasal cannula.   Abdominal:      General: Bowel sounds are normal.      Palpations: Abdomen is soft.      Tenderness: There is no abdominal tenderness.   Musculoskeletal: Normal range of motion.   Skin:     General: Skin is warm and dry.      Capillary Refill: Capillary refill takes less than 2 seconds.      Findings: No erythema.   Neurological:      General: No focal deficit present.      Mental Status: He is alert and oriented to person, place, and time.   Psychiatric:         Mood and Affect: Mood normal.         Behavior: Behavior normal.         Significant Labs:   CBC:   Recent Labs   Lab 10/26/20  0609 10/27/20  0623   WBC 9.96 10.76   HGB 10.9* 11.1*   HCT 35.6* 36.7*    337     CMP:   Recent Labs   Lab 10/26/20  0609 10/27/20  0623    140   K 3.7 3.9    100   CO2 32* 33*   GLU 93 103   BUN 30* 32*   CREATININE 1.4 1.3   CALCIUM 8.9 9.1   PROT 7.2 7.3   ALBUMIN 3.2* 3.3*   BILITOT 0.4 0.5   ALKPHOS 94 98   AST 14 15   ALT 13 13   ANIONGAP 7* 7*   EGFRNONAA 48* 53*     All pertinent labs within the past 24 hours have been reviewed.    Significant Imaging: I have reviewed all pertinent imaging results/findings within the  past 24 hours.      Assessment/Plan:      * COPD exacerbation  Increased oxygen requirement from baseline 5L at admission. History of COVID 19, negative this admission with CXR stable from previous. Currently below baseline oxygen requirements.   -Duoneb q6hr  -fluticasone formetorol qd  -Continue home spiriva, singulair  -Continue empiric rocephin/azithro  - follow-up with final PT/OT recommendations regarding evaluation and treatment    Chronic bilateral pleural effusions  - discontinue lasix 40mg IV BID  - Start oral lasix 40mg daily tomorrow  - Strict I's/O's    BPH (benign prostatic hyperplasia)  -Asymptomatic  -Continue home flomax      Type 2 diabetes mellitus  -A1c 6.8 (7/2020)  -Repeat A1c 5.8%  -LDSSI  -Glucose 140-180 inpt      History of Aspergillus & MAC  -Takes itraconazole at home, plan to re-start upon discharge  - Continue Voriconazole 200 BID while inpatient per ID   - Will need ID follow-up outpatient once discharged      Acute respiratory failure with hypoxia  - Plan as above.   - Goal O2 sat 88-92%      History of 2019 novel coronavirus disease (COVID-19)  Negative this admission. Low suspicion for re-infection given stable CXR and negative labs.         VTE Risk Mitigation (From admission, onward)         Ordered     enoxaparin injection 40 mg  Every 24 hours      10/22/20 2155     IP VTE HIGH RISK PATIENT  Once      10/22/20 2155     Place sequential compression device  Until discontinued      10/22/20 2155                Discharge Planning   ADAM:      Code Status: Full Code   Is the patient medically ready for discharge?:     Reason for patient still in hospital (select all that apply): Pending disposition  Discharge Plan A: Home, Home with family, Home Health(current with:  [facility] [place] -- Fax  [phone]  [phone])            Vinicio Méndez DO  Department of Hospital Medicine   [facility]

## 2020-10-27 NOTE — NURSING
Patient's O2 dropped to low 80s with transferring to the chair, O2 increased to 4LPM from 3. SpO2 now reading 88 and above, patient is without distress. RT notified.    1300  Patient up with PT, pulse jumped to 128 per Telemetry Monitor Tech, patient is without distress.

## 2020-10-27 NOTE — PROGRESS NOTES
Consult Note  [facility] Pulmonary & Critical Care Medicine    Attending: You Carmona MD  Fellow: Blaire Camarillo MD  Admit Date: 10/22/2020  Today's Date: 10/27/2020  Reason for Consult:  Chronic Hypoxic Respiratory Failure     SUBJECTIVE:     HPI:  Mr. Deluna is a 73yo with PMHx outlined below that is known to the [facility] Pulmonary service. Of note, Mr. Deulna was admitted to [facility] with COVID19+ PNA and discharged to [facility] for chronic hypoxic respiratory failure requiring HFNC. During his admission at the [facility] Pulmonary was consulted for increasing oxygen requirements with comfort flow despite diuresis. Review of his chart demonstrates that he has COPD with likely underlying pulmonary fibrosis from known occupational exposure to asbestos. Furthermore, Mr. 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 Overall at the [facility] Mr. Deluna was started on a steroid taper for his ongoing hypoxia with another trial of diuretics to assist with his recovery. Patient was last seen by Dr. Camarillo 10/16/2020 on 5L NC with sats >90% and no increased work of breathing.     Interval History:  Patient continues to feel improvement in breathing. Comfortable on 3L oxygen. No fevers, cough, light headedness. States he wants to go to a rehab facility.    Review of patient's allergies indicates:  No Known Allergies    Past Medical History:   Diagnosis Date    Arthritis     COPD (chronic obstructive pulmonary disease)     Encounter for blood transfusion     Hypertension     Smoker     Weakness      Past Surgical History:   Procedure Laterality Date    HERNIA REPAIR      INTRALUMINAL GASTROINTESTINAL TRACT IMAGING VIA CAPSULE N/A 8/3/2020    Procedure: IMAGING PROCEDURE, GI TRACT, INTRALUMINAL, VIA CAPSULE;  Surgeon: Rey Olivares MD;  Location: [facility];  Service: Endoscopy;  Laterality: N/A;    right knee surgery       History reviewed. No pertinent family history.  Social History     Tobacco Use     Smoking status: Former Smoker    Smokeless tobacco: Never Used   Substance Use Topics    Alcohol use: Not Currently    Drug use: Never       All medications reviewed.    OBJECTIVE:     Vital Signs Trends/Hx Reviewed  Vitals:    10/27/20 0720 10/27/20 0726 10/27/20 0733 10/27/20 0841   BP:    135/72   BP Location:    Left arm   Patient Position:    Lying   Pulse: 79 78 77 88   Resp: 17 20  18   Temp:    98 °F (36.7 °C)   TempSrc:    Oral   SpO2: (!) 91%   (!) 90%   Weight:       Height:             Physical Exam:  General: NAD, cooperative & interactive.  HEENT: AT/NC, PERRL, EOMI, oral and nasal mucosa moist.   Neck: Supple without JVD or palpable LAD.   Cardiac: normal rate, regular rhythm, with no MRG with brisk cap refill and symmetric pulses in distal extremities.  Respiratory: Prolonged expiration with mild bibasilar crackles. No dullness to percussion. No use of accessory muscles with breathing.   Abdomen: Soft, NT/ND. +BS. No hepatosplenomegaly.   Extremities: Trace pitting edema   Neuro: Grossly intact to brief exam. Oriented x3 with appropriate mood/affect to situation.       Laboratory:  No results for input(s): PH, PCO2, PO2, HCO3, POCSATURATED, BE in the last 24 hours.  Recent Labs   Lab 10/27/20  0623   WBC 10.76   RBC 4.47*   HGB 11.1*   HCT 36.7*      MCV 82   MCH 24.8*   MCHC 30.2*     Recent Labs   Lab 10/27/20  0623      K 3.9      CO2 33*   BUN 32*   CREATININE 1.3   MG 2.2       Microbiology Data:   Microbiology Results (last 7 days)     Procedure Component Value Units Date/Time    Blood culture (site 1) [[id number]] Collected: 10/22/20 2222    Order Status: Completed Specimen: Blood from Peripheral, Left Wrist Updated: 10/27/20 0612     Blood Culture, Routine No Growth to date      No Growth to date      No Growth to date      No Growth to date      No Growth to date    Narrative:      Site # 1, aerobic and anaerobic    Blood culture (site 2) [[id number]] Collected:  10/22/20 2212    Order Status: Completed Specimen: Blood from Peripheral, Right Hand Updated: 10/27/20 0612     Blood Culture, Routine No Growth to date      No Growth to date      No Growth to date      No Growth to date      No Growth to date    Narrative:      Site # 2, aerobic only    Culture, Respiratory [[id number]]     Order Status: Canceled Specimen: Respiratory from Sputum, Expectorated            Chest Imaging:   CXR 10/22  There are bilateral interstitial opacities, similar in appearance to prior, most significant within the left lung base.  The pleural spaces are clear.  The cardiac silhouette is unremarkable.  There are calcifications of the aortic arch.  The visualized osseous structures demonstrate degenerative changes.  There are remote left-sided rib fracture deformities.     Impression:     Stable chronic lung findings.  No acute cardiopulmonary abnormality.    Infusions:        Scheduled Medications:    albuterol-ipratropium  3 mL Nebulization Q6H WAKE    azithromycin  500 mg Oral Daily    cefTRIAXone (ROCEPHIN) IVPB  1 g Intravenous Q24H    celecoxib  200 mg Oral Daily    enoxaparin  40 mg Subcutaneous Q24H    fluticasone furoate-vilanteroL  1 puff Inhalation Daily    [START ON 10/28/2020] furosemide  40 mg Oral Daily    lactobacillus acidophilus & bulgar  1 packet Oral BID WM    lidocaine  2 patch Transdermal Q24H    montelukast  10 mg Oral QHS    multivitamin  1 tablet Oral Daily    mupirocin   Nasal BID    pregabalin  150 mg Oral BID    tamsulosin  0.4 mg Oral Daily    tiotropium  1 capsule Inhalation Daily    voriconazole  200 mg Oral BID       PRN Medications:   acetaminophen, dextrose 50%, dextrose 50%, glucagon (human recombinant), glucose, glucose, ibuprofen, influenza, insulin aspart U-100, melatonin, pneumoc 13-loy conj-dip cr(PF), sodium chloride 0.9%, traMADoL    Assessment & Plan:   Patient Active Problem List   Diagnosis    Chest pain    Anemia    RBBB    JACOME  (dyspnea on exertion)    COPD (chronic obstructive pulmonary disease)    Diastolic dysfunction without heart failure    History of 2019 novel coronavirus disease (COVID-19)    Acute respiratory failure with hypoxia    Acute respiratory disease due to COVID-19 virus    History of Aspergillus & MAC    Left shoulder pain    Counseling regarding advance care planning and goals of care    Goals of care, counseling/discussion    Palliative care encounter    Impaired functional mobility and endurance    Post viral debility    Rotator cuff tear    Osteoarthritis of left glenohumeral joint    Type 2 diabetes mellitus    Weakness generalized    COPD exacerbation    BPH (benign prostatic hyperplasia)    Chronic bilateral pleural effusions       ASSESSMENT & RECOMMENDATIONS     Mr. Deluna is a 75 yo male with the above history with increasing oxygen requirements over the past 3 days. CT on 9/11/20 with questionable left lower linear segmental defect that could suggest chronic thromboembolism. This would not explain the patient's worsening oxygen requirement and shortness of breath. CTA thorax noted enlarged pulmonary artery,. With the patient's history of MAC and COPD and evidence of architectural distortion on his lung, pulmonary hypertension is also a possible contributing factor.      #Hypoxic Respiratory Failure   · Likely ongoing hypoxic respiratory failure 2/2 emphysema and was likely exacerbated by COVID19. Most recent CT with diffuse fibrosis and bilateral pleural effusions which are also demonstrated in most recent CXR.  · Continue supplemental oxygen for goal SpO2 88-92% or PaO2 55-80mmHg   · Continue scheduled bronchodilators, with ICS/LAMA/LABA     · Agree with transition to PO lasix.  · Patient started on Rocephin and Azithro 500mg as well as steroids on Friday. If treating for 5 day course, would decrease azithro to 250mg. Do not believe much benefit to antibiotic treatment.  · Appears to be  clinically improving and at baseline.        Alvin Christie MD  [facility]    Pt seen and examined with Pulmonary/Critical Care team and this note reviewed and validated with the following additional comments:    Doubt infection. Should be OK to stop abx.  Will discuss with FM.    Deion Carmona MD  Phone [phone]

## 2020-10-27 NOTE — ASSESSMENT & PLAN NOTE
Increased oxygen requirement from baseline 5L at admission. History of COVID 19, negative this admission with CXR stable from previous. Currently below baseline oxygen requirements.   -Duoneb q6hr  -fluticasone formetorol qd  -Continue home spiriva, singulair  -Continue empiric rocephin/azithro  - follow-up with final PT/OT recommendations regarding evaluation and treatment

## 2020-10-27 NOTE — ASSESSMENT & PLAN NOTE
-Takes itraconazole at home, plan to re-start upon discharge  - Continue Voriconazole 200 BID while inpatient per ID   - Will need ID follow-up outpatient once discharged

## 2020-10-27 NOTE — PT/OT/SLP PROGRESS
Occupational Therapy   Treatment    Name: Rajan Deluna  MRN: [id number]  Admitting Diagnosis:  COPD exacerbation       Recommendations:     Discharge Recommendations: other (see comments)(TBD)  Discharge Equipment Recommendations:  bath bench, rollator  Barriers to discharge:  None    Assessment:     Rajan Deluna is a 75 y.o. male with a medical diagnosis of COPD exacerbation.  Performance deficits affecting function are weakness, impaired endurance, impaired self care skills, impaired functional mobilty, gait instability, impaired balance, decreased upper extremity function, decreased lower extremity function, impaired cardiopulmonary response to activity. Pt found in bed, finishing up with RT tx, and agreeable to therapy.  Pt on 3L and O2 sats 91% at rest. Once pt transitioned to chair, he desatted to 79%.  Increased O2 to 4L and O2 sats began to slowly increase with rest and PLB.  Notified RN and RT.  O2 sat at 87%.  Pt left up in chair eating lunch-RN okayed.  Continue OT services to address functional goals, progressing as able.      Rehab Prognosis:  Good; patient would benefit from acute skilled OT services to address these deficits and reach maximum level of function.       Plan:     Patient to be seen 5 x/week to address the above listed problems via self-care/home management, therapeutic activities, therapeutic exercises  · Plan of Care Expires: 11/23/20  · Plan of Care Reviewed with: patient    Subjective     Pain/Comfort:  · Pain Rating 1: 0/10  · Pain Rating Post-Intervention 1: 0/10    Objective:     Communicated with: RN prior to session.  Patient found HOB elevated with oxygen, pulse ox (continuous), peripheral IV, telemetry upon OT entry to room.    General Precautions: Standard, fall, hearing impaired   Orthopedic Precautions:N/A   Braces: N/A     Occupational Performance:     Bed Mobility:    · Patient completed Rolling/Turning to Right with supervision  · Patient completed Scooting/Bridging with  supervision  · Patient completed Supine to Sit with supervision     Functional Mobility/Transfers:  · Patient completed Sit <> Stand Transfer with stand by assistance  with  no assistive device   · Patient completed Bed <> Chair Transfer using Stand Pivot technique with stand by assistance and contact guard assistance with rolling walker  · Functional Mobility: Pt took 3 steps to chair with CGA/SBA without AD.     Activities of Daily Living:  · Feeding:  modified independence chair level      AMPA 6 Click ADL: 15    Treatment & Education:  Instructed on PLB.   Tx shortened secondary to lunch delivered and pt eager to eat.      Patient left up in chair with all lines intact, call button in reach, chair alarm on, RN notified and setup with lunch trayEducation:      GOALS:   Multidisciplinary Problems     Occupational Therapy Goals        Problem: Occupational Therapy Goal    Goal Priority Disciplines Outcome Interventions   Occupational Therapy Goal     OT, PT/OT Ongoing, Progressing    Description: Goals to be met by: 11/23     Patient will increase functional independence with ADLs by performing:    UE Dressing with Minimal Assistance.  LE Dressing with Stand-by Assistance and Assistive Devices as needed.--MET 10/26  Grooming while standing at sink with Stand-by Assistance.  Toileting from toilet with Supervision for hygiene and clothing management.   Toilet transfer to toilet with Supervision.  Increased functional strength to WFL for ADLs.                     Time Tracking:     OT Date of Treatment: 10/27/20  OT Start Time: 1150  OT Stop Time: 1203  OT Total Time (min): 13 min    Billable Minutes:Therapeutic Activity 13    TED Reyna  10/27/2020

## 2020-10-27 NOTE — PT/OT/SLP PROGRESS
Physical Therapy Treatment    Patient Name:  Rajan Deluna   MRN:  [id number]    Recommendations:     Discharge Recommendations:  (TBD)   Discharge Equipment Recommendations: bath bench, rollator, hip kit   Barriers to discharge: decreased mobility,strength and endurance    Assessment:     Rajan Deluna is a 75 y.o. male admitted with a medical diagnosis of COPD exacerbation.  He presents with the following impairments/functional limitations:  weakness, impaired endurance, impaired functional mobilty, gait instability, impaired balance, decreased ROM, impaired coordination, impaired cardiopulmonary response to activity,pt with good participation and requires assistance with all mobility,pt will benefit from continuing PT services upon discharge.    Rehab Prognosis: Good; patient would benefit from acute skilled PT services to address these deficits and reach maximum level of function.    Recent Surgery: * No surgery found *      Plan:     During this hospitalization, patient to be seen 6 x/week to address the identified rehab impairments via gait training, therapeutic activities, therapeutic exercises, neuromuscular re-education and progress toward the following goals:    · Plan of Care Expires:  11/24/20    Subjective     Chief Complaint: n/a  Patient/Family Comments/goals: pt is concerned with his breathing.  Pain/Comfort:  · Pain Rating 1: 0/10      Objective:     Communicated with nsg prior to session.  Patient found up in chair with oxygen, pulse ox (continuous), telemetry(chair alarm) upon PT entry to room.     General Precautions: Standard, fall, hearing impaired   Orthopedic Precautions:N/A   Braces: N/A     Functional Mobility:  · Bed Mobility:     · Bridging: n/a  · Transfers:     · Sit to Stand:  contact guard assistance with rolling walker  · Gait: amb ~58' X 2 seperate trials with RW and SBA with O2 donned  · Balance: fair standing balance with RW      AM-PAC 6 CLICK MOBILITY  Turning over in bed  (including adjusting bedclothes, sheets and blankets)?: 3  Sitting down on and standing up from a chair with arms (e.g., wheelchair, bedside commode, etc.): 3  Moving from lying on back to sitting on the side of the bed?: 3  Moving to and from a bed to a chair (including a wheelchair)?: 3  Need to walk in hospital room?: 3  Climbing 3-5 steps with a railing?: 2  Basic Mobility Total Score: 17       Therapeutic Activities and Exercises: le supine ex's x 10-12 reps inc: ap,qs,hs,abd/add,slr.       Patient left up in chair with all lines intact, call button in reach and chair alarm on..    GOALS: see general POC  Multidisciplinary Problems     Physical Therapy Goals        Problem: Physical Therapy Goal    Goal Priority Disciplines Outcome Goal Variances Interventions   Physical Therapy Goal     PT, PT/OT Ongoing, Progressing     Description: Goals to be met by: 2020     Patient will increase functional independence with mobility by performin. Supine to sit with Modified [place]  2. Sit to supine with Modified [place]  3. Sit to stand transfer with Supervision  4. Gait  x  feet with Supervision using Rolling Walker.                      Time Tracking:     PT Received On: 10/27/20  PT Start Time: 1252     PT Stop Time: 1319  PT Total Time (min): 27 min     Billable Minutes: Gait Training 17 and Therapeutic Exercise 10    Treatment Type: Treatment  PT/PTA: PTA     PTA Visit Number: 2     Humble Browning, PTA  10/27/2020

## 2020-10-27 NOTE — PLAN OF CARE
Problem: Physical Therapy Goal  Goal: Physical Therapy Goal  Description: Goals to be met by: 2020     Patient will increase functional independence with mobility by performin. Supine to sit with Modified [place]  2. Sit to supine with Modified [place]  3. Sit to stand transfer with Supervision  4. Gait  x  feet with Supervision using Rolling Walker.     Outcome: Ongoing, Progressing

## 2020-10-27 NOTE — SUBJECTIVE & OBJECTIVE
Interval History: Pt is doing well this morning. Breathing comfortably on 3L nasal cannula, less than his baseline 5L. Continues to be insistent about discharging to a rehab facility and not going home. PT/OT continues to recommend home with home health.     Review of Systems   Constitutional: Negative for chills, diaphoresis, fatigue and fever.   HENT: Negative for congestion, rhinorrhea, sore throat and trouble swallowing.    Eyes: Negative for itching and visual disturbance.   Respiratory: Negative for cough, chest tightness, shortness of breath and wheezing.    Cardiovascular: Negative for chest pain and palpitations.   Gastrointestinal: Negative for abdominal pain, constipation, diarrhea, nausea and vomiting.   Endocrine: Negative for polyphagia and polyuria.   Genitourinary: Negative for decreased urine volume, dysuria and flank pain.   Musculoskeletal: Negative for back pain, joint swelling and neck stiffness.   Skin: Negative for color change and rash.   Neurological: Positive for weakness. Negative for dizziness, light-headedness and headaches.   Psychiatric/Behavioral: Negative for confusion. The patient is not nervous/anxious.      Objective:     Vital Signs (Most Recent):  Temp: 98 °F (36.7 °C) (10/27/20 0841)  Pulse: 88 (10/27/20 0841)  Resp: 18 (10/27/20 0841)  BP: 135/72 (10/27/20 0841)  SpO2: (!) 90 % (10/27/20 0841) Vital Signs (24h Range):  Temp:  [97.5 °F (36.4 °C)-98.5 °F (36.9 °C)] 98 °F (36.7 °C)  Pulse:  [] 88  Resp:  [17-20] 18  SpO2:  [90 %-99 %] 90 %  BP: (110-135)/(59-72) 135/72     Weight: 87.2 kg (192 lb 3.9 oz)  Body mass index is 29.23 kg/m².    Intake/Output Summary (Last 24 hours) at 10/27/2020 0857  Last data filed at 10/27/2020 0505  Gross per 24 hour   Intake 100 ml   Output 400 ml   Net -300 ml      Physical Exam  Vitals signs and nursing note reviewed.   Constitutional:       General: He is not in acute distress.     Appearance: Normal appearance. He is well-developed. He  is not diaphoretic.   HENT:      Head: Normocephalic and atraumatic.      Right Ear: External ear normal.      Left Ear: External ear normal.      Nose: Nose normal.      Mouth/Throat:      Mouth: Mucous membranes are moist.      Pharynx: Oropharynx is clear.   Eyes:      Extraocular Movements: Extraocular movements intact.      Conjunctiva/sclera: Conjunctivae normal.   Neck:      Musculoskeletal: Normal range of motion and neck supple.   Cardiovascular:      Rate and Rhythm: Normal rate and regular rhythm.      Pulses: Normal pulses.      Heart sounds: Normal heart sounds.   Pulmonary:      Effort: Pulmonary effort is normal. No respiratory distress.      Breath sounds: No wheezing.      Comments: Breathing comfortably on baseline 3L nasal cannula.   Abdominal:      General: Bowel sounds are normal.      Palpations: Abdomen is soft.      Tenderness: There is no abdominal tenderness.   Musculoskeletal: Normal range of motion.   Skin:     General: Skin is warm and dry.      Capillary Refill: Capillary refill takes less than 2 seconds.      Findings: No erythema.   Neurological:      General: No focal deficit present.      Mental Status: He is alert and oriented to person, place, and time.   Psychiatric:         Mood and Affect: Mood normal.         Behavior: Behavior normal.         Significant Labs:   CBC:   Recent Labs   Lab 10/26/20  0609 10/27/20  0623   WBC 9.96 10.76   HGB 10.9* 11.1*   HCT 35.6* 36.7*    337     CMP:   Recent Labs   Lab 10/26/20  0609 10/27/20  0623    140   K 3.7 3.9    100   CO2 32* 33*   GLU 93 103   BUN 30* 32*   CREATININE 1.4 1.3   CALCIUM 8.9 9.1   PROT 7.2 7.3   ALBUMIN 3.2* 3.3*   BILITOT 0.4 0.5   ALKPHOS 94 98   AST 14 15   ALT 13 13   ANIONGAP 7* 7*   EGFRNONAA 48* 53*     All pertinent labs within the past 24 hours have been reviewed.    Significant Imaging: I have reviewed all pertinent imaging results/findings within the past 24 hours.

## 2020-10-27 NOTE — PROGRESS NOTES
pt declined by [facility] - per Deb WHITING    Per Rona CRAWFORD with [facility] - pt is being evaluated - she has submitted for auth.     TN met with pt to update d/c plan.  Spoke with pt and sister Ms. Reeder.  If pt is declined by [facility] - will d/c to home with home health.

## 2020-10-27 NOTE — PLAN OF CARE
Problem: Occupational Therapy Goal  Goal: Occupational Therapy Goal  Description: Goals to be met by: 11/23     Patient will increase functional independence with ADLs by performing:    UE Dressing with Minimal Assistance.  LE Dressing with Stand-by Assistance and Assistive Devices as needed.--MET 10/26  Grooming while standing at sink with Stand-by Assistance.  Toileting from toilet with Supervision for hygiene and clothing management.   Toilet transfer to toilet with Supervision.  Increased functional strength to WFL for ADLs.    Outcome: Ongoing, Progressing   Rajan Deluna is a 75 y.o. male with a medical diagnosis of COPD exacerbation.  Performance deficits affecting function are weakness, impaired endurance, impaired self care skills, impaired functional mobilty, gait instability, impaired balance, decreased upper extremity function, decreased lower extremity function, impaired cardiopulmonary response to activity. Pt found in bed, finishing up with RT tx, and agreeable to therapy.  Pt on 3L and O2 sats 91% at rest. Once pt transitioned to chair, he desatted to 79%.  Increased O2 to 4L and O2 sats began to slowly increase with rest and PLB.  Notified RN and RT.  O2 sat at 87%.  Pt left up in chair eating lunch-RN okayed.  Continue OT services to address functional goals, progressing as able.      TED Reyna

## 2020-10-28 LAB
ALBUMIN SERPL BCP-MCNC: 3 G/DL (ref 3.5–5.2)
ALP SERPL-CCNC: 88 U/L (ref 55–135)
ALT SERPL W/O P-5'-P-CCNC: 13 U/L (ref 10–44)
ANION GAP SERPL CALC-SCNC: 8 MMOL/L (ref 8–16)
AST SERPL-CCNC: 15 U/L (ref 10–40)
BACTERIA BLD CULT: NORMAL
BACTERIA BLD CULT: NORMAL
BASOPHILS # BLD AUTO: 0.01 K/UL (ref 0–0.2)
BASOPHILS NFR BLD: 0.1 % (ref 0–1.9)
BILIRUB SERPL-MCNC: 0.4 MG/DL (ref 0.1–1)
BUN SERPL-MCNC: 33 MG/DL (ref 8–23)
CALCIUM SERPL-MCNC: 8.9 MG/DL (ref 8.7–10.5)
CHLORIDE SERPL-SCNC: 102 MMOL/L (ref 95–110)
CO2 SERPL-SCNC: 29 MMOL/L (ref 23–29)
CREAT SERPL-MCNC: 1.3 MG/DL (ref 0.5–1.4)
DIFFERENTIAL METHOD: ABNORMAL
EOSINOPHIL # BLD AUTO: 0 K/UL (ref 0–0.5)
EOSINOPHIL NFR BLD: 0.4 % (ref 0–8)
ERYTHROCYTE [DISTWIDTH] IN BLOOD BY AUTOMATED COUNT: 18.1 % (ref 11.5–14.5)
EST. GFR  (AFRICAN AMERICAN): >60 ML/MIN/1.73 M^2
EST. GFR  (NON AFRICAN AMERICAN): 53 ML/MIN/1.73 M^2
GLUCOSE SERPL-MCNC: 96 MG/DL (ref 70–110)
HCT VFR BLD AUTO: 34.7 % (ref 40–54)
HGB BLD-MCNC: 10.5 G/DL (ref 14–18)
IMM GRANULOCYTES # BLD AUTO: 0.03 K/UL (ref 0–0.04)
IMM GRANULOCYTES NFR BLD AUTO: 0.3 % (ref 0–0.5)
LYMPHOCYTES # BLD AUTO: 2.2 K/UL (ref 1–4.8)
LYMPHOCYTES NFR BLD: 20 % (ref 18–48)
MAGNESIUM SERPL-MCNC: 2.3 MG/DL (ref 1.6–2.6)
MCH RBC QN AUTO: 24.8 PG (ref 27–31)
MCHC RBC AUTO-ENTMCNC: 30.3 G/DL (ref 32–36)
MCV RBC AUTO: 82 FL (ref 82–98)
MONOCYTES # BLD AUTO: 0.8 K/UL (ref 0.3–1)
MONOCYTES NFR BLD: 7 % (ref 4–15)
NEUTROPHILS # BLD AUTO: 8.1 K/UL (ref 1.8–7.7)
NEUTROPHILS NFR BLD: 72.2 % (ref 38–73)
NRBC BLD-RTO: 0 /100 WBC
PHOSPHATE SERPL-MCNC: 3.5 MG/DL (ref 2.7–4.5)
PLATELET # BLD AUTO: 324 K/UL (ref 150–350)
PMV BLD AUTO: 9.6 FL (ref 9.2–12.9)
POCT GLUCOSE: 116 MG/DL (ref 70–110)
POCT GLUCOSE: 117 MG/DL (ref 70–110)
POCT GLUCOSE: 120 MG/DL (ref 70–110)
POCT GLUCOSE: 177 MG/DL (ref 70–110)
POTASSIUM SERPL-SCNC: 3.9 MMOL/L (ref 3.5–5.1)
PROT SERPL-MCNC: 6.7 G/DL (ref 6–8.4)
RBC # BLD AUTO: 4.23 M/UL (ref 4.6–6.2)
SODIUM SERPL-SCNC: 139 MMOL/L (ref 136–145)
WBC # BLD AUTO: 11.2 K/UL (ref 3.9–12.7)

## 2020-10-28 PROCEDURE — 80053 COMPREHEN METABOLIC PANEL: CPT

## 2020-10-28 PROCEDURE — 94761 N-INVAS EAR/PLS OXIMETRY MLT: CPT

## 2020-10-28 PROCEDURE — 94640 AIRWAY INHALATION TREATMENT: CPT

## 2020-10-28 PROCEDURE — 97116 GAIT TRAINING THERAPY: CPT | Mod: CQ

## 2020-10-28 PROCEDURE — 97535 SELF CARE MNGMENT TRAINING: CPT | Mod: CO

## 2020-10-28 PROCEDURE — 25000242 PHARM REV CODE 250 ALT 637 W/ HCPCS: Performed by: STUDENT IN AN ORGANIZED HEALTH CARE EDUCATION/TRAINING PROGRAM

## 2020-10-28 PROCEDURE — 25000003 PHARM REV CODE 250: Performed by: STUDENT IN AN ORGANIZED HEALTH CARE EDUCATION/TRAINING PROGRAM

## 2020-10-28 PROCEDURE — 11000001 HC ACUTE MED/SURG PRIVATE ROOM

## 2020-10-28 PROCEDURE — 83735 ASSAY OF MAGNESIUM: CPT

## 2020-10-28 PROCEDURE — 27000221 HC OXYGEN, UP TO 24 HOURS

## 2020-10-28 PROCEDURE — 84100 ASSAY OF PHOSPHORUS: CPT

## 2020-10-28 PROCEDURE — 99900035 HC TECH TIME PER 15 MIN (STAT)

## 2020-10-28 PROCEDURE — 36415 COLL VENOUS BLD VENIPUNCTURE: CPT

## 2020-10-28 PROCEDURE — 85025 COMPLETE CBC W/AUTO DIFF WBC: CPT

## 2020-10-28 PROCEDURE — 63600175 PHARM REV CODE 636 W HCPCS: Performed by: STUDENT IN AN ORGANIZED HEALTH CARE EDUCATION/TRAINING PROGRAM

## 2020-10-28 RX ORDER — GUAIFENESIN 100 MG/5ML
200 SOLUTION ORAL EVERY 4 HOURS PRN
Status: DISCONTINUED | OUTPATIENT
Start: 2020-10-28 | End: 2020-10-29 | Stop reason: HOSPADM

## 2020-10-28 RX ADMIN — VORICONAZOLE 200 MG: 200 TABLET, FILM COATED ORAL at 08:10

## 2020-10-28 RX ADMIN — TIOTROPIUM BROMIDE 18 MCG: 18 CAPSULE ORAL; RESPIRATORY (INHALATION) at 07:10

## 2020-10-28 RX ADMIN — LACTOBACILLUS ACIDOPHILUS / LACTOBACILLUS BULGARICUS 1 EACH: 100 MILLION CFU STRENGTH GRANULES at 05:10

## 2020-10-28 RX ADMIN — THERA TABS 1 TABLET: TAB at 08:10

## 2020-10-28 RX ADMIN — LACTOBACILLUS ACIDOPHILUS / LACTOBACILLUS BULGARICUS 1 EACH: 100 MILLION CFU STRENGTH GRANULES at 08:10

## 2020-10-28 RX ADMIN — IPRATROPIUM BROMIDE AND ALBUTEROL SULFATE 3 ML: .5; 3 SOLUTION RESPIRATORY (INHALATION) at 07:10

## 2020-10-28 RX ADMIN — CELECOXIB 200 MG: 100 CAPSULE ORAL at 08:10

## 2020-10-28 RX ADMIN — FUROSEMIDE 40 MG: 40 TABLET ORAL at 08:10

## 2020-10-28 RX ADMIN — PREGABALIN 150 MG: 75 CAPSULE ORAL at 08:10

## 2020-10-28 RX ADMIN — TAMSULOSIN HYDROCHLORIDE 0.4 MG: 0.4 CAPSULE ORAL at 08:10

## 2020-10-28 RX ADMIN — ENOXAPARIN SODIUM 40 MG: 40 INJECTION SUBCUTANEOUS at 05:10

## 2020-10-28 RX ADMIN — GUAIFENESIN 200 MG: 200 SOLUTION ORAL at 01:10

## 2020-10-28 RX ADMIN — FLUTICASONE FUROATE AND VILANTEROL TRIFENATATE 1 PUFF: 100; 25 POWDER RESPIRATORY (INHALATION) at 07:10

## 2020-10-28 RX ADMIN — MONTELUKAST 10 MG: 10 TABLET, FILM COATED ORAL at 08:10

## 2020-10-28 RX ADMIN — LIDOCAINE 2 PATCH: 50 PATCH TOPICAL at 10:10

## 2020-10-28 RX ADMIN — IPRATROPIUM BROMIDE AND ALBUTEROL SULFATE 3 ML: .5; 3 SOLUTION RESPIRATORY (INHALATION) at 01:10

## 2020-10-28 NOTE — PLAN OF CARE
"  Problem: Occupational Therapy Goal  Goal: Occupational Therapy Goal  Description: Goals to be met by: 11/23     Patient will increase functional independence with ADLs by performing:    UE Dressing with Minimal Assistance.  LE Dressing with Stand-by Assistance and Assistive Devices as needed.--MET 10/26  Grooming while standing at sink with Stand-by Assistance.  Toileting from toilet with Supervision for hygiene and clothing management.   Toilet transfer to toilet with Supervision.  Increased functional strength to WFL for ADLs.    Outcome: Ongoing, Progressing     Rajan Deluna is a 75 y.o. male with a medical diagnosis of COPD exacerbation.  Performance deficits affecting function are weakness, impaired endurance, impaired self care skills, impaired functional mobilty, gait instability, impaired balance, decreased lower extremity function, decreased upper extremity function, decreased safety awareness, impaired cardiopulmonary response to activity. Pt found in bed, agreeable to limited therapy secondary to breakfast delivered and pt wanting to eat a "hot breakfast."  Pt continues to desat with min activity requiring increased liter of O2 and PLB to recover.  Pt does not report any SOB, dizziness, or weakness during tx session.  Progressing towards goals. Continue OT services to address functional goals, progressing as able.    TED Reyna    "

## 2020-10-28 NOTE — PT/OT/SLP PROGRESS
Physical Therapy Treatment    Patient Name:  Rajan Deluna   MRN:  [id number]    Recommendations:     Discharge Recommendations:  home health PT   Discharge Equipment Recommendations: bath bench, rollator, hip kit   Barriers to discharge: decreased mobility,strength and endurance    Assessment:     Rajan Deluna is a 75 y.o. male admitted with a medical diagnosis of COPD exacerbation.  He presents with the following impairments/functional limitations:  weakness, impaired endurance, impaired functional mobilty, gait instability, impaired balance, decreased ROM, impaired coordination, impaired cardiopulmonary response to activity,pt with good participation and improving sitting tolerance,pt remains with decreased endurance and impaired cardiopulmonary,pt will benefit from continuing PT services upon discharge.    Rehab Prognosis: Good; patient would benefit from acute skilled PT services to address these deficits and reach maximum level of function.    Recent Surgery: * No surgery found *      Plan:     During this hospitalization, patient to be seen 6 x/week to address the identified rehab impairments via gait training, therapeutic activities, therapeutic exercises, neuromuscular re-education and progress toward the following goals:    · Plan of Care Expires:  11/24/20    Subjective     Chief Complaint: n/a  Patient/Family Comments/goals: pt states MD may see about setting up rehab.  Pain/Comfort:  · Pain Rating 1: 0/10      Objective:     Communicated with nsg prior to session.  Patient found up in chair with oxygen, telemetry(chair alarm) upon PT entry to room.     General Precautions: Standard, fall, hearing impaired   Orthopedic Precautions:N/A   Braces: N/A     Functional Mobility:  · Transfers:     · Sit to Stand:  contact guard assistance with rolling walker  · Gait: amb ~65' X 2 seperate trials with O2 donned and SBA  · Balance: fair standing balance with RW      AM-PAC 6 CLICK MOBILITY  Turning over in bed  (including adjusting bedclothes, sheets and blankets)?: 3  Sitting down on and standing up from a chair with arms (e.g., wheelchair, bedside commode, etc.): 3  Moving from lying on back to sitting on the side of the bed?: 3  Moving to and from a bed to a chair (including a wheelchair)?: 3  Need to walk in hospital room?: 3  Climbing 3-5 steps with a railing?: 2  Basic Mobility Total Score: 17       Therapeutic Activities and Exercises: pt performs ex's daily.       Patient left up in chair with all lines intact, call button in reach and chair alarm on..    GOALS: see general POC  Multidisciplinary Problems     Physical Therapy Goals        Problem: Physical Therapy Goal    Goal Priority Disciplines Outcome Goal Variances Interventions   Physical Therapy Goal     PT, PT/OT Ongoing, Progressing     Description: Goals to be met by: 2020     Patient will increase functional independence with mobility by performin. Supine to sit with Modified [place]  2. Sit to supine with Modified [place]  3. Sit to stand transfer with Supervision  4. Gait  x  feet with Supervision using Rolling Walker.                      Time Tracking:     PT Received On: 10/28/20  PT Start Time: 1014     PT Stop Time: 1030  PT Total Time (min): 16 min     Billable Minutes: Gait Training 16    Treatment Type: Treatment  PT/PTA: PTA     PTA Visit Number: 3     Humble Browning PTA  10/28/2020

## 2020-10-28 NOTE — PLAN OF CARE
Pt AAOx4. Pt had one episode of chest and abd pain rt indigestion after eating.     Pt c/o nasal congestion, MD notified, new orders placed.     Pt On cont. Pulse ox sats above 90% on 3LNC. IV site CDI, flushed and saline locked. Meds administered per MAR. PT remains on telemetry. BG monitored. No complaints of pain overnight. Call light in reach, remains free from falls, bed alarm in use. Safety maintained. Will continue to monitor.

## 2020-10-28 NOTE — PLAN OF CARE
Problem: Physical Therapy Goal  Goal: Physical Therapy Goal  Description: Goals to be met by: 2020     Patient will increase functional independence with mobility by performin. Supine to sit with Modified [place]  2. Sit to supine with Modified [place]  3. Sit to stand transfer with Supervision  4. Gait  x  feet with Supervision using Rolling Walker.     Outcome: Ongoing, Progressing

## 2020-10-28 NOTE — PLAN OF CARE
Pt awake and alert. No complaints of pain.pt on 3Lnc  oxygen sustained at 90%.  Pt up into chair. Accucheck achs without the need for insulin.

## 2020-10-28 NOTE — PT/OT/SLP PROGRESS
"Occupational Therapy   Treatment    Name: Rajan Deluna  MRN: [id number]  Admitting Diagnosis:  COPD exacerbation       Recommendations:     Discharge Recommendations: home health OT(TBD)  Discharge Equipment Recommendations:  bath bench, rollator  Barriers to discharge:  None    Assessment:     Rajan Deluna is a 75 y.o. male with a medical diagnosis of COPD exacerbation.  Performance deficits affecting function are weakness, impaired endurance, impaired self care skills, impaired functional mobilty, gait instability, impaired balance, decreased lower extremity function, decreased upper extremity function, decreased safety awareness, impaired cardiopulmonary response to activity. Pt found in bed, agreeable to limited therapy secondary to breakfast delivered and pt wanting to eat a "hot breakfast."  Pt continues to desat with min activity requiring increased liter of O2 and PLB to recover.  Pt does not report any SOB, dizziness, or weakness during tx session.  Progressing towards goals. Continue OT services to address functional goals, progressing as able.      Rehab Prognosis:  Good; patient would benefit from acute skilled OT services to address these deficits and reach maximum level of function.       Plan:     Patient to be seen 5 x/week to address the above listed problems via self-care/home management, therapeutic activities, therapeutic exercises  · Plan of Care Expires: 11/23/20  · Plan of Care Reviewed with: patient    Subjective     Pain/Comfort:  · Pain Rating 1: 0/10  · Pain Rating Post-Intervention 1: 0/10    Objective:     Communicated with: RN prior to session.  Patient found HOB elevated with oxygen, pulse ox (continuous), peripheral IV, telemetry upon OT entry to room.    General Precautions: Standard, fall, hearing impaired   Orthopedic Precautions:N/A   Braces: N/A     Occupational Performance:     Bed Mobility:    · Patient completed Rolling/Turning to Right with modified independence  · Patient " completed Scooting/Bridging with modified independence  · Patient completed Supine to Sit with modified independence     Functional Mobility/Transfers:  · Patient completed Sit <> Stand Transfer with stand by assistance  with  no assistive device   · Patient completed Bed <> Chair Transfer using Stand Pivot technique with stand by assistance with no assistive device  · Functional Mobility: Pt ambulate to sink and then to chair ~10 feet with CGA/SBA without AD.     Activities of Daily Living:  · Feeding:  modified independence chair level  · Grooming: stand by assistance wash hands standing at sink  · Lower Body Dressing: stand by assistance magda socks in bed   · Pt declined toileting 2/2 no need.       [facility] 6 Click ADL: 19        Patient left up in chair with all lines intact, call button in reach, chair alarm on, RN notified and setup with breakfast trayEducation:      GOALS:   Multidisciplinary Problems     Occupational Therapy Goals        Problem: Occupational Therapy Goal    Goal Priority Disciplines Outcome Interventions   Occupational Therapy Goal     OT, PT/OT Ongoing, Progressing    Description: Goals to be met by: 11/23     Patient will increase functional independence with ADLs by performing:    UE Dressing with Minimal Assistance.  LE Dressing with Stand-by Assistance and Assistive Devices as needed.--MET 10/26  Grooming while standing at sink with Stand-by Assistance.  Toileting from toilet with Supervision for hygiene and clothing management.   Toilet transfer to toilet with Supervision.  Increased functional strength to WFL for ADLs.                     Time Tracking:     OT Date of Treatment: 10/28/20  OT Start Time: 0748  OT Stop Time: 0804  OT Total Time (min): 16 min    Billable Minutes:Self Care/Home Management 16    TED Reyna  10/28/2020

## 2020-10-28 NOTE — SUBJECTIVE & OBJECTIVE
Interval History: Pt continues to do well on baseline oxygen. He continues to feel weak and requests further PT and rehabilitation prior to going home. Currently pending placement at SNF vs home with home health.     Review of Systems   Constitutional: Negative for chills, diaphoresis, fatigue and fever.   HENT: Negative for congestion, rhinorrhea, sore throat and trouble swallowing.    Eyes: Negative for itching and visual disturbance.   Respiratory: Negative for cough, chest tightness, shortness of breath and wheezing.    Cardiovascular: Negative for chest pain and palpitations.   Gastrointestinal: Negative for abdominal pain, constipation, diarrhea, nausea and vomiting.   Endocrine: Negative for polyphagia and polyuria.   Genitourinary: Negative for decreased urine volume, dysuria and flank pain.   Musculoskeletal: Negative for back pain, joint swelling and neck stiffness.   Skin: Negative for color change and rash.   Neurological: Positive for weakness. Negative for dizziness, light-headedness and headaches.   Psychiatric/Behavioral: Negative for confusion. The patient is not nervous/anxious.         Objective:     Vital Signs (Most Recent):  Temp: 97.5 °F (36.4 °C) (10/28/20 1211)  Pulse: 100 (10/28/20 1211)  Resp: 17 (10/28/20 1211)  BP: (!) 106/55 (10/28/20 1211)  SpO2: (!) 92 % (10/28/20 0742) Vital Signs (24h Range):  Temp:  [97.5 °F (36.4 °C)-98.5 °F (36.9 °C)] 97.5 °F (36.4 °C)  Pulse:  [] 100  Resp:  [17-20] 17  SpO2:  [90 %-92 %] 92 %  BP: (106-132)/(55-69) 106/55     Weight: 85 kg (187 lb 6.3 oz)  Body mass index is 28.49 kg/m².    Intake/Output Summary (Last 24 hours) at 10/28/2020 1225  Last data filed at 10/28/2020 0900  Gross per 24 hour   Intake --   Output 980 ml   Net -980 ml      Physical Exam  Vitals signs and nursing note reviewed.   Constitutional:       General: He is not in acute distress.     Appearance: Normal appearance. He is well-developed. He is not diaphoretic.   HENT:       Head: Normocephalic and atraumatic.      Right Ear: External ear normal.      Left Ear: External ear normal.      Nose: Nose normal.      Mouth/Throat:      Mouth: Mucous membranes are moist.      Pharynx: Oropharynx is clear.   Eyes:      Extraocular Movements: Extraocular movements intact.      Conjunctiva/sclera: Conjunctivae normal.      Pupils: Pupils are equal, round, and reactive to light.   Neck:      Musculoskeletal: Normal range of motion and neck supple.   Cardiovascular:      Rate and Rhythm: Normal rate and regular rhythm.      Pulses: Normal pulses.      Heart sounds: Normal heart sounds.   Pulmonary:      Effort: Pulmonary effort is normal. No respiratory distress.      Breath sounds: Normal breath sounds. No wheezing.      Comments: Breathing comfortably on 3L NC  Abdominal:      General: Bowel sounds are normal.      Palpations: Abdomen is soft.      Tenderness: There is no abdominal tenderness.   Musculoskeletal: Normal range of motion.   Skin:     General: Skin is warm and dry.      Capillary Refill: Capillary refill takes less than 2 seconds.      Findings: No erythema.   Neurological:      General: No focal deficit present.      Mental Status: He is alert and oriented to person, place, and time.   Psychiatric:         Mood and Affect: Mood normal.         Behavior: Behavior normal.         Significant Labs:   CBC:   Recent Labs   Lab 10/27/20  0623 10/28/20  0608   WBC 10.76 11.20   HGB 11.1* 10.5*   HCT 36.7* 34.7*    324     CMP:   Recent Labs   Lab 10/27/20  0623 10/28/20  0609    139   K 3.9 3.9    102   CO2 33* 29    96   BUN 32* 33*   CREATININE 1.3 1.3   CALCIUM 9.1 8.9   PROT 7.3 6.7   ALBUMIN 3.3* 3.0*   BILITOT 0.5 0.4   ALKPHOS 98 88   AST 15 15   ALT 13 13   ANIONGAP 7* 8   EGFRNONAA 53* 53*

## 2020-10-29 VITALS
WEIGHT: 187.38 LBS | DIASTOLIC BLOOD PRESSURE: 60 MMHG | HEIGHT: 68 IN | HEART RATE: 97 BPM | OXYGEN SATURATION: 93 % | RESPIRATION RATE: 20 BRPM | TEMPERATURE: 98 F | BODY MASS INDEX: 28.4 KG/M2 | SYSTOLIC BLOOD PRESSURE: 105 MMHG

## 2020-10-29 LAB
ALBUMIN SERPL BCP-MCNC: 3.1 G/DL (ref 3.5–5.2)
ALP SERPL-CCNC: 99 U/L (ref 55–135)
ALT SERPL W/O P-5'-P-CCNC: 14 U/L (ref 10–44)
ANION GAP SERPL CALC-SCNC: 9 MMOL/L (ref 8–16)
AST SERPL-CCNC: 17 U/L (ref 10–40)
BASOPHILS # BLD AUTO: 0.02 K/UL (ref 0–0.2)
BASOPHILS NFR BLD: 0.3 % (ref 0–1.9)
BILIRUB SERPL-MCNC: 0.3 MG/DL (ref 0.1–1)
BUN SERPL-MCNC: 34 MG/DL (ref 8–23)
CALCIUM SERPL-MCNC: 9 MG/DL (ref 8.7–10.5)
CHLORIDE SERPL-SCNC: 101 MMOL/L (ref 95–110)
CO2 SERPL-SCNC: 30 MMOL/L (ref 23–29)
CREAT SERPL-MCNC: 1.4 MG/DL (ref 0.5–1.4)
DIFFERENTIAL METHOD: ABNORMAL
EOSINOPHIL # BLD AUTO: 0.2 K/UL (ref 0–0.5)
EOSINOPHIL NFR BLD: 2.4 % (ref 0–8)
ERYTHROCYTE [DISTWIDTH] IN BLOOD BY AUTOMATED COUNT: 18.3 % (ref 11.5–14.5)
EST. GFR  (AFRICAN AMERICAN): 56 ML/MIN/1.73 M^2
EST. GFR  (NON AFRICAN AMERICAN): 48 ML/MIN/1.73 M^2
GLUCOSE SERPL-MCNC: 184 MG/DL (ref 70–110)
HCT VFR BLD AUTO: 36.8 % (ref 40–54)
HGB BLD-MCNC: 10.7 G/DL (ref 14–18)
IMM GRANULOCYTES # BLD AUTO: 0.01 K/UL (ref 0–0.04)
IMM GRANULOCYTES NFR BLD AUTO: 0.1 % (ref 0–0.5)
LYMPHOCYTES # BLD AUTO: 2.3 K/UL (ref 1–4.8)
LYMPHOCYTES NFR BLD: 29.5 % (ref 18–48)
MAGNESIUM SERPL-MCNC: 2.3 MG/DL (ref 1.6–2.6)
MCH RBC QN AUTO: 24.5 PG (ref 27–31)
MCHC RBC AUTO-ENTMCNC: 29.1 G/DL (ref 32–36)
MCV RBC AUTO: 84 FL (ref 82–98)
MONOCYTES # BLD AUTO: 0.4 K/UL (ref 0.3–1)
MONOCYTES NFR BLD: 5.1 % (ref 4–15)
NEUTROPHILS # BLD AUTO: 4.9 K/UL (ref 1.8–7.7)
NEUTROPHILS NFR BLD: 62.6 % (ref 38–73)
NRBC BLD-RTO: 0 /100 WBC
PHOSPHATE SERPL-MCNC: 3 MG/DL (ref 2.7–4.5)
PLATELET # BLD AUTO: 325 K/UL (ref 150–350)
PMV BLD AUTO: 10.1 FL (ref 9.2–12.9)
POCT GLUCOSE: 101 MG/DL (ref 70–110)
POCT GLUCOSE: 124 MG/DL (ref 70–110)
POCT GLUCOSE: 96 MG/DL (ref 70–110)
POTASSIUM SERPL-SCNC: 4 MMOL/L (ref 3.5–5.1)
PROT SERPL-MCNC: 6.8 G/DL (ref 6–8.4)
RBC # BLD AUTO: 4.36 M/UL (ref 4.6–6.2)
SODIUM SERPL-SCNC: 140 MMOL/L (ref 136–145)
WBC # BLD AUTO: 7.83 K/UL (ref 3.9–12.7)

## 2020-10-29 PROCEDURE — 25000003 PHARM REV CODE 250: Performed by: STUDENT IN AN ORGANIZED HEALTH CARE EDUCATION/TRAINING PROGRAM

## 2020-10-29 PROCEDURE — 94640 AIRWAY INHALATION TREATMENT: CPT

## 2020-10-29 PROCEDURE — 94761 N-INVAS EAR/PLS OXIMETRY MLT: CPT

## 2020-10-29 PROCEDURE — 80053 COMPREHEN METABOLIC PANEL: CPT

## 2020-10-29 PROCEDURE — 90694 VACC AIIV4 NO PRSRV 0.5ML IM: CPT | Performed by: FAMILY MEDICINE

## 2020-10-29 PROCEDURE — 84100 ASSAY OF PHOSPHORUS: CPT

## 2020-10-29 PROCEDURE — 36415 COLL VENOUS BLD VENIPUNCTURE: CPT

## 2020-10-29 PROCEDURE — 90471 IMMUNIZATION ADMIN: CPT | Performed by: FAMILY MEDICINE

## 2020-10-29 PROCEDURE — 97530 THERAPEUTIC ACTIVITIES: CPT | Mod: CO

## 2020-10-29 PROCEDURE — 85025 COMPLETE CBC W/AUTO DIFF WBC: CPT

## 2020-10-29 PROCEDURE — 90472 IMMUNIZATION ADMIN EACH ADD: CPT | Performed by: FAMILY MEDICINE

## 2020-10-29 PROCEDURE — 99900035 HC TECH TIME PER 15 MIN (STAT)

## 2020-10-29 PROCEDURE — 63600175 PHARM REV CODE 636 W HCPCS: Performed by: FAMILY MEDICINE

## 2020-10-29 PROCEDURE — G0008 ADMIN INFLUENZA VIRUS VAC: HCPCS | Performed by: FAMILY MEDICINE

## 2020-10-29 PROCEDURE — G0009 ADMIN PNEUMOCOCCAL VACCINE: HCPCS | Performed by: FAMILY MEDICINE

## 2020-10-29 PROCEDURE — 97802 MEDICAL NUTRITION INDIV IN: CPT

## 2020-10-29 PROCEDURE — 90670 PCV13 VACCINE IM: CPT | Performed by: FAMILY MEDICINE

## 2020-10-29 PROCEDURE — 97110 THERAPEUTIC EXERCISES: CPT | Mod: CO

## 2020-10-29 PROCEDURE — 83735 ASSAY OF MAGNESIUM: CPT

## 2020-10-29 PROCEDURE — 27000221 HC OXYGEN, UP TO 24 HOURS

## 2020-10-29 PROCEDURE — 25000242 PHARM REV CODE 250 ALT 637 W/ HCPCS: Performed by: STUDENT IN AN ORGANIZED HEALTH CARE EDUCATION/TRAINING PROGRAM

## 2020-10-29 RX ORDER — ITRACONAZOLE 100 MG/1
200 CAPSULE ORAL DAILY
Qty: 60 CAPSULE | Refills: 1 | Status: SHIPPED | OUTPATIENT
Start: 2020-10-29 | End: 2024-02-15

## 2020-10-29 RX ORDER — BENZONATATE 100 MG/1
200 CAPSULE ORAL 3 TIMES DAILY PRN
Qty: 30 CAPSULE | Refills: 2 | Status: SHIPPED | OUTPATIENT
Start: 2020-10-29 | End: 2020-11-08

## 2020-10-29 RX ADMIN — PNEUMOCOCCAL 13-VALENT CONJUGATE VACCINE 0.5 ML: 2.2; 2.2; 2.2; 2.2; 2.2; 4.4; 2.2; 2.2; 2.2; 2.2; 2.2; 2.2; 2.2 INJECTION, SUSPENSION INTRAMUSCULAR at 03:10

## 2020-10-29 RX ADMIN — CELECOXIB 200 MG: 100 CAPSULE ORAL at 08:10

## 2020-10-29 RX ADMIN — IPRATROPIUM BROMIDE AND ALBUTEROL SULFATE 3 ML: .5; 3 SOLUTION RESPIRATORY (INHALATION) at 01:10

## 2020-10-29 RX ADMIN — LACTOBACILLUS ACIDOPHILUS / LACTOBACILLUS BULGARICUS 1 EACH: 100 MILLION CFU STRENGTH GRANULES at 08:10

## 2020-10-29 RX ADMIN — THERA TABS 1 TABLET: TAB at 08:10

## 2020-10-29 RX ADMIN — FUROSEMIDE 40 MG: 40 TABLET ORAL at 08:10

## 2020-10-29 RX ADMIN — TIOTROPIUM BROMIDE 18 MCG: 18 CAPSULE ORAL; RESPIRATORY (INHALATION) at 07:10

## 2020-10-29 RX ADMIN — A/SINGAPORE/GP1908/2015 IVR-180 (AN A/MICHIGAN/45/2015 (H1N1)PDM09-LIKE VIRUS, A/HONG KONG/4801/2014, NYMC X-263B (H3N2) (AN A/HONG KONG/4801/2014-LIKE VIRUS), AND B/BRISBANE/60/2008, WILD TYPE (A B/BRISBANE/60/2008-LIKE VIRUS) 0.5 ML: 15; 15; 15 INJECTION, SUSPENSION INTRAMUSCULAR at 03:10

## 2020-10-29 RX ADMIN — FLUTICASONE FUROATE AND VILANTEROL TRIFENATATE 1 PUFF: 100; 25 POWDER RESPIRATORY (INHALATION) at 07:10

## 2020-10-29 RX ADMIN — FLUTICASONE PROPIONATE 100 MCG: 50 SPRAY, METERED NASAL at 08:10

## 2020-10-29 RX ADMIN — PREGABALIN 150 MG: 75 CAPSULE ORAL at 08:10

## 2020-10-29 RX ADMIN — TAMSULOSIN HYDROCHLORIDE 0.4 MG: 0.4 CAPSULE ORAL at 08:10

## 2020-10-29 RX ADMIN — IPRATROPIUM BROMIDE AND ALBUTEROL SULFATE 3 ML: .5; 3 SOLUTION RESPIRATORY (INHALATION) at 07:10

## 2020-10-29 RX ADMIN — VORICONAZOLE 200 MG: 200 TABLET, FILM COATED ORAL at 08:10

## 2020-10-29 NOTE — PT/OT/SLP PROGRESS
Occupational Therapy   Treatment    Name: Rajan Deluna  MRN: [id number]  Admitting Diagnosis:  COPD exacerbation       Recommendations:     Discharge Recommendations: home health PT  Discharge Equipment Recommendations:  bath bench, rollator  Barriers to discharge:  None    Assessment:     Rajan Deluna is a 75 y.o. male with a medical diagnosis of COPD exacerbation.  Performance deficits affecting function are weakness, impaired endurance, impaired self care skills, impaired functional mobilty, gait instability, impaired balance, decreased lower extremity function, decreased upper extremity function, decreased safety awareness, pain, decreased ROM, impaired skin, impaired cardiopulmonary response to activity. Pt found in bed, agreeable to therapy.  Pt tolerated tx fairly well.  Continues to desat with min exertion requiring an increase in O2 L, rest, and PLB to recover.  RN notified.  Continue OT services to address functional goals, progressing as able.        Rehab Prognosis:  Good; patient would benefit from acute skilled OT services to address these deficits and reach maximum level of function.       Plan:     Patient to be seen 5 x/week to address the above listed problems via self-care/home management, therapeutic activities, therapeutic exercises  · Plan of Care Expires: 11/23/20  · Plan of Care Reviewed with: patient    Subjective     Pain/Comfort:  · Pain Rating 1: (pt did not rate however reports L knee pain during ambulation.)    Objective:     Communicated with: RN prior to session.  Patient found HOB elevated with oxygen, peripheral IV, telemetry, pulse ox (continuous) upon OT entry to room.    General Precautions: Standard, fall, hearing impaired   Orthopedic Precautions:N/A   Braces: N/A     Occupational Performance:     Bed Mobility:    · Patient completed Rolling/Turning to Left with  supervision  · Patient completed Scooting/Bridging with supervision  · Patient completed Supine to Sit with  "supervision     Functional Mobility/Transfers:  · Patient completed Sit <> Stand Transfer with supervision  with  rolling walker   · Patient completed Bed <> Chair Transfer using Stand Pivot technique with supervision with rolling walker  Functional Mobility: Pt ambulated around nurses station with SBA/S using RW in preparation for safety and endurance during ambulatory level ADL/IADL participation. Pt required one standing rest break secondary to L knee pain and drop in O2 sats.  Increased O2 from 4L>6L and instructed on PLB.  O2 increased to low 80's and pt able to ambulated back to room.        Activities of Daily Living:  · Pt declined toileting and G/H standing at sink 2/2 already performed/no needs.          [facility] 6 Click ADL: 19    Treatment & Education:  Pt performed BUE AROM ex x 10 reps for elbow f/e and finger pumps.  He declined L shld SROM/AAROM 2/2 painful with mvmt due to "torn RTC" or " shld".  Pt reports he was unable to complete MRI so unsure of actual problem with shld.    Pt also performed BLE AROM all jts/planes 2 x 10 reps.  Pt tolerated well.      *left O2 on 4L and pt at 88% at rest in chair.  Notified RN Jessica to check on pt and decrease O2 back to 3L as tolerated.      Patient left up in chair with all lines intact, call button in reach, chair alarm on and RN notifiedEducation:      GOALS:   Multidisciplinary Problems     Occupational Therapy Goals        Problem: Occupational Therapy Goal    Goal Priority Disciplines Outcome Interventions   Occupational Therapy Goal     OT, PT/OT Ongoing, Progressing    Description: Goals to be met by: 11/23     Patient will increase functional independence with ADLs by performing:    UE Dressing with Minimal Assistance.  LE Dressing with Stand-by Assistance and Assistive Devices as needed.--MET 10/26  Grooming while standing at sink with Stand-by Assistance.  Toileting from toilet with Supervision for hygiene and clothing management.   Toilet " transfer to toilet with Supervision.  Increased functional strength to WFL for ADLs.                     Time Tracking:     OT Date of Treatment: 10/29/20  OT Start Time: 0953  OT Stop Time: 1024  OT Total Time (min): 31 min    Billable Minutes:Therapeutic Activity 16  Therapeutic Exercise 15    TED Reyna  10/29/2020

## 2020-10-29 NOTE — ASSESSMENT & PLAN NOTE
Contributing Nutrition Diagnosis  Increased nutrient needs, protein/energy    Related to (etiology):   COPD    Signs and Symptoms (as evidenced by):   Pt with COPD exacerbation, and acute respiratory failure with hypoxia      Interventions:  Collaboration with other providers  [employer]-Boost Plus 1x/day     Nutrition Diagnosis Status:   New

## 2020-10-29 NOTE — PLAN OF CARE
Recommendation:   1. Continue current cardiac diet order.   2. Consider addition of consistent CHO restriction if BG levels trend high.   3. addition of Boost Plus daily to supplement protein and energy intake.   4. RD to follow and monitor intake    Goals:   Pt intake >/= 75% EEN/EPN by RD follow up    Nutrition Goal Status: new  Communication of RD Recs: other (comment)(POC)      Problem: Wound  Goal: Optimal Wound Healing  Outcome: Ongoing, Progressing     Problem: Oral Intake Inadequate (Acute Kidney Injury/Impairment)  Goal: Optimal Nutrition Intake  Outcome: Ongoing, Progressing

## 2020-10-29 NOTE — DISCHARGE SUMMARY
"[facility] Hospital Medicine  Discharge Summary      Patient Name: Rajan Deluna  MRN: [id number]  Admission Date: 10/22/2020  Hospital Length of Stay: 7 days  Discharge Date and Time: No discharge date for patient encounter.  Attending Physician: Rajan Rodríguez MD   Discharging Provider: Vinicio Méndez DO  Primary Care Provider: Jason Mccord MD      HPI:   Pt is a 73 yo AAM w/ pmhx of COPD on 4-5LNC at home, BPH, osteoarthritis, hx of MAC and aspergillosis pulmonary infection, hx of COVID19 (hospital admission 7/21/20 and recent d/c from rehab facility 10/16/20) presenting for SOB/anxiety. Pt states today while he was ambulating at home, he became anxious because his home O2 tubing was not long enough to reach his kitchen/living room from his bedroom. He states he was trying to move his O2 tank when the tubing became tangled which made him anxious because he had to untangle the tubing. He states he also had grandchildren at home to take care of which added to his anxiety. Pt states he became SOB after his anxiety started. He never was off of his home O2. He has been compliant with his home medications, which he received on discharge from the rehab facility. He state he productive yellow sputum intermittently, but this is not increased from baseline, he has been having productive cough "since I started seeing a lung doctor." Pt denies f/c/s/n/v, chest pain, palpitations, wheezing, increased cough/sputum production, abdominal pain, constipation, diarrhea, dysuria, lower extremity edema.     Per ED record, when EMS was called, his SpO2 was 50% on 5L, he was placed on non rebreather and SpO2 lorrie to 94-97%. ED tried to put on nasal canula at 5L but SpO2 decreased to mid 80s to was placed back on non rebreather. Otherwise vitals were stable. CBC unremarkable. CMP showed MARQUIS Cr 1.47, CXR showed b/l bibasilar interstitial infiltrate which could represent infection/inflammatory process vs " edema. Pt was transferred here w/ no treatment in ED (per patient).    * No surgery found *      Hospital Course:   Pt was re-started on home medications and inhalers with significant improvement in symptoms. Pt also completed course of prednisone, azithromycin and ceftriaxone. ID consulted, recommended follow-up outpatient with ID for evaluation of MAC (unclear if treated in the past). Pt transitioned to voriconazole during hospitalization with plan to re-start 200mg itraconazole per day at home. Continued education and counseling on need for medication compliance at home discussed. Pt was not eligible for inpatient rehab or LTAC so was discharged home with home health. Pt continued to work with PT and OT during admission with improvement in strength and stability. Upon discharge pt was weaned down to 3L NC, less than baseline 5L prior to admission, and tolerated ambulation with walker.      Consults:   Consults (From admission, onward)        Status Ordering Provider     Inpatient consult to Infectious Diseases  Once     Provider:  Tl Salinas MD    Completed CHRISTIE CHANG     Inpatient consult to Respiratory Care  Once     Provider:  (Not yet assigned)    Acknowledged ORALIA CERON     Inpatient consult to [facility]  Once     Provider:  (Not yet assigned)    Acknowledged IVA ADAMS III     Inpatient consult to Social Work/Case Management  Once     Provider:  (Not yet assigned)    Acknowledged IVA ADAMS III          No new Assessment & Plan notes have been filed under this hospital service since the last note was generated.  Service: Hospital Medicine    Final Active Diagnoses:    Diagnosis Date Noted POA    PRINCIPAL PROBLEM:  COPD exacerbation [J44.1] 10/22/2020 Yes    Chronic bilateral pleural effusions [J90]  Yes    BPH (benign prostatic hyperplasia) [N40.0] 10/22/2020 Yes    Type 2 diabetes mellitus [E11.9] 08/27/2020 Yes    History of Aspergillus & MAC [B44.9]  Yes    Acute respiratory  failure with hypoxia [J96.01]  Yes    History of 2019 novel coronavirus disease (COVID-19) [Z86.19] 07/21/2020 Yes      Problems Resolved During this Admission:    Diagnosis Date Noted Date Resolved POA    Respiratory failure [J96.90] 10/22/2020 10/22/2020 Yes    MARQUIS (acute kidney injury) [N17.9] 07/21/2020 10/23/2020 Yes       Discharged Condition: stable    Disposition: Home or Self Care    Follow Up:  Follow-up Information     Jason Mccord MD.    Specialty: Family Medicine  Why: Phone: [phone]  -- message left with office - they will call you with an appointment     Contact information:  [address] [place] [address]             [facility].    Why: Home Health     Contact information:  [address]  [facility] [address]  [phone]           [facility] On 11/3/2020.    Specialty: Family Medicine  Why: 11:00 am     Contact information:  [address], [address]  [facility] [address] [phone]  Additional information:  At this time [facility] will only use these entries [facility], [facility], and Emergency Department due to COVID-19 precautions.                Patient Instructions:      Ambulatory referral/consult to Home Health   Standing Status: Future   Referral Priority: Routine Referral Type: Home Health   Referral Reason: Specialty Services Required   Requested Specialty: Home Health Services   Number of Visits Requested: 1     Diet Cardiac     Notify your health care provider if you experience any of the following:  temperature >100.4     Notify your health care provider if you experience any of the following:  persistent nausea and vomiting or diarrhea     Notify your health care provider if you experience any of the following:  redness, tenderness, or signs of infection (pain, swelling, redness, odor or green/yellow discharge around incision site)     Notify your health care provider if you  experience any of the following:  severe persistent headache     Notify your health care provider if you experience any of the following:  persistent dizziness, light-headedness, or visual disturbances     Notify your health care provider if you experience any of the following:  increased confusion or weakness     Activity as tolerated       Significant Diagnostic Studies: Labs:   CMP   Recent Labs   Lab 10/28/20  0609 10/29/20  0930    140   K 3.9 4.0    101   CO2 29 30*   GLU 96 184*   BUN 33* 34*   CREATININE 1.3 1.4   CALCIUM 8.9 9.0   PROT 6.7 6.8   ALBUMIN 3.0* 3.1*   BILITOT 0.4 0.3   ALKPHOS 88 99   AST 15 17   ALT 13 14   ANIONGAP 8 9   ESTGFRAFRICA >60 56*   EGFRNONAA 53* 48*   , CBC   Recent Labs   Lab 10/28/20  0608 10/29/20  0930   WBC 11.20 7.83   HGB 10.5* 10.7*   HCT 34.7* 36.8*    325    and All labs within the past 24 hours have been reviewed    Pending Diagnostic Studies:     None         Medications:  Reconciled Home Medications:      Medication List      CHANGE how you take these medications    itraconazole 100 mg Cap  Commonly known as: SPORANOX  Take 2 capsules (200 mg total) by mouth once daily.  What changed:   · how much to take  · when to take this        CONTINUE taking these medications    budesonide-formoterol 160-4.5 mcg 160-4.5 mcg/actuation Hfaa  Commonly known as: SYMBICORT  Inhale 2 puffs into the lungs every 12 (twelve) hours.     celecoxib 200 MG capsule  Commonly known as: CeleBREX  Take 1 capsule (200 mg total) by mouth once daily.     furosemide 20 MG tablet  Commonly known as: LASIX  Take 1 tablet (20 mg total) by mouth once daily.     gabapentin 300 MG capsule  Commonly known as: NEURONTIN  Take 1 capsule (300 mg total) by mouth 3 (three) times daily.     hydrOXYzine HCL 25 MG tablet  Commonly known as: ATARAX  Take 1 tablet (25 mg total) by mouth 3 (three) times daily as needed for Itching.     lactobacillus acidophilus & bulgar 100 million cell  packet  Commonly known as: LACTINEX  Take 1 packet (1 each total) by mouth 2 (two) times daily with meals.     montelukast 10 mg tablet  Commonly known as: SINGULAIR  Take 1 tablet (10 mg total) by mouth every evening.     multivitamin Tab  Take 1 tablet by mouth once daily.     pregabalin 75 MG capsule  Commonly known as: LYRICA  Take 1 capsule (75 mg total) by mouth 2 (two) times daily.     PROAIR HFA 90 mcg/actuation inhaler  Generic drug: albuterol  Inhale 1-2 puffs into the lungs every 4 (four) hours as needed for Wheezing or Shortness of Breath.     tamsulosin 0.4 mg Cap  Commonly known as: FLOMAX  Take 1 capsule (0.4 mg total) by mouth once daily.     tiotropium 18 mcg inhalation capsule  Commonly known as: SPIRIVA  Inhale 1 capsule (18 mcg total) into the lungs once daily. Controller        STOP taking these medications    HYDROcodone-acetaminophen  mg per tablet  Commonly known as: NORCO     predniSONE 10 MG tablet  Commonly known as: DELTASONE     traMADoL 50 mg tablet  Commonly known as: ULTRAM            Indwelling Lines/Drains at time of discharge:   Lines/Drains/Airways     None                 Time spent on the discharge of patient: >30 minutes  Patient was seen and examined on the date of discharge and determined to be suitable for discharge.         Vinicio Méndez DO  Department of Hospital Medicine  [facility]

## 2020-10-29 NOTE — PROGRESS NOTES
TN informed by Rona with [facility] - pt declined.     will d/c to home with home health.    TN spoke with intake nurse at:     [facility] -- Fax [phone] [phone]  pt has home O2 - per pt - supplied by [facility] --- son will bring portable tank at discharge       this pt was never seen by agency as he is out of the area.  TN will attempt per [employer] to locate another agency.

## 2020-10-29 NOTE — PLAN OF CARE
Problem: Occupational Therapy Goal  Goal: Occupational Therapy Goal  Description: Goals to be met by: 11/23     Patient will increase functional independence with ADLs by performing:    UE Dressing with Minimal Assistance.  LE Dressing with Stand-by Assistance and Assistive Devices as needed.--MET 10/26  Grooming while standing at sink with Stand-by Assistance.  Toileting from toilet with Supervision for hygiene and clothing management.   Toilet transfer to toilet with Supervision.  Increased functional strength to WFL for ADLs.    Outcome: Ongoing, Progressing    Rajan Deluna is a 75 y.o. male with a medical diagnosis of COPD exacerbation.  Performance deficits affecting function are weakness, impaired endurance, impaired self care skills, impaired functional mobilty, gait instability, impaired balance, decreased lower extremity function, decreased upper extremity function, decreased safety awareness, pain, decreased ROM, impaired skin, impaired cardiopulmonary response to activity. Pt found in bed, agreeable to therapy.  Pt tolerated tx fairly well.  Continues to desat with min exertion requiring an increase in O2 L, rest, and PLB to recover.  RN notified.  Continue OT services to address functional goals, progressing as able.      TED Reyna

## 2020-10-29 NOTE — PROGRESS NOTES
"[facility]  Adult Nutrition  Progress Note    SUMMARY       Recommendations    Recommendation:   1. Continue current cardiac diet order.   2. Consider addition of consistent CHO restriction if BG levels trend high.   3. addition of Boost Plus daily to supplement protein and energy intake.   4. RD to follow and monitor intake    Goals:   Pt intake >/= 75% EEN/EPN by RD follow up    Nutrition Goal Status: new  Communication of RD Recs: other (comment)(POC)    Reason for Assessment    Reason For Assessment: length of stay(day 7)  Diagnosis: pulmonary disease(COPD exacerbation)  Relevant Medical History: HTN, COPD, smoker, hernia repair  Interdisciplinary Rounds: did not attend  General Information Comments: Pt on cardiac diet reports good appetite. Consuming 100% of meals, denies N/VD/C. Had BM today. PIV in place. Yaw Score: 20. Previously COVID positive, absent upon this admission. NFPE completed today 10/29: pt well nourished  Nutrition Discharge Planning: d/c on cardiac diet with adequate PO intake    Nutrition Risk Screen    Nutrition Risk Screen: no indicators present    Nutrition/Diet History    Food Preferences: no cultural or spiritual preferences identified  Spiritual, Cultural Beliefs, Worship Practices, Values that Affect Care: no  Food Allergies: NKFA  Factors Affecting Nutritional Intake: None identified at this time    Anthropometrics    Temp: 97.8 °F (36.6 °C)  Height Method: Measured  Height: 5' 8" (172.7 cm)  Height (inches): 68 in  Weight Method: Bed Scale  Weight: 85 kg (187 lb 6.3 oz)  Weight (lb): 187.39 lb  Ideal Body Weight (IBW), Male: 154 lb  % Ideal Body Weight, Male (lb): 121.68 %  BMI (Calculated): 28.5  BMI Grade: 25 - 29.9 - overweight       Lab/Procedures/Meds    Pertinent Labs Reviewed: reviewed  Pertinent Labs Comments: H/H 10.7/36.8, BUN 33, Alb 3.0, A1C 5.8  Pertinent Medications Reviewed: reviewed  Pertinent Medications Comments: celecoxib, enoxaparin, lasix, " lactobacillus acidophilus, montelukast, MVI, pregabalin, tamsulosin, tiotropium, voriconazole    Physical Findings/Assessment     Yaw Score: 20    Estimated/Assessed Needs    Weight Used For Calorie Calculations: 85 kg (187 lb 6.3 oz)  Energy Calorie Requirements (kcal): 2015  Energy Need Method: [place]-St Jeor(x 1.3)  Protein Requirements: (1.0-1.2 gm/kg)  Weight Used For Protein Calculations: 85 kg (187 lb 6.3 oz)     Estimated Fluid Requirement Method: RDA Method(or PER MD)  RDA Method (mL): 2015         Nutrition Prescription Ordered    Current Diet Order: Cardiac    Evaluation of Received Nutrient/Fluid Intake    I/O: -950  Energy Calories Required: meeting needs  Protein Required: meeting needs  Fluid Required: meeting needs  Comments: LBM 10/26  Tolerance: tolerating  % Intake of Estimated Energy Needs: 75 - 100 %  % Meal Intake: 75 - 100 %    Nutrition Risk    Level of Risk/Frequency of Follow-up: (1 x/week)     Assessment and Plan    * COPD exacerbation  Contributing Nutrition Diagnosis  Increased nutrient needs, protein/energy    Related to (etiology):   COPD    Signs and Symptoms (as evidenced by):   Pt with COPD exacerbation, and acute respiratory failure with hypoxia      Interventions:  Collaboration with other providers  [employer]-Boost Plus 1x/day     Nutrition Diagnosis Status:   New           Monitor and Evaluation    Food and Nutrient Intake: energy intake, food and beverage intake  Food and Nutrient Adminstration: diet order  Knowledge/Beliefs/Attitudes: food and nutrition knowledge/skill  Physical Activity and Function: nutrition-related ADLs and IADLs  Anthropometric Measurements: weight, weight change, body mass index  Biochemical Data, Medical Tests and Procedures: electrolyte and renal panel, gastrointestinal profile, glucose/endocrine profile, inflammatory profile, lipid profile  Nutrition-Focused Physical Findings: overall appearance     Malnutrition Assessment                  Orbital Region (Subcutaneous Fat Loss): well nourished  Upper Arm Region (Subcutaneous Fat Loss): well nourished  Thoracic and Lumbar Region: well nourished   Mormonism Region (Muscle Loss): well nourished  Clavicle Bone Region (Muscle Loss): well nourished  Clavicle and Acromion Bone Region (Muscle Loss): well nourished  Scapular Bone Region (Muscle Loss): well nourished       Subcutaneous Fat Loss (Final Summary): well nourished  Muscle Loss Evaluation (Final Summary): well nourished         Nutrition Follow-Up    RD Follow-up?: Yes

## 2020-10-29 NOTE — HOSPITAL COURSE
Pt was re-started on home medications and inhalers with significant improvement in symptoms. Pt also completed course of prednisone, azithromycin and ceftriaxone. ID consulted, recommended follow-up outpatient with ID for evaluation of MAC (unclear if treated in the past). Pt transitioned to voriconazole during hospitalization with plan to re-start 200mg itraconazole per day at home. Continued education and counseling on need for medication compliance at home discussed. Pt was not eligible for inpatient rehab or LTAC so was discharged home with home health. Pt continued to work with PT and OT during admission with improvement in strength and stability. Upon discharge pt was weaned down to 3L NC, less than baseline 5L prior to admission, and tolerated ambulation with walker.

## 2020-10-29 NOTE — PROGRESS NOTES
"[facility] Hospital Medicine  Progress Note    Patient Name: Rajan Deluna  MRN: [id number]  Patient Class: IP- Inpatient   Admission Date: 10/22/2020  Length of Stay: 7 days  Attending Physician: Rajan Rodríguez MD  Primary Care Provider: Jason Mccord MD        Subjective:     Principal Problem:COPD exacerbation        HPI:  Pt is a 73 yo AAM w/ pmhx of COPD on 4-5LNC at home, BPH, osteoarthritis, hx of MAC and aspergillosis pulmonary infection, hx of COVID19 (hospital admission 7/21/20 and recent d/c from rehab facility 10/16/20) presenting for SOB/anxiety. Pt states today while he was ambulating at home, he became anxious because his home O2 tubing was not long enough to reach his kitchen/living room from his bedroom. He states he was trying to move his O2 tank when the tubing became tangled which made him anxious because he had to untangle the tubing. He states he also had grandchildren at home to take care of which added to his anxiety. Pt states he became SOB after his anxiety started. He never was off of his home O2. He has been compliant with his home medications, which he received on discharge from the rehab facility. He state he productive yellow sputum intermittently, but this is not increased from baseline, he has been having productive cough "since I started seeing a lung doctor." Pt denies f/c/s/n/v, chest pain, palpitations, wheezing, increased cough/sputum production, abdominal pain, constipation, diarrhea, dysuria, lower extremity edema.     Per ED record, when EMS was called, his SpO2 was 50% on 5L, he was placed on non rebreather and SpO2 lorrie to 94-97%. ED tried to put on nasal canula at 5L but SpO2 decreased to mid 80s to was placed back on non rebreather. Otherwise vitals were stable. CBC unremarkable. CMP showed MARQUIS Cr 1.47, CXR showed b/l bibasilar interstitial infiltrate which could represent infection/inflammatory process vs edema. Pt was transferred here w/ no " treatment in ED (per patient).      Interval History: Pt continues to do well on baseline oxygen. He continues to feel weak and requests further PT and rehabilitation prior to going home. Currently pending placement at SNF vs home with home health.     Review of Systems   Constitutional: Negative for chills, diaphoresis, fatigue and fever.   HENT: Negative for congestion, rhinorrhea, sore throat and trouble swallowing.    Eyes: Negative for itching and visual disturbance.   Respiratory: Negative for cough, chest tightness, shortness of breath and wheezing.    Cardiovascular: Negative for chest pain and palpitations.   Gastrointestinal: Negative for abdominal pain, constipation, diarrhea, nausea and vomiting.   Endocrine: Negative for polyphagia and polyuria.   Genitourinary: Negative for decreased urine volume, dysuria and flank pain.   Musculoskeletal: Negative for back pain, joint swelling and neck stiffness.   Skin: Negative for color change and rash.   Neurological: Positive for weakness. Negative for dizziness, light-headedness and headaches.   Psychiatric/Behavioral: Negative for confusion. The patient is not nervous/anxious.         Objective:     Vital Signs (Most Recent):  Temp: 97.5 °F (36.4 °C) (10/28/20 1211)  Pulse: 100 (10/28/20 1211)  Resp: 17 (10/28/20 1211)  BP: (!) 106/55 (10/28/20 1211)  SpO2: (!) 92 % (10/28/20 0742) Vital Signs (24h Range):  Temp:  [97.5 °F (36.4 °C)-98.5 °F (36.9 °C)] 97.5 °F (36.4 °C)  Pulse:  [] 100  Resp:  [17-20] 17  SpO2:  [90 %-92 %] 92 %  BP: (106-132)/(55-69) 106/55     Weight: 85 kg (187 lb 6.3 oz)  Body mass index is 28.49 kg/m².    Intake/Output Summary (Last 24 hours) at 10/28/2020 1225  Last data filed at 10/28/2020 0900  Gross per 24 hour   Intake --   Output 980 ml   Net -980 ml      Physical Exam  Vitals signs and nursing note reviewed.   Constitutional:       General: He is not in acute distress.     Appearance: Normal appearance. He is well-developed. He  is not diaphoretic.   HENT:      Head: Normocephalic and atraumatic.      Right Ear: External ear normal.      Left Ear: External ear normal.      Nose: Nose normal.      Mouth/Throat:      Mouth: Mucous membranes are moist.      Pharynx: Oropharynx is clear.   Eyes:      Extraocular Movements: Extraocular movements intact.      Conjunctiva/sclera: Conjunctivae normal.      Pupils: Pupils are equal, round, and reactive to light.   Neck:      Musculoskeletal: Normal range of motion and neck supple.   Cardiovascular:      Rate and Rhythm: Normal rate and regular rhythm.      Pulses: Normal pulses.      Heart sounds: Normal heart sounds.   Pulmonary:      Effort: Pulmonary effort is normal. No respiratory distress.      Breath sounds: Normal breath sounds. No wheezing.      Comments: Breathing comfortably on 3L NC  Abdominal:      General: Bowel sounds are normal.      Palpations: Abdomen is soft.      Tenderness: There is no abdominal tenderness.   Musculoskeletal: Normal range of motion.   Skin:     General: Skin is warm and dry.      Capillary Refill: Capillary refill takes less than 2 seconds.      Findings: No erythema.   Neurological:      General: No focal deficit present.      Mental Status: He is alert and oriented to person, place, and time.   Psychiatric:         Mood and Affect: Mood normal.         Behavior: Behavior normal.         Significant Labs:   CBC:   Recent Labs   Lab 10/27/20  0623 10/28/20  0608   WBC 10.76 11.20   HGB 11.1* 10.5*   HCT 36.7* 34.7*    324     CMP:   Recent Labs   Lab 10/27/20  0623 10/28/20  0609    139   K 3.9 3.9    102   CO2 33* 29    96   BUN 32* 33*   CREATININE 1.3 1.3   CALCIUM 9.1 8.9   PROT 7.3 6.7   ALBUMIN 3.3* 3.0*   BILITOT 0.5 0.4   ALKPHOS 98 88   AST 15 15   ALT 13 13   ANIONGAP 7* 8   EGFRNONAA 53* 53*             Assessment/Plan:      * COPD exacerbation  Increased oxygen requirement from baseline 5L at admission. History of COVID 19,  negative this admission with CXR stable from previous. Currently below baseline oxygen requirements and COPD exacerbation has resolved.   -Duoneb q6hr  -fluticasone formetorol qd  -Continue home spiriva, singulair  -Continue empiric rocephin/azithro  - follow-up with final PT/OT recommendations regarding evaluation and treatment    Chronic bilateral pleural effusions  - Start oral lasix 40mg daily tomorrow  - Strict I's/O's    BPH (benign prostatic hyperplasia)  -Asymptomatic  -Continue home flomax      Type 2 diabetes mellitus  -A1c 6.8 (7/2020)  -Repeat A1c 5.8%  -LDSSI  -Glucose 140-180 inpt      History of Aspergillus & MAC  -Takes itraconazole at home, plan to re-start upon discharge  - Continue Voriconazole 200 BID while inpatient per ID   - Will need ID follow-up outpatient once discharged      Acute respiratory failure with hypoxia  - Plan as above.   - Goal O2 sat 88-92%      History of 2019 novel coronavirus disease (COVID-19)  Negative this admission. Low suspicion for re-infection given stable CXR and negative labs.         VTE Risk Mitigation (From admission, onward)         Ordered     enoxaparin injection 40 mg  Every 24 hours      10/22/20 2155     IP VTE HIGH RISK PATIENT  Once      10/22/20 2155     Place sequential compression device  Until discontinued      10/22/20 2155                Discharge Planning   ADAM:      Code Status: Full Code   Is the patient medically ready for discharge?:     Reason for patient still in hospital (select all that apply): Pending disposition  Discharge Plan A: Home, Home with family, Home Health(current with:  [facility] -- Fax  [phone]  [phone])            Vinicio Méndez DO  Department of Hospital Medicine   [facility]

## 2020-10-29 NOTE — PLAN OF CARE
Discharge orders noted. Additional clinical references attached. Patient's discharge instructions given by bedside RN and reviewed via this VN.  Education provided on medications, diagnosis, and follow-up appointments. Patient verbalized understanding. Wheelchair van scheduled for pt. All questions answered. Awaiting transportation.

## 2020-10-29 NOTE — PLAN OF CARE
per Rona with [facility] -pt denied by his insurance    pt will d/c to home with home health - pt informed - [place] also spoke with pt's sister Ms. Reeder and explaned that pt is unable to go to [facility].        van transport with oxygen arranged to take pt home      pt accepted by [facility].       Future Appointments   Date Time Provider Department Center   11/3/2020 11:00 AM Kalyan Rodriguez PA-C [facility]     message left at pt's pcp office asking that  pt be contacted with pcp apt.       pt has home O2 per Essence        10/29/20 1633   Final Note   Assessment Type Final Discharge Note   Anticipated Discharge Disposition Home-Health  ([facility])   What phone number can be called within the next 1-3 days to see how you are doing after discharge? [phone]   Hospital Follow Up  Appt(s) scheduled? Yes   Discharge plans and expectations educations in teach back method with documentation complete? Yes   Right Care Referral Info   Post Acute Recommendation Home-care   Referral Type home care   Facility Name [facility]

## 2020-10-29 NOTE — NURSING
Pt given discharge instructions and avs packet. outpt pharmacy to bring a bridge supply medication  of Itraconazole    Due to pt possibly having already at home and can be refilled on november 9th and [facility] in [facility]

## 2020-10-29 NOTE — PROGRESS NOTES
Pt has been refusing CPAP ordered QHS. Encouraged pt to attempt to wear device, pt states cpap device not required . No respiratory distress noted at this time. RN notified of pt refusal.

## 2020-10-29 NOTE — PLAN OF CARE
Pt vitals were maintained, pt c/o some moderate pain to L/shoulder. Pt ambulated with asst b/c of desat in O2. No wounds, skin intact. Pt BG was in range no insulin needed.WCTM

## 2020-10-29 NOTE — ASSESSMENT & PLAN NOTE
Increased oxygen requirement from baseline 5L at admission. History of COVID 19, negative this admission with CXR stable from previous. Currently below baseline oxygen requirements and COPD exacerbation has resolved.   -Duoneb q6hr  -fluticasone formetorol qd  -Continue home spiriva, singulair  -Continue empiric rocephin/azithro  - follow-up with final PT/OT recommendations regarding evaluation and treatment

## 2020-10-30 ENCOUNTER — PATIENT OUTREACH (OUTPATIENT)
Dept: ADMINISTRATIVE | Facility: CLINIC | Age: 75
End: 2020-10-30

## 2020-10-30 NOTE — TELEPHONE ENCOUNTER
C3 nurse attempted to contact patient for a TCC post hospital discharge follow-up call. The patient's number is a wrong #

## 2020-12-01 NOTE — ASSESSMENT & PLAN NOTE
Asymptomatic  H/H stable at 9.5/30.7  Labs QOD   Subjective     History provided by:  Patient   used: No    URI  Presenting symptoms: congestion, cough, fatigue and rhinorrhea    Severity:  Mild  Onset quality:  Sudden  Duration:  5 days  Timing:  Constant  Progression:  Worsening  Chronicity:  New  Relieved by:  Nothing  Worsened by:  Nothing  Ineffective treatments:  None tried  Risk factors: sick contacts        Review of Systems   Constitutional: Positive for fatigue.   HENT: Positive for congestion and rhinorrhea.    Eyes: Negative.    Respiratory: Positive for cough and shortness of breath.    Cardiovascular: Negative.    Gastrointestinal: Negative.    Endocrine: Negative.    Genitourinary: Negative.    Musculoskeletal: Negative.    Skin: Negative.    Allergic/Immunologic: Negative.    Neurological: Negative.    Hematological: Negative.    Psychiatric/Behavioral: Negative.    All other systems reviewed and are negative.      History reviewed. No pertinent past medical history.    No Known Allergies    History reviewed. No pertinent surgical history.    History reviewed. No pertinent family history.    Social History     Socioeconomic History   • Marital status: Single     Spouse name: Not on file   • Number of children: Not on file   • Years of education: Not on file   • Highest education level: Not on file   Tobacco Use   • Smoking status: Never Smoker           Objective   Physical Exam  Vitals signs and nursing note reviewed.   Constitutional:       Appearance: He is well-developed and normal weight.   HENT:      Head: Normocephalic and atraumatic.      Mouth/Throat:      Mouth: Mucous membranes are moist.      Pharynx: Oropharynx is clear.   Eyes:      Extraocular Movements: Extraocular movements intact.      Pupils: Pupils are equal, round, and reactive to light.   Neck:      Musculoskeletal: Normal range of motion and neck supple.   Cardiovascular:      Rate and Rhythm: Normal rate and regular rhythm.      Pulses: Normal pulses.       Heart sounds: Normal heart sounds.   Pulmonary:      Effort: Pulmonary effort is normal.      Breath sounds: Normal breath sounds. No decreased breath sounds, wheezing or rales.   Abdominal:      General: Bowel sounds are normal.      Palpations: Abdomen is soft.   Musculoskeletal: Normal range of motion.   Skin:     General: Skin is warm and dry.      Capillary Refill: Capillary refill takes less than 2 seconds.   Neurological:      General: No focal deficit present.      Mental Status: He is alert and oriented to person, place, and time.   Psychiatric:         Mood and Affect: Mood normal.         Behavior: Behavior normal.         Procedures           ED Course  ED Course as of Dec 01 1328   Tue Dec 01, 2020   1248 IMPRESSION:    Unremarkable exam. No acute cardiopulmonary findings identified.     This report was finalized on 12/1/2020 12:45 PM by Dr. Franklin Olivera MD.   XR Chest 1 View [ML]   1328 1232- reviewed by Brianna Dai. rate 50 BPM  MD interval 164 ms  QRS duration 102 ms  QT/QTc 406/370 ms  P-R-T axes 21 72 42  Sinus elen    ECG 12 Lead [ML]      ED Course User Index  [ML] Jaye Mann PA                                           MDM  Number of Diagnoses or Management Options  Acute URI:      Amount and/or Complexity of Data Reviewed  Clinical lab tests: ordered and reviewed  Tests in the radiology section of CPT®: ordered and reviewed    Risk of Complications, Morbidity, and/or Mortality  Presenting problems: low  Diagnostic procedures: low  Management options: low    Patient Progress  Patient progress: improved      Final diagnoses:   Acute URI            Jaye Mann PA  12/01/20 1328

## 2021-01-10 NOTE — PLAN OF CARE
Patient is awake and orientedx3. Care plan explained to patient; verbalized understanding. On 15L High Flow N/C, O2 saturation at 94-98%. Hooked to heart monitor, running sinus tachycardia at 104-107bpm. Urinal in reach. Incontinentx2, kept clean and dry per staff. No pain/N/V during shift. Due medications given. Encouraged/assisted to turn every 2 hours as tolerated. Maintained on fall risk precautions. Bed in lowest position, bed alarm on, call light/personal items within reach and instructed to call for help when needed. Will continue to monitor.    oral

## 2021-06-10 NOTE — PLAN OF CARE
Dx:  Neck pain (M54.2)        Insurance (Authorized # of Visits):  [address] exp 8/7/21   POC visits: 6  Authorizing Physician: Dr. Juan Seth  Next MD visit:   Fall Risk: standard         Precautions:    FOTO initial: status- 54/100          D/C FOTO: stat VN note: Patient chart, labs, and vitals reviewed. VN to continue to monitor and be available as needed.      cervical traction w/rot to left                    HEP: 5/25/21- seated snag for left rotation          6/1/21- rows w/ green TB           6/10/21- pivot prone, use of towel roll or cervical roll     Charges: MTx1, TEx2      Total Timed Treatment:  40 min

## 2021-11-10 NOTE — PLAN OF CARE
Israel with [facility] [phone] called.  She is a discharge planner.  She does not approve or deny authorizations for placement but can assist with discharge planning.  She does see in her computer system that [facility] submitted for auth.  She will call me with updates as she sees them.       08/26/20 1335   Post-Acute Status   Post-Acute Authorization Placement  ([facility])     Phong Suh RN,   [phone]     Patient was seen and examined at bedside today   used [id number]  Reports discomfort in upper chest/throat today, was able to tolerate clear liquid diet   Diarrhea resolved    Vitals stable     P/E:  NAD  AAOx3, no new focal deficit, mild left facial drooping same as yesterday   CTABL  S1S2 WNL, no MRG  Abd soft, non tender, distended, BS present   BL LE no calf tenderness or edema     Labs noted     A/P:  Esophageal varices s/p Banding   Decompensated Cirrhosis of liver with ascites  Portal vein thrombosis, splenic and superior mesenteric vein thrombosis  HTN  HLD  DM   Ruled out stroke    Plan:  CT head negative for stroke, today patient reports he had some facial drooping for last 3 days (did not report that yesterday)  Monitor Hb, drop in Hb possibly due to dilution, will cont to monitor q12, no overt bleeding   Change heparin drip to Lovenox    Agree with Nadolol/ Propanolol  Cont PPI  Diet as per GI  Will cont Ceftriaxone   Bleeding precaution, patient educated to inform RN if notices any blood in vomitus or stool or black stool   Stool w/u neg so far, low suspicion for C.diff  Cont to monitor electrolytes and BS   Rest of the management as above. Patient was seen and examined at bedside today   used [id number]  Reports discomfort in upper chest/throat today, was able to tolerate clear liquid diet   Diarrhea resolved    Vitals stable     P/E:  NAD  AAOx3, no new focal deficit, mild left facial drooping same as yesterday   CTABL  S1S2 WNL, no MRG  Abd soft, non tender, distended, BS present   BL LE no calf tenderness or edema     Labs noted     A/P:  Esophageal varices s/p Banding   Decompensated Cirrhosis of liver with ascites  Portal vein thrombosis, splenic and superior mesenteric vein thrombosis  HTN  HLD  DM   Ruled out stroke    Plan:  CT head negative for stroke, today patient reports he had some facial drooping for last 3 years (did not report that yesterday)  Monitor Hb, drop in Hb possibly due to dilution, will cont to monitor q12, no overt bleeding   Change heparin drip to Lovenox  Agree with Nadolol/ Propanolol  Cont PPI  Diet as per GI  Lasix and spironolactone with holding parameter if Hb stable   Will cont Ceftriaxone   Bleeding precaution, patient educated to inform RN if notices any blood in vomitus or stool or black stool   Stool w/u neg so far, low suspicion for C.diff  Cont to monitor electrolytes and BS   Rest of the management as above.

## 2021-12-17 ENCOUNTER — HOSPITAL ENCOUNTER (INPATIENT)
Facility: HOSPITAL | Age: 76
LOS: 4 days | Discharge: HOME | DRG: 392 | End: 2021-12-21
Attending: EMERGENCY MEDICINE | Admitting: STUDENT IN AN ORGANIZED HEALTH CARE EDUCATION/TRAINING PROGRAM
Payer: MEDICARE

## 2021-12-17 DIAGNOSIS — R10.9 ABDOMINAL PAIN: ICD-10-CM

## 2021-12-17 DIAGNOSIS — K57.20 DIVERTICULITIS OF LARGE INTESTINE WITH ABSCESS WITHOUT BLEEDING: ICD-10-CM

## 2021-12-17 DIAGNOSIS — K57.80 DIVERTICULITIS OF INTESTINE WITH ABSCESS WITHOUT BLEEDING, UNSPECIFIED PART OF INTESTINAL TRACT: Primary | ICD-10-CM

## 2021-12-17 DIAGNOSIS — J44.1 COPD EXACERBATION: ICD-10-CM

## 2021-12-17 LAB
ALBUMIN SERPL BCP-MCNC: 3.4 G/DL (ref 3.5–5.2)
ALP SERPL-CCNC: 114 U/L (ref 55–135)
ALT SERPL W/O P-5'-P-CCNC: 16 U/L (ref 10–44)
ANION GAP SERPL CALC-SCNC: 12 MMOL/L (ref 8–16)
AST SERPL-CCNC: 22 U/L (ref 10–40)
BACTERIA #/AREA URNS HPF: ABNORMAL /HPF
BASOPHILS # BLD AUTO: 0.04 K/UL (ref 0–0.2)
BASOPHILS NFR BLD: 0.6 % (ref 0–1.9)
BILIRUB SERPL-MCNC: 1 MG/DL (ref 0.1–1)
BILIRUB UR QL STRIP: NEGATIVE
BUN SERPL-MCNC: 18 MG/DL (ref 8–23)
CALCIUM SERPL-MCNC: 8.7 MG/DL (ref 8.7–10.5)
CHLORIDE SERPL-SCNC: 101 MMOL/L (ref 95–110)
CLARITY UR: CLEAR
CO2 SERPL-SCNC: 24 MMOL/L (ref 23–29)
COLOR UR: YELLOW
CREAT SERPL-MCNC: 1.5 MG/DL (ref 0.5–1.4)
CTP QC/QA: YES
DIFFERENTIAL METHOD: ABNORMAL
EOSINOPHIL # BLD AUTO: 0.2 K/UL (ref 0–0.5)
EOSINOPHIL NFR BLD: 2.8 % (ref 0–8)
ERYTHROCYTE [DISTWIDTH] IN BLOOD BY AUTOMATED COUNT: 21.2 % (ref 11.5–14.5)
EST. GFR  (AFRICAN AMERICAN): 51 ML/MIN/1.73 M^2
EST. GFR  (NON AFRICAN AMERICAN): 44 ML/MIN/1.73 M^2
ESTIMATED AVG GLUCOSE: 123 MG/DL (ref 68–131)
GLUCOSE SERPL-MCNC: 101 MG/DL (ref 70–110)
GLUCOSE UR QL STRIP: NEGATIVE
GRAM STN SPEC: NORMAL
GRAM STN SPEC: NORMAL
HBA1C MFR BLD: 5.9 % (ref 4–5.6)
HCT VFR BLD AUTO: 34.8 % (ref 40–54)
HGB BLD-MCNC: 10.1 G/DL (ref 14–18)
HGB UR QL STRIP: NEGATIVE
HYALINE CASTS #/AREA URNS LPF: 3 /LPF
IMM GRANULOCYTES # BLD AUTO: 0.02 K/UL (ref 0–0.04)
IMM GRANULOCYTES NFR BLD AUTO: 0.3 % (ref 0–0.5)
KETONES UR QL STRIP: NEGATIVE
LACTATE SERPL-SCNC: 1.5 MMOL/L (ref 0.5–2.2)
LEUKOCYTE ESTERASE UR QL STRIP: NEGATIVE
LIPASE SERPL-CCNC: 11 U/L (ref 4–60)
LYMPHOCYTES # BLD AUTO: 1.1 K/UL (ref 1–4.8)
LYMPHOCYTES NFR BLD: 16.6 % (ref 18–48)
MAGNESIUM SERPL-MCNC: 2.4 MG/DL (ref 1.6–2.6)
MCH RBC QN AUTO: 21.2 PG (ref 27–31)
MCHC RBC AUTO-ENTMCNC: 29 G/DL (ref 32–36)
MCV RBC AUTO: 73 FL (ref 82–98)
MICROSCOPIC COMMENT: ABNORMAL
MONOCYTES # BLD AUTO: 0.8 K/UL (ref 0.3–1)
MONOCYTES NFR BLD: 11.4 % (ref 4–15)
NEUTROPHILS # BLD AUTO: 4.6 K/UL (ref 1.8–7.7)
NEUTROPHILS NFR BLD: 68.3 % (ref 38–73)
NITRITE UR QL STRIP: NEGATIVE
NRBC BLD-RTO: 0 /100 WBC
PH UR STRIP: 6 [PH] (ref 5–8)
PLATELET # BLD AUTO: 212 K/UL (ref 150–450)
PMV BLD AUTO: 10 FL (ref 9.2–12.9)
POTASSIUM SERPL-SCNC: 4 MMOL/L (ref 3.5–5.1)
PROT SERPL-MCNC: 7.7 G/DL (ref 6–8.4)
PROT UR QL STRIP: ABNORMAL
RBC # BLD AUTO: 4.76 M/UL (ref 4.6–6.2)
RBC #/AREA URNS HPF: 0 /HPF (ref 0–4)
SARS-COV-2 RDRP RESP QL NAA+PROBE: NEGATIVE
SODIUM SERPL-SCNC: 137 MMOL/L (ref 136–145)
SP GR UR STRIP: >1.03 (ref 1–1.03)
SQUAMOUS #/AREA URNS HPF: 0 /HPF
URN SPEC COLLECT METH UR: ABNORMAL
UROBILINOGEN UR STRIP-ACNC: >=8 EU/DL
WBC # BLD AUTO: 6.75 K/UL (ref 3.9–12.7)
WBC #/AREA URNS HPF: 3 /HPF (ref 0–5)

## 2021-12-17 PROCEDURE — 99222 PR INITIAL HOSPITAL CARE,LEVL II: ICD-10-PCS | Mod: ,,, | Performed by: STUDENT IN AN ORGANIZED HEALTH CARE EDUCATION/TRAINING PROGRAM

## 2021-12-17 PROCEDURE — 96361 HYDRATE IV INFUSION ADD-ON: CPT

## 2021-12-17 PROCEDURE — 83605 ASSAY OF LACTIC ACID: CPT | Performed by: EMERGENCY MEDICINE

## 2021-12-17 PROCEDURE — 94760 N-INVAS EAR/PLS OXIMETRY 1: CPT

## 2021-12-17 PROCEDURE — 63600175 PHARM REV CODE 636 W HCPCS: Performed by: STUDENT IN AN ORGANIZED HEALTH CARE EDUCATION/TRAINING PROGRAM

## 2021-12-17 PROCEDURE — 25000003 PHARM REV CODE 250: Performed by: STUDENT IN AN ORGANIZED HEALTH CARE EDUCATION/TRAINING PROGRAM

## 2021-12-17 PROCEDURE — 25000003 PHARM REV CODE 250: Performed by: EMERGENCY MEDICINE

## 2021-12-17 PROCEDURE — U0002 COVID-19 LAB TEST NON-CDC: HCPCS | Performed by: STUDENT IN AN ORGANIZED HEALTH CARE EDUCATION/TRAINING PROGRAM

## 2021-12-17 PROCEDURE — 81000 URINALYSIS NONAUTO W/SCOPE: CPT | Performed by: EMERGENCY MEDICINE

## 2021-12-17 PROCEDURE — 83036 HEMOGLOBIN GLYCOSYLATED A1C: CPT | Performed by: STUDENT IN AN ORGANIZED HEALTH CARE EDUCATION/TRAINING PROGRAM

## 2021-12-17 PROCEDURE — 96365 THER/PROPH/DIAG IV INF INIT: CPT

## 2021-12-17 PROCEDURE — 87075 CULTR BACTERIA EXCEPT BLOOD: CPT | Performed by: EMERGENCY MEDICINE

## 2021-12-17 PROCEDURE — 87040 BLOOD CULTURE FOR BACTERIA: CPT | Performed by: EMERGENCY MEDICINE

## 2021-12-17 PROCEDURE — 25000003 PHARM REV CODE 250: Performed by: RADIOLOGY

## 2021-12-17 PROCEDURE — 11000001 HC ACUTE MED/SURG PRIVATE ROOM

## 2021-12-17 PROCEDURE — 85025 COMPLETE CBC W/AUTO DIFF WBC: CPT | Performed by: EMERGENCY MEDICINE

## 2021-12-17 PROCEDURE — 80053 COMPREHEN METABOLIC PANEL: CPT | Performed by: EMERGENCY MEDICINE

## 2021-12-17 PROCEDURE — 83690 ASSAY OF LIPASE: CPT | Performed by: EMERGENCY MEDICINE

## 2021-12-17 PROCEDURE — 63600175 PHARM REV CODE 636 W HCPCS: Performed by: EMERGENCY MEDICINE

## 2021-12-17 PROCEDURE — 87070 CULTURE OTHR SPECIMN AEROBIC: CPT | Performed by: EMERGENCY MEDICINE

## 2021-12-17 PROCEDURE — 99900035 HC TECH TIME PER 15 MIN (STAT)

## 2021-12-17 PROCEDURE — 99222 1ST HOSP IP/OBS MODERATE 55: CPT | Mod: ,,, | Performed by: STUDENT IN AN ORGANIZED HEALTH CARE EDUCATION/TRAINING PROGRAM

## 2021-12-17 PROCEDURE — 96375 TX/PRO/DX INJ NEW DRUG ADDON: CPT

## 2021-12-17 PROCEDURE — 63600175 PHARM REV CODE 636 W HCPCS: Performed by: RADIOLOGY

## 2021-12-17 PROCEDURE — 87205 SMEAR GRAM STAIN: CPT | Performed by: EMERGENCY MEDICINE

## 2021-12-17 PROCEDURE — 99285 EMERGENCY DEPT VISIT HI MDM: CPT | Mod: 25

## 2021-12-17 PROCEDURE — 83735 ASSAY OF MAGNESIUM: CPT | Performed by: EMERGENCY MEDICINE

## 2021-12-17 PROCEDURE — 87102 FUNGUS ISOLATION CULTURE: CPT | Performed by: EMERGENCY MEDICINE

## 2021-12-17 RX ORDER — ENOXAPARIN SODIUM 100 MG/ML
40 INJECTION SUBCUTANEOUS EVERY 24 HOURS
Status: DISCONTINUED | OUTPATIENT
Start: 2021-12-17 | End: 2021-12-21 | Stop reason: HOSPADM

## 2021-12-17 RX ORDER — FUROSEMIDE 20 MG/1
20 TABLET ORAL DAILY
Status: DISCONTINUED | OUTPATIENT
Start: 2021-12-18 | End: 2021-12-21 | Stop reason: HOSPADM

## 2021-12-17 RX ORDER — LIDOCAINE 50 MG/G
2 PATCH TOPICAL
Status: DISCONTINUED | OUTPATIENT
Start: 2021-12-17 | End: 2021-12-21 | Stop reason: HOSPADM

## 2021-12-17 RX ORDER — MORPHINE SULFATE 4 MG/ML
4 INJECTION, SOLUTION INTRAMUSCULAR; INTRAVENOUS
Status: COMPLETED | OUTPATIENT
Start: 2021-12-17 | End: 2021-12-17

## 2021-12-17 RX ORDER — LIDOCAINE 50 MG/G
1 PATCH TOPICAL DAILY PRN
COMMUNITY
Start: 2021-11-26

## 2021-12-17 RX ORDER — OXYCODONE HYDROCHLORIDE 5 MG/1
5 TABLET ORAL EVERY 4 HOURS PRN
Status: DISCONTINUED | OUTPATIENT
Start: 2021-12-17 | End: 2021-12-21 | Stop reason: HOSPADM

## 2021-12-17 RX ORDER — ACETAMINOPHEN 325 MG/1
650 TABLET ORAL EVERY 8 HOURS PRN
Status: DISCONTINUED | OUTPATIENT
Start: 2021-12-17 | End: 2021-12-21 | Stop reason: HOSPADM

## 2021-12-17 RX ORDER — SODIUM CHLORIDE 0.9 % (FLUSH) 0.9 %
10 SYRINGE (ML) INJECTION
Status: DISCONTINUED | OUTPATIENT
Start: 2021-12-17 | End: 2021-12-21 | Stop reason: HOSPADM

## 2021-12-17 RX ORDER — MIDAZOLAM HYDROCHLORIDE 1 MG/ML
INJECTION INTRAMUSCULAR; INTRAVENOUS CODE/TRAUMA/SEDATION MEDICATION
Status: COMPLETED | OUTPATIENT
Start: 2021-12-17 | End: 2021-12-17

## 2021-12-17 RX ORDER — LIDOCAINE HYDROCHLORIDE 10 MG/ML
INJECTION INFILTRATION; PERINEURAL CODE/TRAUMA/SEDATION MEDICATION
Status: COMPLETED | OUTPATIENT
Start: 2021-12-17 | End: 2021-12-17

## 2021-12-17 RX ORDER — FENTANYL CITRATE 50 UG/ML
INJECTION, SOLUTION INTRAMUSCULAR; INTRAVENOUS CODE/TRAUMA/SEDATION MEDICATION
Status: COMPLETED | OUTPATIENT
Start: 2021-12-17 | End: 2021-12-17

## 2021-12-17 RX ORDER — TRAMADOL HYDROCHLORIDE 50 MG/1
50 TABLET ORAL 2 TIMES DAILY PRN
Status: ON HOLD | COMMUNITY
Start: 2021-12-08 | End: 2024-02-21 | Stop reason: HOSPADM

## 2021-12-17 RX ORDER — ONDANSETRON 2 MG/ML
4 INJECTION INTRAMUSCULAR; INTRAVENOUS
Status: COMPLETED | OUTPATIENT
Start: 2021-12-17 | End: 2021-12-17

## 2021-12-17 RX ORDER — CELECOXIB 100 MG/1
200 CAPSULE ORAL DAILY
Status: DISCONTINUED | OUTPATIENT
Start: 2021-12-18 | End: 2021-12-21 | Stop reason: HOSPADM

## 2021-12-17 RX ORDER — DEXTROSE MONOHYDRATE, SODIUM CHLORIDE, AND POTASSIUM CHLORIDE 50; 1.49; 4.5 G/1000ML; G/1000ML; G/1000ML
INJECTION, SOLUTION INTRAVENOUS CONTINUOUS
Status: DISCONTINUED | OUTPATIENT
Start: 2021-12-17 | End: 2021-12-18

## 2021-12-17 RX ORDER — LIDOCAINE HYDROCHLORIDE 10 MG/ML
1 INJECTION, SOLUTION EPIDURAL; INFILTRATION; INTRACAUDAL; PERINEURAL ONCE
Status: DISCONTINUED | OUTPATIENT
Start: 2021-12-17 | End: 2021-12-21 | Stop reason: HOSPADM

## 2021-12-17 RX ORDER — TALC
6 POWDER (GRAM) TOPICAL NIGHTLY PRN
Status: DISCONTINUED | OUTPATIENT
Start: 2021-12-17 | End: 2021-12-21 | Stop reason: HOSPADM

## 2021-12-17 RX ORDER — GABAPENTIN 600 MG/1
600 TABLET ORAL 3 TIMES DAILY
COMMUNITY
Start: 2021-12-08 | End: 2024-02-15

## 2021-12-17 RX ORDER — GABAPENTIN 300 MG/1
600 CAPSULE ORAL 3 TIMES DAILY
Status: DISCONTINUED | OUTPATIENT
Start: 2021-12-17 | End: 2021-12-21 | Stop reason: HOSPADM

## 2021-12-17 RX ORDER — PROCHLORPERAZINE EDISYLATE 5 MG/ML
10 INJECTION INTRAMUSCULAR; INTRAVENOUS
Status: COMPLETED | OUTPATIENT
Start: 2021-12-17 | End: 2021-12-17

## 2021-12-17 RX ORDER — ACETAMINOPHEN 325 MG/1
650 TABLET ORAL EVERY 4 HOURS PRN
Status: DISCONTINUED | OUTPATIENT
Start: 2021-12-17 | End: 2021-12-21 | Stop reason: HOSPADM

## 2021-12-17 RX ORDER — ONDANSETRON 8 MG/1
8 TABLET, ORALLY DISINTEGRATING ORAL EVERY 8 HOURS PRN
Status: DISCONTINUED | OUTPATIENT
Start: 2021-12-17 | End: 2021-12-21 | Stop reason: HOSPADM

## 2021-12-17 RX ORDER — FLUTICASONE FUROATE, UMECLIDINIUM BROMIDE AND VILANTEROL TRIFENATATE 100; 62.5; 25 UG/1; UG/1; UG/1
1 POWDER RESPIRATORY (INHALATION) DAILY
COMMUNITY
Start: 2021-11-09 | End: 2024-02-15 | Stop reason: SDUPTHER

## 2021-12-17 RX ADMIN — DEXTROSE, SODIUM CHLORIDE, AND POTASSIUM CHLORIDE: 5; .45; .15 INJECTION INTRAVENOUS at 10:12

## 2021-12-17 RX ADMIN — PIPERACILLIN AND TAZOBACTAM 4.5 G: 4; .5 INJECTION, POWDER, LYOPHILIZED, FOR SOLUTION INTRAVENOUS; PARENTERAL at 11:12

## 2021-12-17 RX ADMIN — LIDOCAINE HYDROCHLORIDE 5 ML: 10 INJECTION, SOLUTION INFILTRATION; PERINEURAL at 02:12

## 2021-12-17 RX ADMIN — MIDAZOLAM HYDROCHLORIDE 0.5 MG: 1 INJECTION, SOLUTION INTRAMUSCULAR; INTRAVENOUS at 02:12

## 2021-12-17 RX ADMIN — PROCHLORPERAZINE EDISYLATE 10 MG: 5 INJECTION INTRAMUSCULAR; INTRAVENOUS at 10:12

## 2021-12-17 RX ADMIN — ONDANSETRON 4 MG: 2 INJECTION INTRAMUSCULAR; INTRAVENOUS at 10:12

## 2021-12-17 RX ADMIN — MORPHINE SULFATE 4 MG: 4 INJECTION, SOLUTION INTRAMUSCULAR; INTRAVENOUS at 09:12

## 2021-12-17 RX ADMIN — LIDOCAINE 5% 2 PATCH: 700 PATCH TOPICAL at 11:12

## 2021-12-17 RX ADMIN — SODIUM CHLORIDE 1000 ML: 0.9 INJECTION, SOLUTION INTRAVENOUS at 09:12

## 2021-12-17 RX ADMIN — GABAPENTIN 600 MG: 300 CAPSULE ORAL at 11:12

## 2021-12-17 RX ADMIN — OXYCODONE 5 MG: 5 TABLET ORAL at 05:12

## 2021-12-17 RX ADMIN — OXYCODONE 5 MG: 5 TABLET ORAL at 10:12

## 2021-12-17 RX ADMIN — FENTANYL CITRATE 25 MCG: 50 INJECTION, SOLUTION INTRAMUSCULAR; INTRAVENOUS at 02:12

## 2021-12-17 RX ADMIN — PIPERACILLIN AND TAZOBACTAM 4.5 G: 4; .5 INJECTION, POWDER, LYOPHILIZED, FOR SOLUTION INTRAVENOUS; PARENTERAL at 10:12

## 2021-12-18 PROBLEM — K57.92 DIVERTICULITIS: Status: ACTIVE | Noted: 2021-12-18

## 2021-12-18 LAB
ALBUMIN SERPL BCP-MCNC: 2.8 G/DL (ref 3.5–5.2)
ALP SERPL-CCNC: 93 U/L (ref 55–135)
ALT SERPL W/O P-5'-P-CCNC: 15 U/L (ref 10–44)
ANION GAP SERPL CALC-SCNC: 8 MMOL/L (ref 8–16)
AST SERPL-CCNC: 20 U/L (ref 10–40)
BASOPHILS # BLD AUTO: 0.03 K/UL (ref 0–0.2)
BASOPHILS NFR BLD: 0.6 % (ref 0–1.9)
BILIRUB SERPL-MCNC: 1 MG/DL (ref 0.1–1)
BUN SERPL-MCNC: 14 MG/DL (ref 8–23)
CALCIUM SERPL-MCNC: 8.4 MG/DL (ref 8.7–10.5)
CHLORIDE SERPL-SCNC: 102 MMOL/L (ref 95–110)
CO2 SERPL-SCNC: 25 MMOL/L (ref 23–29)
CREAT SERPL-MCNC: 1.5 MG/DL (ref 0.5–1.4)
DIFFERENTIAL METHOD: ABNORMAL
EOSINOPHIL # BLD AUTO: 0.3 K/UL (ref 0–0.5)
EOSINOPHIL NFR BLD: 5.4 % (ref 0–8)
ERYTHROCYTE [DISTWIDTH] IN BLOOD BY AUTOMATED COUNT: 21 % (ref 11.5–14.5)
EST. GFR  (AFRICAN AMERICAN): 51 ML/MIN/1.73 M^2
EST. GFR  (NON AFRICAN AMERICAN): 44 ML/MIN/1.73 M^2
GLUCOSE SERPL-MCNC: 123 MG/DL (ref 70–110)
HCT VFR BLD AUTO: 32 % (ref 40–54)
HGB BLD-MCNC: 9.1 G/DL (ref 14–18)
IMM GRANULOCYTES # BLD AUTO: 0.01 K/UL (ref 0–0.04)
IMM GRANULOCYTES NFR BLD AUTO: 0.2 % (ref 0–0.5)
LYMPHOCYTES # BLD AUTO: 1 K/UL (ref 1–4.8)
LYMPHOCYTES NFR BLD: 19 % (ref 18–48)
MAGNESIUM SERPL-MCNC: 2.3 MG/DL (ref 1.6–2.6)
MCH RBC QN AUTO: 20.9 PG (ref 27–31)
MCHC RBC AUTO-ENTMCNC: 28.4 G/DL (ref 32–36)
MCV RBC AUTO: 74 FL (ref 82–98)
MONOCYTES # BLD AUTO: 0.9 K/UL (ref 0.3–1)
MONOCYTES NFR BLD: 16.6 % (ref 4–15)
NEUTROPHILS # BLD AUTO: 3.1 K/UL (ref 1.8–7.7)
NEUTROPHILS NFR BLD: 58.2 % (ref 38–73)
NRBC BLD-RTO: 0 /100 WBC
PHOSPHATE SERPL-MCNC: 2.9 MG/DL (ref 2.7–4.5)
PLATELET # BLD AUTO: 202 K/UL (ref 150–450)
PMV BLD AUTO: 10.3 FL (ref 9.2–12.9)
POCT GLUCOSE: 108 MG/DL (ref 70–110)
POCT GLUCOSE: 110 MG/DL (ref 70–110)
POCT GLUCOSE: 120 MG/DL (ref 70–110)
POCT GLUCOSE: 82 MG/DL (ref 70–110)
POTASSIUM SERPL-SCNC: 4 MMOL/L (ref 3.5–5.1)
PROT SERPL-MCNC: 7 G/DL (ref 6–8.4)
RBC # BLD AUTO: 4.35 M/UL (ref 4.6–6.2)
SODIUM SERPL-SCNC: 135 MMOL/L (ref 136–145)
WBC # BLD AUTO: 5.37 K/UL (ref 3.9–12.7)

## 2021-12-18 PROCEDURE — 83735 ASSAY OF MAGNESIUM: CPT | Performed by: STUDENT IN AN ORGANIZED HEALTH CARE EDUCATION/TRAINING PROGRAM

## 2021-12-18 PROCEDURE — 94761 N-INVAS EAR/PLS OXIMETRY MLT: CPT

## 2021-12-18 PROCEDURE — 11000001 HC ACUTE MED/SURG PRIVATE ROOM

## 2021-12-18 PROCEDURE — 80053 COMPREHEN METABOLIC PANEL: CPT | Performed by: STUDENT IN AN ORGANIZED HEALTH CARE EDUCATION/TRAINING PROGRAM

## 2021-12-18 PROCEDURE — 84100 ASSAY OF PHOSPHORUS: CPT | Performed by: STUDENT IN AN ORGANIZED HEALTH CARE EDUCATION/TRAINING PROGRAM

## 2021-12-18 PROCEDURE — 99232 PR SUBSEQUENT HOSPITAL CARE,LEVL II: ICD-10-PCS | Mod: ,,, | Performed by: STUDENT IN AN ORGANIZED HEALTH CARE EDUCATION/TRAINING PROGRAM

## 2021-12-18 PROCEDURE — 63600175 PHARM REV CODE 636 W HCPCS: Performed by: STUDENT IN AN ORGANIZED HEALTH CARE EDUCATION/TRAINING PROGRAM

## 2021-12-18 PROCEDURE — 99900035 HC TECH TIME PER 15 MIN (STAT)

## 2021-12-18 PROCEDURE — 25000003 PHARM REV CODE 250: Performed by: STUDENT IN AN ORGANIZED HEALTH CARE EDUCATION/TRAINING PROGRAM

## 2021-12-18 PROCEDURE — 36415 COLL VENOUS BLD VENIPUNCTURE: CPT | Performed by: STUDENT IN AN ORGANIZED HEALTH CARE EDUCATION/TRAINING PROGRAM

## 2021-12-18 PROCEDURE — 85025 COMPLETE CBC W/AUTO DIFF WBC: CPT | Performed by: STUDENT IN AN ORGANIZED HEALTH CARE EDUCATION/TRAINING PROGRAM

## 2021-12-18 PROCEDURE — 27000221 HC OXYGEN, UP TO 24 HOURS

## 2021-12-18 PROCEDURE — 94760 N-INVAS EAR/PLS OXIMETRY 1: CPT

## 2021-12-18 PROCEDURE — 99232 SBSQ HOSP IP/OBS MODERATE 35: CPT | Mod: ,,, | Performed by: STUDENT IN AN ORGANIZED HEALTH CARE EDUCATION/TRAINING PROGRAM

## 2021-12-18 RX ADMIN — FUROSEMIDE 20 MG: 20 TABLET ORAL at 08:12

## 2021-12-18 RX ADMIN — GABAPENTIN 600 MG: 300 CAPSULE ORAL at 02:12

## 2021-12-18 RX ADMIN — LACTOBACILLUS TAB 4 TABLET: TAB at 08:12

## 2021-12-18 RX ADMIN — GABAPENTIN 600 MG: 300 CAPSULE ORAL at 08:12

## 2021-12-18 RX ADMIN — THERA TABS 1 TABLET: TAB at 08:12

## 2021-12-18 RX ADMIN — PIPERACILLIN AND TAZOBACTAM 4.5 G: 4; .5 INJECTION, POWDER, LYOPHILIZED, FOR SOLUTION INTRAVENOUS; PARENTERAL at 05:12

## 2021-12-18 RX ADMIN — OXYCODONE 5 MG: 5 TABLET ORAL at 05:12

## 2021-12-18 RX ADMIN — LACTOBACILLUS TAB 4 TABLET: TAB at 04:12

## 2021-12-18 RX ADMIN — LIDOCAINE 5% 2 PATCH: 700 PATCH TOPICAL at 08:12

## 2021-12-18 RX ADMIN — ACETAMINOPHEN 650 MG: 325 TABLET ORAL at 05:12

## 2021-12-18 RX ADMIN — ENOXAPARIN SODIUM 40 MG: 100 INJECTION SUBCUTANEOUS at 04:12

## 2021-12-18 RX ADMIN — OXYCODONE 5 MG: 5 TABLET ORAL at 08:12

## 2021-12-18 RX ADMIN — PIPERACILLIN AND TAZOBACTAM 4.5 G: 4; .5 INJECTION, POWDER, LYOPHILIZED, FOR SOLUTION INTRAVENOUS; PARENTERAL at 08:12

## 2021-12-18 RX ADMIN — DEXTROSE, SODIUM CHLORIDE, AND POTASSIUM CHLORIDE: 5; .45; .15 INJECTION INTRAVENOUS at 10:12

## 2021-12-18 RX ADMIN — CELECOXIB 200 MG: 100 CAPSULE ORAL at 08:12

## 2021-12-18 RX ADMIN — OXYCODONE 5 MG: 5 TABLET ORAL at 03:12

## 2021-12-18 RX ADMIN — PIPERACILLIN AND TAZOBACTAM 4.5 G: 4; .5 INJECTION, POWDER, LYOPHILIZED, FOR SOLUTION INTRAVENOUS; PARENTERAL at 12:12

## 2021-12-18 RX ADMIN — OXYCODONE 5 MG: 5 TABLET ORAL at 10:12

## 2021-12-19 LAB
POCT GLUCOSE: 86 MG/DL (ref 70–110)
POCT GLUCOSE: 92 MG/DL (ref 70–110)
POCT GLUCOSE: 92 MG/DL (ref 70–110)

## 2021-12-19 PROCEDURE — 25000003 PHARM REV CODE 250: Performed by: STUDENT IN AN ORGANIZED HEALTH CARE EDUCATION/TRAINING PROGRAM

## 2021-12-19 PROCEDURE — 27000221 HC OXYGEN, UP TO 24 HOURS

## 2021-12-19 PROCEDURE — 99232 PR SUBSEQUENT HOSPITAL CARE,LEVL II: ICD-10-PCS | Mod: ,,, | Performed by: STUDENT IN AN ORGANIZED HEALTH CARE EDUCATION/TRAINING PROGRAM

## 2021-12-19 PROCEDURE — 94761 N-INVAS EAR/PLS OXIMETRY MLT: CPT

## 2021-12-19 PROCEDURE — 99232 SBSQ HOSP IP/OBS MODERATE 35: CPT | Mod: ,,, | Performed by: STUDENT IN AN ORGANIZED HEALTH CARE EDUCATION/TRAINING PROGRAM

## 2021-12-19 PROCEDURE — 11000001 HC ACUTE MED/SURG PRIVATE ROOM

## 2021-12-19 PROCEDURE — 63600175 PHARM REV CODE 636 W HCPCS: Performed by: STUDENT IN AN ORGANIZED HEALTH CARE EDUCATION/TRAINING PROGRAM

## 2021-12-19 PROCEDURE — 99900035 HC TECH TIME PER 15 MIN (STAT)

## 2021-12-19 RX ADMIN — LIDOCAINE 5% 2 PATCH: 700 PATCH TOPICAL at 08:12

## 2021-12-19 RX ADMIN — PIPERACILLIN AND TAZOBACTAM 4.5 G: 4; .5 INJECTION, POWDER, LYOPHILIZED, FOR SOLUTION INTRAVENOUS; PARENTERAL at 08:12

## 2021-12-19 RX ADMIN — LACTOBACILLUS TAB 4 TABLET: TAB at 09:12

## 2021-12-19 RX ADMIN — GABAPENTIN 600 MG: 300 CAPSULE ORAL at 08:12

## 2021-12-19 RX ADMIN — GABAPENTIN 600 MG: 300 CAPSULE ORAL at 09:12

## 2021-12-19 RX ADMIN — CELECOXIB 200 MG: 100 CAPSULE ORAL at 09:12

## 2021-12-19 RX ADMIN — FUROSEMIDE 20 MG: 20 TABLET ORAL at 09:12

## 2021-12-19 RX ADMIN — PIPERACILLIN AND TAZOBACTAM 4.5 G: 4; .5 INJECTION, POWDER, LYOPHILIZED, FOR SOLUTION INTRAVENOUS; PARENTERAL at 05:12

## 2021-12-19 RX ADMIN — OXYCODONE 5 MG: 5 TABLET ORAL at 11:12

## 2021-12-19 RX ADMIN — OXYCODONE 5 MG: 5 TABLET ORAL at 08:12

## 2021-12-19 RX ADMIN — GABAPENTIN 600 MG: 300 CAPSULE ORAL at 02:12

## 2021-12-19 RX ADMIN — OXYCODONE 5 MG: 5 TABLET ORAL at 05:12

## 2021-12-19 RX ADMIN — THERA TABS 1 TABLET: TAB at 09:12

## 2021-12-19 RX ADMIN — PIPERACILLIN AND TAZOBACTAM 4.5 G: 4; .5 INJECTION, POWDER, LYOPHILIZED, FOR SOLUTION INTRAVENOUS; PARENTERAL at 02:12

## 2021-12-19 NOTE — PROGRESS NOTES
"[facility]  Adult Nutrition  Progress Note    SUMMARY       Recommendations    Recommendation:   1. Continue current diabetic diet order.   2. Addition of Boost Glucose Control TID to supplement protein and calorie intake   3. RD to continue to follow and monitor intake and whether hyperglycemia is illness related or new onset DM.    Goals:   Pt intake >/= 75% EEN/EPN by RD follow up    Nutrition Goal Status: new  Communication of RD Recs: other (comment)(POC)    Reason for Assessment    Reason For Assessment: length of stay(day 10)  Diagnosis: other (see comments)(Acute respiratory disease due to COVID-19 virus )  Relevant Medical History: HTN, COPD, former smoker  Interdisciplinary Rounds: did not attend  General Information Comments: Pt COVID+,  on diabetic 2000 kcal diet, with 25-75% intake recorded. Pt postive for melenic dirrhea, had GI video capsule endoscopy 7/30. Denies N/V/C. NFPE not completed 2/2 isolation precaustions for COVID.   Nutrition Discharge Planning: d/c on cardiac diet with adequate intake    Nutrition Risk Screen    Nutrition Risk Screen: no indicators present    Nutrition/Diet History    Food Preferences: ADA  Spiritual, Cultural Beliefs, Confucianism Practices, Values that Affect Care: no  Food Allergies: NKFA  Factors Affecting Nutritional Intake: other (see comments)(pt on continuous vapotherm)    Anthropometrics    Temp: 98.3 °F (36.8 °C)  Height Method: Stated  Height: 5' 11" (180.3 cm)  Height (inches): 71 in  Weight Method: Bed Scale  Weight: 83 kg (182 lb 15.7 oz)  Weight (lb): 182.98 lb  Ideal Body Weight (IBW), Male: 172 lb  % Ideal Body Weight, Male (lb): 106.38 %  BMI (Calculated): 25.5  BMI Grade: 25 - 29.9 - overweight       Lab/Procedures/Meds    Pertinent Labs Reviewed: reviewed  Pertinent Labs Comments: H/H 8.5/26.4, Na 135, BUN 24, glu 136, Ca 7.9, Total Pro 5.4, Alb 2.2, A1C 6.3  Pertinent Medications Reviewed: reviewed  Pertinent Medications Comments: " Patent albuterol, dexamethazone, lactobacillus acidoph, metronidazple, MVI, pantoprazole, vancomucin, voriconazole    Physical Findings/Assessment    Yaw Score: 16    Estimated/Assessed Needs    Weight Used For Calorie Calculations: 83 kg (182 lb 15.7 oz)  Energy Calorie Requirements (kcal): 2057  Energy Need Method: Toombs-St Jeor(x 1.3)  Protein Requirements: (1.0-1.2 gm/kg)  Weight Used For Protein Calculations: 83 kg (182 lb 15.7 oz)     Estimated Fluid Requirement Method: RDA Method(or PER MD)  RDA Method (mL): 2057         Nutrition Prescription Ordered    Current Diet Order: Diabetic, 2000 kcal    Evaluation of Received Nutrient/Fluid Intake    I/O: +200  Energy Calories Required: not meeting needs  Protein Required: not meeting needs  Fluid Required: not meeting needs  Comments: LBM 7/30  Tolerance: tolerating  % Intake of Estimated Energy Needs: 25 - 50 %  % Meal Intake: Other: 25-75%    Nutrition Risk    Level of Risk/Frequency of Follow-up: (2 x/week)     Assessment and Plan    * Acute respiratory disease due to COVID-19 virus  Contributing Nutrition Diagnosis  Inadequate oral intake    Related to (etiology):   physiological state    Signs and Symptoms (as evidenced by):   Pt COVID+ needing continuous respiratory support, and NPO on 7/30 for GI video capsule endoscopy 2/2 GI bleed    Interventions:  Collaboration with other providers  Commercial Beverage: Boost Glucose Control TID to supplement protein and calorie intake      Nutrition Diagnosis Status:   New           Monitor and Evaluation    Food and Nutrient Intake: energy intake, food and beverage intake  Food and Nutrient Adminstration: diet order  Knowledge/Beliefs/Attitudes: food and nutrition knowledge/skill  Physical Activity and Function: nutrition-related ADLs and IADLs  Anthropometric Measurements: weight, weight change, body mass index  Biochemical Data, Medical Tests and Procedures: electrolyte and renal panel, gastrointestinal profile,  glucose/endocrine profile, inflammatory profile, lipid profile     Malnutrition Assessment       NFPE not performed, patient has been screened for possible COVID-19 and has been placed on airborne and contact precautions. Patient is noted as being positive for COVID-19.         Nutrition Follow-Up    RD Follow-up?: Yes

## 2021-12-20 LAB
POCT GLUCOSE: 112 MG/DL (ref 70–110)
POCT GLUCOSE: 125 MG/DL (ref 70–110)
POCT GLUCOSE: 82 MG/DL (ref 70–110)
POCT GLUCOSE: 93 MG/DL (ref 70–110)

## 2021-12-20 PROCEDURE — 94761 N-INVAS EAR/PLS OXIMETRY MLT: CPT

## 2021-12-20 PROCEDURE — 25000003 PHARM REV CODE 250: Performed by: STUDENT IN AN ORGANIZED HEALTH CARE EDUCATION/TRAINING PROGRAM

## 2021-12-20 PROCEDURE — 11000001 HC ACUTE MED/SURG PRIVATE ROOM

## 2021-12-20 PROCEDURE — 63600175 PHARM REV CODE 636 W HCPCS: Performed by: STUDENT IN AN ORGANIZED HEALTH CARE EDUCATION/TRAINING PROGRAM

## 2021-12-20 PROCEDURE — 99900035 HC TECH TIME PER 15 MIN (STAT)

## 2021-12-20 PROCEDURE — 27000221 HC OXYGEN, UP TO 24 HOURS

## 2021-12-20 RX ORDER — AMOXICILLIN AND CLAVULANATE POTASSIUM 875; 125 MG/1; MG/1
1 TABLET, FILM COATED ORAL 2 TIMES DAILY
Qty: 28 TABLET | Refills: 0 | Status: SHIPPED | OUTPATIENT
Start: 2021-12-20 | End: 2022-01-03

## 2021-12-20 RX ADMIN — PIPERACILLIN AND TAZOBACTAM 4.5 G: 4; .5 INJECTION, POWDER, LYOPHILIZED, FOR SOLUTION INTRAVENOUS; PARENTERAL at 12:12

## 2021-12-20 RX ADMIN — GABAPENTIN 600 MG: 300 CAPSULE ORAL at 03:12

## 2021-12-20 RX ADMIN — GABAPENTIN 600 MG: 300 CAPSULE ORAL at 08:12

## 2021-12-20 RX ADMIN — FUROSEMIDE 20 MG: 20 TABLET ORAL at 08:12

## 2021-12-20 RX ADMIN — THERA TABS 1 TABLET: TAB at 08:12

## 2021-12-20 RX ADMIN — GABAPENTIN 600 MG: 300 CAPSULE ORAL at 09:12

## 2021-12-20 RX ADMIN — CELECOXIB 200 MG: 100 CAPSULE ORAL at 08:12

## 2021-12-20 RX ADMIN — LACTOBACILLUS TAB 4 TABLET: TAB at 08:12

## 2021-12-20 RX ADMIN — PIPERACILLIN AND TAZOBACTAM 4.5 G: 4; .5 INJECTION, POWDER, LYOPHILIZED, FOR SOLUTION INTRAVENOUS; PARENTERAL at 04:12

## 2021-12-20 RX ADMIN — OXYCODONE 5 MG: 5 TABLET ORAL at 08:12

## 2021-12-20 RX ADMIN — LACTOBACILLUS TAB 4 TABLET: TAB at 05:12

## 2021-12-21 VITALS
HEIGHT: 71 IN | OXYGEN SATURATION: 94 % | WEIGHT: 194.25 LBS | RESPIRATION RATE: 12 BRPM | HEART RATE: 69 BPM | SYSTOLIC BLOOD PRESSURE: 101 MMHG | BODY MASS INDEX: 27.19 KG/M2 | TEMPERATURE: 98 F | DIASTOLIC BLOOD PRESSURE: 55 MMHG

## 2021-12-21 LAB
BACTERIA SPEC AEROBE CULT: NO GROWTH
POCT GLUCOSE: 106 MG/DL (ref 70–110)

## 2021-12-21 PROCEDURE — 94761 N-INVAS EAR/PLS OXIMETRY MLT: CPT

## 2021-12-21 PROCEDURE — 27000221 HC OXYGEN, UP TO 24 HOURS

## 2021-12-22 LAB
BACTERIA BLD CULT: NORMAL
BACTERIA BLD CULT: NORMAL

## 2021-12-27 LAB — BACTERIA SPEC ANAEROBE CULT: NORMAL

## 2022-01-03 ENCOUNTER — OFFICE VISIT (OUTPATIENT)
Dept: SURGERY | Facility: CLINIC | Age: 77
End: 2022-01-03
Payer: MEDICARE

## 2022-01-03 VITALS
SYSTOLIC BLOOD PRESSURE: 127 MMHG | HEIGHT: 71 IN | WEIGHT: 201.75 LBS | BODY MASS INDEX: 28.24 KG/M2 | HEART RATE: 93 BPM | DIASTOLIC BLOOD PRESSURE: 72 MMHG

## 2022-01-03 DIAGNOSIS — K57.92 DIVERTICULITIS: Primary | ICD-10-CM

## 2022-01-03 PROCEDURE — 3074F PR MOST RECENT SYSTOLIC BLOOD PRESSURE < 130 MM HG: ICD-10-PCS | Mod: CPTII,S$GLB,, | Performed by: STUDENT IN AN ORGANIZED HEALTH CARE EDUCATION/TRAINING PROGRAM

## 2022-01-03 PROCEDURE — 3078F DIAST BP <80 MM HG: CPT | Mod: CPTII,S$GLB,, | Performed by: STUDENT IN AN ORGANIZED HEALTH CARE EDUCATION/TRAINING PROGRAM

## 2022-01-03 PROCEDURE — 1111F PR DISCHARGE MEDS RECONCILED W/ CURRENT OUTPATIENT MED LIST: ICD-10-PCS | Mod: CPTII,S$GLB,, | Performed by: STUDENT IN AN ORGANIZED HEALTH CARE EDUCATION/TRAINING PROGRAM

## 2022-01-03 PROCEDURE — 1160F PR REVIEW ALL MEDS BY PRESCRIBER/CLIN PHARMACIST DOCUMENTED: ICD-10-PCS | Mod: CPTII,S$GLB,, | Performed by: STUDENT IN AN ORGANIZED HEALTH CARE EDUCATION/TRAINING PROGRAM

## 2022-01-03 PROCEDURE — 1160F RVW MEDS BY RX/DR IN RCRD: CPT | Mod: CPTII,S$GLB,, | Performed by: STUDENT IN AN ORGANIZED HEALTH CARE EDUCATION/TRAINING PROGRAM

## 2022-01-03 PROCEDURE — 99213 PR OFFICE/OUTPT VISIT, EST, LEVL III, 20-29 MIN: ICD-10-PCS | Mod: S$GLB,,, | Performed by: STUDENT IN AN ORGANIZED HEALTH CARE EDUCATION/TRAINING PROGRAM

## 2022-01-03 PROCEDURE — 1101F PT FALLS ASSESS-DOCD LE1/YR: CPT | Mod: CPTII,S$GLB,, | Performed by: STUDENT IN AN ORGANIZED HEALTH CARE EDUCATION/TRAINING PROGRAM

## 2022-01-03 PROCEDURE — 1159F MED LIST DOCD IN RCRD: CPT | Mod: CPTII,S$GLB,, | Performed by: STUDENT IN AN ORGANIZED HEALTH CARE EDUCATION/TRAINING PROGRAM

## 2022-01-03 PROCEDURE — 1101F PR PT FALLS ASSESS DOC 0-1 FALLS W/OUT INJ PAST YR: ICD-10-PCS | Mod: CPTII,S$GLB,, | Performed by: STUDENT IN AN ORGANIZED HEALTH CARE EDUCATION/TRAINING PROGRAM

## 2022-01-03 PROCEDURE — 1111F DSCHRG MED/CURRENT MED MERGE: CPT | Mod: CPTII,S$GLB,, | Performed by: STUDENT IN AN ORGANIZED HEALTH CARE EDUCATION/TRAINING PROGRAM

## 2022-01-03 PROCEDURE — 3288F PR FALLS RISK ASSESSMENT DOCUMENTED: ICD-10-PCS | Mod: CPTII,S$GLB,, | Performed by: STUDENT IN AN ORGANIZED HEALTH CARE EDUCATION/TRAINING PROGRAM

## 2022-01-03 PROCEDURE — 3078F PR MOST RECENT DIASTOLIC BLOOD PRESSURE < 80 MM HG: ICD-10-PCS | Mod: CPTII,S$GLB,, | Performed by: STUDENT IN AN ORGANIZED HEALTH CARE EDUCATION/TRAINING PROGRAM

## 2022-01-03 PROCEDURE — 3074F SYST BP LT 130 MM HG: CPT | Mod: CPTII,S$GLB,, | Performed by: STUDENT IN AN ORGANIZED HEALTH CARE EDUCATION/TRAINING PROGRAM

## 2022-01-03 PROCEDURE — 99999 PR PBB SHADOW E&M-EST. PATIENT-LVL III: ICD-10-PCS | Mod: PBBFAC,,, | Performed by: STUDENT IN AN ORGANIZED HEALTH CARE EDUCATION/TRAINING PROGRAM

## 2022-01-03 PROCEDURE — 1126F PR PAIN SEVERITY QUANTIFIED, NO PAIN PRESENT: ICD-10-PCS | Mod: CPTII,S$GLB,, | Performed by: STUDENT IN AN ORGANIZED HEALTH CARE EDUCATION/TRAINING PROGRAM

## 2022-01-03 PROCEDURE — 1126F AMNT PAIN NOTED NONE PRSNT: CPT | Mod: CPTII,S$GLB,, | Performed by: STUDENT IN AN ORGANIZED HEALTH CARE EDUCATION/TRAINING PROGRAM

## 2022-01-03 PROCEDURE — 1159F PR MEDICATION LIST DOCUMENTED IN MEDICAL RECORD: ICD-10-PCS | Mod: CPTII,S$GLB,, | Performed by: STUDENT IN AN ORGANIZED HEALTH CARE EDUCATION/TRAINING PROGRAM

## 2022-01-03 PROCEDURE — 99213 OFFICE O/P EST LOW 20 MIN: CPT | Mod: S$GLB,,, | Performed by: STUDENT IN AN ORGANIZED HEALTH CARE EDUCATION/TRAINING PROGRAM

## 2022-01-03 PROCEDURE — 99999 PR PBB SHADOW E&M-EST. PATIENT-LVL III: CPT | Mod: PBBFAC,,, | Performed by: STUDENT IN AN ORGANIZED HEALTH CARE EDUCATION/TRAINING PROGRAM

## 2022-01-03 PROCEDURE — 3288F FALL RISK ASSESSMENT DOCD: CPT | Mod: CPTII,S$GLB,, | Performed by: STUDENT IN AN ORGANIZED HEALTH CARE EDUCATION/TRAINING PROGRAM

## 2022-01-04 NOTE — PROGRESS NOTES
Patient ID: Rajan Deluna is a 76 y.o. male.    Chief Complaint: No chief complaint on file.      HPI:  HPI   76M recently admitted for diverticulitis. Feeling well now. Has another day of abx left. No fevers. Regular BMs. No NV. No pain at all. On home O2, +JACOME just walking to waiting room.   Uncertain if has ever had colonoscopy, nothing in chart.   Sees pulm jan5.     Review of Systems   Constitutional: Negative for chills, diaphoresis and fever.   HENT: Negative for trouble swallowing.    Respiratory: Positive for shortness of breath. Negative for cough, wheezing and stridor.    Cardiovascular: Negative for chest pain and palpitations.   Gastrointestinal: Negative for abdominal distention, abdominal pain, blood in stool, diarrhea, nausea and vomiting.   Endocrine: Negative for cold intolerance and heat intolerance.   Genitourinary: Negative for difficulty urinating.   Musculoskeletal: Negative for back pain.   Skin: Negative for rash.   Allergic/Immunologic: Negative for immunocompromised state.   Neurological: Negative for dizziness, syncope and numbness.   Hematological: Negative for adenopathy.   Psychiatric/Behavioral: Negative for agitation.       Current Outpatient Medications   Medication Sig Dispense Refill    celecoxib (CELEBREX) 200 MG capsule Take 1 capsule (200 mg total) by mouth once daily. 30 capsule 1    furosemide (LASIX) 20 MG tablet Take 1 tablet (20 mg total) by mouth once daily. 30 tablet 1    gabapentin (NEURONTIN) 600 MG tablet Take 600 mg by mouth 3 (three) times daily.      hydrOXYzine HCL (ATARAX) 25 MG tablet Take 1 tablet (25 mg total) by mouth 3 (three) times daily as needed for Itching. 30 tablet 0    tamsulosin (FLOMAX) 0.4 mg Cap Take 1 capsule (0.4 mg total) by mouth once daily. 30 capsule 1    traMADoL (ULTRAM) 50 mg tablet Take 50 mg by mouth 2 (two) times daily as needed for Pain.      budesonide-formoterol 160-4.5 mcg (SYMBICORT) 160-4.5 mcg/actuation HFAA Inhale 2  puffs into the lungs every 12 (twelve) hours.      fluticasone-umeclidin-vilanter (TRELEGY ELLIPTA) 100-62.5-25 mcg DsDv Inhale 1 puff into the lungs once daily.      itraconazole (SPORANOX) 100 mg Cap Take 2 capsules (200 mg total) by mouth once daily. (Patient not taking: No sig reported) 60 capsule 1    lactobacillus acidophilus & bulgar (LACTINEX) 100 million cell packet Take 1 packet (1 each total) by mouth 2 (two) times daily with meals. (Patient not taking: Reported on 1/3/2022) 60 tablet 1    LIDOcaine (LIDODERM) 5 % Place 1 patch onto the skin once daily.      multivitamin Tab Take 1 tablet by mouth once daily. (Patient not taking: Reported on 1/3/2022) 30 tablet 0    pregabalin (LYRICA) 75 MG capsule Take 1 capsule (75 mg total) by mouth 2 (two) times daily. (Patient not taking: No sig reported) 60 capsule 0    PROAIR HFA 90 mcg/actuation inhaler Inhale 1-2 puffs into the lungs every 4 (four) hours as needed for Wheezing or Shortness of Breath. (Patient not taking: No sig reported) 8 g 1    tiotropium (SPIRIVA) 18 mcg inhalation capsule Inhale 1 capsule (18 mcg total) into the lungs once daily. Controller (Patient not taking: No sig reported) 30 capsule 0    TRELEGY ELLIPTA 100-62.5-25 mcg DsDv Inhale 1 puff into the lungs once daily.       No current facility-administered medications for this visit.       Review of patient's allergies indicates:  No Known Allergies    Past Medical History:   Diagnosis Date    Arthritis     COPD (chronic obstructive pulmonary disease)     Diabetes mellitus     Digestive disorder     Encounter for blood transfusion     Hypertension     Renal disorder     Smoker     Weakness        Past Surgical History:   Procedure Laterality Date    HERNIA REPAIR      INTRALUMINAL GASTROINTESTINAL TRACT IMAGING VIA CAPSULE N/A 8/3/2020    Procedure: IMAGING PROCEDURE, GI TRACT, INTRALUMINAL, VIA CAPSULE;  Surgeon: Rey Olivares MD;  Location: [facility];  Service:  Endoscopy;  Laterality: N/A;    right knee surgery         History reviewed. No pertinent family history.    Social History     Socioeconomic History    Marital status: Unknown   Tobacco Use    Smoking status: Former Smoker    Smokeless tobacco: Never Used   Substance and Sexual Activity    Alcohol use: Not Currently    Drug use: Never       Vitals:    01/03/22 1036   BP: 127/72   Pulse: 93       Physical Exam  Constitutional:       General: He is not in acute distress.     Appearance: He is well-nourished.   HENT:      Head: Normocephalic and atraumatic.   Eyes:      General: No scleral icterus.  Cardiovascular:      Rate and Rhythm: Normal rate.   Pulmonary:      Effort: Pulmonary effort is normal. No respiratory distress.      Breath sounds: No stridor.   Abdominal:      Palpations: Abdomen is soft.      Tenderness: There is no abdominal tenderness.   Lymphadenopathy:      Cervical: No cervical adenopathy.   Skin:     General: Skin is warm.      Findings: No erythema.   Neurological:      Mental Status: He is alert and oriented to person, place, and time.   Psychiatric:         Mood and Affect: Mood and affect normal.         Behavior: Behavior normal.       CT showed small fluid collection near rectosigmoid junction. IR aspiration - serous. No drain left    Assessment & Plan:   76M with hx of diverticulitis  Terrible candidate for colon resection. No emergent indication for surgery. Instructed him to seek care quickly if symptoms recur. No plans for elective sigmoid resection.

## 2022-01-17 LAB — FUNGUS SPEC CULT: NORMAL

## 2023-02-06 NOTE — NURSING
Nursing Transfer Note      7/23/2020     Transfer to 565 from 464    Transfer via bed     Transfer on nonrebreather     Transported by KELLY Crum and KELLY Godinez and Darlin RT    Chart send with patient: Yes      
"No c/o CP, SOB. Pt stated "CP has gone." will CTCM.  "
"Respiratory therapy at bedside. Pt desat to 83% on 9L HFNC during linen and hygiene measures. Pt states " I keep sliding down and I can't get comfortable."   "
0400 PTT resulted 64.4. therapeutic range, no change in heparin drip rate. Remains 10u/kg/hr -8.7ml/hr per pump. Tolerating well. Next PTT draw placed in epic for 1000AM A/T nomogram.  
18 gauge PIV to right forearm removed without difficulty, catheter intact- no redness or swelling noted to site at time of removal. Tele monitor removed, cleaned, [facility] [place] D/C instructions and medications reviewed with patient and patient's receiving nurse at [facility]- verbalizes understanding. [facility] present at this time to transfer patient to facility. Patient on O2 @ 7L/NC upon departure- no acute respiratory distress noted. NADN at time of D/C.  
APTT resulted back at 38.2, per nomogram Heparin bolus of 30 units/kg given and gtt increased by 2 units/kg/hr to 13 units/kg/hr, order placed for follow-up a PTT in 6 hours  
APTT resulted back at 66.2, per nomogram this is within the therapeutic range for the Heparin gtt, no changes at this time, order placed for follow-up aPTT in 6 hours  
APTT resulted back at 71.4, heparin gtt decreased to 13 units/kg/hr, order placed for follow-up aPTT  
Assumed care of pt bed 64. Pt complaint of shortness of breath with exertion. Pt participating in assessment at this time but is also complaining of generalized weakness. Pt on 8L HFNC w/humidifer. Pt unable to be weaned from 02 due to desat. Pt dest to 89% during linen change and hygiene measures while on 8L. Pt denies chest pain, fever/chills,pain w/ breathing. Head of bed elevated for patient comfort. No edema noted. Lung sounds diminished w/ wheezes noted. Assessment done per flowsheet. NAD noted. Awaiting MD orders/ disposition. Bed rails up x 2 with bed locked in lowest position. Call light in reach. Will continue to monitor. ADMIT evaluation continues.   
Attempted to wean patient down from BIPAP to 15L high flow. Patient's O2 saturation is 82% on 15L high flow. Patient placed back on BIPAP with oxygen saturation of 94%. Patient remains tachypneic breathing 30 bpm. Also patient requesting bedpan very frequently and reporting lower abdominal pain. Several dark liquid stools noted. Dr. Gaston notified. Order for rectal tube placement and occult stool ordered. Physician states that he will be transferring patient to ICU once bed becomes available.   
CASE MANAGEMENT NOTE  Called  [facility]  Patient line at [phone] to discuss patient's family appeal and reconsideration for LTAC placement  And I spoke to  Nya and reviewed with her the patient's case. She states she would request a letter to be sent to the hospital . She could not confirmed  If they received the patient's wife Tania request.     I informed her that she should really escalate this to her Medical Director.She seem to be unconcern. I reported the above to Jen  with [facility] . Jen reviewed the case  And she suggest  That all open request for placement be canceled and  We work with [facility] discharge planner Hodan Costa  At [phone]  And start  Developing a new plan.     A message left for Hodan Costa  To call . Rachel informed of above.  
CASE MANAGEMENT UPDATE  Patient remains in the hospital. LOS 29 days. Spoke to Jen of [facility] at [phone] about patient's discharge plan and  The patient's continued need for high flow oxygen and bipap at night. The patient has been denied for LTAC and the [facility] can only accommodate regular oxygen 2 to 4 liters only.. She suggest we call [phone] to get a peer to peer done.    Called and spoke to Xena at [phone] and she states the LTAC case was open on 8/3/2020, and denied on 8/4 and a peer to peer was offered on 8/5. I inquired about the letter and the notice and she states that  This was given to the LTAC, however they never received this information, so they proceeded with a expedited appeal she states on 8/13 and on 8/14 this appeal upheld the denial.      I informed her that since my facility and the Doctors here and family did not get a proper notice we are requesting a family grievance and  Or a reconsideration. She said the family can call [phone].  Information given to  Cherie Chandra  Director Case Management  
Called spouse and updated her on patient   
DR Grey notified heparin drip remains off on transfer to unit. Ok to placed STAT PTT to restart heparin.  
Dr Grey notified of sepsis alert, creatinine and VS. Sat 89%. No orders given, pt denies and complaints. No distress noted. MD made aware of 0522 VS.  
Dr Grey on unit floor aware of ABG results. bipap with double filters in place. spo2 91-92%. spo2 97-89 prior. Pt wide awake, denies c/o SOB or distress.HOB up 55.  
Dr Saavedra called and notified previous nurse, Madonna, stated pt refused ordered CT scan today and also  stated she spoke with CT and they will do it on tomorrow. No orders given, stated that's fine she will let the day team know.  
MD notified that pt is still agitated and continues to pull at bipap and yell despite multiple attempts to distract him and repositioning to make him more comfortable, new orders received   
PTT 58.8 and therapeutic no changes but will draw next PTT at 1730. Heparin @ 18 units/kg/hr    
PTT 83.7, will hold for 1 hour and decrease by 3units to 15 units/kg/hr, and repeat a PTT on 7/24/2020@ 0130 per Nomogram.  
Patient became more confused throughout the night removing his BiPAP multiple times [sats dropped to 70s]. Notified Dr. Grey, one time dose of 2mg haldol placed. Will continue to monitor.  
Patient reporting midsternal CP a 7/10 and describes it as aching/stabbing. Patient states this started right after eating dinner. Dr. Adam notified and orders for troponin, EKG, and GI cocktail ordered. Will continue to monitor.   
Patient safety maintained. AAOx3. Medications administered per order. Instructed to call for assistance as needed. US of the abdomen completed. Working with therapy as tolerated, up to chair.   
Patient safety maintained. Medications administered per order. Instructed to call for assistance as needed. Working with therapy as tolerated.   
Patient was still agitated, anxious, and removing his oxygen; notified Dr. Grey. STAT order for 1mg of Ativan and prn 2mg of haldol prn every 6 hours ordered.   
Patient's spouse called and informed of patient's status and that patient was transferred to ICU Room 565.   
Plan of care reviewed with patient. Voiced understanding. NSR on monitor with no red alarms noted. Decreased to 10liter high flow per respiratory therapist. Spo2 88-90% No acute distress noted at this time. Side rails x3, bed low, call bell within reach. Maintain bed alarm for patient safety. Patient will be monitored overnight.  
Pt c/o upper mid chest pain rated 7-8/10. No radiation to either arm.  /59 HR 93 on tele monitor. SR noted. spo2 91% on HF 12L/NC. No c/o SOB or distress. HOB up 55. Dr Grey called and notified of all above written. New order EKG and stated will order labs.  
Pt called to report bowel movement. Pt with incontinent episode. Linen change and hygiene measures performed.   
Pt continues to be agitated and restless during the night despite meds and distractions, MD here to see pt, no new orders received at this time, vital signs remain stable during the night  
Pt heard yelling for help from inside room, upon entering the room pt states that he cannot feel his left arm, instructed pt to raise left arm in to the air which he was able to do without difficulty, pt's arm pinched and he responded to pain accordingly, pt then requested water, pt educated on the importance of not reporting false emergencies  
Pt resting quietly in bed with eyes closed, resp unlabored. NAD noted. Easily arousable to verbal stimuli. Offers no complaints at present. Awaiting MD orders/ disposition. Bed rails up x 2 with bed locked in lowest position. Call light in reach. Will continue to monitor. ADMIT evaluation continues.   
Pt thrashing in bed and banging on siderails to the point that bipap tubing became disconnected from mask, water provided for pt and lip moisturizer applied, pt pulled up and repositioned in bed, prior to this event pt rested comfortably during the night, vital signs have been stable    
Pt with continuous complaint of sliding down in bed. Pt became short of breath while being assisted up into bed.   
Pt's son called to get updates, informed of pt's condition and the events of the shift, all questions and concerns addressed at this time  
Pt. Noted to be on 7l  Of HFNC  02 sats were in low 80's, oxygen increased to 9L HFNC  Pt rebounded to 90%  Pt states feeling short winded/ hard to breathe but no pain  Will continue to monitor   
Pts pulse ox sat 76 % during linen change and hygiene measures. Pt with saturated adult brief with incontinence of urine and stool. Pt instructed to take deep breaths at this time. Pulse ox sat to 87%.   
Pulmonology at bedside. Verbal order taken for continuous pulse ox monitoring.   
Recieved report from KELLY Cee in the ED. Awaiting patient arrival to Room 464.  
Report called to Baltazar at [facility]. Awaiting transport at this time.   
Resident at bedside to check in on pt, updated on pt's status and no new orders received at this time, pt's wife and son have been updated on pt condition and plan of care this evening with all questions and concerns adressed  
STAT EKG and troponin ordered per MD.  
Somewhat eventful shift. Started on low dose levo drip after using positioning etc. In attempt to maintain BP in acceptable range. Required increased oxygen support overnight but was tish to successfully wean both the O2 back to 5 L/M and the levo drip off by this am. 40 mg of IVP lasix early in the evening which led to excellent diuresis. Patient QUITE uncomfortable on stretcher for prolonged timeframe. Much effort made w repositioning and use of blankets and pillows to no avail. Family updated three times over the shift. May be able to transfer to tele step down unit if continues to remain hemodynamically stable.    
Tolerating Bipap well Spo2 on continuous pulse ox 98%.  
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 \par \par Since this stent was placed, patient feels great. She is tolerating liquids and solids without issue. No abdominal pain. Stable weight. Good appetite. No post prandial symptoms. No overt signs of Gi bleeding like hematemesis, melena, hematochezia.

## 2023-03-17 NOTE — NURSING TRANSFER
Nursing Transfer Note      7/23/2020     Transfer To: Room 565    Transfer via bed    Transfer with O2, cardiac monitoring    Transported by House  Sup, and RN x's 2    Medicines sent: Heparin drip    Chart send with patient: Yes    Notified: spouse    Patient reassessed at: 7/23/2020@1135    Upon arrival to floor: cardiac monitor applied, patient oriented to room, call bell in reach and bed in lowest position   Patient interviewed and examined.  .    Chief Complaint   Patient presents with   • Vomiting      .  HPI: 1-year-old female with no chronic medical problems presents with about 30 hours of intractable vomiting and diarrhea.  Mom states she had one cracker yesterday morning and has not eaten anything else and has been unable to keep any fluids down.  She is less active than normal.  She she has had decreased urination.  She has been complaining of abdominal pain.  Minimal cough when she states her throat is dry.  No congestion or ear pain.  No fever.  Saw the pediatrician this morning and was sent to the ED for evaluation.    Pt was seen and examined wearing full PPE.  .  Positive ROS: see above  Negative ROS: fever, vision changes, rhinorrhea, chest pain, dysuria, injury, rash, skin changes, dizziness, all other systems reviewed and negative    PAST MEDICAL HX:  There is no previous medical history on file.    There is no problem list on file for this patient.      PAST SURGICAL HX:  There is no previous surgical history on file.    No family history on file.  Review of patient's family status indicates:  No family status on file            PE    Vitals:    03/17/23 1100 03/17/23 1330 03/17/23 1450 03/17/23 1627   BP: 88/50 104/70  89/54   BP Location:    RUE - Right upper extremity   Patient Position:    Supine   Pulse: (!) 157 (!) 162  (!) 148   Resp: 24 24  30   Temp: 99.1 °F (37.3 °C) (!) 101 °F (38.3 °C) 99.1 °F (37.3 °C) 99.2 °F (37.3 °C)   TempSrc: Axillary Oral Oral Axillary   SpO2: 100% 95%  99%   Weight: 18.6 kg (41 lb 0.1 oz)          GEN: awake and oriented, interactive, no acute distress  HEENT: NC/AT, EOMI, oral mucosa pink and moist, bilateral TMs clear, oropharynx minimally erythematous without significant tonsillar enlargement or exudate or evidence of peritonsillar abscess  NECK: supple, no nuchal rigidity  HEART: Normal peripheral perfusion  LUNGS: No respiratory distress, equal chest rise,  speaking in full sentences  ABDOMEN: Soft, nontender, nondistended, no peritoneal signs  BACK: no deformity or trauma noted  EXT: no clubbing or cyanosis present  NEURO: no acute focal deficits  SKIN: no acute rashes or lesions noted    The case was reviewed with the patient. Nursing notes reviewed.    Results for orders placed or performed during the hospital encounter of 03/17/23   COVID/Flu/RSV panel   Result Value    Rapid SARS-COV-2 by PCR Not Detected    Influenza A by PCR Not Detected    Influenza B by PCR Not Detected    RSV BY PCR Not Detected    Isolation Guidelines      Comment: Do not use this test result as the sole decision-maker for discontinuation of isolation.   Clinical evaluation should be considered for other respiratory illness requiring transmission-based isolation.    -    No fever (<99.0 F/37.2 C) for at least 24 hours without the use of fever-reducing medications    AND  -    Respiratory symptoms have improved or resolved (e.g. cough, shortness of breath)     AND  -    COVID-19 negative test    See COVID-19 Deisolation Resource Guide    Procedural Comment      Comment: SARS-COV-2 nucleic acid has not been detected indicating the absence of COVID-19.    This test was performed using the [employer] Xpert Xpress SARS-CoV-2/Flu/RSV RT-PCR test that has been given Emergency Use Authorization (EUA) by the [place] Food and Drug Administration (FDA).  These tests are considered definitive and do not need to be confirmed by another method.   Comprehensive Metabolic Panel   Result Value    Fasting Status     Sodium 137    Potassium 4.8    Chloride 101    Carbon Dioxide 13 (L)    Anion Gap 28 (H)    Glucose 60 (L)    BUN 20 (H)    Creatinine 0.33    Glomerular Filtration Rate      Comment: GFR not calculated for age less than 18 years    BUN/Cr 61 (H)    Calcium 9.3    Bilirubin, Total 0.3    GOT/AST 38    GPT/ALT 29    Alkaline Phosphatase 242    Albumin 3.8    Protein, Total 7.0    Globulin 3.2     A/G Ratio 1.2   CBC with Automated Differential (performable only)   Result Value    WBC 6.9    RBC 4.64    HGB 12.9    HCT 39.5    MCV 85.1    MCH 27.8    MCHC 32.7    RDW-CV 14.2    RDW-SD 44.3        NRBC 0    Neutrophil, Percent 75    Lymphocytes, Percent 17    Mono, Percent 8    Eosinophils, Percent 0    Basophils, Percent 0    Immature Granulocytes 0    Absolute Neutrophils 5.2    Absolute Lymphocytes 1.1 (L)    Absolute Monocytes 0.5    Absolute Eosinophils  0.0    Absolute Basophils 0.0    Absolute Immature Granulocytes 0.0   Streptococcus group A PCR    Specimen: Throat; Swab   Result Value    STREPTOCOCCUS GROUP A PCR Not Detected     Comment: This result was obtained by RT-PCR amplification followed by fluorescence detection. This test has been cleared by the U.S. Food and Drug Administration for detection of Strep A.   GLUCOSE, BEDSIDE - POINT OF CARE   Result Value    GLUCOSE, BEDSIDE - POINT OF CARE 135 (H)   GLUCOSE, BEDSIDE - POINT OF CARE   Result Value    GLUCOSE, BEDSIDE - POINT OF CARE 91   GLUCOSE, BEDSIDE - POINT OF CARE   Result Value    GLUCOSE, BEDSIDE - POINT OF CARE 83        Imaging Results    None           Medications   sodium chloride (NORMAL SALINE) 0.9 % bolus 372 mL (0 mLs Intravenous Completed 3/17/23 1216)   ondansetron (ZOFRAN) injection 2 mg (2 mg Intravenous Given 3/17/23 1146)   dextrose 10 % bolus 93 mL (0 mLs Intravenous Completed 3/17/23 1257)   sodium chloride (NORMAL SALINE) 0.9 % bolus 372 mL (0 mLs Intravenous Completed 3/17/23 1346)   acetaminophen (TYLENOL) 160 MG/5ML suspension 278.4 mg (278.4 mg Oral Given 3/17/23 1354)       ED Course as of 03/17/23 1656   Fri Mar 17, 2023   1132 History and physical as above.  She is not in distress and vitals remarkable only for tachycardia.  Differential includes viral GI illness, COVID, strep, dehydration, electrolyte abnormality.  We will place an IV and check basic labs, strep PCR, COVID panel.  We will give 20 mL/kg  IV fluid bolus and 2 mg IV Zofran.  Will reassess, disposition pending. [BK]   1223 Labs remarkable for hyperglycemia 60, will give D10 5 mL/kg.  Bicarb is quite low at 13, will give a second to 20 mL/kg IV fluid bolus. [BK]   1240 Parents updated with abnormal lab results.  Discussed plan.  We will attempt to try and get her to drink fluids.  Will give juice and crackers. [BK]   1341 Patient spiked a temp of 38.3.  Will give Tylenol. [BK]   1355 Repeat Accu-Chek 135. [BK]   1515 Repeat Accu-Chek 91.  We will continue to monitor.  Fever has resolved after Tylenol. [BK]   1545 Recheck [BK]   1632 Repeat Accu-Chek is 83.  Patient appears stable for outpatient management at this time.  She has not vomited.  She has tolerated p.o.  Glucose appears stable.  We will update her pediatrician.  Plan discharge home with Rx for Zofran and close outpatient follow-up. [BK]   1638 D/W peds Dr Burns, she will see the patient late morning tomorrow, call at 830am tomorrow morning and they will give her an appointment time tomorrow [BK]   1642 Discussed results and plan with dad at bedside.  He is agreeable to taking her tomorrow morning for follow-up for recheck.  Discussed Zofran as needed, fever control, pushing oral fluids and bland diet.  He verbalized understanding.  Patient discharged home, return precautions discussed. [BK]      ED Course User Index  [BK] Allan Duran MD          Observation Note:  The patient required observation in the ED including interventions and reassessment to determine whether they would be stable for discharge home or require admission to the hospital.  After observation, it was determined that the patient should be discharged.  The patient was initially seen at 1130 and disposition was determined at 1630 for a total observation time of 5 hours.      ED Diagnoses     Diagnosis Comment Associated Orders       Final diagnoses    Vomiting and diarrhea -- --    Dehydration -- --    Hypoglycemia --  --             Diana Burns MD  [address]  [place] [address]  [phone]    In 1 day  For recheck             Summary of your Discharge Medications      Take these Medications      Details   ondansetron 4 MG disintegrating tablet  Commonly known as: ZOFRAN ODT   Place 0.5 tablets onto the tongue every 8 hours as needed for Nausea.              Allan Duran MD  03/17/23 4212

## 2023-11-07 NOTE — SUBJECTIVE & OBJECTIVE
Interval History: NAEON. Vital signs stable. Patient currently on 15 liters/minute of supplemental oxygen via high-flow nasal cannula. Patient used BiPAP overnight at 70% oxygen concentration. Melenic stools still present via rectal tube.  GI was consulted due to upper gi bleed and  recommended patient be placed NPO, H&H trend Q12H, and schedule outpatient upper endoscopy. Patient was started on probiotics.     Review of Systems   Respiratory: Negative for shortness of breath.    Cardiovascular: Negative for chest pain.   Gastrointestinal: Positive for diarrhea (melanic). Negative for abdominal pain and nausea.   Psychiatric/Behavioral: The patient is not nervous/anxious.      Objective:     Vital Signs (Most Recent):  Temp: 96.6 °F (35.9 °C) (07/29/20 0601)  Pulse: 91 (07/29/20 0601)  Resp: 20 (07/29/20 0601)  BP: 111/61 (07/29/20 0601)  SpO2: (!) 94 % (07/29/20 0323) Vital Signs (24h Range):  Temp:  [96.6 °F (35.9 °C)-97.9 °F (36.6 °C)] 96.6 °F (35.9 °C)  Pulse:  [] 91  Resp:  [18-22] 20  SpO2:  [86 %-94 %] 94 %  BP: (104-120)/(56-75) 111/61     Weight: 83 kg (182 lb 15.7 oz)  Body mass index is 25.52 kg/m².    Intake/Output Summary (Last 24 hours) at 7/29/2020 0755  Last data filed at 7/29/2020 0600  Gross per 24 hour   Intake 1065 ml   Output 1000 ml   Net 65 ml      Physical Exam  Vitals signs and nursing note reviewed.   Constitutional:       General: He is not in acute distress.  HENT:      Head: Normocephalic and atraumatic.   Eyes:      Extraocular Movements: Extraocular movements intact.   Cardiovascular:      Rate and Rhythm: Normal rate and regular rhythm.      Comments: As seen on monitor, patient appears to have sinus rhythm  Pulmonary:      Effort: Pulmonary effort is normal.      Comments: As seen on the monitor, patient peripheral oxygen saturation in low 90s.   Abdominal:      General: There is no distension.   Skin:     Coloration: Skin is not jaundiced.   Neurological:      Mental Status:  He is alert.   Psychiatric:      Comments: Answers questions appropriately.          Significant Labs:   A1C:   Recent Labs   Lab 07/23/20  0403   HGBA1C 6.3*     Bilirubin:   Recent Labs   Lab 07/25/20  0311 07/26/20  0441 07/27/20  0520 07/28/20  1131 07/29/20  0602   BILITOT 0.6 0.5 0.6 0.8 0.7  0.7       BMP:   Recent Labs   Lab 07/29/20  0602     109     138   K 4.6  4.6     110   CO2 22*  22*   BUN 21  21   CREATININE 1.2  1.2   CALCIUM 8.0*  8.0*   MG 1.8     CBC:   Recent Labs   Lab 07/28/20  1131 07/28/20  1946 07/29/20  0602   WBC 19.85*  --  20.19*   HGB 9.7* 8.9* 9.2*   HCT 30.3* 27.6* 28.6*     --  299     CMP:   Recent Labs   Lab 07/28/20  1131 07/29/20  0602    138  138   K 4.4 4.6  4.6    110  110   CO2 21* 22*  22*   * 109  109   BUN 26* 21  21   CREATININE 1.4 1.2  1.2   CALCIUM 8.2* 8.0*  8.0*   PROT 6.0 5.6*  5.6*   ALBUMIN 2.6* 2.4*  2.4*   BILITOT 0.8 0.7  0.7   ALKPHOS 77 69  69   AST 52* 31  31   * 74*  74*   ANIONGAP 7* 6*  6*   EGFRNONAA 48* 58*  58*     Coagulation:   Recent Labs   Lab 07/29/20  0602   APTT 28.2     Magnesium:   Recent Labs   Lab 07/28/20  1131 07/29/20  0602   MG 1.9 1.8     TSH:   Recent Labs   Lab 07/23/20  0403   TSH 0.604       Significant Imaging: I have reviewed all pertinent imaging results/findings within the past 24 hours.   4

## 2024-01-04 NOTE — ASSESSMENT & PLAN NOTE
Increased oxygen requirement from baseline 5L. History of COVID 19, negative this admission with CXR stable from previous. Non-compliant with medications at home.   -Duoneb q6hr  -fluticasone formetorol qd  -Continue home spiriva, singulair  -Continue empiric rocephin/azithro  - Appreciate ID recs     fs = 111

## 2024-02-15 ENCOUNTER — HOSPITAL ENCOUNTER (INPATIENT)
Facility: HOSPITAL | Age: 79
LOS: 7 days | Discharge: HOME HEALTH | DRG: 177 | End: 2024-02-22
Attending: EMERGENCY MEDICINE | Admitting: HOSPITALIST
Payer: MEDICARE

## 2024-02-15 DIAGNOSIS — I21.4 NSTEMI (NON-ST ELEVATION MYOCARDIAL INFARCTION): ICD-10-CM

## 2024-02-15 DIAGNOSIS — R09.02 HYPOXEMIA: Primary | ICD-10-CM

## 2024-02-15 DIAGNOSIS — R06.02 SOB (SHORTNESS OF BREATH): ICD-10-CM

## 2024-02-15 DIAGNOSIS — R06.02 SHORTNESS OF BREATH: ICD-10-CM

## 2024-02-15 DIAGNOSIS — U07.1 COVID-19 VIRUS DETECTED: ICD-10-CM

## 2024-02-15 DIAGNOSIS — J96.01 ACUTE RESPIRATORY FAILURE WITH HYPOXIA: ICD-10-CM

## 2024-02-15 DIAGNOSIS — I21.4 NSTEMI (NON-ST ELEVATED MYOCARDIAL INFARCTION): ICD-10-CM

## 2024-02-15 DIAGNOSIS — U07.1 COVID: ICD-10-CM

## 2024-02-15 DIAGNOSIS — U07.1 COVID-19: ICD-10-CM

## 2024-02-15 DIAGNOSIS — I21.4 NON-ST ELEVATION MYOCARDIAL INFARCTION (NSTEMI): ICD-10-CM

## 2024-02-15 LAB
ALBUMIN SERPL BCP-MCNC: 3.2 G/DL (ref 3.5–5.2)
ALLENS TEST: ABNORMAL
ALLENS TEST: YES
ALP SERPL-CCNC: 122 U/L (ref 55–135)
ALT SERPL W/O P-5'-P-CCNC: 16 U/L (ref 10–44)
ANION GAP SERPL CALC-SCNC: 10 MMOL/L (ref 8–16)
APTT PPP: 34.5 SEC (ref 21–32)
AST SERPL-CCNC: 35 U/L (ref 10–40)
BASOPHILS # BLD AUTO: 0.04 K/UL (ref 0–0.2)
BASOPHILS # BLD AUTO: 0.05 K/UL (ref 0–0.2)
BASOPHILS NFR BLD: 0.4 % (ref 0–1.9)
BASOPHILS NFR BLD: 0.4 % (ref 0–1.9)
BILIRUB SERPL-MCNC: 0.7 MG/DL (ref 0.1–1)
BNP SERPL-MCNC: 45 PG/ML (ref 0–99)
BUN SERPL-MCNC: 21 MG/DL (ref 8–23)
CALCIUM SERPL-MCNC: 8.8 MG/DL (ref 8.7–10.5)
CHLORIDE SERPL-SCNC: 104 MMOL/L (ref 95–110)
CK SERPL-CCNC: 552 U/L (ref 20–200)
CO2 SERPL-SCNC: 24 MMOL/L (ref 23–29)
CREAT SERPL-MCNC: 1.4 MG/DL (ref 0.5–1.4)
CTP QC/QA: YES
DIFFERENTIAL METHOD BLD: ABNORMAL
DIFFERENTIAL METHOD BLD: ABNORMAL
EOSINOPHIL # BLD AUTO: 0 K/UL (ref 0–0.5)
EOSINOPHIL # BLD AUTO: 0 K/UL (ref 0–0.5)
EOSINOPHIL NFR BLD: 0 % (ref 0–8)
EOSINOPHIL NFR BLD: 0.2 % (ref 0–8)
ERYTHROCYTE [DISTWIDTH] IN BLOOD BY AUTOMATED COUNT: 17.8 % (ref 11.5–14.5)
ERYTHROCYTE [DISTWIDTH] IN BLOOD BY AUTOMATED COUNT: 17.8 % (ref 11.5–14.5)
EST. GFR  (NO RACE VARIABLE): 51 ML/MIN/1.73 M^2
FIO2: 100 %
FIO2: 36 %
GLUCOSE SERPL-MCNC: 102 MG/DL (ref 70–110)
HCT VFR BLD AUTO: 33.3 % (ref 40–54)
HCT VFR BLD AUTO: 33.4 % (ref 40–54)
HGB BLD-MCNC: 10.2 G/DL (ref 14–18)
HGB BLD-MCNC: 10.5 G/DL (ref 14–18)
IMM GRANULOCYTES # BLD AUTO: 0.03 K/UL (ref 0–0.04)
IMM GRANULOCYTES # BLD AUTO: 0.04 K/UL (ref 0–0.04)
IMM GRANULOCYTES NFR BLD AUTO: 0.3 % (ref 0–0.5)
IMM GRANULOCYTES NFR BLD AUTO: 0.3 % (ref 0–0.5)
INR PPP: 1 (ref 0.8–1.2)
LACTATE SERPL-SCNC: 1.1 MMOL/L (ref 0.5–2.2)
LDH SERPL L TO P-CCNC: 193 U/L (ref 110–260)
LPM: 4
LYMPHOCYTES # BLD AUTO: 0.4 K/UL (ref 1–4.8)
LYMPHOCYTES # BLD AUTO: 0.6 K/UL (ref 1–4.8)
LYMPHOCYTES NFR BLD: 3.3 % (ref 18–48)
LYMPHOCYTES NFR BLD: 4.9 % (ref 18–48)
MAGNESIUM SERPL-MCNC: 1.9 MG/DL (ref 1.6–2.6)
MCH RBC QN AUTO: 22.9 PG (ref 27–31)
MCH RBC QN AUTO: 23.4 PG (ref 27–31)
MCHC RBC AUTO-ENTMCNC: 30.6 G/DL (ref 32–36)
MCHC RBC AUTO-ENTMCNC: 31.4 G/DL (ref 32–36)
MCV RBC AUTO: 74 FL (ref 82–98)
MCV RBC AUTO: 75 FL (ref 82–98)
MONOCYTES # BLD AUTO: 0.1 K/UL (ref 0.3–1)
MONOCYTES # BLD AUTO: 0.6 K/UL (ref 0.3–1)
MONOCYTES NFR BLD: 1.1 % (ref 4–15)
MONOCYTES NFR BLD: 5.2 % (ref 4–15)
NEUTROPHILS # BLD AUTO: 10.6 K/UL (ref 1.8–7.7)
NEUTROPHILS # BLD AUTO: 10.7 K/UL (ref 1.8–7.7)
NEUTROPHILS NFR BLD: 89 % (ref 38–73)
NEUTROPHILS NFR BLD: 94.9 % (ref 38–73)
NRBC BLD-RTO: 0 /100 WBC
NRBC BLD-RTO: 0 /100 WBC
PCO2 BLDA: 46.7 MMHG (ref 35–45)
PCO2 BLDA: 48 MMHG (ref 35–45)
PH SMN: 7.39 [PH] (ref 7.35–7.45)
PH SMN: 7.41 [PH] (ref 7.35–7.45)
PLATELET # BLD AUTO: 187 K/UL (ref 150–450)
PLATELET # BLD AUTO: 198 K/UL (ref 150–450)
PMV BLD AUTO: 10.5 FL (ref 9.2–12.9)
PMV BLD AUTO: 9.8 FL (ref 9.2–12.9)
PO2 BLDA: 46.8 MMHG (ref 80–100)
PO2 BLDA: 64.3 MMHG (ref 80–100)
POC BASE DEFICIT: 3.4 MMOL/L (ref -2–2)
POC BASE DEFICIT: 4.2 MMOL/L (ref -2–2)
POC HCO3: 29 MMOL/L (ref 24–28)
POC HCO3: 29.5 MMOL/L (ref 24–28)
POC MOLECULAR INFLUENZA A AGN: NEGATIVE
POC MOLECULAR INFLUENZA B AGN: NEGATIVE
POC PERFORMED BY: ABNORMAL
POC PERFORMED BY: ABNORMAL
POC SATURATED O2: 80.5 % (ref 95–100)
POC SATURATED O2: 92.5 % (ref 95–100)
POTASSIUM SERPL-SCNC: 4.5 MMOL/L (ref 3.5–5.1)
PROT SERPL-MCNC: 7.4 G/DL (ref 6–8.4)
PROTHROMBIN TIME: 11.3 SEC (ref 9–12.5)
RBC # BLD AUTO: 4.46 M/UL (ref 4.6–6.2)
RBC # BLD AUTO: 4.49 M/UL (ref 4.6–6.2)
SARS-COV-2 RDRP RESP QL NAA+PROBE: POSITIVE
SODIUM SERPL-SCNC: 138 MMOL/L (ref 136–145)
SPECIMEN SOURCE: ABNORMAL
SPECIMEN SOURCE: ABNORMAL
TROPONIN I SERPL DL<=0.01 NG/ML-MCNC: 12.57 NG/ML (ref 0–0.03)
TROPONIN I SERPL DL<=0.01 NG/ML-MCNC: 3.23 NG/ML (ref 0–0.03)
WBC # BLD AUTO: 11.28 K/UL (ref 3.9–12.7)
WBC # BLD AUTO: 11.94 K/UL (ref 3.9–12.7)

## 2024-02-15 PROCEDURE — 25000003 PHARM REV CODE 250: Performed by: EMERGENCY MEDICINE

## 2024-02-15 PROCEDURE — 36600 WITHDRAWAL OF ARTERIAL BLOOD: CPT

## 2024-02-15 PROCEDURE — 5A09357 ASSISTANCE WITH RESPIRATORY VENTILATION, LESS THAN 24 CONSECUTIVE HOURS, CONTINUOUS POSITIVE AIRWAY PRESSURE: ICD-10-PCS | Performed by: EMERGENCY MEDICINE

## 2024-02-15 PROCEDURE — 94761 N-INVAS EAR/PLS OXIMETRY MLT: CPT | Mod: XB

## 2024-02-15 PROCEDURE — 84484 ASSAY OF TROPONIN QUANT: CPT | Mod: 91 | Performed by: EMERGENCY MEDICINE

## 2024-02-15 PROCEDURE — 87502 INFLUENZA DNA AMP PROBE: CPT

## 2024-02-15 PROCEDURE — 27000190 HC CPAP FULL FACE MASK W/VALVE

## 2024-02-15 PROCEDURE — 82803 BLOOD GASES ANY COMBINATION: CPT

## 2024-02-15 PROCEDURE — 80053 COMPREHEN METABOLIC PANEL: CPT | Performed by: EMERGENCY MEDICINE

## 2024-02-15 PROCEDURE — 25000242 PHARM REV CODE 250 ALT 637 W/ HCPCS: Performed by: EMERGENCY MEDICINE

## 2024-02-15 PROCEDURE — 94640 AIRWAY INHALATION TREATMENT: CPT

## 2024-02-15 PROCEDURE — 27100171 HC OXYGEN HIGH FLOW UP TO 24 HOURS

## 2024-02-15 PROCEDURE — 93010 ELECTROCARDIOGRAM REPORT: CPT | Mod: 76,,, | Performed by: INTERNAL MEDICINE

## 2024-02-15 PROCEDURE — 63600175 PHARM REV CODE 636 W HCPCS: Performed by: EMERGENCY MEDICINE

## 2024-02-15 PROCEDURE — 83615 LACTATE (LD) (LDH) ENZYME: CPT | Performed by: NURSE PRACTITIONER

## 2024-02-15 PROCEDURE — U0002 COVID-19 LAB TEST NON-CDC: HCPCS | Performed by: EMERGENCY MEDICINE

## 2024-02-15 PROCEDURE — 83605 ASSAY OF LACTIC ACID: CPT | Performed by: EMERGENCY MEDICINE

## 2024-02-15 PROCEDURE — 84484 ASSAY OF TROPONIN QUANT: CPT | Performed by: NURSE PRACTITIONER

## 2024-02-15 PROCEDURE — 94644 CONT INHLJ TX 1ST HOUR: CPT

## 2024-02-15 PROCEDURE — 93005 ELECTROCARDIOGRAM TRACING: CPT

## 2024-02-15 PROCEDURE — 94660 CPAP INITIATION&MGMT: CPT

## 2024-02-15 PROCEDURE — 99285 EMERGENCY DEPT VISIT HI MDM: CPT | Mod: 25

## 2024-02-15 PROCEDURE — 12000002 HC ACUTE/MED SURGE SEMI-PRIVATE ROOM

## 2024-02-15 PROCEDURE — 82728 ASSAY OF FERRITIN: CPT | Performed by: NURSE PRACTITIONER

## 2024-02-15 PROCEDURE — 83735 ASSAY OF MAGNESIUM: CPT | Performed by: EMERGENCY MEDICINE

## 2024-02-15 PROCEDURE — 85730 THROMBOPLASTIN TIME PARTIAL: CPT | Performed by: EMERGENCY MEDICINE

## 2024-02-15 PROCEDURE — 85610 PROTHROMBIN TIME: CPT | Performed by: EMERGENCY MEDICINE

## 2024-02-15 PROCEDURE — 96374 THER/PROPH/DIAG INJ IV PUSH: CPT

## 2024-02-15 PROCEDURE — 83880 ASSAY OF NATRIURETIC PEPTIDE: CPT | Performed by: EMERGENCY MEDICINE

## 2024-02-15 PROCEDURE — 87040 BLOOD CULTURE FOR BACTERIA: CPT | Performed by: EMERGENCY MEDICINE

## 2024-02-15 PROCEDURE — 82550 ASSAY OF CK (CPK): CPT | Performed by: NURSE PRACTITIONER

## 2024-02-15 PROCEDURE — 27000207 HC ISOLATION

## 2024-02-15 PROCEDURE — 99900035 HC TECH TIME PER 15 MIN (STAT)

## 2024-02-15 PROCEDURE — 85025 COMPLETE CBC W/AUTO DIFF WBC: CPT | Performed by: EMERGENCY MEDICINE

## 2024-02-15 PROCEDURE — 96365 THER/PROPH/DIAG IV INF INIT: CPT

## 2024-02-15 PROCEDURE — 85652 RBC SED RATE AUTOMATED: CPT | Performed by: NURSE PRACTITIONER

## 2024-02-15 RX ORDER — CIPROFLOXACIN 250 MG/1
TABLET, FILM COATED ORAL
COMMUNITY
End: 2024-02-15

## 2024-02-15 RX ORDER — IBUPROFEN 200 MG
24 TABLET ORAL
Status: DISCONTINUED | OUTPATIENT
Start: 2024-02-15 | End: 2024-02-16

## 2024-02-15 RX ORDER — SERTRALINE HYDROCHLORIDE 100 MG/1
TABLET, FILM COATED ORAL
COMMUNITY
End: 2024-02-15

## 2024-02-15 RX ORDER — CLARITHROMYCIN 500 MG/1
TABLET, FILM COATED ORAL
COMMUNITY
End: 2024-02-15

## 2024-02-15 RX ORDER — PREGABALIN 150 MG/1
CAPSULE ORAL
COMMUNITY
End: 2024-02-15 | Stop reason: DRUGHIGH

## 2024-02-15 RX ORDER — FLUTICASONE PROPIONATE 50 MCG
1 SPRAY, SUSPENSION (ML) NASAL DAILY PRN
COMMUNITY
Start: 2023-12-28

## 2024-02-15 RX ORDER — AMOXICILLIN 500 MG/1
CAPSULE ORAL
COMMUNITY
End: 2024-02-15

## 2024-02-15 RX ORDER — ALBUTEROL SULFATE 90 UG/1
2 AEROSOL, METERED RESPIRATORY (INHALATION) EVERY 6 HOURS PRN
COMMUNITY
Start: 2023-03-20

## 2024-02-15 RX ORDER — HYDROCODONE BITARTRATE AND ACETAMINOPHEN 5; 325 MG/1; MG/1
2 TABLET ORAL
Status: COMPLETED | OUTPATIENT
Start: 2024-02-15 | End: 2024-02-15

## 2024-02-15 RX ORDER — SODIUM CHLORIDE 9 MG/ML
INJECTION, SOLUTION INTRAVENOUS ONCE AS NEEDED
Status: DISCONTINUED | OUTPATIENT
Start: 2024-02-15 | End: 2024-02-22 | Stop reason: HOSPADM

## 2024-02-15 RX ORDER — HEPARIN SODIUM,PORCINE/D5W 25000/250
0-40 INTRAVENOUS SOLUTION INTRAVENOUS CONTINUOUS
Status: DISPENSED | OUTPATIENT
Start: 2024-02-15 | End: 2024-02-17

## 2024-02-15 RX ORDER — IPRATROPIUM BROMIDE 0.5 MG/2.5ML
500 SOLUTION RESPIRATORY (INHALATION)
Status: COMPLETED | OUTPATIENT
Start: 2024-02-15 | End: 2024-02-15

## 2024-02-15 RX ORDER — ACYCLOVIR 400 MG/1
TABLET ORAL
COMMUNITY
End: 2024-02-15

## 2024-02-15 RX ORDER — MELOXICAM 7.5 MG/1
TABLET ORAL
COMMUNITY
End: 2024-02-15

## 2024-02-15 RX ORDER — PANTOPRAZOLE SODIUM 40 MG/1
TABLET, DELAYED RELEASE ORAL
COMMUNITY
End: 2024-02-15

## 2024-02-15 RX ORDER — LOSARTAN POTASSIUM 100 MG/1
TABLET ORAL
COMMUNITY
End: 2024-02-15

## 2024-02-15 RX ORDER — CLARITHROMYCIN 250 MG/1
TABLET, FILM COATED ORAL
COMMUNITY
End: 2024-02-15

## 2024-02-15 RX ORDER — NITROGLYCERIN 0.4 MG/1
0.4 TABLET SUBLINGUAL EVERY 5 MIN PRN
Status: DISCONTINUED | OUTPATIENT
Start: 2024-02-15 | End: 2024-02-22 | Stop reason: HOSPADM

## 2024-02-15 RX ORDER — METRONIDAZOLE 500 MG/1
TABLET ORAL
COMMUNITY
End: 2024-02-15

## 2024-02-15 RX ORDER — ALBUTEROL SULFATE 2.5 MG/.5ML
10 SOLUTION RESPIRATORY (INHALATION)
Status: COMPLETED | OUTPATIENT
Start: 2024-02-15 | End: 2024-02-15

## 2024-02-15 RX ORDER — ATORVASTATIN CALCIUM 40 MG/1
80 TABLET, FILM COATED ORAL DAILY
Status: DISCONTINUED | OUTPATIENT
Start: 2024-02-16 | End: 2024-02-22 | Stop reason: HOSPADM

## 2024-02-15 RX ORDER — ALBUTEROL SULFATE 0.83 MG/ML
SOLUTION RESPIRATORY (INHALATION)
COMMUNITY
End: 2024-02-15

## 2024-02-15 RX ORDER — MAGNESIUM SULFATE HEPTAHYDRATE 40 MG/ML
2 INJECTION, SOLUTION INTRAVENOUS ONCE
Status: COMPLETED | OUTPATIENT
Start: 2024-02-15 | End: 2024-02-15

## 2024-02-15 RX ORDER — ASCORBIC ACID 500 MG
500 TABLET ORAL 2 TIMES DAILY
Status: DISCONTINUED | OUTPATIENT
Start: 2024-02-16 | End: 2024-02-16

## 2024-02-15 RX ORDER — CIPROFLOXACIN AND DEXAMETHASONE 3; 1 MG/ML; MG/ML
SUSPENSION/ DROPS AURICULAR (OTIC)
COMMUNITY
End: 2024-02-15

## 2024-02-15 RX ORDER — IPRATROPIUM BROMIDE 17 UG/1
AEROSOL, METERED RESPIRATORY (INHALATION)
COMMUNITY
End: 2024-02-15

## 2024-02-15 RX ORDER — NAPROXEN SODIUM 220 MG/1
81 TABLET, FILM COATED ORAL DAILY
Status: DISCONTINUED | OUTPATIENT
Start: 2024-02-16 | End: 2024-02-22 | Stop reason: HOSPADM

## 2024-02-15 RX ORDER — FUROSEMIDE 10 MG/ML
40 INJECTION INTRAMUSCULAR; INTRAVENOUS
Status: DISCONTINUED | OUTPATIENT
Start: 2024-02-15 | End: 2024-02-15

## 2024-02-15 RX ORDER — KETOCONAZOLE 20 MG/G
CREAM TOPICAL
COMMUNITY
End: 2024-02-15

## 2024-02-15 RX ORDER — IPRATROPIUM BROMIDE AND ALBUTEROL SULFATE 2.5; .5 MG/3ML; MG/3ML
3 SOLUTION RESPIRATORY (INHALATION)
Status: DISCONTINUED | OUTPATIENT
Start: 2024-02-16 | End: 2024-02-22 | Stop reason: HOSPADM

## 2024-02-15 RX ORDER — SODIUM CHLORIDE 0.9 % (FLUSH) 0.9 %
3 SYRINGE (ML) INJECTION
Status: DISCONTINUED | OUTPATIENT
Start: 2024-02-15 | End: 2024-02-22 | Stop reason: HOSPADM

## 2024-02-15 RX ORDER — PREDNISONE 10 MG/1
TABLET ORAL
COMMUNITY
End: 2024-02-15

## 2024-02-15 RX ORDER — SODIUM CHLORIDE 0.9 % (FLUSH) 0.9 %
10 SYRINGE (ML) INJECTION
Status: DISCONTINUED | OUTPATIENT
Start: 2024-02-15 | End: 2024-02-22 | Stop reason: HOSPADM

## 2024-02-15 RX ORDER — TRAMADOL HYDROCHLORIDE 100 MG/1
TABLET, EXTENDED RELEASE ORAL
COMMUNITY
End: 2024-02-15

## 2024-02-15 RX ORDER — GLUCAGON 1 MG
1 KIT INJECTION
Status: DISCONTINUED | OUTPATIENT
Start: 2024-02-15 | End: 2024-02-16

## 2024-02-15 RX ORDER — PREGABALIN 100 MG/1
100 CAPSULE ORAL 3 TIMES DAILY
COMMUNITY
Start: 2024-01-10

## 2024-02-15 RX ORDER — POLYETHYLENE GLYCOL 3350 17 G/17G
POWDER, FOR SOLUTION ORAL
COMMUNITY
End: 2024-02-15

## 2024-02-15 RX ORDER — SPIRONOLACTONE 25 MG/1
TABLET ORAL
COMMUNITY
End: 2024-02-15

## 2024-02-15 RX ORDER — ASPIRIN 81 MG/1
81 TABLET ORAL DAILY
COMMUNITY

## 2024-02-15 RX ORDER — ASPIRIN 325 MG
325 TABLET, DELAYED RELEASE (ENTERIC COATED) ORAL
Status: COMPLETED | OUTPATIENT
Start: 2024-02-15 | End: 2024-02-15

## 2024-02-15 RX ORDER — OMEPRAZOLE 20 MG/1
CAPSULE, DELAYED RELEASE ORAL
COMMUNITY
End: 2024-02-15

## 2024-02-15 RX ORDER — NYSTATIN 100000 [USP'U]/ML
SUSPENSION ORAL
COMMUNITY
End: 2024-02-15

## 2024-02-15 RX ORDER — ESCITALOPRAM OXALATE 10 MG/1
TABLET ORAL
COMMUNITY
End: 2024-02-15

## 2024-02-15 RX ORDER — DIPHENHYDRAMINE HCL 25 MG
50 CAPSULE ORAL ONCE
Status: DISCONTINUED | OUTPATIENT
Start: 2024-02-16 | End: 2024-02-16

## 2024-02-15 RX ORDER — ONDANSETRON 4 MG/1
TABLET, FILM COATED ORAL
COMMUNITY
End: 2024-02-15

## 2024-02-15 RX ORDER — CIPROFLOXACIN 500 MG/1
TABLET ORAL
COMMUNITY
End: 2024-02-15

## 2024-02-15 RX ORDER — IBUPROFEN 200 MG
16 TABLET ORAL
Status: DISCONTINUED | OUTPATIENT
Start: 2024-02-15 | End: 2024-02-16

## 2024-02-15 RX ADMIN — IPRATROPIUM BROMIDE 500 MCG: 0.5 SOLUTION RESPIRATORY (INHALATION) at 06:02

## 2024-02-15 RX ADMIN — HYDROCODONE BITARTRATE AND ACETAMINOPHEN 2 TABLET: 5; 325 TABLET ORAL at 06:02

## 2024-02-15 RX ADMIN — ASPIRIN 325 MG: 325 TABLET, COATED ORAL at 08:02

## 2024-02-15 RX ADMIN — ALBUTEROL SULFATE 10 MG: 2.5 SOLUTION RESPIRATORY (INHALATION) at 06:02

## 2024-02-15 RX ADMIN — MAGNESIUM SULFATE HEPTAHYDRATE 2 G: 40 INJECTION, SOLUTION INTRAVENOUS at 06:02

## 2024-02-15 RX ADMIN — HEPARIN SODIUM 12 UNITS/KG/HR: 10000 INJECTION, SOLUTION INTRAVENOUS at 09:02

## 2024-02-15 RX ADMIN — CEFTRIAXONE SODIUM 1 G: 1 INJECTION, POWDER, FOR SOLUTION INTRAMUSCULAR; INTRAVENOUS at 06:02

## 2024-02-16 LAB
ABO + RH BLD: NORMAL
ANION GAP SERPL CALC-SCNC: 13 MMOL/L (ref 8–16)
APTT PPP: 51.8 SEC (ref 21–32)
APTT PPP: 54.7 SEC (ref 21–32)
ASCENDING AORTA: 3.3 CM
AV INDEX (PROSTH): 0.67
AV MEAN GRADIENT: 5 MMHG
AV PEAK GRADIENT: 9 MMHG
AV VALVE AREA BY VELOCITY RATIO: 2.63 CM²
AV VALVE AREA: 2.36 CM²
AV VELOCITY RATIO: 0.75
BASOPHILS # BLD AUTO: 0.01 K/UL (ref 0–0.2)
BASOPHILS NFR BLD: 0.1 % (ref 0–1.9)
BLD GP AB SCN CELLS X3 SERPL QL: NORMAL
BSA FOR ECHO PROCEDURE: 2.12 M2
BUN SERPL-MCNC: 21 MG/DL (ref 8–23)
CALCIUM SERPL-MCNC: 8.8 MG/DL (ref 8.7–10.5)
CHLORIDE SERPL-SCNC: 103 MMOL/L (ref 95–110)
CHOLEST SERPL-MCNC: 167 MG/DL (ref 120–199)
CHOLEST/HDLC SERPL: 3.6 {RATIO} (ref 2–5)
CO2 SERPL-SCNC: 23 MMOL/L (ref 23–29)
CREAT SERPL-MCNC: 1.5 MG/DL (ref 0.5–1.4)
CV ECHO LV RWT: 0.49 CM
DIFFERENTIAL METHOD BLD: ABNORMAL
DOP CALC AO PEAK VEL: 1.46 M/S
DOP CALC AO VTI: 32.6 CM
DOP CALC LVOT AREA: 3.5 CM2
DOP CALC LVOT DIAMETER: 2.11 CM
DOP CALC LVOT PEAK VEL: 1.1 M/S
DOP CALC LVOT STROKE VOLUME: 76.89 CM3
DOP CALC MV VTI: 23.8 CM
DOP CALCLVOT PEAK VEL VTI: 22 CM
E WAVE DECELERATION TIME: 230.02 MSEC
E/A RATIO: 0.54
E/E' RATIO: 9.86 M/S
ECHO LV POSTERIOR WALL: 1.03 CM (ref 0.6–1.1)
EJECTION FRACTION: 65 %
EOSINOPHIL # BLD AUTO: 0 K/UL (ref 0–0.5)
EOSINOPHIL NFR BLD: 0 % (ref 0–8)
ERYTHROCYTE [DISTWIDTH] IN BLOOD BY AUTOMATED COUNT: 17.8 % (ref 11.5–14.5)
ERYTHROCYTE [SEDIMENTATION RATE] IN BLOOD BY PHOTOMETRIC METHOD: 119 MM/HR (ref 0–23)
EST. GFR  (NO RACE VARIABLE): 47 ML/MIN/1.73 M^2
ESTIMATED AVG GLUCOSE: 123 MG/DL (ref 68–131)
FERRITIN SERPL-MCNC: 25 NG/ML (ref 20–300)
FRACTIONAL SHORTENING: 37 % (ref 28–44)
GLUCOSE SERPL-MCNC: 171 MG/DL (ref 70–110)
HBA1C MFR BLD: 5.9 % (ref 4–5.6)
HCT VFR BLD AUTO: 33.7 % (ref 40–54)
HDLC SERPL-MCNC: 47 MG/DL (ref 40–75)
HDLC SERPL: 28.1 % (ref 20–50)
HGB BLD-MCNC: 10.4 G/DL (ref 14–18)
IMM GRANULOCYTES # BLD AUTO: 0.02 K/UL (ref 0–0.04)
IMM GRANULOCYTES NFR BLD AUTO: 0.2 % (ref 0–0.5)
INTERVENTRICULAR SEPTUM: 1.22 CM (ref 0.6–1.1)
IVC DIAMETER: 1.96 CM
LA MAJOR: 5.71 CM
LA MINOR: 6.09 CM
LA WIDTH: 3.9 CM
LDLC SERPL CALC-MCNC: 111 MG/DL (ref 63–159)
LEFT ATRIUM SIZE: 3.22 CM
LEFT ATRIUM VOLUME INDEX MOD: 25.1 ML/M2
LEFT ATRIUM VOLUME INDEX: 30 ML/M2
LEFT ATRIUM VOLUME MOD: 52.79 CM3
LEFT ATRIUM VOLUME: 62.91 CM3
LEFT INTERNAL DIMENSION IN SYSTOLE: 2.68 CM (ref 2.1–4)
LEFT VENTRICLE DIASTOLIC VOLUME INDEX: 38.07 ML/M2
LEFT VENTRICLE DIASTOLIC VOLUME: 79.95 ML
LEFT VENTRICLE MASS INDEX: 78 G/M2
LEFT VENTRICLE SYSTOLIC VOLUME INDEX: 12.6 ML/M2
LEFT VENTRICLE SYSTOLIC VOLUME: 26.42 ML
LEFT VENTRICULAR INTERNAL DIMENSION IN DIASTOLE: 4.23 CM (ref 3.5–6)
LEFT VENTRICULAR MASS: 164.01 G
LV LATERAL E/E' RATIO: 9.86 M/S
LV SEPTAL E/E' RATIO: 9.86 M/S
LVOT MG: 2.51 MMHG
LVOT MV: 0.74 CM/S
LYMPHOCYTES # BLD AUTO: 0.6 K/UL (ref 1–4.8)
LYMPHOCYTES NFR BLD: 6.6 % (ref 18–48)
MAGNESIUM SERPL-MCNC: 2.4 MG/DL (ref 1.6–2.6)
MCH RBC QN AUTO: 22.9 PG (ref 27–31)
MCHC RBC AUTO-ENTMCNC: 30.9 G/DL (ref 32–36)
MCV RBC AUTO: 74 FL (ref 82–98)
MONOCYTES # BLD AUTO: 0.1 K/UL (ref 0.3–1)
MONOCYTES NFR BLD: 0.7 % (ref 4–15)
MV A" WAVE DURATION": 148.43 MSEC
MV PEAK A VEL: 1.27 M/S
MV PEAK E VEL: 0.69 M/S
MV PEAK GRADIENT: 6 MMHG
MV STENOSIS PRESSURE HALF TIME: 66.71 MS
MV VALVE AREA BY CONTINUITY EQUATION: 3.23 CM2
MV VALVE AREA P 1/2 METHOD: 3.3 CM2
NEUTROPHILS # BLD AUTO: 7.8 K/UL (ref 1.8–7.7)
NEUTROPHILS NFR BLD: 92.4 % (ref 38–73)
NONHDLC SERPL-MCNC: 120 MG/DL
NRBC BLD-RTO: 0 /100 WBC
OHS LV EJECTION FRACTION SIMPSONS BIPLANE MOD: 72 %
OHS QRS DURATION: 140 MS
OHS QRS DURATION: 152 MS
OHS QRS DURATION: 154 MS
OHS QTC CALCULATION: 497 MS
OHS QTC CALCULATION: 527 MS
OHS QTC CALCULATION: 548 MS
PHOSPHATE SERPL-MCNC: 2.8 MG/DL (ref 2.7–4.5)
PISA TR MAX VEL: 2.47 M/S
PLATELET # BLD AUTO: 184 K/UL (ref 150–450)
PMV BLD AUTO: 10 FL (ref 9.2–12.9)
POCT GLUCOSE: 132 MG/DL (ref 70–110)
POCT GLUCOSE: 145 MG/DL (ref 70–110)
POCT GLUCOSE: 169 MG/DL (ref 70–110)
POTASSIUM SERPL-SCNC: 4.5 MMOL/L (ref 3.5–5.1)
PULM VEIN S/D RATIO: 1.74
PV MV: 0.7 M/S
PV PEAK D VEL: 0.35 M/S
PV PEAK GRADIENT: 3 MMHG
PV PEAK S VEL: 0.61 M/S
PV PEAK VELOCITY: 0.91 M/S
RA MAJOR: 5.32 CM
RA PRESSURE ESTIMATED: 3 MMHG
RA WIDTH: 5 CM
RBC # BLD AUTO: 4.54 M/UL (ref 4.6–6.2)
RIGHT VENTRICLE DIASTOLIC MID DIMENSION: 3 CM
RIGHT VENTRICULAR END-DIASTOLIC DIMENSION: 4.3 CM
RV TB RVSP: 5 MMHG
RV TISSUE DOPPLER FREE WALL SYSTOLIC VELOCITY 1 (APICAL 4 CHAMBER VIEW): 12.95 CM/S
SINUS: 3.63 CM
SODIUM SERPL-SCNC: 139 MMOL/L (ref 136–145)
SPECIMEN OUTDATE: NORMAL
STJ: 3.29 CM
TDI LATERAL: 0.07 M/S
TDI SEPTAL: 0.07 M/S
TDI: 0.07 M/S
TR MAX PG: 24 MMHG
TRICUSPID ANNULAR PLANE SYSTOLIC EXCURSION: 2.03 CM
TRIGL SERPL-MCNC: 45 MG/DL (ref 30–150)
TROPONIN I SERPL DL<=0.01 NG/ML-MCNC: 12.01 NG/ML (ref 0–0.03)
TROPONIN I SERPL DL<=0.01 NG/ML-MCNC: 12.73 NG/ML (ref 0–0.03)
TROPONIN I SERPL DL<=0.01 NG/ML-MCNC: 14.7 NG/ML (ref 0–0.03)
TROPONIN I SERPL DL<=0.01 NG/ML-MCNC: 9.38 NG/ML (ref 0–0.03)
TV REST PULMONARY ARTERY PRESSURE: 27 MMHG
WBC # BLD AUTO: 8.46 K/UL (ref 3.9–12.7)
Z-SCORE OF LEFT VENTRICULAR DIMENSION IN END DIASTOLE: -4.34
Z-SCORE OF LEFT VENTRICULAR DIMENSION IN END SYSTOLE: -3.14

## 2024-02-16 PROCEDURE — 94640 AIRWAY INHALATION TREATMENT: CPT

## 2024-02-16 PROCEDURE — 85730 THROMBOPLASTIN TIME PARTIAL: CPT | Performed by: HOSPITALIST

## 2024-02-16 PROCEDURE — 93010 ELECTROCARDIOGRAM REPORT: CPT | Mod: ,,, | Performed by: INTERNAL MEDICINE

## 2024-02-16 PROCEDURE — 80061 LIPID PANEL: CPT | Performed by: NURSE PRACTITIONER

## 2024-02-16 PROCEDURE — 84100 ASSAY OF PHOSPHORUS: CPT | Performed by: NURSE PRACTITIONER

## 2024-02-16 PROCEDURE — 11000001 HC ACUTE MED/SURG PRIVATE ROOM

## 2024-02-16 PROCEDURE — 86850 RBC ANTIBODY SCREEN: CPT | Performed by: NURSE PRACTITIONER

## 2024-02-16 PROCEDURE — 85025 COMPLETE CBC W/AUTO DIFF WBC: CPT | Performed by: EMERGENCY MEDICINE

## 2024-02-16 PROCEDURE — 94761 N-INVAS EAR/PLS OXIMETRY MLT: CPT

## 2024-02-16 PROCEDURE — XW033E5 INTRODUCTION OF REMDESIVIR ANTI-INFECTIVE INTO PERIPHERAL VEIN, PERCUTANEOUS APPROACH, NEW TECHNOLOGY GROUP 5: ICD-10-PCS | Performed by: HOSPITALIST

## 2024-02-16 PROCEDURE — 36415 COLL VENOUS BLD VENIPUNCTURE: CPT | Performed by: HOSPITALIST

## 2024-02-16 PROCEDURE — 93005 ELECTROCARDIOGRAM TRACING: CPT

## 2024-02-16 PROCEDURE — 85730 THROMBOPLASTIN TIME PARTIAL: CPT | Mod: 91 | Performed by: EMERGENCY MEDICINE

## 2024-02-16 PROCEDURE — 27100171 HC OXYGEN HIGH FLOW UP TO 24 HOURS

## 2024-02-16 PROCEDURE — 80048 BASIC METABOLIC PNL TOTAL CA: CPT | Performed by: NURSE PRACTITIONER

## 2024-02-16 PROCEDURE — 27000646 HC AEROBIKA DEVICE

## 2024-02-16 PROCEDURE — 25000242 PHARM REV CODE 250 ALT 637 W/ HCPCS: Performed by: NURSE PRACTITIONER

## 2024-02-16 PROCEDURE — 25000242 PHARM REV CODE 250 ALT 637 W/ HCPCS

## 2024-02-16 PROCEDURE — 84484 ASSAY OF TROPONIN QUANT: CPT | Performed by: NURSE PRACTITIONER

## 2024-02-16 PROCEDURE — 25000003 PHARM REV CODE 250

## 2024-02-16 PROCEDURE — 25000003 PHARM REV CODE 250: Performed by: FAMILY MEDICINE

## 2024-02-16 PROCEDURE — 87205 SMEAR GRAM STAIN: CPT

## 2024-02-16 PROCEDURE — 84484 ASSAY OF TROPONIN QUANT: CPT | Mod: 91 | Performed by: HOSPITALIST

## 2024-02-16 PROCEDURE — 94664 DEMO&/EVAL PT USE INHALER: CPT

## 2024-02-16 PROCEDURE — 25000003 PHARM REV CODE 250: Performed by: NURSE PRACTITIONER

## 2024-02-16 PROCEDURE — 99222 1ST HOSP IP/OBS MODERATE 55: CPT | Mod: 25,,, | Performed by: NURSE PRACTITIONER

## 2024-02-16 PROCEDURE — 63600175 PHARM REV CODE 636 W HCPCS

## 2024-02-16 PROCEDURE — 63600175 PHARM REV CODE 636 W HCPCS: Mod: JZ,TB | Performed by: HOSPITALIST

## 2024-02-16 PROCEDURE — 83036 HEMOGLOBIN GLYCOSYLATED A1C: CPT | Performed by: NURSE PRACTITIONER

## 2024-02-16 PROCEDURE — 99900035 HC TECH TIME PER 15 MIN (STAT)

## 2024-02-16 PROCEDURE — 83735 ASSAY OF MAGNESIUM: CPT | Performed by: NURSE PRACTITIONER

## 2024-02-16 PROCEDURE — 36415 COLL VENOUS BLD VENIPUNCTURE: CPT | Mod: XB | Performed by: INTERNAL MEDICINE

## 2024-02-16 PROCEDURE — 27000207 HC ISOLATION

## 2024-02-16 PROCEDURE — 63600175 PHARM REV CODE 636 W HCPCS: Performed by: NURSE PRACTITIONER

## 2024-02-16 PROCEDURE — 63600175 PHARM REV CODE 636 W HCPCS: Performed by: FAMILY MEDICINE

## 2024-02-16 PROCEDURE — 25000003 PHARM REV CODE 250: Performed by: HOSPITALIST

## 2024-02-16 PROCEDURE — 87070 CULTURE OTHR SPECIMN AEROBIC: CPT

## 2024-02-16 PROCEDURE — 94660 CPAP INITIATION&MGMT: CPT

## 2024-02-16 PROCEDURE — 63600175 PHARM REV CODE 636 W HCPCS: Performed by: EMERGENCY MEDICINE

## 2024-02-16 RX ORDER — FLUTICASONE FUROATE AND VILANTEROL 200; 25 UG/1; UG/1
1 POWDER RESPIRATORY (INHALATION) DAILY
Status: DISCONTINUED | OUTPATIENT
Start: 2024-02-16 | End: 2024-02-22 | Stop reason: HOSPADM

## 2024-02-16 RX ORDER — INSULIN ASPART 100 [IU]/ML
0-5 INJECTION, SOLUTION INTRAVENOUS; SUBCUTANEOUS EVERY 6 HOURS PRN
Status: DISCONTINUED | OUTPATIENT
Start: 2024-02-16 | End: 2024-02-22 | Stop reason: HOSPADM

## 2024-02-16 RX ORDER — CLOPIDOGREL BISULFATE 75 MG/1
75 TABLET ORAL DAILY
Status: DISCONTINUED | OUTPATIENT
Start: 2024-02-17 | End: 2024-02-22 | Stop reason: HOSPADM

## 2024-02-16 RX ORDER — GLUCAGON 1 MG
1 KIT INJECTION
Status: DISCONTINUED | OUTPATIENT
Start: 2024-02-16 | End: 2024-02-22 | Stop reason: HOSPADM

## 2024-02-16 RX ORDER — POLYETHYLENE GLYCOL 3350 17 G/17G
17 POWDER, FOR SOLUTION ORAL DAILY
Status: DISCONTINUED | OUTPATIENT
Start: 2024-02-17 | End: 2024-02-22 | Stop reason: HOSPADM

## 2024-02-16 RX ORDER — IBUPROFEN 200 MG
24 TABLET ORAL
Status: DISCONTINUED | OUTPATIENT
Start: 2024-02-16 | End: 2024-02-22 | Stop reason: HOSPADM

## 2024-02-16 RX ORDER — CLOPIDOGREL BISULFATE 75 MG/1
300 TABLET ORAL ONCE
Status: COMPLETED | OUTPATIENT
Start: 2024-02-16 | End: 2024-02-16

## 2024-02-16 RX ORDER — MUPIROCIN 20 MG/G
OINTMENT TOPICAL 2 TIMES DAILY
Status: COMPLETED | OUTPATIENT
Start: 2024-02-16 | End: 2024-02-21

## 2024-02-16 RX ORDER — IBUPROFEN 200 MG
16 TABLET ORAL
Status: DISCONTINUED | OUTPATIENT
Start: 2024-02-16 | End: 2024-02-22 | Stop reason: HOSPADM

## 2024-02-16 RX ORDER — PREGABALIN 50 MG/1
100 CAPSULE ORAL 3 TIMES DAILY
Status: DISCONTINUED | OUTPATIENT
Start: 2024-02-16 | End: 2024-02-22 | Stop reason: HOSPADM

## 2024-02-16 RX ORDER — SODIUM CHLORIDE FOR INHALATION 3 %
4 VIAL, NEBULIZER (ML) INHALATION
Status: DISCONTINUED | OUTPATIENT
Start: 2024-02-16 | End: 2024-02-22 | Stop reason: HOSPADM

## 2024-02-16 RX ADMIN — REMDESIVIR 100 MG: 100 INJECTION, POWDER, LYOPHILIZED, FOR SOLUTION INTRAVENOUS at 09:02

## 2024-02-16 RX ADMIN — PREGABALIN 100 MG: 50 CAPSULE ORAL at 08:02

## 2024-02-16 RX ADMIN — IPRATROPIUM BROMIDE AND ALBUTEROL SULFATE 3 ML: 2.5; .5 SOLUTION RESPIRATORY (INHALATION) at 01:02

## 2024-02-16 RX ADMIN — OXYCODONE HYDROCHLORIDE AND ACETAMINOPHEN 500 MG: 500 TABLET ORAL at 09:02

## 2024-02-16 RX ADMIN — ATORVASTATIN CALCIUM 80 MG: 40 TABLET, FILM COATED ORAL at 09:02

## 2024-02-16 RX ADMIN — CEFTRIAXONE SODIUM 1 G: 1 INJECTION, POWDER, FOR SOLUTION INTRAMUSCULAR; INTRAVENOUS at 08:02

## 2024-02-16 RX ADMIN — DEXAMETHASONE 6 MG: 4 TABLET ORAL at 09:02

## 2024-02-16 RX ADMIN — IPRATROPIUM BROMIDE AND ALBUTEROL SULFATE 3 ML: 2.5; .5 SOLUTION RESPIRATORY (INHALATION) at 07:02

## 2024-02-16 RX ADMIN — AZITHROMYCIN MONOHYDRATE 500 MG: 500 INJECTION, POWDER, LYOPHILIZED, FOR SOLUTION INTRAVENOUS at 11:02

## 2024-02-16 RX ADMIN — FLUTICASONE FUROATE AND VILANTEROL TRIFENATATE 1 PUFF: 200; 25 POWDER RESPIRATORY (INHALATION) at 10:02

## 2024-02-16 RX ADMIN — REMDESIVIR 200 MG: 100 INJECTION, POWDER, LYOPHILIZED, FOR SOLUTION INTRAVENOUS at 02:02

## 2024-02-16 RX ADMIN — ASPIRIN 81 MG CHEWABLE TABLET 81 MG: 81 TABLET CHEWABLE at 09:02

## 2024-02-16 RX ADMIN — CLOPIDOGREL BISULFATE 300 MG: 75 TABLET ORAL at 09:02

## 2024-02-16 RX ADMIN — MUPIROCIN: 20 OINTMENT TOPICAL at 08:02

## 2024-02-16 RX ADMIN — PREGABALIN 100 MG: 50 CAPSULE ORAL at 03:02

## 2024-02-16 RX ADMIN — IPRATROPIUM BROMIDE AND ALBUTEROL SULFATE 3 ML: 2.5; .5 SOLUTION RESPIRATORY (INHALATION) at 08:02

## 2024-02-16 RX ADMIN — HEPARIN SODIUM 12 UNITS/KG/HR: 10000 INJECTION, SOLUTION INTRAVENOUS at 05:02

## 2024-02-16 RX ADMIN — MULTIPLE VITAMINS W/ MINERALS TAB 1 TABLET: TAB at 09:02

## 2024-02-16 NOTE — ASSESSMENT & PLAN NOTE
- presented with SOB and no reports of chest pain  - BNP 45 Troponin 3.23-12.568-14.702-12.730; EKG NSR with RBBB  - initially more concerned about demand etiology however with trend up of troponin concerning for possible ischemic etiology  - will load with Plavix and place on daily DAPT; continue IV Heparin  - echo today to assess LVEF and for WMA; no plans for ischemic evaluation today given continuous BiPap; EKG with no acute changes and will continue to follow and determine proceed with ischemic evaluation inpatient vs outpatient

## 2024-02-16 NOTE — ASSESSMENT & PLAN NOTE
Patient with Hypercapnic and Hypoxic Respiratory failure which is Acute on chronic.  he is on home oxygen at 4 LPM. Supplemental oxygen was provided and noted- Oxygen Concentration (%):  [40-45] 40    .   Signs/symptoms of respiratory failure include- tachypnea, increased work of breathing, and respiratory distress. Contributing diagnoses includes - COPD and COVID  Labs and images were reviewed. Patient Has recent ABG, which has been reviewed. Will treat underlying causes and adjust management of respiratory failure as follows- BiPAP, steroids, breathing treatments, ABX, remdesivir

## 2024-02-16 NOTE — ASSESSMENT & PLAN NOTE
Patient's COPD is with exacerbation noted by use of accessory muscles for breathing and worsening of baseline hypoxia currently.  Patient is currently on COPD Pathway. Continue scheduled inhalers Steroids, Antibiotics, and Supplemental oxygen and monitor respiratory status closely.

## 2024-02-16 NOTE — PLAN OF CARE
Patient currently in ICU on COVID isolation.  contacted his son Rajan to verify information. Rajan reports patient lives with his spouse. Patient is currently staying at a hotel in [facility] (by interstate-unable to recall name), while their house is getting worked on. PO BOX is listed for mail patient receives. He has a rollator and home oxygen. He does not have Home Health. Patient does not drive, but son reports he believes his dad uses transportation through his insurance. Son reports his mom (patient's wife) does not have good reception at [facility], so best to call him if needed. Son encouraged to call with any questions or concerns.  will continue to follow patient through transitions of care and assist with any discharge needs.     Patient Contacts    Name Relation Home Work Mobile   Rajan Deluna iii Son [phone]     MikieCasimiroRosalesCarmencita palmer Relative [phone]     Brannon Deluna Brother [phone]          02/16/24 1336   Discharge Assessment   Assessment Type Discharge Planning Assessment   Confirmed/corrected address, phone number and insurance Yes   Confirmed Demographics Correct on Facesheet   Source of Information family   People in Home significant other;spouse   Facility Arrived From: Home   Do you expect to return to your current living situation? Yes   Do you have help at home or someone to help you manage your care at home? Yes   Who are your caregiver(s) and their phone number(s)? Rajan Deluna iii Son [phone]   Prior to hospitilization cognitive status: Alert/Oriented   Current cognitive status: Alert/Oriented   Walking or Climbing Stairs Difficulty yes   Walking or Climbing Stairs ambulation difficulty, requires equipment   Dressing/Bathing Difficulty no   Equipment Currently Used at Home rollator;oxygen   Do you take prescription medications? Yes   Do you have prescription coverage? Yes   Do you have any problems affording any of your prescribed medications? No    Is the patient taking medications as prescribed? yes   Who is going to help you get home at discharge? Mikie denis,Rajan Son [phone]   How do you get to doctors appointments? health plan transportation   Are you on dialysis? No   Do you take coumadin? No   Discharge Plan A Home   Discharge Plan B Home with family   DME Needed Upon Discharge  none   Discharge Plan discussed with: Adult children   Transition of Care Barriers None     Angelic Mcintosh RN    [phone]

## 2024-02-16 NOTE — ED NOTES
Breathing treatment complete. Patient desatting to low 80s on 4 L NC. MD notified. RT called to bedside.

## 2024-02-16 NOTE — ED NOTES
Patient reports shortness of breath x 2 days on home oxygen. He reports lower quadrant abdominal pain and describes it as painful 10/10 that started after arriving at the ED. He denies nausea, vomiting, or diarrhea.

## 2024-02-16 NOTE — ASSESSMENT & PLAN NOTE
Patient denies CP  Trop 3.233 > 12.568  Will continue trending  ASA loading dose given  EDP discussed case with cardiology  No plavix d/t 3 vessel dz  Heparin gtt  NPO after MN  EKG: sinus rhythm w/ 1st degree AVB, HR 94, left axis deviation, RBBB, no STEMI

## 2024-02-16 NOTE — ASSESSMENT & PLAN NOTE
Patient with Hypercapnic and Hypoxic Respiratory failure which is Acute on chronic.  he is on home oxygen at 4 LPM. Supplemental oxygen was provided and noted- Oxygen Concentration (%):  [40-50] 50    .   Signs/symptoms of respiratory failure include- tachypnea, increased work of breathing, and respiratory distress. Contributing diagnoses includes - COPD and COVID  Labs and images were reviewed. Patient Has recent ABG, which has been reviewed. Will treat underlying causes and adjust management of respiratory failure as follows- BiPAP, steroids, breathing treatments, ABX, remdesivir

## 2024-02-16 NOTE — NURSING
Pt with episode of bradycardia, asymptomatic and no CP. HR low 60s while awake. Previously in 80s per chart trend. Troponin pending. Updated ABRAM Hughes. Will await troponin, awaiting collection. Ok collect chem early. Phlebotomy on unit, inquired to collect.     AM troponin 14.7, upadated ABRAM Hughes - Will maintain NPO for probable LHC.

## 2024-02-16 NOTE — ASSESSMENT & PLAN NOTE
Patient denies CP  Trop 3.233 > 12.568  Will continue trending  ASA loading dose given  EDP discussed case with cardiology  No plavix d/t 3 vessel dz  Heparin gtt    EKG: sinus rhythm w/ 1st degree AVB, HR 94, left axis deviation, RBBB, no STEMI  Cards consult:  We will recommend medical therapy for NSTEMI at this time.  Given ongoing respiratory failure we will not proceed with invasive approach till his respiratory status completely improves     Start Plavix  Aspirin  Heparin drip for 48 hours  Echocardiogram for EF and wall motion

## 2024-02-16 NOTE — ED NOTES
RN spoke with Dr. Mckeon about patient desatting. Orders for high flow received. RT called to bedside

## 2024-02-16 NOTE — PROGRESS NOTES
[facility]  Progress Note    Patient Name: Rajan Deluna  MRN: [id number]  Patient Class: IP- Inpatient   Admission Date: 2/15/2024  Length of Stay: 1 days  Attending Physician: Emeli Wayne MD  Primary Care Provider: Jason Mccord MD        Subjective:     Principal Problem:Acute respiratory failure with hypoxia        HPI:  Rajan Deluna is a 78 y.o. male who  has a past medical history of Arthritis, COPD (chronic obstructive pulmonary disease), Diabetes mellitus, Digestive disorder, Encounter for blood transfusion, Hypertension, Renal disorder, Smoker, and Weakness.     The patient presents to the ED due to shortness of breath.  Patient was at the hotel where he lives when he began to feel short of breath approximately 6 hours ago.  He has a history of COPD/chronic bronchitis and uses 4 L of oxygen at baseline.  EMS reports that he was hypoxic in the 70s on his home oxygen.  He received 125 mg of IV Solu-Medrol and a DuoNeb and was transported here. Patient reports cough with chest tightness, fatigue, and chills.  He denies CP, fever, wheezing, palpitations, lightheadedness, dizziness.    Overview/Hospital Course:  Pt admitted and off from bipap. Cont on iv abx, nebs, steroids, and remdesvir. Still on heparin drip for NSTEMi. Cards consulted and recommend medical therapy for NSTEMI at this time. Given ongoing respiratory failure we will not proceed with invasive approach till his respiratory status completely improves     Interval History: no issues voiced    Review of Systems   Constitutional:  Negative for chills, fatigue and fever.   HENT:  Negative for congestion, ear pain, hearing loss and trouble swallowing.    Eyes:  Negative for pain.   Respiratory:  Negative for cough, chest tightness and shortness of breath.    Cardiovascular:  Negative for chest pain, palpitations and leg swelling.   Gastrointestinal:  Negative for abdominal pain, constipation, diarrhea,  nausea and vomiting.   Endocrine: Negative for polydipsia, polyphagia and polyuria.   Genitourinary:  Negative for difficulty urinating, dysuria, flank pain, frequency, hematuria and urgency.   Musculoskeletal:  Negative for arthralgias and neck pain.   Skin:  Negative for color change and rash.   Allergic/Immunologic: Negative for environmental allergies.   Neurological:  Negative for dizziness, syncope and headaches.   Hematological:  Negative for adenopathy.   Psychiatric/Behavioral:  Negative for behavioral problems, hallucinations and self-injury.    All other systems reviewed and are negative.    Objective:     Vital Signs (Most Recent):  Temp: 98.1 °F (36.7 °C) (02/16/24 1545)  Pulse: 88 (02/16/24 1358)  Resp: (!) 23 (02/16/24 1358)  BP: 125/60 (02/16/24 1345)  SpO2: (!) 91 % (02/16/24 1358) Vital Signs (24h Range):  Temp:  [98.1 °F (36.7 °C)-98.8 °F (37.1 °C)] 98.1 °F (36.7 °C)  Pulse:  [] 88  Resp:  [13-32] 23  SpO2:  [82 %-100 %] 91 %  BP: (106-144)/(53-81) 125/60     Weight: 89.8 kg (198 lb)  Body mass index is 27.62 kg/m².    Intake/Output Summary (Last 24 hours) at 2/16/2024 1712  Last data filed at 2/16/2024 1528  Gross per 24 hour   Intake 868.42 ml   Output 1475 ml   Net -606.58 ml         Physical Exam  Vitals and nursing note reviewed.   Constitutional:       General: He is not in acute distress.     Appearance: He is well-developed.   HENT:      Head: Normocephalic and atraumatic.      Nose: Nose normal.   Eyes:      Conjunctiva/sclera: Conjunctivae normal.   Cardiovascular:      Rate and Rhythm: Normal rate and regular rhythm.      Heart sounds: Normal heart sounds. No murmur heard.  Pulmonary:      Effort: Pulmonary effort is normal.      Breath sounds: Normal breath sounds. No wheezing.   Abdominal:      General: Bowel sounds are normal.      Palpations: Abdomen is soft. There is no mass.      Tenderness: There is no abdominal tenderness. There is no guarding or rebound.    Musculoskeletal:         General: Normal range of motion.      Cervical back: Normal range of motion and neck supple.   Skin:     General: Skin is warm and dry.      Findings: No rash.   Neurological:      Mental Status: He is alert and oriented to person, place, and time.   Psychiatric:         Behavior: Behavior normal.             Significant Labs: All pertinent labs within the past 24 hours have been reviewed.  BMP:   Recent Labs   Lab 02/16/24  0348   *      K 4.5      CO2 23   BUN 21   CREATININE 1.5*   CALCIUM 8.8   MG 2.4     CBC:   Recent Labs   Lab 02/15/24  1804 02/15/24  2101 02/16/24  0348   WBC 11.94 11.28 8.46   HGB 10.5* 10.2* 10.4*   HCT 33.4* 33.3* 33.7*    198 184       Significant Imaging: I have reviewed all pertinent imaging results/findings within the past 24 hours.  I have reviewed and interpreted all pertinent imaging results/findings within the past 24 hours.    Assessment/Plan:      * Acute respiratory failure with hypoxia  Patient with Hypercapnic and Hypoxic Respiratory failure which is Acute on chronic.  he is on home oxygen at 4 LPM. Supplemental oxygen was provided and noted- Oxygen Concentration (%):  [40-50] 50    .   Signs/symptoms of respiratory failure include- tachypnea, increased work of breathing, and respiratory distress. Contributing diagnoses includes - COPD and COVID  Labs and images were reviewed. Patient Has recent ABG, which has been reviewed. Will treat underlying causes and adjust management of respiratory failure as follows- BiPAP, steroids, breathing treatments, ABX, remdesivir    COVID-19  Patient is identified as High risk for severe complications of COVID 19 based on COVID risk score of 5   Initiate standard COVID protocols; COVID-19 testing ,Infection Control notification  and isolation- respiratory, contact and droplet per protocol    Diagnostics: CBC, CMP, Ferritin, CRP, LDH, BNP, Troponin, and Portable CXR    Management: Initiate  "targeted therapy with Remdesivir, 200mg IV x1, followed by 100mg IV daily x5 days total and Dexamethasone PO/IV 6mg daily x10 days, Maintain oxygen saturations 92-96% via NIPPV- CPAP  % FiO2 and monitor with continuous/intermittent pulse oximetry. , Inhaled bronchodilators as needed for shortness of breath., Continuous cardiac monitoring., and Manage respiratory failure (O2 requirement >10LPM or needing NIPPV/Mechanical ventilation) and/or Pneumonia (active chest infiltrates) separately as described below.    Advance Care Planning Current advance care plan has not been discussed with patient/family/POA and patient currently remains Full Code.     NSTEMI (non-ST elevated myocardial infarction)  Patient denies CP  Trop 3.233 > 12.568  Will continue trending  ASA loading dose given  EDP discussed case with cardiology  No plavix d/t 3 vessel dz  Heparin gtt    EKG: sinus rhythm w/ 1st degree AVB, HR 94, left axis deviation, RBBB, no STEMI  Cards consult:  We will recommend medical therapy for NSTEMI at this time.  Given ongoing respiratory failure we will not proceed with invasive approach till his respiratory status completely improves     Start Plavix  Aspirin  Heparin drip for 48 hours  Echocardiogram for EF and wall motion      COPD exacerbation  Patient's COPD is with exacerbation noted by use of accessory muscles for breathing and worsening of baseline hypoxia currently.  Patient is currently on COPD Pathway. Continue scheduled inhalers Steroids, Antibiotics, and Supplemental oxygen and monitor respiratory status closely.     Type 2 diabetes mellitus  Patient's FSGs are controlled on current medication regimen.  Last A1c reviewed-   Lab Results   Component Value Date    HGBA1C 5.9 (H) 12/17/2021     Most recent fingerstick glucose reviewed- No results for input(s): "POCTGLUCOSE" in the last 24 hours.  Current correctional scale  Low  Maintain anti-hyperglycemic dose as follows-   Antihyperglycemics (From " admission, onward)      Start     Stop Route Frequency Ordered    02/16/24 0118  insulin aspart U-100 pen 0-5 Units         -- SubQ Every 6 hours PRN 02/16/24 0019          Hold Oral hypoglycemics while patient is in the hospital.    Anemia  Patient's anemia is currently controlled. Has not received any PRBCs to date. Etiology likely d/t Iron deficiency  Current CBC reviewed-   Lab Results   Component Value Date    HGB 10.2 (L) 02/15/2024    HCT 33.3 (L) 02/15/2024     Monitor serial CBC and transfuse if patient becomes hemodynamically unstable, symptomatic or H/H drops below 7/21.      VTE Risk Mitigation (From admission, onward)           Ordered     IP VTE HIGH RISK PATIENT  Once         02/15/24 2256     Place sequential compression device  Until discontinued         02/15/24 2256     Place sequential compression device  Until discontinued         02/15/24 2250     heparin 25,000 units in dextrose 5% (100 units/ml) IV bolus from bag LOW INTENSITY nomogram - [facility]  As needed (PRN)        Question:  Heparin Infusion Adjustment (DO NOT MODIFY ANSWER)  Answer:  \[email or web address]\Images\Pharmacy\HeparinInfusions\heparin LOW INTENSITY nomogram for [facility] [id number]    02/15/24 2044     heparin 25,000 units in dextrose 5% (100 units/ml) IV bolus from bag LOW INTENSITY nomogram - [facility]  As needed (PRN)        Question:  Heparin Infusion Adjustment (DO NOT MODIFY ANSWER)  Answer:  \[email or web address]\Images\Pharmacy\HeparinInfusions\heparin LOW INTENSITY nomogram for [facility] [id number]    02/15/24 2044     heparin 25,000 units in dextrose 5% 250 mL (100 units/mL) infusion LOW INTENSITY nomogram - [facility]  Continuous        Question:  Begin at (units/kg/hr)  Answer:  12    02/15/24 2044                    Discharge Planning   ADAM:      Code Status: Full Code   Is the patient medically ready for discharge?:     Reason for patient still in hospital (select all that apply): Treatment  Discharge Plan A: Home            Critical care time  spent on the evaluation and treatment of severe organ dysfunction, review of pertinent labs and imaging studies, discussions with consulting providers and discussions with patient/family: >30 minutes.      Emeli Wayne MD  Department of Hospital Medicine   [facility] - Intensive Care

## 2024-02-16 NOTE — ASSESSMENT & PLAN NOTE
Patient is identified as High risk for severe complications of COVID 19 based on COVID risk score of 5   Initiate standard COVID protocols; COVID-19 testing ,Infection Control notification  and isolation- respiratory, contact and droplet per protocol    Diagnostics: CBC, CMP, Ferritin, CRP, LDH, BNP, Troponin, and Portable CXR    Management: Initiate targeted therapy with Remdesivir, 200mg IV x1, followed by 100mg IV daily x5 days total and Dexamethasone PO/IV 6mg daily x10 days, Maintain oxygen saturations 92-96% via NIPPV- CPAP  % FiO2 and monitor with continuous/intermittent pulse oximetry. , Inhaled bronchodilators as needed for shortness of breath., Continuous cardiac monitoring., and Manage respiratory failure (O2 requirement >10LPM or needing NIPPV/Mechanical ventilation) and/or Pneumonia (active chest infiltrates) separately as described below.    Advance Care Planning  Current advance care plan has not been discussed with patient/family/POA and patient currently remains Full Code.

## 2024-02-16 NOTE — CONSULTS
Consult Note  [facility]    Attending: Emeli Wayne MD  Fellow: Felicia Nelson DO  Admit Date: 2/15/2024  Today's Date: 02/16/2024  Reason for Consult:  ICU level of care for treatment of acute respiratory failure and concern for NSTEMI    SUBJECTIVE:     HPI:   Rajan Deluna is a 78 y.o. male who has a past medical history of COPD (on 4 L home O2), Diabetes mellitus, Diverticulitis, and Hypertension.  The patient presented to the ED on 2/15 due to 2 days of progressive shortness of breath and chills. Pt endorses running out of his Trelegy prescription 3-4 days ago and also intermittently missing doses of other medications he is prescribed. Also questionable if his oxygen at home is working and delivering 4 L/min. He has had some recent sick contacts at [employer] and at the hotel in [facility] where he is currently staying while his house is being remodeled. Found by EMS to be hypoxic in the 70s on his home oxygen.  He received 125 mg of IV Solu-Medrol and a DuoNeb treatment via EMS. In the ED he received DuoNebs and magnesium sulfate. In the ED he was found to be positive for COVID-19 and had a rising Troponin. Cardiology consulted.   Other relevant past medical history includes a previous diagnosis of MAC and aspergillus - unclear if completely treated. 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 He was also hospitalized 07/2020 - 10/2020 for acute hypoxic respiratory failure d/t COVID-19 infection.       Review of patient's allergies indicates:  No Known Allergies    Past Medical History:   Diagnosis Date    Arthritis     COPD (chronic obstructive pulmonary disease)     Diabetes mellitus     Digestive disorder     Encounter for blood transfusion     Hypertension     Renal disorder     Smoker     Weakness      Past Surgical History:   Procedure Laterality Date    HERNIA REPAIR      INTRALUMINAL GASTROINTESTINAL TRACT IMAGING VIA CAPSULE  N/A 8/3/2020    Procedure: IMAGING PROCEDURE, GI TRACT, INTRALUMINAL, VIA CAPSULE;  Surgeon: Rey Olivares MD;  Location: [facility];  Service: Endoscopy;  Laterality: N/A;    right knee surgery       Family History   Family history unknown: Yes     Social History     Tobacco Use    Smoking status: Former    Smokeless tobacco: Never   Substance Use Topics    Alcohol use: Not Currently    Drug use: Never       All medications reviewed.    Review of Systems   Constitutional:  Positive for chills. Negative for fever.   HENT:  Positive for sore throat.    Eyes:  Negative for blurred vision.   Respiratory:  Positive for shortness of breath.    Cardiovascular:  Negative for chest pain.   Gastrointestinal:  Negative for abdominal pain, constipation, diarrhea, heartburn, nausea and vomiting.   Genitourinary:  Negative for dysuria.   Musculoskeletal:  Negative for myalgias.   Neurological:  Negative for dizziness, weakness and headaches.     OBJECTIVE:     Vital Signs Trends/Hx Reviewed  Vitals:    02/16/24 1230 02/16/24 1245 02/16/24 1300 02/16/24 1315   BP:  129/66 (!) 127/58    Pulse: 81 79 80 82   Resp: (!) 23 (!) 23 (!) 21 (!) 32   Temp:       TempSrc:       SpO2: (!) 93% (!) 93% (!) 90% (!) 92%   Weight:       Height:         Physical Exam:  General: NAD, cooperative & interactive.  HEENT: AT/NC, PERRL, EOMI, oral and nasal mucosa moist, poor dentition  Neck: Supple without JVD or palpable LAD.   Cardiac: normal rate, regular rhythm, distant heart sounds.  Respiratory: respiratory distress, rhonchi present, on CPAP this AM    Abdomen: Soft, NT/ND. +BS. No hepatosplenomegaly.   Extremities: No edema.   Neuro: Grossly intact to brief exam. Oriented x3.     Laboratory:  Recent Labs   Lab 02/15/24  2105   PH 7.389   PCO2 48.0*   PO2 46.8*   HCO3 29.0*   POCSATURATED 80.5*     Recent Labs   Lab 02/16/24  0348   WBC 8.46   RBC 4.54*   HGB 10.4*   HCT 33.7*      MCV 74*   MCH 22.9*   MCHC 30.9*     Recent Labs   Lab  02/16/24  0348      K 4.5      CO2 23   BUN 21   CREATININE 1.5*   CALCIUM 8.8   MG 2.4       Microbiology Data:   Microbiology Results (last 7 days)       Procedure Component Value Units Date/Time    Culture, Respiratory with Gram Stain [[id number]] Collected: 02/16/24 1059    Order Status: Sent Specimen: Respiratory from Sputum Updated: 02/16/24 1105    Blood culture #2 [[id number]] Collected: 02/15/24 1823    Order Status: Completed Specimen: Blood from Peripheral, Hand, Right Updated: 02/16/24 0545     Blood Culture, Routine No Growth to date    Blood culture #1 [[id number]] Collected: 02/15/24 1802    Order Status: Completed Specimen: Blood from Peripheral, Antecubital, Right Updated: 02/16/24 0545     Blood Culture, Routine No Growth to date             Chest Imaging:   CXR 2/15  Impression: Ill-defined bibasilar ground-glass airspace opacities.      Infusions:     heparin (porcine) in D5W 12 Units/kg/hr (02/16/24 1327)       Scheduled Medications:    albuterol-ipratropium  3 mL Nebulization Q6H WAKE    ascorbic acid (vitamin C)  500 mg Oral BID    aspirin  81 mg Oral Daily    atorvastatin  80 mg Oral Daily    azithromycin  500 mg Intravenous Q24H    cefTRIAXone (Rocephin) IV (PEDS and ADULTS)  1 g Intravenous Q24H    [START ON 2/17/2024] clopidogreL  75 mg Oral Daily    dexAMETHasone  6 mg Oral Daily    diphenhydrAMINE  50 mg Oral Once    fluticasone furoate-vilanteroL  1 puff Inhalation Daily    multivitamin  1 tablet Oral Daily    remdesivir infusion  100 mg Intravenous Daily    [START ON 2/17/2024] tiotropium bromide  2 puff Inhalation Daily       PRN Medications:   sodium chloride 0.9%, dextrose 10%, dextrose 10%, glucagon (human recombinant), glucose, glucose, heparin (PORCINE), heparin (PORCINE), insulin aspart U-100, nitroGLYCERIN, sodium chloride 0.9%, sodium chloride 0.9%, sodium chloride 3%    Assessment & Plan:   Patient Active Problem List   Diagnosis    Chest pain    Anemia    RBBB     JACOME (dyspnea on exertion)    COPD (chronic obstructive pulmonary disease)    Diastolic dysfunction without heart failure    History of 2019 novel coronavirus disease (COVID-19)    Acute respiratory failure with hypoxia    Acute respiratory disease due to COVID-19 virus    History of Aspergillus & MAC    Left shoulder pain    Counseling regarding advance care planning and goals of care    Goals of care, counseling/discussion    Palliative care encounter    Impaired functional mobility and endurance    Post viral debility    Rotator cuff tear    Osteoarthritis of left glenohumeral joint    Type 2 diabetes mellitus    Weakness generalized    COPD exacerbation    BPH (benign prostatic hyperplasia)    Chronic bilateral pleural effusions    Diverticulitis of intestine with abscess without bleeding    Diverticulitis    NSTEMI (non-ST elevated myocardial infarction)    COVID-19       ASSESSMENT & RECOMMENDATIONS     CNS/Neuro:  #Neuropathic Pain  - Pt endorses intermittent shooting pains in his legs and feet    Plan:  Continue home Pregabalin    Cardiovascular:  #NSTEMI  - Troponin upon admission: 3.2, peak of 14.7, now trending down  - EKG in ED showed 1st degree AV block, LAD, RBB  - CT chest in 2022 showed significant atherosclerotic plaque suggestive of 3 vessel CAD    Plan:  Aspirin and heparin loaded in the ED  Continue high dose statin and Plavix  Continue home ASA  Continue Heparin drip  TTE today  Cardiology following, no plans for intervention today    Respiratory:  #COPD Exacerbation  #Bronchiectasis  - Pt on 4 L home O2, ran out of home Trelegy several days prior to admission  - 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  - Last PFTs (2017) show decreased FEV1 and DLCO  - Smoked cigarettes for 50+ years, 1-2 ppd, quit in 2015  - CXR in ED showed bibasilar ground glass opacities  - Transitioned from CPAP to HFNC this AM  - ABG upon admission: 7.41/46.7/64.3 on 100% FiO2    Plan:  Wean  supplemental oxygen as tolerated, goal SpO2 88-92%  DuoNebs q6h  Continue Tiotropium and Fluticasone Vilanterol inhaler daily  Aerobika and hypertonic saline nebulizer ordered to help collect sputum samples and clear secretions  Ensure pt has Trelegy inhaler prescribed upon discharge    GI/Metabolic:  GI Ppx: Not indicated  Diet: cardiac low salt diet    - No GI/metabolic concerns - daily Miralax    Renal:  F: no fluids  E: replete as needed    Plan:  Check daily CMP and replete electrolytes as needed    Heme:  DVT Ppx: Heparin gtt & SCDs    #Iron Deficiency Anemia  - Hx of previous blood transfusion  - H/H: 10.4/33.7, MCV: 74  - Low iron levels measure last in 2017    Plan:  Plan to discharge with iron supplementation  Iron profile ordered with AM labs    Endo:   #Type 2 Diabetes Mellitus  - A1c: 5.9%    Plan:  SSI while inpatient    ID:  #COVID-19 infection  - Tested positive in ED, endorses recent sick contacts  - Of note, hospitalized for ~3 months in 2020 d/t respiratory distress from COVID-19 infection    #Hx of MAC and Aspergillus  - Previously treated for Aspergillus with Itraconazole    Plan:  Continue daily Remdesivir and Dexamethasone  Abx broadened for Ceftriaxone and Azithromycin for adequate CAP coverage  Blood and respiratory cultures collected - will follow  AFB Cxs ordered    Code Status: Full code    Disposition: remain in ICU while treating respiratory distress and working up NSTEMI     Critical Care Daily Checklist:    A: Awake: RASS Goal/Actual Goal: 0  Actual: Saeed Agitation Sedation Scale (RASS): 0   B: Spontaneous Breathing Trial Performed? N/A   C: SAT & SBT Coordinated?  N/A                      D: Delirium: CAM-ICU Overall CAM-ICU: No   E: Early Mobility Performed? Yes   F: Feeding Goal: PO intake  Status: at goal     AS: Analgesia/Sedation N/A   T: Thromboembolic Prophylaxis Heparin drip & SCDs   H: HOB > 300 Yes   U: Stress Ulcer Prophylaxis (if needed) Not indicated   G: Glucose  Control 140-180   B: Bowel Function Stool Occurrence: Last Bowel Movement: 02/10/24 (estimated)   ; Regimen: Miralax daily   I: Indwelling Catheter (Lines & Trejo) Necessity 3 PIVs   D: De-escalation of Antimicrobials/Pharmacotherapies Continue Azithromycin and Ceftriaxone, will de-escalate as appropriate    Plan for the day/ETD Wean supplemental O2, TTE for NSTEMI workup, abx    Code Status:  Family/Goals of Care: Full code         Thank you for allowing us to participate in the care of this patient. We will continue to follow. Please call with questions.    Jaron Thomson DO  [facility], HO-1  [facility]

## 2024-02-16 NOTE — ASSESSMENT & PLAN NOTE
"Patient's FSGs are controlled on current medication regimen.  Last A1c reviewed-   Lab Results   Component Value Date    HGBA1C 5.9 (H) 12/17/2021     Most recent fingerstick glucose reviewed- No results for input(s): "POCTGLUCOSE" in the last 24 hours.  Current correctional scale  Low  Maintain anti-hyperglycemic dose as follows-   Antihyperglycemics (From admission, onward)      Start     Stop Route Frequency Ordered    02/16/24 0118  insulin aspart U-100 pen 0-5 Units         -- SubQ Every 6 hours PRN 02/16/24 0019          Hold Oral hypoglycemics while patient is in the hospital.  "

## 2024-02-16 NOTE — ED PROVIDER NOTES
Encounter Date: 2/15/2024       History     Chief Complaint   Patient presents with    Shortness of Breath     Pt called 911 from a hotel in [place] with complaint of shortness of breath. Pt has used home O2 and tried treatments with no relief.  Pt has COPD and was given solumedrol and duo neb in route.      Rajan Deluna is a 78 y.o. male who  has a past medical history of Arthritis, COPD (chronic obstructive pulmonary disease), Diabetes mellitus, Digestive disorder, Encounter for blood transfusion, Hypertension, Renal disorder, Smoker, and Weakness.    The patient presents to the ED due to shortness of breath.  Patient was at home when he began to feel short of breath approximately 6 hours ago.  He has a history of COPD/chronic bronchitis and uses 4 L of oxygen at baseline.  EMS reports that he was hypoxic in the 70s on his home oxygen.  He received 125 mg of IV Solu-Medrol and a DuoNeb and was transported here.  At this time patient reports no new pains he reports cough with chest tightness.  Denies any fever or additional symptoms.    The history is provided by the patient.     Review of patient's allergies indicates:  No Known Allergies  Past Medical History:   Diagnosis Date    Arthritis     COPD (chronic obstructive pulmonary disease)     Diabetes mellitus     Digestive disorder     Encounter for blood transfusion     Hypertension     Renal disorder     Smoker     Weakness      Past Surgical History:   Procedure Laterality Date    HERNIA REPAIR      INTRALUMINAL GASTROINTESTINAL TRACT IMAGING VIA CAPSULE N/A 8/3/2020    Procedure: IMAGING PROCEDURE, GI TRACT, INTRALUMINAL, VIA CAPSULE;  Surgeon: Rey Olivares MD;  Location: [facility];  Service: Endoscopy;  Laterality: N/A;    right knee surgery       No family history on file.  Social History     Tobacco Use    Smoking status: Former    Smokeless tobacco: Never   Substance Use Topics    Alcohol use: Not Currently    Drug use: Never     Review of Systems    Constitutional:  Negative for fever.   HENT:  Negative for sore throat.    Respiratory:  Positive for cough, chest tightness and shortness of breath.    Cardiovascular:  Negative for chest pain.   Gastrointestinal:  Negative for nausea.   Genitourinary:  Negative for dysuria.   Musculoskeletal:  Negative for back pain.   Skin:  Negative for rash.   Neurological:  Negative for weakness.   Hematological:  Does not bruise/bleed easily.       Physical Exam     Initial Vitals [02/15/24 1703]   BP Pulse Resp Temp SpO2   (!) 141/82 (!) 122 (!) 24 98 °F (36.7 °C) (!) 92 %      MAP       --         Physical Exam    Nursing note and vitals reviewed.  Constitutional: He appears well-developed and well-nourished. He is not diaphoretic. No distress.   HENT:   Head: Normocephalic and atraumatic.   Mouth/Throat: Oropharynx is clear and moist.   Eyes: EOM are normal. Pupils are equal, round, and reactive to light.   Neck: No tracheal deviation present.   Cardiovascular:  Normal rate, regular rhythm, normal heart sounds and intact distal pulses.           Pulmonary/Chest: No stridor. No respiratory distress. He has wheezes. He has rales.   Abdominal: Abdomen is soft. He exhibits no distension and no mass. There is no abdominal tenderness.   Musculoskeletal:         General: No edema. Normal range of motion.     Neurological: He is alert and oriented to person, place, and time. No cranial nerve deficit or sensory deficit.   Skin: Skin is warm and dry. Capillary refill takes less than 2 seconds. No rash noted.   Psychiatric: He has a normal mood and affect.         ED Course   Procedures  Labs Reviewed   COMPREHENSIVE METABOLIC PANEL - Abnormal; Notable for the following components:       Result Value    Albumin 3.2 (*)     eGFR 51 (*)     All other components within normal limits   CBC W/ AUTO DIFFERENTIAL - Abnormal; Notable for the following components:    RBC 4.49 (*)     Hemoglobin 10.5 (*)     Hematocrit 33.4 (*)     MCV 74  (*)     MCH 23.4 (*)     MCHC 31.4 (*)     RDW 17.8 (*)     Gran # (ANC) 10.6 (*)     Lymph # 0.6 (*)     Gran % 89.0 (*)     Lymph % 4.9 (*)     All other components within normal limits   TROPONIN I - Abnormal; Notable for the following components:    Troponin I 3.233 (*)     All other components within normal limits   SARS-COV-2 RNA AMPLIFICATION, QUAL - Abnormal; Notable for the following components:    SARS-CoV-2 RNA, Amplification, Qual Positive (*)     All other components within normal limits   APTT - Abnormal; Notable for the following components:    aPTT 34.5 (*)     All other components within normal limits    Narrative:     Draw baseline aPTT prior to starting the heparin bolus or  infusion  (if patient is on warfarin prior to heparin therapy)   CBC W/ AUTO DIFFERENTIAL - Abnormal; Notable for the following components:    RBC 4.46 (*)     Hemoglobin 10.2 (*)     Hematocrit 33.3 (*)     MCV 75 (*)     MCH 22.9 (*)     MCHC 30.6 (*)     RDW 17.8 (*)     Gran # (ANC) 10.7 (*)     Lymph # 0.4 (*)     Mono # 0.1 (*)     Gran % 94.9 (*)     Lymph % 3.3 (*)     Mono % 1.1 (*)     All other components within normal limits    Narrative:     Draw baseline aPTT prior to starting the heparin bolus or  infusion  (if patient is on warfarin prior to heparin therapy)   CULTURE, BLOOD   CULTURE, BLOOD   LACTIC ACID, PLASMA   B-TYPE NATRIURETIC PEPTIDE   MAGNESIUM   PROTIME-INR    Narrative:     Draw baseline aPTT prior to starting the heparin bolus or  infusion  (if patient is on warfarin prior to heparin therapy)   POCT INFLUENZA A/B MOLECULAR     EKG Readings: (Independently Interpreted)   EKG: Rate 122.  Sinus tachycardia.  Right bundle-branch block.  Nonspecific ST changes.  No STEMI   Other EKG Interpretations: Repeat EKG:  Rate 94.  Sinus rhythm with first-degree AV block.  Right bundle-branch block.  No STEMI.         Imaging Results              X-Ray Chest AP Portable (SOB) (Final result)  Result time 02/15/24  20:26:05      Final result by Davon Santacruz MD (02/15/24 20:26:05)                   Impression:      Ill-defined bibasilar ground-glass airspace opacities.      Electronically signed by: Davon Santacruz MD  Date:    02/15/2024  Time:    20:26               Narrative:    EXAMINATION:  XR CHEST AP PORTABLE    CLINICAL HISTORY:  Shortness of breath.    TECHNIQUE:  Single frontal view of the chest was performed.    COMPARISON:  12/17/2021.    FINDINGS:  Monitoring EKG leads are present.  The trachea is unremarkable.  There are calcifications of the aortic knob.  The cardiomediastinal silhouette is enlarged.  There is no evidence of free air the hemidiaphragms.  There are no pleural effusions.  There is no evidence of a pneumothorax.  There is no evidence of pneumomediastinum.  There are ill-defined bibasilar ground-glass airspace opacities.  There are degenerative changes in the osseous structures.                                    X-Rays:   Independently Interpreted Readings:   Chest X-Ray: Normal heart size. Prominent bilateral interstitial opacities     Medications   heparin 25,000 units in dextrose 5% 250 mL (100 units/mL) infusion LOW INTENSITY nomogram - [facility] (12 Units/kg/hr × 81.8 kg (Adjusted) Intravenous New Bag 2/15/24 2135)   heparin 25,000 units in dextrose 5% (100 units/ml) IV bolus from bag LOW INTENSITY nomogram - [facility] (has no administration in time range)   heparin 25,000 units in dextrose 5% (100 units/ml) IV bolus from bag LOW INTENSITY nomogram - [facility] (has no administration in time range)   albuterol sulfate nebulizer solution 10 mg (10 mg Nebulization Given 2/15/24 1813)   ipratropium 0.02 % nebulizer solution 500 mcg (500 mcg Nebulization Given 2/15/24 1813)   magnesium sulfate 2g in water 50mL IVPB (premix) (0 g Intravenous Stopped 2/15/24 1915)   HYDROcodone-acetaminophen 5-325 mg per tablet 2 tablet (2 tablets Oral Given 2/15/24 1840)   cefTRIAXone (Rocephin) 1 g in dextrose 5 % in water (D5W) 100 mL  IVPB (MB+) (0 g Intravenous Stopped 2/15/24 1927)   aspirin EC tablet 325 mg (325 mg Oral Given 2/15/24 2035)   heparin 25,000 units in dextrose 5% (100 units/ml) IV bolus from bag LOW INTENSITY nomogram - [facility] (4,000 Units Intravenous Bolus from Bag 2/15/24 2136)     Medical Decision Making  Differential Diagnosis includes, but is not limited to:  PE, MI/ACS, pneumothorax, pericardial effusion/tamonade, pneumonia, lung abscess, pericarditis/myocarditis, pleural effusion, lung mass, CHF exacerbation, asthma exacerbation, COPD exacerbation, aspirated/ingested foreign body, airway obstruction, CO poisoning, anemia, metabolic derangement, allergy/atopy, influenza, viral URI, viral syndrome.      Problems Addressed:  SOB (shortness of breath): acute illness or injury     Details: Repeat ABG shows hypoxemia.  Of note this 1st ABG was when patient was on his non-rebreather and the subsequent was on his home oxygen.  Patient was placed on BiPAP initially however was more tolerant of CPAP.  Oxygen saturations have improved.  On reassessment patient is in no apparent distress.  Informed of plan for admission.  Discussed with [facility] medicine who will admit patient to ICU.  NPO after midnight for possible left heart catheterization.    Amount and/or Complexity of Data Reviewed  Labs: ordered. Decision-making details documented in ED Course.  Radiology: ordered.    Risk  OTC drugs.  Prescription drug management.  Decision regarding hospitalization.    Critical Care  Total time providing critical care: 35 minutes               ED Course as of 02/15/24 2236   Thu Feb 15, 2024   1820 COVID-19 Rapid Screening(!) [RN]   1826 POCT Influenza A/B Molecular [RN]   1826 CBC auto differential(!) [RN]   1826 COVID-19 Rapid Screening(!) [RN]   1830 Troponin I(!) [RN]   1857 Brain natriuretic peptide [RN]   2048 Discussed with Dr. Castro [facility] who reviewed ECGs  Will plan to admit start heparin he will evaluate the patient for  possible left heart catheterization in the morning. Does not want to load with clopidogrel   [RN]      ED Course User Index  [RN] Shlomo Mckeon Jr., MD                             Clinical Impression:  Final diagnoses:  [R06.02] SOB (shortness of breath)  [R06.02] Shortness of breath  [R09.02] Hypoxemia (Primary)  [U07.1] COVID  [I21.4] NSTEMI (non-ST elevated myocardial infarction)          ED Disposition Condition    Observation Stable             Portions of this note were dictated using voice recognition software and may contain dictation related errors in spelling/grammar/syntax not found on text review       Shlomo Mckeon Jr., MD  02/15/24 9019       Shlomo Mckeon Jr., MD  02/15/24 1679

## 2024-02-16 NOTE — CONSULTS
[facility] - Intensive Care  Cardiology  Consult Note    Patient Name: Rajan Deluna  MRN: [id number]  Admission Date: 2/15/2024  Hospital Length of Stay: 1 days  Code Status: Full Code   Attending Provider: Emeli Wayne MD   Consulting Provider: DARSHANA Juan ANP  Primary Care Physician: Jason Mccord MD  Principal Problem:Acute respiratory failure with hypoxia    Patient information was obtained from patient, past medical records, and ER records.     Inpatient consult to [facility]iology[facility]  Consult performed by: Sylvia Marshall APRN, LENIN  Consult ordered by: Marylin Bernabe NP  Reason for consult: NSTEMI        Subjective:     Chief Complaint:  SOB     HPI:   77yo male with acute respiratory failure with hypoxia, COPD, anemia, COVID 19, NSTEMI, DMII, RBBB who presented to the ER with SOB. He was on COVID isolation and on continuous BiPap therefore his HPI was obtained from the chart. Apparently he complained of SOB and called EMS. Upon arrival, EMS noted him to be tachypneic with sats in the 70s and was given neb treatment and solumedrol and was brought to the ER. Labs CBC with H&H 10.4&33.7 BMp with BUN 21 creatinine 1.5  Troponin 3.23-12.568-14.702-12.730 EKG with SR 1st degree AVB RBBB ABG 7.389/48.0/46.8/80.5. Admitted to [facility] and Cardiology consulted for NSTEMI management     Past Medical History:   Diagnosis Date    Arthritis     COPD (chronic obstructive pulmonary disease)     Diabetes mellitus     Digestive disorder     Encounter for blood transfusion     Hypertension     Renal disorder     Smoker     Weakness        Past Surgical History:   Procedure Laterality Date    HERNIA REPAIR      INTRALUMINAL GASTROINTESTINAL TRACT IMAGING VIA CAPSULE N/A 8/3/2020    Procedure: IMAGING PROCEDURE, GI TRACT, INTRALUMINAL, VIA CAPSULE;  Surgeon: Rey Olivares MD;  Location: [facility];  Service: Endoscopy;  Laterality: N/A;    right knee surgery         Review of  patient's allergies indicates:  No Known Allergies    No current facility-administered medications on file prior to encounter.     Current Outpatient Medications on File Prior to Encounter   Medication Sig    albuterol (PROVENTIL/VENTOLIN HFA) 90 mcg/actuation inhaler Inhale 2 puffs into the lungs every 6 (six) hours as needed.    aspirin (ECOTRIN) 81 MG EC tablet Take 81 mg by mouth once daily.    celecoxib (CELEBREX) 200 MG capsule Take 1 capsule (200 mg total) by mouth once daily.    fluticasone-umeclidin-vilanter (TRELEGY ELLIPTA) 100-62.5-25 mcg DsDv Inhale 1 puff into the lungs once daily.    furosemide (LASIX) 20 MG tablet Take 1 tablet (20 mg total) by mouth once daily.    hydrOXYzine HCL (ATARAX) 25 MG tablet Take 1 tablet (25 mg total) by mouth 3 (three) times daily as needed for Itching.    LIDOcaine (LIDODERM) 5 % Place 1 patch onto the skin daily as needed.    pregabalin (LYRICA) 100 MG capsule Take 100 mg by mouth 3 (three) times daily.    tamsulosin (FLOMAX) 0.4 mg Cap Take 1 capsule (0.4 mg total) by mouth once daily.    traMADoL (ULTRAM) 50 mg tablet Take 50 mg by mouth 2 (two) times daily as needed for Pain.    fluticasone propionate (FLONASE) 50 mcg/actuation nasal spray 1 spray by Each Nostril route daily as needed.     Family History    Family history is unknown by patient.       Tobacco Use    Smoking status: Former    Smokeless tobacco: Never   Substance and Sexual Activity    Alcohol use: Not Currently    Drug use: Never    Sexual activity: Not on file     Review of Systems   Unable to perform ROS: Other     Objective:     Vital Signs (Most Recent):  Temp: 98.4 °F (36.9 °C) (02/16/24 0745)  Pulse: 79 (02/16/24 1053)  Resp: (!) 27 (02/16/24 1053)  BP: 122/61 (02/16/24 0900)  SpO2: (!) 88 % (02/16/24 1053) Vital Signs (24h Range):  Temp:  [98 °F (36.7 °C)-98.8 °F (37.1 °C)] 98.4 °F (36.9 °C)  Pulse:  [] 79  Resp:  [13-28] 27  SpO2:  [82 %-100 %] 88 %  BP: (106-141)/(53-82) 122/61      Weight: 90 kg (198 lb 6.6 oz)  Body mass index is 27.67 kg/m².    SpO2: (!) 88 %         Intake/Output Summary (Last 24 hours) at 2/16/2024 1123  Last data filed at 2/16/2024 0711  Gross per 24 hour   Intake 560.48 ml   Output 325 ml   Net 235.48 ml       Lines/Drains/Airways       Peripheral Intravenous Line  Duration                  Peripheral IV - Single Lumen 02/15/24 1833 20 G Right Antecubital <1 day         Peripheral IV - Single Lumen 02/15/24 1834 22 G Posterior;Right Hand <1 day         Peripheral IV - Single Lumen 02/15/24 1836 20 G Left;Posterior Hand <1 day                     Physical Exam  Cardiovascular:      Rate and Rhythm: Normal rate.   Pulmonary:      Effort: Pulmonary effort is normal.      Comments: On BiPap          Significant Labs: BMP:   Recent Labs   Lab 02/15/24  1823 02/16/24  0348    171*    139   K 4.5 4.5    103   CO2 24 23   BUN 21 21   CREATININE 1.4 1.5*   CALCIUM 8.8 8.8   MG 1.9 2.4   , CMP   Recent Labs   Lab 02/15/24  1823 02/16/24  0348    139   K 4.5 4.5    103   CO2 24 23    171*   BUN 21 21   CREATININE 1.4 1.5*   CALCIUM 8.8 8.8   PROT 7.4  --    ALBUMIN 3.2*  --    BILITOT 0.7  --    ALKPHOS 122  --    AST 35  --    ALT 16  --    ANIONGAP 10 13   , CBC   Recent Labs   Lab 02/15/24  1804 02/15/24  2101 02/16/24  0348   WBC 11.94 11.28 8.46   HGB 10.5* 10.2* 10.4*   HCT 33.4* 33.3* 33.7*    198 184   , and Troponin   Recent Labs   Lab 02/16/24  0348 02/16/24  0652 02/16/24  0901   TROPONINI 14.702* 12.730* 12.008*       Significant Imaging: Echocardiogram: Transthoracic echo (TTE) complete (Cupid Only):   Results for orders placed or performed during the hospital encounter of 08/27/20   Echo Color Flow Doppler? Yes   Result Value Ref Range    BSA 2.15 m2    STJ 2.97 cm    AV mean gradient 4 mmHg    Ao peak rimma 1.35 m/s    Ao VTI 25.50 cm    IVS 1.05 0.6 - 1.1 cm    LA size 3.01 cm    Left Atrium Major Axis 5.18 cm    Left  Atrium Minor Axis 5.30 cm    LVIDd 4.31 3.5 - 6.0 cm    LVIDs 3.09 2.1 - 4.0 cm    LVOT diameter 2.06 cm    LVOT peak VTI 17.43 cm    Posterior Wall 1.08 0.6 - 1.1 cm    RA Major Axis 5.47 cm    Sinus 3.37 cm    TAPSE 2.99 cm    TDI LATERAL 0.05 m/s    TDI SEPTAL 0.05 m/s    LV Diastolic Volume 83.56 mL    LV Systolic Volume 37.65 mL    LVOT peak rimma 1.01 m/s    LA WIDTH 4.17 cm    RA Width 3.50 cm    FS 28 %    LA volume 55.90 cm3    LV mass 156.21 g    Left Ventricle Relative Wall Thickness 0.50 cm    AV valve area 2.28 cm2    AV Velocity Ratio 0.75     AV index (prosthetic) 0.68     Mean e' 0.05 m/s    LVOT area 3.3 cm2    LVOT stroke volume 58.06 cm3    AV peak gradient 7 mmHg    LV Systolic Volume Index 17.6 mL/m2    LV Diastolic Volume Index 39.00 mL/m2    LA Volume Index 26.1 mL/m2    LV Mass Index 73 g/m2    Right Atrial Pressure (from IVC) 8 mmHg    Narrative    · The left ventricle is normal in size with low normal systolic function.   The estimated ejection fraction is 55%.  · There is left ventricular concentric remodeling.  · Septal wall has abnormal motion.  · There are segmental left ventricular wall motion abnormalities.  · Normal left ventricular diastolic function.  · Mild right atrial enlargement.  · Intermediate central venous pressure (8 mmHg).        Assessment and Plan:     NSTEMI (non-ST elevated myocardial infarction)  - presented with SOB and no reports of chest pain  - BNP 45 Troponin 3.23-12.568-14.702-12.730; EKG NSR with RBBB  - initially more concerned about demand etiology however with trend up of troponin concerning for possible ischemic etiology  - will load with Plavix and place on daily DAPT; continue IV Heparin  - echo today to assess LVEF and for WMA; no plans for ischemic evaluation today given continuous BiPap; EKG with no acute changes and will continue to follow and determine proceed with ischemic evaluation inpatient vs outpatient         VTE Risk Mitigation (From  admission, onward)           Ordered     IP VTE HIGH RISK PATIENT  Once         02/15/24 2256     Place sequential compression device  Until discontinued         02/15/24 2256     Place sequential compression device  Until discontinued         02/15/24 2250     heparin 25,000 units in dextrose 5% (100 units/ml) IV bolus from bag LOW INTENSITY nomogram - [facility]  As needed (PRN)        Question:  Heparin Infusion Adjustment (DO NOT MODIFY ANSWER)  Answer:  [email or web address]\epic\Images\Pharmacy\HeparinInfusions\heparin LOW INTENSITY nomogram for [facility] [id number]    02/15/24 2044     heparin 25,000 units in dextrose 5% (100 units/ml) IV bolus from bag LOW INTENSITY nomogram - [facility]  As needed (PRN)        Question:  Heparin Infusion Adjustment (DO NOT MODIFY ANSWER)  Answer:  [email or web address]\epic\Images\Pharmacy\HeparinInfusions\heparin LOW INTENSITY nomogram for [facility] [id number]    02/15/24 2044     heparin 25,000 units in dextrose 5% 250 mL (100 units/mL) infusion LOW INTENSITY nomogram - [facility]  Continuous        Question:  Begin at (units/kg/hr)  Answer:  12    02/15/24 2044                    Thank you for your consult. I will follow-up with patient. Please contact us if you have any additional questions.    Sylvia Marshall APRN, ANP  Cardiology   [facility] - Intensive Care

## 2024-02-16 NOTE — SUBJECTIVE & OBJECTIVE
Past Medical History:   Diagnosis Date    Arthritis     COPD (chronic obstructive pulmonary disease)     Diabetes mellitus     Digestive disorder     Encounter for blood transfusion     Hypertension     Renal disorder     Smoker     Weakness        Past Surgical History:   Procedure Laterality Date    HERNIA REPAIR      INTRALUMINAL GASTROINTESTINAL TRACT IMAGING VIA CAPSULE N/A 8/3/2020    Procedure: IMAGING PROCEDURE, GI TRACT, INTRALUMINAL, VIA CAPSULE;  Surgeon: Rey Olivares MD;  Location: [facility];  Service: Endoscopy;  Laterality: N/A;    right knee surgery         Review of patient's allergies indicates:  No Known Allergies    No current facility-administered medications on file prior to encounter.     Current Outpatient Medications on File Prior to Encounter   Medication Sig    albuterol (PROVENTIL/VENTOLIN HFA) 90 mcg/actuation inhaler Inhale 2 puffs into the lungs every 6 (six) hours as needed.    aspirin (ECOTRIN) 81 MG EC tablet Take 81 mg by mouth once daily.    celecoxib (CELEBREX) 200 MG capsule Take 1 capsule (200 mg total) by mouth once daily.    fluticasone-umeclidin-vilanter (TRELEGY ELLIPTA) 100-62.5-25 mcg DsDv Inhale 1 puff into the lungs once daily.    furosemide (LASIX) 20 MG tablet Take 1 tablet (20 mg total) by mouth once daily.    hydrOXYzine HCL (ATARAX) 25 MG tablet Take 1 tablet (25 mg total) by mouth 3 (three) times daily as needed for Itching.    LIDOcaine (LIDODERM) 5 % Place 1 patch onto the skin daily as needed.    pregabalin (LYRICA) 100 MG capsule Take 100 mg by mouth 3 (three) times daily.    tamsulosin (FLOMAX) 0.4 mg Cap Take 1 capsule (0.4 mg total) by mouth once daily.    traMADoL (ULTRAM) 50 mg tablet Take 50 mg by mouth 2 (two) times daily as needed for Pain.    fluticasone propionate (FLONASE) 50 mcg/actuation nasal spray 1 spray by Each Nostril route daily as needed.     Family History    Family history is unknown by patient.       Tobacco Use    Smoking status:  Former    Smokeless tobacco: Never   Substance and Sexual Activity    Alcohol use: Not Currently    Drug use: Never    Sexual activity: Not on file     Review of Systems   Unable to perform ROS: Other     Objective:     Vital Signs (Most Recent):  Temp: 98.4 °F (36.9 °C) (02/16/24 0745)  Pulse: 79 (02/16/24 1053)  Resp: (!) 27 (02/16/24 1053)  BP: 122/61 (02/16/24 0900)  SpO2: (!) 88 % (02/16/24 1053) Vital Signs (24h Range):  Temp:  [98 °F (36.7 °C)-98.8 °F (37.1 °C)] 98.4 °F (36.9 °C)  Pulse:  [] 79  Resp:  [13-28] 27  SpO2:  [82 %-100 %] 88 %  BP: (106-141)/(53-82) 122/61     Weight: 90 kg (198 lb 6.6 oz)  Body mass index is 27.67 kg/m².    SpO2: (!) 88 %         Intake/Output Summary (Last 24 hours) at 2/16/2024 1123  Last data filed at 2/16/2024 0711  Gross per 24 hour   Intake 560.48 ml   Output 325 ml   Net 235.48 ml       Lines/Drains/Airways       Peripheral Intravenous Line  Duration                  Peripheral IV - Single Lumen 02/15/24 1833 20 G Right Antecubital <1 day         Peripheral IV - Single Lumen 02/15/24 1834 22 G Posterior;Right Hand <1 day         Peripheral IV - Single Lumen 02/15/24 1836 20 G Left;Posterior Hand <1 day                     Physical Exam  Cardiovascular:      Rate and Rhythm: Normal rate.   Pulmonary:      Effort: Pulmonary effort is normal.      Comments: On BiPap          Significant Labs: BMP:   Recent Labs   Lab 02/15/24  1823 02/16/24  0348    171*    139   K 4.5 4.5    103   CO2 24 23   BUN 21 21   CREATININE 1.4 1.5*   CALCIUM 8.8 8.8   MG 1.9 2.4   , CMP   Recent Labs   Lab 02/15/24  1823 02/16/24  0348    139   K 4.5 4.5    103   CO2 24 23    171*   BUN 21 21   CREATININE 1.4 1.5*   CALCIUM 8.8 8.8   PROT 7.4  --    ALBUMIN 3.2*  --    BILITOT 0.7  --    ALKPHOS 122  --    AST 35  --    ALT 16  --    ANIONGAP 10 13   , CBC   Recent Labs   Lab 02/15/24  1804 02/15/24  2101 02/16/24  0348   WBC 11.94 11.28 8.46   HGB 10.5*  10.2* 10.4*   HCT 33.4* 33.3* 33.7*    198 184   , and Troponin   Recent Labs   Lab 02/16/24  0348 02/16/24  0652 02/16/24  0901   TROPONINI 14.702* 12.730* 12.008*       Significant Imaging: Echocardiogram: Transthoracic echo (TTE) complete (Cupid Only):   Results for orders placed or performed during the hospital encounter of 08/27/20   Echo Color Flow Doppler? Yes   Result Value Ref Range    BSA 2.15 m2    STJ 2.97 cm    AV mean gradient 4 mmHg    Ao peak rimma 1.35 m/s    Ao VTI 25.50 cm    IVS 1.05 0.6 - 1.1 cm    LA size 3.01 cm    Left Atrium Major Axis 5.18 cm    Left Atrium Minor Axis 5.30 cm    LVIDd 4.31 3.5 - 6.0 cm    LVIDs 3.09 2.1 - 4.0 cm    LVOT diameter 2.06 cm    LVOT peak VTI 17.43 cm    Posterior Wall 1.08 0.6 - 1.1 cm    RA Major Axis 5.47 cm    Sinus 3.37 cm    TAPSE 2.99 cm    TDI LATERAL 0.05 m/s    TDI SEPTAL 0.05 m/s    LV Diastolic Volume 83.56 mL    LV Systolic Volume 37.65 mL    LVOT peak rimma 1.01 m/s    LA WIDTH 4.17 cm    RA Width 3.50 cm    FS 28 %    LA volume 55.90 cm3    LV mass 156.21 g    Left Ventricle Relative Wall Thickness 0.50 cm    AV valve area 2.28 cm2    AV Velocity Ratio 0.75     AV index (prosthetic) 0.68     Mean e' 0.05 m/s    LVOT area 3.3 cm2    LVOT stroke volume 58.06 cm3    AV peak gradient 7 mmHg    LV Systolic Volume Index 17.6 mL/m2    LV Diastolic Volume Index 39.00 mL/m2    LA Volume Index 26.1 mL/m2    LV Mass Index 73 g/m2    Right Atrial Pressure (from IVC) 8 mmHg    Narrative    · The left ventricle is normal in size with low normal systolic function.   The estimated ejection fraction is 55%.  · There is left ventricular concentric remodeling.  · Septal wall has abnormal motion.  · There are segmental left ventricular wall motion abnormalities.  · Normal left ventricular diastolic function.  · Mild right atrial enlargement.  · Intermediate central venous pressure (8 mmHg).

## 2024-02-16 NOTE — SUBJECTIVE & OBJECTIVE
Interval History: no issues voiced    Review of Systems   Constitutional:  Negative for chills, fatigue and fever.   HENT:  Negative for congestion, ear pain, hearing loss and trouble swallowing.    Eyes:  Negative for pain.   Respiratory:  Negative for cough, chest tightness and shortness of breath.    Cardiovascular:  Negative for chest pain, palpitations and leg swelling.   Gastrointestinal:  Negative for abdominal pain, constipation, diarrhea, nausea and vomiting.   Endocrine: Negative for polydipsia, polyphagia and polyuria.   Genitourinary:  Negative for difficulty urinating, dysuria, flank pain, frequency, hematuria and urgency.   Musculoskeletal:  Negative for arthralgias and neck pain.   Skin:  Negative for color change and rash.   Allergic/Immunologic: Negative for environmental allergies.   Neurological:  Negative for dizziness, syncope and headaches.   Hematological:  Negative for adenopathy.   Psychiatric/Behavioral:  Negative for behavioral problems, hallucinations and self-injury.    All other systems reviewed and are negative.    Objective:     Vital Signs (Most Recent):  Temp: 98.1 °F (36.7 °C) (02/16/24 1545)  Pulse: 88 (02/16/24 1358)  Resp: (!) 23 (02/16/24 1358)  BP: 125/60 (02/16/24 1345)  SpO2: (!) 91 % (02/16/24 1358) Vital Signs (24h Range):  Temp:  [98.1 °F (36.7 °C)-98.8 °F (37.1 °C)] 98.1 °F (36.7 °C)  Pulse:  [] 88  Resp:  [13-32] 23  SpO2:  [82 %-100 %] 91 %  BP: (106-144)/(53-81) 125/60     Weight: 89.8 kg (198 lb)  Body mass index is 27.62 kg/m².    Intake/Output Summary (Last 24 hours) at 2/16/2024 1712  Last data filed at 2/16/2024 1528  Gross per 24 hour   Intake 868.42 ml   Output 1475 ml   Net -606.58 ml         Physical Exam  Vitals and nursing note reviewed.   Constitutional:       General: He is not in acute distress.     Appearance: He is well-developed.   HENT:      Head: Normocephalic and atraumatic.      Nose: Nose normal.   Eyes:      Conjunctiva/sclera:  Conjunctivae normal.   Cardiovascular:      Rate and Rhythm: Normal rate and regular rhythm.      Heart sounds: Normal heart sounds. No murmur heard.  Pulmonary:      Effort: Pulmonary effort is normal.      Breath sounds: Normal breath sounds. No wheezing.   Abdominal:      General: Bowel sounds are normal.      Palpations: Abdomen is soft. There is no mass.      Tenderness: There is no abdominal tenderness. There is no guarding or rebound.   Musculoskeletal:         General: Normal range of motion.      Cervical back: Normal range of motion and neck supple.   Skin:     General: Skin is warm and dry.      Findings: No rash.   Neurological:      Mental Status: He is alert and oriented to person, place, and time.   Psychiatric:         Behavior: Behavior normal.             Significant Labs: All pertinent labs within the past 24 hours have been reviewed.  BMP:   Recent Labs   Lab 02/16/24  0348   *      K 4.5      CO2 23   BUN 21   CREATININE 1.5*   CALCIUM 8.8   MG 2.4     CBC:   Recent Labs   Lab 02/15/24  1804 02/15/24  2101 02/16/24  0348   WBC 11.94 11.28 8.46   HGB 10.5* 10.2* 10.4*   HCT 33.4* 33.3* 33.7*    198 184       Significant Imaging: I have reviewed all pertinent imaging results/findings within the past 24 hours.  I have reviewed and interpreted all pertinent imaging results/findings within the past 24 hours.

## 2024-02-16 NOTE — H&P
[facility]  History & Physical    Patient Name: Rajan Deluna  MRN: [id number]  Patient Class: IP- Inpatient  Admission Date: 2/15/2024  Attending Physician: Roddy Quintero MD   Primary Care Provider: Jason Mccord MD         Patient information was obtained from patient, past medical records, and ER records.     Subjective:     Principal Problem:Acute respiratory failure with hypoxia    Chief Complaint:   Chief Complaint   Patient presents with    Shortness of Breath     Pt called 911 from a hotel in [place] with complaint of shortness of breath. Pt has used home O2 and tried treatments with no relief.  Pt has COPD and was given solumedrol and duo neb in route.         HPI: Rajan Deluna is a 78 y.o. male who  has a past medical history of Arthritis, COPD (chronic obstructive pulmonary disease), Diabetes mellitus, Digestive disorder, Encounter for blood transfusion, Hypertension, Renal disorder, Smoker, and Weakness.     The patient presents to the ED due to shortness of breath.  Patient was at the hotel where he lives when he began to feel short of breath approximately 6 hours ago.  He has a history of COPD/chronic bronchitis and uses 4 L of oxygen at baseline.  EMS reports that he was hypoxic in the 70s on his home oxygen.  He received 125 mg of IV Solu-Medrol and a DuoNeb and was transported here. Patient reports cough with chest tightness, fatigue, and chills.  He denies CP, fever, wheezing, palpitations, lightheadedness, dizziness.    Past Medical History:   Diagnosis Date    Arthritis     COPD (chronic obstructive pulmonary disease)     Diabetes mellitus     Digestive disorder     Encounter for blood transfusion     Hypertension     Renal disorder     Smoker     Weakness        Past Surgical History:   Procedure Laterality Date    HERNIA REPAIR      INTRALUMINAL GASTROINTESTINAL TRACT IMAGING VIA CAPSULE N/A 8/3/2020    Procedure: IMAGING PROCEDURE, GI TRACT,  INTRALUMINAL, VIA CAPSULE;  Surgeon: Rey Olivares MD;  [facility];  Service: Endoscopy;  Laterality: N/A;    right knee surgery         Review of patient's allergies indicates:  No Known Allergies    No current facility-administered medications on file prior to encounter.     Current Outpatient Medications on File Prior to Encounter   Medication Sig    albuterol (PROVENTIL/VENTOLIN HFA) 90 mcg/actuation inhaler Inhale 2 puffs into the lungs every 6 (six) hours as needed.    aspirin (ECOTRIN) 81 MG EC tablet Take 81 mg by mouth once daily.    celecoxib (CELEBREX) 200 MG capsule Take 1 capsule (200 mg total) by mouth once daily.    fluticasone-umeclidin-vilanter (TRELEGY ELLIPTA) 100-62.5-25 mcg DsDv Inhale 1 puff into the lungs once daily.    furosemide (LASIX) 20 MG tablet Take 1 tablet (20 mg total) by mouth once daily.    hydrOXYzine HCL (ATARAX) 25 MG tablet Take 1 tablet (25 mg total) by mouth 3 (three) times daily as needed for Itching.    LIDOcaine (LIDODERM) 5 % Place 1 patch onto the skin daily as needed.    pregabalin (LYRICA) 100 MG capsule Take 100 mg by mouth 3 (three) times daily.    tamsulosin (FLOMAX) 0.4 mg Cap Take 1 capsule (0.4 mg total) by mouth once daily.    traMADoL (ULTRAM) 50 mg tablet Take 50 mg by mouth 2 (two) times daily as needed for Pain.    fluticasone propionate (FLONASE) 50 mcg/actuation nasal spray 1 spray by Each Nostril route daily as needed.    [DISCONTINUED] acyclovir (ZOVIRAX) 400 MG tablet     [DISCONTINUED] albuterol (PROVENTIL) 2.5 mg /3 mL (0.083 %) nebulizer solution     [DISCONTINUED] amoxicillin (AMOXIL) 500 MG capsule     [DISCONTINUED] apixaban (ELIQUIS) 5 mg Tab     [DISCONTINUED] budesonide-formoterol 160-4.5 mcg (SYMBICORT) 160-4.5 mcg/actuation HFAA Inhale 2 puffs into the lungs every 12 (twelve) hours.    [DISCONTINUED] ciprofloxacin HCl (CIPRO) 250 MG tablet ciprofloxacin 250 mg tablet   Take 1 tablet twice a day by oral route as directed for 30  days.    [DISCONTINUED] ciprofloxacin HCl (CIPRO) 500 MG tablet Take 1 tablet twice a day by oral route as directed for 30 days.    [DISCONTINUED] ciprofloxacin-dexAMETHasone 0.3-0.1% (CIPRODEX) 0.3-0.1 % DrpS     [DISCONTINUED] clarithromycin (BIAXIN) 250 MG tablet clarithromycin 250 mg tablet   Take 1 tablet twice a day by oral route as directed for 30 days.    [DISCONTINUED] clarithromycin (BIAXIN) 500 MG tablet Take 1 tablet twice a day by oral route as directed for 30 days.    [DISCONTINUED] EScitalopram oxalate (LEXAPRO) 10 MG tablet     [DISCONTINUED] fluticasone furoate (ARNUITY ELLIPTA) 100 mcg/actuation inhaler     [DISCONTINUED] gabapentin (NEURONTIN) 600 MG tablet Take 600 mg by mouth 3 (three) times daily.    [DISCONTINUED] ipratropium (ATROVENT HFA) 17 mcg/actuation inhaler     [DISCONTINUED] itraconazole (SPORANOX) 100 mg Cap Take 2 capsules (200 mg total) by mouth once daily. (Patient not taking: No sig reported)    [DISCONTINUED] ketoconazole (NIZORAL) 2 % cream     [DISCONTINUED] lactobacillus acidophilus & bulgar (LACTINEX) 100 million cell packet Take 1 packet (1 each total) by mouth 2 (two) times daily with meals. (Patient not taking: Reported on 1/3/2022)    [DISCONTINUED] losartan (COZAAR) 100 MG tablet     [DISCONTINUED] meloxicam (MOBIC) 7.5 MG tablet     [DISCONTINUED] metroNIDAZOLE (FLAGYL) 500 MG tablet     [DISCONTINUED] multivitamin Tab Take 1 tablet by mouth once daily. (Patient not taking: Reported on 1/3/2022)    [DISCONTINUED] nystatin (MYCOSTATIN) 100,000 unit/mL suspension     [DISCONTINUED] omeprazole (PRILOSEC) 20 MG capsule omeprazole 20 mg capsule,delayed release    [DISCONTINUED] ondansetron (ZOFRAN) 4 MG tablet     [DISCONTINUED] pantoprazole (PROTONIX) 40 MG tablet     [DISCONTINUED] polyethylene glycol (GLYCOLAX) 17 gram/dose powder polyethylene glycol 3350 17 gram/dose oral powder    [DISCONTINUED] predniSONE (DELTASONE) 10 MG tablet     [DISCONTINUED] pregabalin (LYRICA)  150 MG capsule     [DISCONTINUED] pregabalin (LYRICA) 75 MG capsule Take 1 capsule (75 mg total) by mouth 2 (two) times daily. (Patient not taking: No sig reported)    [DISCONTINUED] PROAIR HFA 90 mcg/actuation inhaler Inhale 1-2 puffs into the lungs every 4 (four) hours as needed for Wheezing or Shortness of Breath. (Patient not taking: No sig reported)    [DISCONTINUED] ranitidine (ZANTAC) 300 MG tablet ranitidine 300 mg tablet    [DISCONTINUED] sertraline (ZOLOFT) 100 MG tablet     [DISCONTINUED] spironolactone (ALDACTONE) 25 MG tablet     [DISCONTINUED] tiotropium (SPIRIVA) 18 mcg inhalation capsule Inhale 1 capsule (18 mcg total) into the lungs once daily. Controller (Patient not taking: No sig reported)    [DISCONTINUED] traMADoL (ULTRAM-ER) 100 MG Tb24     [DISCONTINUED] TRELEGY ELLIPTA 100-62.5-25 mcg DsDv Inhale 1 puff into the lungs once daily.    [DISCONTINUED] umeclidinium (INCRUSE ELLIPTA) 62.5 mcg/actuation inhalation capsule     [DISCONTINUED] umeclidinium-vilanteroL (ANORO ELLIPTA) 62.5-25 mcg/actuation DsDv      Family History    Family history is unknown by patient.       Tobacco Use    Smoking status: Former    Smokeless tobacco: Never   Substance and Sexual Activity    Alcohol use: Not Currently    Drug use: Never    Sexual activity: Not on file     Review of Systems   Constitutional:  Positive for chills and fatigue. Negative for fever.   HENT:  Negative for congestion.    Respiratory:  Positive for cough, chest tightness and shortness of breath.    Cardiovascular:  Negative for chest pain and palpitations.   Gastrointestinal:  Negative for abdominal pain, nausea and vomiting.   Genitourinary:  Negative for dysuria.   Musculoskeletal:  Positive for myalgias.   Skin:  Negative for color change.   Neurological:  Negative for dizziness and light-headedness.   Psychiatric/Behavioral:  Negative for agitation and confusion. The patient is not nervous/anxious.      Objective:     Vital Signs (Most  Recent):  Temp: 98 °F (36.7 °C) (02/15/24 1703)  Pulse: 84 (02/15/24 2229)  Resp: 18 (02/15/24 2229)  BP: 129/61 (02/15/24 2202)  SpO2: 98 % (02/15/24 2229) Vital Signs (24h Range):  Temp:  [98 °F (36.7 °C)] 98 °F (36.7 °C)  Pulse:  [] 84  Resp:  [13-26] 18  SpO2:  [82 %-100 %] 98 %  BP: (114-141)/(58-82) 129/61     Weight: 91.5 kg (201 lb 11.5 oz)  Body mass index is 28.13 kg/m².     Physical Exam  Constitutional:       Appearance: He is ill-appearing.   HENT:      Head: Normocephalic.      Mouth/Throat:      Mouth: Mucous membranes are moist.   Eyes:      Extraocular Movements: Extraocular movements intact.      Pupils: Pupils are equal, round, and reactive to light.   Cardiovascular:      Rate and Rhythm: Normal rate and regular rhythm.      Pulses: Normal pulses.      Heart sounds: Normal heart sounds.   Pulmonary:      Effort: Respiratory distress present.      Breath sounds: Rhonchi present. No wheezing or rales.      Comments: On BiPAP  Abdominal:      General: Bowel sounds are normal. There is no distension.      Palpations: Abdomen is soft.      Tenderness: There is no abdominal tenderness. There is no guarding or rebound.   Musculoskeletal:      Cervical back: No rigidity.      Right lower leg: No edema.      Left lower leg: No edema.   Skin:     General: Skin is warm.   Neurological:      Mental Status: He is alert and oriented to person, place, and time.   Psychiatric:         Mood and Affect: Mood normal.         Behavior: Behavior normal.         Thought Content: Thought content normal.         Judgment: Judgment normal.              CRANIAL NERVES     CN III, IV, VI   Pupils are equal, round, and reactive to light.       Significant Labs: All pertinent labs within the past 24 hours have been reviewed.    Significant Imaging: I have reviewed all pertinent imaging results/findings within the past 24 hours.  Assessment/Plan:     * Acute respiratory failure with hypoxia  Patient with Hypercapnic and  Hypoxic Respiratory failure which is Acute on chronic.  he is on home oxygen at 4 LPM. Supplemental oxygen was provided and noted- Oxygen Concentration (%):  [40-45] 40    .   Signs/symptoms of respiratory failure include- tachypnea, increased work of breathing, and respiratory distress. Contributing diagnoses includes - COPD and COVID  Labs and images were reviewed. Patient Has recent ABG, which has been reviewed. Will treat underlying causes and adjust management of respiratory failure as follows- BiPAP, steroids, breathing treatments, ABX, remdesivir    COVID-19  Patient is identified as High risk for severe complications of COVID 19 based on COVID risk score of 5   Initiate standard COVID protocols; COVID-19 testing ,Infection Control notification  and isolation- respiratory, contact and droplet per protocol    Diagnostics: CBC, CMP, Ferritin, CRP, LDH, BNP, Troponin, and Portable CXR    Management: Initiate targeted therapy with Remdesivir, 200mg IV x1, followed by 100mg IV daily x5 days total and Dexamethasone PO/IV 6mg daily x10 days, Maintain oxygen saturations 92-96% via NIPPV- CPAP  % FiO2 and monitor with continuous/intermittent pulse oximetry. , Inhaled bronchodilators as needed for shortness of breath., Continuous cardiac monitoring., and Manage respiratory failure (O2 requirement >10LPM or needing NIPPV/Mechanical ventilation) and/or Pneumonia (active chest infiltrates) separately as described below.    Advance Care Planning Current advance care plan has not been discussed with patient/family/POA and patient currently remains Full Code.     NSTEMI (non-ST elevated myocardial infarction)  Patient denies CP  Trop 3.233 > 12.568  Will continue trending  ASA loading dose given  EDP discussed case with cardiology  No plavix d/t 3 vessel dz  Heparin gtt  NPO after MN  EKG: sinus rhythm w/ 1st degree AVB, HR 94, left axis deviation, RBBB, no STEMI      COPD exacerbation  Patient's COPD is with exacerbation  "noted by use of accessory muscles for breathing and worsening of baseline hypoxia currently.  Patient is currently on COPD Pathway. Continue scheduled inhalers Steroids, Antibiotics, and Supplemental oxygen and monitor respiratory status closely.     Type 2 diabetes mellitus  Patient's FSGs are controlled on current medication regimen.  Last A1c reviewed-   Lab Results   Component Value Date    HGBA1C 5.9 (H) 12/17/2021     Most recent fingerstick glucose reviewed- No results for input(s): "POCTGLUCOSE" in the last 24 hours.  Current correctional scale  Low  Maintain anti-hyperglycemic dose as follows-   Antihyperglycemics (From admission, onward)      Start     Stop Route Frequency Ordered    02/16/24 0118  insulin aspart U-100 pen 0-5 Units         -- SubQ Every 6 hours PRN 02/16/24 0019          Hold Oral hypoglycemics while patient is in the hospital.    Anemia  Patient's anemia is currently controlled. Has not received any PRBCs to date. Etiology likely d/t Iron deficiency  Current CBC reviewed-   Lab Results   Component Value Date    HGB 10.2 (L) 02/15/2024    HCT 33.3 (L) 02/15/2024     Monitor serial CBC and transfuse if patient becomes hemodynamically unstable, symptomatic or H/H drops below 7/21.      VTE Risk Mitigation (From admission, onward)           Ordered     IP VTE HIGH RISK PATIENT  Once         02/15/24 2256     Place sequential compression device  Until discontinued         02/15/24 2256     Place sequential compression device  Until discontinued         02/15/24 2250     heparin 25,000 units in dextrose 5% (100 units/ml) IV bolus from bag LOW INTENSITY nomogram - [facility]  As needed (PRN)        Question:  Heparin Infusion Adjustment (DO NOT MODIFY ANSWER)  Answer:  [email or web address]\Images\Pharmacy\HeparinInfusions\heparin LOW INTENSITY nomogram for [facility] [id number]    02/15/24 2044     heparin 25,000 units in dextrose 5% (100 units/ml) IV bolus from bag LOW INTENSITY nomogram - [facility]  As needed " (PRN)        Question:  Heparin Infusion Adjustment (DO NOT MODIFY ANSWER)  Answer:  [email or web address]\Images\Pharmacy\HeparinInfusions\heparin LOW INTENSITY nomogram for [facility] [id number]    02/15/24 2044     heparin 25,000 units in dextrose 5% 250 mL (100 units/mL) infusion LOW INTENSITY nomogram - [facility]  Continuous        Question:  Begin at (units/kg/hr)  Answer:  12    02/15/24 2044                       Critical care time spent on the evaluation and treatment of severe organ dysfunction, review of pertinent labs and imaging studies, discussions with consulting providers and discussions with patient/family: 50 minutes.              Marylin Bernabe NP  Department of Hospital Medicine  [facility] - Emergency Dept

## 2024-02-16 NOTE — ED NOTES
Report received from KELLY Daniels.    Patient resting in bed, receiving a breathing treatment.   On the cardiac monitor. Denies pain. Dyspnea at rest. Wears 4 L NC at home baseline. Awake, alert, oriented x 4.

## 2024-02-16 NOTE — CONSULTS
Pt not appropriate for education at this time. Pt on isolation for COVID at this time. Will follow up next week. Attached heart healthy diet education to discharge notes.

## 2024-02-16 NOTE — HOSPITAL COURSE
Pt admitted and off from bipap. Cont on iv abx, nebs, steroids, and remdesvir. Still on heparin drip for NSTEMi. Cards consulted and recommend medical therapy for NSTEMI at this time. Given ongoing respiratory failure we will not proceed with invasive approach till his respiratory status completely improves   2/17/24: pt on 8LPM. Home oxygen of 4LPm. Heparin drip for 48 hours, Cont asa and plavix and statin. F/U with cards as outpt.   2/18: heparin drip done. Cards plan for outpt stress test. Pt now on 4.5 LPM. Sob improving  2/20 Stable, feeling better but still on 4.5LPM O2, on 4 liters at home  2/21 satble

## 2024-02-16 NOTE — PLAN OF CARE
Pt admitted to Unit AAOX4 with CPAP, no distress noted.   Calm & cooperative discussed plan of care, admission process initiated. Monitored for therapeutic effects of Heparin therapy. Increasing troponin level, Md notified of change.  Pt became bradycardic, md also notified of change.

## 2024-02-16 NOTE — PHARMACY MED REC
"Admission Medication History     The home medication history was taken by Elizabeth Linares CPhT.    Medication history obtained from, Patient's Wife Verified    You may go to "Admission" then "Reconcile Home Medications" tabs to review and/or act upon these items.     The home medication list has been updated by the Pharmacy department.   Please read ALL comments highlighted in yellow.   Please address this information as you see fit.    Feel free to contact us if you have any questions or require assistance.      The medications listed below were removed from the home medication list.  Please reorder if appropriate:  Patient reports no longer taking the following medication(s):  Acyclovir 400 mg  Amoxicillin 500 mg  Anoro Ellipta 62.5-25 mcg Inhaler  Arnuity 100 mcg Inhaler  Atrovent HFA 17 mcg Inhaler  Cipro 250 mg and 500 mg  Ciprodex 0.3-0.1% drop  Clarithromycin 250 mg and 500 mg  Eliquis 5 mg  Gabapentin 600 mg  Incruse Ellipta 62.5 mcg Inhaler  Ketoconazole 2% cream  Lactinex 100 million cell packet  Lexapro 10 mg  Losartan 100 mg  Metronidazole 500 mg  Mobic 7,5 mg  Multivitamin   Nystatin 100,000 unit/ml susp  Omeprazole 20 mg  Polyethylene Glycol 17 gram powder  Prednisone 10 mg  Protonix 40 mg  Ranitidine 300 mg  Sertraline 100 mg  Spiriva 18 mcg Inhaler  Spironolactone 25 mg  Sporanox 100 mg  Symbicort 160-4.5 mcg Inhaler  Zofran 4 mg      Elizabeth Linares CPhT.  Ext [phone]               .          "

## 2024-02-16 NOTE — EICU
New Patient Evaluation    HPI:  78 M history of COPD (FEV1 51) on 4 L, aspergillus and mycobacterial lung infection, DM (A1C 5.9), hypertension, PVD, diverticulitis, presented with shortness of breath. Hypoxic on EMS arrival in the 70s, given Solumedrol and Duoneb on site. COVID +. Troponin from 3.233 increased to 12.568. Loaded with aspirin and started on heparin drip as per cardiology recommendation. Of note, previous echo 2020 with note of wall motion abnormalities. CXR with ill-defined bibasilar ground glass opacities    Camera Assessment:  /81  HR 76  O2 95%  Seen on CPAP 5, MV 10-13  Awake not in distress    Data:  WBC 11.28, H/H 10.2/33.3, platelets 198  Na 138, K 4.5, creatinine 1.4  BNP 45,   AG 7.39/48/47    Assessment and Plans:  Acute on chronic hypoxemic and hypercapnic secondary to COVID in this patient with COPD. Started on Remdesevir and dexamethasone as well as ceftriaxone.  NSTEMI on heparin drip, loaded with aspirin and high dose statin

## 2024-02-16 NOTE — HPI
Rajan Deluna is a 78 y.o. male who  has a past medical history of Arthritis, COPD (chronic obstructive pulmonary disease), Diabetes mellitus, Digestive disorder, Encounter for blood transfusion, Hypertension, Renal disorder, Smoker, and Weakness.     The patient presents to the ED due to shortness of breath.  Patient was at the hotel where he lives when he began to feel short of breath approximately 6 hours ago.  He has a history of COPD/chronic bronchitis and uses 4 L of oxygen at baseline.  EMS reports that he was hypoxic in the 70s on his home oxygen.  He received 125 mg of IV Solu-Medrol and a DuoNeb and was transported here. Patient reports cough with chest tightness, fatigue, and chills.  He denies CP, fever, wheezing, palpitations, lightheadedness, dizziness.

## 2024-02-16 NOTE — HPI
77yo male with acute respiratory failure with hypoxia, COPD, anemia, COVID 19, NSTEMI, DMII, RBBB who presented to the ER with SOB. He was on COVID isolation and on continuous BiPap therefore his HPI was obtained from the chart. Apparently he complained of SOB and called EMS. Upon arrival, EMS noted him to be tachypneic with sats in the 70s and was given neb treatment and solumedrol and was brought to the ER. Labs CBC with H&H 10.4&33.7 BMp with BUN 21 creatinine 1.5  Troponin 3.23-12.568-14.702-12.730 EKG with SR 1st degree AVB RBBB ABG 7.389/48.0/46.8/80.5. Admitted to [facility] consulted for NSTEMI management

## 2024-02-16 NOTE — SUBJECTIVE & OBJECTIVE
Past Medical History:   Diagnosis Date    Arthritis     COPD (chronic obstructive pulmonary disease)     Diabetes mellitus     Digestive disorder     Encounter for blood transfusion     Hypertension     Renal disorder     Smoker     Weakness        Past Surgical History:   Procedure Laterality Date    HERNIA REPAIR      INTRALUMINAL GASTROINTESTINAL TRACT IMAGING VIA CAPSULE N/A 8/3/2020    Procedure: IMAGING PROCEDURE, GI TRACT, INTRALUMINAL, VIA CAPSULE;  Surgeon: Rey Olivares MD;  Location: [facility];  Service: Endoscopy;  Laterality: N/A;    right knee surgery         Review of patient's allergies indicates:  No Known Allergies    No current facility-administered medications on file prior to encounter.     Current Outpatient Medications on File Prior to Encounter   Medication Sig    albuterol (PROVENTIL/VENTOLIN HFA) 90 mcg/actuation inhaler Inhale 2 puffs into the lungs every 6 (six) hours as needed.    aspirin (ECOTRIN) 81 MG EC tablet Take 81 mg by mouth once daily.    celecoxib (CELEBREX) 200 MG capsule Take 1 capsule (200 mg total) by mouth once daily.    fluticasone-umeclidin-vilanter (TRELEGY ELLIPTA) 100-62.5-25 mcg DsDv Inhale 1 puff into the lungs once daily.    furosemide (LASIX) 20 MG tablet Take 1 tablet (20 mg total) by mouth once daily.    hydrOXYzine HCL (ATARAX) 25 MG tablet Take 1 tablet (25 mg total) by mouth 3 (three) times daily as needed for Itching.    LIDOcaine (LIDODERM) 5 % Place 1 patch onto the skin daily as needed.    pregabalin (LYRICA) 100 MG capsule Take 100 mg by mouth 3 (three) times daily.    tamsulosin (FLOMAX) 0.4 mg Cap Take 1 capsule (0.4 mg total) by mouth once daily.    traMADoL (ULTRAM) 50 mg tablet Take 50 mg by mouth 2 (two) times daily as needed for Pain.    fluticasone propionate (FLONASE) 50 mcg/actuation nasal spray 1 spray by Each Nostril route daily as needed.    [DISCONTINUED] acyclovir (ZOVIRAX) 400 MG tablet     [DISCONTINUED] albuterol (PROVENTIL) 2.5 mg  /3 mL (0.083 %) nebulizer solution     [DISCONTINUED] amoxicillin (AMOXIL) 500 MG capsule     [DISCONTINUED] apixaban (ELIQUIS) 5 mg Tab     [DISCONTINUED] budesonide-formoterol 160-4.5 mcg (SYMBICORT) 160-4.5 mcg/actuation HFAA Inhale 2 puffs into the lungs every 12 (twelve) hours.    [DISCONTINUED] ciprofloxacin HCl (CIPRO) 250 MG tablet ciprofloxacin 250 mg tablet   Take 1 tablet twice a day by oral route as directed for 30 days.    [DISCONTINUED] ciprofloxacin HCl (CIPRO) 500 MG tablet Take 1 tablet twice a day by oral route as directed for 30 days.    [DISCONTINUED] ciprofloxacin-dexAMETHasone 0.3-0.1% (CIPRODEX) 0.3-0.1 % DrpS     [DISCONTINUED] clarithromycin (BIAXIN) 250 MG tablet clarithromycin 250 mg tablet   Take 1 tablet twice a day by oral route as directed for 30 days.    [DISCONTINUED] clarithromycin (BIAXIN) 500 MG tablet Take 1 tablet twice a day by oral route as directed for 30 days.    [DISCONTINUED] EScitalopram oxalate (LEXAPRO) 10 MG tablet     [DISCONTINUED] fluticasone furoate (ARNUITY ELLIPTA) 100 mcg/actuation inhaler     [DISCONTINUED] gabapentin (NEURONTIN) 600 MG tablet Take 600 mg by mouth 3 (three) times daily.    [DISCONTINUED] ipratropium (ATROVENT HFA) 17 mcg/actuation inhaler     [DISCONTINUED] itraconazole (SPORANOX) 100 mg Cap Take 2 capsules (200 mg total) by mouth once daily. (Patient not taking: No sig reported)    [DISCONTINUED] ketoconazole (NIZORAL) 2 % cream     [DISCONTINUED] lactobacillus acidophilus & bulgar (LACTINEX) 100 million cell packet Take 1 packet (1 each total) by mouth 2 (two) times daily with meals. (Patient not taking: Reported on 1/3/2022)    [DISCONTINUED] losartan (COZAAR) 100 MG tablet     [DISCONTINUED] meloxicam (MOBIC) 7.5 MG tablet     [DISCONTINUED] metroNIDAZOLE (FLAGYL) 500 MG tablet     [DISCONTINUED] multivitamin Tab Take 1 tablet by mouth once daily. (Patient not taking: Reported on 1/3/2022)    [DISCONTINUED] nystatin (MYCOSTATIN) 100,000  unit/mL suspension     [DISCONTINUED] omeprazole (PRILOSEC) 20 MG capsule omeprazole 20 mg capsule,delayed release    [DISCONTINUED] ondansetron (ZOFRAN) 4 MG tablet     [DISCONTINUED] pantoprazole (PROTONIX) 40 MG tablet     [DISCONTINUED] polyethylene glycol (GLYCOLAX) 17 gram/dose powder polyethylene glycol 3350 17 gram/dose oral powder    [DISCONTINUED] predniSONE (DELTASONE) 10 MG tablet     [DISCONTINUED] pregabalin (LYRICA) 150 MG capsule     [DISCONTINUED] pregabalin (LYRICA) 75 MG capsule Take 1 capsule (75 mg total) by mouth 2 (two) times daily. (Patient not taking: No sig reported)    [DISCONTINUED] PROAIR HFA 90 mcg/actuation inhaler Inhale 1-2 puffs into the lungs every 4 (four) hours as needed for Wheezing or Shortness of Breath. (Patient not taking: No sig reported)    [DISCONTINUED] ranitidine (ZANTAC) 300 MG tablet ranitidine 300 mg tablet    [DISCONTINUED] sertraline (ZOLOFT) 100 MG tablet     [DISCONTINUED] spironolactone (ALDACTONE) 25 MG tablet     [DISCONTINUED] tiotropium (SPIRIVA) 18 mcg inhalation capsule Inhale 1 capsule (18 mcg total) into the lungs once daily. Controller (Patient not taking: No sig reported)    [DISCONTINUED] traMADoL (ULTRAM-ER) 100 MG Tb24     [DISCONTINUED] TRELEGY ELLIPTA 100-62.5-25 mcg DsDv Inhale 1 puff into the lungs once daily.    [DISCONTINUED] umeclidinium (INCRUSE ELLIPTA) 62.5 mcg/actuation inhalation capsule     [DISCONTINUED] umeclidinium-vilanteroL (ANORO ELLIPTA) 62.5-25 mcg/actuation DsDv      Family History    Family history is unknown by patient.       Tobacco Use    Smoking status: Former    Smokeless tobacco: Never   Substance and Sexual Activity    Alcohol use: Not Currently    Drug use: Never    Sexual activity: Not on file     Review of Systems   Constitutional:  Positive for chills and fatigue. Negative for fever.   HENT:  Negative for congestion.    Respiratory:  Positive for cough, chest tightness and shortness of breath.    Cardiovascular:   Negative for chest pain and palpitations.   Gastrointestinal:  Negative for abdominal pain, nausea and vomiting.   Genitourinary:  Negative for dysuria.   Musculoskeletal:  Positive for myalgias.   Skin:  Negative for color change.   Neurological:  Negative for dizziness and light-headedness.   Psychiatric/Behavioral:  Negative for agitation and confusion. The patient is not nervous/anxious.      Objective:     Vital Signs (Most Recent):  Temp: 98 °F (36.7 °C) (02/15/24 1703)  Pulse: 84 (02/15/24 2229)  Resp: 18 (02/15/24 2229)  BP: 129/61 (02/15/24 2202)  SpO2: 98 % (02/15/24 2229) Vital Signs (24h Range):  Temp:  [98 °F (36.7 °C)] 98 °F (36.7 °C)  Pulse:  [] 84  Resp:  [13-26] 18  SpO2:  [82 %-100 %] 98 %  BP: (114-141)/(58-82) 129/61     Weight: 91.5 kg (201 lb 11.5 oz)  Body mass index is 28.13 kg/m².     Physical Exam  Constitutional:       Appearance: He is ill-appearing.   HENT:      Head: Normocephalic.      Mouth/Throat:      Mouth: Mucous membranes are moist.   Eyes:      Extraocular Movements: Extraocular movements intact.      Pupils: Pupils are equal, round, and reactive to light.   Cardiovascular:      Rate and Rhythm: Normal rate and regular rhythm.      Pulses: Normal pulses.      Heart sounds: Normal heart sounds.   Pulmonary:      Effort: Respiratory distress present.      Breath sounds: Rhonchi present. No wheezing or rales.      Comments: On BiPAP  Abdominal:      General: Bowel sounds are normal. There is no distension.      Palpations: Abdomen is soft.      Tenderness: There is no abdominal tenderness. There is no guarding or rebound.   Musculoskeletal:      Cervical back: No rigidity.      Right lower leg: No edema.      Left lower leg: No edema.   Skin:     General: Skin is warm.   Neurological:      Mental Status: He is alert and oriented to person, place, and time.   Psychiatric:         Mood and Affect: Mood normal.         Behavior: Behavior normal.         Thought Content: Thought  content normal.         Judgment: Judgment normal.              CRANIAL NERVES     CN III, IV, VI   Pupils are equal, round, and reactive to light.       Significant Labs: All pertinent labs within the past 24 hours have been reviewed.    Significant Imaging: I have reviewed all pertinent imaging results/findings within the past 24 hours.

## 2024-02-17 LAB
ANION GAP SERPL CALC-SCNC: 12 MMOL/L (ref 8–16)
APTT PPP: 39.1 SEC (ref 21–32)
BASOPHILS # BLD AUTO: 0.01 K/UL (ref 0–0.2)
BASOPHILS NFR BLD: 0.1 % (ref 0–1.9)
BUN SERPL-MCNC: 23 MG/DL (ref 8–23)
CALCIUM SERPL-MCNC: 8.5 MG/DL (ref 8.7–10.5)
CHLORIDE SERPL-SCNC: 105 MMOL/L (ref 95–110)
CO2 SERPL-SCNC: 22 MMOL/L (ref 23–29)
CREAT SERPL-MCNC: 1.2 MG/DL (ref 0.5–1.4)
DIFFERENTIAL METHOD BLD: ABNORMAL
EOSINOPHIL # BLD AUTO: 0 K/UL (ref 0–0.5)
EOSINOPHIL NFR BLD: 0 % (ref 0–8)
ERYTHROCYTE [DISTWIDTH] IN BLOOD BY AUTOMATED COUNT: 18.2 % (ref 11.5–14.5)
EST. GFR  (NO RACE VARIABLE): >60 ML/MIN/1.73 M^2
GLUCOSE SERPL-MCNC: 107 MG/DL (ref 70–110)
HCT VFR BLD AUTO: 33.1 % (ref 40–54)
HGB BLD-MCNC: 10.3 G/DL (ref 14–18)
IMM GRANULOCYTES # BLD AUTO: 0.08 K/UL (ref 0–0.04)
IMM GRANULOCYTES NFR BLD AUTO: 0.5 % (ref 0–0.5)
IRON SERPL-MCNC: 14 UG/DL (ref 45–160)
LYMPHOCYTES # BLD AUTO: 1.3 K/UL (ref 1–4.8)
LYMPHOCYTES NFR BLD: 8.1 % (ref 18–48)
MAGNESIUM SERPL-MCNC: 2.6 MG/DL (ref 1.6–2.6)
MCH RBC QN AUTO: 22.9 PG (ref 27–31)
MCHC RBC AUTO-ENTMCNC: 31.1 G/DL (ref 32–36)
MCV RBC AUTO: 74 FL (ref 82–98)
MONOCYTES # BLD AUTO: 1.1 K/UL (ref 0.3–1)
MONOCYTES NFR BLD: 6.9 % (ref 4–15)
NEUTROPHILS # BLD AUTO: 13.1 K/UL (ref 1.8–7.7)
NEUTROPHILS NFR BLD: 84.4 % (ref 38–73)
NRBC BLD-RTO: 0 /100 WBC
PHOSPHATE SERPL-MCNC: 2.8 MG/DL (ref 2.7–4.5)
PLATELET # BLD AUTO: 205 K/UL (ref 150–450)
PMV BLD AUTO: 10.3 FL (ref 9.2–12.9)
POCT GLUCOSE: 108 MG/DL (ref 70–110)
POCT GLUCOSE: 109 MG/DL (ref 70–110)
POCT GLUCOSE: 140 MG/DL (ref 70–110)
POTASSIUM SERPL-SCNC: 4.4 MMOL/L (ref 3.5–5.1)
RBC # BLD AUTO: 4.49 M/UL (ref 4.6–6.2)
SATURATED IRON: 4 % (ref 20–50)
SODIUM SERPL-SCNC: 139 MMOL/L (ref 136–145)
TOTAL IRON BINDING CAPACITY: 400 UG/DL (ref 250–450)
TRANSFERRIN SERPL-MCNC: 270 MG/DL (ref 200–375)
WBC # BLD AUTO: 15.46 K/UL (ref 3.9–12.7)

## 2024-02-17 PROCEDURE — 63600175 PHARM REV CODE 636 W HCPCS: Performed by: NURSE PRACTITIONER

## 2024-02-17 PROCEDURE — 85730 THROMBOPLASTIN TIME PARTIAL: CPT | Performed by: EMERGENCY MEDICINE

## 2024-02-17 PROCEDURE — 94640 AIRWAY INHALATION TREATMENT: CPT

## 2024-02-17 PROCEDURE — 63600175 PHARM REV CODE 636 W HCPCS

## 2024-02-17 PROCEDURE — 63600175 PHARM REV CODE 636 W HCPCS: Performed by: FAMILY MEDICINE

## 2024-02-17 PROCEDURE — 82728 ASSAY OF FERRITIN: CPT | Performed by: NURSE PRACTITIONER

## 2024-02-17 PROCEDURE — 25000242 PHARM REV CODE 250 ALT 637 W/ HCPCS: Performed by: NURSE PRACTITIONER

## 2024-02-17 PROCEDURE — 84100 ASSAY OF PHOSPHORUS: CPT | Performed by: NURSE PRACTITIONER

## 2024-02-17 PROCEDURE — 25000003 PHARM REV CODE 250: Performed by: NURSE PRACTITIONER

## 2024-02-17 PROCEDURE — 83540 ASSAY OF IRON: CPT

## 2024-02-17 PROCEDURE — 36415 COLL VENOUS BLD VENIPUNCTURE: CPT | Performed by: NURSE PRACTITIONER

## 2024-02-17 PROCEDURE — 63600175 PHARM REV CODE 636 W HCPCS: Mod: JZ,TB | Performed by: HOSPITALIST

## 2024-02-17 PROCEDURE — 94761 N-INVAS EAR/PLS OXIMETRY MLT: CPT

## 2024-02-17 PROCEDURE — 27000207 HC ISOLATION

## 2024-02-17 PROCEDURE — 83735 ASSAY OF MAGNESIUM: CPT | Performed by: NURSE PRACTITIONER

## 2024-02-17 PROCEDURE — 25000003 PHARM REV CODE 250

## 2024-02-17 PROCEDURE — 99233 SBSQ HOSP IP/OBS HIGH 50: CPT | Mod: ,,, | Performed by: INTERNAL MEDICINE

## 2024-02-17 PROCEDURE — 85025 COMPLETE CBC W/AUTO DIFF WBC: CPT | Performed by: EMERGENCY MEDICINE

## 2024-02-17 PROCEDURE — 25000003 PHARM REV CODE 250: Performed by: HOSPITALIST

## 2024-02-17 PROCEDURE — 25000242 PHARM REV CODE 250 ALT 637 W/ HCPCS

## 2024-02-17 PROCEDURE — 83615 LACTATE (LD) (LDH) ENZYME: CPT | Performed by: NURSE PRACTITIONER

## 2024-02-17 PROCEDURE — 94660 CPAP INITIATION&MGMT: CPT

## 2024-02-17 PROCEDURE — 99900035 HC TECH TIME PER 15 MIN (STAT)

## 2024-02-17 PROCEDURE — 11000001 HC ACUTE MED/SURG PRIVATE ROOM

## 2024-02-17 PROCEDURE — 20000000 HC ICU ROOM

## 2024-02-17 PROCEDURE — 80048 BASIC METABOLIC PNL TOTAL CA: CPT | Performed by: NURSE PRACTITIONER

## 2024-02-17 PROCEDURE — 27100171 HC OXYGEN HIGH FLOW UP TO 24 HOURS

## 2024-02-17 PROCEDURE — 25000003 PHARM REV CODE 250: Performed by: FAMILY MEDICINE

## 2024-02-17 PROCEDURE — 94664 DEMO&/EVAL PT USE INHALER: CPT

## 2024-02-17 RX ADMIN — MULTIPLE VITAMINS W/ MINERALS TAB 1 TABLET: TAB at 08:02

## 2024-02-17 RX ADMIN — IPRATROPIUM BROMIDE AND ALBUTEROL SULFATE 3 ML: 2.5; .5 SOLUTION RESPIRATORY (INHALATION) at 07:02

## 2024-02-17 RX ADMIN — HEPARIN SODIUM 12 UNITS/KG/HR: 10000 INJECTION, SOLUTION INTRAVENOUS at 05:02

## 2024-02-17 RX ADMIN — ASPIRIN 81 MG CHEWABLE TABLET 81 MG: 81 TABLET CHEWABLE at 08:02

## 2024-02-17 RX ADMIN — MUPIROCIN: 20 OINTMENT TOPICAL at 08:02

## 2024-02-17 RX ADMIN — PREGABALIN 100 MG: 50 CAPSULE ORAL at 08:02

## 2024-02-17 RX ADMIN — REMDESIVIR 100 MG: 100 INJECTION, POWDER, LYOPHILIZED, FOR SOLUTION INTRAVENOUS at 10:02

## 2024-02-17 RX ADMIN — CLOPIDOGREL BISULFATE 75 MG: 75 TABLET ORAL at 08:02

## 2024-02-17 RX ADMIN — DEXAMETHASONE 6 MG: 4 TABLET ORAL at 08:02

## 2024-02-17 RX ADMIN — CEFTRIAXONE SODIUM 1 G: 1 INJECTION, POWDER, FOR SOLUTION INTRAMUSCULAR; INTRAVENOUS at 06:02

## 2024-02-17 RX ADMIN — IPRATROPIUM BROMIDE AND ALBUTEROL SULFATE 3 ML: 2.5; .5 SOLUTION RESPIRATORY (INHALATION) at 02:02

## 2024-02-17 RX ADMIN — AZITHROMYCIN MONOHYDRATE 500 MG: 500 INJECTION, POWDER, LYOPHILIZED, FOR SOLUTION INTRAVENOUS at 11:02

## 2024-02-17 RX ADMIN — PREGABALIN 100 MG: 50 CAPSULE ORAL at 02:02

## 2024-02-17 RX ADMIN — POLYETHYLENE GLYCOL 3350 17 G: 17 POWDER, FOR SOLUTION ORAL at 08:02

## 2024-02-17 RX ADMIN — FLUTICASONE FUROATE AND VILANTEROL TRIFENATATE 1 PUFF: 200; 25 POWDER RESPIRATORY (INHALATION) at 07:02

## 2024-02-17 RX ADMIN — TIOTROPIUM BROMIDE INHALATION SPRAY 2 PUFF: 3.12 SPRAY, METERED RESPIRATORY (INHALATION) at 02:02

## 2024-02-17 RX ADMIN — ATORVASTATIN CALCIUM 80 MG: 40 TABLET, FILM COATED ORAL at 08:02

## 2024-02-17 NOTE — PLAN OF CARE
Problem: Adult Inpatient Plan of Care  Goal: Plan of Care Review  Outcome: Ongoing, Progressing  Goal: Patient-Specific Goal (Individualized)  Outcome: Ongoing, Progressing  Goal: Absence of Hospital-Acquired Illness or Injury  Outcome: Ongoing, Progressing  Goal: Optimal Comfort and Wellbeing  Outcome: Ongoing, Progressing  Goal: Readiness for Transition of Care  Outcome: Ongoing, Progressing     Problem: Cardiac-Related Pain (Acute Coronary Syndrome)  Goal: Absence of Cardiac-Related Pain  Outcome: Ongoing, Progressing     Problem: Tissue Perfusion (Acute Coronary Syndrome)  Goal: Adequate Tissue Perfusion  Outcome: Ongoing, Progressing     Problem: Bleeding (Cardiac Catheterization)  Goal: Absence of Bleeding  Outcome: Ongoing, Progressing     Problem: Adjustment to Illness COPD (Chronic Obstructive Pulmonary Disease)  Goal: Optimal Chronic Illness Coping  Outcome: Ongoing, Progressing     Problem: Infection COPD (Chronic Obstructive Pulmonary Disease)  Goal: Absence of Infection Signs and Symptoms  Outcome: Ongoing, Progressing     Problem: Fall Injury Risk  Goal: Absence of Fall and Fall-Related Injury  Outcome: Ongoing, Progressing

## 2024-02-17 NOTE — SUBJECTIVE & OBJECTIVE
Interval History: no issues voiced. Sob is improved. No chest pain    Review of Systems   Constitutional:  Negative for chills, fatigue and fever.   HENT:  Negative for congestion, ear pain, hearing loss and trouble swallowing.    Eyes:  Negative for pain.   Respiratory:  Negative for cough, chest tightness and shortness of breath.    Cardiovascular:  Negative for chest pain, palpitations and leg swelling.   Gastrointestinal:  Negative for abdominal pain, constipation, diarrhea, nausea and vomiting.   Endocrine: Negative for polydipsia, polyphagia and polyuria.   Genitourinary:  Negative for difficulty urinating, dysuria, flank pain, frequency, hematuria and urgency.   Musculoskeletal:  Negative for arthralgias and neck pain.   Skin:  Negative for color change and rash.   Allergic/Immunologic: Negative for environmental allergies.   Neurological:  Negative for dizziness, syncope and headaches.   Hematological:  Negative for adenopathy.   Psychiatric/Behavioral:  Negative for behavioral problems, hallucinations and self-injury.    All other systems reviewed and are negative.    Objective:     Vital Signs (Most Recent):  Temp: 98.1 °F (36.7 °C) (02/17/24 1601)  Pulse: 74 (02/17/24 1640)  Resp: (!) 25 (02/17/24 1640)  BP: (!) 133/56 (02/17/24 1640)  SpO2: 95 % (02/17/24 1640) Vital Signs (24h Range):  Temp:  [97.9 °F (36.6 °C)-98.2 °F (36.8 °C)] 98.1 °F (36.7 °C)  Pulse:  [] 74  Resp:  [9-37] 25  SpO2:  [83 %-100 %] 95 %  BP: (115-169)/() 133/56     Weight: 89.8 kg (198 lb)  Body mass index is 27.62 kg/m².    Intake/Output Summary (Last 24 hours) at 2/17/2024 1657  Last data filed at 2/17/2024 1600  Gross per 24 hour   Intake 1079.68 ml   Output 2500 ml   Net -1420.32 ml           Physical Exam  Vitals and nursing note reviewed.   Constitutional:       General: He is not in acute distress.     Appearance: He is well-developed.   HENT:      Head: Normocephalic and atraumatic.      Nose: Nose normal.   Eyes:       Conjunctiva/sclera: Conjunctivae normal.   Cardiovascular:      Rate and Rhythm: Normal rate and regular rhythm.      Heart sounds: Normal heart sounds. No murmur heard.  Pulmonary:      Effort: Pulmonary effort is normal.      Breath sounds: Normal breath sounds. No wheezing.   Abdominal:      General: Bowel sounds are normal.      Palpations: Abdomen is soft. There is no mass.      Tenderness: There is no abdominal tenderness. There is no guarding or rebound.   Musculoskeletal:         General: Normal range of motion.      Cervical back: Normal range of motion and neck supple.   Skin:     General: Skin is warm and dry.      Findings: No rash.   Neurological:      Mental Status: He is alert and oriented to person, place, and time.   Psychiatric:         Behavior: Behavior normal.             Significant Labs: All pertinent labs within the past 24 hours have been reviewed.  BMP:   Recent Labs   Lab 02/17/24  0538         K 4.4      CO2 22*   BUN 23   CREATININE 1.2   CALCIUM 8.5*   MG 2.6       CBC:   Recent Labs   Lab 02/15/24  2101 02/16/24  0348 02/17/24  0538   WBC 11.28 8.46 15.46*   HGB 10.2* 10.4* 10.3*   HCT 33.3* 33.7* 33.1*    184 205         Significant Imaging: I have reviewed all pertinent imaging results/findings within the past 24 hours.  I have reviewed and interpreted all pertinent imaging results/findings within the past 24 hours.

## 2024-02-17 NOTE — PLAN OF CARE
Patient remains in ICU . AAOx4 and follows commands. Afebrile. Sinus bradycardia/NSR on monitor. VSS. SpO2>88% on 7L high flow nasal cannula. UOP 1700ml/shift.    Heparin gtt continued @ 12 units/kg/hr.    Cardiac diet tolerated well overnight.    Plan of care reviewed with patient throughout shift. All questions/concerns addressed. Pt safety and isolation precautions maintained. Report given to AM nurse.    Problem: Adult Inpatient Plan of Care  Goal: Plan of Care Review  Outcome: Ongoing, Progressing     Problem: Adult Inpatient Plan of Care  Goal: Absence of Hospital-Acquired Illness or Injury  Outcome: Ongoing, Progressing     Problem: Adult Inpatient Plan of Care  Goal: Optimal Comfort and Wellbeing  Outcome: Ongoing, Progressing     Problem: Adult Inpatient Plan of Care  Goal: Readiness for Transition of Care  Outcome: Ongoing, Progressing

## 2024-02-17 NOTE — PROGRESS NOTES
[facility] - Intensive Care  Cardiology  Progress Note    Patient Name: Rajan Deluna  MRN: [id number]  Admission Date: 2/15/2024  Hospital Length of Stay: 2 days  Code Status: Full Code   Attending Physician: Emeli Wayne MD   Primary Care Physician: Jason Mccord MD  Expected Discharge Date:   Principal Problem:Acute respiratory failure with hypoxia    Subjective:     Hospital Course: 2/17/2024 seen and examined this morning.  He denies chest pain.  Reports breathing is better.  Troponins downtrending.  Echocardiogram with preserved EF.    Interval History:     Review of Systems   Constitutional: Positive for malaise/fatigue.   Cardiovascular:  Positive for dyspnea on exertion.   Respiratory:  Positive for cough and shortness of breath.      Objective:     Vital Signs (Most Recent):  Temp: 98 °F (36.7 °C) (02/17/24 1115)  Pulse: 88 (02/17/24 1045)  Resp: (!) 37 (02/17/24 1045)  BP: 130/60 (02/17/24 1115)  SpO2: (!) 83 % (02/17/24 1045) Vital Signs (24h Range):  Temp:  [97.9 °F (36.6 °C)-98.4 °F (36.9 °C)] 98 °F (36.7 °C)  Pulse:  [] 88  Resp:  [9-37] 37  SpO2:  [83 %-100 %] 83 %  BP: (114-169)/(55-82) 130/60     Weight: 89.8 kg (198 lb)  Body mass index is 27.62 kg/m².    SpO2: (!) 83 %         Intake/Output Summary (Last 24 hours) at 2/17/2024 1142  Last data filed at 2/17/2024 1100  Gross per 24 hour   Intake 821.22 ml   Output 3000 ml   Net -2178.78 ml       Lines/Drains/Airways       Peripheral Intravenous Line  Duration                  Peripheral IV - Single Lumen 02/15/24 1833 20 G Right Antecubital 1 day         Peripheral IV - Single Lumen 02/15/24 1834 22 G Posterior;Right Hand 1 day                    Physical Exam  Constitutional:       Appearance: He is ill-appearing.   HENT:      Head: Normocephalic and atraumatic.   Cardiovascular:      Rate and Rhythm: Normal rate and regular rhythm.      Heart sounds: No murmur heard.  Pulmonary:      Breath sounds: Rhonchi and rales present.       Comments: Prolonged expiratory  Abdominal:      General: Abdomen is flat.      Palpations: Abdomen is soft.   Musculoskeletal:      Right lower leg: No edema.      Left lower leg: No edema.   Neurological:      Mental Status: He is oriented to person, place, and time.   Psychiatric:         Behavior: Behavior normal.         Significant Labs: BMP:   Recent Labs   Lab 02/15/24  1823 02/16/24  0348 02/17/24  0538    171* 107    139 139   K 4.5 4.5 4.4    103 105   CO2 24 23 22*   BUN 21 21 23   CREATININE 1.4 1.5* 1.2   CALCIUM 8.8 8.8 8.5*   MG 1.9 2.4 2.6   , CMP   Recent Labs   Lab 02/15/24  1823 02/16/24  0348 02/17/24  0538    139 139   K 4.5 4.5 4.4    103 105   CO2 24 23 22*    171* 107   BUN 21 21 23   CREATININE 1.4 1.5* 1.2   CALCIUM 8.8 8.8 8.5*   PROT 7.4  --   --    ALBUMIN 3.2*  --   --    BILITOT 0.7  --   --    ALKPHOS 122  --   --    AST 35  --   --    ALT 16  --   --    ANIONGAP 10 13 12   , CBC   Recent Labs   Lab 02/15/24  2101 02/16/24  0348 02/17/24  0538   WBC 11.28 8.46 15.46*   HGB 10.2* 10.4* 10.3*   HCT 33.3* 33.7* 33.1*    184 205   , Lipid Panel   Recent Labs   Lab 02/16/24  0652   CHOL 167   HDL 47   LDLCALC 111.0   TRIG 45   CHOLHDL 28.1   , and Troponin   Recent Labs   Lab 02/16/24  0652 02/16/24  0901 02/16/24  1300   TROPONINI 12.730* 12.008* 9.385*       Significant Imaging: Echocardiogram: 2D echo with color flow doppler: No results found for this or any previous visit. and Transthoracic echo (TTE) complete (Cupid Only):   Results for orders placed or performed during the hospital encounter of 02/15/24   Echo   Result Value Ref Range    BSA 2.12 m2    Perry's Biplane MOD Ejection Fraction 72 %    LVOT stroke volume 76.89 cm3    LVIDd 4.23 3.5 - 6.0 cm    LV Systolic Volume 26.42 mL    LV Systolic Volume Index 12.6 mL/m2    LVIDs 2.68 2.1 - 4.0 cm    LV Diastolic Volume 79.95 mL    LV Diastolic Volume Index 38.07 mL/m2    IVS 1.22 (A)  "0.6 - 1.1 cm    LVOT diameter 2.11 cm    LVOT area 3.5 cm2    FS 37 28 - 44 %    Left Ventricle Relative Wall Thickness 0.49 cm    Posterior Wall 1.03 0.6 - 1.1 cm    LV mass 164.01 g    LV Mass Index 78 g/m2    MV Peak E William 0.69 m/s    TDI LATERAL 0.07 m/s    TDI SEPTAL 0.07 m/s    E/E' ratio 9.86 m/s    MV Peak A William 1.27 m/s    TR Max William 2.47 m/s    E/A ratio 0.54     E wave deceleration time 230.02 msec    MV "A" wave duration [id number] msec    LV SEPTAL E/E' RATIO 9.86 m/s    LV LATERAL E/E' RATIO 9.86 m/s    PV Peak S William 0.61 m/s    PV Peak D William 0.35 m/s    Pulm vein S/D ratio 1.74     LVOT peak william 1.10 m/s    Left Ventricular Outflow Tract Mean Velocity 0.74 cm/s    Left Ventricular Outflow Tract Mean Gradient 2.51 mmHg    RVDD 4.30 cm    RV S' 12.95 cm/s    TAPSE 2.03 cm    LA size 3.22 cm    Left Atrium Minor Axis 6.09 cm    Left Atrium Major Axis 5.71 cm    LA volume (mod) 52.79 cm3    LA Volume Index (Mod) 25.1 mL/m2    RA Major Axis 5.32 cm    RA Width 5.00 cm    AV mean gradient 5 mmHg    AV peak gradient 9 mmHg    Ao peak william 1.46 m/s    Ao VTI 32.60 cm    LVOT peak VTI 22.00 cm    AV valve area 2.36 cm²    AV Velocity Ratio 0.75     AV index (prosthetic) 0.67     NAOOP by Velocity Ratio 2.63 cm²    MV peak gradient 6 mmHg    MV stenosis pressure 1/2 time 66.71 ms    MV valve area p 1/2 method 3.30 cm2    MV valve area by continuity eq 3.23 cm2    MV VTI 23.8 cm    Triscuspid Valve Regurgitation Peak Gradient 24 mmHg    PV PEAK VELOCITY 0.91 m/s    PV peak gradient 3 mmHg    Pulmonary Valve Mean Velocity 0.70 m/s    Sinus 3.63 cm    STJ 3.29 cm    Ascending aorta 3.30 cm    IVC diameter 1.96 cm    Mean e' 0.07 m/s    ZLVIDS -3.14     ZLVIDD -4.34     LA Volume Index 30.0 mL/m2    LA volume 62.91 cm3    LA WIDTH 3.9 cm    RV-perkins mid d 3.0 cm    TV resting pulmonary artery pressure 27 mmHg    RV TB RVSP 5 mmHg    Est. RA pres 3 mmHg    EF 65 %    Narrative      Left Ventricle: The left " ventricle is normal in size. Normal wall   thickness. There is concentric remodeling. Normal wall motion. Septal   motion is consistent with bundle branch block. There is normal systolic   function. Ejection fraction by visual approximation is 65%. There is   normal diastolic function. Normal left ventricular filling pressure.    Right Ventricle: Mild right ventricular enlargement. Wall thickness is   normal. Right ventricle wall motion  is normal. Systolic function is   normal.    Right Atrium: Right atrium is moderately dilated.    Tricuspid Valve: There is mild regurgitation.    Pulmonary Artery: The estimated pulmonary artery systolic pressure is   27 mmHg.    IVC/SVC: Normal venous pressure at 3 mmHg.       Assessment and Plan:     Brief HPI:  78 years old male with        NSTEMI  Acute hypoxic respiratory failure on chronic respiratory failure  COVID 19 pneumonia  COPD exacerbation  Anemia/microcytic/iron-deficiency    Plan    NSTEMI.  EKG without significant ischemic changes.  Troponins trending down  We will recommend medical therapy at this time for NSTEMI.  Given underlying respiratory failure we will not proceed with any invasive intervention at this time.  Echocardiogram reviewed.  Preserved EF    Continue aspirin and Plavix  Continue high-intensity statin  Heparin drip for 48 hours and discontinue  Patient's we will need to follow up with Cardiology as an outpatient and we will consider PET stress test.  Unless he becomes hemodynamically or electrically unstable during hospitalization then we will change the plan  Respiratory failure management by Pulmonary team.  Still requiring high oxygen  Clinically appears to be euvolemic.       Please call us back with any questions    Active Diagnoses:    Diagnosis Date Noted POA    PRINCIPAL PROBLEM:  Acute respiratory failure with hypoxia [J96.01]  Yes    NSTEMI (non-ST elevated myocardial infarction) [I21.4] 02/15/2024 Yes    COVID-19 [U07.1] 02/15/2024 Yes     COPD exacerbation [J44.1] 10/22/2020 Yes    Type 2 diabetes mellitus [E11.9] 08/27/2020 Yes    Anemia [D64.9] 12/09/2016 Yes      Problems Resolved During this Admission:       VTE Risk Mitigation (From admission, onward)           Ordered     IP VTE HIGH RISK PATIENT  Once         02/15/24 2256     Place sequential compression device  Until discontinued         02/15/24 2256     Place sequential compression device  Until discontinued         02/15/24 2250     heparin 25,000 units in dextrose 5% (100 units/ml) IV bolus from bag LOW INTENSITY nomogram - [facility]  As needed (PRN)        Question:  Heparin Infusion Adjustment (DO NOT MODIFY ANSWER)  Answer:  \\[email or web address]\[email or web address]\Images\Pharmacy\HeparinInfusions\heparin LOW INTENSITY nomogram for [facility] [id number]    02/15/24 2044     heparin 25,000 units in dextrose 5% (100 units/ml) IV bolus from bag LOW INTENSITY nomogram - [facility]  As needed (PRN)        Question:  Heparin Infusion Adjustment (DO NOT MODIFY ANSWER)  Answer:  \\[email or web address]\epic\Images\Pharmacy\HeparinInfusions\heparin LOW INTENSITY nomogram for [facility] [id number]    02/15/24 2044     heparin 25,000 units in dextrose 5% 250 mL (100 units/mL) infusion LOW INTENSITY nomogram - [facility]  Continuous        Question:  Begin at (units/kg/hr)  Answer:  12    02/15/24 2044                    Jourdan Correa MD  Cardiology  [facility] - Intensive Care

## 2024-02-17 NOTE — ASSESSMENT & PLAN NOTE
Patient is identified as High risk for severe complications of COVID 19 based on COVID risk score of 6   Initiate standard COVID protocols; COVID-19 testing ,Infection Control notification  and isolation- respiratory, contact and droplet per protocol    Diagnostics: CBC, CMP, Ferritin, CRP, LDH, BNP, Troponin, and Portable CXR    Management: Initiate targeted therapy with Remdesivir, 200mg IV x1, followed by 100mg IV daily x5 days total and Dexamethasone PO/IV 6mg daily x10 days, Maintain oxygen saturations 92-96% via NIPPV- CPAP;Standby  % FiO2 and monitor with continuous/intermittent pulse oximetry. , Inhaled bronchodilators as needed for shortness of breath., Continuous cardiac monitoring., and Manage respiratory failure (O2 requirement >10LPM or needing NIPPV/Mechanical ventilation) and/or Pneumonia (active chest infiltrates) separately as described below.    Advance Care Planning  Current advance care plan has not been discussed with patient/family/POA and patient currently remains Full Code.

## 2024-02-17 NOTE — ASSESSMENT & PLAN NOTE
Patient denies CP  Trop 3.233 > 12.568  Will continue trending  ASA loading dose given  EDP discussed case with cardiology  No plavix d/t 3 vessel dz  Heparin gtt    EKG: sinus rhythm w/ 1st degree AVB, HR 94, left axis deviation, RBBB, no STEMI  Cards consult:  We will recommend medical therapy for NSTEMI at this time.  Given ongoing respiratory failure we will not proceed with invasive approach till his respiratory status completely improves     Echocardiogram for EF and wall motion    Heparin drip for 48 hours, Cont asa and plavix and statin. F/U with cards as outpt.

## 2024-02-17 NOTE — PLAN OF CARE
Patient on oxygen with documented flow.  Patient wears 4L at home. Will attempt to wean per O2 order protocol. Plan of care on going.

## 2024-02-18 DIAGNOSIS — I21.4 NSTEMI (NON-ST ELEVATED MYOCARDIAL INFARCTION): Primary | ICD-10-CM

## 2024-02-18 LAB
ANION GAP SERPL CALC-SCNC: 12 MMOL/L (ref 8–16)
APTT PPP: 32.7 SEC (ref 21–32)
BASOPHILS # BLD AUTO: 0.01 K/UL (ref 0–0.2)
BASOPHILS NFR BLD: 0.1 % (ref 0–1.9)
BUN SERPL-MCNC: 23 MG/DL (ref 8–23)
CALCIUM SERPL-MCNC: 8.5 MG/DL (ref 8.7–10.5)
CHLORIDE SERPL-SCNC: 104 MMOL/L (ref 95–110)
CO2 SERPL-SCNC: 21 MMOL/L (ref 23–29)
CREAT SERPL-MCNC: 1.2 MG/DL (ref 0.5–1.4)
DIFFERENTIAL METHOD BLD: ABNORMAL
EOSINOPHIL # BLD AUTO: 0 K/UL (ref 0–0.5)
EOSINOPHIL NFR BLD: 0 % (ref 0–8)
ERYTHROCYTE [DISTWIDTH] IN BLOOD BY AUTOMATED COUNT: 17.8 % (ref 11.5–14.5)
EST. GFR  (NO RACE VARIABLE): >60 ML/MIN/1.73 M^2
FERRITIN SERPL-MCNC: 33 NG/ML (ref 20–300)
GLUCOSE SERPL-MCNC: 99 MG/DL (ref 70–110)
HCT VFR BLD AUTO: 32.9 % (ref 40–54)
HGB BLD-MCNC: 10.1 G/DL (ref 14–18)
IMM GRANULOCYTES # BLD AUTO: 0.03 K/UL (ref 0–0.04)
IMM GRANULOCYTES NFR BLD AUTO: 0.3 % (ref 0–0.5)
LDH SERPL L TO P-CCNC: 270 U/L (ref 110–260)
LYMPHOCYTES # BLD AUTO: 1.3 K/UL (ref 1–4.8)
LYMPHOCYTES NFR BLD: 11.5 % (ref 18–48)
MAGNESIUM SERPL-MCNC: 2.4 MG/DL (ref 1.6–2.6)
MCH RBC QN AUTO: 22.5 PG (ref 27–31)
MCHC RBC AUTO-ENTMCNC: 30.7 G/DL (ref 32–36)
MCV RBC AUTO: 73 FL (ref 82–98)
MONOCYTES # BLD AUTO: 1.1 K/UL (ref 0.3–1)
MONOCYTES NFR BLD: 9.1 % (ref 4–15)
NEUTROPHILS # BLD AUTO: 9.1 K/UL (ref 1.8–7.7)
NEUTROPHILS NFR BLD: 79 % (ref 38–73)
NRBC BLD-RTO: 0 /100 WBC
PHOSPHATE SERPL-MCNC: 2.8 MG/DL (ref 2.7–4.5)
PLATELET # BLD AUTO: 213 K/UL (ref 150–450)
PMV BLD AUTO: 10.6 FL (ref 9.2–12.9)
POCT GLUCOSE: 114 MG/DL (ref 70–110)
POCT GLUCOSE: 133 MG/DL (ref 70–110)
POCT GLUCOSE: 134 MG/DL (ref 70–110)
POCT GLUCOSE: 164 MG/DL (ref 70–110)
POCT GLUCOSE: 96 MG/DL (ref 70–110)
POTASSIUM SERPL-SCNC: 4.4 MMOL/L (ref 3.5–5.1)
RBC # BLD AUTO: 4.48 M/UL (ref 4.6–6.2)
SODIUM SERPL-SCNC: 137 MMOL/L (ref 136–145)
WBC # BLD AUTO: 11.52 K/UL (ref 3.9–12.7)

## 2024-02-18 PROCEDURE — 63600175 PHARM REV CODE 636 W HCPCS: Performed by: NURSE PRACTITIONER

## 2024-02-18 PROCEDURE — 84100 ASSAY OF PHOSPHORUS: CPT | Performed by: NURSE PRACTITIONER

## 2024-02-18 PROCEDURE — 99233 SBSQ HOSP IP/OBS HIGH 50: CPT | Mod: ,,, | Performed by: INTERNAL MEDICINE

## 2024-02-18 PROCEDURE — 11000001 HC ACUTE MED/SURG PRIVATE ROOM

## 2024-02-18 PROCEDURE — 25000003 PHARM REV CODE 250: Performed by: HOSPITALIST

## 2024-02-18 PROCEDURE — 94664 DEMO&/EVAL PT USE INHALER: CPT

## 2024-02-18 PROCEDURE — 27000221 HC OXYGEN, UP TO 24 HOURS

## 2024-02-18 PROCEDURE — 94761 N-INVAS EAR/PLS OXIMETRY MLT: CPT

## 2024-02-18 PROCEDURE — 80048 BASIC METABOLIC PNL TOTAL CA: CPT | Performed by: NURSE PRACTITIONER

## 2024-02-18 PROCEDURE — 25000003 PHARM REV CODE 250: Performed by: NURSE PRACTITIONER

## 2024-02-18 PROCEDURE — 63600175 PHARM REV CODE 636 W HCPCS

## 2024-02-18 PROCEDURE — 99900035 HC TECH TIME PER 15 MIN (STAT)

## 2024-02-18 PROCEDURE — 94640 AIRWAY INHALATION TREATMENT: CPT

## 2024-02-18 PROCEDURE — 25000242 PHARM REV CODE 250 ALT 637 W/ HCPCS: Performed by: NURSE PRACTITIONER

## 2024-02-18 PROCEDURE — 25000003 PHARM REV CODE 250: Performed by: FAMILY MEDICINE

## 2024-02-18 PROCEDURE — 83735 ASSAY OF MAGNESIUM: CPT | Performed by: NURSE PRACTITIONER

## 2024-02-18 PROCEDURE — 63600175 PHARM REV CODE 636 W HCPCS: Mod: JZ,TB | Performed by: HOSPITALIST

## 2024-02-18 PROCEDURE — 27000207 HC ISOLATION

## 2024-02-18 PROCEDURE — 25000242 PHARM REV CODE 250 ALT 637 W/ HCPCS

## 2024-02-18 PROCEDURE — 63600175 PHARM REV CODE 636 W HCPCS: Performed by: FAMILY MEDICINE

## 2024-02-18 PROCEDURE — 36415 COLL VENOUS BLD VENIPUNCTURE: CPT | Performed by: NURSE PRACTITIONER

## 2024-02-18 PROCEDURE — 85730 THROMBOPLASTIN TIME PARTIAL: CPT | Performed by: EMERGENCY MEDICINE

## 2024-02-18 PROCEDURE — 25000003 PHARM REV CODE 250

## 2024-02-18 PROCEDURE — 85025 COMPLETE CBC W/AUTO DIFF WBC: CPT | Performed by: EMERGENCY MEDICINE

## 2024-02-18 RX ORDER — HYDROCODONE BITARTRATE AND ACETAMINOPHEN 5; 325 MG/1; MG/1
1 TABLET ORAL EVERY 6 HOURS PRN
Status: DISCONTINUED | OUTPATIENT
Start: 2024-02-18 | End: 2024-02-22 | Stop reason: HOSPADM

## 2024-02-18 RX ORDER — GUAIFENESIN 100 MG/5ML
200 SOLUTION ORAL EVERY 4 HOURS PRN
Status: DISCONTINUED | OUTPATIENT
Start: 2024-02-18 | End: 2024-02-22 | Stop reason: HOSPADM

## 2024-02-18 RX ADMIN — IPRATROPIUM BROMIDE AND ALBUTEROL SULFATE 3 ML: 2.5; .5 SOLUTION RESPIRATORY (INHALATION) at 01:02

## 2024-02-18 RX ADMIN — PREGABALIN 100 MG: 50 CAPSULE ORAL at 10:02

## 2024-02-18 RX ADMIN — ATORVASTATIN CALCIUM 80 MG: 40 TABLET, FILM COATED ORAL at 10:02

## 2024-02-18 RX ADMIN — CLOPIDOGREL BISULFATE 75 MG: 75 TABLET ORAL at 10:02

## 2024-02-18 RX ADMIN — IPRATROPIUM BROMIDE AND ALBUTEROL SULFATE 3 ML: 2.5; .5 SOLUTION RESPIRATORY (INHALATION) at 08:02

## 2024-02-18 RX ADMIN — CEFTRIAXONE SODIUM 1 G: 1 INJECTION, POWDER, FOR SOLUTION INTRAMUSCULAR; INTRAVENOUS at 06:02

## 2024-02-18 RX ADMIN — TIOTROPIUM BROMIDE INHALATION SPRAY 2 PUFF: 3.12 SPRAY, METERED RESPIRATORY (INHALATION) at 07:02

## 2024-02-18 RX ADMIN — PREGABALIN 100 MG: 50 CAPSULE ORAL at 09:02

## 2024-02-18 RX ADMIN — DEXAMETHASONE 6 MG: 4 TABLET ORAL at 10:02

## 2024-02-18 RX ADMIN — FLUTICASONE FUROATE AND VILANTEROL TRIFENATATE 1 PUFF: 200; 25 POWDER RESPIRATORY (INHALATION) at 07:02

## 2024-02-18 RX ADMIN — REMDESIVIR 100 MG: 100 INJECTION, POWDER, LYOPHILIZED, FOR SOLUTION INTRAVENOUS at 10:02

## 2024-02-18 RX ADMIN — MUPIROCIN: 20 OINTMENT TOPICAL at 10:02

## 2024-02-18 RX ADMIN — AZITHROMYCIN MONOHYDRATE 500 MG: 500 INJECTION, POWDER, LYOPHILIZED, FOR SOLUTION INTRAVENOUS at 11:02

## 2024-02-18 RX ADMIN — ASPIRIN 81 MG CHEWABLE TABLET 81 MG: 81 TABLET CHEWABLE at 10:02

## 2024-02-18 RX ADMIN — MUPIROCIN: 20 OINTMENT TOPICAL at 09:02

## 2024-02-18 RX ADMIN — PREGABALIN 100 MG: 50 CAPSULE ORAL at 02:02

## 2024-02-18 RX ADMIN — MULTIPLE VITAMINS W/ MINERALS TAB 1 TABLET: TAB at 10:02

## 2024-02-18 RX ADMIN — IPRATROPIUM BROMIDE AND ALBUTEROL SULFATE 3 ML: 2.5; .5 SOLUTION RESPIRATORY (INHALATION) at 07:02

## 2024-02-18 NOTE — NURSING
Received patient from ICU at 2343, patient AAOx4, patient oriented to floor and room, questions encouraged and answered. Upon my head to toe assessment of the patient, I am in agreement with the previously charted shift assessment. Plan of care on going, patient safety intact, bed in lowest position, bed alarm on, patient belongings and call light in reach, patient agreeable to call for assistance.

## 2024-02-18 NOTE — PLAN OF CARE
Problem: Adult Inpatient Plan of Care  Goal: Plan of Care Review  Outcome: Ongoing, Progressing  Goal: Patient-Specific Goal (Individualized)  Outcome: Ongoing, Progressing  Goal: Absence of Hospital-Acquired Illness or Injury  Outcome: Ongoing, Progressing  Goal: Optimal Comfort and Wellbeing  Outcome: Ongoing, Progressing  Goal: Readiness for Transition of Care  Outcome: Ongoing, Progressing     Problem: Adjustment to Illness (Acute Coronary Syndrome)  Goal: Optimal Adaptation to Illness  Outcome: Ongoing, Progressing     Problem: Dysrhythmia (Acute Coronary Syndrome)  Goal: Normalized Cardiac Rhythm  Outcome: Ongoing, Progressing     Problem: Cardiac-Related Pain (Acute Coronary Syndrome)  Goal: Absence of Cardiac-Related Pain  Outcome: Ongoing, Progressing     Problem: Hemodynamic Instability (Acute Coronary Syndrome)  Goal: Effective Cardiac Pump Function  Outcome: Ongoing, Progressing     Problem: Tissue Perfusion (Acute Coronary Syndrome)  Goal: Adequate Tissue Perfusion  Outcome: Ongoing, Progressing     Problem: Arrhythmia/Dysrhythmia (Cardiac Catheterization)  Goal: Stable Heart Rate and Rhythm  Outcome: Ongoing, Progressing     Problem: Bleeding (Cardiac Catheterization)  Goal: Absence of Bleeding  Outcome: Ongoing, Progressing     Problem: Contrast-Induced Injury Risk (Cardiac Catheterization)  Goal: Absence of Contrast-Induced Injury  Outcome: Ongoing, Progressing     Problem: Embolism (Cardiac Catheterization)  Goal: Absence of Embolism Signs and Symptoms  Outcome: Ongoing, Progressing     Problem: Ongoing Anesthesia/Sedation Effects (Cardiac Catheterization)  Goal: Anesthesia/Sedation Recovery  Outcome: Ongoing, Progressing     Problem: Pain (Cardiac Catheterization)  Goal: Acceptable Pain Control  Outcome: Ongoing, Progressing     Problem: Vascular Access Protection (Cardiac Catheterization)  Goal: Absence of Vascular Access Complication  Outcome: Ongoing, Progressing     Problem: Adjustment to  Illness COPD (Chronic Obstructive Pulmonary Disease)  Goal: Optimal Chronic Illness Coping  Outcome: Ongoing, Progressing     Problem: Functional Ability Impaired COPD (Chronic Obstructive Pulmonary Disease)  Goal: Optimal Level of Functional Meagher  Outcome: Ongoing, Progressing     Problem: Infection COPD (Chronic Obstructive Pulmonary Disease)  Goal: Absence of Infection Signs and Symptoms  Outcome: Ongoing, Progressing     Problem: Oral Intake Inadequate COPD (Chronic Obstructive Pulmonary Disease)  Goal: Improved Nutrition Intake  Outcome: Ongoing, Progressing     Problem: Respiratory Compromise COPD (Chronic Obstructive Pulmonary Disease)  Goal: Effective Oxygenation and Ventilation  Outcome: Ongoing, Progressing     Problem: Diabetes Comorbidity  Goal: Blood Glucose Level Within Targeted Range  Outcome: Ongoing, Progressing     Problem: Fall Injury Risk  Goal: Absence of Fall and Fall-Related Injury  Outcome: Ongoing, Progressing     Problem: Skin Injury Risk Increased  Goal: Skin Health and Integrity  Outcome: Ongoing, Progressing

## 2024-02-18 NOTE — PROGRESS NOTES
[facility]  Progress Note    Patient Name: Rajan Deluna  MRN: [id number]  Patient Class: IP- Inpatient   Admission Date: 2/15/2024  Length of Stay: 3 days  Attending Physician: Emeli Wayne MD  Primary Care Provider: Jason Mccord MD        Subjective:     Principal Problem:Acute respiratory failure with hypoxia        HPI:  Rajan Deluna is a 78 y.o. male who  has a past medical history of Arthritis, COPD (chronic obstructive pulmonary disease), Diabetes mellitus, Digestive disorder, Encounter for blood transfusion, Hypertension, Renal disorder, Smoker, and Weakness.     The patient presents to the ED due to shortness of breath.  Patient was [place] the hotel [place] when he began to feel short of breath approximately 6 hours ago.  He has a history of COPD/chronic bronchitis and uses 4 L of oxygen at baseline.  EMS reports that he was hypoxic in the 70s on his home oxygen.  He received 125 mg of IV Solu-Medrol and a DuoNeb and was transported here. Patient reports cough with chest tightness, fatigue, and chills.  He denies CP, fever, wheezing, palpitations, lightheadedness, dizziness.    Overview/Hospital Course:  Pt admitted and off from bipap. Cont on iv abx, nebs, steroids, and remdesvir. Still on heparin drip for NSTEMi. Cards consulted and recommend medical therapy for NSTEMI at this time. Given ongoing respiratory failure we will not proceed with invasive approach till his respiratory status completely improves   2/17/24: pt on 8LPM. Home oxygen of 4LPm. Heparin drip for 48 hours, Cont asa and plavix and statin. F/U with cards as outpt.   2/18: heparin drip done. Cards plan for outpt stress test. Pt now on 4.5 LPM. Sob improving    Interval History: no issues voiced. Sob is improved. No chest pain    Review of Systems   Constitutional:  Negative for chills, fatigue and fever.   HENT:  Negative for congestion, ear pain, hearing loss and trouble swallowing.    Eyes:   Negative for pain.   Respiratory:  Negative for cough, chest tightness and shortness of breath.    Cardiovascular:  Negative for chest pain, palpitations and leg swelling.   Gastrointestinal:  Negative for abdominal pain, constipation, diarrhea, nausea and vomiting.   Endocrine: Negative for polydipsia, polyphagia and polyuria.   Genitourinary:  Negative for difficulty urinating, dysuria, flank pain, frequency, hematuria and urgency.   Musculoskeletal:  Negative for arthralgias and neck pain.   Skin:  Negative for color change and rash.   Allergic/Immunologic: Negative for environmental allergies.   Neurological:  Negative for dizziness, syncope and headaches.   Hematological:  Negative for adenopathy.   Psychiatric/Behavioral:  Negative for behavioral problems, hallucinations and self-injury.    All other systems reviewed and are negative.    Objective:     Vital Signs (Most Recent):  Temp: 98.3 °F (36.8 °C) (02/18/24 1140)  Pulse: 70 (02/18/24 1335)  Resp: 20 (02/18/24 1335)  BP: 118/60 (02/18/24 1140)  SpO2: 96 % (02/18/24 1335) Vital Signs (24h Range):  Temp:  [98 °F (36.7 °C)-98.4 °F (36.9 °C)] 98.3 °F (36.8 °C)  Pulse:  [50-89] 70  Resp:  [10-33] 20  SpO2:  [86 %-98 %] 96 %  BP: (111-172)/() 118/60     Weight: 84.3 kg (185 lb 13.6 oz)  Body mass index is 25.92 kg/m².    Intake/Output Summary (Last 24 hours) at 2/18/2024 1556  Last data filed at 2/18/2024 0800  Gross per 24 hour   Intake 165.61 ml   Output 2200 ml   Net -2034.39 ml           Physical Exam  Vitals and nursing note reviewed.   Constitutional:       General: He is not in acute distress.     Appearance: He is well-developed.   HENT:      Head: Normocephalic and atraumatic.      Nose: Nose normal.   Eyes:      Conjunctiva/sclera: Conjunctivae normal.   Cardiovascular:      Rate and Rhythm: Normal rate and regular rhythm.      Heart sounds: Normal heart sounds. No murmur heard.  Pulmonary:      Effort: Pulmonary effort is normal.      Breath  sounds: Normal breath sounds. No wheezing.   Abdominal:      General: Bowel sounds are normal.      Palpations: Abdomen is soft. There is no mass.      Tenderness: There is no abdominal tenderness. There is no guarding or rebound.   Musculoskeletal:         General: Normal range of motion.      Cervical back: Normal range of motion and neck supple.   Skin:     General: Skin is warm and dry.      Findings: No rash.   Neurological:      Mental Status: He is alert and oriented to person, place, and time.   Psychiatric:         Behavior: Behavior normal.             Significant Labs: All pertinent labs within the past 24 hours have been reviewed.  BMP:   Recent Labs   Lab 02/18/24  0349   GLU 99      K 4.4      CO2 21*   BUN 23   CREATININE 1.2   CALCIUM 8.5*   MG 2.4       CBC:   Recent Labs   Lab 02/17/24  0538 02/18/24  0349   WBC 15.46* 11.52   HGB 10.3* 10.1*   HCT 33.1* 32.9*    213         Significant Imaging: I have reviewed all pertinent imaging results/findings within the past 24 hours.  I have reviewed and interpreted all pertinent imaging results/findings within the past 24 hours.    Assessment/Plan:      * Acute respiratory failure with hypoxia  Patient with Hypercapnic and Hypoxic Respiratory failure which is Acute on chronic.  he is on home oxygen at 4 LPM. Supplemental oxygen was provided and noted- Oxygen Concentration (%):  [40-50] 50    .   Signs/symptoms of respiratory failure include- tachypnea, increased work of breathing, and respiratory distress. Contributing diagnoses includes - COPD and COVID  Labs and images were reviewed. Patient Has recent ABG, which has been reviewed. Will treat underlying causes and adjust management of respiratory failure as follows- BiPAP, steroids, breathing treatments, ABX, remdesivir    COVID-19  Patient is identified as High risk for severe complications of COVID 19 based on COVID risk score of 6   Initiate standard COVID protocols; COVID-19  testing ,Infection Control notification  and isolation- respiratory, contact and droplet per protocol    Diagnostics: CBC, CMP, Ferritin, CRP, LDH, BNP, Troponin, and Portable CXR    Management: Initiate targeted therapy with Remdesivir, 200mg IV x1, followed by 100mg IV daily x5 days total and Dexamethasone PO/IV 6mg daily x10 days, Maintain oxygen saturations 92-96% via NIPPV- CPAP;Standby  % FiO2 and monitor with continuous/intermittent pulse oximetry. , Inhaled bronchodilators as needed for shortness of breath., Continuous cardiac monitoring., and Manage respiratory failure (O2 requirement >10LPM or needing NIPPV/Mechanical ventilation) and/or Pneumonia (active chest infiltrates) separately as described below.    Advance Care Planning Current advance care plan has not been discussed with patient/family/POA and patient currently remains Full Code.     NSTEMI (non-ST elevated myocardial infarction)  Patient denies CP  Trop 3.233 > 12.568  Will continue trending  ASA loading dose given  EDP discussed case with cardiology  No plavix d/t 3 vessel dz  Heparin gtt    EKG: sinus rhythm w/ 1st degree AVB, HR 94, left axis deviation, RBBB, no STEMI  Cards consult:  We will recommend medical therapy for NSTEMI at this time.  Given ongoing respiratory failure we will not proceed with invasive approach till his respiratory status completely improves     Echocardiogram for EF and wall motion    Stress test as outpt    Heparin drip for 48 hours, Cont asa and plavix and statin. F/U with cards as outpt.       COPD exacerbation  Patient's COPD is with exacerbation noted by use of accessory muscles for breathing and worsening of baseline hypoxia currently.  Patient is currently on COPD Pathway. Continue scheduled inhalers Steroids, Antibiotics, and Supplemental oxygen and monitor respiratory status closely.     Type 2 diabetes mellitus  Patient's FSGs are controlled on current medication regimen.  Last A1c reviewed-   Lab Results  "  Component Value Date    HGBA1C 5.9 (H) 12/17/2021     Most recent fingerstick glucose reviewed- No results for input(s): "POCTGLUCOSE" in the last 24 hours.  Current correctional scale  Low  Maintain anti-hyperglycemic dose as follows-   Antihyperglycemics (From admission, onward)      Start     Stop Route Frequency Ordered    02/16/24 0118  insulin aspart U-100 pen 0-5 Units         -- SubQ Every 6 hours PRN 02/16/24 0019          Hold Oral hypoglycemics while patient is in the hospital.    Anemia  Patient's anemia is currently controlled. Has not received any PRBCs to date. Etiology likely d/t Iron deficiency  Current CBC reviewed-   Lab Results   Component Value Date    HGB 10.2 (L) 02/15/2024    HCT 33.3 (L) 02/15/2024     Monitor serial CBC and transfuse if patient becomes hemodynamically unstable, symptomatic or H/H drops below 7/21.      VTE Risk Mitigation (From admission, onward)           Ordered     IP VTE HIGH RISK PATIENT  Once         02/15/24 2256     Place sequential compression device  Until discontinued         02/15/24 2250     heparin 25,000 units in dextrose 5% (100 units/ml) IV bolus from bag LOW INTENSITY nomogram - [facility]  As needed (PRN)        Question:  Heparin Infusion Adjustment (DO NOT MODIFY ANSWER)  Answer:  \\[email or web address]\epic\Images\Pharmacy\HeparinInfusions\heparin LOW INTENSITY nomogram for [facility] [id number]    02/15/24 2044     heparin 25,000 units in dextrose 5% (100 units/ml) IV bolus from bag LOW INTENSITY nomogram - [facility]  As needed (PRN)        Question:  Heparin Infusion Adjustment (DO NOT MODIFY ANSWER)  Answer:  \\[email or web address]\epic\Images\Pharmacy\HeparinInfusions\heparin LOW INTENSITY nomogram for [facility] [id number].pdf    02/15/24 2044     heparin 25,000 units in dextrose 5% 250 mL (100 units/mL) infusion LOW INTENSITY nomogram - [facility]  Continuous        Question:  Begin at (units/kg/hr)  Answer:  12    02/15/24 2044                    Discharge Planning   ADAM:      Code Status: Full " Code   Is the patient medically ready for discharge?:     Reason for patient still in hospital (select all that apply): Treatment  Discharge Plan A: Home                  Emeli Wayne MD  Department of [facility]

## 2024-02-18 NOTE — ASSESSMENT & PLAN NOTE
Patient denies CP  Trop 3.233 > 12.568  Will continue trending  ASA loading dose given  EDP discussed case with cardiology  No plavix d/t 3 vessel dz  Heparin gtt    EKG: sinus rhythm w/ 1st degree AVB, HR 94, left axis deviation, RBBB, no STEMI  Cards consult:  We will recommend medical therapy for NSTEMI at this time.  Given ongoing respiratory failure we will not proceed with invasive approach till his respiratory status completely improves     Echocardiogram for EF and wall motion    Stress test as outpt    Heparin drip for 48 hours, Cont asa and plavix and statin. F/U with cards as outpt.

## 2024-02-18 NOTE — PROGRESS NOTES
[place] - Intensive Care  Cardiology  Progress Note    Patient Name: Rajan Deluna  MRN: [id number]  Admission Date: 2/15/2024  Hospital Length of Stay: 3 days  Code Status: Full Code   Attending Physician: Emeli Wayne MD   Primary Care Physician: Jason Mccord MD  Expected Discharge Date:   Principal Problem:Acute respiratory failure with hypoxia    Subjective:     Hospital Course:   2/17/2024 seen and examined this morning.  He denies chest pain.  Reports breathing is better.  Troponins downtrending.  Echocardiogram with preserved EF.    2/18/2024:  seen and examined.  Remains chest pain-free.  He denies any significant symptoms.  Breathing is better.  Oxygen requirement improving.  Interval History:     Review of Systems   Constitutional: Positive for malaise/fatigue.   Cardiovascular:  Positive for dyspnea on exertion.   Respiratory:  Positive for cough and shortness of breath.      Objective:     Vital Signs (Most Recent):  Temp: 98.3 °F (36.8 °C) (02/18/24 1140)  Pulse: 69 (02/18/24 1223)  Resp: 18 (02/18/24 1140)  BP: 118/60 (02/18/24 1140)  SpO2: 96 % (02/18/24 1140) Vital Signs (24h Range):  Temp:  [98 °F (36.7 °C)-98.4 °F (36.9 °C)] 98.3 °F (36.8 °C)  Pulse:  [] 69  Resp:  [10-33] 18  SpO2:  [86 %-98 %] 96 %  BP: (111-172)/() 118/60     Weight: 84.3 kg (185 lb 13.6 oz)  Body mass index is 25.92 kg/m².    SpO2: 96 %         Intake/Output Summary (Last 24 hours) at 2/18/2024 1246  Last data filed at 2/18/2024 0800  Gross per 24 hour   Intake 415.14 ml   Output 2400 ml   Net -1984.86 ml         Lines/Drains/Airways       Peripheral Intravenous Line  Duration                  Peripheral IV - Single Lumen 02/15/24 1833 20 G Right Antecubital 2 days         Peripheral IV - Single Lumen 02/17/24 1255 20 G Anterior;Distal;Right Wrist <1 day                    Physical Exam  Constitutional:       Appearance: He is ill-appearing.   HENT:      Head: Normocephalic and atraumatic.  "  Cardiovascular:      Rate and Rhythm: Normal rate and regular rhythm.      Heart sounds: No murmur heard.  Pulmonary:      Breath sounds: Rhonchi and rales present.      Comments: Prolonged expiratory  Abdominal:      General: Abdomen is flat.      Palpations: Abdomen is soft.   Musculoskeletal:      Right lower leg: No edema.      Left lower leg: No edema.   Neurological:      Mental Status: He is oriented to person, place, and time.   Psychiatric:         Behavior: Behavior normal.         Significant Labs: BMP:   Recent Labs   Lab 02/17/24  0538 02/18/24  0349    99    137   K 4.4 4.4    104   CO2 22* 21*   BUN 23 23   CREATININE 1.2 1.2   CALCIUM 8.5* 8.5*   MG 2.6 2.4     , CMP   Recent Labs   Lab 02/17/24  0538 02/18/24  0349    137   K 4.4 4.4    104   CO2 22* 21*    99   BUN 23 23   CREATININE 1.2 1.2   CALCIUM 8.5* 8.5*   ANIONGAP 12 12     , CBC   Recent Labs   Lab 02/17/24  0538 02/18/24  0349   WBC 15.46* 11.52   HGB 10.3* 10.1*   HCT 33.1* 32.9*    213     , Lipid Panel   No results for input(s): "CHOL", "HDL", "LDLCALC", "TRIG", "CHOLHDL" in the last 48 hours.  , and Troponin   Recent Labs   Lab 02/16/24  1300   TROPONINI 9.385*         Significant Imaging: Echocardiogram: 2D echo with color flow doppler: No results found for this or any previous visit. and Transthoracic echo (TTE) complete (Cupid Only):   Results for orders placed or performed during the hospital encounter of 02/15/24   Echo   Result Value Ref Range    BSA 2.12 m2    Perry's Biplane MOD Ejection Fraction 72 %    LVOT stroke volume 76.89 cm3    LVIDd 4.23 3.5 - 6.0 cm    LV Systolic Volume 26.42 mL    LV Systolic Volume Index 12.6 mL/m2    LVIDs 2.68 2.1 - 4.0 cm    LV Diastolic Volume 79.95 mL    LV Diastolic Volume Index 38.07 mL/m2    IVS 1.22 (A) 0.6 - 1.1 cm    LVOT diameter 2.11 cm    LVOT area 3.5 cm2    FS 37 28 - 44 %    Left Ventricle Relative Wall Thickness 0.49 cm    " "Posterior Wall 1.03 0.6 - 1.1 cm    LV mass 164.01 g    LV Mass Index 78 g/m2    MV Peak E William 0.69 m/s    TDI LATERAL 0.07 m/s    TDI SEPTAL 0.07 m/s    E/E' ratio 9.86 m/s    MV Peak A William 1.27 m/s    TR Max William 2.47 m/s    E/A ratio 0.54     E wave deceleration time 230.02 msec    MV "A" wave duration [id number] msec    LV SEPTAL E/E' RATIO 9.86 m/s    LV LATERAL E/E' RATIO 9.86 m/s    PV Peak S William 0.61 m/s    PV Peak D William 0.35 m/s    Pulm vein S/D ratio 1.74     LVOT peak william 1.10 m/s    Left Ventricular Outflow Tract Mean Velocity 0.74 cm/s    Left Ventricular Outflow Tract Mean Gradient 2.51 mmHg    RVDD 4.30 cm    RV S' 12.95 cm/s    TAPSE 2.03 cm    LA size 3.22 cm    Left Atrium Minor Axis 6.09 cm    Left Atrium Major Axis 5.71 cm    LA volume (mod) 52.79 cm3    LA Volume Index (Mod) 25.1 mL/m2    RA Major Axis 5.32 cm    RA Width 5.00 cm    AV mean gradient 5 mmHg    AV peak gradient 9 mmHg    Ao peak william 1.46 m/s    Ao VTI 32.60 cm    LVOT peak VTI 22.00 cm    AV valve area 2.36 cm²    AV Velocity Ratio 0.75     AV index (prosthetic) 0.67     ANOOP by Velocity Ratio 2.63 cm²    MV peak gradient 6 mmHg    MV stenosis pressure 1/2 time 66.71 ms    MV valve area p 1/2 method 3.30 cm2    MV valve area by continuity eq 3.23 cm2    MV VTI 23.8 cm    Triscuspid Valve Regurgitation Peak Gradient 24 mmHg    PV PEAK VELOCITY 0.91 m/s    PV peak gradient 3 mmHg    Pulmonary Valve Mean Velocity 0.70 m/s    Sinus 3.63 cm    STJ 3.29 cm    Ascending aorta 3.30 cm    IVC diameter 1.96 cm    Mean e' 0.07 m/s    ZLVIDS -3.14     ZLVIDD -4.34     LA Volume Index 30.0 mL/m2    LA volume 62.91 cm3    LA WIDTH 3.9 cm    RV-perkins mid d 3.0 cm    TV resting pulmonary artery pressure 27 mmHg    RV TB RVSP 5 mmHg    Est. RA pres 3 mmHg    EF 65 %    Narrative      Left Ventricle: The left ventricle is normal in size. Normal wall   thickness. There is concentric remodeling. Normal wall motion. Septal   motion is consistent " with bundle branch block. There is normal systolic   function. Ejection fraction by visual approximation is 65%. There is   normal diastolic function. Normal left ventricular filling pressure.    Right Ventricle: Mild right ventricular enlargement. Wall thickness is   normal. Right ventricle wall motion  is normal. Systolic function is   normal.    Right Atrium: Right atrium is moderately dilated.    Tricuspid Valve: There is mild regurgitation.    Pulmonary Artery: The estimated pulmonary artery systolic pressure is   27 mmHg.    IVC/SVC: Normal venous pressure at 3 mmHg.       Assessment and Plan:     Brief HPI:  78 years old male with        NSTEMI  Acute hypoxic respiratory failure on chronic respiratory failure  COVID 19 pneumonia  COPD exacerbation  Anemia/microcytic/iron-deficiency    Plan    NSTEMI.  EKG without significant ischemic changes.  Troponins trending down  We will recommend medical therapy at this time for NSTEMI.      Echocardiogram reviewed.  Preserved EF    Continue aspirin and Plavix  Continue high-intensity statin  Heparin drip for 48 hours and discontinue      Have discussed with him ischemic evaluation.  Angiogram versus noninvasive evaluation.  He prefers to proceed with a stress test over angiogram which is very reasonable given his underlying COVID and respiratory failure.  We will arrange for a PET stress test as an outpatient and follow up after that     Our office will reach out to the patient to arrange the PET stress test      Please call us back with any questions    Active Diagnoses:    Diagnosis Date Noted POA    PRINCIPAL PROBLEM:  Acute respiratory failure with hypoxia [J96.01]  Yes    NSTEMI (non-ST elevated myocardial infarction) [I21.4] 02/15/2024 Yes    COVID-19 [U07.1] 02/15/2024 Yes    COPD exacerbation [J44.1] 10/22/2020 Yes    Type 2 diabetes mellitus [E11.9] 08/27/2020 Yes    Anemia [D64.9] 12/09/2016 Yes      Problems Resolved During this Admission:       VTE Risk  Mitigation (From admission, onward)           Ordered     IP VTE HIGH RISK PATIENT  Once         02/15/24 2256     Place sequential compression device  Until discontinued         02/15/24 2250     heparin 25,000 units in dextrose 5% (100 units/ml) IV bolus from bag LOW INTENSITY nomogram - [facility]  As needed (PRN)        Question:  Heparin Infusion Adjustment (DO NOT MODIFY ANSWER)  Answer:  [email or web address]\epic\Images\Pharmacy\HeparinInfusions\heparin LOW INTENSITY nomogram for [facility] [id number]    02/15/24 2044     heparin 25,000 units in dextrose 5% (100 units/ml) IV bolus from bag LOW INTENSITY nomogram - [facility]  As needed (PRN)        Question:  Heparin Infusion Adjustment (DO NOT MODIFY ANSWER)  Answer:  [email or web address]\epic\Images\Pharmacy\HeparinInfusions\heparin LOW INTENSITY nomogram for [facility] [id number].pdf    02/15/24 2044     heparin 25,000 units in dextrose 5% 250 mL (100 units/mL) infusion LOW INTENSITY nomogram - [facility]  Continuous        Question:  Begin at (units/kg/hr)  Answer:  12    02/15/24 2044                    Jourdan Correa MD  Cardiology  [place] - Intensive Care

## 2024-02-18 NOTE — NURSING TRANSFER
Nursing Transfer Note      2/17/2024   10:14 PM    Nurse giving handoff:KELLY Cevallos  Nurse receiving handoff:RENÉE Rangel    Reason patient is being transferred: Stepdown    Transfer From:  to [address]    Transfer via bed    Transfer with O2, cardiac monitoring    Transported by KELLY Cevallos    Transfer Vital Signs:  Blood Pressure:138/64  Heart Rate:80  O2:96  Temperature:98.3  Respirations:20    Telemetry: Box Number [id number]  Order for Tele Monitor? Yes    Additional Lines: Oxygen    Medicines sent: Mupirocin, Fluticasone and Spiriva Respimat    Patient belongings transferred with patient: Yes    Chart send with patient: Yes    Notified: Rajan Deluna III, son    Upon arrival to floor: cardiac monitor applied, patient oriented to room, call bell in reach, and bed in lowest position

## 2024-02-18 NOTE — SUBJECTIVE & OBJECTIVE
Interval History: no issues voiced. Sob is improved. No chest pain    Review of Systems   Constitutional:  Negative for chills, fatigue and fever.   HENT:  Negative for congestion, ear pain, hearing loss and trouble swallowing.    Eyes:  Negative for pain.   Respiratory:  Negative for cough, chest tightness and shortness of breath.    Cardiovascular:  Negative for chest pain, palpitations and leg swelling.   Gastrointestinal:  Negative for abdominal pain, constipation, diarrhea, nausea and vomiting.   Endocrine: Negative for polydipsia, polyphagia and polyuria.   Genitourinary:  Negative for difficulty urinating, dysuria, flank pain, frequency, hematuria and urgency.   Musculoskeletal:  Negative for arthralgias and neck pain.   Skin:  Negative for color change and rash.   Allergic/Immunologic: Negative for environmental allergies.   Neurological:  Negative for dizziness, syncope and headaches.   Hematological:  Negative for adenopathy.   Psychiatric/Behavioral:  Negative for behavioral problems, hallucinations and self-injury.    All other systems reviewed and are negative.    Objective:     Vital Signs (Most Recent):  Temp: 98.3 °F (36.8 °C) (02/18/24 1140)  Pulse: 70 (02/18/24 1335)  Resp: 20 (02/18/24 1335)  BP: 118/60 (02/18/24 1140)  SpO2: 96 % (02/18/24 1335) Vital Signs (24h Range):  Temp:  [98 °F (36.7 °C)-98.4 °F (36.9 °C)] 98.3 °F (36.8 °C)  Pulse:  [50-89] 70  Resp:  [10-33] 20  SpO2:  [86 %-98 %] 96 %  BP: (111-172)/() 118/60     Weight: 84.3 kg (185 lb 13.6 oz)  Body mass index is 25.92 kg/m².    Intake/Output Summary (Last 24 hours) at 2/18/2024 1556  Last data filed at 2/18/2024 0800  Gross per 24 hour   Intake 165.61 ml   Output 2200 ml   Net -2034.39 ml           Physical Exam  Vitals and nursing note reviewed.   Constitutional:       General: He is not in acute distress.     Appearance: He is well-developed.   HENT:      Head: Normocephalic and atraumatic.      Nose: Nose normal.   Eyes:       Conjunctiva/sclera: Conjunctivae normal.   Cardiovascular:      Rate and Rhythm: Normal rate and regular rhythm.      Heart sounds: Normal heart sounds. No murmur heard.  Pulmonary:      Effort: Pulmonary effort is normal.      Breath sounds: Normal breath sounds. No wheezing.   Abdominal:      General: Bowel sounds are normal.      Palpations: Abdomen is soft. There is no mass.      Tenderness: There is no abdominal tenderness. There is no guarding or rebound.   Musculoskeletal:         General: Normal range of motion.      Cervical back: Normal range of motion and neck supple.   Skin:     General: Skin is warm and dry.      Findings: No rash.   Neurological:      Mental Status: He is alert and oriented to person, place, and time.   Psychiatric:         Behavior: Behavior normal.             Significant Labs: All pertinent labs within the past 24 hours have been reviewed.  BMP:   Recent Labs   Lab 02/18/24  0349   GLU 99      K 4.4      CO2 21*   BUN 23   CREATININE 1.2   CALCIUM 8.5*   MG 2.4       CBC:   Recent Labs   Lab 02/17/24  0538 02/18/24  0349   WBC 15.46* 11.52   HGB 10.3* 10.1*   HCT 33.1* 32.9*    213         Significant Imaging: I have reviewed all pertinent imaging results/findings within the past 24 hours.  I have reviewed and interpreted all pertinent imaging results/findings within the past 24 hours.

## 2024-02-19 LAB
ANION GAP SERPL CALC-SCNC: 9 MMOL/L (ref 8–16)
APTT PPP: 30.4 SEC (ref 21–32)
BACTERIA SPEC AEROBE CULT: NORMAL
BACTERIA SPEC AEROBE CULT: NORMAL
BASOPHILS # BLD AUTO: 0.01 K/UL (ref 0–0.2)
BASOPHILS NFR BLD: 0.1 % (ref 0–1.9)
BUN SERPL-MCNC: 28 MG/DL (ref 8–23)
CALCIUM SERPL-MCNC: 8.7 MG/DL (ref 8.7–10.5)
CHLORIDE SERPL-SCNC: 102 MMOL/L (ref 95–110)
CO2 SERPL-SCNC: 23 MMOL/L (ref 23–29)
CREAT SERPL-MCNC: 1.5 MG/DL (ref 0.5–1.4)
DIFFERENTIAL METHOD BLD: ABNORMAL
EOSINOPHIL # BLD AUTO: 0 K/UL (ref 0–0.5)
EOSINOPHIL NFR BLD: 0 % (ref 0–8)
ERYTHROCYTE [DISTWIDTH] IN BLOOD BY AUTOMATED COUNT: 17.9 % (ref 11.5–14.5)
EST. GFR  (NO RACE VARIABLE): 47 ML/MIN/1.73 M^2
FERRITIN SERPL-MCNC: 24 NG/ML (ref 20–300)
GLUCOSE SERPL-MCNC: 110 MG/DL (ref 70–110)
GRAM STN SPEC: NORMAL
HCT VFR BLD AUTO: 34.1 % (ref 40–54)
HGB BLD-MCNC: 10.6 G/DL (ref 14–18)
IMM GRANULOCYTES # BLD AUTO: 0.02 K/UL (ref 0–0.04)
IMM GRANULOCYTES NFR BLD AUTO: 0.2 % (ref 0–0.5)
LDH SERPL L TO P-CCNC: 219 U/L (ref 110–260)
LYMPHOCYTES # BLD AUTO: 1.4 K/UL (ref 1–4.8)
LYMPHOCYTES NFR BLD: 15.3 % (ref 18–48)
MAGNESIUM SERPL-MCNC: 2.2 MG/DL (ref 1.6–2.6)
MCH RBC QN AUTO: 22.8 PG (ref 27–31)
MCHC RBC AUTO-ENTMCNC: 31.1 G/DL (ref 32–36)
MCV RBC AUTO: 74 FL (ref 82–98)
MONOCYTES # BLD AUTO: 0.6 K/UL (ref 0.3–1)
MONOCYTES NFR BLD: 6.9 % (ref 4–15)
NEUTROPHILS # BLD AUTO: 7 K/UL (ref 1.8–7.7)
NEUTROPHILS NFR BLD: 77.5 % (ref 38–73)
NRBC BLD-RTO: 0 /100 WBC
PHOSPHATE SERPL-MCNC: 3.1 MG/DL (ref 2.7–4.5)
PLATELET # BLD AUTO: 222 K/UL (ref 150–450)
PMV BLD AUTO: 10.1 FL (ref 9.2–12.9)
POCT GLUCOSE: 106 MG/DL (ref 70–110)
POCT GLUCOSE: 106 MG/DL (ref 70–110)
POCT GLUCOSE: 138 MG/DL (ref 70–110)
POCT GLUCOSE: 149 MG/DL (ref 70–110)
POTASSIUM SERPL-SCNC: 4.7 MMOL/L (ref 3.5–5.1)
RBC # BLD AUTO: 4.64 M/UL (ref 4.6–6.2)
SODIUM SERPL-SCNC: 134 MMOL/L (ref 136–145)
WBC # BLD AUTO: 8.98 K/UL (ref 3.9–12.7)

## 2024-02-19 PROCEDURE — 97165 OT EVAL LOW COMPLEX 30 MIN: CPT

## 2024-02-19 PROCEDURE — 25000003 PHARM REV CODE 250: Performed by: NURSE PRACTITIONER

## 2024-02-19 PROCEDURE — 85025 COMPLETE CBC W/AUTO DIFF WBC: CPT | Performed by: EMERGENCY MEDICINE

## 2024-02-19 PROCEDURE — 11000001 HC ACUTE MED/SURG PRIVATE ROOM

## 2024-02-19 PROCEDURE — 63600175 PHARM REV CODE 636 W HCPCS: Mod: JZ,TB | Performed by: HOSPITALIST

## 2024-02-19 PROCEDURE — 97161 PT EVAL LOW COMPLEX 20 MIN: CPT

## 2024-02-19 PROCEDURE — 25000003 PHARM REV CODE 250: Performed by: FAMILY MEDICINE

## 2024-02-19 PROCEDURE — 99900035 HC TECH TIME PER 15 MIN (STAT)

## 2024-02-19 PROCEDURE — 25000003 PHARM REV CODE 250

## 2024-02-19 PROCEDURE — 85730 THROMBOPLASTIN TIME PARTIAL: CPT | Performed by: EMERGENCY MEDICINE

## 2024-02-19 PROCEDURE — 94761 N-INVAS EAR/PLS OXIMETRY MLT: CPT

## 2024-02-19 PROCEDURE — 97535 SELF CARE MNGMENT TRAINING: CPT

## 2024-02-19 PROCEDURE — 63600175 PHARM REV CODE 636 W HCPCS: Performed by: FAMILY MEDICINE

## 2024-02-19 PROCEDURE — 63600175 PHARM REV CODE 636 W HCPCS: Performed by: NURSE PRACTITIONER

## 2024-02-19 PROCEDURE — 27000646 HC AEROBIKA DEVICE

## 2024-02-19 PROCEDURE — 97530 THERAPEUTIC ACTIVITIES: CPT

## 2024-02-19 PROCEDURE — 36415 COLL VENOUS BLD VENIPUNCTURE: CPT | Mod: XB | Performed by: NURSE PRACTITIONER

## 2024-02-19 PROCEDURE — 80048 BASIC METABOLIC PNL TOTAL CA: CPT | Performed by: NURSE PRACTITIONER

## 2024-02-19 PROCEDURE — 83735 ASSAY OF MAGNESIUM: CPT | Performed by: NURSE PRACTITIONER

## 2024-02-19 PROCEDURE — 82728 ASSAY OF FERRITIN: CPT | Performed by: NURSE PRACTITIONER

## 2024-02-19 PROCEDURE — 97116 GAIT TRAINING THERAPY: CPT

## 2024-02-19 PROCEDURE — 94640 AIRWAY INHALATION TREATMENT: CPT

## 2024-02-19 PROCEDURE — 25000242 PHARM REV CODE 250 ALT 637 W/ HCPCS: Performed by: NURSE PRACTITIONER

## 2024-02-19 PROCEDURE — 25000242 PHARM REV CODE 250 ALT 637 W/ HCPCS

## 2024-02-19 PROCEDURE — 83615 LACTATE (LD) (LDH) ENZYME: CPT | Performed by: NURSE PRACTITIONER

## 2024-02-19 PROCEDURE — 94664 DEMO&/EVAL PT USE INHALER: CPT

## 2024-02-19 PROCEDURE — 27000221 HC OXYGEN, UP TO 24 HOURS

## 2024-02-19 PROCEDURE — 84100 ASSAY OF PHOSPHORUS: CPT | Performed by: NURSE PRACTITIONER

## 2024-02-19 PROCEDURE — 27000207 HC ISOLATION

## 2024-02-19 PROCEDURE — 25000003 PHARM REV CODE 250: Performed by: HOSPITALIST

## 2024-02-19 RX ORDER — BENZONATATE 100 MG/1
100 CAPSULE ORAL 3 TIMES DAILY
Status: DISCONTINUED | OUTPATIENT
Start: 2024-02-19 | End: 2024-02-22 | Stop reason: HOSPADM

## 2024-02-19 RX ADMIN — IPRATROPIUM BROMIDE AND ALBUTEROL SULFATE 3 ML: 2.5; .5 SOLUTION RESPIRATORY (INHALATION) at 08:02

## 2024-02-19 RX ADMIN — BENZONATATE 100 MG: 100 CAPSULE ORAL at 04:02

## 2024-02-19 RX ADMIN — GUAIFENESIN 200 MG: 200 SOLUTION ORAL at 02:02

## 2024-02-19 RX ADMIN — FLUTICASONE FUROATE AND VILANTEROL TRIFENATATE 1 PUFF: 200; 25 POWDER RESPIRATORY (INHALATION) at 01:02

## 2024-02-19 RX ADMIN — DEXAMETHASONE 6 MG: 4 TABLET ORAL at 09:02

## 2024-02-19 RX ADMIN — MUPIROCIN: 20 OINTMENT TOPICAL at 09:02

## 2024-02-19 RX ADMIN — MULTIPLE VITAMINS W/ MINERALS TAB 1 TABLET: TAB at 09:02

## 2024-02-19 RX ADMIN — CEFTRIAXONE SODIUM 1 G: 1 INJECTION, POWDER, FOR SOLUTION INTRAMUSCULAR; INTRAVENOUS at 06:02

## 2024-02-19 RX ADMIN — BENZONATATE 100 MG: 100 CAPSULE ORAL at 11:02

## 2024-02-19 RX ADMIN — ASPIRIN 81 MG CHEWABLE TABLET 81 MG: 81 TABLET CHEWABLE at 09:02

## 2024-02-19 RX ADMIN — PREGABALIN 100 MG: 50 CAPSULE ORAL at 11:02

## 2024-02-19 RX ADMIN — ATORVASTATIN CALCIUM 80 MG: 40 TABLET, FILM COATED ORAL at 09:02

## 2024-02-19 RX ADMIN — MUPIROCIN: 20 OINTMENT TOPICAL at 11:02

## 2024-02-19 RX ADMIN — SODIUM CHLORIDE SOLN NEBU 3% 4 ML: 3 NEBU SOLN at 08:02

## 2024-02-19 RX ADMIN — CLOPIDOGREL BISULFATE 75 MG: 75 TABLET ORAL at 09:02

## 2024-02-19 RX ADMIN — PREGABALIN 100 MG: 50 CAPSULE ORAL at 02:02

## 2024-02-19 RX ADMIN — IPRATROPIUM BROMIDE AND ALBUTEROL SULFATE 3 ML: 2.5; .5 SOLUTION RESPIRATORY (INHALATION) at 01:02

## 2024-02-19 RX ADMIN — REMDESIVIR 100 MG: 100 INJECTION, POWDER, LYOPHILIZED, FOR SOLUTION INTRAVENOUS at 09:02

## 2024-02-19 RX ADMIN — TIOTROPIUM BROMIDE INHALATION SPRAY 2 PUFF: 3.12 SPRAY, METERED RESPIRATORY (INHALATION) at 01:02

## 2024-02-19 RX ADMIN — PREGABALIN 100 MG: 50 CAPSULE ORAL at 09:02

## 2024-02-19 NOTE — PLAN OF CARE
unable to meet with patient via [employer] monitor, since no monitor in the room. I spoke with patient via phone to discuss discharge needs. Therapy recommends Home Health (low intensity therapy). Patient is aware and agreeable. He is currently staying at the [facility] in [facility] since house is being working on.  sent Home Health referral to [facility] via [employer]. Patient encouraged to call with any questions or concerns.  will continue to follow patient through transitions of care and assist with any discharge needs.     Patient Contacts    Name Relation Home Work Mobile   Rajan Deluna iii [phone]     Carmencita Lofton [phone]     Brannon Deluna Brother [phone]       Future Appointments   Date Time Provider Department Center   6/20/2024 12:30 PM CARDIAC, PET IMAGING [facility]         02/19/24 1352   Discharge Reassessment   Assessment Type Discharge Planning Reassessment   Did the patient's condition or plan change since previous assessment? No   Discharge Plan discussed with: Patient   Discharge Plan A Home   Discharge Plan B Home with family   DME Needed Upon Discharge  none;bath bench   Transition of Care Barriers None   Why the patient remains in the hospital Requires continued medical care   Post-Acute Status   Post-Acute Authorization Home Health   Hospital Resources/Appts/Education Provided Appointments scheduled and added to AVS   Discharge Delays None known at this time     Angelic Mcintosh RN    [phone]

## 2024-02-19 NOTE — PROGRESS NOTES
[facility] Medicine  Progress Note    Patient Name: Rajan Deluna  MRN: [id number]  Patient Class: IP- Inpatient   Admission Date: 2/15/2024  Length of Stay: 4 days  Attending Physician: Eran Morales MD  Primary Care Provider: Jason Mccord MD        Subjective:     Principal Problem:Acute respiratory failure with hypoxia        HPI:  Rajan Deluna is a 78 y.o. male who  has a past medical history of Arthritis, COPD (chronic obstructive pulmonary disease), Diabetes mellitus, Digestive disorder, Encounter for blood transfusion, Hypertension, Renal disorder, Smoker, and Weakness.     The patient presents to the ED due to shortness of breath.  Patient was at the hotel where he lives when he began to feel short of breath approximately 6 hours ago.  He has a history of COPD/chronic bronchitis and uses 4 L of oxygen at baseline.  EMS reports that he was hypoxic in the 70s on his home oxygen.  He received 125 mg of IV Solu-Medrol and a DuoNeb and was transported here. Patient reports cough with chest tightness, fatigue, and chills.  He denies CP, fever, wheezing, palpitations, lightheadedness, dizziness.    Overview/Hospital Course:  Pt admitted and off from bipap. Cont on iv abx, nebs, steroids, and remdesvir. Still on heparin drip for NSTEMi. Cards consulted and recommend medical therapy for NSTEMI at this time. Given ongoing respiratory failure we will not proceed with invasive approach till his respiratory status completely improves   2/17/24: pt on 8LPM. Home oxygen of 4LPm. Heparin drip for 48 hours, Cont asa and plavix and statin. F/U with cards as outpt.   2/18: heparin drip done. Cards plan for outpt stress test. Pt now on 4.5 LPM. Sob improving    Interval History:   Seen and examined with notion of cough, productive, stated has been ambulating, no CP or SOB    Review of Systems   Constitutional:  Negative for activity change, appetite change, chills and fever.   HENT:  Positive for  congestion.    Respiratory:  Positive for cough. Negative for chest tightness, shortness of breath and wheezing.    Cardiovascular:  Negative for chest pain, palpitations and leg swelling.   Gastrointestinal:  Negative for abdominal pain, nausea and vomiting.   Musculoskeletal:  Negative for back pain and gait problem.   Neurological:  Negative for dizziness, weakness, numbness and headaches.   Psychiatric/Behavioral:  Negative for agitation, confusion and hallucinations.    All other systems reviewed and are negative.    Objective:     Vital Signs (Most Recent):  Temp: 98.1 °F (36.7 °C) (02/19/24 1151)  Pulse: 79 (02/19/24 1352)  Resp: 18 (02/19/24 1352)  BP: 104/62 (02/19/24 1151)  SpO2: 96 % (02/19/24 1352) Vital Signs (24h Range):  Temp:  [97.6 °F (36.4 °C)-98.1 °F (36.7 °C)] 98.1 °F (36.7 °C)  Pulse:  [53-90] 79  Resp:  [16-20] 18  SpO2:  [93 %-100 %] 96 %  BP: (104-125)/(60-85) 104/62     Weight: 84.3 kg (185 lb 13.6 oz)  Body mass index is 25.92 kg/m².    Intake/Output Summary (Last 24 hours) at 2/19/2024 1639  Last data filed at 2/19/2024 0944  Gross per 24 hour   Intake 796.83 ml   Output 1 ml   Net 795.83 ml         Physical Exam  Vitals and nursing note reviewed.   Constitutional:       General: He is not in acute distress.     Appearance: He is not toxic-appearing.   HENT:      Head: Normocephalic and atraumatic.      Nose: No congestion.      Mouth/Throat:      Mouth: Mucous membranes are moist.      Pharynx: No oropharyngeal exudate.   Eyes:      Extraocular Movements: Extraocular movements intact.      Pupils: Pupils are equal, round, and reactive to light.   Cardiovascular:      Rate and Rhythm: Normal rate and regular rhythm.      Heart sounds: No murmur heard.     No friction rub. No gallop.   Pulmonary:      Effort: Pulmonary effort is normal. No respiratory distress.      Breath sounds: No wheezing or rales.   Chest:      Chest wall: No tenderness.   Abdominal:      General: Bowel sounds are  "normal. There is no distension.      Palpations: Abdomen is soft.      Tenderness: There is no abdominal tenderness. There is no guarding or rebound.   Musculoskeletal:         General: No swelling or tenderness.      Cervical back: No rigidity or tenderness.      Right lower leg: No edema.      Left lower leg: No edema.   Neurological:      General: No focal deficit present.      Mental Status: He is alert and oriented to person, place, and time.      Cranial Nerves: No cranial nerve deficit.      Motor: No weakness.      Coordination: Coordination normal.   Psychiatric:         Thought Content: Thought content normal.             Significant Labs: All pertinent labs within the past 24 hours have been reviewed.  A1C:   Recent Labs   Lab 02/16/24  0348   HGBA1C 5.9*     BMP:   Recent Labs   Lab 02/19/24  0310      *   K 4.7      CO2 23   BUN 28*   CREATININE 1.5*   CALCIUM 8.7   MG 2.2     CBC:   Recent Labs   Lab 02/18/24  0349 02/19/24  0310   WBC 11.52 8.98   HGB 10.1* 10.6*   HCT 32.9* 34.1*    222     CMP:   Recent Labs   Lab 02/18/24  0349 02/19/24  0310    134*   K 4.4 4.7    102   CO2 21* 23   GLU 99 110   BUN 23 28*   CREATININE 1.2 1.5*   CALCIUM 8.5* 8.7   ANIONGAP 12 9     Cardiac Markers: No results for input(s): "CKMB", "MYOGLOBIN", "BNP", "TROPISTAT" in the last 48 hours.  Coagulation:   Recent Labs   Lab 02/19/24  0310   APTT 30.4     Lactic Acid: No results for input(s): "LACTATE" in the last 48 hours.  Lipid Panel: No results for input(s): "CHOL", "HDL", "LDLCALC", "TRIG", "CHOLHDL" in the last 48 hours.  Magnesium:   Recent Labs   Lab 02/18/24  0349 02/19/24  0310   MG 2.4 2.2     Respiratory Culture: No results for input(s): "GSRESP", "RESPIRATORYC" in the last 48 hours.  Urine Culture: No results for input(s): "LABURIN" in the last 48 hours.  Recent Lab Results         02/19/24  1151   02/19/24  0540   02/19/24  0310   02/18/24  0316        Anion Gap     9   "       PTT     30.4  Comment: Refer to local heparin nomogram for intensity/dose specific   therapeutic   range.  LOT^050^APTT FSL^[id number]           Baso #     0.01         Basophil %     0.1         BUN     28         Calcium     8.7         Chloride     102         CO2     23         Creatinine     1.5         Differential Method     Automated         eGFR     47         Eos #     0.0         Eos %     0.0         Glucose     110         Gran # (ANC)     7.0         Gran %     77.5         Hematocrit     34.1         Hemoglobin     10.6         Immature Grans (Abs)     0.02  Comment: Mild elevation in immature granulocytes is non specific and   can be seen in a variety of conditions including stress response,   acute inflammation, trauma and pregnancy. Correlation with other   laboratory and clinical findings is essential.           Immature Granulocytes     0.2         Lymph #     1.4         Lymph %     15.3         Magnesium      2.2         MCH     22.8         MCHC     31.1         MCV     74         Mono #     0.6         Mono %     6.9         MPV     10.1         nRBC     0         Phosphorus Level     3.1         Platelet Count     222         POCT Glucose 106   106     114       Potassium     4.7         RBC     4.64         RDW     17.9         Sodium     134         WBC     8.98                 Significant Imaging: I have reviewed all pertinent imaging results/findings within the past 24 hours.    Assessment/Plan:      * Acute respiratory failure with hypoxia  Patient with Hypercapnic and Hypoxic Respiratory failure which is Acute on chronic.  he is on home oxygen at 4 LPM. Supplemental oxygen was provided and noted- Oxygen Concentration (%):  [40-50] 50    .   Signs/symptoms of respiratory failure include- tachypnea, increased work of breathing, and respiratory distress. Contributing diagnoses includes - COPD and COVID  Labs and images were reviewed. Patient Has recent ABG, which has been reviewed. Will  treat underlying causes and adjust management of respiratory failure as follows- BiPAP, steroids, breathing treatments, ABX, remdesivir    COVID-19  Patient is identified as High risk for severe complications of COVID 19 based on COVID risk score of 6   Initiate standard COVID protocols; COVID-19 testing ,Infection Control notification  and isolation- respiratory, contact and droplet per protocol    Diagnostics: CBC, CMP, Ferritin, CRP, LDH, BNP, Troponin, and Portable CXR    Management: Initiate targeted therapy with Remdesivir, 200mg IV x1, followed by 100mg IV daily x5 days total and Dexamethasone PO/IV 6mg daily x10 days, Maintain oxygen saturations 92-96% via NIPPV- CPAP;Standby  % FiO2 and monitor with continuous/intermittent pulse oximetry. , Inhaled bronchodilators as needed for shortness of breath., Continuous cardiac monitoring., and Manage respiratory failure (O2 requirement >10LPM or needing NIPPV/Mechanical ventilation) and/or Pneumonia (active chest infiltrates) separately as described below.    Advance Care Planning Current advance care plan has not been discussed with patient/family/POA and patient currently remains Full Code.     NSTEMI (non-ST elevated myocardial infarction)  Patient denies CP  Trop 3.233 > 12.568  Will continue trending  ASA loading dose given  EDP discussed case with cardiology  No plavix d/t 3 vessel dz  Heparin gtt    EKG: sinus rhythm w/ 1st degree AVB, HR 94, left axis deviation, RBBB, no STEMI  Cards consult:  We will recommend medical therapy for NSTEMI at this time.  Given ongoing respiratory failure we will not proceed with invasive approach till his respiratory status completely improves     Echocardiogram for EF and wall motion    Stress test as outpt    Heparin drip for 48 hours, Cont asa and plavix and statin. F/U with cards as outpt.       COPD exacerbation  Patient's COPD is with exacerbation noted by use of accessory muscles for breathing and worsening of baseline  hypoxia currently.  Patient is currently on COPD Pathway. Continue scheduled inhalers Steroids, Antibiotics, and Supplemental oxygen and monitor respiratory status closely.     Type 2 diabetes mellitus  Patient's FSGs are controlled on current medication regimen.  Last A1c reviewed-   Lab Results   Component Value Date    HGBA1C 5.9 (H) 02/16/2024     Most recent fingerstick glucose reviewed-   Recent Labs   Lab 02/18/24  2055 02/19/24  0540 02/19/24  1151   POCTGLUCOSE 114* 106 106     Current correctional scale  Low  Maintain anti-hyperglycemic dose as follows-   Antihyperglycemics (From admission, onward)      Start     Stop Route Frequency Ordered    02/16/24 0118  insulin aspart U-100 pen 0-5 Units         -- SubQ Every 6 hours PRN 02/16/24 0019          Hold Oral hypoglycemics while patient is in the hospital.    COPD (chronic obstructive pulmonary disease)  Patient's COPD is controlled currently.  Patient is currently off COPD Pathway. Continue scheduled inhalers Antibiotics and Supplemental oxygen and monitor respiratory status closely.     Anemia  Patient's anemia is currently controlled. Has not received any PRBCs to date. Etiology likely d/t Iron deficiency  Current CBC reviewed-   Lab Results   Component Value Date    HGB 10.2 (L) 02/15/2024    HCT 33.3 (L) 02/15/2024     Monitor serial CBC and transfuse if patient becomes hemodynamically unstable, symptomatic or H/H drops below 7/21.      VTE Risk Mitigation (From admission, onward)           Ordered     IP VTE HIGH RISK PATIENT  Once         02/15/24 2256     Place sequential compression device  Until discontinued         02/15/24 2250     heparin 25,000 units in dextrose 5% (100 units/ml) IV bolus from bag LOW INTENSITY nomogram - [facility]  As needed (PRN)        Question:  Heparin Infusion Adjustment (DO NOT MODIFY ANSWER)  Answer:  [email or web address]\epic\Images\Pharmacy\HeparinInfusions\heparin LOW INTENSITY nomogram for [facility] [id number]    02/15/24 2044      heparin 25,000 units in dextrose 5% (100 units/ml) IV bolus from bag LOW INTENSITY nomogram - [facility]  As needed (PRN)        Question:  Heparin Infusion Adjustment (DO NOT MODIFY ANSWER)  Answer:  [email or web address]\epic\Images\Pharmacy\HeparinInfusions\heparin LOW INTENSITY nomogram for [facility] [id number].pdf    02/15/24 2044     heparin 25,000 units in dextrose 5% 250 mL (100 units/mL) infusion LOW INTENSITY nomogram - [facility]  Continuous        Question:  Begin at (units/kg/hr)  Answer:  12    02/15/24 2044                    Discharge Planning   ADAM:      Code Status: Full Code   Is the patient medically ready for discharge?:     Reason for patient still in hospital (select all that apply): Patient trending condition and Treatment  Discharge Plan A: Home   Discharge Delays: None known at this time      Time spent > 35 min        Eran Morales MD  Department of Hospital Medicine   [facility]

## 2024-02-19 NOTE — PLAN OF CARE
Patient on oxygen in no apparent distress, given aerosoland MDI treatments, no adverse reactions will continue to monitor.

## 2024-02-19 NOTE — SUBJECTIVE & OBJECTIVE
Interval History:   Seen and examined with notion of cough, productive, stated has been ambulating, no CP or SOB    Review of Systems   Constitutional:  Negative for activity change, appetite change, chills and fever.   HENT:  Positive for congestion.    Respiratory:  Positive for cough. Negative for chest tightness, shortness of breath and wheezing.    Cardiovascular:  Negative for chest pain, palpitations and leg swelling.   Gastrointestinal:  Negative for abdominal pain, nausea and vomiting.   Musculoskeletal:  Negative for back pain and gait problem.   Neurological:  Negative for dizziness, weakness, numbness and headaches.   Psychiatric/Behavioral:  Negative for agitation, confusion and hallucinations.    All other systems reviewed and are negative.    Objective:     Vital Signs (Most Recent):  Temp: 98.1 °F (36.7 °C) (02/19/24 1151)  Pulse: 79 (02/19/24 1352)  Resp: 18 (02/19/24 1352)  BP: 104/62 (02/19/24 1151)  SpO2: 96 % (02/19/24 1352) Vital Signs (24h Range):  Temp:  [97.6 °F (36.4 °C)-98.1 °F (36.7 °C)] 98.1 °F (36.7 °C)  Pulse:  [53-90] 79  Resp:  [16-20] 18  SpO2:  [93 %-100 %] 96 %  BP: (104-125)/(60-85) 104/62     Weight: 84.3 kg (185 lb 13.6 oz)  Body mass index is 25.92 kg/m².    Intake/Output Summary (Last 24 hours) at 2/19/2024 1639  Last data filed at 2/19/2024 0944  Gross per 24 hour   Intake 796.83 ml   Output 1 ml   Net 795.83 ml         Physical Exam  Vitals and nursing note reviewed.   Constitutional:       General: He is not in acute distress.     Appearance: He is not toxic-appearing.   HENT:      Head: Normocephalic and atraumatic.      Nose: No congestion.      Mouth/Throat:      Mouth: Mucous membranes are moist.      Pharynx: No oropharyngeal exudate.   Eyes:      Extraocular Movements: Extraocular movements intact.      Pupils: Pupils are equal, round, and reactive to light.   Cardiovascular:      Rate and Rhythm: Normal rate and regular rhythm.      Heart sounds: No murmur heard.     " No friction rub. No gallop.   Pulmonary:      Effort: Pulmonary effort is normal. No respiratory distress.      Breath sounds: No wheezing or rales.   Chest:      Chest wall: No tenderness.   Abdominal:      General: Bowel sounds are normal. There is no distension.      Palpations: Abdomen is soft.      Tenderness: There is no abdominal tenderness. There is no guarding or rebound.   Musculoskeletal:         General: No swelling or tenderness.      Cervical back: No rigidity or tenderness.      Right lower leg: No edema.      Left lower leg: No edema.   Neurological:      General: No focal deficit present.      Mental Status: He is alert and oriented to person, place, and time.      Cranial Nerves: No cranial nerve deficit.      Motor: No weakness.      Coordination: Coordination normal.   Psychiatric:         Thought Content: Thought content normal.             Significant Labs: All pertinent labs within the past 24 hours have been reviewed.  A1C:   Recent Labs   Lab 02/16/24  0348   HGBA1C 5.9*     BMP:   Recent Labs   Lab 02/19/24  0310      *   K 4.7      CO2 23   BUN 28*   CREATININE 1.5*   CALCIUM 8.7   MG 2.2     CBC:   Recent Labs   Lab 02/18/24  0349 02/19/24  0310   WBC 11.52 8.98   HGB 10.1* 10.6*   HCT 32.9* 34.1*    222     CMP:   Recent Labs   Lab 02/18/24  0349 02/19/24  0310    134*   K 4.4 4.7    102   CO2 21* 23   GLU 99 110   BUN 23 28*   CREATININE 1.2 1.5*   CALCIUM 8.5* 8.7   ANIONGAP 12 9     Cardiac Markers: No results for input(s): "CKMB", "MYOGLOBIN", "BNP", "TROPISTAT" in the last 48 hours.  Coagulation:   Recent Labs   Lab 02/19/24  0310   APTT 30.4     Lactic Acid: No results for input(s): "LACTATE" in the last 48 hours.  Lipid Panel: No results for input(s): "CHOL", "HDL", "LDLCALC", "TRIG", "CHOLHDL" in the last 48 hours.  Magnesium:   Recent Labs   Lab 02/18/24  0349 02/19/24  0310   MG 2.4 2.2     Respiratory Culture: No results for input(s): " ""GSRESP", "RESPIRATORYC" in the last 48 hours.  Urine Culture: No results for input(s): "LABURIN" in the last 48 hours.  Recent Lab Results         02/19/24  1151   02/19/24  0540   02/19/24  0310   02/18/24  2055        Anion Gap     9         PTT     30.4  Comment: Refer to local heparin nomogram for intensity/dose specific   therapeutic   range.  LOT^050^APTT FSL^[id number]           Baso #     0.01         Basophil %     0.1         BUN     28         Calcium     8.7         Chloride     102         CO2     23         Creatinine     1.5         Differential Method     Automated         eGFR     47         Eos #     0.0         Eos %     0.0         Glucose     110         Gran # (ANC)     7.0         Gran %     77.5         Hematocrit     34.1         Hemoglobin     10.6         Immature Grans (Abs)     0.02  Comment: Mild elevation in immature granulocytes is non specific and   can be seen in a variety of conditions including stress response,   acute inflammation, trauma and pregnancy. Correlation with other   laboratory and clinical findings is essential.           Immature Granulocytes     0.2         Lymph #     1.4         Lymph %     15.3         Magnesium      2.2         MCH     22.8         MCHC     31.1         MCV     74         Mono #     0.6         Mono %     6.9         MPV     10.1         nRBC     0         Phosphorus Level     3.1         Platelet Count     222         POCT Glucose 106   106     114       Potassium     4.7         RBC     4.64         RDW     17.9         Sodium     134         WBC     8.98                 Significant Imaging: I have reviewed all pertinent imaging results/findings within the past 24 hours.  "

## 2024-02-19 NOTE — PLAN OF CARE
Problem: Occupational Therapy  Goal: Occupational Therapy Goal  Description: Goals to be met by: 2/28/2024     Patient will increase functional independence with ADLs by performing:    UE Dressing with Modified [place].  LE Dressing with Modified [place].  Grooming while standing at sink with Modified [place].  Toileting from toilet with Modified [place] for hygiene and clothing management.   Toilet transfer to toilet with Modified [place].  Upper extremity exercise program x 10 reps per handout, with independence.  Implementation of energy conservation tech as needed with ADL and fx mobility.    Outcome: Ongoing, Progressing

## 2024-02-19 NOTE — PLAN OF CARE
Problem: Adult Inpatient Plan of Care  Goal: Plan of Care Review  Outcome: Ongoing, Progressing  Goal: Patient-Specific Goal (Individualized)  Outcome: Ongoing, Progressing  Goal: Absence of Hospital-Acquired Illness or Injury  Outcome: Ongoing, Progressing  Goal: Optimal Comfort and Wellbeing  Outcome: Ongoing, Progressing  Goal: Readiness for Transition of Care  Outcome: Ongoing, Progressing     Problem: Cardiac-Related Pain (Acute Coronary Syndrome)  Goal: Absence of Cardiac-Related Pain  Outcome: Ongoing, Progressing     Problem: Bleeding (Cardiac Catheterization)  Goal: Absence of Bleeding  Outcome: Ongoing, Progressing     Problem: Respiratory Compromise COPD (Chronic Obstructive Pulmonary Disease)  Goal: Effective Oxygenation and Ventilation  Outcome: Ongoing, Progressing     Problem: Fall Injury Risk  Goal: Absence of Fall and Fall-Related Injury  Outcome: Ongoing, Progressing     Problem: Skin Injury Risk Increased  Goal: Skin Health and Integrity  Outcome: Ongoing, Progressing

## 2024-02-19 NOTE — ASSESSMENT & PLAN NOTE
Patient's FSGs are controlled on current medication regimen.  Last A1c reviewed-   Lab Results   Component Value Date    HGBA1C 5.9 (H) 02/16/2024     Most recent fingerstick glucose reviewed-   Recent Labs   Lab 02/18/24 2055 02/19/24  0540 02/19/24  1151   POCTGLUCOSE 114* 106 106     Current correctional scale  Low  Maintain anti-hyperglycemic dose as follows-   Antihyperglycemics (From admission, onward)    Start     Stop Route Frequency Ordered    02/16/24 0118  insulin aspart U-100 pen 0-5 Units         -- SubQ Every 6 hours PRN 02/16/24 0019        Hold Oral hypoglycemics while patient is in the hospital.

## 2024-02-19 NOTE — PT/OT/SLP EVAL
Occupational Therapy   Evaluation, tx    Name: Rajan Deluna  MRN: [id number]  Admitting Diagnosis: Acute respiratory failure with hypoxia  Recent Surgery: * No surgery found *    The primary encounter diagnosis was Hypoxemia. Diagnoses of SOB (shortness of breath), Shortness of breath, COVID, NSTEMI (non-ST elevated myocardial infarction), Non-ST elevation myocardial infarction (NSTEMI), NSTEMI (non-ST elevation myocardial infarction), and Acute respiratory failure with hypoxia [J96.01] were also pertinent to this visit.    Recommendations:     Discharge Recommendations: Low Intensity Therapy  Discharge Equipment Recommendations:  none  Barriers to discharge:   (desats with exertion)    Assessment:     Rajan Deluna is a 78 y.o. male with a medical diagnosis of Acute respiratory failure with hypoxia.  He presents with Performance deficits affecting function: weakness, impaired endurance, impaired self care skills, impaired functional mobility, gait instability, impaired balance, impaired cardiopulmonary response to activity.      Pt resting Spo2 92% on 4.5L, desat to 83% with exertional activity, unable to recover quickly with PLB rest break, up titrated O2 to 5L, pt quickly recovered SpO2 93%. O2 returned to 4.5L. Pt reported feeling minimally exerted with mobility related ADLs using RW in room. He ambulated SBA with RW, toilet t/f SBA with RW and GB, LB dressing Min A, toileting SBA (simulated)- pt declined need to use and g/hygiene SBA standing at sink (washed hands only). Pt not back to his baseline. He would benefit from Low Intensity Therapy in post acute setting. Continue with OT POC.     Rehab Prognosis: Good; patient would benefit from acute skilled OT services to address these deficits and reach maximum level of function.       Plan:     Patient to be seen 5 x/week to address the above listed problems via self-care/home management, therapeutic activities, therapeutic exercises  Plan of Care Expires:  03/19/24  Plan of Care Reviewed with: patient    Subjective     Chief Complaint: none  Patient/Family Comments/goals: pt agreeable. I want to move around and sit up, I been in bed a long time.    Occupational Profile:  Living Environment: pt. lives with his spouse in a 2 story apartment building, his apartment is on first level with THE; t/s combo.  Previous level of function: Indep-Mod I with rollator for ambulation and ADLS; pt does light housekeeping; received meals on wheels-foes not do much meal prep; does not have a car.  Roles and Routines: , active caretaker of self. Wife walks with a RW.  Equipment Used at Home: oxygen, rollator (has access to shower chair, 3n1 commode frame over toilet)  Assistance upon Discharge: limited assist from spouse    Pain/Comfort:  Pain Rating 1: 0/10  Pain Rating Post-Intervention 1: 0/10    Patients cultural, spiritual, Mandaeism conflicts given the current situation: no    Objective:     Communicated with: nurse prior to session.  Patient found HOB elevated with bed alarm, telemetry, oxygen upon OT entry to room.    General Precautions: Standard, fall, diabetic, contact, airborne, droplet, hearing impaired (hx COPD)  Orthopedic Precautions: N/A  Braces: N/A  Respiratory Status: HF Nasal cannula, flow 4.5 L/min    Occupational Performance:    Bed Mobility:    Patient completed Rolling/Turning to Left with  modified independence and with side rail  Patient completed Scooting/Bridging with modified independence  Patient completed Supine to Sit with modified independence and with side rail    Functional Mobility/Transfers:  Patient completed Sit <> Stand Transfer with stand by assistance  with  rolling walker and grab bars(s)   Patient completed Bed <> Chair Transfer using Step Transfer technique with stand by assistance with rolling walker  Patient completed Toilet Transfer Step Transfer technique with stand by assistance with  rolling walker and min vc for HP, closer  walker proximity  Functional Mobility: ambulated SBA with RW to bathroom, sink and back to bedside chair; ~5' and 8'x 2. Pt mildly SOB and he verbalized feeling mildly exerted.     Activities of Daily Living:  Feeding:  independence .  Grooming: stand by assistance wshed hands standing inside RW for safety; vc for PLB tech when felt SOB and pt was pacing him self by resting arms on walker prior to ambulating away from sink back to chair. Pt did not want to brush teeth 2/2 already did prior to OT.  Lower Body Dressing: minimum assistance for R socks, limited reach, flexibility, struggeld and was mildly exerted with task. Vc for PLB tech and exhale on exertion. He demonstrated return.   Toileting: stand by assistance and simulated 2/2 declined need to use seated on toilet.    Cognitive/Visual Perceptual:  Cognitive/Psychosocial Skills:     -       Oriented to: Person, Place, Time, and Situation   -       Follows Commands/attention:Follows one-step commands, Follows two-step commands, and [place] had to repeat fopr pt to understand  -       Communication: clear/fluent  -       Memory: Poor immediate recall  -       Safety awareness/insight to disability: impaired   -       Mood/Affect/Coping skills/emotional control: Appropriate to situation  Visual/Perceptual:      -Intact .    Physical Exam:  Balance:    -       sitting: good  dynamic: good- standing: good-  dynamic: fair plus  Postural examination/scapula alignment:    -       No postural abnormalities identified  Dominant hand:    -       right  Upper Extremity Range of Motion:     -       Right Upper Extremity: WFL  -       Left Upper Extremity: WFL  Upper Extremity Strength:    -       Right Upper Extremity: WFL  -       Left Upper Extremity: WFL   Strength:    -       Right Upper Extremity: WFL  -       Left Upper Extremity: WFL    AMPAC 6 Click ADL:  AMPAC Total Score: 22    Treatment & Education:  Purpose of OT and POC.  Pt seen for self care and fx mobility  retraining with emphasis on safety, fall prevention, energy conservation education and implementation as stated above.  Assessment and modification of supplemental O2 needs as needed to maintain and increase oxygenation during self care activities and mobility.    Call don't fall and call bell use explained. Pt verbalized understanding.      Patient left up in chair with all lines intact, call button in reach, and nurse notified    GOALS:   Multidisciplinary Problems       Occupational Therapy Goals          Problem: Occupational Therapy    Goal Priority Disciplines Outcome Interventions   Occupational Therapy Goal     OT, PT/OT Ongoing, Progressing    Description: Goals to be met by: 2/28/2024     Patient will increase functional independence with ADLs by performing:    UE Dressing with Modified [place].  LE Dressing with Modified [place].  Grooming while standing at sink with Modified [place].  Toileting from toilet with Modified [place] for hygiene and clothing management.   Toilet transfer to toilet with Modified [place].  Upper extremity exercise program x 10 reps per handout, with independence.  Implementation of energy conservation tech as needed with ADL and fx mobility.                         History:     Past Medical History:   Diagnosis Date    Arthritis     COPD (chronic obstructive pulmonary disease)     Diabetes mellitus     Digestive disorder     Encounter for blood transfusion     Hypertension     Renal disorder     Smoker     Weakness          Past Surgical History:   Procedure Laterality Date    HERNIA REPAIR      INTRALUMINAL GASTROINTESTINAL TRACT IMAGING VIA CAPSULE N/A 8/3/2020    Procedure: IMAGING PROCEDURE, GI TRACT, INTRALUMINAL, VIA CAPSULE;  Surgeon: Rey Olivares MD;  Location[facility];  Service: Endoscopy;  Laterality: N/A;    right knee surgery         Time Tracking:     OT Date of Treatment: 02/19/24  OT Start Time: 1247  OT Stop Time: 1313  OT Total  Time (min): 26 min    Billable Minutes:Evaluation 10  Self Care/Home Management 16  Total Time 26    2/19/2024

## 2024-02-19 NOTE — PLAN OF CARE
Problem: Physical Therapy  Goal: Physical Therapy Goal  Description: Goals to be met by: 3/19/24     Patient will increase functional independence with mobility by performin. Sit to stand transfer with Modified [place] with use of RW.   2. Bed to chair transfer with Modified [place] using Rolling Walker  3. Gait  x 150 feet with Modified [place] using Rolling Walker.     Outcome: Ongoing, Progressing     PT evaluation completed. Pt was previously MOD I with use of rollator. Pt at this time requires CGA/SBA with mobility with use of RW. Therapy recommending low intensity therapy. Therapy will continue to progress pt as able.

## 2024-02-19 NOTE — PT/OT/SLP EVAL
Physical Therapy Evaluation and Treatment    Patient Name:  Rajan Deluna   MRN:  [id number]    Recommendations:     Discharge Recommendations: Low Intensity Therapy   Discharge Equipment Recommendations: bath bench   Barriers to discharge:  limited functional endurance    Assessment:     Rajan Deluna is a 78 y.o. male admitted with a medical diagnosis of Acute respiratory failure with hypoxia.  He presents with the following impairments/functional limitations: impaired endurance, gait instability, impaired balance, impaired cardiopulmonary response to activity.      PT evaluation completed. Pt was previously MOD I with use of rollator. Pt at this time requires CGA/SBA with mobility with use of RW. Therapy recommending low intensity therapy. Therapy will continue to progress pt as able.     Rehab Prognosis: Good; patient would benefit from acute skilled PT services to address these deficits and reach maximum level of function.    Recent Surgery: * No surgery found *      Plan:     During this hospitalization, patient to be seen 3 x/week to address the identified rehab impairments via gait training, therapeutic activities, therapeutic exercises, neuromuscular re-education and progress toward the following goals:    Plan of Care Expires:  03/19/24    Subjective     Chief Complaint: none  Patient/Family Comments/goals: to return to PLOF  Pain/Comfort:  Pain Rating 1: 0/10  Pain Rating Post-Intervention 1: 0/10    Patients cultural, spiritual, Denominational conflicts given the current situation: no    Living Environment:  Lives with wife and grandson (currently in hotel on 1st floor due to house being renovated) (when returning home will be living in 2 story home- lives on 1st with threshold step to enter and tub/shower combo)  Prior to admission, patients level of function was MOD I with use of rollator.  Equipment used at home: rollator, oxygen.  DME owned (not currently used): none.  Upon discharge, patient will have  assistance from family.    Objective:     Communicated with Nurse prior to session.  Patient found HOB elevated with bed alarm, peripheral IV, oxygen  upon PT entry to room.    General Precautions: Standard, fall, airborne, contact, droplet  Orthopedic Precautions:N/A   Braces: N/A  Respiratory Status: Nasal cannula, flow 4.5 L/min    Exams:  Cognitive Exam:  Patient is oriented to Person, Place, Time, and Situation  Sensation:    -       Intact  light/touch to BLE  RLE ROM: WFL  RLE Strength: WFL  LLE ROM: WFL  LLE Strength: WFL    Functional Mobility:  Bed Mobility:     Supine to Sit: modified independence  Sit to Supine: modified independence  Transfers:     Sit to Stand:  contact guard assistance with rolling walker  Gait: ~35ft with use of RW and CGA      AM-PAC 6 CLICK MOBILITY  Total Score:19       Treatment & Education:  Pt educated on role of therapy.   Pt educated on safe sequencing with use of RW.   Pt has no complaints of pain or SOB.   Pt states he is feeling better but just feels he is still moving slower than compared to baseline.     Patient left HOB elevated with all lines intact, call button in reach, bed alarm on, and Nurse notified.    GOALS:   Multidisciplinary Problems       Physical Therapy Goals          Problem: Physical Therapy    Goal Priority Disciplines Outcome Goal Variances Interventions   Physical Therapy Goal     PT, PT/OT Ongoing, Progressing     Description: Goals to be met by: 3/19/24     Patient will increase functional independence with mobility by performin. Sit to stand transfer with Modified [place] with use of RW.   2. Bed to chair transfer with Modified [place] using Rolling Walker  3. Gait  x 150 feet with Modified [place] using Rolling Walker.                          History:     Past Medical History:   Diagnosis Date    Arthritis     COPD (chronic obstructive pulmonary disease)     Diabetes mellitus     Digestive disorder     Encounter for blood  transfusion     Hypertension     Renal disorder     Smoker     Weakness        Past Surgical History:   Procedure Laterality Date    HERNIA REPAIR      INTRALUMINAL GASTROINTESTINAL TRACT IMAGING VIA CAPSULE N/A 8/3/2020    Procedure: IMAGING PROCEDURE, GI TRACT, INTRALUMINAL, VIA CAPSULE;  Surgeon: Rey Olivares MD;  Location: [facility];  Service: Endoscopy;  Laterality: N/A;    right knee surgery         Time Tracking:     PT Received On: 02/19/24  PT Start Time: 0937     PT Stop Time: 1015  PT Total Time (min): 38 min     Billable Minutes: Evaluation 10, Gait Training 12, and Therapeutic Activity 16      02/19/2024

## 2024-02-20 LAB
ANION GAP SERPL CALC-SCNC: 14 MMOL/L (ref 8–16)
APTT PPP: 31.8 SEC (ref 21–32)
BASOPHILS # BLD AUTO: 0.01 K/UL (ref 0–0.2)
BASOPHILS NFR BLD: 0.1 % (ref 0–1.9)
BUN SERPL-MCNC: 31 MG/DL (ref 8–23)
CALCIUM SERPL-MCNC: 8.5 MG/DL (ref 8.7–10.5)
CHLORIDE SERPL-SCNC: 101 MMOL/L (ref 95–110)
CO2 SERPL-SCNC: 21 MMOL/L (ref 23–29)
CREAT SERPL-MCNC: 1.3 MG/DL (ref 0.5–1.4)
DIFFERENTIAL METHOD BLD: ABNORMAL
EOSINOPHIL # BLD AUTO: 0 K/UL (ref 0–0.5)
EOSINOPHIL NFR BLD: 0 % (ref 0–8)
ERYTHROCYTE [DISTWIDTH] IN BLOOD BY AUTOMATED COUNT: 17.7 % (ref 11.5–14.5)
EST. GFR  (NO RACE VARIABLE): 56 ML/MIN/1.73 M^2
GLUCOSE SERPL-MCNC: 171 MG/DL (ref 70–110)
HCT VFR BLD AUTO: 33.9 % (ref 40–54)
HGB BLD-MCNC: 11.1 G/DL (ref 14–18)
IMM GRANULOCYTES # BLD AUTO: 0.04 K/UL (ref 0–0.04)
IMM GRANULOCYTES NFR BLD AUTO: 0.4 % (ref 0–0.5)
LYMPHOCYTES # BLD AUTO: 1.3 K/UL (ref 1–4.8)
LYMPHOCYTES NFR BLD: 14.8 % (ref 18–48)
MAGNESIUM SERPL-MCNC: 2.2 MG/DL (ref 1.6–2.6)
MCH RBC QN AUTO: 23.9 PG (ref 27–31)
MCHC RBC AUTO-ENTMCNC: 32.7 G/DL (ref 32–36)
MCV RBC AUTO: 73 FL (ref 82–98)
MONOCYTES # BLD AUTO: 0.7 K/UL (ref 0.3–1)
MONOCYTES NFR BLD: 7.5 % (ref 4–15)
NEUTROPHILS # BLD AUTO: 7 K/UL (ref 1.8–7.7)
NEUTROPHILS NFR BLD: 77.2 % (ref 38–73)
NRBC BLD-RTO: 0 /100 WBC
OHS QRS DURATION: 160 MS
OHS QTC CALCULATION: 525 MS
PHOSPHATE SERPL-MCNC: 2.9 MG/DL (ref 2.7–4.5)
PLATELET # BLD AUTO: 237 K/UL (ref 150–450)
PMV BLD AUTO: 10.2 FL (ref 9.2–12.9)
POCT GLUCOSE: 143 MG/DL (ref 70–110)
POCT GLUCOSE: 145 MG/DL (ref 70–110)
POCT GLUCOSE: 83 MG/DL (ref 70–110)
POCT GLUCOSE: 88 MG/DL (ref 70–110)
POTASSIUM SERPL-SCNC: 4.5 MMOL/L (ref 3.5–5.1)
RBC # BLD AUTO: 4.65 M/UL (ref 4.6–6.2)
SODIUM SERPL-SCNC: 136 MMOL/L (ref 136–145)
WBC # BLD AUTO: 9.06 K/UL (ref 3.9–12.7)

## 2024-02-20 PROCEDURE — 63600175 PHARM REV CODE 636 W HCPCS: Performed by: STUDENT IN AN ORGANIZED HEALTH CARE EDUCATION/TRAINING PROGRAM

## 2024-02-20 PROCEDURE — 25000003 PHARM REV CODE 250: Performed by: STUDENT IN AN ORGANIZED HEALTH CARE EDUCATION/TRAINING PROGRAM

## 2024-02-20 PROCEDURE — 99900035 HC TECH TIME PER 15 MIN (STAT)

## 2024-02-20 PROCEDURE — 27000207 HC ISOLATION

## 2024-02-20 PROCEDURE — 11000001 HC ACUTE MED/SURG PRIVATE ROOM

## 2024-02-20 PROCEDURE — 94761 N-INVAS EAR/PLS OXIMETRY MLT: CPT

## 2024-02-20 PROCEDURE — 25000003 PHARM REV CODE 250

## 2024-02-20 PROCEDURE — 97110 THERAPEUTIC EXERCISES: CPT

## 2024-02-20 PROCEDURE — 85025 COMPLETE CBC W/AUTO DIFF WBC: CPT | Performed by: EMERGENCY MEDICINE

## 2024-02-20 PROCEDURE — 84100 ASSAY OF PHOSPHORUS: CPT | Performed by: NURSE PRACTITIONER

## 2024-02-20 PROCEDURE — 94640 AIRWAY INHALATION TREATMENT: CPT

## 2024-02-20 PROCEDURE — 25000003 PHARM REV CODE 250: Performed by: INTERNAL MEDICINE

## 2024-02-20 PROCEDURE — 25000003 PHARM REV CODE 250: Performed by: NURSE PRACTITIONER

## 2024-02-20 PROCEDURE — 80048 BASIC METABOLIC PNL TOTAL CA: CPT | Performed by: NURSE PRACTITIONER

## 2024-02-20 PROCEDURE — 83735 ASSAY OF MAGNESIUM: CPT | Performed by: NURSE PRACTITIONER

## 2024-02-20 PROCEDURE — 25000003 PHARM REV CODE 250: Performed by: FAMILY MEDICINE

## 2024-02-20 PROCEDURE — 25000242 PHARM REV CODE 250 ALT 637 W/ HCPCS: Performed by: NURSE PRACTITIONER

## 2024-02-20 PROCEDURE — 27000221 HC OXYGEN, UP TO 24 HOURS

## 2024-02-20 PROCEDURE — 36415 COLL VENOUS BLD VENIPUNCTURE: CPT | Performed by: NURSE PRACTITIONER

## 2024-02-20 PROCEDURE — 85730 THROMBOPLASTIN TIME PARTIAL: CPT | Performed by: EMERGENCY MEDICINE

## 2024-02-20 PROCEDURE — 94664 DEMO&/EVAL PT USE INHALER: CPT

## 2024-02-20 PROCEDURE — 97530 THERAPEUTIC ACTIVITIES: CPT

## 2024-02-20 PROCEDURE — 97116 GAIT TRAINING THERAPY: CPT

## 2024-02-20 PROCEDURE — 97535 SELF CARE MNGMENT TRAINING: CPT

## 2024-02-20 PROCEDURE — 63600175 PHARM REV CODE 636 W HCPCS: Performed by: NURSE PRACTITIONER

## 2024-02-20 PROCEDURE — 27000646 HC AEROBIKA DEVICE

## 2024-02-20 RX ORDER — ALUMINUM HYDROXIDE, MAGNESIUM HYDROXIDE, AND SIMETHICONE 1200; 120; 1200 MG/30ML; MG/30ML; MG/30ML
30 SUSPENSION ORAL ONCE
Status: COMPLETED | OUTPATIENT
Start: 2024-02-20 | End: 2024-02-20

## 2024-02-20 RX ORDER — PANTOPRAZOLE SODIUM 40 MG/1
40 TABLET, DELAYED RELEASE ORAL DAILY
Status: DISCONTINUED | OUTPATIENT
Start: 2024-02-20 | End: 2024-02-22 | Stop reason: HOSPADM

## 2024-02-20 RX ORDER — ONDANSETRON HYDROCHLORIDE 2 MG/ML
4 INJECTION, SOLUTION INTRAVENOUS EVERY 6 HOURS PRN
Status: DISCONTINUED | OUTPATIENT
Start: 2024-02-20 | End: 2024-02-22 | Stop reason: HOSPADM

## 2024-02-20 RX ORDER — LIDOCAINE HYDROCHLORIDE 20 MG/ML
15 SOLUTION OROPHARYNGEAL ONCE
Status: COMPLETED | OUTPATIENT
Start: 2024-02-20 | End: 2024-02-20

## 2024-02-20 RX ORDER — PROCHLORPERAZINE EDISYLATE 5 MG/ML
2.5 INJECTION INTRAMUSCULAR; INTRAVENOUS EVERY 4 HOURS PRN
Status: DISCONTINUED | OUTPATIENT
Start: 2024-02-20 | End: 2024-02-22 | Stop reason: HOSPADM

## 2024-02-20 RX ADMIN — MUPIROCIN: 20 OINTMENT TOPICAL at 08:02

## 2024-02-20 RX ADMIN — PANTOPRAZOLE SODIUM 40 MG: 40 TABLET, DELAYED RELEASE ORAL at 05:02

## 2024-02-20 RX ADMIN — FLUTICASONE FUROATE AND VILANTEROL TRIFENATATE 1 PUFF: 200; 25 POWDER RESPIRATORY (INHALATION) at 07:02

## 2024-02-20 RX ADMIN — PREGABALIN 100 MG: 50 CAPSULE ORAL at 03:02

## 2024-02-20 RX ADMIN — BENZONATATE 100 MG: 100 CAPSULE ORAL at 05:02

## 2024-02-20 RX ADMIN — IPRATROPIUM BROMIDE AND ALBUTEROL SULFATE 3 ML: 2.5; .5 SOLUTION RESPIRATORY (INHALATION) at 07:02

## 2024-02-20 RX ADMIN — BENZONATATE 100 MG: 100 CAPSULE ORAL at 02:02

## 2024-02-20 RX ADMIN — PREGABALIN 100 MG: 50 CAPSULE ORAL at 08:02

## 2024-02-20 RX ADMIN — PREGABALIN 100 MG: 50 CAPSULE ORAL at 09:02

## 2024-02-20 RX ADMIN — DEXAMETHASONE 6 MG: 4 TABLET ORAL at 10:02

## 2024-02-20 RX ADMIN — BENZONATATE 100 MG: 100 CAPSULE ORAL at 08:02

## 2024-02-20 RX ADMIN — ONDANSETRON 4 MG: 2 INJECTION INTRAMUSCULAR; INTRAVENOUS at 04:02

## 2024-02-20 RX ADMIN — ATORVASTATIN CALCIUM 80 MG: 40 TABLET, FILM COATED ORAL at 10:02

## 2024-02-20 RX ADMIN — TIOTROPIUM BROMIDE INHALATION SPRAY 2 PUFF: 3.12 SPRAY, METERED RESPIRATORY (INHALATION) at 07:02

## 2024-02-20 RX ADMIN — ALUMINUM HYDROXIDE, MAGNESIUM HYDROXIDE, AND DIMETHICONE 30 ML: 200; 20; 200 SUSPENSION ORAL at 03:02

## 2024-02-20 RX ADMIN — IPRATROPIUM BROMIDE AND ALBUTEROL SULFATE 3 ML: 2.5; .5 SOLUTION RESPIRATORY (INHALATION) at 01:02

## 2024-02-20 RX ADMIN — CLOPIDOGREL BISULFATE 75 MG: 75 TABLET ORAL at 10:02

## 2024-02-20 RX ADMIN — LIDOCAINE HYDROCHLORIDE 15 ML: 20 SOLUTION ORAL; TOPICAL at 03:02

## 2024-02-20 RX ADMIN — MULTIPLE VITAMINS W/ MINERALS TAB 1 TABLET: TAB at 10:02

## 2024-02-20 RX ADMIN — MUPIROCIN: 20 OINTMENT TOPICAL at 09:02

## 2024-02-20 RX ADMIN — IPRATROPIUM BROMIDE AND ALBUTEROL SULFATE 3 ML: 2.5; .5 SOLUTION RESPIRATORY (INHALATION) at 09:02

## 2024-02-20 RX ADMIN — PROCHLORPERAZINE EDISYLATE 2.5 MG: 5 INJECTION INTRAMUSCULAR; INTRAVENOUS at 05:02

## 2024-02-20 RX ADMIN — ASPIRIN 81 MG CHEWABLE TABLET 81 MG: 81 TABLET CHEWABLE at 10:02

## 2024-02-20 NOTE — PLAN OF CARE
Continue OT POC. Patient progressing to maintaining standing tolerance ~6 minutes for groom/hygiene at sink with RW and SBA; ADLs progressing toward SBA, functional mobility with SBA and RW. SpO2 monitored and  maintained >93%. Recommend low intensity therapy once medically stable.     Problem: Occupational Therapy  Goal: Occupational Therapy Goal  Description: Goals to be met by: 2/28/2024     Patient will increase functional independence with ADLs by performing:    UE Dressing with Modified [place].  LE Dressing with Modified [place].  Grooming while standing at sink with Modified [place].  Toileting from toilet with Modified [place] for hygiene and clothing management.   Toilet transfer to toilet with Modified [place].  Upper extremity exercise program x 10 reps per handout, with independence.  Implementation of energy conservation tech as needed with ADL and fx mobility.    Outcome: Ongoing, Progressing

## 2024-02-20 NOTE — ASSESSMENT & PLAN NOTE
Patient with Hypercapnic and Hypoxic Respiratory failure which is Acute on chronic.  he is on home oxygen at 4 LPM. Supplemental oxygen was provided and noted-      .   Signs/symptoms of respiratory failure include- tachypnea, increased work of breathing, and respiratory distress. Contributing diagnoses includes - COPD and COVID  Labs and images were reviewed. Patient Has recent ABG, which has been reviewed. Will treat underlying causes and adjust management of respiratory failure as follows- BiPAP, steroids, breathing treatments, ABX, remdesivir

## 2024-02-20 NOTE — PT/OT/SLP PROGRESS
Occupational Therapy   Treatment    Name: Rajan Deluna  MRN: [id number]  Admitting Diagnosis:  Acute respiratory failure with hypoxia       Recommendations:     Discharge Recommendations: Low Intensity Therapy  Discharge Equipment Recommendations:  none  Barriers to discharge:  None    Assessment:     Rajna Deluna is a 78 y.o. male with a medical diagnosis of Acute respiratory failure with hypoxia.  He presents with ..The primary encounter diagnosis was Hypoxemia. Diagnoses of SOB (shortness of breath), Shortness of breath, COVID, NSTEMI (non-ST elevated myocardial infarction), Non-ST elevation myocardial infarction (NSTEMI), NSTEMI (non-ST elevation myocardial infarction), and Acute respiratory failure with hypoxia [J96.01] were also pertinent to this visit. . Performance deficits affecting function are weakness, impaired endurance, impaired self care skills, impaired functional mobility, gait instability, impaired balance, decreased lower extremity function, impaired cardiopulmonary response to activity.     Continue OT POC. Patient progressing to maintaining standing tolerance ~6 minutes for groom/hygiene at sink with RW and SBA; ADLs progressing toward SBA, functional mobility with SBA and RW. SpO2 monitored and maintained >93%. Recommend low intensity therapy once medically stable.     Rehab Prognosis:  Good; patient would benefit from acute skilled OT services to address these deficits and reach maximum level of function.       Plan:     Patient to be seen 5 x/week to address the above listed problems via self-care/home management, therapeutic activities, therapeutic exercises  Plan of Care Expires: 03/19/24  Plan of Care Reviewed with: patient    Subjective     Chief Complaint: mild nausea  Patient/Family Comments/goals: Patient reported gratitude at increasing level of function. Patient reported having been a  after finishing 12th grade.  Pain/Comfort:  Pain Rating 1: 0/10  Pain Addressed 1:  Reposition  Pain Rating Post-Intervention 1: 0/10    Objective:     Communicated with: nursing prior to session.  Patient found HOB elevated with bed alarm, oxygen, peripheral IV, telemetry upon OT entry to room.    General Precautions: Standard, fall, airborne, contact, droplet    Orthopedic Precautions:N/A  Braces: N/A  Respiratory Status: Nasal cannula, flow 4.5 L/min     Occupational Performance:     Bed Mobility:    Patient completed Supine to Sit with stand by assistance  Patient completed Sit to Supine with stand by assistance     Functional Mobility/Transfers:  Patient completed Sit <> Stand Transfer with stand by assistance  with  rolling walker   Patient completed Toilet Transfer Step Transfer technique to [facility] with stand by assistance with  rolling walker  Functional Mobility: Patient performed 3 trials sit<>stand from BS with RW and CGA. Patient performed in room mobility to BS and sink with RW and CGA progressing to SBA with OT student managing IV pole    Activities of Daily Living:  Grooming: stand by assistance while standing at sink with RW for face hygiene, oral care; tolerated stand for ~6 minutes to complete  Lower Body Dressing: stand by assistance with setup of socks  Toileting: contact guard assistance inferred from simulated clothes management during toilet transfer to [facility] with RW; no BM      [facility] 6 Click ADL: 22    Treatment & Education:  Patient instructed on and performed tricep extensions from [facility] to strengthen upper body and facilitate prefunctional stance techniques needed for toileting.  Patient reported maintaining 0/10 on Modified Jorge Rating of Perceived Exertion.  SpO2 monitored throughout activities and transitions; maintained >93%.  Patient provided education and handout on breathing distress control with demonstration of pursed lip breathing; guided instruction on 6 breaths using technique.  ADL / functional mobility training as above with education on step sequence, hand  placement with RW.  Patient returned to bed with support of pillow for floating heels and all needs met.    Patient left HOB elevated with all lines intact, call button in reach, bed alarm on, and nursing notified    GOALS:   Multidisciplinary Problems       Occupational Therapy Goals          Problem: Occupational Therapy    Goal Priority Disciplines Outcome Interventions   Occupational Therapy Goal     OT, PT/OT Ongoing, Progressing    Description: Goals to be met by: 2/28/2024     Patient will increase functional independence with ADLs by performing:    UE Dressing with Modified [place].  LE Dressing with Modified [place].  Grooming while standing at sink with Modified [place].  Toileting from toilet with Modified [place] for hygiene and clothing management.   Toilet transfer to toilet with Modified [place].  Upper extremity exercise program x 10 reps per handout, with independence.  Implementation of energy conservation tech as needed with ADL and fx mobility.                         Time Tracking:     OT Date of Treatment: 02/20/24  OT Start Time: 1026  OT Stop Time: 1113  OT Total Time (min): 47 min    Billable Minutes:Self Care/Home Management 23 min  Therapeutic Activity 16 min  Therapeutic Exercise 8 min    OT/MAYURI: OT          2/20/2024

## 2024-02-20 NOTE — ASSESSMENT & PLAN NOTE
Patient's COPD is controlled currently.  Patient is currently off COPD Pathway. Continue scheduled inhalers Antibiotics and Supplemental oxygen and monitor respiratory status closely.

## 2024-02-20 NOTE — PROGRESS NOTES
[facility] Medicine  Progress Note    Patient Name: Rajan Deluna  MRN: [id number]  Patient Class: IP- Inpatient   Admission Date: 2/15/2024  Length of Stay: 5 days  Attending Physician: Vicki Harris MD  Primary Care Provider: Jason Mccord MD        Subjective:     Principal Problem:Acute respiratory failure with hypoxia        HPI:  Rajan Deluna is a 78 y.o. male who  has a past medical history of Arthritis, COPD (chronic obstructive pulmonary disease), Diabetes mellitus, Digestive disorder, Encounter for blood transfusion, Hypertension, Renal disorder, Smoker, and Weakness.     The patient presents to the ED due to shortness of breath.  Patient was at the hotel where he lives when he began to feel short of breath approximately 6 hours ago.  He has a history of COPD/chronic bronchitis and uses 4 L of oxygen at baseline.  EMS reports that he was hypoxic in the 70s on his home oxygen.  He received 125 mg of IV Solu-Medrol and a DuoNeb and was transported here. Patient reports cough with chest tightness, fatigue, and chills.  He denies CP, fever, wheezing, palpitations, lightheadedness, dizziness.    Overview/Hospital Course:  Pt admitted and off from bipap. Cont on iv abx, nebs, steroids, and remdesvir. Still on heparin drip for NSTEMi. Cards consulted and recommend medical therapy for NSTEMI at this time. Given ongoing respiratory failure we will not proceed with invasive approach till his respiratory status completely improves   2/17/24: pt on 8LPM. Home oxygen of 4LPm. Heparin drip for 48 hours, Cont asa and plavix and statin. F/U with cards as outpt.   2/18: heparin drip done. Cards plan for outpt stress test. Pt now on 4.5 LPM. Sob improving  2/20 Stable, feeling better but still on 4.5LPM O2, on 4 liters at home    Interval History: Today patient is in good spirits. Reports some nausea and heartburn type symptoms, he denies chest pain, gets SOB after talking for a prolonged period  of time.    Review of Systems   Constitutional:  Negative for activity change, appetite change, chills and fever.   HENT:  Positive for congestion.    Respiratory:  Positive for cough. Negative for chest tightness, shortness of breath and wheezing.    Cardiovascular:  Negative for chest pain, palpitations and leg swelling.   Gastrointestinal:  Negative for abdominal pain, nausea and vomiting.   Musculoskeletal:  Negative for back pain and gait problem.   Neurological:  Negative for dizziness, weakness, numbness and headaches.   Psychiatric/Behavioral:  Negative for agitation, confusion and hallucinations.    All other systems reviewed and are negative.    Objective:     Vital Signs (Most Recent):  Temp: 99 °F (37.2 °C) (02/20/24 1139)  Pulse: 73 (02/20/24 1627)  Resp: 16 (02/20/24 1328)  BP: 105/76 (02/20/24 1139)  SpO2: (!) 94 % (02/20/24 1328) Vital Signs (24h Range):  Temp:  [97.6 °F (36.4 °C)-99 °F (37.2 °C)] 99 °F (37.2 °C)  Pulse:  [69-86] 73  Resp:  [16-20] 16  SpO2:  [93 %-97 %] 94 %  BP: (101-128)/(55-76) 105/76     Weight: 84.3 kg (185 lb 13.6 oz)  Body mass index is 25.92 kg/m².    Intake/Output Summary (Last 24 hours) at 2/20/2024 1636  Last data filed at 2/20/2024 0511  Gross per 24 hour   Intake 180 ml   Output 1310 ml   Net -1130 ml         Physical Exam  Vitals and nursing note reviewed.   Constitutional:       General: He is not in acute distress.     Appearance: He is not toxic-appearing.   HENT:      Head: Normocephalic and atraumatic.      Nose: No congestion.      Mouth/Throat:      Mouth: Mucous membranes are moist.      Pharynx: No oropharyngeal exudate.   Eyes:      Extraocular Movements: Extraocular movements intact.      Pupils: Pupils are equal, round, and reactive to light.   Cardiovascular:      Rate and Rhythm: Normal rate and regular rhythm.      Heart sounds: No murmur heard.     No friction rub. No gallop.   Pulmonary:      Effort: Pulmonary effort is normal. No respiratory distress.       Breath sounds: No wheezing or rales.   Chest:      Chest wall: No tenderness.   Abdominal:      General: Bowel sounds are normal. There is no distension.      Palpations: Abdomen is soft.      Tenderness: There is no abdominal tenderness. There is no guarding or rebound.   Musculoskeletal:         General: No swelling or tenderness.      Cervical back: No rigidity or tenderness.      Right lower leg: No edema.      Left lower leg: No edema.   Neurological:      General: No focal deficit present.      Mental Status: He is alert and oriented to person, place, and time.      Cranial Nerves: No cranial nerve deficit.      Motor: No weakness.      Coordination: Coordination normal.   Psychiatric:         Thought Content: Thought content normal.             Significant Labs: All pertinent labs within the past 24 hours have been reviewed.  CBC:   Recent Labs   Lab 02/19/24 0310 02/20/24  0233   WBC 8.98 9.06   HGB 10.6* 11.1*   HCT 34.1* 33.9*    237     CMP:   Recent Labs   Lab 02/19/24 0310 02/20/24  0233   * 136   K 4.7 4.5    101   CO2 23 21*    171*   BUN 28* 31*   CREATININE 1.5* 1.3   CALCIUM 8.7 8.5*   ANIONGAP 9 14       Significant Imaging: I have reviewed all pertinent imaging results/findings within the past 24 hours.    Assessment/Plan:      * Acute respiratory failure with hypoxia  Patient with Hypercapnic and Hypoxic Respiratory failure which is Acute on chronic.  he is on home oxygen at 4 LPM. Supplemental oxygen was provided and noted-      .   Signs/symptoms of respiratory failure include- tachypnea, increased work of breathing, and respiratory distress. Contributing diagnoses includes - COPD and COVID  Labs and images were reviewed. Patient Has recent ABG, which has been reviewed. Will treat underlying causes and adjust management of respiratory failure as follows- BiPAP, steroids, breathing treatments, ABX, remdesivir    COVID-19  Patient is identified as High risk for  severe complications of COVID 19 based on COVID risk score of 6   Initiate standard COVID protocols; COVID-19 testing ,Infection Control notification  and isolation- respiratory, contact and droplet per protocol    Diagnostics: CBC, CMP, Ferritin, CRP, LDH, BNP, Troponin, and Portable CXR    Management: Initiate targeted therapy with Remdesivir, 200mg IV x1, followed by 100mg IV daily x5 days total and Dexamethasone PO/IV 6mg daily x10 days, Maintain oxygen saturations 92-96% via NIPPV- CPAP;Standby  % FiO2 and monitor with continuous/intermittent pulse oximetry. , Inhaled bronchodilators as needed for shortness of breath., Continuous cardiac monitoring., and Manage respiratory failure (O2 requirement >10LPM or needing NIPPV/Mechanical ventilation) and/or Pneumonia (active chest infiltrates) separately as described below.    Advance Care Planning Current advance care plan has not been discussed with patient/family/POA and patient currently remains Full Code.     NSTEMI (non-ST elevated myocardial infarction)  Patient denies CP  Trop 3.233 > 12.568  Will continue trending  ASA loading dose given  EDP discussed case with cardiology  No plavix d/t 3 vessel dz  Heparin gtt    EKG: sinus rhythm w/ 1st degree AVB, HR 94, left axis deviation, RBBB, no STEMI  Cards consult:  We will recommend medical therapy for NSTEMI at this time.  Given ongoing respiratory failure we will not proceed with invasive approach till his respiratory status completely improves     Echocardiogram for EF and wall motion    Stress test as outpt    Heparin drip for 48 hours, Cont asa and plavix and statin. F/U with cards as outpt.       COPD exacerbation  Patient's COPD is with exacerbation noted by use of accessory muscles for breathing and worsening of baseline hypoxia currently.  Patient is currently on COPD Pathway. Continue scheduled inhalers Steroids, Antibiotics, and Supplemental oxygen and monitor respiratory status closely.     Type 2  diabetes mellitus  Patient's FSGs are controlled on current medication regimen.  Last A1c reviewed-   Lab Results   Component Value Date    HGBA1C 5.9 (H) 02/16/2024     Most recent fingerstick glucose reviewed-   Recent Labs   Lab 02/18/24 2055 02/19/24  0540 02/19/24  1151   POCTGLUCOSE 114* 106 106     Current correctional scale  Low  Maintain anti-hyperglycemic dose as follows-   Antihyperglycemics (From admission, onward)      Start     Stop Route Frequency Ordered    02/16/24 0118  insulin aspart U-100 pen 0-5 Units         -- SubQ Every 6 hours PRN 02/16/24 0019          Hold Oral hypoglycemics while patient is in the hospital.    COPD (chronic obstructive pulmonary disease)  Patient's COPD is controlled currently.  Patient is currently off COPD Pathway. Continue scheduled inhalers Antibiotics and Supplemental oxygen and monitor respiratory status closely.     Anemia  Patient's anemia is currently controlled. Has not received any PRBCs to date. Etiology likely d/t Iron deficiency  Current CBC reviewed-   Lab Results   Component Value Date    HGB 11.1 (L) 02/20/2024    HCT 33.9 (L) 02/20/2024     Monitor serial CBC and transfuse if patient becomes hemodynamically unstable, symptomatic or H/H drops below 7/21.      VTE Risk Mitigation (From admission, onward)           Ordered     IP VTE HIGH RISK PATIENT  Once         02/15/24 2256     Place sequential compression device  Until discontinued         02/15/24 2250     heparin 25,000 units in dextrose 5% (100 units/ml) IV bolus from bag LOW INTENSITY nomogram - [facility]  As needed (PRN)        Question:  Heparin Infusion Adjustment (DO NOT MODIFY ANSWER)  Answer:  [email or web address]\epic\Images\Pharmacy\HeparinInfusions\heparin LOW INTENSITY nomogram for [facility] [id number]    02/15/24 2044     heparin 25,000 units in dextrose 5% (100 units/ml) IV bolus from bag LOW INTENSITY nomogram - [facility]  As needed (PRN)        Question:  Heparin Infusion Adjustment (DO NOT MODIFY ANSWER)   Answer:  [email or web address]\epic\Images\Pharmacy\HeparinInfusions\heparin LOW INTENSITY nomogram for [facility] [id number].pdf    02/15/24 2044     heparin 25,000 units in dextrose 5% 250 mL (100 units/mL) infusion LOW INTENSITY nomogram - [facility]  Continuous        Question:  Begin at (units/kg/hr)  Answer:  12    02/15/24 2044                    Discharge Planning   ADAM:      Code Status: Full Code   Is the patient medically ready for discharge?:     Reason for patient still in hospital (select all that apply): Patient trending condition and Pending disposition  Discharge Plan A: Home   Discharge Delays: None known at this time              Vicki Harris MD  Department of Hospital Medicine   [facility]

## 2024-02-20 NOTE — PLAN OF CARE
[facility] has accepted patient for Home Health.    Per Dr. Correa's note: We will arrange for a PET stress test as an outpatient and follow up after that  Our office will reach out to the patient to arrange the PET stress test.    Patient Contacts    Name Relation Home Work Mobile   Rajan Deluna iii [phone]     Carmencita Lofton [phone]     Brannon Deluna Brother [phone]       Future Appointments   Date Time Provider Department Center   6/20/2024 12:30 PM CARDIAC, PET IMAGING [facility]      [facility]   [phone] [address] [place] [phone]      Next Steps: Follow up  Instructions: Home Health Company       Jourdan Correa MD  Cardiology Interventional Cardiology [phone] [address] [facility] [address]      Next Steps: Follow up  Instructions: Cardiology Follow-Up      02/20/24 1048   Post-Acute Status   Post-Acute Authorization Home [facility] Status Pending medical clearance/testing     Angelic Mcintosh RN    [phone]

## 2024-02-20 NOTE — SUBJECTIVE & OBJECTIVE
Interval History: Today patient is in good spirits. Reports some nausea and heartburn type symptoms, he denies chest pain, gets SOB after talking for a prolonged period of time.    Review of Systems   Constitutional:  Negative for activity change, appetite change, chills and fever.   HENT:  Positive for congestion.    Respiratory:  Positive for cough. Negative for chest tightness, shortness of breath and wheezing.    Cardiovascular:  Negative for chest pain, palpitations and leg swelling.   Gastrointestinal:  Negative for abdominal pain, nausea and vomiting.   Musculoskeletal:  Negative for back pain and gait problem.   Neurological:  Negative for dizziness, weakness, numbness and headaches.   Psychiatric/Behavioral:  Negative for agitation, confusion and hallucinations.    All other systems reviewed and are negative.    Objective:     Vital Signs (Most Recent):  Temp: 99 °F (37.2 °C) (02/20/24 1139)  Pulse: 73 (02/20/24 1627)  Resp: 16 (02/20/24 1328)  BP: 105/76 (02/20/24 1139)  SpO2: (!) 94 % (02/20/24 1328) Vital Signs (24h Range):  Temp:  [97.6 °F (36.4 °C)-99 °F (37.2 °C)] 99 °F (37.2 °C)  Pulse:  [69-86] 73  Resp:  [16-20] 16  SpO2:  [93 %-97 %] 94 %  BP: (101-128)/(55-76) 105/76     Weight: 84.3 kg (185 lb 13.6 oz)  Body mass index is 25.92 kg/m².    Intake/Output Summary (Last 24 hours) at 2/20/2024 1636  Last data filed at 2/20/2024 0511  Gross per 24 hour   Intake 180 ml   Output 1310 ml   Net -1130 ml         Physical Exam  Vitals and nursing note reviewed.   Constitutional:       General: He is not in acute distress.     Appearance: He is not toxic-appearing.   HENT:      Head: Normocephalic and atraumatic.      Nose: No congestion.      Mouth/Throat:      Mouth: Mucous membranes are moist.      Pharynx: No oropharyngeal exudate.   Eyes:      Extraocular Movements: Extraocular movements intact.      Pupils: Pupils are equal, round, and reactive to light.   Cardiovascular:      Rate and Rhythm: Normal  rate and regular rhythm.      Heart sounds: No murmur heard.     No friction rub. No gallop.   Pulmonary:      Effort: Pulmonary effort is normal. No respiratory distress.      Breath sounds: No wheezing or rales.   Chest:      Chest wall: No tenderness.   Abdominal:      General: Bowel sounds are normal. There is no distension.      Palpations: Abdomen is soft.      Tenderness: There is no abdominal tenderness. There is no guarding or rebound.   Musculoskeletal:         General: No swelling or tenderness.      Cervical back: No rigidity or tenderness.      Right lower leg: No edema.      Left lower leg: No edema.   Neurological:      General: No focal deficit present.      Mental Status: He is alert and oriented to person, place, and time.      Cranial Nerves: No cranial nerve deficit.      Motor: No weakness.      Coordination: Coordination normal.   Psychiatric:         Thought Content: Thought content normal.             Significant Labs: All pertinent labs within the past 24 hours have been reviewed.  CBC:   Recent Labs   Lab 02/19/24  0310 02/20/24  0233   WBC 8.98 9.06   HGB 10.6* 11.1*   HCT 34.1* 33.9*    237     CMP:   Recent Labs   Lab 02/19/24  0310 02/20/24  0233   * 136   K 4.7 4.5    101   CO2 23 21*    171*   BUN 28* 31*   CREATININE 1.5* 1.3   CALCIUM 8.7 8.5*   ANIONGAP 9 14       Significant Imaging: I have reviewed all pertinent imaging results/findings within the past 24 hours.

## 2024-02-20 NOTE — PLAN OF CARE
Problem: Physical Therapy  Goal: Physical Therapy Goal  Description: Goals to be met by: 3/19/24     Patient will increase functional independence with mobility by performin. Sit to stand transfer with Modified [place] with use of RW.   2. Bed to chair transfer with Modified [place] using Rolling Walker  3. Gait  x 150 feet with Modified [place] using Rolling Walker.     Outcome: Ongoing, Progressing     Pt participates in bed mobility, seated BLE therapeutic exercises, transfers to standing and ambulation with use of RW and CGA/SBA. Therapy will continue to progress pt as able.

## 2024-02-20 NOTE — ASSESSMENT & PLAN NOTE
Patient's anemia is currently controlled. Has not received any PRBCs to date. Etiology likely d/t Iron deficiency  Current CBC reviewed-   Lab Results   Component Value Date    HGB 11.1 (L) 02/20/2024    HCT 33.9 (L) 02/20/2024     Monitor serial CBC and transfuse if patient becomes hemodynamically unstable, symptomatic or H/H drops below 7/21.

## 2024-02-21 VITALS
RESPIRATION RATE: 16 BRPM | HEART RATE: 78 BPM | DIASTOLIC BLOOD PRESSURE: 67 MMHG | TEMPERATURE: 98 F | SYSTOLIC BLOOD PRESSURE: 113 MMHG | OXYGEN SATURATION: 99 % | BODY MASS INDEX: 26.02 KG/M2 | HEIGHT: 71 IN | WEIGHT: 185.88 LBS

## 2024-02-21 LAB
ANION GAP SERPL CALC-SCNC: 8 MMOL/L (ref 8–16)
APTT PPP: 29.9 SEC (ref 21–32)
BACTERIA BLD CULT: NORMAL
BACTERIA BLD CULT: NORMAL
BASOPHILS # BLD AUTO: 0.01 K/UL (ref 0–0.2)
BASOPHILS NFR BLD: 0.1 % (ref 0–1.9)
BUN SERPL-MCNC: 31 MG/DL (ref 8–23)
CALCIUM SERPL-MCNC: 8.6 MG/DL (ref 8.7–10.5)
CHLORIDE SERPL-SCNC: 102 MMOL/L (ref 95–110)
CO2 SERPL-SCNC: 25 MMOL/L (ref 23–29)
CREAT SERPL-MCNC: 1.4 MG/DL (ref 0.5–1.4)
DIFFERENTIAL METHOD BLD: ABNORMAL
EOSINOPHIL # BLD AUTO: 0 K/UL (ref 0–0.5)
EOSINOPHIL NFR BLD: 0.2 % (ref 0–8)
ERYTHROCYTE [DISTWIDTH] IN BLOOD BY AUTOMATED COUNT: 17.7 % (ref 11.5–14.5)
EST. GFR  (NO RACE VARIABLE): 51 ML/MIN/1.73 M^2
GLUCOSE SERPL-MCNC: 101 MG/DL (ref 70–110)
HCT VFR BLD AUTO: 33.9 % (ref 40–54)
HGB BLD-MCNC: 10.5 G/DL (ref 14–18)
IMM GRANULOCYTES # BLD AUTO: 0.03 K/UL (ref 0–0.04)
IMM GRANULOCYTES NFR BLD AUTO: 0.3 % (ref 0–0.5)
LYMPHOCYTES # BLD AUTO: 1.9 K/UL (ref 1–4.8)
LYMPHOCYTES NFR BLD: 19.2 % (ref 18–48)
MAGNESIUM SERPL-MCNC: 2.3 MG/DL (ref 1.6–2.6)
MCH RBC QN AUTO: 23 PG (ref 27–31)
MCHC RBC AUTO-ENTMCNC: 31 G/DL (ref 32–36)
MCV RBC AUTO: 74 FL (ref 82–98)
MONOCYTES # BLD AUTO: 0.8 K/UL (ref 0.3–1)
MONOCYTES NFR BLD: 8.5 % (ref 4–15)
NEUTROPHILS # BLD AUTO: 7 K/UL (ref 1.8–7.7)
NEUTROPHILS NFR BLD: 71.7 % (ref 38–73)
NRBC BLD-RTO: 0 /100 WBC
PHOSPHATE SERPL-MCNC: 3.6 MG/DL (ref 2.7–4.5)
PLATELET # BLD AUTO: 238 K/UL (ref 150–450)
PMV BLD AUTO: 10.1 FL (ref 9.2–12.9)
POCT GLUCOSE: 108 MG/DL (ref 70–110)
POCT GLUCOSE: 155 MG/DL (ref 70–110)
POTASSIUM SERPL-SCNC: 4.4 MMOL/L (ref 3.5–5.1)
RBC # BLD AUTO: 4.56 M/UL (ref 4.6–6.2)
SODIUM SERPL-SCNC: 135 MMOL/L (ref 136–145)
WBC # BLD AUTO: 9.81 K/UL (ref 3.9–12.7)

## 2024-02-21 PROCEDURE — 27000207 HC ISOLATION

## 2024-02-21 PROCEDURE — 11000001 HC ACUTE MED/SURG PRIVATE ROOM

## 2024-02-21 PROCEDURE — 25000003 PHARM REV CODE 250: Performed by: FAMILY MEDICINE

## 2024-02-21 PROCEDURE — 25000242 PHARM REV CODE 250 ALT 637 W/ HCPCS: Performed by: NURSE PRACTITIONER

## 2024-02-21 PROCEDURE — 27000221 HC OXYGEN, UP TO 24 HOURS

## 2024-02-21 PROCEDURE — 84100 ASSAY OF PHOSPHORUS: CPT | Performed by: NURSE PRACTITIONER

## 2024-02-21 PROCEDURE — 83735 ASSAY OF MAGNESIUM: CPT | Performed by: NURSE PRACTITIONER

## 2024-02-21 PROCEDURE — 63600175 PHARM REV CODE 636 W HCPCS: Performed by: NURSE PRACTITIONER

## 2024-02-21 PROCEDURE — 94640 AIRWAY INHALATION TREATMENT: CPT

## 2024-02-21 PROCEDURE — 97535 SELF CARE MNGMENT TRAINING: CPT | Mod: CO

## 2024-02-21 PROCEDURE — 25000003 PHARM REV CODE 250

## 2024-02-21 PROCEDURE — 99900035 HC TECH TIME PER 15 MIN (STAT)

## 2024-02-21 PROCEDURE — 85025 COMPLETE CBC W/AUTO DIFF WBC: CPT | Performed by: EMERGENCY MEDICINE

## 2024-02-21 PROCEDURE — 25000003 PHARM REV CODE 250: Performed by: STUDENT IN AN ORGANIZED HEALTH CARE EDUCATION/TRAINING PROGRAM

## 2024-02-21 PROCEDURE — 80048 BASIC METABOLIC PNL TOTAL CA: CPT | Performed by: NURSE PRACTITIONER

## 2024-02-21 PROCEDURE — 36415 COLL VENOUS BLD VENIPUNCTURE: CPT | Performed by: NURSE PRACTITIONER

## 2024-02-21 PROCEDURE — 85730 THROMBOPLASTIN TIME PARTIAL: CPT | Performed by: EMERGENCY MEDICINE

## 2024-02-21 PROCEDURE — 94664 DEMO&/EVAL PT USE INHALER: CPT

## 2024-02-21 PROCEDURE — 94761 N-INVAS EAR/PLS OXIMETRY MLT: CPT

## 2024-02-21 PROCEDURE — 27000646 HC AEROBIKA DEVICE

## 2024-02-21 PROCEDURE — 25000003 PHARM REV CODE 250: Performed by: NURSE PRACTITIONER

## 2024-02-21 PROCEDURE — 97530 THERAPEUTIC ACTIVITIES: CPT | Mod: CO

## 2024-02-21 RX ORDER — NAPROXEN SODIUM 220 MG/1
81 TABLET, FILM COATED ORAL DAILY
Qty: 90 TABLET | Refills: 3 | Status: SHIPPED | OUTPATIENT
Start: 2024-02-22 | End: 2025-02-21

## 2024-02-21 RX ORDER — PANTOPRAZOLE SODIUM 40 MG/1
40 TABLET, DELAYED RELEASE ORAL DAILY
Qty: 30 TABLET | Refills: 0 | Status: SHIPPED | OUTPATIENT
Start: 2024-02-22 | End: 2025-02-21

## 2024-02-21 RX ORDER — ATORVASTATIN CALCIUM 80 MG/1
80 TABLET, FILM COATED ORAL DAILY
Qty: 90 TABLET | Refills: 3 | Status: SHIPPED | OUTPATIENT
Start: 2024-02-22 | End: 2025-02-21

## 2024-02-21 RX ORDER — CLOPIDOGREL BISULFATE 75 MG/1
75 TABLET ORAL DAILY
Qty: 90 TABLET | Refills: 3 | Status: SHIPPED | OUTPATIENT
Start: 2024-02-22 | End: 2025-02-21

## 2024-02-21 RX ORDER — BENZONATATE 100 MG/1
100 CAPSULE ORAL 3 TIMES DAILY
Qty: 30 CAPSULE | Refills: 0 | Status: SHIPPED | OUTPATIENT
Start: 2024-02-21 | End: 2024-03-02

## 2024-02-21 RX ADMIN — ATORVASTATIN CALCIUM 80 MG: 40 TABLET, FILM COATED ORAL at 08:02

## 2024-02-21 RX ADMIN — PREGABALIN 100 MG: 50 CAPSULE ORAL at 08:02

## 2024-02-21 RX ADMIN — IPRATROPIUM BROMIDE AND ALBUTEROL SULFATE 3 ML: 2.5; .5 SOLUTION RESPIRATORY (INHALATION) at 08:02

## 2024-02-21 RX ADMIN — PREGABALIN 100 MG: 50 CAPSULE ORAL at 03:02

## 2024-02-21 RX ADMIN — FLUTICASONE FUROATE AND VILANTEROL TRIFENATATE 1 PUFF: 200; 25 POWDER RESPIRATORY (INHALATION) at 08:02

## 2024-02-21 RX ADMIN — CLOPIDOGREL BISULFATE 75 MG: 75 TABLET ORAL at 08:02

## 2024-02-21 RX ADMIN — PANTOPRAZOLE SODIUM 40 MG: 40 TABLET, DELAYED RELEASE ORAL at 08:02

## 2024-02-21 RX ADMIN — MUPIROCIN: 20 OINTMENT TOPICAL at 08:02

## 2024-02-21 RX ADMIN — BENZONATATE 100 MG: 100 CAPSULE ORAL at 09:02

## 2024-02-21 RX ADMIN — MULTIPLE VITAMINS W/ MINERALS TAB 1 TABLET: TAB at 08:02

## 2024-02-21 RX ADMIN — IPRATROPIUM BROMIDE AND ALBUTEROL SULFATE 3 ML: 2.5; .5 SOLUTION RESPIRATORY (INHALATION) at 02:02

## 2024-02-21 RX ADMIN — TIOTROPIUM BROMIDE INHALATION SPRAY 2 PUFF: 3.12 SPRAY, METERED RESPIRATORY (INHALATION) at 08:02

## 2024-02-21 RX ADMIN — DEXAMETHASONE 6 MG: 4 TABLET ORAL at 08:02

## 2024-02-21 RX ADMIN — ASPIRIN 81 MG CHEWABLE TABLET 81 MG: 81 TABLET CHEWABLE at 08:02

## 2024-02-21 RX ADMIN — BENZONATATE 100 MG: 100 CAPSULE ORAL at 01:02

## 2024-02-21 RX ADMIN — PREGABALIN 100 MG: 50 CAPSULE ORAL at 09:02

## 2024-02-21 RX ADMIN — BENZONATATE 100 MG: 100 CAPSULE ORAL at 05:02

## 2024-02-21 NOTE — PLAN OF CARE
AVS and educational attachments shared with patient via hospital room phone. Discussed thoroughly. Patient verbalized clear understanding using teach back method. Notified bedside nurse of completion of education. At present no distress noted. Patient to be discharged via arranged w/c transportation service and with all of patient's belonings. Will cont to be available to patient and family and intervene prn.

## 2024-02-21 NOTE — PLAN OF CARE
Patient on oxygen with documented flow.  Will attempt to wean per O2 order protocol. The proper method of use, as well as anticipated side effects, of this aerosol treatment are discussed and demonstrated to the patient.  Lung clearance therapy. The proper method of use, as well as anticipated side effects, of this metered-dose inhaler are discussed and demonstrated to the patient. Will continue to monitor.

## 2024-02-21 NOTE — PROGRESS NOTES
[facility]                    Pharmacy       Discharge Medication Education    Patient ACCEPTED medication education. Pharmacy has provided education on the name, indication, and possible side effects of the medication(s) prescribed, using teach-back method.     The following medications have also been discussed, during this admission.        Medication List        START taking these medications      atorvastatin 80 MG tablet  Commonly known as: LIPITOR  Take 1 tablet (80 mg total) by mouth once daily.  Start taking on: February 22, 2024     benzonatate 100 MG capsule  Commonly known as: TESSALON  Take 1 capsule (100 mg total) by mouth 3 (three) times daily. for 10 days     clopidogreL 75 mg tablet  Commonly known as: PLAVIX  Take 1 tablet (75 mg total) by mouth once daily.  Start taking on: February 22, 2024     pantoprazole 40 MG tablet  Commonly known as: PROTONIX  Take 1 tablet (40 mg total) by mouth once daily.  Start taking on: February 22, 2024            CHANGE how you take these medications      * aspirin 81 MG EC tablet  Commonly known as: ECOTRIN  What changed: Another medication with the same name was added. Make sure you understand how and when to take each.     * aspirin 81 MG Chew  Take 1 tablet (81 mg total) by mouth once daily.  Start taking on: February 22, 2024  What changed: You were already taking a medication with the same name, and this prescription was added. Make sure you understand how and when to take each.           * This list has 2 medication(s) that are the same as other medications prescribed for you. Read the directions carefully, and ask your doctor or other care provider to review them with you.                CONTINUE taking these medications      albuterol 90 mcg/actuation inhaler  Commonly known as: PROVENTIL/VENTOLIN HFA     fluticasone propionate 50 mcg/actuation nasal spray  Commonly known as: FLONASE     fluticasone-umeclidin-vilanter 100-62.5-25 mcg  Dsdv  Commonly known as: TRELEGY ELLIPTA  Inhale 1 puff into the lungs once daily.     furosemide 20 MG tablet  Commonly known as: LASIX  Take 1 tablet (20 mg total) by mouth once daily.     hydrOXYzine HCL 25 MG tablet  Commonly known as: ATARAX  Take 1 tablet (25 mg total) by mouth 3 (three) times daily as needed for Itching.     LIDOcaine 5 %  Commonly known as: LIDODERM     pregabalin 100 MG capsule  Commonly known as: LYRICA     tamsulosin 0.4 mg Cap  Commonly known as: FLOMAX  Take 1 capsule (0.4 mg total) by mouth once daily.            STOP taking these medications      celecoxib 200 MG capsule  Commonly known as: CeleBREX     traMADoL 50 mg tablet  Commonly known as: ULTRAM               Where to Get Your Medications        These medications were sent to [facility]  [address] [place] [address] [address] [place] [address]      Phone: [phone]   aspirin 81 MG Chew  atorvastatin 80 MG tablet  benzonatate 100 MG capsule  clopidogreL 75 mg tablet  fluticasone-umeclidin-vilanter 100-62.5-25 mcg Dsdv  pantoprazole 40 MG tablet          Thank you  Frank Mejias, PharmD  [phone]

## 2024-02-21 NOTE — PT/OT/SLP PROGRESS
"Occupational Therapy   Treatment    Name: Rajan Deluna  MRN: [id number]  Admitting Diagnosis:  Acute respiratory failure with hypoxia       Recommendations:     Discharge Recommendations: Low Intensity Therapy  Discharge Equipment Recommendations:  none  Barriers to discharge:  None    Assessment:     Rajan Deluna is a 78 y.o. male with a medical diagnosis of Acute respiratory failure with hypoxia.  Performance deficits affecting function are weakness, impaired endurance, impaired balance, impaired cardiopulmonary response to activity.     Rehab Prognosis:  Good; patient would benefit from acute skilled OT services to address these deficits and reach maximum level of function.       Plan:     Patient to be seen 5 x/week to address the above listed problems via self-care/home management, therapeutic activities, therapeutic exercises  Plan of Care Expires: 03/19/24  Plan of Care Reviewed with: patient    Subjective     Chief Complaint: "I want to go sit by that window"  Patient/Family Comments/goals: return to PLOF  Pain/Comfort:  Pain Rating 1: 0/10  Pain Rating Post-Intervention 1: 0/10    Objective:     Communicated with: Sloane peng prior to session.  Patient found HOB elevated with telemetry, oxygen upon OT entry to room.    General Precautions: Standard, airborne, contact, droplet, fall      Bed Mobility:    Patient completed Scooting/Bridging with modified independence  Patient completed Supine to Sit with modified independence     Functional Mobility/Transfers:  Patient completed Sit <> Stand Transfer with supervision  with  no assistive device   Patient completed Bed <> Chair Transfer using Step Transfer technique with supervision with no assistive device    Activities of Daily Living:  Lower Body Dressing: stand by assistance to magda underwear seated EOB in Fig 4 technique    [facility] 6 Click ADL: 22    Treatment & Education:  Educated on purpose/role of OT  Issued handout on energy conservation; reviewed " with patient  ADLs as above  Ambulated in room, sans AD, SBA; no LOB noted  VCs for O2 line safety  All questions answered within OT scope of practice    Patient left up in chair with all lines intact, call button in reach, and nsg notified    GOALS:   Multidisciplinary Problems       Occupational Therapy Goals          Problem: Occupational Therapy    Goal Priority Disciplines Outcome Interventions   Occupational Therapy Goal     OT, PT/OT Ongoing, Progressing    Description: Goals to be met by: 2/28/2024     Patient will increase functional independence with ADLs by performing:    UE Dressing with Modified [place].  LE Dressing with Modified [place].  Grooming while standing at sink with Modified [place].  Toileting from toilet with Modified McKean for hygiene and clothing management.   Toilet transfer to toilet with Modified [place]Ke[place].  Upper extremity exercise program x 10 reps per handout, with independence.  Implementation of energy conservation tech as needed with ADL and fx mobility.                         Time Tracking:     OT Date of Treatment: 02/21/24  OT Start Time: 1028  OT Stop Time: 1055  OT Total Time (min): 27 min    Billable Minutes:Self Care/Home Management 10  Therapeutic Activity 17          Number of MAYURI visits since last OT visit: 1    2/21/2024

## 2024-02-21 NOTE — PLAN OF CARE
Discharge orders noted. No DME ordered. Patient will be set up with [facility] for Home Health.  awaiting Home Health orders. PCP follow-up scheduled. Family will transport home at discharge. No further Case Management needs noted.     Per Dr. Correa's note: We will arrange for a PET stress test as an outpatient and follow up after that  Our office will reach out to the patient to arrange the PET stress test.     spoke with patient and son Rajan to review discharge information. All questions answered. Patient confirmed he is staying at [facility] in [facility] Address: [address] [place] [address], [address]. I will call son to ensure he have transport home (with 02). If not, then  will have to set up transport.    [employer] wheelchair van set up for 2 pm with oxygen. Son Rajan and patient was updated as well.    Lexi with [facility] aware of discharge today and place patient is staying. Will ask MD again for HH orders.    Patient Contacts    Name Relation Home Work Mobile   Rajan Deluna iii [phone]     MikieCarmencita Connell Relative [phone]     Brannon Deluna Brother [phone]       Future Appointments   Date Time Provider Department Center   6/20/2024 12:30 PM CARDIAC, PET IMAGING [facility]                  Name Relationship Specialty Phone Fax Address Order                [facility]   [phone] [address] [facility] [address]     Next Steps: Follow up  Instructions: Home Health [facility]        Jourdan Correa MD  Cardiology Interventional Cardiology [phone] [address] [place] [address]     Next Steps: Follow up  Instructions: Cardiology Follow-Up You will be contacted after PET test.        Jason Mccord MD PCP - General Family Medicine   [facility] [address] [facility] [address]     Next Steps: Follow up on  2/26/2024  Instructions: at 10:30 am, Primary Care Physician            02/21/24 1006   Final Note   Assessment Type Final Discharge Note   Anticipated Discharge Disposition Home-Health   Hospital Resources/Appts/Education Provided Appointments scheduled and added to AVS   Post-Acute Status   Post-Acute Authorization Home Health   Home Health Status   (Pending Home Health orders)   Discharge Delays None known at this time     Angelic Mcintosh RN    [phone]

## 2024-02-21 NOTE — PLAN OF CARE
VN note: VN completed AVS and attachments and notified bedside nurseSloane. Will cont to be available and intervene prn.

## 2024-02-22 LAB
OHS QRS DURATION: 150 MS
OHS QTC CALCULATION: 470 MS

## 2024-02-22 NOTE — PLAN OF CARE
Patient on oxygen with documented flow.  Will attempt to wean per O2 order protocol. The proper method of use, as well as anticipated side effects, of this aerosol treatment are discussed and demonstrated to the patient.  .

## 2024-03-03 NOTE — DISCHARGE SUMMARY
[facility] Discharge Summary    Attending Physician: Kimberly    Date of Admit: 2/15/2024  Date of Discharge: 2/22/2024    Discharge to: Home  Condition: Stable    Discharge Diagnoses     COVID-19 PNA  NSTEMI  COPD exacerbation    Consultants and Procedures     Consultants:  Pulm/Crit, Cardiology    Procedures:   None    Brief History of Present Illness       Rajan Deluna is a 78 y.o. male who  has a past medical history of Arthritis, COPD (chronic obstructive pulmonary disease), Diabetes mellitus, Digestive disorder, Encounter for blood transfusion, Hypertension, Renal disorder, Smoker, and Weakness.     The patient presents to the ED due to shortness of breath.  Patient was at the hotel where he lives when he began to feel short of breath approximately 6 hours ago.  He has a history of COPD/chronic bronchitis and uses 4 L of oxygen at baseline.  EMS reports that he was hypoxic in the 70s on his home oxygen.  He received 125 mg of IV Solu-Medrol and a DuoNeb and was transported here. Patient reports cough with chest tightness, fatigue, and chills.  He denies CP, fever, wheezing, palpitations, lightheadedness, dizziness.    For the full HPI please refer to the History & Physical from this admission.    Hospital Course By Problem with Pertinent Findings    Acute respiratory failure with hypoxia  Patient with Hypercapnic and Hypoxic Respiratory failure which is Acute on chronic.  he is on home oxygen at 4 LPM. Supplemental oxygen was provided and noted-         COVID-19  Patient is identified as High risk for severe complications of COVID 19 based on COVID risk score of 6     Management: Initiate targeted therapy with Remdesivir, 200mg IV x1, followed by 100mg IV daily x5 days total and Dexamethasone PO/IV 6mg daily until discharge. O2 requirements significantly improved     Advance Care Planning Current advance care plan has not been discussed with patient/family/POA and patient currently remains  "Full Code.      NSTEMI (non-ST elevated myocardial infarction)  Patient denies CP  Trop 3.233 > 12.568  Will continue trending  ASA loading dose given  EDP discussed case with cardiology  No plavix d/t 3 vessel dz  Heparin gtt     EKG: sinus rhythm w/ 1st degree AVB, HR 94, left axis deviation, RBBB, no STEMI  Cards consult:  We will recommend medical therapy for NSTEMI at this time.  Given ongoing respiratory failure we will not proceed with invasive approach till his respiratory status completely improves  Echocardiogram for EF and wall motion  Heparin drip for 48 hours completed, Cont asa and plavix and statin. F/U with cards as outpt.   Stress test as outpt        COPD exacerbation-Improved   Patient's COPD is with exacerbation noted by use of accessory muscles for breathing and worsening of baseline hypoxia currently.      Type 2 diabetes mellitus  Patient's FSGs are controlled on current medication regimen.  Last A1c reviewed-         Lab Results   Component Value Date     HGBA1C 5.9 (H) 02/16/2024      Most recent fingerstick glucose reviewed-         Recent Labs   Lab 02/18/24  2055 02/19/24  0540 02/19/24  1151   POCTGLUCOSE 114* 106 106           COPD (chronic obstructive pulmonary disease)  Patient's COPD is controlled currently.  Patient is currently off COPD Pathway. Continue scheduled inhalers Antibiotics and Supplemental oxygen and monitor respiratory status closely.      Anemia  Patient's anemia is currently controlled. Has not received any PRBCs to date. Etiology likely d/t Iron deficiency  Current CBC reviewed-     Discharge Time: Greater than 30 minutes? Yes    /67 (BP Location: Right arm, Patient Position: Lying)   Pulse 78   Temp 97.8 °F (36.6 °C) (Oral)   Resp 16   Ht 5' 11" (1.803 m)   Wt 84.3 kg (185 lb 13.6 oz)   SpO2 99%   BMI 25.92 kg/m²       Discharge Medications        Medication List        START taking these medications      atorvastatin 80 MG tablet  Commonly known as: " LIPITOR  Take 1 tablet (80 mg total) by mouth once daily.     benzonatate 100 MG capsule  Commonly known as: TESSALON  Take 1 capsule (100 mg total) by mouth 3 (three) times daily. for 10 days     clopidogreL 75 mg tablet  Commonly known as: PLAVIX  Take 1 tablet (75 mg total) by mouth once daily.     pantoprazole 40 MG tablet  Commonly known as: PROTONIX  Take 1 tablet (40 mg total) by mouth once daily.            CHANGE how you take these medications      * aspirin 81 MG EC tablet  Commonly known as: ECOTRIN  What changed: Another medication with the same name was added. Make sure you understand how and when to take each.     * aspirin 81 MG Chew  Take 1 tablet (81 mg total) by mouth once daily.  What changed: You were already taking a medication with the same name, and this prescription was added. Make sure you understand how and when to take each.           * This list has 2 medication(s) that are the same as other medications prescribed for you. Read the directions carefully, and ask your doctor or other care provider to review them with you.                CONTINUE taking these medications      albuterol 90 mcg/actuation inhaler  Commonly known as: PROVENTIL/VENTOLIN HFA     fluticasone propionate 50 mcg/actuation nasal spray  Commonly known as: FLONASE     fluticasone-umeclidin-vilanter 100-62.5-25 mcg Dsdv  Commonly known as: TRELEGY ELLIPTA  Inhale 1 puff into the lungs once daily.     furosemide 20 MG tablet  Commonly known as: LASIX  Take 1 tablet (20 mg total) by mouth once daily.     hydrOXYzine HCL 25 MG tablet  Commonly known as: ATARAX  Take 1 tablet (25 mg total) by mouth 3 (three) times daily as needed for Itching.     LIDOcaine 5 %  Commonly known as: LIDODERM     pregabalin 100 MG capsule  Commonly known as: LYRICA     tamsulosin 0.4 mg Cap  Commonly known as: FLOMAX  Take 1 capsule (0.4 mg total) by mouth once daily.            STOP taking these medications      celecoxib 200 MG  capsule  Commonly known as: CeleBREX     traMADoL 50 mg tablet  Commonly known as: ULTRAM               Where to Get Your Medications        These medications were sent to [facility] - [address]  [address] [address]      Phone: [phone]   aspirin 81 MG Chew  atorvastatin 80 MG tablet  benzonatate 100 MG capsule  clopidogreL 75 mg tablet  fluticasone-umeclidin-vilanter 100-62.5-25 mcg Dsdv  pantoprazole 40 MG tablet         Discharge Information:   Diet:  Cardiac    Physical Activity:  As tolerated    Instructions:  1. Take all medications as prescribed  2. Keep all follow-up appointments  3. Return to the hospital or call your primary care physicians if any worsening symptoms such as fevers, chills, sweats, chest pain, SOB, dizziness or other concerns occur.      Follow-Up Appointments:  PCP in 1 week      Follow up items for PCP and tests that have not resulted at time of discharge:   None      Vicki Harris MD  [facility] Medicine

## 2024-03-25 NOTE — ASSESSMENT & PLAN NOTE
Patient's anemia is currently controlled. Has not received any PRBCs to date. Etiology likely d/t Iron deficiency  Current CBC reviewed-   Lab Results   Component Value Date    HGB 10.5 (L) 02/21/2024    HCT 33.9 (L) 02/21/2024     Monitor serial CBC and transfuse if patient becomes hemodynamically unstable, symptomatic or H/H drops below 7/21.

## 2024-03-25 NOTE — SUBJECTIVE & OBJECTIVE
Interval History: No new complaints    Review of Systems   Constitutional:  Negative for activity change, appetite change, chills and fever.   HENT:  Positive for congestion.    Respiratory:  Positive for cough. Negative for chest tightness, shortness of breath and wheezing.    Cardiovascular:  Negative for chest pain, palpitations and leg swelling.   Gastrointestinal:  Negative for abdominal pain, nausea and vomiting.   Musculoskeletal:  Negative for back pain and gait problem.   Neurological:  Negative for dizziness, weakness, numbness and headaches.   Psychiatric/Behavioral:  Negative for agitation, confusion and hallucinations.    All other systems reviewed and are negative.    Objective:     Vital Signs (Most Recent):  Temp: 97.8 °F (36.6 °C) (02/21/24 2341)  Pulse: 78 (02/21/24 2341)  Resp: 16 (02/21/24 2341)  BP: 113/67 (02/21/24 2341)  SpO2: 99 % (02/21/24 2341) Vital Signs (24h Range):        Weight: 84.3 kg (185 lb 13.6 oz)  Body mass index is 25.92 kg/m².  No intake or output data in the 24 hours ending 03/25/24 1206      Physical Exam  Vitals and nursing note reviewed.   Constitutional:       General: He is not in acute distress.     Appearance: He is not toxic-appearing.   HENT:      Head: Normocephalic and atraumatic.      Nose: No congestion.      Mouth/Throat:      Mouth: Mucous membranes are moist.      Pharynx: No oropharyngeal exudate.   Eyes:      Extraocular Movements: Extraocular movements intact.      Pupils: Pupils are equal, round, and reactive to light.   Cardiovascular:      Rate and Rhythm: Normal rate and regular rhythm.      Heart sounds: No murmur heard.     No friction rub. No gallop.   Pulmonary:      Effort: Pulmonary effort is normal. No respiratory distress.      Breath sounds: No wheezing or rales.   Chest:      Chest wall: No tenderness.   Abdominal:      General: Bowel sounds are normal. There is no distension.      Palpations: Abdomen is soft.      Tenderness: There is no  "abdominal tenderness. There is no guarding or rebound.   Musculoskeletal:         General: No swelling or tenderness.      Cervical back: No rigidity or tenderness.      Right lower leg: No edema.      Left lower leg: No edema.   Neurological:      General: No focal deficit present.      Mental Status: He is alert and oriented to person, place, and time.      Cranial Nerves: No cranial nerve deficit.      Motor: No weakness.      Coordination: Coordination normal.   Psychiatric:         Thought Content: Thought content normal.             Significant Labs: All pertinent labs within the past 24 hours have been reviewed.  CBC: No results for input(s): "WBC", "HGB", "HCT", "PLT" in the last 48 hours.  CMP: No results for input(s): "NA", "K", "CL", "CO2", "GLU", "BUN", "CREATININE", "CALCIUM", "PROT", "ALBUMIN", "BILITOT", "ALKPHOS", "AST", "ALT", "ANIONGAP", "EGFRNONAA" in the last 48 hours.    Invalid input(s): "ESTGFAFRICA"    Significant Imaging: I have reviewed all pertinent imaging results/findings within the past 24 hours.  "

## 2024-03-25 NOTE — PROGRESS NOTES
[facility]  Progress Note    Patient Name: Rajan Deluna  MRN: [id number]  Patient Class: IP- Inpatient   Admission Date: 2/15/2024  Length of Stay: 7 days  Attending Physician: No att. providers found  Primary Care Provider: Jason Mccord MD        Subjective:     Principal Problem:Acute respiratory failure with hypoxia        HPI:  Rajan Deluna is a 78 y.o. male who  has a past medical history of Arthritis, COPD (chronic obstructive pulmonary disease), Diabetes mellitus, Digestive disorder, Encounter for blood transfusion, Hypertension, Renal disorder, Smoker, and Weakness.     The patient presents to the ED due to shortness of breath.  Patient was at the hotel where he lives when he began to feel short of breath approximately 6 hours ago.  He has a history of COPD/chronic bronchitis and uses 4 L of oxygen at baseline.  EMS reports that he was hypoxic in the 70s on his home oxygen.  He received 125 mg of IV Solu-Medrol and a DuoNeb and was transported here. Patient reports cough with chest tightness, fatigue, and chills.  He denies CP, fever, wheezing, palpitations, lightheadedness, dizziness.    Overview/Hospital Course:  Pt admitted and off from bipap. Cont on iv abx, nebs, steroids, and remdesvir. Still on heparin drip for NSTEMi. Cards consulted and recommend medical therapy for NSTEMI at this time. Given ongoing respiratory failure we will not proceed with invasive approach till his respiratory status completely improves   2/17/24: pt on 8LPM. Home oxygen of 4LPm. Heparin drip for 48 hours, Cont asa and plavix and statin. F/U with cards as outpt.   2/18: heparin drip done. Cards plan for outpt stress test. Pt now on 4.5 LPM. Sob improving  2/20 Stable, feeling better but still on 4.5LPM O2, on 4 liters at home  2/21 satble    Interval History: No new complaints    Review of Systems   Constitutional:  Negative for activity change, appetite change, chills and fever.   HENT:   Positive for congestion.    Respiratory:  Positive for cough. Negative for chest tightness, shortness of breath and wheezing.    Cardiovascular:  Negative for chest pain, palpitations and leg swelling.   Gastrointestinal:  Negative for abdominal pain, nausea and vomiting.   Musculoskeletal:  Negative for back pain and gait problem.   Neurological:  Negative for dizziness, weakness, numbness and headaches.   Psychiatric/Behavioral:  Negative for agitation, confusion and hallucinations.    All other systems reviewed and are negative.    Objective:     Vital Signs (Most Recent):  Temp: 97.8 °F (36.6 °C) (02/21/24 2341)  Pulse: 78 (02/21/24 2341)  Resp: 16 (02/21/24 2341)  BP: 113/67 (02/21/24 2341)  SpO2: 99 % (02/21/24 2341) Vital Signs (24h Range):        Weight: 84.3 kg (185 lb 13.6 oz)  Body mass index is 25.92 kg/m².  No intake or output data in the 24 hours ending 03/25/24 1206      Physical Exam  Vitals and nursing note reviewed.   Constitutional:       General: He is not in acute distress.     Appearance: He is not toxic-appearing.   HENT:      Head: Normocephalic and atraumatic.      Nose: No congestion.      Mouth/Throat:      Mouth: Mucous membranes are moist.      Pharynx: No oropharyngeal exudate.   Eyes:      Extraocular Movements: Extraocular movements intact.      Pupils: Pupils are equal, round, and reactive to light.   Cardiovascular:      Rate and Rhythm: Normal rate and regular rhythm.      Heart sounds: No murmur heard.     No friction rub. No gallop.   Pulmonary:      Effort: Pulmonary effort is normal. No respiratory distress.      Breath sounds: No wheezing or rales.   Chest:      Chest wall: No tenderness.   Abdominal:      General: Bowel sounds are normal. There is no distension.      Palpations: Abdomen is soft.      Tenderness: There is no abdominal tenderness. There is no guarding or rebound.   Musculoskeletal:         General: No swelling or tenderness.      Cervical back: No rigidity or  "tenderness.      Right lower leg: No edema.      Left lower leg: No edema.   Neurological:      General: No focal deficit present.      Mental Status: He is alert and oriented to person, place, and time.      Cranial Nerves: No cranial nerve deficit.      Motor: No weakness.      Coordination: Coordination normal.   Psychiatric:         Thought Content: Thought content normal.             Significant Labs: All pertinent labs within the past 24 hours have been reviewed.  CBC: No results for input(s): "WBC", "HGB", "HCT", "PLT" in the last 48 hours.  CMP: No results for input(s): "NA", "K", "CL", "CO2", "GLU", "BUN", "CREATININE", "CALCIUM", "PROT", "ALBUMIN", "BILITOT", "ALKPHOS", "AST", "ALT", "ANIONGAP", "EGFRNONAA" in the last 48 hours.    Invalid input(s): "ESTGFAFRICA"    Significant Imaging: I have reviewed all pertinent imaging results/findings within the past 24 hours.    Assessment/Plan:      * Acute respiratory failure with hypoxia  Patient with Hypercapnic and Hypoxic Respiratory failure which is Acute on chronic.  he is on home oxygen at 4 LPM. Supplemental oxygen was provided and noted-      .   Signs/symptoms of respiratory failure include- tachypnea, increased work of breathing, and respiratory distress. Contributing diagnoses includes - COPD and COVID  Labs and images were reviewed. Patient Has recent ABG, which has been reviewed. Will treat underlying causes and adjust management of respiratory failure as follows- BiPAP, steroids, breathing treatments, ABX, remdesivir    COVID-19  Patient is identified as High risk for severe complications of COVID 19 based on COVID risk score of 6   Initiate standard COVID protocols; COVID-19 testing ,Infection Control notification  and isolation- respiratory, contact and droplet per protocol    Diagnostics: CBC, CMP, Ferritin, CRP, LDH, BNP, Troponin, and Portable CXR    Management: Initiate targeted therapy with Remdesivir, 200mg IV x1, followed by 100mg IV daily " x5 days total and Dexamethasone PO/IV 6mg daily x10 days, Maintain oxygen saturations 92-96% via NIPPV- CPAP;Standby  % FiO2 and monitor with continuous/intermittent pulse oximetry. , Inhaled bronchodilators as needed for shortness of breath., Continuous cardiac monitoring., and Manage respiratory failure (O2 requirement >10LPM or needing NIPPV/Mechanical ventilation) and/or Pneumonia (active chest infiltrates) separately as described below.    Advance Care Planning Current advance care plan has not been discussed with patient/family/POA and patient currently remains Full Code.     NSTEMI (non-ST elevated myocardial infarction)  Patient denies CP  Trop 3.233 > 12.568  Will continue trending  ASA loading dose given  EDP discussed case with cardiology  No plavix d/t 3 vessel dz  Heparin gtt    EKG: sinus rhythm w/ 1st degree AVB, HR 94, left axis deviation, RBBB, no STEMI  Cards consult:  We will recommend medical therapy for NSTEMI at this time.  Given ongoing respiratory failure we will not proceed with invasive approach till his respiratory status completely improves     Echocardiogram for EF and wall motion    Stress test as outpt    Heparin drip for 48 hours, Cont asa and plavix and statin. F/U with cards as outpt.       COPD exacerbation  Patient's COPD is with exacerbation noted by use of accessory muscles for breathing and worsening of baseline hypoxia currently.  Patient is currently on COPD Pathway. Continue scheduled inhalers Steroids, Antibiotics, and Supplemental oxygen and monitor respiratory status closely.     Type 2 diabetes mellitus  Patient's FSGs are controlled on current medication regimen.  Last A1c reviewed-   Lab Results   Component Value Date    HGBA1C 5.9 (H) 02/16/2024     Most recent fingerstick glucose reviewed-   Recent Labs   Lab 02/18/24 2055 02/19/24  0540 02/19/24  1151   POCTGLUCOSE 114* 106 106     Current correctional scale  Low  Maintain anti-hyperglycemic dose as follows-    Antihyperglycemics (From admission, onward)      Start     Stop Route Frequency Ordered    02/16/24 0118  insulin aspart U-100 pen 0-5 Units         -- SubQ Every 6 hours PRN 02/16/24 0019          Hold Oral hypoglycemics while patient is in the hospital.    COPD (chronic obstructive pulmonary disease)  Patient's COPD is controlled currently.  Patient is currently off COPD Pathway. Continue scheduled inhalers Antibiotics and Supplemental oxygen and monitor respiratory status closely.     Anemia  Patient's anemia is currently controlled. Has not received any PRBCs to date. Etiology likely d/t Iron deficiency  Current CBC reviewed-   Lab Results   Component Value Date    HGB 10.5 (L) 02/21/2024    HCT 33.9 (L) 02/21/2024     Monitor serial CBC and transfuse if patient becomes hemodynamically unstable, symptomatic or H/H drops below 7/21.      VTE Risk Mitigation (From admission, onward)           Ordered     IP VTE HIGH RISK PATIENT  Once         02/15/24 2256     heparin 25,000 units in dextrose 5% 250 mL (100 units/mL) infusion LOW INTENSITY nomogram - [facility]  Continuous        Question:  Begin at (units/kg/hr)  Answer:  12    02/15/24 2044                    Discharge Planning   ADAM: 2/21/2024     Code Status: Prior   Is the patient medically ready for discharge?:     Reason for patient still in hospital (select all that apply): Patient trending condition  Discharge Plan A: Home   Discharge Delays: None known at this time              Vicki Harris MD  Department of Hospital Medicine   [facility]

## 2024-04-24 ENCOUNTER — EXTERNAL HOME HEALTH (OUTPATIENT)
Dept: HOME HEALTH SERVICES | Facility: HOSPITAL | Age: 79
End: 2024-04-24
Payer: MEDICARE

## 2024-05-20 PROBLEM — I21.4 NSTEMI (NON-ST ELEVATED MYOCARDIAL INFARCTION): Status: RESOLVED | Noted: 2024-02-15 | Resolved: 2024-05-20

## 2024-05-30 ENCOUNTER — PATIENT MESSAGE (OUTPATIENT)
Dept: RADIOLOGY | Facility: HOSPITAL | Age: 79
End: 2024-05-30
Payer: MEDICARE

## 2024-05-30 DIAGNOSIS — I20.89 OTHER FORMS OF ANGINA PECTORIS: Primary | ICD-10-CM

## 2024-09-03 NOTE — PT/OT/SLP PROGRESS
Problem: Gas Exchange Impaired  Goal: Optimal Gas Exchange  Outcome: Ongoing, Progressing     Problem: Obstructive Sleep Apnea Risk or Actual  Goal: Unobstructed Breathing During Sleep  Outcome: Ongoing, Progressing   Patient in no apparent distress. Sat's  95 % on room air. He wears home cpap with 6 lpm bleed in at home. PRN treatments not needed. Breo daily . Will continue to monitor.    Physical Therapy Treatment    Patient Name:  Rajan Deluna   MRN:  [id number]    Recommendations:     Discharge Recommendations: Low Intensity Therapy  Discharge Equipment Recommendations: none  Barriers to discharge: None    Assessment:     Rajan Deluna is a 78 y.o. male admitted with a medical diagnosis of Acute respiratory failure with hypoxia.  He presents with the following impairments/functional limitations: weakness, impaired endurance, impaired balance, impaired cardiopulmonary response to activity.    Pt participates in bed mobility, seated BLE therapeutic exercises, transfers to standing and ambulation with use of RW and CGA/SBA. Therapy will continue to progress pt as able.     Rehab Prognosis: Good; patient would benefit from acute skilled PT services to address these deficits and reach maximum level of function.    Recent Surgery: * No surgery found *      Plan:     During this hospitalization, patient to be seen 3 x/week to address the identified rehab impairments via gait training, therapeutic activities, therapeutic exercises, neuromuscular re-education and progress toward the following goals:    Plan of Care Expires:  03/19/24    Subjective     Chief Complaint: none  Patient/Family Comments/goals: to return home  Pain/Comfort:  Pain Rating 1: 0/10  Pain Rating Post-Intervention 1: 0/10      Objective:     Communicated with Nurse prior to session.  Patient found HOB elevated with bed alarm, oxygen, telemetry upon PT entry to room.     General Precautions: Standard, fall, diabetic, airborne, droplet, hearing impaired, contact  Orthopedic Precautions: N/A  Braces: N/A  Respiratory Status: Nasal cannula, flow 4.5 L/min     Functional Mobility:  Bed Mobility:     Supine to Sit: modified independence  Sit to Supine: modified independence  Transfers:     Sit to Stand:  stand by assistance with rolling walker  Gait: ~60ft in room; multiple laps to complete distance due to covid+ precautions; with use of RW  and SBA      AM-PAC 6 CLICK MOBILITY  Turning over in bed (including adjusting bedclothes, sheets and blankets)?: 4  Sitting down on and standing up from a chair with arms (e.g., wheelchair, bedside commode, etc.): 3  Moving from lying on back to sitting on the side of the bed?: 4  Moving to and from a bed to a chair (including a wheelchair)?: 3  Need to walk in hospital room?: 3  Climbing 3-5 steps with a railing?: 3  Basic Mobility Total Score: 20       Treatment & Education:  Pt participates in mobility as stated above.   Pt participates in seated BLE marches, knee extension kicks, and ankle pumps; 2x20 with seated rest breaks between as needed.   Pt has no complaints of SOB or pain.   PCT present at end of session to deliver lunch tray.  Pt sitting up in bed with lunch tray set up at end of session.     Patient left HOB elevated with all lines intact, call button in reach, bed alarm on, and Nurse notified.    GOALS:   Multidisciplinary Problems       Physical Therapy Goals          Problem: Physical Therapy    Goal Priority Disciplines Outcome Goal Variances Interventions   Physical Therapy Goal     PT, PT/OT Ongoing, Progressing     Description: Goals to be met by: 3/19/24     Patient will increase functional independence with mobility by performin. Sit to stand transfer with Modified [place] with use of RW.   2. Bed to chair transfer with Modified [place] using Rolling Walker  3. Gait  x 150 feet with Modified [place] using Rolling Walker.                          Time Tracking:     PT Received On: 24  PT Start Time: 1133     PT Stop Time: 1154  PT Total Time (min): 21 min     Billable Minutes: Gait Training 11 and Therapeutic Exercise 10    Treatment Type: Treatment  PT/PTA: PT     Number of PTA visits since last PT visit: 0     2024   None
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
## 2024-09-16 ENCOUNTER — PATIENT OUTREACH (OUTPATIENT)
Dept: ADMINISTRATIVE | Facility: CLINIC | Age: 79
End: 2024-09-16
Payer: MEDICARE

## 2024-09-16 NOTE — PROGRESS NOTES
C3 nurse attempted to contact Rajan Deluna for a TCC post hospital discharge follow up call. No answer. The patient does not have a scheduled HOSFU appointment, and the pt does not have an [facility] PCP.

## 2025-06-02 NOTE — ASSESSMENT & PLAN NOTE
Add naproxen scheduled BID  Continue lidocaine patch, tylenol for pain  Stable pulses, ROM limited by patient effort.     Patient is being discharged to home with . Discharge paperwork reviewed with patient.  All questions answered at time of discharge.